# Patient Record
Sex: FEMALE | Race: WHITE | NOT HISPANIC OR LATINO | Employment: UNEMPLOYED | URBAN - METROPOLITAN AREA
[De-identification: names, ages, dates, MRNs, and addresses within clinical notes are randomized per-mention and may not be internally consistent; named-entity substitution may affect disease eponyms.]

---

## 2017-02-15 ENCOUNTER — GENERIC CONVERSION - ENCOUNTER (OUTPATIENT)
Dept: OTHER | Facility: OTHER | Age: 82
End: 2017-02-15

## 2017-02-16 ENCOUNTER — ALLSCRIPTS OFFICE VISIT (OUTPATIENT)
Dept: OTHER | Facility: OTHER | Age: 82
End: 2017-02-16

## 2017-02-27 ENCOUNTER — APPOINTMENT (INPATIENT)
Dept: RADIOLOGY | Facility: HOSPITAL | Age: 82
DRG: 580 | End: 2017-02-27
Payer: MEDICARE

## 2017-02-27 ENCOUNTER — ANESTHESIA EVENT (INPATIENT)
Dept: PERIOP | Facility: HOSPITAL | Age: 82
DRG: 580 | End: 2017-02-27
Payer: MEDICARE

## 2017-02-27 ENCOUNTER — ANESTHESIA (INPATIENT)
Dept: PERIOP | Facility: HOSPITAL | Age: 82
DRG: 580 | End: 2017-02-27
Payer: MEDICARE

## 2017-02-27 ENCOUNTER — HOSPITAL ENCOUNTER (INPATIENT)
Facility: HOSPITAL | Age: 82
LOS: 4 days | Discharge: HOME HEALTH | DRG: 580 | End: 2017-03-03
Attending: INTERNAL MEDICINE | Admitting: INTERNAL MEDICINE
Payer: MEDICARE

## 2017-02-27 DIAGNOSIS — L03.90 CELLULITIS: Primary | ICD-10-CM

## 2017-02-27 DIAGNOSIS — L02.91 ABSCESS: ICD-10-CM

## 2017-02-27 DIAGNOSIS — G35 MULTIPLE SCLEROSIS (HCC): ICD-10-CM

## 2017-02-27 PROBLEM — L03.312 CELLULITIS OF BACK: Status: ACTIVE | Noted: 2017-02-27

## 2017-02-27 PROBLEM — I10 BENIGN ESSENTIAL HYPERTENSION: Status: ACTIVE | Noted: 2017-02-27

## 2017-02-27 PROBLEM — E78.5 HYPERLIPEMIA: Status: ACTIVE | Noted: 2017-02-27

## 2017-02-27 PROBLEM — I48.91 ATRIAL FIBRILLATION (HCC): Status: ACTIVE | Noted: 2017-02-27

## 2017-02-27 PROBLEM — E13.9 DIABETES 1.5, MANAGED AS TYPE 2 (HCC): Status: ACTIVE | Noted: 2017-02-27

## 2017-02-27 PROBLEM — I16.9 HYPERTENSIVE CRISIS: Status: ACTIVE | Noted: 2017-02-27

## 2017-02-27 LAB
ALBUMIN SERPL BCP-MCNC: 3 G/DL (ref 3.5–5)
ALP SERPL-CCNC: 113 U/L (ref 46–116)
ALT SERPL W P-5'-P-CCNC: 30 U/L (ref 12–78)
ANION GAP SERPL CALCULATED.3IONS-SCNC: 7 MMOL/L (ref 4–13)
APTT PPP: 29 SECONDS (ref 24–33)
AST SERPL W P-5'-P-CCNC: 19 U/L (ref 5–45)
BASOPHILS # BLD AUTO: 0 THOUSANDS/ΜL (ref 0–0.1)
BASOPHILS NFR BLD AUTO: 0 % (ref 0–1)
BILIRUB SERPL-MCNC: 0.3 MG/DL (ref 0.2–1)
BUN SERPL-MCNC: 13 MG/DL (ref 5–25)
CALCIUM SERPL-MCNC: 9.1 MG/DL (ref 8.3–10.1)
CHLORIDE SERPL-SCNC: 102 MMOL/L (ref 100–108)
CO2 SERPL-SCNC: 31 MMOL/L (ref 21–32)
CREAT SERPL-MCNC: 0.7 MG/DL (ref 0.6–1.3)
EOSINOPHIL # BLD AUTO: 0.1 THOUSAND/ΜL (ref 0–0.61)
EOSINOPHIL NFR BLD AUTO: 1 % (ref 0–6)
ERYTHROCYTE [DISTWIDTH] IN BLOOD BY AUTOMATED COUNT: 15.3 % (ref 11.6–15.1)
GFR SERPL CREATININE-BSD FRML MDRD: >60 ML/MIN/1.73SQ M
GLUCOSE SERPL-MCNC: 142 MG/DL (ref 65–140)
HCT VFR BLD AUTO: 41.3 % (ref 37–47)
HGB BLD-MCNC: 13.6 G/DL (ref 12–16)
INR PPP: 0.99 (ref 0.86–1.16)
LACTATE SERPL-SCNC: 1.8 MMOL/L (ref 0.5–2)
LYMPHOCYTES # BLD AUTO: 1.1 THOUSANDS/ΜL (ref 0.6–4.47)
LYMPHOCYTES NFR BLD AUTO: 10 % (ref 14–44)
MCH RBC QN AUTO: 27.9 PG (ref 27–31)
MCHC RBC AUTO-ENTMCNC: 32.9 G/DL (ref 31.4–37.4)
MCV RBC AUTO: 85 FL (ref 82–98)
MONOCYTES # BLD AUTO: 0.7 THOUSAND/ΜL (ref 0.17–1.22)
MONOCYTES NFR BLD AUTO: 7 % (ref 4–12)
NEUTROPHILS # BLD AUTO: 9.5 THOUSANDS/ΜL (ref 1.85–7.62)
NEUTS SEG NFR BLD AUTO: 82 % (ref 43–75)
NRBC BLD AUTO-RTO: 0 /100 WBCS
PLATELET # BLD AUTO: 195 THOUSANDS/UL (ref 130–400)
PMV BLD AUTO: 9.9 FL (ref 8.9–12.7)
POTASSIUM SERPL-SCNC: 5.1 MMOL/L (ref 3.5–5.3)
PROT SERPL-MCNC: 7.9 G/DL (ref 6.4–8.2)
PROTHROMBIN TIME: 10.4 SECONDS (ref 9.4–11.7)
RBC # BLD AUTO: 4.87 MILLION/UL (ref 4.2–5.4)
SODIUM SERPL-SCNC: 140 MMOL/L (ref 136–145)
WBC # BLD AUTO: 11.6 THOUSAND/UL (ref 4.8–10.8)

## 2017-02-27 PROCEDURE — 87070 CULTURE OTHR SPECIMN AEROBIC: CPT | Performed by: SPECIALIST

## 2017-02-27 PROCEDURE — 85025 COMPLETE CBC W/AUTO DIFF WBC: CPT | Performed by: PHYSICIAN ASSISTANT

## 2017-02-27 PROCEDURE — 36415 COLL VENOUS BLD VENIPUNCTURE: CPT | Performed by: PHYSICIAN ASSISTANT

## 2017-02-27 PROCEDURE — 80053 COMPREHEN METABOLIC PANEL: CPT | Performed by: PHYSICIAN ASSISTANT

## 2017-02-27 PROCEDURE — 99284 EMERGENCY DEPT VISIT MOD MDM: CPT

## 2017-02-27 PROCEDURE — 96365 THER/PROPH/DIAG IV INF INIT: CPT

## 2017-02-27 PROCEDURE — 87205 SMEAR GRAM STAIN: CPT | Performed by: SPECIALIST

## 2017-02-27 PROCEDURE — 71020 HB CHEST X-RAY 2VW FRONTAL&LATL: CPT

## 2017-02-27 PROCEDURE — 87040 BLOOD CULTURE FOR BACTERIA: CPT | Performed by: PHYSICIAN ASSISTANT

## 2017-02-27 PROCEDURE — 83605 ASSAY OF LACTIC ACID: CPT | Performed by: PHYSICIAN ASSISTANT

## 2017-02-27 PROCEDURE — 87176 TISSUE HOMOGENIZATION CULTR: CPT | Performed by: SPECIALIST

## 2017-02-27 PROCEDURE — 87147 CULTURE TYPE IMMUNOLOGIC: CPT | Performed by: SPECIALIST

## 2017-02-27 PROCEDURE — 85730 THROMBOPLASTIN TIME PARTIAL: CPT | Performed by: PHYSICIAN ASSISTANT

## 2017-02-27 PROCEDURE — 0J960ZZ DRAINAGE OF CHEST SUBCUTANEOUS TISSUE AND FASCIA, OPEN APPROACH: ICD-10-PCS | Performed by: SPECIALIST

## 2017-02-27 PROCEDURE — 85610 PROTHROMBIN TIME: CPT | Performed by: PHYSICIAN ASSISTANT

## 2017-02-27 PROCEDURE — 87186 SC STD MICRODIL/AGAR DIL: CPT | Performed by: SPECIALIST

## 2017-02-27 PROCEDURE — 87075 CULTR BACTERIA EXCEPT BLOOD: CPT | Performed by: SPECIALIST

## 2017-02-27 RX ORDER — PROPOFOL 10 MG/ML
INJECTION, EMULSION INTRAVENOUS AS NEEDED
Status: DISCONTINUED | OUTPATIENT
Start: 2017-02-27 | End: 2017-02-27 | Stop reason: SURG

## 2017-02-27 RX ORDER — LABETALOL HYDROCHLORIDE 5 MG/ML
10 INJECTION, SOLUTION INTRAVENOUS EVERY 4 HOURS PRN
Status: DISCONTINUED | OUTPATIENT
Start: 2017-02-27 | End: 2017-02-27

## 2017-02-27 RX ORDER — AMLODIPINE BESYLATE 10 MG/1
10 TABLET ORAL DAILY
Status: DISCONTINUED | OUTPATIENT
Start: 2017-02-28 | End: 2017-03-03 | Stop reason: HOSPADM

## 2017-02-27 RX ORDER — LISINOPRIL 20 MG/1
20 TABLET ORAL 2 TIMES DAILY
COMMUNITY
End: 2018-04-20 | Stop reason: SDUPTHER

## 2017-02-27 RX ORDER — OXYCODONE HYDROCHLORIDE AND ACETAMINOPHEN 5; 325 MG/1; MG/1
1 TABLET ORAL EVERY 4 HOURS PRN
Status: DISCONTINUED | OUTPATIENT
Start: 2017-02-27 | End: 2017-03-02

## 2017-02-27 RX ORDER — SOTALOL HYDROCHLORIDE 80 MG/1
80 TABLET ORAL 2 TIMES DAILY
COMMUNITY
End: 2020-03-04 | Stop reason: SDUPTHER

## 2017-02-27 RX ORDER — CLINDAMYCIN PHOSPHATE 600 MG/50ML
600 INJECTION INTRAVENOUS ONCE
Status: COMPLETED | OUTPATIENT
Start: 2017-02-27 | End: 2017-02-27

## 2017-02-27 RX ORDER — LISINOPRIL 20 MG/1
20 TABLET ORAL DAILY
Status: DISCONTINUED | OUTPATIENT
Start: 2017-02-27 | End: 2017-02-27

## 2017-02-27 RX ORDER — AMLODIPINE BESYLATE 10 MG/1
10 TABLET ORAL DAILY
COMMUNITY
End: 2018-04-20 | Stop reason: SDUPTHER

## 2017-02-27 RX ORDER — SOTALOL HYDROCHLORIDE 80 MG/1
80 TABLET ORAL 2 TIMES DAILY
Status: DISCONTINUED | OUTPATIENT
Start: 2017-02-27 | End: 2017-03-03

## 2017-02-27 RX ORDER — LABETALOL HYDROCHLORIDE 5 MG/ML
10 INJECTION, SOLUTION INTRAVENOUS EVERY 4 HOURS PRN
Status: DISCONTINUED | OUTPATIENT
Start: 2017-02-27 | End: 2017-03-03 | Stop reason: HOSPADM

## 2017-02-27 RX ORDER — SODIUM CHLORIDE 9 MG/ML
50 INJECTION, SOLUTION INTRAVENOUS CONTINUOUS
Status: DISCONTINUED | OUTPATIENT
Start: 2017-02-27 | End: 2017-03-01

## 2017-02-27 RX ORDER — SIMVASTATIN 20 MG
20 TABLET ORAL
COMMUNITY
End: 2018-03-28 | Stop reason: SDUPTHER

## 2017-02-27 RX ORDER — PRAVASTATIN SODIUM 40 MG
40 TABLET ORAL
Status: DISCONTINUED | OUTPATIENT
Start: 2017-02-27 | End: 2017-03-03 | Stop reason: HOSPADM

## 2017-02-27 RX ORDER — FENTANYL CITRATE 50 UG/ML
INJECTION, SOLUTION INTRAMUSCULAR; INTRAVENOUS AS NEEDED
Status: DISCONTINUED | OUTPATIENT
Start: 2017-02-27 | End: 2017-02-27 | Stop reason: SURG

## 2017-02-27 RX ORDER — FENTANYL CITRATE/PF 50 MCG/ML
25 SYRINGE (ML) INJECTION
Status: DISCONTINUED | OUTPATIENT
Start: 2017-02-27 | End: 2017-02-27 | Stop reason: HOSPADM

## 2017-02-27 RX ORDER — SODIUM CHLORIDE 9 MG/ML
INJECTION, SOLUTION INTRAVENOUS CONTINUOUS PRN
Status: DISCONTINUED | OUTPATIENT
Start: 2017-02-27 | End: 2017-02-27 | Stop reason: SURG

## 2017-02-27 RX ORDER — AMLODIPINE BESYLATE 10 MG/1
10 TABLET ORAL DAILY
Status: DISCONTINUED | OUTPATIENT
Start: 2017-02-27 | End: 2017-02-27

## 2017-02-27 RX ORDER — ONDANSETRON 2 MG/ML
4 INJECTION INTRAMUSCULAR; INTRAVENOUS EVERY 6 HOURS PRN
Status: DISCONTINUED | OUTPATIENT
Start: 2017-02-27 | End: 2017-03-03 | Stop reason: HOSPADM

## 2017-02-27 RX ORDER — MIDAZOLAM HYDROCHLORIDE 1 MG/ML
INJECTION INTRAMUSCULAR; INTRAVENOUS AS NEEDED
Status: DISCONTINUED | OUTPATIENT
Start: 2017-02-27 | End: 2017-02-27 | Stop reason: SURG

## 2017-02-27 RX ORDER — MAGNESIUM HYDROXIDE 1200 MG/15ML
LIQUID ORAL AS NEEDED
Status: DISCONTINUED | OUTPATIENT
Start: 2017-02-27 | End: 2017-02-27 | Stop reason: HOSPADM

## 2017-02-27 RX ORDER — LISINOPRIL 20 MG/1
20 TABLET ORAL DAILY
Status: DISCONTINUED | OUTPATIENT
Start: 2017-02-28 | End: 2017-03-01

## 2017-02-27 RX ORDER — ASPIRIN 81 MG/1
162 TABLET, CHEWABLE ORAL DAILY
Status: DISCONTINUED | OUTPATIENT
Start: 2017-02-28 | End: 2017-03-03 | Stop reason: HOSPADM

## 2017-02-27 RX ADMIN — FENTANYL CITRATE 25 MCG: 50 INJECTION, SOLUTION INTRAMUSCULAR; INTRAVENOUS at 14:38

## 2017-02-27 RX ADMIN — PROPOFOL 50 MG: 10 INJECTION, EMULSION INTRAVENOUS at 14:38

## 2017-02-27 RX ADMIN — PIPERACILLIN SODIUM AND TAZOBACTAM SODIUM 3.38 G: 3; .375 INJECTION, POWDER, LYOPHILIZED, FOR SOLUTION INTRAVENOUS at 12:50

## 2017-02-27 RX ADMIN — FENTANYL CITRATE 25 MCG: 50 INJECTION, SOLUTION INTRAMUSCULAR; INTRAVENOUS at 14:45

## 2017-02-27 RX ADMIN — FENTANYL CITRATE 25 MCG: 50 INJECTION, SOLUTION INTRAMUSCULAR; INTRAVENOUS at 14:55

## 2017-02-27 RX ADMIN — PROPOFOL 30 MG: 10 INJECTION, EMULSION INTRAVENOUS at 14:45

## 2017-02-27 RX ADMIN — ENOXAPARIN SODIUM 40 MG: 40 INJECTION SUBCUTANEOUS at 18:38

## 2017-02-27 RX ADMIN — SOTALOL HYDROCHLORIDE 80 MG: 80 TABLET ORAL at 18:39

## 2017-02-27 RX ADMIN — VANCOMYCIN HYDROCHLORIDE 1 G: 1 INJECTION, POWDER, LYOPHILIZED, FOR SOLUTION INTRAVENOUS at 14:32

## 2017-02-27 RX ADMIN — LABETALOL HYDROCHLORIDE 10 MG: 5 INJECTION, SOLUTION INTRAVENOUS at 20:07

## 2017-02-27 RX ADMIN — MIDAZOLAM HYDROCHLORIDE 1 MG: 1 INJECTION, SOLUTION INTRAMUSCULAR; INTRAVENOUS at 14:38

## 2017-02-27 RX ADMIN — MIDAZOLAM HYDROCHLORIDE 1 MG: 1 INJECTION, SOLUTION INTRAMUSCULAR; INTRAVENOUS at 14:45

## 2017-02-27 RX ADMIN — FENTANYL CITRATE 25 MCG: 50 INJECTION, SOLUTION INTRAMUSCULAR; INTRAVENOUS at 14:41

## 2017-02-27 RX ADMIN — VANCOMYCIN HYDROCHLORIDE 1250 MG: 1 INJECTION, POWDER, LYOPHILIZED, FOR SOLUTION INTRAVENOUS at 13:53

## 2017-02-27 RX ADMIN — CLINDAMYCIN PHOSPHATE 600 MG: 12 INJECTION, SOLUTION INTRAMUSCULAR; INTRAVENOUS at 11:47

## 2017-02-27 RX ADMIN — SODIUM CHLORIDE: 0.9 INJECTION, SOLUTION INTRAVENOUS at 14:38

## 2017-02-27 RX ADMIN — PRAVASTATIN SODIUM 40 MG: 40 TABLET ORAL at 18:38

## 2017-02-28 LAB
ANION GAP SERPL CALCULATED.3IONS-SCNC: 11 MMOL/L (ref 4–13)
BACTERIA UR QL AUTO: ABNORMAL /HPF
BASOPHILS # BLD AUTO: 0 THOUSANDS/ΜL (ref 0–0.1)
BASOPHILS NFR BLD AUTO: 0 % (ref 0–1)
BILIRUB UR QL STRIP: NEGATIVE
BUN SERPL-MCNC: 13 MG/DL (ref 5–25)
CALCIUM SERPL-MCNC: 7.9 MG/DL (ref 8.3–10.1)
CHLORIDE SERPL-SCNC: 105 MMOL/L (ref 100–108)
CLARITY UR: CLEAR
CO2 SERPL-SCNC: 25 MMOL/L (ref 21–32)
COLOR UR: YELLOW
CREAT SERPL-MCNC: 0.71 MG/DL (ref 0.6–1.3)
EOSINOPHIL # BLD AUTO: 0.1 THOUSAND/ΜL (ref 0–0.61)
EOSINOPHIL NFR BLD AUTO: 1 % (ref 0–6)
ERYTHROCYTE [DISTWIDTH] IN BLOOD BY AUTOMATED COUNT: 15.1 % (ref 11.6–15.1)
FINE GRAN CASTS URNS QL MICRO: ABNORMAL /LPF
GFR SERPL CREATININE-BSD FRML MDRD: >60 ML/MIN/1.73SQ M
GLUCOSE SERPL-MCNC: 151 MG/DL (ref 65–140)
GLUCOSE SERPL-MCNC: 165 MG/DL (ref 65–140)
GLUCOSE SERPL-MCNC: 176 MG/DL (ref 65–140)
GLUCOSE UR STRIP-MCNC: NEGATIVE MG/DL
HCT VFR BLD AUTO: 35.6 % (ref 37–47)
HGB BLD-MCNC: 11.7 G/DL (ref 12–16)
HGB UR QL STRIP.AUTO: ABNORMAL
KETONES UR STRIP-MCNC: ABNORMAL MG/DL
LEUKOCYTE ESTERASE UR QL STRIP: NEGATIVE
LYMPHOCYTES # BLD AUTO: 1.3 THOUSANDS/ΜL (ref 0.6–4.47)
LYMPHOCYTES NFR BLD AUTO: 12 % (ref 14–44)
MCH RBC QN AUTO: 28.1 PG (ref 27–31)
MCHC RBC AUTO-ENTMCNC: 32.8 G/DL (ref 31.4–37.4)
MCV RBC AUTO: 86 FL (ref 82–98)
MONOCYTES # BLD AUTO: 1.1 THOUSAND/ΜL (ref 0.17–1.22)
MONOCYTES NFR BLD AUTO: 10 % (ref 4–12)
NEUTROPHILS # BLD AUTO: 8.6 THOUSANDS/ΜL (ref 1.85–7.62)
NEUTS SEG NFR BLD AUTO: 78 % (ref 43–75)
NITRITE UR QL STRIP: NEGATIVE
NON-SQ EPI CELLS URNS QL MICRO: ABNORMAL /HPF
NRBC BLD AUTO-RTO: 0 /100 WBCS
PH UR STRIP.AUTO: 5.5 [PH] (ref 5–9)
PLATELET # BLD AUTO: 196 THOUSANDS/UL (ref 130–400)
PMV BLD AUTO: 9.7 FL (ref 8.9–12.7)
POTASSIUM SERPL-SCNC: 4.1 MMOL/L (ref 3.5–5.3)
PROT UR STRIP-MCNC: ABNORMAL MG/DL
RBC # BLD AUTO: 4.16 MILLION/UL (ref 4.2–5.4)
RBC #/AREA URNS AUTO: ABNORMAL /HPF
SODIUM SERPL-SCNC: 141 MMOL/L (ref 136–145)
SP GR UR STRIP.AUTO: 1.02 (ref 1–1.03)
UROBILINOGEN UR QL STRIP.AUTO: 0.2 E.U./DL
WBC # BLD AUTO: 11.1 THOUSAND/UL (ref 4.8–10.8)
WBC #/AREA URNS AUTO: ABNORMAL /HPF

## 2017-02-28 PROCEDURE — 85025 COMPLETE CBC W/AUTO DIFF WBC: CPT | Performed by: SPECIALIST

## 2017-02-28 PROCEDURE — 97162 PT EVAL MOD COMPLEX 30 MIN: CPT

## 2017-02-28 PROCEDURE — 97110 THERAPEUTIC EXERCISES: CPT

## 2017-02-28 PROCEDURE — 82948 REAGENT STRIP/BLOOD GLUCOSE: CPT

## 2017-02-28 PROCEDURE — G8979 MOBILITY GOAL STATUS: HCPCS

## 2017-02-28 PROCEDURE — 97167 OT EVAL HIGH COMPLEX 60 MIN: CPT

## 2017-02-28 PROCEDURE — G8978 MOBILITY CURRENT STATUS: HCPCS

## 2017-02-28 PROCEDURE — 80048 BASIC METABOLIC PNL TOTAL CA: CPT | Performed by: SPECIALIST

## 2017-02-28 PROCEDURE — G8988 SELF CARE GOAL STATUS: HCPCS

## 2017-02-28 PROCEDURE — 87086 URINE CULTURE/COLONY COUNT: CPT | Performed by: NURSE PRACTITIONER

## 2017-02-28 PROCEDURE — 81001 URINALYSIS AUTO W/SCOPE: CPT | Performed by: NURSE PRACTITIONER

## 2017-02-28 PROCEDURE — G8987 SELF CARE CURRENT STATUS: HCPCS

## 2017-02-28 RX ADMIN — LISINOPRIL 20 MG: 20 TABLET ORAL at 10:15

## 2017-02-28 RX ADMIN — SOTALOL HYDROCHLORIDE 80 MG: 80 TABLET ORAL at 10:14

## 2017-02-28 RX ADMIN — ASPIRIN 81 MG 162 MG: 81 TABLET ORAL at 10:14

## 2017-02-28 RX ADMIN — PRAVASTATIN SODIUM 40 MG: 40 TABLET ORAL at 18:09

## 2017-02-28 RX ADMIN — SODIUM CHLORIDE 50 ML/HR: 0.9 INJECTION, SOLUTION INTRAVENOUS at 18:12

## 2017-02-28 RX ADMIN — SOTALOL HYDROCHLORIDE 80 MG: 80 TABLET ORAL at 18:09

## 2017-02-28 RX ADMIN — AMLODIPINE BESYLATE 10 MG: 10 TABLET ORAL at 10:15

## 2017-02-28 RX ADMIN — VANCOMYCIN HYDROCHLORIDE 1250 MG: 1 INJECTION, POWDER, LYOPHILIZED, FOR SOLUTION INTRAVENOUS at 14:58

## 2017-02-28 RX ADMIN — ENOXAPARIN SODIUM 40 MG: 40 INJECTION SUBCUTANEOUS at 10:14

## 2017-02-28 RX ADMIN — VANCOMYCIN HYDROCHLORIDE 1250 MG: 1 INJECTION, POWDER, LYOPHILIZED, FOR SOLUTION INTRAVENOUS at 03:00

## 2017-02-28 RX ADMIN — METFORMIN HYDROCHLORIDE 500 MG: 500 TABLET, FILM COATED ORAL at 10:14

## 2017-03-01 ENCOUNTER — APPOINTMENT (INPATIENT)
Dept: PHYSICAL THERAPY | Facility: HOSPITAL | Age: 82
DRG: 580 | End: 2017-03-01
Payer: MEDICARE

## 2017-03-01 LAB
ANION GAP SERPL CALCULATED.3IONS-SCNC: 9 MMOL/L (ref 4–13)
BACTERIA SPEC ANAEROBE CULT: NORMAL
BACTERIA UR CULT: NORMAL
BUN SERPL-MCNC: 13 MG/DL (ref 5–25)
CALCIUM SERPL-MCNC: 8 MG/DL (ref 8.3–10.1)
CHLORIDE SERPL-SCNC: 107 MMOL/L (ref 100–108)
CO2 SERPL-SCNC: 27 MMOL/L (ref 21–32)
CREAT SERPL-MCNC: 0.75 MG/DL (ref 0.6–1.3)
ERYTHROCYTE [DISTWIDTH] IN BLOOD BY AUTOMATED COUNT: 14.9 % (ref 11.6–15.1)
GFR SERPL CREATININE-BSD FRML MDRD: >60 ML/MIN/1.73SQ M
GLUCOSE SERPL-MCNC: 174 MG/DL (ref 65–140)
HCT VFR BLD AUTO: 34.8 % (ref 37–47)
HGB BLD-MCNC: 11.3 G/DL (ref 12–16)
MCH RBC QN AUTO: 27.6 PG (ref 27–31)
MCHC RBC AUTO-ENTMCNC: 32.5 G/DL (ref 31.4–37.4)
MCV RBC AUTO: 85 FL (ref 82–98)
PLATELET # BLD AUTO: 192 THOUSANDS/UL (ref 130–400)
PMV BLD AUTO: 8.7 FL (ref 8.9–12.7)
POTASSIUM SERPL-SCNC: 3.8 MMOL/L (ref 3.5–5.3)
RBC # BLD AUTO: 4.09 MILLION/UL (ref 4.2–5.4)
SODIUM SERPL-SCNC: 143 MMOL/L (ref 136–145)
VANCOMYCIN TROUGH SERPL-MCNC: 16.3 UG/ML (ref 10–20)
WBC # BLD AUTO: 8.9 THOUSAND/UL (ref 4.8–10.8)

## 2017-03-01 PROCEDURE — 80048 BASIC METABOLIC PNL TOTAL CA: CPT | Performed by: NURSE PRACTITIONER

## 2017-03-01 PROCEDURE — 85027 COMPLETE CBC AUTOMATED: CPT | Performed by: NURSE PRACTITIONER

## 2017-03-01 PROCEDURE — 80202 ASSAY OF VANCOMYCIN: CPT | Performed by: NURSE PRACTITIONER

## 2017-03-01 PROCEDURE — 82948 REAGENT STRIP/BLOOD GLUCOSE: CPT

## 2017-03-01 PROCEDURE — 97110 THERAPEUTIC EXERCISES: CPT

## 2017-03-01 PROCEDURE — 97535 SELF CARE MNGMENT TRAINING: CPT

## 2017-03-01 RX ORDER — ACETAMINOPHEN 325 MG/1
650 TABLET ORAL EVERY 6 HOURS PRN
Status: DISCONTINUED | OUTPATIENT
Start: 2017-03-01 | End: 2017-03-03 | Stop reason: HOSPADM

## 2017-03-01 RX ORDER — LISINOPRIL 10 MG/1
10 TABLET ORAL ONCE
Status: COMPLETED | OUTPATIENT
Start: 2017-03-01 | End: 2017-03-01

## 2017-03-01 RX ORDER — POLYETHYLENE GLYCOL 3350 17 G/17G
17 POWDER, FOR SOLUTION ORAL DAILY
Status: DISCONTINUED | OUTPATIENT
Start: 2017-03-01 | End: 2017-03-03 | Stop reason: HOSPADM

## 2017-03-01 RX ORDER — DOCUSATE SODIUM 100 MG/1
100 CAPSULE, LIQUID FILLED ORAL 2 TIMES DAILY
Status: DISCONTINUED | OUTPATIENT
Start: 2017-03-01 | End: 2017-03-03 | Stop reason: HOSPADM

## 2017-03-01 RX ADMIN — DOCUSATE SODIUM 100 MG: 100 CAPSULE, LIQUID FILLED ORAL at 11:29

## 2017-03-01 RX ADMIN — ENOXAPARIN SODIUM 40 MG: 40 INJECTION SUBCUTANEOUS at 08:13

## 2017-03-01 RX ADMIN — VANCOMYCIN HYDROCHLORIDE 1250 MG: 1 INJECTION, POWDER, LYOPHILIZED, FOR SOLUTION INTRAVENOUS at 02:56

## 2017-03-01 RX ADMIN — POLYETHYLENE GLYCOL 3350 17 G: 17 POWDER, FOR SOLUTION ORAL at 11:29

## 2017-03-01 RX ADMIN — ASPIRIN 81 MG 162 MG: 81 TABLET ORAL at 08:13

## 2017-03-01 RX ADMIN — METFORMIN HYDROCHLORIDE 500 MG: 500 TABLET, FILM COATED ORAL at 07:50

## 2017-03-01 RX ADMIN — AMLODIPINE BESYLATE 10 MG: 10 TABLET ORAL at 08:19

## 2017-03-01 RX ADMIN — SOTALOL HYDROCHLORIDE 80 MG: 80 TABLET ORAL at 18:41

## 2017-03-01 RX ADMIN — LISINOPRIL 10 MG: 10 TABLET ORAL at 09:25

## 2017-03-01 RX ADMIN — SOTALOL HYDROCHLORIDE 80 MG: 80 TABLET ORAL at 08:12

## 2017-03-01 RX ADMIN — LISINOPRIL 20 MG: 20 TABLET ORAL at 08:12

## 2017-03-01 RX ADMIN — PRAVASTATIN SODIUM 40 MG: 40 TABLET ORAL at 18:41

## 2017-03-01 RX ADMIN — CEFTRIAXONE SODIUM 2000 MG: 2 INJECTION, POWDER, FOR SOLUTION INTRAMUSCULAR; INTRAVENOUS at 14:34

## 2017-03-01 RX ADMIN — VANCOMYCIN HYDROCHLORIDE 1250 MG: 1 INJECTION, POWDER, LYOPHILIZED, FOR SOLUTION INTRAVENOUS at 15:58

## 2017-03-01 RX ADMIN — DOCUSATE SODIUM 100 MG: 100 CAPSULE, LIQUID FILLED ORAL at 18:41

## 2017-03-02 ENCOUNTER — APPOINTMENT (INPATIENT)
Dept: PHYSICAL THERAPY | Facility: HOSPITAL | Age: 82
DRG: 580 | End: 2017-03-02
Payer: MEDICARE

## 2017-03-02 ENCOUNTER — APPOINTMENT (INPATIENT)
Dept: OCCUPATIONAL THERAPY | Facility: HOSPITAL | Age: 82
DRG: 580 | End: 2017-03-02
Payer: MEDICARE

## 2017-03-02 LAB
ANION GAP SERPL CALCULATED.3IONS-SCNC: 9 MMOL/L (ref 4–13)
BACTERIA TISS AEROBE CULT: NORMAL
BASOPHILS # BLD AUTO: 0 THOUSANDS/ΜL (ref 0–0.1)
BASOPHILS NFR BLD AUTO: 1 % (ref 0–1)
BUN SERPL-MCNC: 16 MG/DL (ref 5–25)
CALCIUM SERPL-MCNC: 8.1 MG/DL (ref 8.3–10.1)
CHLORIDE SERPL-SCNC: 107 MMOL/L (ref 100–108)
CO2 SERPL-SCNC: 28 MMOL/L (ref 21–32)
CREAT SERPL-MCNC: 0.74 MG/DL (ref 0.6–1.3)
EOSINOPHIL # BLD AUTO: 0.2 THOUSAND/ΜL (ref 0–0.61)
EOSINOPHIL NFR BLD AUTO: 2 % (ref 0–6)
ERYTHROCYTE [DISTWIDTH] IN BLOOD BY AUTOMATED COUNT: 15.2 % (ref 11.6–15.1)
GFR SERPL CREATININE-BSD FRML MDRD: >60 ML/MIN/1.73SQ M
GLUCOSE SERPL-MCNC: 152 MG/DL (ref 65–140)
GLUCOSE SERPL-MCNC: 152 MG/DL (ref 65–140)
GLUCOSE SERPL-MCNC: 153 MG/DL (ref 65–140)
GLUCOSE SERPL-MCNC: 160 MG/DL (ref 65–140)
GLUCOSE SERPL-MCNC: 163 MG/DL (ref 65–140)
GLUCOSE SERPL-MCNC: 164 MG/DL (ref 65–140)
GLUCOSE SERPL-MCNC: 168 MG/DL (ref 65–140)
GLUCOSE SERPL-MCNC: 182 MG/DL (ref 65–140)
GLUCOSE SERPL-MCNC: 186 MG/DL (ref 65–140)
GRAM STN SPEC: NORMAL
HCT VFR BLD AUTO: 35 % (ref 37–47)
HGB BLD-MCNC: 11.5 G/DL (ref 12–16)
LYMPHOCYTES # BLD AUTO: 1.2 THOUSANDS/ΜL (ref 0.6–4.47)
LYMPHOCYTES NFR BLD AUTO: 15 % (ref 14–44)
MCH RBC QN AUTO: 28 PG (ref 27–31)
MCHC RBC AUTO-ENTMCNC: 32.9 G/DL (ref 31.4–37.4)
MCV RBC AUTO: 85 FL (ref 82–98)
MONOCYTES # BLD AUTO: 0.8 THOUSAND/ΜL (ref 0.17–1.22)
MONOCYTES NFR BLD AUTO: 10 % (ref 4–12)
NEUTROPHILS # BLD AUTO: 5.8 THOUSANDS/ΜL (ref 1.85–7.62)
NEUTS SEG NFR BLD AUTO: 72 % (ref 43–75)
NRBC BLD AUTO-RTO: 0 /100 WBCS
PLATELET # BLD AUTO: 209 THOUSANDS/UL (ref 130–400)
PMV BLD AUTO: 9.1 FL (ref 8.9–12.7)
POTASSIUM SERPL-SCNC: 3.9 MMOL/L (ref 3.5–5.3)
RBC # BLD AUTO: 4.12 MILLION/UL (ref 4.2–5.4)
SODIUM SERPL-SCNC: 144 MMOL/L (ref 136–145)
WBC # BLD AUTO: 8.1 THOUSAND/UL (ref 4.8–10.8)

## 2017-03-02 PROCEDURE — 97110 THERAPEUTIC EXERCISES: CPT

## 2017-03-02 PROCEDURE — 85025 COMPLETE CBC W/AUTO DIFF WBC: CPT | Performed by: INTERNAL MEDICINE

## 2017-03-02 PROCEDURE — 93005 ELECTROCARDIOGRAM TRACING: CPT | Performed by: INTERNAL MEDICINE

## 2017-03-02 PROCEDURE — 97535 SELF CARE MNGMENT TRAINING: CPT

## 2017-03-02 PROCEDURE — 82948 REAGENT STRIP/BLOOD GLUCOSE: CPT

## 2017-03-02 PROCEDURE — 80048 BASIC METABOLIC PNL TOTAL CA: CPT | Performed by: INTERNAL MEDICINE

## 2017-03-02 RX ADMIN — ASPIRIN 81 MG 162 MG: 81 TABLET ORAL at 10:09

## 2017-03-02 RX ADMIN — METFORMIN HYDROCHLORIDE 500 MG: 500 TABLET, FILM COATED ORAL at 07:51

## 2017-03-02 RX ADMIN — CEFTRIAXONE SODIUM 2000 MG: 2 INJECTION, POWDER, FOR SOLUTION INTRAMUSCULAR; INTRAVENOUS at 15:04

## 2017-03-02 RX ADMIN — PRAVASTATIN SODIUM 40 MG: 40 TABLET ORAL at 17:02

## 2017-03-02 RX ADMIN — ENOXAPARIN SODIUM 40 MG: 40 INJECTION SUBCUTANEOUS at 10:08

## 2017-03-02 RX ADMIN — SOTALOL HYDROCHLORIDE 80 MG: 80 TABLET ORAL at 10:08

## 2017-03-02 RX ADMIN — VANCOMYCIN HYDROCHLORIDE 1250 MG: 1 INJECTION, POWDER, LYOPHILIZED, FOR SOLUTION INTRAVENOUS at 02:57

## 2017-03-02 RX ADMIN — SOTALOL HYDROCHLORIDE 80 MG: 80 TABLET ORAL at 17:02

## 2017-03-02 RX ADMIN — POLYETHYLENE GLYCOL 3350 17 G: 17 POWDER, FOR SOLUTION ORAL at 10:08

## 2017-03-02 RX ADMIN — DOCUSATE SODIUM 100 MG: 100 CAPSULE, LIQUID FILLED ORAL at 10:09

## 2017-03-02 RX ADMIN — AMLODIPINE BESYLATE 10 MG: 10 TABLET ORAL at 10:08

## 2017-03-02 RX ADMIN — DOCUSATE SODIUM 100 MG: 100 CAPSULE, LIQUID FILLED ORAL at 17:02

## 2017-03-02 RX ADMIN — LISINOPRIL 30 MG: 10 TABLET ORAL at 10:11

## 2017-03-03 ENCOUNTER — APPOINTMENT (INPATIENT)
Dept: PHYSICAL THERAPY | Facility: HOSPITAL | Age: 82
DRG: 580 | End: 2017-03-03
Payer: MEDICARE

## 2017-03-03 ENCOUNTER — APPOINTMENT (INPATIENT)
Dept: OCCUPATIONAL THERAPY | Facility: HOSPITAL | Age: 82
DRG: 580 | End: 2017-03-03
Payer: MEDICARE

## 2017-03-03 VITALS
RESPIRATION RATE: 18 BRPM | HEART RATE: 68 BPM | HEIGHT: 66 IN | TEMPERATURE: 97.8 F | DIASTOLIC BLOOD PRESSURE: 60 MMHG | BODY MASS INDEX: 31.98 KG/M2 | WEIGHT: 199 LBS | SYSTOLIC BLOOD PRESSURE: 162 MMHG | OXYGEN SATURATION: 97 %

## 2017-03-03 LAB
ATRIAL RATE: 69 BPM
ATRIAL RATE: 76 BPM
GLUCOSE SERPL-MCNC: 142 MG/DL (ref 65–140)
GLUCOSE SERPL-MCNC: 169 MG/DL (ref 65–140)
P AXIS: -20 DEGREES
PR INTERVAL: 170 MS
PR INTERVAL: 174 MS
QRS AXIS: 8 DEGREES
QRS AXIS: 9 DEGREES
QRSD INTERVAL: 78 MS
QRSD INTERVAL: 86 MS
QT INTERVAL: 412 MS
QT INTERVAL: 470 MS
QTC INTERVAL: 441 MS
QTC INTERVAL: 528 MS
T WAVE AXIS: 14 DEGREES
T WAVE AXIS: 18 DEGREES
VENTRICULAR RATE: 69 BPM
VENTRICULAR RATE: 76 BPM

## 2017-03-03 PROCEDURE — 97110 THERAPEUTIC EXERCISES: CPT

## 2017-03-03 PROCEDURE — 93005 ELECTROCARDIOGRAM TRACING: CPT | Performed by: NURSE PRACTITIONER

## 2017-03-03 PROCEDURE — 82948 REAGENT STRIP/BLOOD GLUCOSE: CPT

## 2017-03-03 PROCEDURE — 97535 SELF CARE MNGMENT TRAINING: CPT

## 2017-03-03 RX ORDER — SOTALOL HYDROCHLORIDE 80 MG/1
80 TABLET ORAL 2 TIMES DAILY
Status: DISCONTINUED | OUTPATIENT
Start: 2017-03-03 | End: 2017-03-03

## 2017-03-03 RX ORDER — SOTALOL HYDROCHLORIDE 80 MG/1
80 TABLET ORAL 2 TIMES DAILY
Status: DISCONTINUED | OUTPATIENT
Start: 2017-03-03 | End: 2017-03-03 | Stop reason: HOSPADM

## 2017-03-03 RX ORDER — CEFADROXIL 500 MG/1
500 CAPSULE ORAL 2 TIMES DAILY
Qty: 12 CAPSULE | Refills: 0 | Status: SHIPPED | OUTPATIENT
Start: 2017-03-04 | End: 2017-03-09

## 2017-03-03 RX ADMIN — ACETAMINOPHEN 650 MG: 325 TABLET, FILM COATED ORAL at 02:55

## 2017-03-03 RX ADMIN — ASPIRIN 81 MG 162 MG: 81 TABLET ORAL at 09:19

## 2017-03-03 RX ADMIN — CEFTRIAXONE SODIUM 2000 MG: 2 INJECTION, POWDER, FOR SOLUTION INTRAMUSCULAR; INTRAVENOUS at 13:22

## 2017-03-03 RX ADMIN — METFORMIN HYDROCHLORIDE 500 MG: 500 TABLET, FILM COATED ORAL at 09:18

## 2017-03-03 RX ADMIN — SOTALOL HYDROCHLORIDE 80 MG: 80 TABLET ORAL at 13:23

## 2017-03-03 RX ADMIN — LISINOPRIL 30 MG: 10 TABLET ORAL at 09:18

## 2017-03-03 RX ADMIN — POLYETHYLENE GLYCOL 3350 17 G: 17 POWDER, FOR SOLUTION ORAL at 09:19

## 2017-03-03 RX ADMIN — ENOXAPARIN SODIUM 40 MG: 40 INJECTION SUBCUTANEOUS at 09:18

## 2017-03-03 RX ADMIN — DOCUSATE SODIUM 100 MG: 100 CAPSULE, LIQUID FILLED ORAL at 09:18

## 2017-03-03 RX ADMIN — AMLODIPINE BESYLATE 10 MG: 10 TABLET ORAL at 09:18

## 2017-03-04 LAB
BACTERIA BLD CULT: NORMAL
BACTERIA BLD CULT: NORMAL

## 2017-03-06 ENCOUNTER — ALLSCRIPTS OFFICE VISIT (OUTPATIENT)
Dept: OTHER | Facility: OTHER | Age: 82
End: 2017-03-06

## 2017-03-06 ENCOUNTER — GENERIC CONVERSION - ENCOUNTER (OUTPATIENT)
Dept: OTHER | Facility: OTHER | Age: 82
End: 2017-03-06

## 2017-03-09 ENCOUNTER — ALLSCRIPTS OFFICE VISIT (OUTPATIENT)
Dept: OTHER | Facility: OTHER | Age: 82
End: 2017-03-09

## 2017-03-10 ENCOUNTER — HOSPITAL ENCOUNTER (INPATIENT)
Facility: HOSPITAL | Age: 82
LOS: 2 days | Discharge: HOME HEALTH | DRG: 060 | End: 2017-03-13
Attending: EMERGENCY MEDICINE | Admitting: HOSPITALIST
Payer: MEDICARE

## 2017-03-10 DIAGNOSIS — I16.9 HYPERTENSIVE CRISIS: ICD-10-CM

## 2017-03-10 DIAGNOSIS — G35 MULTIPLE SCLEROSIS (HCC): ICD-10-CM

## 2017-03-10 DIAGNOSIS — R53.1 WEAKNESS: Primary | ICD-10-CM

## 2017-03-10 PROCEDURE — 93005 ELECTROCARDIOGRAM TRACING: CPT | Performed by: EMERGENCY MEDICINE

## 2017-03-10 RX ORDER — METHYLPREDNISOLONE SODIUM SUCCINATE 125 MG/2ML
125 INJECTION, POWDER, LYOPHILIZED, FOR SOLUTION INTRAMUSCULAR; INTRAVENOUS ONCE
Status: COMPLETED | OUTPATIENT
Start: 2017-03-11 | End: 2017-03-11

## 2017-03-11 ENCOUNTER — APPOINTMENT (EMERGENCY)
Dept: RADIOLOGY | Facility: HOSPITAL | Age: 82
DRG: 060 | End: 2017-03-11
Payer: MEDICARE

## 2017-03-11 ENCOUNTER — APPOINTMENT (INPATIENT)
Dept: RADIOLOGY | Facility: HOSPITAL | Age: 82
DRG: 060 | End: 2017-03-11
Payer: MEDICARE

## 2017-03-11 PROBLEM — I10 HYPERTENSION: Status: ACTIVE | Noted: 2017-03-11

## 2017-03-11 PROBLEM — R53.1 WEAKNESS: Status: ACTIVE | Noted: 2017-03-11

## 2017-03-11 PROBLEM — E11.9 DIABETES MELLITUS (HCC): Status: ACTIVE | Noted: 2017-03-11

## 2017-03-11 LAB
25(OH)D3 SERPL-MCNC: 12 NG/ML (ref 30–100)
ALBUMIN SERPL BCP-MCNC: 3 G/DL (ref 3.5–5)
ALP SERPL-CCNC: 91 U/L (ref 46–116)
ALT SERPL W P-5'-P-CCNC: 30 U/L (ref 12–78)
ANION GAP SERPL CALCULATED.3IONS-SCNC: 9 MMOL/L (ref 4–13)
AST SERPL W P-5'-P-CCNC: 26 U/L (ref 5–45)
BACTERIA UR QL AUTO: ABNORMAL /HPF
BASOPHILS # BLD AUTO: 0 THOUSANDS/ΜL (ref 0–0.1)
BASOPHILS NFR BLD AUTO: 0 % (ref 0–1)
BILIRUB SERPL-MCNC: 0.3 MG/DL (ref 0.2–1)
BILIRUB UR QL STRIP: NEGATIVE
BUN SERPL-MCNC: 12 MG/DL (ref 5–25)
CALCIUM SERPL-MCNC: 8.4 MG/DL (ref 8.3–10.1)
CHLORIDE SERPL-SCNC: 102 MMOL/L (ref 100–108)
CLARITY UR: ABNORMAL
CO2 SERPL-SCNC: 30 MMOL/L (ref 21–32)
COLOR UR: YELLOW
CREAT SERPL-MCNC: 0.78 MG/DL (ref 0.6–1.3)
EOSINOPHIL # BLD AUTO: 0.1 THOUSAND/ΜL (ref 0–0.61)
EOSINOPHIL NFR BLD AUTO: 1 % (ref 0–6)
ERYTHROCYTE [DISTWIDTH] IN BLOOD BY AUTOMATED COUNT: 15.2 % (ref 11.6–15.1)
FLUAV AG SPEC QL IA: NEGATIVE
FLUAV AG SPEC QL: DETECTED
FLUBV AG SPEC QL IA: NEGATIVE
FLUBV AG SPEC QL: ABNORMAL
GFR SERPL CREATININE-BSD FRML MDRD: >60 ML/MIN/1.73SQ M
GLUCOSE SERPL-MCNC: 155 MG/DL (ref 65–140)
GLUCOSE SERPL-MCNC: 163 MG/DL (ref 65–140)
GLUCOSE SERPL-MCNC: 216 MG/DL (ref 65–140)
GLUCOSE SERPL-MCNC: 286 MG/DL (ref 65–140)
GLUCOSE SERPL-MCNC: 348 MG/DL (ref 65–140)
GLUCOSE SERPL-MCNC: 366 MG/DL (ref 65–140)
GLUCOSE UR STRIP-MCNC: NEGATIVE MG/DL
HCT VFR BLD AUTO: 39.4 % (ref 37–47)
HGB BLD-MCNC: 12.8 G/DL (ref 12–16)
HGB UR QL STRIP.AUTO: ABNORMAL
KETONES UR STRIP-MCNC: NEGATIVE MG/DL
LEUKOCYTE ESTERASE UR QL STRIP: NEGATIVE
LG PLATELETS BLD QL SMEAR: PRESENT
LIPASE SERPL-CCNC: 165 U/L (ref 73–393)
LYMPHOCYTES # BLD AUTO: 0.7 THOUSANDS/ΜL (ref 0.6–4.47)
LYMPHOCYTES NFR BLD AUTO: 8 % (ref 14–44)
MAGNESIUM SERPL-MCNC: 1.9 MG/DL (ref 1.6–2.6)
MCH RBC QN AUTO: 27.8 PG (ref 27–31)
MCHC RBC AUTO-ENTMCNC: 32.6 G/DL (ref 31.4–37.4)
MCV RBC AUTO: 86 FL (ref 82–98)
MONOCYTES # BLD AUTO: 0.6 THOUSAND/ΜL (ref 0.17–1.22)
MONOCYTES NFR BLD AUTO: 7 % (ref 4–12)
NEUTROPHILS # BLD AUTO: 7.6 THOUSANDS/ΜL (ref 1.85–7.62)
NEUTS SEG NFR BLD AUTO: 84 % (ref 43–75)
NITRITE UR QL STRIP: NEGATIVE
NON-SQ EPI CELLS URNS QL MICRO: ABNORMAL /HPF
NRBC BLD AUTO-RTO: 0 /100 WBCS
OTHER STN SPEC: ABNORMAL
PH UR STRIP.AUTO: 6 [PH] (ref 5–9)
PLATELET # BLD AUTO: 147 THOUSANDS/UL (ref 130–400)
PLATELET # BLD AUTO: 162 THOUSANDS/UL (ref 130–400)
PLATELET BLD QL SMEAR: ADEQUATE
PMV BLD AUTO: 10.1 FL (ref 8.9–12.7)
PMV BLD AUTO: 9.7 FL (ref 8.9–12.7)
POTASSIUM SERPL-SCNC: 4.8 MMOL/L (ref 3.5–5.3)
PROT SERPL-MCNC: 7.7 G/DL (ref 6.4–8.2)
PROT UR STRIP-MCNC: ABNORMAL MG/DL
RBC # BLD AUTO: 4.61 MILLION/UL (ref 4.2–5.4)
RBC #/AREA URNS AUTO: ABNORMAL /HPF
RSV B RNA SPEC QL NAA+PROBE: ABNORMAL
S PYO AG THROAT QL: NEGATIVE
SODIUM SERPL-SCNC: 141 MMOL/L (ref 136–145)
SP GR UR STRIP.AUTO: 1.02 (ref 1–1.03)
TROPONIN I SERPL-MCNC: <0.02 NG/ML
TSH SERPL DL<=0.05 MIU/L-ACNC: 0.62 UIU/ML (ref 0.36–3.74)
UROBILINOGEN UR QL STRIP.AUTO: 0.2 E.U./DL
VIT B12 SERPL-MCNC: 582 PG/ML (ref 100–900)
WBC # BLD AUTO: 9 THOUSAND/UL (ref 4.8–10.8)
WBC #/AREA URNS AUTO: ABNORMAL /HPF
WBC TOXIC VACUOLES BLD QL SMEAR: PRESENT

## 2017-03-11 PROCEDURE — 82948 REAGENT STRIP/BLOOD GLUCOSE: CPT

## 2017-03-11 PROCEDURE — 71020 HB CHEST X-RAY 2VW FRONTAL&LATL: CPT

## 2017-03-11 PROCEDURE — 80053 COMPREHEN METABOLIC PANEL: CPT | Performed by: EMERGENCY MEDICINE

## 2017-03-11 PROCEDURE — 85025 COMPLETE CBC W/AUTO DIFF WBC: CPT | Performed by: EMERGENCY MEDICINE

## 2017-03-11 PROCEDURE — 87081 CULTURE SCREEN ONLY: CPT | Performed by: EMERGENCY MEDICINE

## 2017-03-11 PROCEDURE — 87077 CULTURE AEROBIC IDENTIFY: CPT | Performed by: EMERGENCY MEDICINE

## 2017-03-11 PROCEDURE — 87400 INFLUENZA A/B EACH AG IA: CPT | Performed by: EMERGENCY MEDICINE

## 2017-03-11 PROCEDURE — 87186 SC STD MICRODIL/AGAR DIL: CPT | Performed by: EMERGENCY MEDICINE

## 2017-03-11 PROCEDURE — 81001 URINALYSIS AUTO W/SCOPE: CPT | Performed by: EMERGENCY MEDICINE

## 2017-03-11 PROCEDURE — 96361 HYDRATE IV INFUSION ADD-ON: CPT

## 2017-03-11 PROCEDURE — 83735 ASSAY OF MAGNESIUM: CPT | Performed by: EMERGENCY MEDICINE

## 2017-03-11 PROCEDURE — 96374 THER/PROPH/DIAG INJ IV PUSH: CPT

## 2017-03-11 PROCEDURE — 87086 URINE CULTURE/COLONY COUNT: CPT | Performed by: EMERGENCY MEDICINE

## 2017-03-11 PROCEDURE — 83690 ASSAY OF LIPASE: CPT | Performed by: EMERGENCY MEDICINE

## 2017-03-11 PROCEDURE — G8978 MOBILITY CURRENT STATUS: HCPCS

## 2017-03-11 PROCEDURE — G8979 MOBILITY GOAL STATUS: HCPCS

## 2017-03-11 PROCEDURE — 82607 VITAMIN B-12: CPT | Performed by: PSYCHIATRY & NEUROLOGY

## 2017-03-11 PROCEDURE — 87070 CULTURE OTHR SPECIMN AEROBIC: CPT | Performed by: EMERGENCY MEDICINE

## 2017-03-11 PROCEDURE — 82306 VITAMIN D 25 HYDROXY: CPT | Performed by: PSYCHIATRY & NEUROLOGY

## 2017-03-11 PROCEDURE — 73620 X-RAY EXAM OF FOOT: CPT

## 2017-03-11 PROCEDURE — 36415 COLL VENOUS BLD VENIPUNCTURE: CPT | Performed by: EMERGENCY MEDICINE

## 2017-03-11 PROCEDURE — 84443 ASSAY THYROID STIM HORMONE: CPT | Performed by: EMERGENCY MEDICINE

## 2017-03-11 PROCEDURE — 97163 PT EVAL HIGH COMPLEX 45 MIN: CPT

## 2017-03-11 PROCEDURE — 87798 DETECT AGENT NOS DNA AMP: CPT | Performed by: EMERGENCY MEDICINE

## 2017-03-11 PROCEDURE — 84484 ASSAY OF TROPONIN QUANT: CPT | Performed by: EMERGENCY MEDICINE

## 2017-03-11 PROCEDURE — 87430 STREP A AG IA: CPT | Performed by: EMERGENCY MEDICINE

## 2017-03-11 PROCEDURE — 99285 EMERGENCY DEPT VISIT HI MDM: CPT

## 2017-03-11 PROCEDURE — 85049 AUTOMATED PLATELET COUNT: CPT | Performed by: HOSPITALIST

## 2017-03-11 RX ORDER — OSELTAMIVIR PHOSPHATE 30 MG/1
30 CAPSULE ORAL EVERY 12 HOURS SCHEDULED
Status: DISCONTINUED | OUTPATIENT
Start: 2017-03-11 | End: 2017-03-13 | Stop reason: HOSPADM

## 2017-03-11 RX ORDER — LISINOPRIL 20 MG/1
20 TABLET ORAL 2 TIMES DAILY
Status: DISCONTINUED | OUTPATIENT
Start: 2017-03-11 | End: 2017-03-13 | Stop reason: HOSPADM

## 2017-03-11 RX ORDER — PRAVASTATIN SODIUM 40 MG
40 TABLET ORAL
Status: DISCONTINUED | OUTPATIENT
Start: 2017-03-11 | End: 2017-03-13 | Stop reason: HOSPADM

## 2017-03-11 RX ORDER — SOTALOL HYDROCHLORIDE 80 MG/1
80 TABLET ORAL 2 TIMES DAILY
Status: DISCONTINUED | OUTPATIENT
Start: 2017-03-11 | End: 2017-03-13 | Stop reason: HOSPADM

## 2017-03-11 RX ORDER — ASPIRIN 81 MG/1
162 TABLET, CHEWABLE ORAL DAILY
Status: DISCONTINUED | OUTPATIENT
Start: 2017-03-11 | End: 2017-03-13 | Stop reason: HOSPADM

## 2017-03-11 RX ORDER — NYSTATIN 100000 [USP'U]/G
1 POWDER TOPICAL 2 TIMES DAILY
Status: DISCONTINUED | OUTPATIENT
Start: 2017-03-12 | End: 2017-03-13 | Stop reason: HOSPADM

## 2017-03-11 RX ORDER — AMLODIPINE BESYLATE 10 MG/1
10 TABLET ORAL DAILY
Status: DISCONTINUED | OUTPATIENT
Start: 2017-03-11 | End: 2017-03-13 | Stop reason: HOSPADM

## 2017-03-11 RX ADMIN — SODIUM CHLORIDE 1000 MG: 0.9 INJECTION, SOLUTION INTRAVENOUS at 09:24

## 2017-03-11 RX ADMIN — INSULIN LISPRO 4 UNITS: 100 INJECTION, SOLUTION INTRAVENOUS; SUBCUTANEOUS at 17:50

## 2017-03-11 RX ADMIN — AMLODIPINE BESYLATE 10 MG: 10 TABLET ORAL at 09:11

## 2017-03-11 RX ADMIN — LISINOPRIL 20 MG: 20 TABLET ORAL at 09:10

## 2017-03-11 RX ADMIN — INSULIN LISPRO 2 UNITS: 100 INJECTION, SOLUTION INTRAVENOUS; SUBCUTANEOUS at 09:11

## 2017-03-11 RX ADMIN — METHYLPREDNISOLONE SODIUM SUCCINATE 125 MG: 125 INJECTION, POWDER, FOR SOLUTION INTRAMUSCULAR; INTRAVENOUS at 00:56

## 2017-03-11 RX ADMIN — SODIUM CHLORIDE 1000 ML: 0.9 INJECTION, SOLUTION INTRAVENOUS at 00:55

## 2017-03-11 RX ADMIN — INSULIN LISPRO 3 UNITS: 100 INJECTION, SOLUTION INTRAVENOUS; SUBCUTANEOUS at 12:32

## 2017-03-11 RX ADMIN — PRAVASTATIN SODIUM 40 MG: 40 TABLET ORAL at 17:50

## 2017-03-11 RX ADMIN — ENOXAPARIN SODIUM 40 MG: 40 INJECTION SUBCUTANEOUS at 09:11

## 2017-03-11 RX ADMIN — ASPIRIN 81 MG CHEWABLE TABLET 162 MG: 81 TABLET CHEWABLE at 09:11

## 2017-03-11 RX ADMIN — OSELTAMIVIR PHOSPHATE 30 MG: 30 CAPSULE ORAL at 21:42

## 2017-03-11 RX ADMIN — INSULIN LISPRO 8 UNITS: 100 INJECTION, SOLUTION INTRAVENOUS; SUBCUTANEOUS at 21:42

## 2017-03-11 RX ADMIN — SOTALOL HYDROCHLORIDE 80 MG: 80 TABLET ORAL at 09:11

## 2017-03-11 RX ADMIN — LISINOPRIL 20 MG: 20 TABLET ORAL at 17:51

## 2017-03-11 RX ADMIN — SOTALOL HYDROCHLORIDE 80 MG: 80 TABLET ORAL at 17:50

## 2017-03-12 LAB
GLUCOSE SERPL-MCNC: 218 MG/DL (ref 65–140)
GLUCOSE SERPL-MCNC: 243 MG/DL (ref 65–140)
GLUCOSE SERPL-MCNC: 277 MG/DL (ref 65–140)
GLUCOSE SERPL-MCNC: 288 MG/DL (ref 65–140)
MRSA NOSE QL CULT: NORMAL

## 2017-03-12 PROCEDURE — 94664 DEMO&/EVAL PT USE INHALER: CPT

## 2017-03-12 PROCEDURE — 94760 N-INVAS EAR/PLS OXIMETRY 1: CPT

## 2017-03-12 PROCEDURE — 82948 REAGENT STRIP/BLOOD GLUCOSE: CPT

## 2017-03-12 RX ORDER — BISACODYL 10 MG
10 SUPPOSITORY, RECTAL RECTAL DAILY PRN
Status: DISCONTINUED | OUTPATIENT
Start: 2017-03-12 | End: 2017-03-13 | Stop reason: HOSPADM

## 2017-03-12 RX ORDER — IPRATROPIUM BROMIDE AND ALBUTEROL SULFATE 2.5; .5 MG/3ML; MG/3ML
3 SOLUTION RESPIRATORY (INHALATION) EVERY 4 HOURS PRN
Status: DISCONTINUED | OUTPATIENT
Start: 2017-03-12 | End: 2017-03-13 | Stop reason: HOSPADM

## 2017-03-12 RX ORDER — CHLORTHALIDONE 25 MG/1
25 TABLET ORAL DAILY
Status: DISCONTINUED | OUTPATIENT
Start: 2017-03-12 | End: 2017-03-13 | Stop reason: HOSPADM

## 2017-03-12 RX ORDER — IPRATROPIUM BROMIDE AND ALBUTEROL SULFATE 2.5; .5 MG/3ML; MG/3ML
3 SOLUTION RESPIRATORY (INHALATION) ONCE
Status: COMPLETED | OUTPATIENT
Start: 2017-03-12 | End: 2017-03-12

## 2017-03-12 RX ORDER — ACETAMINOPHEN 325 MG/1
650 TABLET ORAL EVERY 6 HOURS PRN
Status: COMPLETED | OUTPATIENT
Start: 2017-03-12 | End: 2017-03-12

## 2017-03-12 RX ORDER — MELATONIN
1000 DAILY
Status: DISCONTINUED | OUTPATIENT
Start: 2017-03-13 | End: 2017-03-13 | Stop reason: HOSPADM

## 2017-03-12 RX ADMIN — OSELTAMIVIR PHOSPHATE 30 MG: 30 CAPSULE ORAL at 08:37

## 2017-03-12 RX ADMIN — NYSTATIN 1 APPLICATION: 100000 POWDER TOPICAL at 08:38

## 2017-03-12 RX ADMIN — ACETAMINOPHEN 650 MG: 325 TABLET, FILM COATED ORAL at 21:56

## 2017-03-12 RX ADMIN — SOTALOL HYDROCHLORIDE 80 MG: 80 TABLET ORAL at 08:36

## 2017-03-12 RX ADMIN — PRAVASTATIN SODIUM 40 MG: 40 TABLET ORAL at 17:10

## 2017-03-12 RX ADMIN — OSELTAMIVIR PHOSPHATE 30 MG: 30 CAPSULE ORAL at 21:16

## 2017-03-12 RX ADMIN — INSULIN LISPRO 2 UNITS: 100 INJECTION, SOLUTION INTRAVENOUS; SUBCUTANEOUS at 08:38

## 2017-03-12 RX ADMIN — ASPIRIN 81 MG CHEWABLE TABLET 162 MG: 81 TABLET CHEWABLE at 08:36

## 2017-03-12 RX ADMIN — IPRATROPIUM BROMIDE AND ALBUTEROL SULFATE 3 ML: .5; 3 SOLUTION RESPIRATORY (INHALATION) at 06:43

## 2017-03-12 RX ADMIN — LISINOPRIL 20 MG: 20 TABLET ORAL at 17:10

## 2017-03-12 RX ADMIN — SOTALOL HYDROCHLORIDE 80 MG: 80 TABLET ORAL at 17:10

## 2017-03-12 RX ADMIN — INSULIN LISPRO 1 UNITS: 100 INJECTION, SOLUTION INTRAVENOUS; SUBCUTANEOUS at 21:58

## 2017-03-12 RX ADMIN — INSULIN LISPRO 3 UNITS: 100 INJECTION, SOLUTION INTRAVENOUS; SUBCUTANEOUS at 17:09

## 2017-03-12 RX ADMIN — ENOXAPARIN SODIUM 40 MG: 40 INJECTION SUBCUTANEOUS at 08:37

## 2017-03-12 RX ADMIN — CHLORTHALIDONE 25 MG: 25 TABLET ORAL at 18:41

## 2017-03-12 RX ADMIN — NYSTATIN 1 APPLICATION: 100000 POWDER TOPICAL at 17:15

## 2017-03-12 RX ADMIN — BISACODYL 10 MG: 10 SUPPOSITORY RECTAL at 22:41

## 2017-03-12 RX ADMIN — INSULIN LISPRO 3 UNITS: 100 INJECTION, SOLUTION INTRAVENOUS; SUBCUTANEOUS at 12:36

## 2017-03-12 RX ADMIN — LISINOPRIL 20 MG: 20 TABLET ORAL at 08:36

## 2017-03-12 RX ADMIN — AMLODIPINE BESYLATE 10 MG: 10 TABLET ORAL at 08:36

## 2017-03-13 ENCOUNTER — APPOINTMENT (INPATIENT)
Dept: PHYSICAL THERAPY | Facility: HOSPITAL | Age: 82
DRG: 060 | End: 2017-03-13
Payer: MEDICARE

## 2017-03-13 VITALS
WEIGHT: 208.56 LBS | BODY MASS INDEX: 33.52 KG/M2 | RESPIRATION RATE: 18 BRPM | SYSTOLIC BLOOD PRESSURE: 148 MMHG | TEMPERATURE: 97 F | HEIGHT: 66 IN | HEART RATE: 67 BPM | DIASTOLIC BLOOD PRESSURE: 70 MMHG | OXYGEN SATURATION: 94 %

## 2017-03-13 PROBLEM — I16.0 HYPERTENSIVE URGENCY: Status: ACTIVE | Noted: 2017-02-27

## 2017-03-13 LAB
ATRIAL RATE: 77 BPM
BACTERIA THROAT CULT: NORMAL
BACTERIA UR CULT: NORMAL
GLUCOSE SERPL-MCNC: 154 MG/DL (ref 65–140)
GLUCOSE SERPL-MCNC: 160 MG/DL (ref 65–140)
GLUCOSE SERPL-MCNC: 180 MG/DL (ref 65–140)
P AXIS: 47 DEGREES
PR INTERVAL: 174 MS
QRS AXIS: 21 DEGREES
QRSD INTERVAL: 68 MS
QT INTERVAL: 398 MS
QTC INTERVAL: 450 MS
T WAVE AXIS: 50 DEGREES
VENTRICULAR RATE: 77 BPM

## 2017-03-13 PROCEDURE — 94760 N-INVAS EAR/PLS OXIMETRY 1: CPT

## 2017-03-13 PROCEDURE — G8987 SELF CARE CURRENT STATUS: HCPCS

## 2017-03-13 PROCEDURE — G8988 SELF CARE GOAL STATUS: HCPCS

## 2017-03-13 PROCEDURE — 82948 REAGENT STRIP/BLOOD GLUCOSE: CPT

## 2017-03-13 PROCEDURE — 97166 OT EVAL MOD COMPLEX 45 MIN: CPT

## 2017-03-13 PROCEDURE — 97110 THERAPEUTIC EXERCISES: CPT

## 2017-03-13 RX ORDER — CHLORTHALIDONE 25 MG/1
25 TABLET ORAL EVERY OTHER DAY
Qty: 15 TABLET | Refills: 0 | Status: SHIPPED | OUTPATIENT
Start: 2017-03-13 | End: 2018-02-19

## 2017-03-13 RX ORDER — IPRATROPIUM BROMIDE AND ALBUTEROL SULFATE 2.5; .5 MG/3ML; MG/3ML
3 SOLUTION RESPIRATORY (INHALATION)
Qty: 360 ML | Refills: 0 | Status: SHIPPED | OUTPATIENT
Start: 2017-03-13 | End: 2017-04-12

## 2017-03-13 RX ORDER — OSELTAMIVIR PHOSPHATE 30 MG/1
30 CAPSULE ORAL EVERY 12 HOURS SCHEDULED
Qty: 6 CAPSULE | Refills: 0 | Status: SHIPPED | OUTPATIENT
Start: 2017-03-13 | End: 2017-03-16

## 2017-03-13 RX ADMIN — INSULIN LISPRO 1 UNITS: 100 INJECTION, SOLUTION INTRAVENOUS; SUBCUTANEOUS at 17:11

## 2017-03-13 RX ADMIN — INSULIN LISPRO 1 UNITS: 100 INJECTION, SOLUTION INTRAVENOUS; SUBCUTANEOUS at 13:51

## 2017-03-13 RX ADMIN — SOTALOL HYDROCHLORIDE 80 MG: 80 TABLET ORAL at 08:35

## 2017-03-13 RX ADMIN — ASPIRIN 81 MG CHEWABLE TABLET 162 MG: 81 TABLET CHEWABLE at 08:36

## 2017-03-13 RX ADMIN — OSELTAMIVIR PHOSPHATE 30 MG: 30 CAPSULE ORAL at 17:12

## 2017-03-13 RX ADMIN — PRAVASTATIN SODIUM 40 MG: 40 TABLET ORAL at 16:40

## 2017-03-13 RX ADMIN — CHOLECALCIFEROL TAB 25 MCG (1000 UNIT) 1000 UNITS: 25 TAB at 09:32

## 2017-03-13 RX ADMIN — AMLODIPINE BESYLATE 10 MG: 10 TABLET ORAL at 08:36

## 2017-03-13 RX ADMIN — LISINOPRIL 20 MG: 20 TABLET ORAL at 08:34

## 2017-03-13 RX ADMIN — INSULIN LISPRO 1 UNITS: 100 INJECTION, SOLUTION INTRAVENOUS; SUBCUTANEOUS at 08:37

## 2017-03-13 RX ADMIN — ENOXAPARIN SODIUM 40 MG: 40 INJECTION SUBCUTANEOUS at 08:37

## 2017-03-13 RX ADMIN — CHLORTHALIDONE 25 MG: 25 TABLET ORAL at 08:36

## 2017-03-20 ENCOUNTER — ALLSCRIPTS OFFICE VISIT (OUTPATIENT)
Dept: OTHER | Facility: OTHER | Age: 82
End: 2017-03-20

## 2017-04-24 ENCOUNTER — ALLSCRIPTS OFFICE VISIT (OUTPATIENT)
Dept: OTHER | Facility: OTHER | Age: 82
End: 2017-04-24

## 2017-04-27 ENCOUNTER — ALLSCRIPTS OFFICE VISIT (OUTPATIENT)
Dept: OTHER | Facility: OTHER | Age: 82
End: 2017-04-27

## 2017-05-04 ENCOUNTER — ALLSCRIPTS OFFICE VISIT (OUTPATIENT)
Dept: OTHER | Facility: OTHER | Age: 82
End: 2017-05-04

## 2017-05-11 ENCOUNTER — GENERIC CONVERSION - ENCOUNTER (OUTPATIENT)
Dept: OTHER | Facility: OTHER | Age: 82
End: 2017-05-11

## 2017-05-11 LAB
A/G RATIO (HISTORICAL): 1.3 (ref 1.2–2.2)
ALBUMIN SERPL BCP-MCNC: 4 G/DL (ref 3.5–4.8)
ALP SERPL-CCNC: 94 IU/L (ref 39–117)
ALT SERPL W P-5'-P-CCNC: 14 IU/L (ref 0–32)
AST SERPL W P-5'-P-CCNC: 12 IU/L (ref 0–40)
BILIRUB SERPL-MCNC: 0.2 MG/DL (ref 0–1.2)
BUN SERPL-MCNC: 16 MG/DL (ref 8–27)
BUN/CREA RATIO (HISTORICAL): 24 (ref 12–28)
CALCIUM SERPL-MCNC: 9.3 MG/DL (ref 8.7–10.3)
CHLORIDE SERPL-SCNC: 98 MMOL/L (ref 96–106)
CHOLEST SERPL-MCNC: 168 MG/DL (ref 100–199)
CO2 SERPL-SCNC: 25 MMOL/L (ref 18–29)
CREAT SERPL-MCNC: 0.66 MG/DL (ref 0.57–1)
EGFR AFRICAN AMERICAN (HISTORICAL): 97 ML/MIN/1.73
EGFR-AMERICAN CALC (HISTORICAL): 84 ML/MIN/1.73
GLUCOSE SERPL-MCNC: 174 MG/DL (ref 65–99)
HDLC SERPL-MCNC: 44 MG/DL
LDLC SERPL CALC-MCNC: 65 MG/DL (ref 0–99)
POTASSIUM SERPL-SCNC: 4.6 MMOL/L (ref 3.5–5.2)
SODIUM SERPL-SCNC: 142 MMOL/L (ref 134–144)
TOT. GLOBULIN, SERUM (HISTORICAL): 3 G/DL (ref 1.5–4.5)
TOTAL PROTEIN (HISTORICAL): 7 G/DL (ref 6–8.5)
TRIGL SERPL-MCNC: 297 MG/DL (ref 0–149)

## 2017-05-12 LAB
CREATININE, URINE (HISTORICAL): 59.3 MG/DL
HBA1C MFR BLD HPLC: 7.6 % (ref 4.8–5.6)
INTERPRETATION (HISTORICAL): NORMAL
MICROALBUM.,U,RANDOM (HISTORICAL): 355.4 UG/ML
MICROALBUMIN/CREATININE RATIO (HISTORICAL): 599.3 MG/G CREAT (ref 0–30)

## 2017-05-14 ENCOUNTER — GENERIC CONVERSION - ENCOUNTER (OUTPATIENT)
Dept: OTHER | Facility: OTHER | Age: 82
End: 2017-05-14

## 2017-05-16 ENCOUNTER — ALLSCRIPTS OFFICE VISIT (OUTPATIENT)
Dept: OTHER | Facility: OTHER | Age: 82
End: 2017-05-16

## 2017-06-15 ENCOUNTER — GENERIC CONVERSION - ENCOUNTER (OUTPATIENT)
Dept: OTHER | Facility: OTHER | Age: 82
End: 2017-06-15

## 2017-06-15 LAB — AMBIG ABBREV CMP14 DEFAULT (HISTORICAL): NORMAL

## 2017-06-28 ENCOUNTER — GENERIC CONVERSION - ENCOUNTER (OUTPATIENT)
Dept: OTHER | Facility: OTHER | Age: 82
End: 2017-06-28

## 2017-07-07 ENCOUNTER — HOSPITAL ENCOUNTER (OUTPATIENT)
Dept: RADIOLOGY | Facility: HOSPITAL | Age: 82
Discharge: HOME | End: 2017-07-07
Payer: MEDICARE

## 2017-07-07 DIAGNOSIS — C50.911 MALIGNANT NEOPLASM OF RIGHT FEMALE BREAST (HCC): ICD-10-CM

## 2017-07-07 DIAGNOSIS — Z12.39 ENCOUNTER FOR OTHER SCREENING FOR MALIGNANT NEOPLASM OF BREAST: ICD-10-CM

## 2017-07-07 PROCEDURE — G0206 DX MAMMO INCL CAD UNI: HCPCS

## 2017-08-16 LAB
A/G RATIO (HISTORICAL): 1.4 (ref 1.2–2.2)
ALBUMIN SERPL BCP-MCNC: 4.1 G/DL (ref 3.5–4.8)
ALP SERPL-CCNC: 93 IU/L (ref 39–117)
ALT SERPL W P-5'-P-CCNC: 21 IU/L (ref 0–32)
AST SERPL W P-5'-P-CCNC: 18 IU/L (ref 0–40)
BILIRUB SERPL-MCNC: 0.3 MG/DL (ref 0–1.2)
BUN SERPL-MCNC: 14 MG/DL (ref 8–27)
BUN/CREA RATIO (HISTORICAL): 20 (ref 12–28)
CALCIUM SERPL-MCNC: 9.1 MG/DL (ref 8.7–10.3)
CHLORIDE SERPL-SCNC: 99 MMOL/L (ref 96–106)
CO2 SERPL-SCNC: 26 MMOL/L (ref 18–29)
CREAT SERPL-MCNC: 0.7 MG/DL (ref 0.57–1)
EGFR AFRICAN AMERICAN (HISTORICAL): 95 ML/MIN/1.73
EGFR-AMERICAN CALC (HISTORICAL): 83 ML/MIN/1.73
GLUCOSE SERPL-MCNC: 151 MG/DL (ref 65–99)
HBA1C MFR BLD HPLC: 7.1 % (ref 4.8–5.6)
POTASSIUM SERPL-SCNC: 4.6 MMOL/L (ref 3.5–5.2)
SODIUM SERPL-SCNC: 140 MMOL/L (ref 134–144)
TOT. GLOBULIN, SERUM (HISTORICAL): 3 G/DL (ref 1.5–4.5)
TOTAL PROTEIN (HISTORICAL): 7.1 G/DL (ref 6–8.5)

## 2017-08-17 ENCOUNTER — GENERIC CONVERSION - ENCOUNTER (OUTPATIENT)
Dept: OTHER | Facility: OTHER | Age: 82
End: 2017-08-17

## 2017-08-22 ENCOUNTER — ALLSCRIPTS OFFICE VISIT (OUTPATIENT)
Dept: OTHER | Facility: OTHER | Age: 82
End: 2017-08-22

## 2017-11-14 ENCOUNTER — GENERIC CONVERSION - ENCOUNTER (OUTPATIENT)
Dept: OTHER | Facility: OTHER | Age: 82
End: 2017-11-14

## 2017-11-14 LAB
A/G RATIO (HISTORICAL): 1.4 (ref 1.2–2.2)
ALBUMIN SERPL BCP-MCNC: 4.4 G/DL (ref 3.5–4.7)
ALP SERPL-CCNC: 100 IU/L (ref 39–117)
ALT SERPL W P-5'-P-CCNC: 25 IU/L (ref 0–32)
AST SERPL W P-5'-P-CCNC: 24 IU/L (ref 0–40)
BILIRUB SERPL-MCNC: 0.2 MG/DL (ref 0–1.2)
BUN SERPL-MCNC: 15 MG/DL (ref 8–27)
BUN/CREA RATIO (HISTORICAL): 21 (ref 12–28)
CALCIUM SERPL-MCNC: 9.8 MG/DL (ref 8.7–10.3)
CHLORIDE SERPL-SCNC: 101 MMOL/L (ref 96–106)
CHOLEST SERPL-MCNC: 176 MG/DL (ref 100–199)
CO2 SERPL-SCNC: 26 MMOL/L (ref 18–29)
CREAT SERPL-MCNC: 0.71 MG/DL (ref 0.57–1)
EGFR AFRICAN AMERICAN (HISTORICAL): 93 ML/MIN/1.73
EGFR-AMERICAN CALC (HISTORICAL): 81 ML/MIN/1.73
GLUCOSE SERPL-MCNC: 149 MG/DL (ref 65–99)
HBA1C MFR BLD HPLC: 6.9 % (ref 4.8–5.6)
HDLC SERPL-MCNC: 50 MG/DL
INTERPRETATION (HISTORICAL): NORMAL
LDLC SERPL CALC-MCNC: 70 MG/DL (ref 0–99)
POTASSIUM SERPL-SCNC: 4.9 MMOL/L (ref 3.5–5.2)
SODIUM SERPL-SCNC: 144 MMOL/L (ref 134–144)
TOT. GLOBULIN, SERUM (HISTORICAL): 3.2 G/DL (ref 1.5–4.5)
TOTAL PROTEIN (HISTORICAL): 7.6 G/DL (ref 6–8.5)
TRIGL SERPL-MCNC: 278 MG/DL (ref 0–149)

## 2017-11-15 ENCOUNTER — GENERIC CONVERSION - ENCOUNTER (OUTPATIENT)
Dept: OTHER | Facility: OTHER | Age: 82
End: 2017-11-15

## 2017-11-17 ENCOUNTER — ALLSCRIPTS OFFICE VISIT (OUTPATIENT)
Dept: OTHER | Facility: OTHER | Age: 82
End: 2017-11-17

## 2017-12-18 DIAGNOSIS — C50.911 MALIGNANT NEOPLASM OF RIGHT FEMALE BREAST (HCC): ICD-10-CM

## 2017-12-21 ENCOUNTER — GENERIC CONVERSION - ENCOUNTER (OUTPATIENT)
Dept: OTHER | Facility: OTHER | Age: 82
End: 2017-12-21

## 2017-12-21 LAB
A/G RATIO (HISTORICAL): 1.3 (ref 1.2–2.2)
ALBUMIN SERPL BCP-MCNC: 4.2 G/DL (ref 3.5–4.7)
ALP SERPL-CCNC: 100 IU/L (ref 39–117)
ALT SERPL W P-5'-P-CCNC: 25 IU/L (ref 0–32)
AST SERPL W P-5'-P-CCNC: 23 IU/L (ref 0–40)
BASOPHILS # BLD AUTO: 0.1 X10E3/UL (ref 0–0.2)
BASOPHILS # BLD AUTO: 1 %
BILIRUB SERPL-MCNC: 0.3 MG/DL (ref 0–1.2)
BUN SERPL-MCNC: 14 MG/DL (ref 8–27)
BUN/CREA RATIO (HISTORICAL): 18 (ref 12–28)
CALCIUM SERPL-MCNC: 9.5 MG/DL (ref 8.7–10.3)
CHLORIDE SERPL-SCNC: 98 MMOL/L (ref 96–106)
CO2 SERPL-SCNC: 25 MMOL/L (ref 18–29)
CREAT SERPL-MCNC: 0.76 MG/DL (ref 0.57–1)
DEPRECATED RDW RBC AUTO: 14.6 % (ref 12.3–15.4)
EGFR AFRICAN AMERICAN (HISTORICAL): 86 ML/MIN/1.73
EGFR-AMERICAN CALC (HISTORICAL): 74 ML/MIN/1.73
EOSINOPHIL # BLD AUTO: 0.2 X10E3/UL (ref 0–0.4)
EOSINOPHIL # BLD AUTO: 2 %
GLUCOSE SERPL-MCNC: 204 MG/DL (ref 65–99)
HCT VFR BLD AUTO: 42.2 % (ref 34–46.6)
HGB BLD-MCNC: 13.8 G/DL (ref 11.1–15.9)
IMM.GRANULOCYTES (CD4/8) (HISTORICAL): 0 %
IMM.GRANULOCYTES (CD4/8) (HISTORICAL): 0 X10E3/UL (ref 0–0.1)
LYMPHOCYTES # BLD AUTO: 1.9 X10E3/UL (ref 0.7–3.1)
LYMPHOCYTES # BLD AUTO: 23 %
MCH RBC QN AUTO: 28.4 PG (ref 26.6–33)
MCHC RBC AUTO-ENTMCNC: 32.7 G/DL (ref 31.5–35.7)
MCV RBC AUTO: 87 FL (ref 79–97)
MONOCYTES # BLD AUTO: 0.7 X10E3/UL (ref 0.1–0.9)
MONOCYTES (HISTORICAL): 8 %
NEUTROPHILS # BLD AUTO: 5.3 X10E3/UL (ref 1.4–7)
NEUTROPHILS # BLD AUTO: 66 %
PLATELET # BLD AUTO: 217 X10E3/UL (ref 150–379)
POTASSIUM SERPL-SCNC: 4.3 MMOL/L (ref 3.5–5.2)
RBC (HISTORICAL): 4.86 X10E6/UL (ref 3.77–5.28)
SODIUM SERPL-SCNC: 142 MMOL/L (ref 134–144)
TOT. GLOBULIN, SERUM (HISTORICAL): 3.2 G/DL (ref 1.5–4.5)
TOTAL PROTEIN (HISTORICAL): 7.4 G/DL (ref 6–8.5)
WBC # BLD AUTO: 8 X10E3/UL (ref 3.4–10.8)

## 2017-12-22 LAB — CA 27.29 (HISTORICAL): 38.4 U/ML (ref 0–38.6)

## 2018-01-03 ENCOUNTER — GENERIC CONVERSION - ENCOUNTER (OUTPATIENT)
Dept: OTHER | Facility: OTHER | Age: 83
End: 2018-01-03

## 2018-01-03 ENCOUNTER — ALLSCRIPTS OFFICE VISIT (OUTPATIENT)
Dept: OTHER | Facility: OTHER | Age: 83
End: 2018-01-03
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
## 2018-01-10 NOTE — RESULT NOTES
Verified Results  (1) HEMOGLOBIN A1C [id number] 07:43AM [facility]     Test Name Result Flag Reference   Hemoglobin A1c 7 1 % H 4 8-5 6   Pre-diabetes: 5 7 - 6 4           Diabetes: >6 4           Glycemic control for adults with diabetes: <7 0     (1) COMPREHENSIVE METABOLIC PANEL [id number] 19:41MX [facility]     Test Name Result Flag Reference   Glucose, Serum 151 mg/dL H 65-99   BUN 14 mg/dL  8-27   Creatinine, Serum 0 70 mg/dL  0 57-1 00   BUN/Creatinine Ratio 20  12-28   Sodium, Serum 140 mmol/L  134-144   Potassium, Serum 4 6 mmol/L  3 5-5 2   Chloride, Serum 99 mmol/L     Carbon Dioxide, Total 26 mmol/L  18-29   Calcium, Serum 9 1 mg/dL  8 7-10 3   Protein, Total, Serum 7 1 g/dL  6 0-8 5   Albumin, Serum 4 1 g/dL  3 5-4 8   Globulin, Total 3 0 g/dL  1 5-4 5   A/G Ratio 1 4  1 2-2 2   Bilirubin, Total 0 3 mg/dL  0 0-1 2   Alkaline Phosphatase, S 93 IU/L     AST (SGOT) 18 IU/L  0-40   ALT (SGPT) 21 IU/L  0-32   eGFR If NonAfricn Am 83 mL/min/1 73  >59   eGFR If Africn Am 95 mL/min/1 73  >59   Glucose, Serum 151 mg/dL H 65-99   BUN 14 mg/dL  8-27   Creatinine, Serum 0 70 mg/dL  0 57-1 00   BUN/Creatinine Ratio 20  12-28   Sodium, Serum 140 mmol/L  134-144   Potassium, Serum 4 6 mmol/L  3 5-5 2   Chloride, Serum 99 mmol/L     Carbon Dioxide, Total 26 mmol/L  18-29   Calcium, Serum 9 1 mg/dL  8 7-10 3   Protein, Total, Serum 7 1 g/dL  6 0-8 5   Albumin, Serum 4 1 g/dL  3 5-4 8   Globulin, Total 3 0 g/dL  1 5-4 5   A/G Ratio 1 4  1 2-2 2   Bilirubin, Total 0 3 mg/dL  0 0-1 2   Alkaline Phosphatase, S 93 IU/L     AST (SGOT) 18 IU/L  0-40   ALT (SGPT) 21 IU/L  0-32   eGFR If NonAfricn Am 83 mL/min/1 73  >59   eGFR If Africn Am 95 mL/min/1 73  >59

## 2018-01-10 NOTE — PROGRESS NOTES
Assessment    1  Change or removal of wound packing (V58 30) (Z48 00)   2  Abscess of back (682 2) (L02 212)   3  Encounter for recheck of abscess following incision and drainage (V67 09) (Z09)    Plan  Abscess of back, Change or removal of wound packing    · Recheck if symptoms do not improve in 4-7 days Evaluation and Treatment  Follow-up   Status: Complete  Done: 73UPZ1247 03:09PM   Ordered; For: Abscess of back, Change or removal of wound packing; Ordered By: Medardo Crabtree Performed:  Due: 45QYE3378    Discussion/Summary  Discussion Summary:   Stacy Flores is doing well  Both abscesses were repacked today after the packing was removed  There granulating well and getting smaller  I will check her in the 4 days to change the packing once again  This will be changed tomorrow by the visiting nurse  Chief Complaint  Chief Complaint Free Text Note Form: Patient presents S/P I and D back- packing change  LB,CMA      History of Present Illness  HPI: Stacy Flores comes today for packing change  She is doing well  She had 2 abscesses drained in the hospital  After the last packing change he noted some bleeding  She also has a visiting nurse which is changing the packing  She is having no fevers  She is taking her last antibiotic tonight  Active Problems    1  Abnormal gait (781 2) (R26 9)   2  Abnormal mammogram (793 80) (R92 8)   3  Abscess of back (682 2) (L02 212)   4  Angioedema (995 1)   5  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)   6  Atrial fibrillation (427 31) (I48 91)   7  Benign essential hypertension (401 1) (I10)   8  BMI 33 0-33 9,adult (V85 33) (Z68 33)   9  Breast cancer (174 9) (C50 919)   10  Change or removal of wound packing (V58 30) (Z48 00)   11  Edema (782 3) (R60 9)   12  Encounter for recheck of abscess following incision and drainage (V67 09) (Z09)   13  Encounter for screening mammogram for high-risk patient (V76 11) (Z12 31)   14   Encounter for screening mammogram for malignant neoplasm of breast (V76 12)    (Z12 31)   15  Endometrial thickening on ultra sound (793 5) (R93 8)   16  Former smoker (T30 64) (T58 513)   17  Hirsutism (704 1) (L68 0)   18  History of mastectomy, right (V45 71) (Z90 11)   19  Hyperlipidemia (272 4) (E78 5)   20  Influenza vaccine needed (V04 81) (Z23)   21  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)   22  Localized, primary osteoarthritis of shoulder region, 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)    (M19 019)   23  Multiple sclerosis (340) (G35)   24  Neoplasm of uncertain behavior of skin (238 2) (D48 5)   25  Obesity (278 00) (E66 9)   26  Peptic ulcer (533 90) (K27 9)   27  Post-menopausal bleeding (627 1) (N95 0)   28  Screening for malignant neoplasm of breast (V76 10) (Z12 39)   29  Screening for osteoporosis (V82 81) (Z13 820)   30  Skin ulcer (707 9) (L98 499)   31  Type 2 diabetes mellitus (250 00) (E11 9)   32  Urinary incontinence (788 30) (R32)   33  Wears glasses (V49 89) (Z97 3)    Past Medical History    1  Acute upper respiratory infection (465 9) (J06 9)   2  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, 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)   3  History of Cellulitis (682 9) (L03 90)   4  History of Cellulitis of chest wall (682 2) (L03 313)   5  History of Dysuria (788 1) (R30 0)   6  History of Flushing (782 62) (R23 2)   7  History of abdominal pain (V13 89) (Z87 898)   8  History of acute conjunctivitis (V12 49) (Z86 69)   9  History of chronic endometritis (V13 29) (Z87 42)   10  History of ingrown nail (V13 3) (Z87 2)   11  History of malignant neoplasm of breast (V10 3) (Z85 3)   12  History of viral gastroenteritis (V12 09) (Z86 19)   13  Influenza vaccine needed (V04 81) (Z23)   14  History of Initial Medicare annual wellness visit (V70 0) (Z00 00)   15  Medicare annual wellness visit, subsequent (V70 0) (Z00 00)   16  Need for pneumococcal vaccination (V03 82) (Z23)   17  History of Nonvenomous Insect Bite (E906 4)   18   Otitis media, left (382 9) (H66 92)   19  History of Palpitations (785 1) (R00 2)   20  History of Pelvic cramping (625 9) (R10 2)   21  History of Pressure Ulcer Of The Buttock (707 05)   22  History of Proctitis (569 49) (K62 89)   23  Urinary tract infection (599 0) (N39 0)   24  History of Vaginal polyp (623 7) (N84 2)   25  History of Visit for pre-operative examination (V72 84) (Y43 224)    Surgical History    1  History of Biopsy Breast Open   2  History of Breast Surgery Mastectomy   3  History of Cervical Loop Electrosurgical Excision (LEEP)   4  History of Dilation And Curettage    Family History  Mother    1  Family history of lung cancer (V16 1) (Z80 1)  Father    2  Family history of    3  Family history of cardiac disorder (V17 49) (Z82 49)  Child    4  Family history of malignant neoplasm of urinary bladder (V16 52) (Z80 52)  Sister    5  Family history of Diabetes  Cousin    6  Family history of cardiac disorder (V17 49) (Z82 49)   7  Family history of lung cancer (V16 1) (Z80 1)   8  Family history of multiple sclerosis (V17 2) (Z82 0)    Social History    · Denied: History of Drug use   · Former smoker (V15 82) (Z53 938)   ·    · No alcohol use   · Three children    Current Meds   1  AmLODIPine Besylate 10 MG Oral Tablet; 1 every day; Therapy: 76PVT8319 to [phone])  Requested for: 91AQG0513; Last   Rx:2016 Ordered   2  Aspir-81 81 MG Oral Tablet Delayed Release; 1 Two Times A Day; Therapy: 23YCD7596 to  Requested for: 29ALX2343 Recorded   3  Bridget Contour Next Test In [employer]; USE TO TEST GLUCOSE DAILY (dx: 250 00); Therapy: 69JXN9201 to (Last Rx:54Jkm1405)  Requested for: 60PRN3043 Ordered   4  [employer]S Vitamin C 500 MG Oral Tablet; TAKE 1 TABLET DAILY; Therapy: (Recorded:2015) to Recorded   5  Lancets Miscellaneous; test glucose daily  (dx: 250 00) with Bridget Contour glucometer; Therapy: 17NQD3094 to (Last Rx:42Xuu4365)  Requested for: 86SDG1180 Ordered   6   Latanoprost 0 005 % Ophthalmic Solution; INSTILL 1 DROP IN BOTH EYES AT   BEDTIME; Therapy: (Recorded:08Jun2015) to Recorded   7  Lisinopril 20 MG Oral Tablet; TAKE 2 TABLETS BY MOUTH EVERY DAY; Therapy: 52NER6705 to (Evaluate:95Sgp8260)  Requested for: 07NMD9525; Last   Rx:61Gyg1989 Ordered   8  MetFORMIN HCl - 500 MG Oral Tablet; Take 1 tablet daily; Therapy: 61HJQ4795 to (Evaluate:15Nov2016)  Requested for: 70SHH2739; Last   Rx:48Amv2944 Ordered   9  Multi-Vitamin Oral Tablet; 1 Every Day; Therapy: 39Fpn8570 to  Requested for: 30QFB9466 Recorded   10  Omega-3 Fish Oil 1000 MG Oral Capsule; TAKE 1 CAPSULE DAILY; Therapy: (Recorded:08Jun2015) to Recorded   11  Simvastatin 20 MG Oral Tablet; 1 every day; Therapy: 21PIZ3451 to (Evaluate:06Jan2017)  Requested for: 51Jgk2492; Last    Rx:12Jan2016 Ordered   12  Sotalol HCl (AF) 80 MG Oral Tablet; TAKE 1 TABLET TWICE DAILY; Therapy: (Recorded:08Jun2015) to Recorded    Allergies    1  Clindamycin HCl CAPS   2  Coumadin TABS   3  Latex Exam Gloves MISC   4  Pradaxa CAPS    Vitals  Vital Signs    Recorded: 30EFT9747 02:36PM   Temperature 97 7 F   Heart Rate 84   Systolic 051   Diastolic 82   Height 5 ft 5 in   Weight 199 lb    BMI Calculated 33 12   BSA Calculated 1 98   O2 Saturation 99   Pain Scale 0     Physical Exam    Skin   Skin and subcutaneous tissue: Abnormal   Wounds clean  Granulating well  Repacked spoke with 1/4 inch iodoform packing  Future Appointments    Date/Time Provider Specialty Site   05/16/2017 01:00 PM Contreras Valdez[address]   06/28/2017 01:30 PM Pascual Garcia MD Hematology Oncology [facility]     Signatures   Electronically signed by :  Verona Martinez MD; Mar  9 2017  3:09PM EST                       (Author)

## 2018-01-11 NOTE — RESULT NOTES
Discussion/Summary   Appointment 11/17/17   Please print and put in my folder  Dr Rose Ramirez      Verified Results  (1) COMPREHENSIVE METABOLIC PANEL [id number] 67:82FI [facility]     Test Name Result Flag Reference   Glucose, Serum 149 mg/dL H 65-99   BUN 15 mg/dL  8-27   Creatinine, Serum 0 71 mg/dL  0 57-1 00   BUN/Creatinine Ratio 21  12-28   Sodium, Serum 144 mmol/L  134-144   Potassium, Serum 4 9 mmol/L  3 5-5 2   Chloride, Serum 101 mmol/L     Carbon Dioxide, Total 26 mmol/L  18-29   Calcium, Serum 9 8 mg/dL  8 7-10 3   Protein, Total, Serum 7 6 g/dL  6 0-8 5   Albumin, Serum 4 4 g/dL  3 5-4 7   Globulin, Total 3 2 g/dL  1 5-4 5   A/G Ratio 1 4  1 2-2 2   Bilirubin, Total 0 2 mg/dL  0 0-1 2   Alkaline Phosphatase, S 100 IU/L     AST (SGOT) 24 IU/L  0-40   ALT (SGPT) 25 IU/L  0-32   eGFR If NonAfricn Am 81 mL/min/1 73  >59   eGFR If Africn Am 93 mL/min/1 73  >59     (1) LIPID PANEL FASTING W DIRECT LDL REFLEX [id number] 07:47AM [facility]     Test Name Result Flag Reference   Cholesterol, Total 176 mg/dL  100-199   Triglycerides 278 mg/dL H 0-149   HDL Cholesterol 50 mg/dL  >39   LDL Cholesterol Calc 70 mg/dL  0-99     (1) HEMOGLOBIN A1C [id number] 07:47AM [facility]     Test Name Result Flag Reference   Hemoglobin A1c 6 9 % H 4 8-5 6   Pre-diabetes: 5 7 - 6 4           Diabetes: >6 4           Glycemic control for adults with diabetes: <7 0     [facility]) Cardiovascular Risk Assessment [id number] 07:47AM [facility]     Test Name Result Flag Reference   Interpretation Note     Supplemental report is available  PDF Image

## 2018-01-12 VITALS
TEMPERATURE: 97.7 F | SYSTOLIC BLOOD PRESSURE: 130 MMHG | HEIGHT: 65 IN | BODY MASS INDEX: 33.15 KG/M2 | WEIGHT: 199 LBS | HEART RATE: 84 BPM | DIASTOLIC BLOOD PRESSURE: 82 MMHG | OXYGEN SATURATION: 99 %

## 2018-01-12 VITALS
DIASTOLIC BLOOD PRESSURE: 74 MMHG | SYSTOLIC BLOOD PRESSURE: 142 MMHG | HEART RATE: 80 BPM | TEMPERATURE: 97.5 F | RESPIRATION RATE: 16 BRPM

## 2018-01-12 NOTE — RESULT NOTES
Message   Appointment 4/21/16   Please print and put in my folder   Dr Hua Sanchez     Verified Results  (1) COMPREHENSIVE METABOLIC PANEL [id number] 70:81LR [facility]     Test Name Result Flag Reference   Glucose, Serum 134 mg/dL H 65-99   BUN 14 mg/dL  8-27   Creatinine, Serum 0 57 mg/dL  0 57-1 00   eGFR If NonAfricn Am 89 mL/min/1 73  >59   eGFR If Africn Am 103 mL/min/1 73  >59   BUN/Creatinine Ratio 25  11-26   Sodium, Serum 143 mmol/L  134-144   Potassium, Serum 4 7 mmol/L  3 5-5 2   Chloride, Serum 101 mmol/L     Carbon Dioxide, Total 25 mmol/L  18-29   Calcium, Serum 9 4 mg/dL  8 7-10 3   Protein, Total, Serum 6 9 g/dL  6 0-8 5   Albumin, Serum 4 0 g/dL  3 5-4 8   Globulin, Total 2 9 g/dL  1 5-4 5   A/G Ratio 1 4  1 1-2 5   Bilirubin, Total 0 2 mg/dL  0 0-1 2   Alkaline Phosphatase, S 99 IU/L     AST (SGOT) 12 IU/L  0-40   ALT (SGPT) 16 IU/L  0-32     (LC) Lipid Panel 26Hcn2694 07:29AM [facility]     Test Name Result Flag Reference   Cholesterol, Total 174 mg/dL  100-199   Triglycerides 369 mg/dL H 0-149   HDL Cholesterol 40 mg/dL  >39   According to ATP-III Guidelines, HDL-C >59 mg/dL is considered a  negative risk factor for CHD     VLDL Cholesterol Garrick 74 mg/dL H 5-40   LDL Cholesterol Calc 60 mg/dL  0-99

## 2018-01-13 VITALS
DIASTOLIC BLOOD PRESSURE: 78 MMHG | TEMPERATURE: 97.6 F | OXYGEN SATURATION: 97 % | SYSTOLIC BLOOD PRESSURE: 130 MMHG | HEART RATE: 81 BPM | HEIGHT: 65 IN | WEIGHT: 199 LBS | BODY MASS INDEX: 33.15 KG/M2

## 2018-01-13 VITALS
TEMPERATURE: 98.2 F | HEIGHT: 65 IN | DIASTOLIC BLOOD PRESSURE: 78 MMHG | BODY MASS INDEX: 33.15 KG/M2 | RESPIRATION RATE: 18 BRPM | SYSTOLIC BLOOD PRESSURE: 130 MMHG | WEIGHT: 199 LBS | HEART RATE: 78 BPM

## 2018-01-13 VITALS
HEIGHT: 65 IN | TEMPERATURE: 98.1 F | BODY MASS INDEX: 33.32 KG/M2 | SYSTOLIC BLOOD PRESSURE: 130 MMHG | DIASTOLIC BLOOD PRESSURE: 82 MMHG | OXYGEN SATURATION: 97 % | WEIGHT: 200 LBS | HEART RATE: 86 BPM

## 2018-01-13 NOTE — RESULT NOTES
Message   Appointment 11/17/16   Please print and put in my folder  Dr Roni Christy      Verified Results  (1) HEMOGLOBIN A1C [id number] 07:27AM [facility]     Test Name Result Flag Reference   Hemoglobin A1c 5 9 % H 4 8-5 6   Pre-diabetes: 5 7 - 6 4           Diabetes: >6 4           Glycemic control for adults with diabetes: <7 0     (1) COMPREHENSIVE METABOLIC PANEL [id number] 57:88YO [facility]     Test Name Result Flag Reference   Glucose, Serum 140 mg/dL H 65-99   BUN 14 mg/dL  8-27   Creatinine, Serum 0 59 mg/dL  0 57-1 00   eGFR If NonAfricn Am 87 mL/min/1 73  >59   eGFR If Africn Am 101 mL/min/1 73  >59   BUN/Creatinine Ratio 24  11-26   Sodium, Serum 142 mmol/L  136-144   Potassium, Serum 4 5 mmol/L  3 5-5 2   Chloride, Serum 99 mmol/L     Carbon Dioxide, Total 26 mmol/L  18-29   Calcium, Serum 8 8 mg/dL  8 7-10 3   Protein, Total, Serum 6 5 g/dL  6 0-8 5   Albumin, Serum 3 8 g/dL  3 5-4 8   Globulin, Total 2 7 g/dL  1 5-4 5   A/G Ratio 1 4  1 1-2 5   Bilirubin, Total 0 2 mg/dL  0 0-1 2   Alkaline Phosphatase, S 88 IU/L     AST (SGOT) 14 IU/L  0-40   ALT (SGPT) 16 IU/L  0-32     (1) LIPID PANEL FASTING W DIRECT LDL REFLEX [id number] 07:27AM [facility]     Test Name Result Flag Reference   Cholesterol, Total 163 mg/dL  100-199   Triglycerides 283 mg/dL H 0-149   HDL Cholesterol 42 mg/dL  >39   According to ATP-III Guidelines, HDL-C >59 mg/dL is considered a  negative risk factor for CHD  LDL Cholesterol Calc 64 mg/dL  0-99     (1) URIC ACID [id number] 07:27AM [facility]     Test Name Result Flag Reference   Uric Acid, Serum 5 4 mg/dL  2 5-7 1   Therapeutic target for gout patients: <6 0     [facility]) Cardiovascular Risk Assessment [id number] 07:27AM [facility]     Test Name Result Flag Reference   Interpretation Note     Supplement report is available  PDF Image

## 2018-01-14 VITALS
OXYGEN SATURATION: 98 % | DIASTOLIC BLOOD PRESSURE: 78 MMHG | WEIGHT: 200 LBS | SYSTOLIC BLOOD PRESSURE: 132 MMHG | BODY MASS INDEX: 33.32 KG/M2 | HEIGHT: 65 IN | HEART RATE: 82 BPM | TEMPERATURE: 98 F

## 2018-01-14 VITALS
SYSTOLIC BLOOD PRESSURE: 142 MMHG | HEART RATE: 72 BPM | DIASTOLIC BLOOD PRESSURE: 76 MMHG | TEMPERATURE: 96.8 F | RESPIRATION RATE: 16 BRPM

## 2018-01-14 VITALS
RESPIRATION RATE: 16 BRPM | HEART RATE: 84 BPM | DIASTOLIC BLOOD PRESSURE: 72 MMHG | TEMPERATURE: 98 F | SYSTOLIC BLOOD PRESSURE: 138 MMHG

## 2018-01-14 VITALS
SYSTOLIC BLOOD PRESSURE: 118 MMHG | WEIGHT: 200 LBS | DIASTOLIC BLOOD PRESSURE: 72 MMHG | BODY MASS INDEX: 33.32 KG/M2 | TEMPERATURE: 98.2 F | HEART RATE: 82 BPM | HEIGHT: 65 IN | OXYGEN SATURATION: 99 %

## 2018-01-15 VITALS
BODY MASS INDEX: 33.32 KG/M2 | TEMPERATURE: 97.9 F | WEIGHT: 200 LBS | HEART RATE: 76 BPM | DIASTOLIC BLOOD PRESSURE: 82 MMHG | SYSTOLIC BLOOD PRESSURE: 130 MMHG | HEIGHT: 65 IN | OXYGEN SATURATION: 99 %

## 2018-01-15 NOTE — RESULT NOTES
Discussion/Summary   Appointment 5/16/17   Please print and put in my folder  Dr Darryl Jara      Verified Results  (1) COMPREHENSIVE METABOLIC PANEL [id number] 36:34FT [facility]     Test Name Result Flag Reference   Glucose, Serum 174 mg/dL H 65-99   BUN 16 mg/dL  8-27   Creatinine, Serum 0 66 mg/dL  0 57-1 00   BUN/Creatinine Ratio 24  12-28   Sodium, Serum 142 mmol/L  134-144   Potassium, Serum 4 6 mmol/L  3 5-5 2   Chloride, Serum 98 mmol/L     Carbon Dioxide, Total 25 mmol/L  18-29   Calcium, Serum 9 3 mg/dL  8 7-10 3   Protein, Total, Serum 7 0 g/dL  6 0-8 5   Albumin, Serum 4 0 g/dL  3 5-4 8   Globulin, Total 3 0 g/dL  1 5-4 5   A/G Ratio 1 3  1 2-2 2   Bilirubin, Total 0 2 mg/dL  0 0-1 2   Alkaline Phosphatase, S 94 IU/L     AST (SGOT) 12 IU/L  0-40   ALT (SGPT) 14 IU/L  0-32   eGFR If NonAfricn Am 84 mL/min/1 73  >59   eGFR If Africn Am 97 mL/min/1 73  >59     (1) LIPID PANEL FASTING W DIRECT LDL REFLEX [id number] 07:34AM [facility]     Test Name Result Flag Reference   Cholesterol, Total 168 mg/dL  100-199   Triglycerides 297 mg/dL H 0-149   HDL Cholesterol 44 mg/dL  >39   LDL Cholesterol Calc 65 mg/dL  0-99     (1) HEMOGLOBIN A1C [id number] 07:34AM [facility]     Test Name Result Flag Reference   Hemoglobin A1c 7 6 % H 4 8-5 6   Pre-diabetes: 5 7 - 6 4           Diabetes: >6 4           Glycemic control for adults with diabetes: <7 0     (1) MICROALBUMIN CREATININE RATIO, RANDOM URINE [id number] 07:34AM [facility]     Test Name Result Flag Reference   Creatinine, Urine 59 3 mg/dL  Not Estab  Microalbumin, Urine 355 4 ug/mL  Not Estab  Microalb/Creat Ratio 599 3 mg/g creat H 0 0-30 0     [facility]) Cardiovascular Risk Assessment [id number] 07:34AM [facility]     Test Name Result Flag Reference   Interpretation Note     Supplement report is available  PDF Image

## 2018-01-15 NOTE — MISCELLANEOUS
History of Present Illness  TCM Communication [facility]: The patient is being contacted for follow-up after hospitalization and [facility]  She was hospitalized at [facility]  The dates of hospitalization: 02/27/2017, date of admission: 02/27/2017, date of discharge: 03/03/2017  She was discharged to home, pt is home with at home VNA  She scheduled a follow up appointment  Follow-up appointments with other specialists: pt is also following with Dr Stoner  The patient is currently asymptomatic  Counseling was provided to the patient  and   Communication performed and completed by andrea hackett   HPI: I spoke with patient who was discharged on 3/3/2017 to home following a brief hospital stay for cellulitus of the back and a left lateral chest abcess  pt reports she feels well today however is very concerned about high bp readings when the visiting nurse comes out typically around the (190's)  pt reported that she feels well, she has no headaches or chest pain, no SOB or fevers  pt does not want to return to the ER so is going to come right over to our office right now to have her blood pressure checked  hg      Active Problems     1  Abnormal gait (781 2) (R26 9)   2  Abnormal mammogram (793 80) (R92 8)   3  Angioedema (995 1)   4  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)   5  Breast cancer (174 9) (C50 919)   6  Edema (782 3) (R60 9)   7  Encounter for screening mammogram for high-risk patient (V76 11) (Z12 31)   8  Encounter for screening mammogram for malignant neoplasm of breast (V76 12)   (Z12 31)   9  Endometrial thickening on ultra sound (793 5) (R93 8)   10  Former smoker (V15 82) (K50 886)   11  Hirsutism (704 1) (L68 0)   12  History of mastectomy, right (V45 71) (Z90 11)   13  Hyperlipidemia (272 4) (E78 5)   14  Influenza vaccine needed (V04 81) (Z23)   15  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)   16   Localized, primary osteoarthritis of shoulder region, 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)    (M19 019)   17  Neoplasm of uncertain behavior of skin (238 2) (D48 5)   18  Obesity (278 00) (E66 9)   19  Peptic ulcer (533 90) (K27 9)   20  Post-menopausal bleeding (627 1) (N95 0)   21  Screening for malignant neoplasm of breast (V76 10) (Z12 39)   22  Screening for osteoporosis (V82 81) (Z13 820)   23  Skin ulcer (707 9) (L98 499)   24  Urinary incontinence (788 30) (R32)   25  Wears glasses (V49 89) (Z97 3)    Atrial fibrillation (427 31) (I48 91)       Benign essential hypertension (401 1) (I10)       Type 2 diabetes mellitus (250 00) (E11 9)       Multiple sclerosis (340) (G35)       BMI 33 0-33 9,adult (V85 33) (Z68 33)       Change or removal of wound packing (V58 30) (Z48 00)       Encounter for recheck of abscess following incision and drainage (V67 09) (Z09)       Abscess of back (682 2) (M34 616)          Past Medical History    1  Acute upper respiratory infection (465 9) (J06 9)   2  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, 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)   3  History of Cellulitis (682 9) (L03 90)   4  History of Cellulitis of chest wall (682 2) (L03 313)   5  History of Dysuria (788 1) (R30 0)   6  History of Flushing (782 62) (R23 2)   7  History of abdominal pain (V13 89) (Z87 898)   8  History of acute conjunctivitis (V12 49) (Z86 69)   9  History of chronic endometritis (V13 29) (Z87 42)   10  History of ingrown nail (V13 3) (Z87 2)   11  History of malignant neoplasm of breast (V10 3) (Z85 3)   12  History of viral gastroenteritis (V12 09) (Z86 19)   13  Influenza vaccine needed (V04 81) (Z23)   14  History of Initial Medicare annual wellness visit (V70 0) (Z00 00)   15  Medicare annual wellness visit, subsequent (V70 0) (Z00 00)   16  Need for pneumococcal vaccination (V03 82) (Z23)   17  History of Nonvenomous Insect Bite (E906 4)   18  Otitis media, left (382 9) (H66 92)   19  History of Palpitations (785 1) (R00 2)   20   History of Pelvic cramping (625  9) (R10 2)   21  History of Pressure Ulcer Of The Buttock (707 05)   22  History of Proctitis (569 49) (K62 89)   23  Urinary tract infection (599 0) (N39 0)   24  History of Vaginal polyp (623 7) (N84 2)   25  History of Visit for pre-operative examination (V72 84) (G36 140)    Surgical History    1  History of Biopsy Breast Open   2  History of Breast Surgery Mastectomy   3  History of Cervical Loop Electrosurgical Excision (LEEP)   4  History of Dilation And Curettage    Family History  Mother    1  Family history of lung cancer (V16 1) (Z80 1)  Father    2  Family history of    3  Family history of cardiac disorder (V17 49) (Z82 49)  Child    4  Family history of malignant neoplasm of urinary bladder (V16 52) (Z80 52)  Sister    5  Family history of Diabetes  Cousin    6  Family history of cardiac disorder (V17 49) (Z82 49)   7  Family history of lung cancer (V16 1) (Z80 1)   8  Family history of multiple sclerosis (V17 2) (Z82 0)    Social History    · Denied: History of Drug use   · Former smoker (V15 82) (E01 438)   ·    · No alcohol use   · Three children    Current Meds   1  AmLODIPine Besylate 10 MG Oral Tablet; 1 every day; Therapy: 26UFS3632 to [phone])  Requested for: 83CLQ6473; Last   Rx:2016 Ordered   2  Aspir-81 81 MG Oral Tablet Delayed Release; 1 Two Times A Day; Therapy: 99MUZ9727 to  Requested for: 73EWC3051 Recorded   3  Bridget Contour Next Test In [employer]; USE TO TEST GLUCOSE DAILY (dx: 250 00); Therapy: 63BUW7038 to (Last Rx:48Qln6660)  Requested for: 25UMS6828 Ordered   4  [employer] Vitamin C 500 MG Oral Tablet; TAKE 1 TABLET DAILY; Therapy: (Recorded:2015) to Recorded   5  Lancets Miscellaneous; test glucose daily  (dx: 250 00) with Bridget Contour glucometer; Therapy: 92UNJ0337 to (Last Rx:06Zlj6476)  Requested for: 76FSI8550 Ordered   6  Latanoprost 0 005 % Ophthalmic Solution; INSTILL 1 DROP IN BOTH EYES AT   BEDTIME;    Therapy: (Recorded:08Jun2015) to Recorded   7  Lisinopril 20 MG Oral Tablet; TAKE 2 TABLETS BY MOUTH EVERY DAY; Therapy: 78PHQ5812 to (Evaluate:59Sml7583)  Requested for: 17KFG0622; Last   Rx:75Neq1389 Ordered   8  MetFORMIN HCl - 500 MG Oral Tablet; Take 1 tablet daily; Therapy: 35MRM2266 to (Evaluate:95Ogi8236)  Requested for: 26ECP1640; Last   Rx:48Pic9274 Ordered   9  Multi-Vitamin Oral Tablet; 1 Every Day; Therapy: 12Mmp6485 to  Requested for: 00RJI1446 Recorded   10  Omega-3 Fish Oil 1000 MG Oral Capsule; TAKE 1 CAPSULE DAILY; Therapy: (Recorded:08Jun2015) to Recorded   11  Simvastatin 20 MG Oral Tablet; 1 every day; Therapy: 39UCI1342 to (Evaluate:06Jan2017)  Requested for: 59Tux0531; Last    Rx:12Jan2016 Ordered   12  Sotalol HCl (AF) 80 MG Oral Tablet; TAKE 1 TABLET TWICE DAILY; Therapy: (Recorded:08Jun2015) to Recorded    Allergies    1  Clindamycin HCl CAPS   2  Coumadin TABS   3  Latex Exam Gloves MISC   4  Pradaxa CAPS    Future Appointments    Date/Time Provider Specialty Site   04/20/2017 02:15 PM Jo Ann Woodward MD General Surgery [address]   05/16/2017 01:00 PM Niki Jnuior[address]   06/28/2017 01:30 PM Matthew Awan MD Hematology Oncology [facility]     Signatures   Electronically signed by : Sherry Stanton DO;  Apr  3 2017  1:02PM EST                       (Author)

## 2018-01-16 NOTE — MISCELLANEOUS
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)   5  Atrial fibrillation (427 31) (I48 91)   6  Benign essential hypertension (401 1) (I10)   7  Edema (782 3) (R60 9)   8  Encounter for screening mammogram for malignant neoplasm of breast (V76 12)   (Z12 31)   9  Endometrial thickening on ultra sound (793 5) (R93 8)   10  Hirsutism (704 1) (L68 0)   11  History of mastectomy, right (V45 71) (Z90 11)   12  Hyperlipidemia (272 4) (E78 5)   13  Initial Medicare annual wellness visit (V70 0) (Z00 00)   14  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)   15  Localized, primary osteoarthritis of shoulder region, 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)    (M19 019)   16  Multiple sclerosis (340) (G35)   17  Neoplasm of uncertain behavior of skin (238 2) (D48 5)   18  Obesity (278 00) (E66 9)   19  Peptic ulcer (533 90) (K27 9)   20  Post-menopausal bleeding (627 1) (N95 0)   21  Screening for malignant neoplasm of breast (V76 10) (Z12 39)   22  Screening for osteoporosis (V82 81) (Z13 820)   23  Type 2 diabetes mellitus (250 00) (E11 9)   24  Urinary incontinence (788 30) (R32)   25  Visit for pre-operative examination (V72 84) (Z01 818)   26  Wears glasses (V49 89) (Z97 3)    Current Meds   1  AmLODIPine Besylate 10 MG Oral Tablet; 1 every day; Therapy: 08GCH7321 to (Last Rx:30Vip3906)  Requested for: 88SQG8470 Ordered   2  Aspir-81 81 MG Oral Tablet Delayed Release; 1 Two Times A Day; Therapy: 69DOX3035 to  Requested for: 74OZD2659 Recorded   3  Bridget Contour Next Test In [employer]; USE TO TEST GLUCOSE DAILY (dx: 250 00); Therapy: 05DCE6165 to (Last Rx:94Gtm8893)  Requested for: 27Kco9487 Ordered   4   [facility] Vitamin C 500 MG Oral Tablet; TAKE 1 TABLET DAILY; Therapy: (Recorded:08Jun2015) to Recorded   5  Lancets Miscellaneous; test glucose daily  (dx: 250 00) with Bridget Contour glucometer; Therapy: 95KCC2915 to (Last Rx:03Sep2015)  Requested for: 03Sep2015 Ordered   6  Latanoprost 0 005 % Ophthalmic Solution; INSTILL 1 DROP IN BOTH EYES AT   BEDTIME; Therapy: (Recorded:08Jun2015) to Recorded   7  Lisinopril 20 MG Oral Tablet; 2 Every Day; Therapy: 48GSZ9376 to (Evaluate:06Jan2017)  Requested for: 12Jan2016; Last   Rx:12Jan2016 Ordered   8  MetFORMIN HCl - 1000 MG Oral Tablet (Glucophage); metformin   1/2  bid; Therapy: 05JMT6297 to (Evaluate:06Jan2017)  Requested for: 12Jan2016; Last   Rx:12Jan2016 Ordered   9  Multi-Vitamin Oral Tablet; 1 Every Day; Therapy: 92Vbk1058 to  Requested for: 47QES2077 Recorded   10  Omega-3 Fish Oil 1000 MG Oral Capsule; TAKE 1 CAPSULE DAILY; Therapy: (Recorded:08Jun2015) to Recorded   11  Simvastatin 20 MG Oral Tablet; 1 every day; Therapy: 72NGS9651 to (Evaluate:06Jan2017)  Requested for: 12Jan2016; Last    Rx:12Jan2016 Ordered   12  Sotalol HCl (AF) 80 MG Oral Tablet; TAKE 1 TABLET TWICE DAILY; Therapy: (Recorded:08Jun2015) to Recorded    Allergies    1  Clindamycin HCl CAPS   2  Coumadin TABS   3  Latex Exam Gloves MISC   4  Pradaxa CAPS    Signatures   Electronically signed by :  Cristhian Puente MD; May 16 2016  5:31PM EST

## 2018-01-16 NOTE — RESULT NOTES
Message   Appointment 2/16/17   Please print and put in my folder  Dr Shelley       Verified Results  (1) COMPREHENSIVE METABOLIC PANEL 35GMX0819 48:76VL Gloria Dill     Test Name Result Flag Reference   Glucose, Serum 143 mg/dL H 65-99   BUN 13 mg/dL  8-27   Creatinine, Serum 0 63 mg/dL  0 57-1 00   eGFR If NonAfricn Am 86 mL/min/1 73  >59   eGFR If Africn Am 99 mL/min/1 73  >59   BUN/Creatinine Ratio 21  11-26   Sodium, Serum 145 mmol/L H 134-144   Potassium, Serum 5 0 mmol/L  3 5-5 2   Chloride, Serum 103 mmol/L     Carbon Dioxide, Total 25 mmol/L  18-29   Calcium, Serum 9 2 mg/dL  8 7-10 3   Protein, Total, Serum 7 0 g/dL  6 0-8 5   Albumin, Serum 4 0 g/dL  3 5-4 8   Globulin, Total 3 0 g/dL  1 5-4 5   A/G Ratio 1 3  1 1-2 5   **Effective March 13, 2017 the reference interval**                   for A/G Ratio will be changing to:                               Age                Male          Female                           0 -  7 days       1 1 - 2 3       1 1 - 2 3                           8 - 30 days       1 2 - 2 8       1 2 - 2 8                           1 -  6 months     1 3 - 3 6       1 3 - 3 6                    7 months -  5 years      1 5 - 2 6       1 5 - 2 6                              > 5 years      1 2 - 2 2       1 2 - 2 2   Bilirubin, Total <0 2 mg/dL  0 0-1 2   Alkaline Phosphatase, S 94 IU/L     AST (SGOT) 15 IU/L  0-40   ALT (SGPT) 19 IU/L  0-32     (1) HEMOGLOBIN A1C 55FDN3016 07:38AM Gloria Dill     Test Name Result Flag Reference   Hemoglobin A1c 6 7 % H 4 8-5 6   Pre-diabetes: 5 7 - 6 4           Diabetes: >6 4           Glycemic control for adults with diabetes: <7 0

## 2018-01-16 NOTE — PROGRESS NOTES
Assessment    1  Otitis media, left (382 9) (H66 92)    Plan  Otitis media, left    · Amoxicillin 875 MG Oral Tablet; Take 1 tablet twice daily   · Call [phone] if: The pain is not better in 2 days ; Status:Complete;   Done:  68XOQ1958    Discussion/Summary  Possible side effects of new medications were reviewed with the patient/guardian today  Chief Complaint  head congestion sinus drainage feeling sick for the past week rmkpn      History of Present Illness  HPI: She started getting sick on 1/29/16  She has a sore throat, head congestion and runny nose  Her ears hurt as well  She has never had such bad ear pain  Review of Systems    Constitutional: no fever  Active Problems    1  Abnormal gait (781 2) (R26 9)   2  Abnormal mammogram (793 80) (R92 8)   3  Angioedema (995 1)   4  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)   5  Atrial fibrillation (427 31) (I48 91)   6  Benign essential hypertension (401 1) (I10)   7  Breast cancer (174 9) (C50 919)   8  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, 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)   9  Edema (782 3) (R60 9)   10  Encounter for screening mammogram for malignant neoplasm of breast (V76 12)    (Z12 31)   11  Endometrial thickening on ultra sound (793 5) (R93 8)   12  Hirsutism (704 1) (L68 0)   13  Hyperlipidemia (272 4) (E78 5)   14  Initial Medicare annual wellness visit (V70 0) (Z00 00)   15  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)   16  Localized, primary osteoarthritis of shoulder region, 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)    (M19 019)   17  Multiple sclerosis (340) (G35)   18  Neoplasm of uncertain behavior of skin (238 2) (D48 5)   19  Obesity (278 00) (E66 9)   20  Peptic ulcer (533 90) (K27 9)   21  Post-menopausal bleeding (627 1) (N95 0)   22  Screening for malignant neoplasm of breast (V76 10) (Z12 39)   23  Screening for osteoporosis (V82 81) (Z13 820)   24  Type 2 diabetes mellitus (250 00) (E11 9)   25   Urinary incontinence (788 30) (R32)   26  Visit for pre-operative examination (V72 84) (Z01 818)   27  Wears glasses (V49 89) (Z97 3)    Past Medical History    1  Acute upper respiratory infection (465 9) (J06 9)   2  History of Cellulitis (682 9) (L03 90)   3  History of Cellulitis of chest wall (682 2) (L03 313)   4  History of Dysuria (788 1) (R30 0)   5  History of Flushing (782 62) (R23 2)   6  History of abdominal pain (V13 89) (Z87 898)   7  History of chronic endometritis (V13 29) (Z87 42)   8  History of ingrown nail (V13 3) (Z87 2)   9  Influenza vaccine needed (V04 81) (Z23)   10  Influenza vaccine needed (V04 81) (Z23)   11  Need for pneumococcal vaccination (V03 82) (Z23)   12  History of Nonvenomous Insect Bite (E906 4)   13  History of Palpitations (785 1) (R00 2)   14  History of Pelvic cramping (625 9) (N94 9)   15  History of Pressure Ulcer Of The Buttock (707 05)   16  History of Proctitis (569 49) (K62 89)   17  Urinary tract infection (599 0) (N39 0)   18  History of Vaginal polyp (623 7) (N84 2)    Family History    1  Family history of lung cancer (V16 1) (Z80 1)    2  Family history of    3  Family history of cardiac disorder (V17 49) (Z82 49)    4  Family history of malignant neoplasm of urinary bladder (V16 52) (Z80 52)    5  Family history of Diabetes    6  Family history of cardiac disorder (V17 49) (Z82 49)   7  Family history of lung cancer (V16 1) (Z80 1)   8  Family history of multiple sclerosis (V17 2) (Z82 0)    Social History    · Denied: History of Drug use   · Former smoker (V15 82) (P90 290)   ·    · No alcohol use   · Three children    Surgical History    1  History of Biopsy Breast Open   2  History of Breast Surgery Mastectomy   3  History of Cervical Loop Electrosurgical Excision (LEEP)   4  History of Dilation And Curettage    Current Meds   1  AmLODIPine Besylate 10 MG Oral Tablet; 1 every day;    Therapy: 14EWL1578 to (Last Rx:94Fih1558)  Requested for: 31LTS4504 Ordered   2  Aspir-81 81 MG Oral Tablet Delayed Release; 1 Two Times A Day; Therapy: 50QCE9758 to  Requested for: 49ZNO9430 Recorded   3  Bridget Contour Next Test In [employer]; USE TO TEST GLUCOSE DAILY (dx: 250 00); Therapy: 44RNA2333 to (Last Rx:03Sep2015)  Requested for: 03Sep2015 Ordered   4  [employer] Vitamin C 500 MG Oral Tablet; TAKE 1 TABLET DAILY; Therapy: (Recorded:08Jun2015) to Recorded   5  Lancets Miscellaneous; test glucose daily  (dx: 250 00) with Bridget Contour glucometer; Therapy: 49MXU4414 to (Last Rx:03Sep2015)  Requested for: 03Sep2015 Ordered   6  Latanoprost 0 005 % Ophthalmic Solution; INSTILL 1 DROP IN BOTH EYES AT   BEDTIME; Therapy: (Recorded:08Jun2015) to Recorded   7  Lisinopril 20 MG Oral Tablet; 2 Every Day; Therapy: 29GAU1061 to (Evaluate:06Jan2017)  Requested for: 12Jan2016; Last   Rx:12Jan2016 Ordered   8  MetFORMIN HCl - 1000 MG Oral Tablet; metformin   1/2  bid; Therapy: 14AEX3320 to (Evaluate:06Jan2017)  Requested for: 12Jan2016; Last   Rx:12Jan2016 Ordered   9  Multi-Vitamin Oral Tablet; 1 Every Day; Therapy: 77Mvm5830 to  Requested for: 77IZC8033 Recorded   10  Omega-3 Fish Oil 1000 MG Oral Capsule; TAKE 1 CAPSULE DAILY; Therapy: (Recorded:08Jun2015) to Recorded   11  Simvastatin 20 MG Oral Tablet; 1 every day; Therapy: 87FYY1763 to (Evaluate:06Jan2017)  Requested for: 12Jan2016; Last    Rx:12Jan2016 Ordered   12  Sotalol HCl (AF) 80 MG Oral Tablet; TAKE 1 TABLET TWICE DAILY; Therapy: (Recorded:08Jun2015) to Recorded    Allergies    1  Clindamycin HCl CAPS   2  Coumadin TABS   3  Latex Exam Gloves MISC   4  Pradaxa CAPS    Vitals   Recorded: 03Feb2016 09:41AM   Temperature 97 6 F   Heart Rate 66   Respiration 18   Systolic 294   Diastolic 80   Height 5 ft 5 in   Weight 196 lb    BMI Calculated 32 62   BSA Calculated 1 96     Physical Exam    Constitutional   General appearance: No acute distress, well appearing and well nourished      Ears, Nose, Mouth, and Throat   External inspection of ears and nose: Normal     Otoscopic examination: Abnormal   The right tympanic membrane was bulging, but was not red  The left tympanic membrane was red and was bulging  Exam of the right middle ear showed a middle ear effusion  Exam of the left middle ear showed a middle ear effusion  Oropharynx: Normal with no erythema, edema, exudate or lesions  Pulmonary   Auscultation of lungs: Clear to auscultation  Cardiovascular   Auscultation of heart: Normal rate and rhythm, normal S1 and S2, without murmurs         Future Appointments    Date/Time Provider Specialty Site   04/04/2016 01:00 PM Marko Pierre MD General Surgery [address]   04/14/2016 01:30 PM [address]   05/05/2016 01:30 PM Dena Ahumada, MD Hematology Oncology [facility]     Signatures   Electronically signed by : Maribel Muir DO; Feb  3 2016 10:13AM EST                       (Author)

## 2018-01-17 NOTE — RESULT NOTES
Message   Appointment 4/21/16   Please print and put in my folder   Dr Alex David     Verified Results  General acute hospital) Cardiovascular Risk Assessment 08Apr2016 07:29AM [facility]     Test Name Result Flag Reference   Interpretation Note     Supplement report is available  PDF Image   (1) HEMOGLOBIN A1C 04Gtu1102 07:29AM [facility]     Test Name Result Flag Reference   Hemoglobin A1c 6 2 % H 4 8-5 6   Pre-diabetes: 5 7 - 6 4           Diabetes: >6 4           Glycemic control for adults with diabetes: <7 0     General acute hospital) [facility] CMP14 Default [id number] 07:29AM [facility]     Test Name Result Flag Reference   [facility] CMP14 Default Comment     A hand-written panel/profile was received from your office  In  accordance with the [email or web address] Ambiguous Test Code Policy dated July 0995, we have completed your order by using the closest currently  or formerly recognized AMA panel  We have assigned Comprehensive  Metabolic Panel (14), Test Code #923833 to this request   If this  is not the testing you wished to receive on this specimen, please  contact the [address] [place] [address] Client Inquiry/Technical Services Department  to clarify the test order  We appreciate your business  General acute hospital) Cherry Berryn LP Default [id number] 07:29AM [facility]     Test Name Result Flag Reference   Ambig Abbrev LP Default Comment     A hand-written panel/profile was received from your office  In  accordance with the [employer] Ambiguous Test Code Policy dated July 5444, we have completed your order by using the closest currently  or formerly recognized AMA panel  We have assigned Lipid Panel,  Test Code #835593 to this request  If this is not the testing you  wished to receive on this specimen, please contact the [address] [place] [address]  [facility] Inquiry/Technical Services Department to clarify the test  order  We appreciate your business       (1) MICROALBUMIN CREATININE RATIO, RANDOM URINE 08Apr2016 07:29AM [facility]     Test Name Result Flag Reference   Creatinine, Urine 109 2 mg/dL  15 0-278 0   Microalbumin, Urine 254 5 ug/mL H 0 0-17 0   Microalb/Creat Ratio 233 1 mg/g creat H 0 0-30 0

## 2018-01-18 NOTE — RESULT NOTES
Verified Results  (1) COMPREHENSIVE METABOLIC PANEL [id number] 86:83TP Skyler Ny     Test Name Result Flag Reference   Glucose, Serum 133 mg/dL H 65-99   BUN 18 mg/dL  8-27   Creatinine, Serum 0 66 mg/dL  0 57-1 00   eGFR If NonAfricn Am 85 mL/min/1 73  >59   eGFR If Africn Am 98 mL/min/1 73  >59   BUN/Creatinine Ratio 27 H 11-26   Sodium, Serum 144 mmol/L  134-144   Potassium, Serum 4 7 mmol/L  3 5-5 2   Chloride, Serum 102 mmol/L     Carbon Dioxide, Total 24 mmol/L  18-29   Calcium, Serum 8 8 mg/dL  8 7-10 3   Protein, Total, Serum 6 8 g/dL  6 0-8 5   Albumin, Serum 4 0 g/dL  3 5-4 8   Globulin, Total 2 8 g/dL  1 5-4 5   A/G Ratio 1 4  1 1-2 5   Bilirubin, Total <0 2 mg/dL  0 0-1 2   Alkaline Phosphatase, S 92 IU/L     AST (SGOT) 15 IU/L  0-40   ALT (SGPT) 17 IU/L  0-32   Glucose, Serum 133 mg/dL H 65-99   BUN 18 mg/dL  8-27   Creatinine, Serum 0 66 mg/dL  0 57-1 00   eGFR If NonAfricn Am 85 mL/min/1 73  >59   eGFR If Africn Am 98 mL/min/1 73  >59   BUN/Creatinine Ratio 27 H 11-26   Sodium, Serum 144 mmol/L  134-144   Potassium, Serum 4 7 mmol/L  3 5-5 2   Chloride, Serum 102 mmol/L     Carbon Dioxide, Total 24 mmol/L  18-29   Calcium, Serum 8 8 mg/dL  8 7-10 3   Protein, Total, Serum 6 8 g/dL  6 0-8 5   Albumin, Serum 4 0 g/dL  3 5-4 8   Globulin, Total 2 8 g/dL  1 5-4 5   A/G Ratio 1 4  1 1-2 5   Bilirubin, Total <0 2 mg/dL  0 0-1 2   Alkaline Phosphatase, S 92 IU/L     AST (SGOT) 15 IU/L  0-40   ALT (SGPT) 17 IU/L  0-32           (1) HEMOGLOBIN A1C 11Aug2016 07:28AM [facility] Ny     Test Name Result Flag Reference   Hemoglobin A1c 6 5 % H 4 8-5 6   Pre-diabetes: 5 7 - 6 4           Diabetes: >6 4           Glycemic control for adults with diabetes: <7 0

## 2018-01-18 NOTE — MISCELLANEOUS
Assessment    1  Benign essential hypertension (401 1) (I10)   2  Multiple sclerosis (340) (G35)   3  Type 2 diabetes mellitus (250 00) (E11 9)   4  Abscess of back (682 2) (L02 212)   5  Atrial fibrillation (427 31) (I48 91)   6  BMI 32 0-32 9,adult (V85 32) (Z68 32)    Plan  Benign essential hypertension    · AmLODIPine Besylate 10 MG Oral Tablet; 1 every day   Rx By: Christian Bolden; Dispense: 90 Days ; #:90 TAB; Refill: 3; For: Benign essential hypertension; TRAE = N; Verified Transmission to [place] [address]; Last Updated By: Matilda Santiago; 3/20/2017 1:12:58 PM   · Chlorthalidone 25 MG Oral Tablet; 1every other day   Dispense: 0 Days ; #: Sufficient Tablet; Refill: 0; For: Benign essential hypertension; TRAE = N; Record; Last Updated By: Matilda Santiago; 3/20/2017 1:12:58 PM   · Lisinopril 20 MG Oral Tablet; TAKE 2 TABLETS BY MOUTH EVERY DAY   Rx By: Christian Bolden; Dispense: 90 Days ; #:180 TAB; Refill: 1; For: Benign essential hypertension; TRAE = N; Verified Transmission to [place] [address]; Last Updated By: Matilda Santiago; 3/20/2017 1:12:58 PM  Type 2 diabetes mellitus    · MetFORMIN HCl - 500 MG Oral Tablet; Take 1 tablet daily   Rx By: Christian Bolden; Dispense: 90 Days ; #:90 Tablet; Refill: 1; For: Type 2 diabetes mellitus; TRAE = N; Verified Transmission to [place] [address]; Msg to Pharmacy: please put on file; Last Updated By: Matilda Santiago; 3/20/2017 1:18:55 PM    Discussion/Summary  Discussion Summary:   HTN stable  Continue current medications  DM- sugars are stable  Will continue with Metformin 500mg  Advised her to call with blood sugar log if they are staying elevated  MS- she does not wish to see neurology at this time  Medication SE Review and Pt Understands Tx: Possible side effects of new medications were reviewed with the patient/guardian today  The treatment plan was reviewed with the patient/guardian   The patient/guardian understands and agrees with the treatment plan      Chief Complaint  I spoke with both Vinicio Bella and her  on 3/15/17 after her hospital discharge to home on 3/13/17  She states " I feel pretty good today" She still feels a slight bit weak but "better today" She denies fever  She has a home nebulizer with Duo Neb for home use  I reviewed her medications with her  and updated her chart  The visiting nurse came to assess her this am and she states that her BP was normal  Her blood sugar this afternoon was 180  She will keep a blood sugar diary and bring that with her to her appt next week  They are aware to call the office at any time with any questions or concerns and they area aware that if she has any dyspnea, weakness, dizziness, palpitations, chest pains, etc she needs to go to the ER [facility] LPN   Chief Complaint Free Text Note Form: Hospital follow up appt  She is still coughing but not as bad  Still feels a little weak  Tracey Causeykendall      History of Present Illness  TCM Communication [facility]: The patient is being contacted for follow-up after hospitalization  Hospital records were reviewed  She was hospitalized at [facility]  The date of admission: 3/10/17, date of discharge: 3/13/17  Diagnosis: Influenza A  She was discharged to home  Medications reviewed and updated today  She scheduled a follow up appointment  Counseling was provided to the patient and patient's family  Topics counseled included instructions for management, risk factor reductions, patient and family education, home health agency benefits, activities of daily living and importance of compliance with treatment  Communication performed and completed by [facility] LPN 0/88/52   HPI: Here for TCM  She has been using her nebulizer which helps with her breathing  Denies SOB or wheezing  She has a lingering cough and has generalized weakness, but is improving  Denies fevers  Was thought to have exacerbation of her MS, was seen by Dr Joan Coombs   She hasn't seen a neurologist in years  She does not want any treatment for her MS and does not plan to follow up with neurology at this time  She has been checking her sugars  They range from 120s-150s fasting  She is taking Metformin 500mg daily  She has an appt this afternoon with Dr Olman Rouse for skin abscess check  Review of Systems  Complete-Female:   Constitutional: feeling tired, but no fever and no chills  Cardiovascular: no chest pain, no palpitations and no lower extremity edema  Respiratory: cough, but no shortness of breath and no wheezing  Gastrointestinal: no abdominal pain, no nausea, no vomiting and no diarrhea  Musculoskeletal: no arthralgias and no myalgias  Neurological: no headache and no dizziness  Active Problems     1  Abnormal gait (781 2) (R26 9)   2  Abnormal mammogram (793 80) (R92 8)   3  Angioedema (995 1)   4  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)   5  Atrial fibrillation (427 31) (I48 91)   6  Benign essential hypertension (401 1) (I10)   7  Breast cancer (174 9) (C50 919)   8  Edema (782 3) (R60 9)   9  Encounter for screening mammogram for high-risk patient (V76 11) (Z12 31)   10  Encounter for screening mammogram for malignant neoplasm of breast (V76 12)    (Z12 31)   11  Endometrial thickening on ultra sound (793 5) (R93 8)   12  Former smoker (N74 69) (Y49 709)   13  Hirsutism (704 1) (L68 0)   14  History of mastectomy, right (V45 71) (Z90 11)   15  Hyperlipidemia (272 4) (E78 5)   16  Influenza vaccine needed (V04 81) (Z23)   17  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)   18  Localized, primary osteoarthritis of shoulder region, 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)    (M19 019)   19  Multiple sclerosis (340) (G35)   20  Neoplasm of uncertain behavior of skin (238 2) (D48 5)   21  Obesity (278 00) (E66 9)   22  Peptic ulcer (533 90) (K27 9)   23  Post-menopausal bleeding (627 1) (N95 0)   24  Screening for malignant neoplasm of breast (V76 10) (Z12 39)   25   Screening for osteoporosis (V82 81) (Z13 820)   26  Skin ulcer (707 9) (L98 499)   27  Type 2 diabetes mellitus (250 00) (E11 9)   28  Urinary incontinence (788 30) (R32)   29  Wears glasses (V49 89) (Z97 3)    Change or removal of wound packing (V58 30) (Z48 00)       Encounter for recheck of abscess following incision and drainage (V67 09) (Z09)       Abscess of back (682 2) (D31 852)          Past Medical History    1  Acute upper respiratory infection (465 9) (J06 9)   2  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, 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)   3  History of Cellulitis (682 9) (L03 90)   4  History of Cellulitis of chest wall (682 2) (L03 313)   5  History of Dysuria (788 1) (R30 0)   6  History of Flushing (782 62) (R23 2)   7  History of abdominal pain (V13 89) (Z87 898)   8  History of acute conjunctivitis (V12 49) (Z86 69)   9  History of chronic endometritis (V13 29) (Z87 42)   10  History of ingrown nail (V13 3) (Z87 2)   11  History of malignant neoplasm of breast (V10 3) (Z85 3)   12  History of viral gastroenteritis (V12 09) (Z86 19)   13  Influenza vaccine needed (V04 81) (Z23)   14  History of Initial Medicare annual wellness visit (V70 0) (Z00 00)   15  Medicare annual wellness visit, subsequent (V70 0) (Z00 00)   16  Need for pneumococcal vaccination (V03 82) (Z23)   17  History of Nonvenomous Insect Bite (E906 4)   18  Otitis media, left (382 9) (H66 92)   19  History of Palpitations (785 1) (R00 2)   20  History of Pelvic cramping (625 9) (R10 2)   21  History of Pressure Ulcer Of The Buttock (707 05)   22  History of Proctitis (569 49) (K62 89)   23  Urinary tract infection (599 0) (N39 0)   24  History of Vaginal polyp (623 7) (N84 2)   25  History of Visit for pre-operative examination (V72 84) (X45 341)    Surgical History    1  History of Biopsy Breast Open   2  History of Breast Surgery Mastectomy   3  History of Cervical Loop Electrosurgical Excision (LEEP)   4   History of Dilation And Curettage  Surgical History Reviewed: The surgical history was reviewed and updated today  Family History  Mother    1  Family history of lung cancer (V16 1) (Z80 1)  Father    2  Family history of    3  Family history of cardiac disorder (V17 49) (Z82 49)  Child    4  Family history of malignant neoplasm of urinary bladder (V16 52) (Z80 52)  Sister    5  Family history of Diabetes  Cousin    6  Family history of cardiac disorder (V17 49) (Z82 49)   7  Family history of lung cancer (V16 1) (Z80 1)   8  Family history of multiple sclerosis (V17 2) (Z82 0)    Social History    · Denied: History of Drug use   · Former smoker (V15 82) (B70 978)   ·    · No alcohol use   · Three children  Social History Reviewed: The social history was reviewed and is unchanged  Current Meds   1  AmLODIPine Besylate 10 MG Oral Tablet; 1 every day; Therapy: 16CNB5860 to (21 )  Requested for: 31QXN6306; Last   Rx:2016 Ordered   2  Aspir-81 81 MG Oral Tablet Delayed Release; 1 Two Times A Day; Therapy: 37LUD0306 to  Requested for: 89DIG1107 Recorded   3  Bridget Contour Next Test In [employer]; USE TO TEST GLUCOSE DAILY (dx: 250 00); Therapy: 93DQJ1840 to (Last Rx:65Nkn0023)  Requested for: 77KPQ8120 Ordered   4  Chlorthalidone 25 MG Oral Tablet; 1every other day; Therapy: (Recorded:2017) to Recorded   5  [employer] Vitamin C 500 MG Oral Tablet; TAKE 1 TABLET DAILY; Therapy: (Recorded:2015) to Recorded   6  DuoNeb 0 5-2 5 (3) MG/3ML Inhalation Solution; use as dir; Therapy: (Recorded:2017) to Recorded   7  Lancets Miscellaneous; test glucose daily  (dx: 250 00) with Bridget Contour glucometer; Therapy: 62BJK4212 to (Last Rx:21Vxq4731)  Requested for: 53TCP4589 Ordered   8  Latanoprost 0 005 % Ophthalmic Solution; INSTILL 1 DROP IN BOTH EYES AT   BEDTIME; Therapy: (Recorded:2015) to Recorded   9   Lisinopril 20 MG Oral Tablet; TAKE 2 TABLETS BY MOUTH EVERY DAY; Therapy: 24HML8416 to (Evaluate:52Xsa3117)  Requested for: 72GAK0071; Last   Rx:21Sja6244 Ordered   10  MetFORMIN HCl - 500 MG Oral Tablet; Take 1 tablet daily; Therapy: 49DZH2974 to (Evaluate:80Tts8885)  Requested for: 14AFU1955; Last    Rx:53Rbb2080 Ordered   11  Multi-Vitamin Oral Tablet; 1 Every Day; Therapy: 30Zix9234 to  Requested for: 36EGL9960 Recorded   12  Omega-3 Fish Oil 1000 MG Oral Capsule; TAKE 1 CAPSULE DAILY; Therapy: (Recorded:08Jun2015) to Recorded   13  Simvastatin 20 MG Oral Tablet; 1 every day; Therapy: 73DHU8843 to (Evaluate:06Jan2017)  Requested for: 50Ezt7504; Last    Rx:12Jan2016 Ordered   14  Sotalol HCl (AF) 80 MG Oral Tablet; TAKE 1 TABLET TWICE DAILY; Therapy: (Recorded:08Jun2015) to Recorded  Medication List Reviewed: The medication list was reviewed and updated today  Allergies    1  Clindamycin HCl CAPS   2  Coumadin TABS   3  Latex Exam Gloves MISC   4  Pradaxa CAPS    Vitals  Signs   Recorded: 20Mar2017 12:52PM   Temperature: 97 1 F  Heart Rate: 84  Respiration: 20  Systolic: 141  Diastolic: 74  Weight: 733 lb   BMI Calculated: 32 62  BSA Calculated: 1 96  Patient Refused Height: Yes  Patient Refused Height: Yes    Physical Exam    Constitutional   General appearance: No acute distress, well appearing and well nourished  Eyes   Conjunctiva and lids: No swelling, erythema or discharge  Ears, Nose, Mouth, and Throat   Otoscopic examination: Tympanic membranes translucent with normal light reflex  Canals patent without erythema  Nasal mucosa, septum, and turbinates: Normal without edema or erythema  Oropharynx: Normal with no erythema, edema, exudate or lesions  Pulmonary   Respiratory effort: No increased work of breathing or signs of respiratory distress  Auscultation of lungs: Clear to auscultation  Cardiovascular   Auscultation of heart: Normal rate and rhythm, normal S1 and S2, without murmurs      Examination of extremities for edema and/or varicosities: Normal     Abdomen   Abdomen: Non-tender, no masses  Lymphatic   Palpation of lymph nodes in neck: No lymphadenopathy  Musculoskeletal in a wheelchair today  Skin   Skin and subcutaneous tissue: Normal without rashes or lesions  Psychiatric   Mood and affect: Normal          Message   Recorded as Task   Date: 03/13/2017 06:31 PM, Created By: System   Task Name: [facility]   Assigned To:  Ken Xiao   Regarding Patient: David Castro, Status: Active   Comment:    System - 13 Mar 2017 6:31 PM     Patient discharged from hospital   Patient Name: Karina Moon  Patient YOB: 1935  Discharge Date: 3/13/2017  Facility[facility] 13 Mar 2017 6:47 PM     TASK REASSIGNED: Previously Assigned To Arcenio Primus     Future Appointments    Date/Time Provider Specialty Site   04/20/2017 02:15 PM Vero Toscano MD General Surgery [address]   05/16/2017 01:00 PM Arcenio Prim, [address] [place] [address]   06/28/2017 01:30 PM Med Hinojosa MD Hematology Oncology [facility]     Signatures   Electronically signed by : Eli Rocha, ; Mar 15 2017  2:52PM EST                       (Co-author)    Electronically signed by : Tabitha Herring; Mar 20 2017  1:29PM EST                       (Author)    Electronically signed by : Bailey Chance DO; Mar 20 2017  2:59PM EST                       (Review)

## 2018-01-22 VITALS
BODY MASS INDEX: 31.63 KG/M2 | SYSTOLIC BLOOD PRESSURE: 144 MMHG | TEMPERATURE: 97.1 F | HEART RATE: 84 BPM | RESPIRATION RATE: 20 BRPM | WEIGHT: 196 LBS | DIASTOLIC BLOOD PRESSURE: 74 MMHG

## 2018-01-22 VITALS
WEIGHT: 201 LBS | SYSTOLIC BLOOD PRESSURE: 120 MMHG | RESPIRATION RATE: 16 BRPM | BODY MASS INDEX: 33.49 KG/M2 | OXYGEN SATURATION: 97 % | TEMPERATURE: 98.3 F | HEIGHT: 65 IN | HEART RATE: 76 BPM | DIASTOLIC BLOOD PRESSURE: 70 MMHG

## 2018-01-23 NOTE — CONSULTS
I had the pleasure of evaluating your patient, Dominguez Ron  My full evaluation follows:      Chief Complaint  Chief Complaint:   The patient presents to the office today with Breast cancer, followup  History of Present Illness  24-year-old female previously diagnosed with breast cancer here for oncology followup  A routine mammogram demonstrated a right-sided abnormality  Mrs Hurley did not feel any masses or lumps  Patient was seen by Dr Jarret Rodriguez and underwent stereotactic needle biopsy  Results demonstrated invasive carcinoma  Patient has also been seen by Dr Ab Parker; status post mastectomy with sentinel lymph node sampling  Previously we discussed postsurgical treatment options including chemotherapy (not indicated) and hormonal manipulation (patient initially agreed but then decided against)  Mrs Hurley returns for followup  Presently Mrs Hurley states feeling okay, baseline  No surgical issues or pain control issues  MS issues are about the same as before  No other active medical problems  Activities are very limited but no different than before  No fevers or signs of infection  No headaches or dizziness  No significant body aches  Fatigue is the same as before - some days good, some days bad  Decreased muscle strength is the same as before and extremities  Right-sided mastectomy with sentinel lobe node biopsy (2015)  Patient refused hormonal manipulation  Observation/surveillance      Review of Systems    Constitutional: feeling tired, but as noted in HPI  Eyes: No complaints of eye pain, no red eyes, no eyesight problems, no discharge, no dry eyes, no itching of eyes  ENT: no complaints of earache, no loss of hearing, no nose bleeds, no nasal discharge, no sore throat, no hoarseness  Cardiovascular: No complaints of slow heart rate, no fast heart rate, no chest pain, no palpitations, no leg claudication, no lower extremity edema     Respiratory: No complaints of shortness of breath, no wheezing, no cough, no SOB on exertion, no orthopnea, no PND  Gastrointestinal: No complaints of abdominal pain, no constipation, no nausea or vomiting, no diarrhea, no bloody stools  Genitourinary: No complaints of dysuria, no incontinence, no pelvic pain, no dysmenorrhea, no vaginal discharge or bleeding  Musculoskeletal: arthralgias and joint stiffness  Integumentary: No complaints of skin rash or lesions, no itching, no skin wounds, no breast pain or lump  Neurological: limb weakness and difficulty walking, but as noted in HPI  Psychiatric: Not suicidal, no sleep disturbance, no anxiety or depression, no change in personality, no emotional problems  Endocrine: No complaints of proptosis, no hot flashes, no muscle weakness, no deepening of the voice, no feelings of weakness  Hematologic/Lymphatic: No complaints of swollen glands, no swollen glands in the neck, does not bleed easily, does not bruise easily  ROS reviewed  Active Problems    1  Abnormal gait (781 2) (R26 9)   2  Abnormal mammogram (793 80) (R92 8)   3  Angioedema (995 1)   4  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)   5  Atrial fibrillation (427 31) (I48 91)   6  Benign essential hypertension (401 1) (I10)   7  BMI 32 0-32 9,adult (V85 32) (Z68 32)   8  Breast cancer (174 9) (C50 919)   9  Cancer of right breast, stage 1 (174 9) (C50 911)   10  Edema (782 3) (R60 9)   11  Encounter for recheck of abscess following incision and drainage (V67 09) (Z09)   12  Encounter for screening mammogram for high-risk patient (V76 11) (Z12 31)   13  Encounter for screening mammogram for malignant neoplasm of breast (V76 12)    (Z12 31)   14  Endometrial thickening on ultra sound (793 5) (R93 8)   15  Former smoker (V15 82) (Z03 469)   16  Hirsutism (704 1) (L68 0)   17  History of mastectomy, right (V45 71) (Z98 890,Z90 11)   18  Hyperlipidemia (272 4) (E78 5)   19   Influenza vaccine needed (V04 81) (Z23)   20  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)   21  Localized, primary osteoarthritis of shoulder region, 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)    (M19 019)   22  Multiple sclerosis (340) (G35)   23  Neoplasm of uncertain behavior of skin (238 2) (D48 5)   24  Obesity (278 00) (E66 9)   25  Peptic ulcer (533 90) (K27 9)   26  Post-menopausal bleeding (627 1) (N95 0)   27  Screening for malignant neoplasm of breast (V76 10) (Z12 31)   28  Screening for osteoporosis (V82 81) (Z13 820)   29  Skin ulcer (707 9) (L98 499)   30  Type 2 diabetes mellitus (250 00) (E11 9)   31  Urinary incontinence (788 30) (R32)   32   Wears glasses (V49 89) (Z97 3)    Past Medical History    · History of Abscess of back (682 2) (L02 212)   · History of Abscess of right buttock (682 5) (L02 31)   · Acute upper respiratory infection (465 9) (J06 9)   · 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, 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)   · History of Cellulitis (682 9) (L03 90)   · History of Cellulitis of chest wall (682 2) (L03 313)   · History of Dysuria (788 1) (R30 0)   · History of Flushing (782 62) (R23 2)   · History of abdominal pain (V13 89) (A65 060)   · History of acute conjunctivitis (V12 49) (Z86 69)   · History of chronic endometritis (V13 29) (Z87 42)   · History of ingrown nail (V13 3) (Z87 2)   · History of malignant neoplasm of breast (V10 3) (Z85 3)   · History of viral gastroenteritis (V12 09) (Z86 19)   · Influenza vaccine needed (V04 81) (Z23)   · History of Initial Medicare annual wellness visit (V70 0) (Z00 00)   · Medicare annual wellness visit, subsequent (V70 0) (Z00 00)   · Need for pneumococcal vaccination (V03 82) (Z23)   · History of Nonvenomous Insect Bite (E906 4)   · Otitis media, left (382 9) (H66 92)   · History of Palpitations (785 1) (R00 2)   · History of Pelvic cramping (625 9) (R10 2)   · History of Pressure Ulcer Of The Buttock (707 05)   · History of Proctitis (569 49) (K08 89)   · Urinary tract infection (599 0) (N39 0)   · History of Vaginal polyp (623 7) (N84 2)   · History of Visit for pre-operative examination (V72 84) (A98 111)    Surgical History    · History of Biopsy Breast Open   · History of Breast Surgery Mastectomy   · History of Cervical Loop Electrosurgical Excision (LEEP)   · History of Dilation And Curettage    The surgical history was reviewed and updated today  Family History    · Family history of lung cancer (V16 1) (Z80 1)    · Family history of    · Family history of cardiac disorder (V17 49) (Z82 49)    · Family history of malignant neoplasm of urinary bladder (V16 52) (Z80 52)    · Family history of Diabetes    · Family history of cardiac disorder (V17 49) (Z82 49)   · Family history of lung cancer (V16 1) (Z80 1)   · Family history of multiple sclerosis (V17 2) (Z82 0)    The family history was reviewed and updated today  Social History    · Denied: History of Drug use   · Former smoker (V15 82) (Z96 895)   ·    · No alcohol use   · Three children  The social history was reviewed and updated today  Current Meds   1  AmLODIPine Besylate 10 MG Oral Tablet; 1 every day; Therapy: 67LMM7229 to (Evaluate:56Nfz3121)  Requested for: 51NRN3611; Last   Rx:2017 Ordered   2  Aspir-81 81 MG Oral Tablet Delayed Release; 1 Two Times A Day; Therapy: 25UYP0862 to  Requested for: 85LPS2622 Recorded   3  Bridget Contour Next Test In [employer]; USE TO TEST GLUCOSE DAILY (dx E11 9); Therapy: 74MKV6594 to (Last Rx:2017)  Requested for: 2017 Ordered   4  Bridget Microlet Lancets Miscellaneous; TEST GLUCOSE DAILY (DX: 250 00) WITH   BRIDGET CONTOUR GLUCOMETER; Therapy: 42RZN8910 to [phone])  Requested for: 2017; Last   ZN:82SKU0959 Ordered   5  [employer]S Vitamin C 500 MG Oral Tablet; TAKE 1 TABLET DAILY; Therapy: (Recorded:2015) to Recorded   6  DuoNeb 0 5-2 5 (3) MG/3ML Inhalation Solution; use as dir;    Therapy: (Recorded:15Mar2017) to Recorded   7  Lancets Miscellaneous; test glucose daily  (dx: 250 00) with Bridget Contour glucometer; Therapy: 51LIF9308 to (Last Rx:15Rqb4693)  Requested for: 16BYV7239 Ordered   8  Latanoprost 0 005 % Ophthalmic Solution; INSTILL 1 DROP IN BOTH EYES AT   BEDTIME; Therapy: (Recorded:08Jun2015) to Recorded   9  Lisinopril 20 MG Oral Tablet; TAKE 2 TABLETS BY MOUTH EVERY DAY; Therapy: 88HBH4343 to (MVYPNEXS:54MFE3152)  Requested for: 32VGP7733; Last   Rx:25Oct2017 Ordered   10  MetFORMIN HCl - 1000 MG Oral Tablet; TAKE 1/2 TABLET  BY MOUTH TWICE A DAY; Therapy: 83TOA0010 to (Evaluate:23Apr2018)  Requested for: 93PVG1034; Last    Rx:25Oct2017 Ordered   11  Multi-Vitamin Oral Tablet; 1 Every Day; Therapy: 24Flp9813 to  Requested for: 97MQQ1591 Recorded   12  Omega-3 Fish Oil 1000 MG Oral Capsule; TAKE 1 CAPSULE DAILY; Therapy: (Recorded:08Jun2015) to Recorded   13  Simvastatin 20 MG Oral Tablet; TAKE 1 TABLET  by mouth every day; Therapy: 19ELP2632 to ([phone])  Requested for: 48SOI7230; Last    Rx:83Xrj8419 Ordered   14  Sotalol HCl (AF) 80 MG Oral Tablet; TAKE 1 TABLET TWICE DAILY; Therapy: (Recorded:38Ljl4027) to Recorded    The medication list was reviewed and updated today  Allergies    1  Clindamycin HCl CAPS   2  Coumadin TABS   3  Latex Exam Gloves MISC   4  Pradaxa CAPS    Vitals   Recorded: 87FTF7776 01:04PM   Temperature 97 1 F   Heart Rate 84   Respiration 16   Systolic 182   Diastolic 76   Height Unobtainable Yes   Weight Unobtainable Yes   O2 Saturation 98   Pain Scale 0     Physical Exam    Constitutional   General appearance: No acute distress, well appearing and well nourished  No apparent distress, obese female, wheelchair bound  Eyes   Conjunctiva and lids: No swelling, erythema or discharge  Pupils and irises: Equal, round and reactive to light      Ears, Nose, Mouth, and Throat   External inspection of ears and nose: Normal  Otoscopic examination: Tympanic membranes translucent with normal light reflex  Canals patent without erythema  Nasal mucosa, septum, and turbinates: Normal without edema or erythema  Oropharynx: Normal with no erythema, edema, exudate or lesions  Pulmonary Scattered bilateral rhonchi  Cardiovascular   Palpation of heart: Normal PMI, no thrills  Auscultation of heart: Normal rate and rhythm, normal S1 and S2, without murmurs  Examination of extremities for edema and/or varicosities: Abnormal   0-1+ bilateral lower extremity edema plus obesity, no cords, pulses are decreased  Carotid pulses: Normal     Abdomen Obese cannot palpate liver or spleen, + bowel sounds, no rigidity or rebound  Lymphatic   Palpation of lymph nodes in neck: No lymphadenopathy  No palpable adenopathy in the neck, supraclavicular region, axilla and groin bilaterally  Musculoskeletal   Gait and station: Normal     Digits and nails: Normal without clubbing or cyanosis  Inspection/palpation of joints, bones, and muscles: Normal     Skin   Skin and subcutaneous tissue: Normal without rashes or lesions  moist, no ecchymoses or petechiae, patient has numerous brown moles throughout her skin - same as before  Neurologic   Cranial nerves: Cranial nerves 2-12 intact  Reflexes: 2+ and symmetric  Sensation: No sensory loss  Psychiatric   Orientation to person, place, and time: Normal     Mood and affect: Normal     Additional Exam:  Breasts: (Female present) right anterior chest wall with healed suture line, no axillary adenopathy bilaterally, left breast without masses, retraction or dimpling, no redness or inflammation  ECOG 0       Results/Data    Results   Pathology Oncotype recurrence score = 8 ER and OK RT-PCR were positive; HER-2/marlys was negative    Right breast mastectomy was performed on Hyacinth 3, 2015   Results demonstrated invasive breast carcinoma, tumor site = upper outer quadrant, invasive breast carcinoma no special type with neuroendocrine differentiation and mucinous features, tumor size = 15 mm, tumor grade = G1-2, focality = unifocal, invasive did not invade the dermis and epidermis or nipple, there was no skeletal muscle present, lymphovascular invasion was not identified  Dermal lymphovascular invasion was not identified  DCIS was present non-extensive component, size = 0 2 cm in greatest measurable diameter  Lobular carcinoma was not identified  Margins were negative  Right sentinel lymph node was negative for malignancy (0/1)  AJCC = pT1c pN0 G1-2 cM0 ER/NM positive HER-2/marlys = 1+ = negative R0 = stage IA    Right breast core biopsies from May 8, 2015 demonstrated invasive mammary carcinoma, no special type, present in all cores  Largest single focus was 1 cm, histologic grade = I-II, estrogen receptor = 100% progesterone receptor = 100% HER-2/marlys by IHC = 1+ = negative  Radiology 07/07/2017 diagnostic left digital mammogram was category 2, benign with no evidence of malignancy  Diagnostic left mammogram with CAD from May 11, 2016 was category 2, benign  Followup right digital mammogram from May 6, 2015 stated status post ultrasound-guided core biopsy for lobular mass in the retroareolar right breast, no hematoma at the biopsy site  4/27/15 digital mammogram was category zero, need additional imaging, right 10:00 breast mass    Lab Results 12/21/2017 CA 27, 29 = 38 4 BUN = 14 creatinine = 0 76 calcium = 9 5 LFTs WNL WBC = 8 0 hemoglobin = 13 8 hematocrit = 42 2 platelet = 586 neutrophil = 66%   6/15/17 WBC = 6 6, hemoglobin = 12 7, hematocrit = 39 3, platelet count = 464 BUN = 14, creatinine = 0 63, LFTs = WNL, albumin 3 9, calcium = 9 2, alkaline phosphatase = 90, CA 27-29 = 33 8  11/22/16 BUN = 14 creatinine = 0 58 calcium = 9 2 LFTs WNL CA 27, 29 = 36 6 WBC = 7 9 hemoglobin = 13 6 hematocrit = 43 platelet = 492  7/28/62 BUN = 16 creatinine = 0 68 calcium = 9 1 LFTs WNL  Assessment    1  Breast cancer (174 9) (C50 919)    Plan  Breast cancer    · (1) CA 27 29; Status:Active; Requested for:86Qir7220; Perform:[facility]; DWS:75ZQG5916;JOCOBDA; For:Breast cancer; Ordered By:Richard PRESCOTT;   · (1) CBC/PLT/DIFF; Status:Active; Requested for:18Uyn2515; Perform:[facility]; SWT:83LUV9457;BLKUNOE; For:Breast cancer; Ordered By:Richard PRESCOTT;   · (1) COMPREHENSIVE METABOLIC PANEL; Status:Active; Requested for:54Ook6564; Perform:[facility]; QFZ:22LMN1884;ISNGCOY; For:Breast cancer; Ordered By:Richard PRESCOTT;   · Follow Up After Tests Complete Evaluation and Treatment  Follow-up  Status: Complete   Done: 32KKE5556 01:20PM   Ordered; For: Breast cancer; Ordered By: Dena Ahumada Performed:  Due: 61GBF2773; Last Updated By: Carmela Mcarthur; 1/3/2018 1:43:01 PM  Encounter for screening mammogram for high-risk patient    · MAMMO SCREENING LEFT W CAD; Status:Hold For - Scheduling; Requested  for:86Jqz7473; Perform:[facility]; IVM:44EGS5635;GAMALIELEPWAT;  Santos Rodriguez for screening mammogram for high-risk patient; Ordered By:Richard Barth;    Discussion/Summary    24-year-old female with a history of stage IA right breast cancer status post mastectomy in June 2015  Issues:    1  Stage IA breast cancer (ER/MS positive, HER-2/marlys negative ) Clinically there are no troubling breast cancer related signs  Recent laboratory test results were good/acceptable  The mammogram in July 2017 did not demonstrate any abnormalities  Mrs Hurley is to return in 6 months with blood work and a repeat mammogram before  Patient knows to call the office if she has any oncology questions or concerns  Patient will also continue with monthly self exams  As discussed previously, patient has hormone receptor positive breast cancer but deferred hormonal manipulation  Mrs Hurley understand that this increases her risk for recurrence   Although usually not standard of care, patient will continue with blood work including a tumor marker every 6 months  Using [employer]juvant [employer], patients such as Ms Hurley (using G2 histology with average for age medical problems), would have a 19 4% chance for relapse with surgery alone  Hormone manipulation would decrease this number by 5 3%  Chemotherapy alone would decrease the relapse rate by 1 5%  Using G1 histology and major problems (patient has multiple sclerosis), the hormonal benefit is 1 6% and the chemotherapy benefit is 0 4% for relapse  The mortality benefit statistics are obviously less impressive  The decision to hold off on hormonal manipulation was a well thought out decision by the patient - Mrs Hurley understands that she is still at risk for recurrent breast cancer  2 Routine health maintenance and medical care is up-to-date  Please carefully review your medication list and verify that the list is accurate and up-to-date  Please call the oncology office if there are medications missing from the list, medications on the list that you are not currently taking or if there is a dosage or instruction that is different from how you're taking a medication  The patient, patient's family was counseled regarding  total time of encounter was 25 minutes  Thank you very much for allowing me to participate in the care of this patient  If you have any questions, please do not hesitate to contact me        Signatures   Electronically signed by : Lali John MD; Marbin  3 2018  1:46PM EST                       (Author)

## 2018-01-24 VITALS
RESPIRATION RATE: 16 BRPM | HEART RATE: 84 BPM | TEMPERATURE: 97.1 F | OXYGEN SATURATION: 98 % | DIASTOLIC BLOOD PRESSURE: 76 MMHG | SYSTOLIC BLOOD PRESSURE: 136 MMHG

## 2018-02-04 PROBLEM — C50.911: Status: ACTIVE | Noted: 2017-06-28

## 2018-02-04 PROBLEM — L03.312 CELLULITIS OF BACK: Status: RESOLVED | Noted: 2017-02-27 | Resolved: 2018-02-04

## 2018-02-04 PROBLEM — L02.91 ABSCESS: Status: RESOLVED | Noted: 2017-02-27 | Resolved: 2018-02-04

## 2018-02-04 PROBLEM — E13.9 DIABETES 1.5, MANAGED AS TYPE 2 (HCC): Status: RESOLVED | Noted: 2017-02-27 | Resolved: 2018-02-04

## 2018-02-04 PROBLEM — I16.0 HYPERTENSIVE URGENCY: Status: RESOLVED | Noted: 2017-02-27 | Resolved: 2018-02-04

## 2018-02-04 RX ORDER — LATANOPROST 50 UG/ML
1 SOLUTION/ DROPS OPHTHALMIC
COMMUNITY
End: 2020-10-08 | Stop reason: SDUPTHER

## 2018-02-04 RX ORDER — LANCETS 30 GAUGE
EACH MISCELLANEOUS
COMMUNITY
Start: 2013-04-23 | End: 2018-11-23

## 2018-02-04 RX ORDER — IPRATROPIUM BROMIDE AND ALBUTEROL SULFATE 2.5; .5 MG/3ML; MG/3ML
SOLUTION RESPIRATORY (INHALATION)
COMMUNITY
End: 2018-08-21 | Stop reason: ALTCHOICE

## 2018-02-04 RX ORDER — CHLORAL HYDRATE 500 MG
1 CAPSULE ORAL DAILY
COMMUNITY

## 2018-02-04 RX ORDER — ASCORBIC ACID 500 MG
1 TABLET ORAL DAILY
COMMUNITY
End: 2021-12-18

## 2018-02-14 LAB
ALBUMIN SERPL-MCNC: 4 G/DL (ref 3.5–4.7)
ALBUMIN/GLOB SERPL: 1.3 {RATIO} (ref 1.2–2.2)
ALP SERPL-CCNC: 92 IU/L (ref 39–117)
ALT SERPL-CCNC: 23 IU/L (ref 0–32)
AST SERPL-CCNC: 21 IU/L (ref 0–40)
BILIRUB SERPL-MCNC: 0.3 MG/DL (ref 0–1.2)
BUN SERPL-MCNC: 16 MG/DL (ref 8–27)
BUN/CREAT SERPL: 23 (ref 12–28)
CALCIUM SERPL-MCNC: 9.1 MG/DL (ref 8.7–10.3)
CHLORIDE SERPL-SCNC: 100 MMOL/L (ref 96–106)
CO2 SERPL-SCNC: 25 MMOL/L (ref 18–29)
CREAT SERPL-MCNC: 0.7 MG/DL (ref 0.57–1)
GLOBULIN SER-MCNC: 3.2 G/DL (ref 1.5–4.5)
GLUCOSE SERPL-MCNC: 150 MG/DL (ref 65–99)
HBA1C MFR BLD: 6.7 % (ref 4.8–5.6)
POTASSIUM SERPL-SCNC: 4.2 MMOL/L (ref 3.5–5.2)
PROT SERPL-MCNC: 7.2 G/DL (ref 6–8.5)
SL AMB EGFR AFRICAN AMERICAN: 95 ML/MIN/1.73
SL AMB EGFR NON AFRICAN AMERICAN: 82 ML/MIN/1.73
SODIUM SERPL-SCNC: 140 MMOL/L (ref 134–144)

## 2018-02-15 ENCOUNTER — OFFICE VISIT (OUTPATIENT)
Dept: FAMILY MEDICINE CLINIC | Facility: CLINIC | Age: 83
End: 2018-02-15
Payer: MEDICARE

## 2018-02-15 VITALS
HEART RATE: 80 BPM | DIASTOLIC BLOOD PRESSURE: 80 MMHG | RESPIRATION RATE: 16 BRPM | SYSTOLIC BLOOD PRESSURE: 140 MMHG | TEMPERATURE: 99.1 F

## 2018-02-15 DIAGNOSIS — G35 MULTIPLE SCLEROSIS (HCC): ICD-10-CM

## 2018-02-15 DIAGNOSIS — L92.9 GRANULOMA OF GREAT TOE: ICD-10-CM

## 2018-02-15 DIAGNOSIS — L03.031 PARONYCHIA OF GREAT TOE, RIGHT: Primary | ICD-10-CM

## 2018-02-15 PROCEDURE — 99213 OFFICE O/P EST LOW 20 MIN: CPT | Performed by: FAMILY MEDICINE

## 2018-02-15 PROCEDURE — 11765 WEDGE EXCISION SKN NAIL FOLD: CPT | Performed by: FAMILY MEDICINE

## 2018-02-15 RX ORDER — CEPHALEXIN 500 MG/1
500 CAPSULE ORAL EVERY 12 HOURS SCHEDULED
Qty: 14 CAPSULE | Refills: 0 | Status: SHIPPED | OUTPATIENT
Start: 2018-02-15 | End: 2018-02-19 | Stop reason: SDUPTHER

## 2018-02-15 NOTE — PROGRESS NOTES
Subjective:           Shea Maldonado was seen today for toe injury  Diagnoses and all orders for this visit:    Paronychia of great toe, right  -     cephalexin (KEFLEX) 500 mg capsule; Take 1 capsule (500 mg total) by mouth every 12 (twelve) hours for 7 days    Granuloma of great toe    Multiple sclerosis (HCC)      The right great toe was cleansed with Betadine alcohol  Injected medial nail plate under granuloma with 2 percent lidocaine without epinephrine  The underlying medial nail was trimmed to the base  The granulomatous inflamed tissue was excised with a curved scissor  Hemostasis was achieved with silver nitrate cautery to the bases granulomatous area  Was observed for 10 minutes no active bleeding  A dressing was placed with bacitracin, gauze wrap  Patient tolerated procedure well with no active bleeding on discharge  No orders of the defined types were placed in this encounter  Patient Instructions    Cleanse daily topical bacitracin daily  Elevate  Tylenol as needed Call if pain/ bleeding  Recheck 48-72 hours  complete antibiotic  Continue medications  Maintain Glucose   Ximena Hurley      HPI painful R great toe after nail trim from Podiatry  Now painful granuloma over nail archer with paronychia  Toe red swollen tender no fever  Angry granuloma tissue over nail  Vitals:    02/15/18 1411   BP: 140/80   Pulse: 80   Resp: 16   Temp: 99 1 °F (37 3 °C)       Allergies   Allergen Reactions    Clindamycin      Annotation - [id number]: bad taste in mouth    Coumadin [Warfarin]      Reaction Date: 22May2012;  Annotation - [id number]: lip swelling    Latex      Annotation - 99LUV9337: RASH    Pradaxa [Dabigatran Etexilate Mesylate]        Current Outpatient Prescriptions on File Prior to Visit   Medication Sig Dispense Refill    amLODIPine (NORVASC) 10 mg tablet Take 10 mg by mouth daily      ascorbic acid ([employer]S VITAMIN C) 500 MG tablet Take 1 tablet by mouth daily      aspirin 81 MG tablet Take 162 mg by mouth daily      FRANKI MICROLET LANCETS lancets by Does not apply route      glucose blood (FRANKI CONTOUR NEXT TEST) test strip by In Vitro route      ipratropium-albuterol (DUONEB) 0 5-2 5 mg/3 mL Inhale      Lancets MISC by Does not apply route      latanoprost (XALATAN) 0 005 % ophthalmic solution Apply 1 drop to eye      lisinopril (ZESTRIL) 20 mg tablet Take 20 mg by mouth 2 (two) times a day      metFORMIN (GLUCOPHAGE) 1000 MG tablet Take by mouth      Multiple Vitamin (MULTI-VITAMIN DAILY PO) Take by mouth daily      Omega-3 Fatty Acids (OMEGA-3 FISH OIL) 1000 MG CAPS Take 1 capsule by mouth daily      simvastatin (ZOCOR) 20 mg tablet Take 20 mg by mouth daily at bedtime      sotalol (BETAPACE) 80 mg tablet Take 80 mg by mouth 2 (two) times a day      chlorthalidone 25 mg tablet Take 1 tablet by mouth every other day for 30 days 15 tablet 0    cholecalciferol 1000 units tablet Take 1 tablet by mouth daily for 30 days 30 tablet 0     No current facility-administered medications on file prior to visit  Past Medical History:   Diagnosis Date    [facility] [address] [facility]     [facility]     Hyperlipidemia     Hypertension     MS (multiple sclerosis) ([facility] [facility]        Past Surgical History:   Procedure Laterality Date    BREAST SURGERY      INCISION AND DRAINAGE OF WOUND Left 2/27/2017    Procedure: INCISION AND DRAINAGE (I&D)   Chest wall x 2;  Surgeon: Helder Galan MD;  Location: [facility];  Service:    Ashford Neighbor            reports that she has quit smoking  She has never used smokeless tobacco  She reports that she does not drink alcohol or use drugs  reports that she has quit smoking   She has never used smokeless tobacco     The following portions of the patient's history were reviewed and updated as appropriate: allergies, current medications, past family history, past medical history, past social history, past surgical history and problem list     Review of Systems   Constitutional: Negative for activity change, appetite change and fever  HENT: Negative for congestion, hearing loss, mouth sores, sinus pressure and tinnitus  Eyes: Positive for visual disturbance  Negative for discharge and redness  Respiratory: Negative for apnea and chest tightness  Cardiovascular: Negative for chest pain  Gastrointestinal: Negative for abdominal distention  Musculoskeletal: Positive for arthralgias, gait problem and joint swelling  Skin: Positive for wound  Granuloma with Paronychia  R great toe   Neurological: Negative for dizziness and seizures  Hematological: Negative for adenopathy  Psychiatric/Behavioral: Negative for agitation and behavioral problems  Physical Exam   Constitutional: She is oriented to person, place, and time  HENT:   Head: Normocephalic and atraumatic  Eyes: Pupils are equal, round, and reactive to light  No scleral icterus  glasses   Neck: No thyromegaly present  Cardiovascular: Normal rate  I/VI JAIRO   Pulmonary/Chest: No respiratory distress  She has no wheezes  Abdominal: She exhibits no distension  Neurological: She is alert and oriented to person, place, and time  No cranial nerve deficit  Some uncontrolled twitching   Skin: Skin is warm  Capillary refill takes 2 to 3 seconds  There is erythema  R great toe medial distal with granuloma overgrowth paronychia    Psychiatric: She has a normal mood and affect

## 2018-02-15 NOTE — PATIENT INSTRUCTIONS
Cleanse daily topical bacitracin daily  Elevate  Tylenol as needed Call if pain/ bleeding  Recheck 48-72 hours  complete antibiotic  Continue medications  Maintain Glucose

## 2018-02-18 PROBLEM — E78.2 MIXED HYPERLIPIDEMIA: Status: ACTIVE | Noted: 2017-02-27

## 2018-02-19 ENCOUNTER — OFFICE VISIT (OUTPATIENT)
Dept: FAMILY MEDICINE CLINIC | Facility: CLINIC | Age: 83
End: 2018-02-19
Payer: MEDICARE

## 2018-02-19 VITALS
TEMPERATURE: 97.2 F | BODY MASS INDEX: 32.62 KG/M2 | HEART RATE: 78 BPM | HEIGHT: 66 IN | DIASTOLIC BLOOD PRESSURE: 80 MMHG | SYSTOLIC BLOOD PRESSURE: 136 MMHG | WEIGHT: 203 LBS | RESPIRATION RATE: 22 BRPM

## 2018-02-19 DIAGNOSIS — I10 ESSENTIAL HYPERTENSION: ICD-10-CM

## 2018-02-19 DIAGNOSIS — L03.031 PARONYCHIA OF GREAT TOE, RIGHT: ICD-10-CM

## 2018-02-19 DIAGNOSIS — E11.9 TYPE 2 DIABETES MELLITUS WITHOUT COMPLICATION, WITHOUT LONG-TERM CURRENT USE OF INSULIN (HCC): Primary | ICD-10-CM

## 2018-02-19 DIAGNOSIS — E78.2 MIXED HYPERLIPIDEMIA: ICD-10-CM

## 2018-02-19 PROCEDURE — 99214 OFFICE O/P EST MOD 30 MIN: CPT | Performed by: FAMILY MEDICINE

## 2018-02-19 RX ORDER — CEPHALEXIN 500 MG/1
500 CAPSULE ORAL EVERY 12 HOURS SCHEDULED
Qty: 14 CAPSULE | Refills: 0 | Status: SHIPPED | OUTPATIENT
Start: 2018-02-19 | End: 2018-02-26

## 2018-02-19 NOTE — PROGRESS NOTES
Assessment/Plan:    Type 2 diabetes mellitus without complication, without long-term current use of insulin ([facility])  Improved  A1c is down to 6 7 from 6 9      Mixed hyperlipidemia  Well controlled with simvastatin, next labs due in 3 months  Essential hypertension  Well controlled       Diagnoses and all orders for this visit:    Type 2 diabetes mellitus without complication, without long-term current use of insulin ([facility] )  -     Hemoglobin A1c; Future    Essential hypertension  -     Comprehensive metabolic panel; Future    Mixed hyperlipidemia  -     Lipid Panel with Direct LDL reflex; Future    Paronychia of great toe, right  Comments:  improved, but not resolved, will continue antibiotic  Orders:  -     cephalexin (KEFLEX) 500 mg capsule;  Take 1 capsule (500 mg total) by mouth every 12 (twelve) hours for 7 days          Patient Instructions     Recent Results (from the past 336 hour(s))   Comprehensive metabolic panel    Collection Time: 02/14/18  7:47 AM   Result Value Ref Range    SL AMB GLUCOSE 150 (H) 65 - 99 mg/dL    BUN 16 8 - 27 mg/dL    Creatinine, Serum 0 70 0 57 - 1 00 mg/dL    eGFR Non  82 >59 mL/min/1 73    SL AMB EGFR AFRICAN AMERICAN 95 >59 mL/min/1 73    SL AMB BUN/CREATININE RATIO 23 12 - 28    SL AMB SODIUM 140 134 - 144 mmol/L    SL AMB POTASSIUM 4 2 3 5 - 5 2 mmol/L    SL AMB CHLORIDE 100 96 - 106 mmol/L    SL AMB CARBON DIOXIDE 25 18 - 29 mmol/L    CALCIUM 9 1 8 7 - 10 3 mg/dL    SL AMB PROTEIN, TOTAL 7 2 6 0 - 8 5 g/dL    Serum Albumin 4 0 3 5 - 4 7 g/dL    Globulin, Total 3 2 1 5 - 4 5 g/dL    SL AMB ALBUMIN/GLOBULIN RATIO 1 3 1 2 - 2 2    SL AMB BILIRUBIN, TOTAL 0 3 0 0 - 1 2 mg/dL    Alk Phos Isoenzymes 92 39 - 117 IU/L    SL AMB AST 21 0 - 40 IU/L    SL AMB ALT 23 0 - 32 IU/L   Hemoglobin A1c    Collection Time: 02/14/18  7:47 AM   Result Value Ref Range    Hemoglobin A1C 6 7 (H) 4 8 - 5 6 %       Return in about 6 months (around 8/19/2018) for Next scheduled follow up - sometime in August     Subjective:      Patient ID: Bard Sharp is a [de-identified] y o  female  Chief Complaint   Patient presents with    Follow-up     lab work results, medication review and R foot Big toe  [id number]       She was seen on the 15th after injuring her right big toe  She has been taking antibiotics  She was able to wear a shoe today  The pain is getting less  She is trying to eat less sugar  She has not had any low sugars  Hypertension   This is a chronic problem  The current episode started more than 1 year ago  The problem is controlled  Pertinent negatives include no anxiety, chest pain or shortness of breath  The current treatment provides moderate improvement  There are no compliance problems  The following portions of the patient's history were reviewed and updated as appropriate: allergies, current medications, past family history, past medical history, past social history, past surgical history and problem list     Review of Systems   Constitutional: Negative for fever  Respiratory: Negative for shortness of breath  Cardiovascular: Negative for chest pain           Current Outpatient Prescriptions   Medication Sig Dispense Refill    amLODIPine (NORVASC) 10 mg tablet Take 10 mg by mouth daily      ascorbic acid ([employer] VITAMIN C) 500 MG tablet Take 1 tablet by mouth daily      aspirin 81 MG tablet Take 162 mg by mouth daily      FRANKI MICROLET LANCETS lancets by Does not apply route      cephalexin (KEFLEX) 500 mg capsule Take 1 capsule (500 mg total) by mouth every 12 (twelve) hours for 7 days 14 capsule 0    glucose blood (FRANKI CONTOUR NEXT TEST) test strip by In Vitro route      ipratropium-albuterol (DUONEB) 0 5-2 5 mg/3 mL Inhale      Lancets MISC by Does not apply route      latanoprost (XALATAN) 0 005 % ophthalmic solution Apply 1 drop to eye      lisinopril (ZESTRIL) 20 mg tablet Take 20 mg by mouth 2 (two) times a day      metFORMIN (GLUCOPHAGE) 1000 MG tablet Take by mouth      Multiple Vitamin (MULTI-VITAMIN DAILY PO) Take by mouth daily      Omega-3 Fatty Acids (OMEGA-3 FISH OIL) 1000 MG CAPS Take 1 capsule by mouth daily      simvastatin (ZOCOR) 20 mg tablet Take 20 mg by mouth daily at bedtime      sotalol (BETAPACE) 80 mg tablet Take 80 mg by mouth 2 (two) times a day       No current facility-administered medications for this visit  Objective:    /80   Pulse 78   Temp (!) 97 2 °F (36 2 °C)   Resp 22   Ht 5' 6" (1 676 m)   Wt 92 1 kg (203 lb)   BMI 32 77 kg/m²        Physical Exam   Constitutional: She appears well-developed and well-nourished  HENT:   Head: Normocephalic and atraumatic  Right Ear: External ear normal    Left Ear: External ear normal    Mouth/Throat: Oropharynx is clear and moist    Cardiovascular: Normal rate, regular rhythm and normal heart sounds  Exam reveals no friction rub  No murmur heard  Pulmonary/Chest: Effort normal and breath sounds normal  No respiratory distress  She has no wheezes  She has no rales  Musculoskeletal: She exhibits no edema  Using wheelchair, very slow gait   Skin:   Right first toe distal erythema, slightly warm   Nursing note and vitals reviewed               Maximo DO Patrick

## 2018-02-19 NOTE — PATIENT INSTRUCTIONS
Recent Results (from the past 336 hour(s))   Comprehensive metabolic panel    Collection Time: 02/14/18  7:47 AM   Result Value Ref Range    SL AMB GLUCOSE 150 (H) 65 - 99 mg/dL    BUN 16 8 - 27 mg/dL    Creatinine, Serum 0 70 0 57 - 1 00 mg/dL    eGFR Non  82 >59 mL/min/1 73    SL AMB EGFR AFRICAN AMERICAN 95 >59 mL/min/1 73    SL AMB BUN/CREATININE RATIO 23 12 - 28    SL AMB SODIUM 140 134 - 144 mmol/L    SL AMB POTASSIUM 4 2 3 5 - 5 2 mmol/L    SL AMB CHLORIDE 100 96 - 106 mmol/L    SL AMB CARBON DIOXIDE 25 18 - 29 mmol/L    CALCIUM 9 1 8 7 - 10 3 mg/dL    SL AMB PROTEIN, TOTAL 7 2 6 0 - 8 5 g/dL    Serum Albumin 4 0 3 5 - 4 7 g/dL    Globulin, Total 3 2 1 5 - 4 5 g/dL    SL AMB ALBUMIN/GLOBULIN RATIO 1 3 1 2 - 2 2    SL AMB BILIRUBIN, TOTAL 0 3 0 0 - 1 2 mg/dL    Alk Phos Isoenzymes 92 39 - 117 IU/L    SL AMB AST 21 0 - 40 IU/L    SL AMB ALT 23 0 - 32 IU/L   Hemoglobin A1c    Collection Time: 02/14/18  7:47 AM   Result Value Ref Range    Hemoglobin A1C 6 7 (H) 4 8 - 5 6 %

## 2018-02-27 ENCOUNTER — OFFICE VISIT (OUTPATIENT)
Dept: PODIATRY | Facility: CLINIC | Age: 83
End: 2018-02-27
Payer: MEDICARE

## 2018-02-27 VITALS — BODY MASS INDEX: 32.62 KG/M2 | HEIGHT: 66 IN | WEIGHT: 203 LBS

## 2018-02-27 DIAGNOSIS — I87.2 PERIPHERAL VENOUS INSUFFICIENCY: ICD-10-CM

## 2018-02-27 DIAGNOSIS — E11.42 DIABETIC POLYNEUROPATHY ASSOCIATED WITH TYPE 2 DIABETES MELLITUS (HCC): ICD-10-CM

## 2018-02-27 DIAGNOSIS — L03.031 PARONYCHIA OF GREAT TOE, RIGHT: ICD-10-CM

## 2018-02-27 DIAGNOSIS — L60.0 INGROWING NAIL: Primary | ICD-10-CM

## 2018-02-27 PROCEDURE — 99203 OFFICE O/P NEW LOW 30 MIN: CPT | Performed by: PODIATRIST

## 2018-02-27 NOTE — PROGRESS NOTES
Assessment/Plan:  Patient will finish course of antibiotics  Patient will continue with Neosporin dressing daily  Patient call if any drainage or signs of infection discussed proper diabetic foot care  Discussed that subungual hematoma will be there to the nail grows out  Discussed importance of elevation discussed pressure stockings discussed referral to vascular doctor for chronic swelling  Aseptic debridement and planning of nails x10 and manually and mechanically  Patient call if any signs of infection follow-up 2 weeks     Diagnoses and all orders for this visit:    Ingrowing nail    Diabetic polyneuropathy associated with type 2 diabetes mellitus [facility]    Peripheral venous insufficiency    Paronychia of great toe, right          Subjective:      Patient ID: Jo Check is a [de-identified] y o  female  Patient has had ingrown nail right hallux tibial border for the past few weeks  Patient had partially cut out by her medical doctor and has been on Keflex  The redness and pain has significantly improved rates pain 2/10 pain scale  Does have dried blood beneath the nail but there is no drainage and redness has significantly improved  Patient has a 30 year history of MS and a 20 year history of diabetes  Does have tingling and burning in feet  Patient is mostly wheelchair bound and has significant swelling in the legs  Patient has developed some discoloration of the left dorsal midfoot and lower leg since she was in nursing home  The following portions of the patient's history were reviewed and updated as appropriate: allergies, current medications, past family history, past medical history, past social history, past surgical history and problem list     Review of Systems   Constitutional: Negative  HENT: Negative  Eyes: Negative  Respiratory: Negative  Cardiovascular: Negative  Gastrointestinal: Negative  Endocrine: Negative  Genitourinary: Negative      Musculoskeletal: Negative  Skin: Negative  Allergic/Immunologic: Negative  Neurological: Negative  Hematological: Negative  Psychiatric/Behavioral: Negative  Objective:      Ht 5' 6" (1 676 m)   Wt 92 1 kg (203 lb)   BMI 32 77 kg/m²          Physical Exam    DP pulses 1/4 bilateral, PT pulses 0/4 bilateral, capillary fill time 6 seconds times 10 temperature gradient increased bilateral  Plus two pitting edema bilateral feet and ankles moderate venous stasis mild hyperpigmentation of left dorsal midfoot and anterior ankle left worse than right no open wound no signs of infection  Right hallux tibial border there is a subungual hematoma with dried blood there is decreasing redness there is minimal pain no exudate no abscess no open wound  Bilateral hallux nails are thickened and dystrophic other nails are non dystrophic  Significant edema  There is weakness of dorsiflexion and plantar flexion of bilateral foot and ankle  Negative calf tenderness negative Homans sign

## 2018-03-13 ENCOUNTER — OFFICE VISIT (OUTPATIENT)
Dept: PODIATRY | Facility: CLINIC | Age: 83
End: 2018-03-13
Payer: MEDICARE

## 2018-03-13 VITALS — BODY MASS INDEX: 32.62 KG/M2 | WEIGHT: 203 LBS | HEIGHT: 66 IN

## 2018-03-13 DIAGNOSIS — I70.203 ATHEROSCLEROSIS OF NATIVE ARTERY OF BOTH LOWER EXTREMITIES, WITH UNSPECIFIED PRESENCE OF CLINICAL MANIFESTATION (HCC): ICD-10-CM

## 2018-03-13 DIAGNOSIS — L92.9 GRANULOMA OF GREAT TOE: Primary | ICD-10-CM

## 2018-03-13 DIAGNOSIS — L60.0 INGROWING NAIL: ICD-10-CM

## 2018-03-13 DIAGNOSIS — E11.42 DIABETIC POLYNEUROPATHY ASSOCIATED WITH TYPE 2 DIABETES MELLITUS (HCC): ICD-10-CM

## 2018-03-13 DIAGNOSIS — B35.1 ONYCHOMYCOSIS: ICD-10-CM

## 2018-03-13 PROCEDURE — 99212 OFFICE O/P EST SF 10 MIN: CPT | Performed by: PODIATRIST

## 2018-03-13 NOTE — PROGRESS NOTES
Assessment/Plan:  Neosporin dressing daily  Patient call if any redness or signs of infection  Follow-up 2 months     Diagnoses and all orders for this visit:    Granuloma of great toe    Ingrowing nail    Onychomycosis    Diabetic polyneuropathy associated with type 2 diabetes mellitus ([facility] )    Atherosclerosis of native artery of both lower extremities, with unspecified presence of clinical manifestation (HCC)          Subjective:      Patient ID: Marvin Greenwood is a [de-identified] y o  female  Patient is follow-up for right hallux ingrown infection  Patient finish course of antibiotics  Patient has noticed any redness or drainage  Patient is partially in wheelchair  Patient has not had any fever chills  Patient does have numbness and burning in feet  The following portions of the patient's history were reviewed and updated as appropriate: allergies, current medications, past family history, past medical history, past social history, past surgical history and problem list     Review of Systems   Constitutional: Negative  HENT: Negative  Eyes: Negative  Respiratory: Negative  Cardiovascular: Negative  Gastrointestinal: Negative  Endocrine: Negative  Genitourinary: Negative  Musculoskeletal: Negative  Skin: Negative  Allergic/Immunologic: Negative  Neurological: Negative  Hematological: Negative  Psychiatric/Behavioral: Negative  Objective:      Ht 5' 6" (1 676 m)   Wt 92 1 kg (203 lb)   BMI 32 77 kg/m²          Physical Exam    DP pulses 1/4 bilateral, PT pulses 0/4 bilateral, capillary fill time 6 seconds times 10 temperature gradient increased bilateral  Plus two pitting edema bilateral feet and ankles moderate venous stasis mild hyperpigmentation of left dorsal midfoot and anterior ankle left worse than right no open wound no signs of infection  Bilateral hallux nails are thickened and dystrophic other nails are non dystrophic  Significant edema    There is weakness of dorsiflexion and plantar flexion of bilateral foot and ankle  Negative calf tenderness negative Homans sign  Right hallux tibial border resolving infection no exudate no purulence no abscess    Decreased vibratory and monofilament sensation bilateral

## 2018-03-28 DIAGNOSIS — E78.2 MIXED HYPERLIPIDEMIA: Primary | ICD-10-CM

## 2018-03-28 RX ORDER — SIMVASTATIN 20 MG
TABLET ORAL
Qty: 90 TABLET | Refills: 1 | Status: SHIPPED | OUTPATIENT
Start: 2018-03-28 | End: 2018-04-12 | Stop reason: SDUPTHER

## 2018-04-12 DIAGNOSIS — E78.2 MIXED HYPERLIPIDEMIA: ICD-10-CM

## 2018-04-12 RX ORDER — SIMVASTATIN 20 MG
20 TABLET ORAL DAILY
Qty: 90 TABLET | Refills: 0 | Status: SHIPPED | OUTPATIENT
Start: 2018-04-12 | End: 2018-08-21 | Stop reason: SDUPTHER

## 2018-04-20 ENCOUNTER — TELEPHONE (OUTPATIENT)
Dept: FAMILY MEDICINE CLINIC | Facility: CLINIC | Age: 83
End: 2018-04-20

## 2018-04-20 DIAGNOSIS — E11.9 TYPE 2 DIABETES MELLITUS WITHOUT COMPLICATION, WITHOUT LONG-TERM CURRENT USE OF INSULIN (HCC): Primary | ICD-10-CM

## 2018-04-20 DIAGNOSIS — I10 ESSENTIAL HYPERTENSION: ICD-10-CM

## 2018-04-20 RX ORDER — LISINOPRIL 20 MG/1
20 TABLET ORAL 2 TIMES DAILY
Qty: 180 TABLET | Refills: 1 | Status: SHIPPED | OUTPATIENT
Start: 2018-04-20 | End: 2018-08-21 | Stop reason: SDUPTHER

## 2018-04-20 RX ORDER — AMLODIPINE BESYLATE 10 MG/1
10 TABLET ORAL DAILY
Qty: 90 TABLET | Refills: 1 | Status: SHIPPED | OUTPATIENT
Start: 2018-04-20 | End: 2018-10-29 | Stop reason: SDUPTHER

## 2018-05-15 ENCOUNTER — OFFICE VISIT (OUTPATIENT)
Dept: PODIATRY | Facility: CLINIC | Age: 83
End: 2018-05-15
Payer: MEDICARE

## 2018-05-15 VITALS — WEIGHT: 203 LBS | HEIGHT: 66 IN | BODY MASS INDEX: 32.62 KG/M2

## 2018-05-15 DIAGNOSIS — B35.1 ONYCHOMYCOSIS: Primary | ICD-10-CM

## 2018-05-15 DIAGNOSIS — I70.203 ATHEROSCLEROSIS OF NATIVE ARTERY OF BOTH LOWER EXTREMITIES, WITH UNSPECIFIED PRESENCE OF CLINICAL MANIFESTATION (HCC): ICD-10-CM

## 2018-05-15 DIAGNOSIS — M79.671 PAIN IN BOTH FEET: ICD-10-CM

## 2018-05-15 DIAGNOSIS — I89.0 LYMPHEDEMA OF BOTH LOWER EXTREMITIES: ICD-10-CM

## 2018-05-15 DIAGNOSIS — M79.672 PAIN IN BOTH FEET: ICD-10-CM

## 2018-05-15 DIAGNOSIS — E11.42 DIABETIC POLYNEUROPATHY ASSOCIATED WITH TYPE 2 DIABETES MELLITUS (HCC): ICD-10-CM

## 2018-05-15 DIAGNOSIS — M79.89 LEG SWELLING: ICD-10-CM

## 2018-05-15 PROCEDURE — 99212 OFFICE O/P EST SF 10 MIN: CPT | Performed by: PODIATRIST

## 2018-05-15 PROCEDURE — 11721 DEBRIDE NAIL 6 OR MORE: CPT | Performed by: PODIATRIST

## 2018-05-15 NOTE — PROGRESS NOTES
Assessment/Plan:    Aseptic debridement and planning of nails x10 and manually and mechanically  Discussed importance of elevation, discussed importance cutting back on salt movement to improve circulation  Discussed pressure stockings  Discussed referral to vascular doctor for chronic swelling of legs  Discussed risks and signs of DVT  Follow-up 1 month  Diagnoses and all orders for this visit:    Onychomycosis    Diabetic polyneuropathy associated with type 2 diabetes mellitus ([facility] )    Atherosclerosis of native artery of both lower extremities, with unspecified presence of clinical manifestation (HCC)    Pain in both feet    Leg swelling    Lymphedema of both lower extremities          Subjective:      Patient ID: Luther Alejandro is a [de-identified] y o  female  Patient has thick painful nails gets recurrent ingrown nails  Patient is mostly wheelchair bound has history of MS  Patient has significant swelling in legs  Has only been elevating legs part of time  Does often eats foods that are high in salt  Past Medical History:   Diagnosis Date    Cancer ([place] [address] )     Diabetes mellitus ([place] [address] )     Hyperlipidemia     Hypertension     MS (multiple sclerosis) ([place] [address] )        Past Surgical History:   Procedure Laterality Date    BREAST SURGERY      INCISION AND DRAINAGE OF WOUND Left 2/27/2017    Procedure: INCISION AND DRAINAGE (I&D)   Chest wall x 2;  Surgeon: Jen Mcmullen MD;  Location: [facility];  Service:     MASTECTOMY         Allergies   Allergen Reactions    Clindamycin      Annotation - [id number]: bad taste in mouth    Coumadin [Warfarin]      Reaction Date: [id number];  Annotation - [id number]: lip swelling    Latex      Annotation - [id number]: RASH    Pradaxa [Dabigatran Etexilate Mesylate]          Current Outpatient Prescriptions:     amLODIPine (NORVASC) 10 mg tablet, Take 1 tablet (10 mg total) by mouth daily, Disp: 90 tablet, Rfl: 1    ascorbic acid (CVS VITAMIN C) 500 MG tablet, Take 1 tablet by mouth daily, Disp: , Rfl:     aspirin 81 MG tablet, Take 162 mg by mouth daily, Disp: , Rfl:     FRANKI MICROLET LANCETS lancets, by Does not apply route, Disp: , Rfl:     glucose blood (FRANKI CONTOUR NEXT TEST) test strip, by In Vitro route, Disp: , Rfl:     ipratropium-albuterol (DUONEB) 0 5-2 5 mg/3 mL, Inhale, Disp: , Rfl:     Lancets MISC, by Does not apply route, Disp: , Rfl:     latanoprost (XALATAN) 0 005 % ophthalmic solution, Apply 1 drop to eye, Disp: , Rfl:     lisinopril (ZESTRIL) 20 mg tablet, Take 1 tablet (20 mg total) by mouth 2 (two) times a day, Disp: 180 tablet, Rfl: 1    metFORMIN (GLUCOPHAGE) 1000 MG tablet, Take 0 5 tablets (500 mg total) by mouth 2 (two) times a day with meals, Disp: 90 tablet, Rfl: 1    Multiple Vitamin (MULTI-VITAMIN DAILY PO), Take by mouth daily, Disp: , Rfl:     Omega-3 Fatty Acids (OMEGA-3 FISH OIL) 1000 MG CAPS, Take 1 capsule by mouth daily, Disp: , Rfl:     simvastatin (ZOCOR) 20 mg tablet, Take 1 tablet (20 mg total) by mouth daily, Disp: 90 tablet, Rfl: 0    sotalol (BETAPACE) 80 mg tablet, Take 80 mg by mouth 2 (two) times a day, Disp: , Rfl:     Patient Active Problem List   Diagnosis    Mixed hyperlipidemia    Atrial fibrillation (HCC)    Multiple sclerosis (HCC)    Weakness    Essential hypertension    Type 2 diabetes mellitus without complication, without long-term current use of insulin (HCC)    Urinary incontinence    Cancer of right breast, stage 1 (HCC)    Arthropathy of multiple sites    Abnormal gait    Granuloma of great toe       Review of Systems   Constitutional: Negative  HENT: Negative  Eyes: Negative  Respiratory: Negative  Cardiovascular: Negative  Gastrointestinal: Negative  Endocrine: Negative  Genitourinary: Negative  Musculoskeletal: Negative  Skin: Negative  Allergic/Immunologic: Negative  Neurological: Negative  Hematological: Negative  Psychiatric/Behavioral: Negative  Objective:      Ht 5' 6" (1 676 m)   Wt 92 1 kg (203 lb)   BMI 32 77 kg/m²          Physical Exam   Cardiovascular: Pulses are weak pulses  Pulses:       Dorsalis pedis pulses are 1+ on the right side, and 1+ on the left side  Posterior tibial pulses are 0 on the right side, and 0 on the left side  Feet:   Right Foot:   Skin Integrity: Positive for callus  Left Foot:   Skin Integrity: Positive for callus  Patient's shoes and socks removed  Right Foot/Ankle   Right Foot Inspection  Skin Exam: callus and callus                          Toe Exam: swelling  Sensory   Vibration: absent  Proprioception: diminished   Monofilament testing: diminished  Vascular  Capillary refills: elevated  The right DP pulse is 1+  The right PT pulse is 0  Left Foot/Ankle  Left Foot Inspection  Skin Exam: callus                         Toe Exam: swelling                   Sensory   Vibration: absent  Proprioception: diminished  Monofilament: diminished  Vascular  Capillary refills: elevated  The left DP pulse is 1+  The left PT pulse is 0  Assign Risk Category:  Deformity present; Loss of protective sensation; Weak pulses       Risk: 2     DP pulses 1/4 bilateral, PT pulses 0/4 bilateral, capillary fill time 6 seconds times 10 temperature gradient increased bilateral  Plus two pitting edema bilateral feet and ankles moderate venous stasis mild hyperpigmentation of left dorsal midfoot and anterior ankle left worse than right no open wound no signs of infection  Right hallux tibial border there is a subungual hematoma with dried blood there is decreasing redness there is minimal pain no exudate no abscess no open wound  Bilateral hallux nails are thickened and dystrophic other nails are non dystrophic  Significant edema  There is weakness of dorsiflexion and plantar flexion of bilateral foot and ankle  Negative calf tenderness negative Homans sign

## 2018-06-22 ENCOUNTER — TRANSCRIBE ORDERS (OUTPATIENT)
Dept: ADMINISTRATIVE | Facility: HOSPITAL | Age: 83
End: 2018-06-22

## 2018-06-22 DIAGNOSIS — Z85.3 HX: BREAST CANCER: Primary | ICD-10-CM

## 2018-06-30 LAB
ALBUMIN SERPL-MCNC: 4.1 G/DL (ref 3.5–4.7)
ALBUMIN SERPL-MCNC: 4.1 G/DL (ref 3.5–4.7)
ALBUMIN/GLOB SERPL: 1.3 {RATIO} (ref 1.2–2.2)
ALBUMIN/GLOB SERPL: 1.4 {RATIO} (ref 1.2–2.2)
ALP SERPL-CCNC: 101 IU/L (ref 39–117)
ALP SERPL-CCNC: 99 IU/L (ref 39–117)
ALT SERPL-CCNC: 21 IU/L (ref 0–32)
ALT SERPL-CCNC: 22 IU/L (ref 0–32)
AMBIG ABBREV DEFAULT: NORMAL
AMBIG ABBREV DEFAULT: NORMAL
AST SERPL-CCNC: 17 IU/L (ref 0–40)
AST SERPL-CCNC: 21 IU/L (ref 0–40)
BASOPHILS # BLD AUTO: 0 X10E3/UL (ref 0–0.2)
BASOPHILS NFR BLD AUTO: 0 %
BILIRUB SERPL-MCNC: 0.2 MG/DL (ref 0–1.2)
BILIRUB SERPL-MCNC: 0.2 MG/DL (ref 0–1.2)
BUN SERPL-MCNC: 16 MG/DL (ref 8–27)
BUN SERPL-MCNC: 16 MG/DL (ref 8–27)
BUN/CREAT SERPL: 21 (ref 12–28)
BUN/CREAT SERPL: 22 (ref 12–28)
CALCIUM SERPL-MCNC: 9.4 MG/DL (ref 8.7–10.3)
CALCIUM SERPL-MCNC: 9.5 MG/DL (ref 8.7–10.3)
CANCER AG27-29 SERPL-ACNC: 38.8 U/ML (ref 0–38.6)
CHLORIDE SERPL-SCNC: 100 MMOL/L (ref 96–106)
CHLORIDE SERPL-SCNC: 99 MMOL/L (ref 96–106)
CHOLEST SERPL-MCNC: 160 MG/DL (ref 100–199)
CO2 SERPL-SCNC: 25 MMOL/L (ref 20–29)
CO2 SERPL-SCNC: 26 MMOL/L (ref 20–29)
CREAT SERPL-MCNC: 0.72 MG/DL (ref 0.57–1)
CREAT SERPL-MCNC: 0.75 MG/DL (ref 0.57–1)
EOSINOPHIL # BLD AUTO: 0.1 X10E3/UL (ref 0–0.4)
EOSINOPHIL NFR BLD AUTO: 2 %
ERYTHROCYTE [DISTWIDTH] IN BLOOD BY AUTOMATED COUNT: 14.9 % (ref 12.3–15.4)
GLOBULIN SER-MCNC: 3 G/DL (ref 1.5–4.5)
GLOBULIN SER-MCNC: 3.1 G/DL (ref 1.5–4.5)
GLUCOSE SERPL-MCNC: 159 MG/DL (ref 65–99)
GLUCOSE SERPL-MCNC: 160 MG/DL (ref 65–99)
HBA1C MFR BLD: 6.9 % (ref 4.8–5.6)
HCT VFR BLD AUTO: 41.3 % (ref 34–46.6)
HDLC SERPL-MCNC: 47 MG/DL
HGB BLD-MCNC: 13.5 G/DL (ref 11.1–15.9)
IMM GRANULOCYTES # BLD: 0 X10E3/UL (ref 0–0.1)
IMM GRANULOCYTES NFR BLD: 0 %
LDLC SERPL CALC-MCNC: 63 MG/DL (ref 0–99)
LYMPHOCYTES # BLD AUTO: 1.5 X10E3/UL (ref 0.7–3.1)
LYMPHOCYTES NFR BLD AUTO: 21 %
MCH RBC QN AUTO: 28.5 PG (ref 26.6–33)
MCHC RBC AUTO-ENTMCNC: 32.7 G/DL (ref 31.5–35.7)
MCV RBC AUTO: 87 FL (ref 79–97)
MICRODELETION SYND BLD/T FISH: NORMAL
MONOCYTES # BLD AUTO: 0.7 X10E3/UL (ref 0.1–0.9)
MONOCYTES NFR BLD AUTO: 10 %
NEUTROPHILS # BLD AUTO: 5 X10E3/UL (ref 1.4–7)
NEUTROPHILS NFR BLD AUTO: 67 %
PLATELET # BLD AUTO: 199 X10E3/UL (ref 150–379)
POTASSIUM SERPL-SCNC: 4.8 MMOL/L (ref 3.5–5.2)
POTASSIUM SERPL-SCNC: 4.9 MMOL/L (ref 3.5–5.2)
PROT SERPL-MCNC: 7.1 G/DL (ref 6–8.5)
PROT SERPL-MCNC: 7.2 G/DL (ref 6–8.5)
RBC # BLD AUTO: 4.73 X10E6/UL (ref 3.77–5.28)
SL AMB EGFR AFRICAN AMERICAN: 87 ML/MIN/1.73
SL AMB EGFR AFRICAN AMERICAN: 91 ML/MIN/1.73
SL AMB EGFR NON AFRICAN AMERICAN: 76 ML/MIN/1.73
SL AMB EGFR NON AFRICAN AMERICAN: 79 ML/MIN/1.73
SODIUM SERPL-SCNC: 144 MMOL/L (ref 134–144)
SODIUM SERPL-SCNC: 145 MMOL/L (ref 134–144)
TRIGL SERPL-MCNC: 252 MG/DL (ref 0–149)
WBC # BLD AUTO: 7.3 X10E3/UL (ref 3.4–10.8)

## 2018-07-03 DIAGNOSIS — Z12.31 ENCOUNTER FOR SCREENING MAMMOGRAM FOR MALIGNANT NEOPLASM OF BREAST: ICD-10-CM

## 2018-07-03 DIAGNOSIS — C50.919 MALIGNANT NEOPLASM OF FEMALE BREAST (HCC): ICD-10-CM

## 2018-07-09 ENCOUNTER — TRANSCRIBE ORDERS (OUTPATIENT)
Dept: HEMATOLOGY ONCOLOGY | Facility: MEDICAL CENTER | Age: 83
End: 2018-07-09

## 2018-07-09 ENCOUNTER — HOSPITAL ENCOUNTER (OUTPATIENT)
Dept: RADIOLOGY | Facility: HOSPITAL | Age: 83
Discharge: HOME | End: 2018-07-09
Attending: INTERNAL MEDICINE
Payer: MEDICARE

## 2018-07-09 DIAGNOSIS — C50.919 MALIGNANT NEOPLASM OF FEMALE BREAST, UNSPECIFIED ESTROGEN RECEPTOR STATUS, UNSPECIFIED LATERALITY, UNSPECIFIED SITE OF BREAST (HCC): Primary | ICD-10-CM

## 2018-07-09 DIAGNOSIS — Z85.3 HX: BREAST CANCER: ICD-10-CM

## 2018-07-09 PROCEDURE — 77065 DX MAMMO INCL CAD UNI: CPT

## 2018-07-11 ENCOUNTER — OFFICE VISIT (OUTPATIENT)
Dept: HEMATOLOGY ONCOLOGY | Facility: MEDICAL CENTER | Age: 83
End: 2018-07-11
Payer: MEDICARE

## 2018-07-11 VITALS
HEART RATE: 85 BPM | RESPIRATION RATE: 16 BRPM | DIASTOLIC BLOOD PRESSURE: 90 MMHG | SYSTOLIC BLOOD PRESSURE: 150 MMHG | OXYGEN SATURATION: 97 % | TEMPERATURE: 98.4 F

## 2018-07-11 DIAGNOSIS — Z17.0 MALIGNANT NEOPLASM OF RIGHT BREAST, STAGE 1, ESTROGEN RECEPTOR POSITIVE (HCC): Primary | ICD-10-CM

## 2018-07-11 DIAGNOSIS — C50.911 MALIGNANT NEOPLASM OF RIGHT BREAST, STAGE 1, ESTROGEN RECEPTOR POSITIVE (HCC): Primary | ICD-10-CM

## 2018-07-11 PROCEDURE — 99213 OFFICE O/P EST LOW 20 MIN: CPT | Performed by: INTERNAL MEDICINE

## 2018-07-11 NOTE — PROGRESS NOTES
[place]  1935  [address] [facility] [place] [facility] [employer] [id number]    DISCUSSION  SUMMARY:    61-year-old female with a history of stage IA right breast cancer status post mastectomy in June 2015  Issues:      1  Stage IA breast cancer (ER/MA positive, HER-2/marlys negative ) Clinically there are no troubling breast cancer related signs  Recent laboratory test results were good/acceptable  The mammogram in July 2018 did not demonstrate any abnormalities  Mrs Hurley is to return in 12 months with blood work and a repeat mammogram before  Patient will also continue with monthly self exams  As discussed previously, patient has hormone receptor positive breast cancer but deferred hormonal manipulation  Mrs Hurley understand that this increases her risk for recurrence  Although usually not standard of care, patient will continue with blood work  Mrs Hurley has requested a 12 month follow-up  Afterwards, if no cancer issues, patient can be followed by her PCP for surveillance (patient's request)  [employer], patients such as Ms Hurley (using G2 histology with average for age medical problems), would have a 19 4% chance for relapse with surgery alone  Hormone manipulation would decrease this number by 5 3%  Chemotherapy alone would decrease the relapse rate by 1 5%  Using G1 histology and major problems (patient has multiple sclerosis), the hormonal benefit is 1 6% and the chemotherapy benefit is 0 4% for relapse  The mortality benefit statistics are obviously less impressive  The decision to hold off on hormonal manipulation was a well thought out decision by the patient - Mrs Hurley understands that she is still at risk for recurrent breast cancer       Routine health maintenance and medical care is up-to-date     Patient knows to call the hematology/oncology office if there are any other questions or concerns  Carefully review your medication list and verify that the list is accurate and up-to-date  Please call the hematology/oncology office if there are medications missing from the list, medications on the list that you are not currently taking or if there is a dosage or instruction that is different from how you're taking that medication  Patient goals and areas of care:  Breast cancer surveillance  Patient is not able to self-care   _____________________________________________________________________________________    Chief Complaint   Patient presents with    Follow-up     Stage IA right breast cancer on surveillance     History of Present Illness:    82-year-old female previously diagnosed with breast cancer here for oncology followup  A routine mammogram demonstrated a right-sided abnormality  Mrs Hurley did not feel any masses or lumps  Patient was seen by Dr Monica Ricketts and underwent stereotactic needle biopsy  Results demonstrated invasive carcinoma  Patient has also been seen by Dr Dave Navarro; status post mastectomy with sentinel lymph node sampling  Previously we discussed postsurgical treatment options including chemotherapy (not indicated) and hormonal manipulation (patient initially agreed but then decided against)  Mrs Hurley returns for followup  Mrs Hurley states feeling okay, baseline  No surgical issues or pain control issues  MS issues are about the same as before  No other active medical problems  Activities are very limited but no different than before  No fevers or signs of infection  No headaches or dizziness  No significant body aches  Fatigue is the same as before - some days good, some days bad  Decreased muscle strength is the same as before and extremities  Review of Systems   Constitutional: Positive for fatigue  HENT: Negative  Eyes: Negative  Respiratory: Negative  Cardiovascular: Negative  Gastrointestinal: Negative  Endocrine: Negative  Genitourinary: Negative  Musculoskeletal: Negative  Skin: Negative  Allergic/Immunologic: Negative  Neurological: Positive for weakness  Hematological: Negative  Psychiatric/Behavioral: Negative  All other systems reviewed and are negative         Patient Active Problem List   Diagnosis    Mixed hyperlipidemia    Atrial fibrillation (HCC)    [facility]    Weakness    Essential hypertension    Type 2 diabetes mellitus without complication, without long-term current use of insulin ([facility] )    Urinary incontinence    Cancer of right breast, stage 1 (HCC)    Arthropathy of multiple sites    Abnormal gait    Granuloma of great toe     Past Medical History:   Diagnosis Date    Abscess of buttock, right     last assessed-5/4/2017    [facility])     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-last assessed-10/8/2015    Cellulitis of chest wall     last assessed-7/27/2015    Chronic endometritis 10/12/2006    Diabetes mellitus ([facility] )     Dysuria 11/02/2007    Flushing     resolved-6/30/2015    Hyperlipidemia     Hypertension     Ingrown nail 01/05/2012    Malignant neoplasm of breast ([facility] )     last assessed-11/5/2015    MS (multiple sclerosis) ([place] [address] )     Nonvenomous insect bite     last assessed-12/12/2013    Palpitations 02/09/2011    Pressure ulcer of buttock 10/13/2006    Proctitis 05/12/2006    Vaginal polyp 03/14/2006    Viral gastroenteritis     last assessed-9/8/2016     Past Surgical History:   Procedure Laterality Date    BREAST BIOPSY      open    BREAST SURGERY      CERVICAL BIOPSY  W/ LOOP ELECTRODE EXCISION      DILATION AND CURETTAGE OF UTERUS      INCISION AND DRAINAGE OF WOUND Left 2/27/2017    Procedure: INCISION AND DRAINAGE (I&D)   Chest wall x 2;  Surgeon: Riddhi Lopez MD;  Location: [facility];  Service:     MASTECTOMY      last assessed-6/30/2015     Family History   Problem Relation Age of Onset    Heart disease Father cardiac disorder    COPD Sister     Diabetes Sister     Lung cancer Mother     Lung cancer Cousin     Heart disease Cousin         cardiac disorder    Multiple sclerosis Cousin     Cancer Cousin     Cancer Child         urinary bladder     Social History     Social History    Marital status: /Civil Union     Spouse name: N/A    Number of children: 3    Years of education: N/A     Occupational History    Not on file       Social History Main Topics    Smoking status: Former Smoker    Smokeless tobacco: Never Used    Alcohol use No    Drug use: No    Sexual activity: No     Other Topics Concern    Not on file     Social History Narrative    No narrative on file       Current Outpatient Prescriptions:     amLODIPine (NORVASC) 10 mg tablet, Take 1 tablet (10 mg total) by mouth daily, Disp: 90 tablet, Rfl: 1    ascorbic acid (CVS VITAMIN C) 500 MG tablet, Take 1 tablet by mouth daily, Disp: , Rfl:     aspirin 81 MG tablet, Take 162 mg by mouth daily, Disp: , Rfl:     FRANKI MICROLET LANCETS lancets, by Does not apply route, Disp: , Rfl:     glucose blood (FRANKI CONTOUR NEXT TEST) test strip, by In Vitro route, Disp: , Rfl:     Lancets MISC, by Does not apply route, Disp: , Rfl:     latanoprost (XALATAN) 0 005 % ophthalmic solution, Apply 1 drop to eye, Disp: , Rfl:     lisinopril (ZESTRIL) 20 mg tablet, Take 1 tablet (20 mg total) by mouth 2 (two) times a day, Disp: 180 tablet, Rfl: 1    metFORMIN (GLUCOPHAGE) 1000 MG tablet, Take 0 5 tablets (500 mg total) by mouth 2 (two) times a day with meals, Disp: 90 tablet, Rfl: 1    Multiple Vitamin (MULTI-VITAMIN DAILY PO), Take by mouth daily, Disp: , Rfl:     Omega-3 Fatty Acids (OMEGA-3 FISH OIL) 1000 MG CAPS, Take 1 capsule by mouth daily, Disp: , Rfl:     simvastatin (ZOCOR) 20 mg tablet, Take 1 tablet (20 mg total) by mouth daily, Disp: 90 tablet, Rfl: 0    sotalol (BETAPACE) 80 mg tablet, Take 80 mg by mouth 2 (two) times a day, Disp: , Rfl:     ipratropium-albuterol (DUONEB) 0 5-2 5 mg/3 mL, Inhale, Disp: , Rfl:     Allergies   Allergen Reactions    Clindamycin      Annotation - [id number]: bad taste in mouth    Coumadin [Warfarin]      Reaction Date: 22May2012; Annotation - [id number]: lip swelling    Latex      Annotation - [id number]: RASH    Pradaxa [Dabigatran Etexilate Mesylate]        Vitals:    07/11/18 1309   BP: 150/90   Pulse: 85   Resp: 16   Temp: 98 4 °F (36 9 °C)   SpO2: 97%     Physical Exam   Constitutional: She is oriented to person, place, and time  She appears well-developed and well-nourished  Elderly female, wheelchair bound, obese, no respiratory distress   HENT:   Head: Normocephalic and atraumatic  Right Ear: External ear normal    Left Ear: External ear normal    Mouth/Throat: Oropharynx is clear and moist    Eyes: Conjunctivae and EOM are normal  Pupils are equal, round, and reactive to light  Neck: Normal range of motion  Neck supple  Cardiovascular: Normal rate, regular rhythm, normal heart sounds and intact distal pulses  Pulmonary/Chest:   Fair air entry bilaterally   Abdominal: Soft  Bowel sounds are normal    Obese cannot palpate liver or spleen, +bowel sounds, nontender   Musculoskeletal: Normal range of motion  Neurological: She is alert and oriented to person, place, and time  Decreased motor in lower and upper extremities, neck is chronically tilted downward, speech is clear, response time is slightly delayed, patient is appropriate and responds intelligently   Skin: Skin is warm  Relatively good color, warm, moist, no petechiae or ecchymoses   Psychiatric: She has a normal mood and affect   Her behavior is normal  Judgment and thought content normal    Breasts ( present) right anterior chest wall with well-healed suture line, no axillary adenopathy bilaterally  Extremities: 1-2+ bilateral lower extremity edema, no cords, pulses are 1+  Lymphatics:  No adenopathy in the neck, supraclavicular region, axilla and groin bilaterally    Performance Status: 0 - Asymptomatic (from a breast cancer standpoint)    Labs:    06/29/2018 BUN = 16 creatinine = 0 72 calcium = 9 5 LFTs WNL CA 27, 29 = 38 8 WBC = 7 3 hemoglobin = 13 5 hematocrit = 41 3 platelet = 057 neutrophil = 67%    12/21/2017 CA 27, 29 = 38 4 BUN = 14 creatinine = 0 76 calcium = 9 5 LFTs WNL WBC = 8 0 hemoglobin = 13 8 hematocrit = 42 2 platelet = 398 neutrophil = 66%      Imaging    07/09/2018 diagnostic left mammogram with CAD was category 2, benign with yearly follow-up  07/07/2017 diagnostic left digital mammogram was category 2, benign with no evidence of malignancy  Pathology    Oncotype recurrence score = 8 ER and ID RT-PCR were positive; HER-2/marlys was negative     Right breast mastectomy was performed on Hyacinth 3, 2015  Results demonstrated invasive breast carcinoma, tumor site = upper outer quadrant, invasive breast carcinoma no special type with neuroendocrine differentiation and mucinous features, tumor size = 15 mm, tumor grade = G1-2, focality = unifocal, invasive did not invade the dermis and epidermis or nipple, there was no skeletal muscle present, lymphovascular invasion was not identified  Dermal lymphovascular invasion was not identified  DCIS was present non-extensive component, size = 0 2 cm in greatest measurable diameter  Lobular carcinoma was not identified  Margins were negative  Right sentinel lymph node was negative for malignancy (0/1)  AJCC = pT1c pN0 G1-2 cM0 ER/ID positive HER-2/marlys = 1+ = negative R0 = stage IA     Right breast core biopsies from May 8, 2015 demonstrated invasive mammary carcinoma, no special type, present in all cores  Largest single focus was 1 cm, histologic grade = I-II, estrogen receptor = 100% progesterone receptor = 100% HER-2/marlys by IHC = 1+ = negative

## 2018-07-24 ENCOUNTER — OFFICE VISIT (OUTPATIENT)
Dept: PODIATRY | Facility: CLINIC | Age: 83
End: 2018-07-24
Payer: MEDICARE

## 2018-07-24 VITALS — BODY MASS INDEX: 32.62 KG/M2 | WEIGHT: 203 LBS | HEIGHT: 66 IN | RESPIRATION RATE: 17 BRPM | HEART RATE: 85 BPM

## 2018-07-24 DIAGNOSIS — G57.62 NEUROMA OF SECOND INTERSPACE OF LEFT FOOT: Primary | ICD-10-CM

## 2018-07-24 DIAGNOSIS — I70.203 ATHEROSCLEROSIS OF NATIVE ARTERY OF BOTH LOWER EXTREMITIES, WITH UNSPECIFIED PRESENCE OF CLINICAL MANIFESTATION (HCC): ICD-10-CM

## 2018-07-24 DIAGNOSIS — R20.2 PARESTHESIA: ICD-10-CM

## 2018-07-24 DIAGNOSIS — E11.42 DIABETIC POLYNEUROPATHY ASSOCIATED WITH TYPE 2 DIABETES MELLITUS (HCC): ICD-10-CM

## 2018-07-24 DIAGNOSIS — I89.0 LYMPHEDEMA OF BOTH LOWER EXTREMITIES: ICD-10-CM

## 2018-07-24 PROCEDURE — 99213 OFFICE O/P EST LOW 20 MIN: CPT | Performed by: PODIATRIST

## 2018-07-24 NOTE — PROGRESS NOTES
Assessment/Plan:    Aseptic debridement and planning of nails x10 and manually and mechanically      Discussed steroid or alcohol injections if not resolving  Discussed orthotics  Discussed NSAIDs risks and benefits  Stretching icing  Follow-up 1 month  Diagnoses and all orders for this visit:    Neuroma of second interspace of left foot    Paresthesia    Lymphedema of both lower extremities    Atherosclerosis of native artery of both lower extremities, with unspecified presence of clinical manifestation (HCC)    Diabetic polyneuropathy associated with type 2 diabetes mellitus (HCC)          Subjective:      Patient ID: Luis Manuel Diaz is a [de-identified] y o  female  Patient has been having pain and burning in left 2nd and 3rd interspace worse in the 2nd interspace  Has been having this on off the past several months  This has worsened tighter shoes and when walking  Patient rates pain 8/10 pain scale 7 pain last anywhere from a few minutes to a few hours  Says it happens a few times a week  No history of injury  Patient also has thick painful nails that hurt walking wearing shoes            Past Medical History:   Diagnosis Date    Abscess of buttock, right     last assessed-5/4/2017    Cancer [facility])     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-last assessed-10/8/2015    Cellulitis of chest wall     last assessed-7/27/2015    Chronic endometritis 10/12/2006    [facility]     Dysuria 11/02/2007    Flushing     resolved-6/30/2015    Hyperlipidemia     Hypertension     Ingrown nail 01/05/2012    Malignant neoplasm of breast ([facility] )     last assessed-11/5/2015    MS (multiple sclerosis) ([facility] )     Nonvenomous insect bite     last assessed-12/12/2013    Palpitations 02/09/2011    Pressure ulcer of buttock 10/13/2006    Proctitis 05/12/2006    Vaginal polyp 03/14/2006    Viral gastroenteritis     last assessed-9/8/2016       Past Surgical History:   Procedure Laterality Date    BREAST BIOPSY      open    BREAST SURGERY      CERVICAL BIOPSY  W/ LOOP ELECTRODE EXCISION      DILATION AND CURETTAGE OF UTERUS      INCISION AND DRAINAGE OF WOUND Left 2/27/2017    Procedure: INCISION AND DRAINAGE (I&D)   Chest wall x 2;  Surgeon: Gay Chery MD;  Location: [facility];  Service:     MASTECTOMY      last assessed-6/30/2015       Allergies   Allergen Reactions    Clindamycin      Annotation - [id number]: bad taste in mouth    Coumadin [Warfarin]      Reaction Date: [id number];  Annotation - [id number]: lip swelling    Latex      Annotation - [id number]: RASH    Pradaxa [Dabigatran Etexilate Mesylate]          Current Outpatient Prescriptions:     amLODIPine (NORVASC) 10 mg tablet, Take 1 tablet (10 mg total) by mouth daily, Disp: 90 tablet, Rfl: 1    ascorbic acid (CVS VITAMIN C) 500 MG tablet, Take 1 tablet by mouth daily, Disp: , Rfl:     aspirin 81 MG tablet, Take 162 mg by mouth daily, Disp: , Rfl:     FRANKI MICROLET LANCETS lancets, by Does not apply route, Disp: , Rfl:     glucose blood (FRANKI CONTOUR NEXT TEST) test strip, by In Vitro route, Disp: , Rfl:     ipratropium-albuterol (DUONEB) 0 5-2 5 mg/3 mL, Inhale, Disp: , Rfl:     Lancets MISC, by Does not apply route, Disp: , Rfl:     latanoprost (XALATAN) 0 005 % ophthalmic solution, Apply 1 drop to eye, Disp: , Rfl:     lisinopril (ZESTRIL) 20 mg tablet, Take 1 tablet (20 mg total) by mouth 2 (two) times a day, Disp: 180 tablet, Rfl: 1    metFORMIN (GLUCOPHAGE) 1000 MG tablet, Take 0 5 tablets (500 mg total) by mouth 2 (two) times a day with meals, Disp: 90 tablet, Rfl: 1    Multiple Vitamin (MULTI-VITAMIN DAILY PO), Take by mouth daily, Disp: , Rfl:     Omega-3 Fatty Acids (OMEGA-3 FISH OIL) 1000 MG CAPS, Take 1 capsule by mouth daily, Disp: , Rfl:     simvastatin (ZOCOR) 20 mg tablet, Take 1 tablet (20 mg total) by mouth daily, Disp: 90 tablet, Rfl: 0    sotalol (BETAPACE) 80 mg tablet, Take 80 mg by mouth 2 (two) times a day, Disp: , Rfl:     Patient Active Problem List   Diagnosis    Mixed hyperlipidemia    [facility] )    [facility]    Weakness    Essential hypertension    Type 2 diabetes mellitus without complication, without long-term current use of insulin ([facility] )    Urinary incontinence    Cancer of right breast, stage 1 (HCC)    Arthropathy of multiple sites    Abnormal gait    Granuloma of great toe       Review of Systems   Constitutional: Negative  HENT: Negative  Eyes: Negative  Respiratory: Negative  Cardiovascular: Negative  Gastrointestinal: Negative  Endocrine: Negative  Genitourinary: Negative  Musculoskeletal: Negative  Skin: Negative  Allergic/Immunologic: Negative  Neurological: Negative  Hematological: Negative  Psychiatric/Behavioral: Negative  Objective:      Pulse 85   Resp 17   Ht 5' 6" (1 676 m)   Wt 92 1 kg (203 lb)   BMI 32 77 kg/m²          Physical Exam   Cardiovascular: Pulses are weak pulses  Pulses:       Dorsalis pedis pulses are 1+ on the right side, and 1+ on the left side  Posterior tibial pulses are 0 on the right side, and 0 on the left side  Feet:   Right Foot:   Skin Integrity: Positive for callus  Left Foot:   Skin Integrity: Positive for callus  Patient's shoes and socks removed  Right Foot/Ankle   Right Foot Inspection  Skin Exam: callus and callus                          Toe Exam: swelling  Sensory   Vibration: absent  Proprioception: diminished   Monofilament testing: diminished  Vascular  Capillary refills: elevated  The right DP pulse is 1+  The right PT pulse is 0  Left Foot/Ankle  Left Foot Inspection  Skin Exam: callus                         Toe Exam: swelling                   Sensory   Vibration: absent  Proprioception: diminished  Monofilament: diminished  Vascular  Capillary refills: elevated  The left DP pulse is 1+  The left PT pulse is 0     Assign Risk Category:  Deformity present; Loss of protective sensation; Weak pulses       Risk: 2     DP pulses 1/4 bilateral, PT pulses 0/4 bilateral, capillary fill time 6 seconds times 10 temperature gradient increased bilateral  Plus two pitting edema bilateral feet and ankles moderate venous stasis mild hyperpigmentation of left dorsal midfoot and anterior ankle left worse than right no open wound no signs of infection     Nails are thickened discolored dystrophic  Skin is hairless and atrophic vascular changes noted bilateral  Positive Coty sign right 2nd interspace,  Mild hammertoes bilateral  Negative erythema no open wound no signs of infection

## 2018-08-21 ENCOUNTER — OFFICE VISIT (OUTPATIENT)
Dept: FAMILY MEDICINE CLINIC | Facility: CLINIC | Age: 83
End: 2018-08-21
Payer: MEDICARE

## 2018-08-21 VITALS
BODY MASS INDEX: 32.28 KG/M2 | DIASTOLIC BLOOD PRESSURE: 68 MMHG | TEMPERATURE: 97 F | SYSTOLIC BLOOD PRESSURE: 116 MMHG | RESPIRATION RATE: 16 BRPM | HEART RATE: 84 BPM | WEIGHT: 200 LBS

## 2018-08-21 DIAGNOSIS — E11.9 TYPE 2 DIABETES MELLITUS WITHOUT COMPLICATION, WITHOUT LONG-TERM CURRENT USE OF INSULIN (HCC): Primary | ICD-10-CM

## 2018-08-21 DIAGNOSIS — E78.2 MIXED HYPERLIPIDEMIA: ICD-10-CM

## 2018-08-21 DIAGNOSIS — I10 ESSENTIAL HYPERTENSION: ICD-10-CM

## 2018-08-21 PROCEDURE — 99214 OFFICE O/P EST MOD 30 MIN: CPT | Performed by: FAMILY MEDICINE

## 2018-08-21 RX ORDER — LISINOPRIL 20 MG/1
20 TABLET ORAL 2 TIMES DAILY
Qty: 180 TABLET | Refills: 3 | Status: SHIPPED | OUTPATIENT
Start: 2018-08-21 | End: 2019-08-22 | Stop reason: SDUPTHER

## 2018-08-21 RX ORDER — SIMVASTATIN 20 MG
20 TABLET ORAL DAILY
Qty: 90 TABLET | Refills: 3 | Status: SHIPPED | OUTPATIENT
Start: 2018-08-21 | End: 2019-10-09 | Stop reason: SDUPTHER

## 2018-08-21 NOTE — ASSESSMENT & PLAN NOTE
Pressure is running low on new  type of amlodipine  She is going to try taking 5 mg a day instead of 10 and see how she does

## 2018-08-21 NOTE — ASSESSMENT & PLAN NOTE
Lab Results   Component Value Date    HGBA1C 6 9 (H) 06/29/2018       No results for input(s): POCGLU in the last 72 hours      Blood Sugar Average: Last 72 hrs:    Very well controlled  Reminded to get eye exam done - form given

## 2018-08-21 NOTE — PROGRESS NOTES
Assessment/Plan:    Problem List Items Addressed This Visit     Mixed hyperlipidemia     Well controlled         Relevant Medications    simvastatin (ZOCOR) 20 mg tablet    Other Relevant Orders    Lipid Panel with Direct LDL reflex    Essential hypertension     Pressure is running low on new  type of amlodipine  She is going to try taking 5 mg a day instead of 10 and see how she does  Relevant Medications    lisinopril (ZESTRIL) 20 mg tablet    Type 2 diabetes mellitus without complication, without long-term current use of insulin (HCC) - Primary     Lab Results   Component Value Date    HGBA1C 6 9 (H) 06/29/2018       No results for input(s): POCGLU in the last 72 hours  Blood Sugar Average: Last 72 hrs:    Very well controlled  Reminded to get eye exam done - form given           Relevant Medications    metFORMIN (GLUCOPHAGE) 1000 MG tablet    Other Relevant Orders    Comprehensive metabolic panel    Hemoglobin A1C          There are no Patient Instructions on file for this visit  Return in about 6 months (around 2/21/2019) for Annual Wellness Visit  Subjective:      Patient ID: Luis Manuel Diaz is a [de-identified] y o  female  Chief Complaint   Patient presents with    Follow-up     medication use--lj       She doesn't feel good on the new type of amlodipine  It is a new   She is feeling off from the medication  Diabetes   She presents for her follow-up diabetic visit  She has type 2 diabetes mellitus  Associated symptoms include fatigue  There are no hypoglycemic complications  Symptoms are improving  Current diabetic treatment includes oral agent (monotherapy)  She is compliant with treatment all of the time  Hyperlipidemia   This is a chronic problem  The current episode started more than 1 year ago  The problem is controlled  Current antihyperlipidemic treatment includes statins  The current treatment provides moderate improvement of lipids   There are no compliance problems  The following portions of the patient's history were reviewed and updated as appropriate:  past social history    Review of Systems   Constitutional: Positive for fatigue  Respiratory: Negative  Cardiovascular: Negative  Current Outpatient Prescriptions   Medication Sig Dispense Refill    amLODIPine (NORVASC) 10 mg tablet Take 1 tablet (10 mg total) by mouth daily 90 tablet 1    ascorbic acid (CVS VITAMIN C) 500 MG tablet Take 1 tablet by mouth daily      aspirin 81 MG tablet Take 162 mg by mouth daily      FRANKI MICROLET LANCETS lancets by Does not apply route      glucose blood (FRANKI CONTOUR NEXT TEST) test strip by In Vitro route      Lancets MISC by Does not apply route      latanoprost (XALATAN) 0 005 % ophthalmic solution Apply 1 drop to eye      lisinopril (ZESTRIL) 20 mg tablet Take 1 tablet (20 mg total) by mouth 2 (two) times a day 180 tablet 3    metFORMIN (GLUCOPHAGE) 1000 MG tablet Take 0 5 tablets (500 mg total) by mouth 2 (two) times a day with meals 90 tablet 3    Multiple Vitamin (MULTI-VITAMIN DAILY PO) Take by mouth daily      Omega-3 Fatty Acids (OMEGA-3 FISH OIL) 1000 MG CAPS Take 1 capsule by mouth daily      simvastatin (ZOCOR) 20 mg tablet Take 1 tablet (20 mg total) by mouth daily 90 tablet 3    sotalol (BETAPACE) 80 mg tablet Take 80 mg by mouth 2 (two) times a day       No current facility-administered medications for this visit  Objective:    /68   Pulse 84   Temp (!) 97 °F (36 1 °C)   Resp 16   Wt 90 7 kg (200 lb)   BMI 32 28 kg/m²        Physical Exam   Constitutional: She appears well-developed and well-nourished  HENT:   Head: Normocephalic and atraumatic  Right Ear: External ear normal    Left Ear: External ear normal    Mouth/Throat: Oropharynx is clear and moist    Cardiovascular: Normal rate, regular rhythm and normal heart sounds  Exam reveals no friction rub  No murmur heard    Pulmonary/Chest: Effort normal and breath sounds normal  No respiratory distress  She has no wheezes  She has no rales  Musculoskeletal: She exhibits edema (trace left leg)  She exhibits no deformity  Poor gait, using wheelchair   Nursing note and vitals reviewed               Javon Jimenez DO

## 2018-10-02 ENCOUNTER — OFFICE VISIT (OUTPATIENT)
Dept: PODIATRY | Facility: CLINIC | Age: 83
End: 2018-10-02
Payer: MEDICARE

## 2018-10-02 VITALS — WEIGHT: 200 LBS | BODY MASS INDEX: 32.14 KG/M2 | HEIGHT: 66 IN

## 2018-10-02 DIAGNOSIS — M79.672 PAIN IN BOTH FEET: ICD-10-CM

## 2018-10-02 DIAGNOSIS — E11.42 DIABETIC POLYNEUROPATHY ASSOCIATED WITH TYPE 2 DIABETES MELLITUS (HCC): ICD-10-CM

## 2018-10-02 DIAGNOSIS — I70.203 ATHEROSCLEROSIS OF NATIVE ARTERY OF BOTH LOWER EXTREMITIES, WITH UNSPECIFIED PRESENCE OF CLINICAL MANIFESTATION (HCC): Primary | ICD-10-CM

## 2018-10-02 DIAGNOSIS — M79.671 PAIN IN BOTH FEET: ICD-10-CM

## 2018-10-02 DIAGNOSIS — G57.62 NEUROMA OF SECOND INTERSPACE OF LEFT FOOT: ICD-10-CM

## 2018-10-02 DIAGNOSIS — R20.2 PARESTHESIA: ICD-10-CM

## 2018-10-02 PROCEDURE — 99213 OFFICE O/P EST LOW 20 MIN: CPT | Performed by: PODIATRIST

## 2018-10-02 NOTE — PROGRESS NOTES
Assessment/Plan:    Aseptic debridement and planning of nails x10 and manually and mechanically    Discussed wider shoes discussed orthotics  Discussed oral and topical treatments for neuropathy  Discussed B vitamins and alpha lipoic acid  Follow up 1 month     Diagnoses and all orders for this visit:    Atherosclerosis of native artery of both lower extremities, with unspecified presence of clinical manifestation ([facility] )    Diabetic polyneuropathy associated with type 2 diabetes mellitus ([facility] )    Pain in both feet    Paresthesia    Neuroma of second interspace of left foot          Subjective:      Patient ID: Mari Burk is a 82 y o  female  Patient   is follow-up for pain and burning in feet  Isolated pain in interspaces has improved  Is still having generalized pain and burning in feet ice laid pain in interspaces from neuroma significantly improved pain is on off   is having generalized pain and burning in feet  Worse at night    Patient is having thick painful nails that hurt when walking wearing shoes        Past Medical History:   Diagnosis Date    Abscess of buttock, right     last assessed-5/4/2017    Cancer [facility])     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-last assessed-10/8/2015    Cellulitis of chest wall     last assessed-7/27/2015    Chronic endometritis 10/12/2006    Diabetes mellitus ([facility] )     Dysuria 11/02/2007    Flushing     resolved-6/30/2015    Hyperlipidemia     Hypertension     Ingrown nail 01/05/2012    Malignant ne[facility] of breast ([facility] )     last assessed-11/5/2015    MS (multiple sclerosis) ([facility] )     Nonvenomous insect bite     last assessed-12/12/2013    Palpitations 02/09/2011    Pressure ulcer of buttock 10/13/2006    Proctitis 05/12/2006    Vaginal polyp 03/14/2006    Viral gastroenteritis     last assessed-9/8/2016       Past Surgical History:   Procedure Laterality Date    BREAST BIOPSY      open    BREAST SURGERY      CERVICAL BIOPSY  W/ LOOP ELECTRODE EXCISION      DILATION AND CURETTAGE OF UTERUS      INCISION AND DRAINAGE OF WOUND Left 2/27/2017    Procedure: INCISION AND DRAINAGE (I&D)   Chest wall x 2;  Surgeon: Ashely Tapia MD;  Location: [facility];  Service:     MASTECTOMY      last assessed-6/30/2015       Allergies   Allergen Reactions    Clindamycin      Annotation - [id number]: bad taste in mouth    Coumadin [Warfarin]      Reaction Date: [id number];  Annotation - [id number]: lip swelling    Latex      Annotation - [id number]: RASH    Pradaxa [Dabigatran Etexilate Mesylate]          Current Outpatient Prescriptions:     amLODIPine (NORVASC) 10 mg tablet, Take 1 tablet (10 mg total) by mouth daily, Disp: 90 tablet, Rfl: 1    ascorbic acid (CVS VITAMIN C) 500 MG tablet, Take 1 tablet by mouth daily, Disp: , Rfl:     aspirin 81 MG tablet, Take 162 mg by mouth daily, Disp: , Rfl:     FRANKI MICROLET LANCETS lancets, by Does not apply route, Disp: , Rfl:     glucose blood (FRANKI CONTOUR NEXT TEST) test strip, by In Vitro route, Disp: , Rfl:     Lancets MISC, by Does not apply route, Disp: , Rfl:     latanoprost (XALATAN) 0 005 % ophthalmic solution, Apply 1 drop to eye, Disp: , Rfl:     lisinopril (ZESTRIL) 20 mg tablet, Take 1 tablet (20 mg total) by mouth 2 (two) times a day, Disp: 180 tablet, Rfl: 3    metFORMIN (GLUCOPHAGE) 1000 MG tablet, Take 0 5 tablets (500 mg total) by mouth 2 (two) times a day with meals, Disp: 90 tablet, Rfl: 3    Multiple Vitamin (MULTI-VITAMIN DAILY PO), Take by mouth daily, Disp: , Rfl:     Omega-3 Fatty Acids (OMEGA-3 FISH OIL) 1000 MG CAPS, Take 1 capsule by mouth daily, Disp: , Rfl:     simvastatin (ZOCOR) 20 mg tablet, Take 1 tablet (20 mg total) by mouth daily, Disp: 90 tablet, Rfl: 3    sotalol (BETAPACE) 80 mg tablet, Take 80 mg by mouth 2 (two) times a day, Disp: , Rfl:     Patient Active Problem List   Diagnosis    Mixed hyperlipidemia    Atrial fibrillation (HCC)    Multiple sclerosis ([address] )    Weakness    Essential hypertension    Type 2 diabetes mellitus without complication, without long-term current use of insulin (HCC)    Urinary incontinence    Cancer of right breast, stage 1 (HCC)    Arthropathy of multiple sites    Abnormal gait    Granuloma of great toe       Review of Systems   Constitutional: Negative  HENT: Negative  Eyes: Negative  Respiratory: Negative  Cardiovascular: Negative  Gastrointestinal: Negative  Endocrine: Negative  Genitourinary: Negative  Musculoskeletal: Negative  Skin: Negative  Allergic/Immunologic: Negative  Neurological: Negative  Hematological: Negative  Psychiatric/Behavioral: Negative  Objective:      Ht 5' 6" (1 676 m)   Wt 90 7 kg (200 lb)   BMI 32 28 kg/m²          Physical Exam   Cardiovascular: Pulses are weak pulses  Pulses:       Dorsalis pedis pulses are 1+ on the right side, and 1+ on the left side  Posterior tibial pulses are 0 on the right side, and 0 on the left side  Feet:   Right Foot:   Skin Integrity: Positive for callus  Left Foot:   Skin Integrity: Positive for callus  Patient's shoes and socks removed  Right Foot/Ankle   Right Foot Inspection  Skin Exam: callus and callus                          Toe Exam: swelling  Sensory   Vibration: absent  Proprioception: diminished   Monofilament testing: diminished  Vascular  Capillary refills: elevated  The right DP pulse is 1+  The right PT pulse is 0  Left Foot/Ankle  Left Foot Inspection  Skin Exam: callus                         Toe Exam: swelling                   Sensory   Vibration: absent  Proprioception: diminished  Monofilament: diminished  Vascular  Capillary refills: elevated  The left DP pulse is 1+  The left PT pulse is 0  Assign Risk Category:  Deformity present;  Loss of protective sensation; Weak pulses       Risk: 2     DP pulses 1/4 bilateral, PT pulses 0/4 bilateral, capillary fill time 6 seconds times 10 temperature gradient increased bilateral  Plus two pitting edema bilateral feet and ankles moderate venous stasis mild hyperpigmentation of left dorsal midfoot and anterior ankle  Nails are thickened discolored dystrophic  Skin is hairless and atrophic vascular changes noted bilateral  Positive Coty sign right 2nd interspace,  Mild pain and swelling in area 2nd interspace bilateral right worse than left  Negative Tinel sign tarsal tunnel bilateral  No open wound no signs of infection  Nails thick discolored dystrophic, mild ingrown bilateral hallux no sign of infection no exudate

## 2018-10-29 DIAGNOSIS — I10 ESSENTIAL HYPERTENSION: ICD-10-CM

## 2018-10-29 RX ORDER — AMLODIPINE BESYLATE 10 MG/1
10 TABLET ORAL DAILY
Qty: 90 TABLET | Refills: 1 | Status: SHIPPED | OUTPATIENT
Start: 2018-10-29 | End: 2019-04-25 | Stop reason: SDUPTHER

## 2018-11-13 ENCOUNTER — IMMUNIZATION (OUTPATIENT)
Dept: FAMILY MEDICINE CLINIC | Facility: CLINIC | Age: 83
End: 2018-11-13
Payer: MEDICARE

## 2018-11-13 DIAGNOSIS — Z23 ENCOUNTER FOR IMMUNIZATION: ICD-10-CM

## 2018-11-13 PROCEDURE — G0008 ADMIN INFLUENZA VIRUS VAC: HCPCS

## 2018-11-13 PROCEDURE — 90662 IIV NO PRSV INCREASED AG IM: CPT

## 2018-11-23 DIAGNOSIS — E11.9 TYPE 2 DIABETES MELLITUS WITHOUT COMPLICATION, WITHOUT LONG-TERM CURRENT USE OF INSULIN (HCC): Primary | ICD-10-CM

## 2018-11-23 DIAGNOSIS — E11.9 TYPE 2 DIABETES MELLITUS WITHOUT COMPLICATION, WITHOUT LONG-TERM CURRENT USE OF INSULIN (HCC): ICD-10-CM

## 2018-12-11 ENCOUNTER — OFFICE VISIT (OUTPATIENT)
Dept: PODIATRY | Facility: CLINIC | Age: 83
End: 2018-12-11
Payer: MEDICARE

## 2018-12-11 VITALS — WEIGHT: 200 LBS | HEIGHT: 66 IN | RESPIRATION RATE: 17 BRPM | BODY MASS INDEX: 32.14 KG/M2

## 2018-12-11 DIAGNOSIS — E11.42 DIABETIC POLYNEUROPATHY ASSOCIATED WITH TYPE 2 DIABETES MELLITUS (HCC): ICD-10-CM

## 2018-12-11 DIAGNOSIS — M79.672 PAIN IN BOTH FEET: ICD-10-CM

## 2018-12-11 DIAGNOSIS — I70.203 ATHEROSCLEROSIS OF NATIVE ARTERY OF BOTH LOWER EXTREMITIES, WITH UNSPECIFIED PRESENCE OF CLINICAL MANIFESTATION (HCC): Primary | ICD-10-CM

## 2018-12-11 DIAGNOSIS — L85.3 XEROSIS CUTIS: ICD-10-CM

## 2018-12-11 DIAGNOSIS — B35.1 ONYCHOMYCOSIS: ICD-10-CM

## 2018-12-11 DIAGNOSIS — R23.4 FISSURE IN SKIN OF FOOT: ICD-10-CM

## 2018-12-11 DIAGNOSIS — M79.671 PAIN IN BOTH FEET: ICD-10-CM

## 2018-12-11 PROCEDURE — 11721 DEBRIDE NAIL 6 OR MORE: CPT | Performed by: PODIATRIST

## 2018-12-11 PROCEDURE — 99212 OFFICE O/P EST SF 10 MIN: CPT | Performed by: PODIATRIST

## 2018-12-11 NOTE — PROGRESS NOTES
Assessment/Plan:    Aseptic debridement and planning of nails x10 and manually and mechanically    Patient apply urea cream b i d  Patient will call if any redness or signs of infection  Patient apply Lysol spray in shoes at night  Patient apply antifungal powder between toes daily  Follow-up 2 months     Diagnoses and all orders for this visit:    Atherosclerosis of native artery of both lower extremities, with unspecified presence of clinical manifestation ([facility] )    Diabetic polyneuropathy associated with type 2 diabetes mellitus (HCC)    Pain in both feet    Onychomycosis    Xerosis cutis    Fissure in skin of foot          Subjective:      Patient ID: Odilia Landa is a 82 y o  female  Patient  has significant dryness and cracking of skin plantar feet bilateral   Small superficial fissures but no open wound or signs of infection  Patient says blood sugar has been well controlled  Patient is mostly wheelchair bound  Denies any calf tenderness or shortness of breath  Has thick painful nails that hurt when wearing shoes          Past Medical History:   Diagnosis Date    Abscess of buttock, right     last assessed-5/4/2017    Cancer [facility])     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-last assessed-10/8/2015    Cellulitis of chest wall     last assessed-7/27/2015    Chronic endometritis 10/12/2006    Diabetes mellitus [facility] )     Dysuria 11/02/2007    Flushing     resolved-6/30/2015    Hyperlipidemia     Hypertension     Ingrown nail 01/05/2012    Malignant neoplasm of breast ([facility] )     last assessed-11/5/2015    [facility]     Nonvenomous insect bite     last assessed-12/12/2013    Palpitations 02/09/2011    Pressure ulcer of buttock 10/13/2006    Proctitis 05/12/2006    Vaginal polyp 03/14/2006    Viral gastroenteritis     last assessed-9/8/2016       Past Surgical History:   Procedure Laterality Date    BREAST BIOPSY      open    BREAST SURGERY      CERVICAL BIOPSY  W/ LOOP ELECTRODE EXCISION      DILATION AND CURETTAGE OF UTERUS      INCISION AND DRAINAGE OF WOUND Left 2/27/2017    Procedure: INCISION AND DRAINAGE (I&D)   Chest wall x 2;  Surgeon: Joe Trevino MD;  Location: [place] [facility];  Service:     MASTECTOMY      last assessed-6/30/2015       Allergies   Allergen Reactions    Clindamycin      Annotation - [id number]: bad taste in mouth    Coumadin [Warfarin]      Reaction Date: [id number];  Annotation - [id number]: lip swelling    Latex      Annotation - [id number]: RASH    Pradaxa [Dabigatran Etexilate Mesylate]          Current Outpatient Prescriptions:     amLODIPine (NORVASC) 10 mg tablet, TAKE 1 TABLET (10 MG TOTAL) BY MOUTH DAILY, Disp: 90 tablet, Rfl: 1    ascorbic acid (CVS VITAMIN C) 500 MG tablet, Take 1 tablet by mouth daily, Disp: , Rfl:     aspirin 81 MG tablet, Take 162 mg by mouth daily, Disp: , Rfl:     FRANKI MICROLET LANCETS lancets, by Other route 2 (two) times a day E11 9 Test blood sugar twice daily, Disp: 100 each, Rfl: 5    glucose blood (FRANKI CONTOUR NEXT TEST) test strip, 1 each by Other route 2 (two) times a day E11 9 test blood sugar twice daily, Disp: 100 each, Rfl: 5    latanoprost (XALATAN) 0 005 % ophthalmic solution, Apply 1 drop to eye, Disp: , Rfl:     lisinopril (ZESTRIL) 20 mg tablet, Take 1 tablet (20 mg total) by mouth 2 (two) times a day, Disp: 180 tablet, Rfl: 3    metFORMIN (GLUCOPHAGE) 1000 MG tablet, Take 0 5 tablets (500 mg total) by mouth 2 (two) times a day with meals, Disp: 90 tablet, Rfl: 3    Multiple Vitamin (MULTI-VITAMIN DAILY PO), Take by mouth daily, Disp: , Rfl:     Omega-3 Fatty Acids (OMEGA-3 FISH OIL) 1000 MG CAPS, Take 1 capsule by mouth daily, Disp: , Rfl:     simvastatin (ZOCOR) 20 mg tablet, Take 1 tablet (20 mg total) by mouth daily, Disp: 90 tablet, Rfl: 3    sotalol (BETAPACE) 80 mg tablet, Take 80 mg by mouth 2 (two) times a day, Disp: , Rfl:     Patient Active Problem List   Diagnosis  Mixed hyperlipidemia    Atrial fibrillation (HCC)    Multiple sclerosis (HCC)    Weakness    Essential hypertension    Type 2 diabetes mellitus without complication, without long-term current use of insulin (HCC)    Urinary incontinence    Cancer of right breast, stage 1 (HCC)    Arthropathy of multiple sites    Abnormal gait    Granuloma of great toe       Review of Systems   Constitutional: Negative  HENT: Negative  Eyes: Negative  Respiratory: Negative  Cardiovascular: Negative  Gastrointestinal: Negative  Endocrine: Negative  Genitourinary: Negative  Musculoskeletal: Negative  Skin: Negative  Allergic/Immunologic: Negative  Neurological: Negative  Hematological: Negative  Psychiatric/Behavioral: Negative  Objective:      Resp 17   Ht 5' 6" (1 676 m)   Wt 90 7 kg (200 lb)   BMI 32 28 kg/m²          Physical Exam   Cardiovascular: Pulses are weak pulses  Pulses:       Dorsalis pedis pulses are 1+ on the right side, and 1+ on the left side  Posterior tibial pulses are 0 on the right side, and 0 on the left side  Feet:   Right Foot:   Skin Integrity: Positive for callus  Left Foot:   Skin Integrity: Positive for callus  Patient's shoes and socks removed  Right Foot/Ankle   Right Foot Inspection  Skin Exam: callus and callus                          Toe Exam: swelling  Sensory   Vibration: absent  Proprioception: diminished   Monofilament testing: diminished  Vascular  Capillary refills: elevated  The right DP pulse is 1+  The right PT pulse is 0  Left Foot/Ankle  Left Foot Inspection  Skin Exam: callus                         Toe Exam: swelling                   Sensory   Vibration: absent  Proprioception: diminished  Monofilament: diminished  Vascular  Capillary refills: elevated  The left DP pulse is 1+  The left PT pulse is 0  Assign Risk Category:  Deformity present;  Loss of protective sensation; Weak pulses       Risk: 2     DP pulses 1/4 bilateral, PT pulses 0/4 bilateral, capillary fill time 6 seconds times 10 temperature gradient increased bilateral  Plus two pitting edema bilateral feet and ankles moderate venous stasis mild hyperpigmentation of left dorsal midfoot and anterior ankle  Nails are thickened discolored dystrophic  Skin is hairless and atrophic vascular changes noted bilateral  Positive Coty sign right 2nd interspace,    Significant xerosis plantar feet bilateral  Superficial fissures posterior heel bilateral right worse than left  No open wound or signs of infection  Significant edema bilateral feet and ankles  Negative calf tenderness

## 2019-02-15 LAB
ALBUMIN SERPL-MCNC: 4.1 G/DL (ref 3.5–4.7)
ALBUMIN/GLOB SERPL: 1.4 {RATIO} (ref 1.2–2.2)
ALP SERPL-CCNC: 96 IU/L (ref 39–117)
ALT SERPL-CCNC: 15 IU/L (ref 0–32)
AST SERPL-CCNC: 14 IU/L (ref 0–40)
BILIRUB SERPL-MCNC: <0.2 MG/DL (ref 0–1.2)
BUN SERPL-MCNC: 15 MG/DL (ref 8–27)
BUN/CREAT SERPL: 23 (ref 12–28)
CALCIUM SERPL-MCNC: 9.4 MG/DL (ref 8.7–10.3)
CHLORIDE SERPL-SCNC: 102 MMOL/L (ref 96–106)
CHOLEST SERPL-MCNC: 165 MG/DL (ref 100–199)
CO2 SERPL-SCNC: 27 MMOL/L (ref 20–29)
CREAT SERPL-MCNC: 0.64 MG/DL (ref 0.57–1)
GLOBULIN SER-MCNC: 3 G/DL (ref 1.5–4.5)
GLUCOSE SERPL-MCNC: 150 MG/DL (ref 65–99)
HBA1C MFR BLD: 6.6 % (ref 4.8–5.6)
HDLC SERPL-MCNC: 43 MG/DL
LABCORP COMMENT: NORMAL
LDLC SERPL CALC-MCNC: 68 MG/DL (ref 0–99)
MICRODELETION SYND BLD/T FISH: NORMAL
POTASSIUM SERPL-SCNC: 4.4 MMOL/L (ref 3.5–5.2)
PROT SERPL-MCNC: 7.1 G/DL (ref 6–8.5)
SL AMB EGFR AFRICAN AMERICAN: 97 ML/MIN/1.73
SL AMB EGFR NON AFRICAN AMERICAN: 84 ML/MIN/1.73
SODIUM SERPL-SCNC: 144 MMOL/L (ref 134–144)
TRIGL SERPL-MCNC: 269 MG/DL (ref 0–149)

## 2019-02-19 ENCOUNTER — OFFICE VISIT (OUTPATIENT)
Dept: PODIATRY | Facility: CLINIC | Age: 84
End: 2019-02-19
Payer: MEDICARE

## 2019-02-19 VITALS — WEIGHT: 200 LBS | HEIGHT: 66 IN | BODY MASS INDEX: 32.14 KG/M2 | RESPIRATION RATE: 17 BRPM

## 2019-02-19 DIAGNOSIS — M77.8 CAPSULITIS OF LEFT FOOT: ICD-10-CM

## 2019-02-19 DIAGNOSIS — M1A.0720 CHRONIC GOUT OF LEFT FOOT, UNSPECIFIED CAUSE: Primary | ICD-10-CM

## 2019-02-19 PROCEDURE — 99213 OFFICE O/P EST LOW 20 MIN: CPT | Performed by: PODIATRIST

## 2019-02-19 NOTE — PROGRESS NOTES
Assessment/Plan:    Chronic swelling left 2nd digit MP joint  Possible gout, possible fracture  Suggested blood work patient says she just had blood work and did not want to do more will go for x-ray and then will sent for new blood work  A1c 6 6  Creatinine and liver function within normal limits  Aseptic debridement and planning of nails x10 and manually and mechanically  Patient will call if any redness swelling or increasing pain  Discussed proper diabetic foot care  Follow-up 2 weeks     Diagnoses and all orders for this visit:    Chronic gout of left foot, unspecified cause  -     X-ray foot left 3+ views; Future    Capsulitis of left foot  -     X-ray foot left 3+ views; Future          Subjective:      Patient ID: Kelsy Desir is a 82 y o  female  Patient has chief complaint of pain and swelling in left 2nd digit for the past few weeks  Says it was very painful but is minimally painful nail  There is swelling but no redness or warmth  Patient has not had any fever chills  No known history of trauma  No known history of gout  Says when was painful it was also red and swollen for a few days was very painful  Patient is mostly in wheelchair patient does have history of neuropathy        Past Medical History:   Diagnosis Date    Abscess of buttock, right     last assessed-5/4/2017    Cancer [facility])     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-last assessed-10/8/2015    Cellulitis of chest wall     last assessed-7/27/2015    Chronic endometritis 10/12/2006    Diabetes mellitus [facility]     Dysuria 11/02/2007    Flushing     resolved-6/30/2015    Hyperlipidemia     Hypertension     Ingrown nail 01/05/2012    Malignant neoplasm of breast ([facility] )     last assessed-11/5/2015    MS (multiple sclerosis) ([facility] )     Nonvenomous insect bite     last assessed-12/12/2013    Palpitations 02/09/2011    Pressure ulcer of buttock 10/13/2006    Proctitis 05/12/2006    Vaginal polyp 03/14/2006    Viral gastroenteritis     last assessed-9/8/2016       Past Surgical History:   Procedure Laterality Date    BREAST BIOPSY      open    BREAST SURGERY      CERVICAL BIOPSY  W/ LOOP ELECTRODE EXCISION      DILATION AND CURETTAGE OF UTERUS      INCISION AND DRAINAGE OF WOUND Left 2/27/2017    Procedure: INCISION AND DRAINAGE (I&D)   Chest wall x 2;  Surgeon: Oriana Rouse MD;  Location[facility] MAIN OR;  Service:     MASTECTOMY      last assessed-6/30/2015       Allergies   Allergen Reactions    Clindamycin      Annotation - [id number]: bad taste in mouth    Coumadin [Warfarin]      Reaction Date: [id number];  Annotation - [id number]: lip swelling    Latex      Annotation - [id number]: RASH    Pradaxa [Dabigatran Etexilate Mesylate]          Current Outpatient Medications:     amLODIPine (NORVASC) 10 mg tablet, TAKE 1 TABLET (10 MG TOTAL) BY MOUTH DAILY, Disp: 90 tablet, Rfl: 1    ascorbic acid (CVS VITAMIN C) 500 MG tablet, Take 1 tablet by mouth daily, Disp: , Rfl:     aspirin 81 MG tablet, Take 162 mg by mouth daily, Disp: , Rfl:     FRANKI MICROLET LANCETS lancets, by Other route 2 (two) times a day E11 9 Test blood sugar twice daily, Disp: 100 each, Rfl: 5    glucose blood (FRANKI CONTOUR NEXT TEST) test strip, 1 each by Other route 2 (two) times a day E11 9 test blood sugar twice daily, Disp: 100 each, Rfl: 5    latanoprost (XALATAN) 0 005 % ophthalmic solution, Apply 1 drop to eye, Disp: , Rfl:     lisinopril (ZESTRIL) 20 mg tablet, Take 1 tablet (20 mg total) by mouth 2 (two) times a day, Disp: 180 tablet, Rfl: 3    metFORMIN (GLUCOPHAGE) 1000 MG tablet, Take 0 5 tablets (500 mg total) by mouth 2 (two) times a day with meals, Disp: 90 tablet, Rfl: 3    Multiple Vitamin (MULTI-VITAMIN DAILY PO), Take by mouth daily, Disp: , Rfl:     Omega-3 Fatty Acids (OMEGA-3 FISH OIL) 1000 MG CAPS, Take 1 capsule by mouth daily, Disp: , Rfl:     simvastatin (ZOCOR) 20 mg tablet, Take 1 tablet (20 mg total) by mouth daily, Disp: 90 tablet, Rfl: 3    sotalol (BETAPACE) 80 mg tablet, Take 80 mg by mouth 2 (two) times a day, Disp: , Rfl:     Patient Active Problem List   Diagnosis    Mixed hyperlipidemia    Atrial fibrillation (HCC)    [facility]    Weakness    Essential hypertension    Type 2 diabetes mellitus without complication, without long-term current use of insulin (HCC)    Urinary incontinence    Cancer of right breast, stage 1 (HCC)    Arthropathy of multiple sites    Abnormal gait    Granuloma of great toe       Review of Systems   Constitutional: Negative  HENT: Negative  Eyes: Negative  Respiratory: Negative  Cardiovascular: Negative  Gastrointestinal: Negative  Endocrine: Negative  Genitourinary: Negative  Musculoskeletal: Positive for gait problem and joint swelling  Skin: Negative  Allergic/Immunologic: Negative  Hematological: Negative  Psychiatric/Behavioral: Negative  Objective:  Patient's shoes and socks were removed, feet examined  Resp 17   Ht 5' 6" (1 676 m)   Wt 90 7 kg (200 lb)   BMI 32 28 kg/m²          Physical Exam   Cardiovascular: Pulses are weak pulses  Pulses:       Dorsalis pedis pulses are 1+ on the right side, and 1+ on the left side  Posterior tibial pulses are Absent on the right side, and Absent on the left side  Feet:   Right Foot:   Skin Integrity: Positive for callus and dry skin  Left Foot:   Skin Integrity: Positive for callus and dry skin  Nursing note and vitals reviewed  Foot Exam    General  General Appearance: appears stated age and healthy   Orientation: alert and oriented to person, place, and time   Affect: appropriate   Assistance: wheelchair use       Right Foot/Ankle     Inspection and Palpation  Arch: pes planus  Hammertoes: second toe, fifth toe, fourth toe and third toe  Hallux limitus: yes  Skin Exam: callus, dry skin and skin changes;      Neurovascular  Dorsalis pedis: 1+  Posterior tibial: absent      Left Foot/Ankle      Inspection and Palpation  Arch: pes planus  Hammertoes: second toe, fifth toe, fourth toe and third toe  Hallux limitus: yes  Skin Exam: callus, dry skin and skin changes; Neurovascular  Dorsalis pedis: 1+  Posterior tibial: absent            Patient's shoes and socks removed  Right Foot/Ankle   Right Foot Inspection  Skin Exam: dry skin, callus and callus                            Sensory   Vibration: absent    Monofilament testing: diminished  Vascular  Capillary refills: elevated  The right DP pulse is 1+  The right PT pulse is absent  Right Toe  - Comprehensive Exam  Arch: pes planus  Hammertoes: second toe, fifth toe, fourth toe and third toe  Hallux limitus: yes    Left Foot/Ankle  Left Foot Inspection  Skin Exam: dry skin and callus                                         Sensory   Vibration: absent  Proprioception: diminished  Monofilament: diminished  Vascular  Capillary refills: elevated  The left DP pulse is 1+  The left PT pulse is absent  Left Toe  - Comprehensive Exam  Arch: pes planus  Hammertoes: second toe, fifth toe, fourth toe and third toe  Hallux limitus: yes  Assign Risk Category:  Deformity present; Loss of protective sensation; Weak pulses       Risk: 2        DP pulses 1/4 bilateral, PT pulses 0/4 bilateral, capillary fill time 6 seconds times 10 temperature gradient increased bilateral  Plus two pitting edema bilateral feet and ankles moderate venous stasis mild hyperpigmentation of left dorsal midfoot and anterior ankle  Nails are thickened discolored dystrophic  Skin is hairless and atrophic vascular changes noted bilateral  Positive Coty sign right 2nd interspace,      There is swelling at the base of the left 2nd digit  There is no fluctuance, no pain on palpation, no redness no warmth, no abscess

## 2019-03-15 ENCOUNTER — OFFICE VISIT (OUTPATIENT)
Dept: FAMILY MEDICINE CLINIC | Facility: CLINIC | Age: 84
End: 2019-03-15
Payer: MEDICARE

## 2019-03-15 VITALS
RESPIRATION RATE: 18 BRPM | DIASTOLIC BLOOD PRESSURE: 72 MMHG | HEART RATE: 86 BPM | SYSTOLIC BLOOD PRESSURE: 138 MMHG | TEMPERATURE: 96.6 F

## 2019-03-15 DIAGNOSIS — E11.9 TYPE 2 DIABETES MELLITUS WITHOUT COMPLICATION, WITHOUT LONG-TERM CURRENT USE OF INSULIN (HCC): ICD-10-CM

## 2019-03-15 DIAGNOSIS — Z00.00 MEDICARE ANNUAL WELLNESS VISIT, SUBSEQUENT: Primary | ICD-10-CM

## 2019-03-15 DIAGNOSIS — I70.203 ATHEROSCLEROSIS OF NATIVE ARTERY OF BOTH LOWER EXTREMITIES, WITH UNSPECIFIED PRESENCE OF CLINICAL MANIFESTATION (HCC): ICD-10-CM

## 2019-03-15 DIAGNOSIS — E11.42 DIABETIC POLYNEUROPATHY ASSOCIATED WITH TYPE 2 DIABETES MELLITUS (HCC): ICD-10-CM

## 2019-03-15 DIAGNOSIS — G35 MULTIPLE SCLEROSIS (HCC): ICD-10-CM

## 2019-03-15 DIAGNOSIS — I10 ESSENTIAL HYPERTENSION: ICD-10-CM

## 2019-03-15 DIAGNOSIS — C50.911 MALIGNANT NEOPLASM OF RIGHT BREAST, STAGE 1, ESTROGEN RECEPTOR POSITIVE (HCC): ICD-10-CM

## 2019-03-15 DIAGNOSIS — Z17.0 MALIGNANT NEOPLASM OF RIGHT BREAST, STAGE 1, ESTROGEN RECEPTOR POSITIVE (HCC): ICD-10-CM

## 2019-03-15 DIAGNOSIS — I48.91 ATRIAL FIBRILLATION, UNSPECIFIED TYPE (HCC): ICD-10-CM

## 2019-03-15 PROCEDURE — G0439 PPPS, SUBSEQ VISIT: HCPCS | Performed by: FAMILY MEDICINE

## 2019-03-15 NOTE — PROGRESS NOTES
Assessment and Plan:    Problem List Items Addressed This Visit     Atherosclerosis of native artery of both lower extremities ([facility] )     Stable          Atrial fibrillation ([facility] )     On sotalol         Cancer of right breast, stage 1 ([facility] )     Following with Dr Viviana Conroy         Diabetic polyneuropathy associated with type 2 diabetes mellitus ([facility] )    Relevant Orders    Hemoglobin A1C    Comprehensive metabolic panel    Lipid Panel with Direct LDL reflex    Essential hypertension     Well controlled  Continue amlodipine 10 mg  and lisinopril 20          Multiple sclerosis (HCC)     Not following with specialist  Happy with how she feels now  I offered her a referral to Dr Darlene Wall, but she declined         Type 2 diabetes mellitus without complication, without long-term current use of insulin ([facility] )     Lab Results   Component Value Date    HGBA1C 6 6 (H) 02/14/2019       No results for input(s): POCGLU in the last 72 hours  Blood Sugar Average: Last 72 hrs: Well controlled  Encouraged to get her eye exam             Other Visit Diagnoses     Medicare annual wellness visit, subsequent    -  Primary        Health Maintenance Due   Topic Date Due    Medicare Annual Wellness Visit (AWV)  1935    BMI: Followup Plan  10/01/1955    HEPATITIS B VACCINES (1 of 3 - Risk 3-dose series) 10/01/1956    DTaP,Tdap,and Td Vaccines (1 - Tdap) 10/01/1958    DM Eye Exam  06/07/2017    URINE MICROALBUMIN  05/11/2018    Depression Screening PHQ  02/19/2019         HPI:  Meet Hanna is a 82 y o  female here for her Subsequent Wellness Visit  She has been taking 10 mg of amlodipine for her pressure       Patient Active Problem List   Diagnosis    Mixed hyperlipidemia    Atrial fibrillation (HCC)    Multiple sclerosis ([facility] )    Weakness    Essential hypertension    Type 2 diabetes mellitus without complication, without long-term current use of insulin (HCC)    Urinary incontinence    Cancer of right breast, stage 1 (HCC)    Arthropathy of multiple sites    Abnormal gait    Granuloma of great toe    Diabetic polyneuropathy associated with type 2 diabetes mellitus ([facility] )    Atherosclerosis of native artery of both lower extremities ([facility] )     Past Medical History:   Diagnosis Date    Abscess of buttock, right     last assessed-5/4/2017    Cancer [facility])     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-last assessed-10/8/2015    Cellulitis of chest wall     last assessed-7/27/2015    Chronic endometritis 10/12/2006    Diabetes mellitus ([facility] )     Dysuria 11/02/2007    Flushing     resolved-6/30/2015    Hyperlipidemia     Hypertension     Ingrown nail 01/05/2012    Malignant neoplasm of breast ([facility] )     last assessed-11/5/2015    MS (multiple sclerosis) ([facility] )     Nonvenomous insect bite     last assessed-12/12/2013    Palpitations 02/09/2011    Pressure ulcer of buttock 10/13/2006    Proctitis 05/12/2006    Vaginal polyp 03/14/2006    Viral gastroenteritis     last assessed-9/8/2016     Past Surgical History:   Procedure Laterality Date    BREAST BIOPSY      open    BREAST SURGERY      CERVICAL BIOPSY  W/ LOOP ELECTRODE EXCISION      DILATION AND CURETTAGE OF UTERUS      INCISION AND DRAINAGE OF WOUND Left 2/27/2017    Procedure: INCISION AND DRAINAGE (I&D)   Chest wall x 2;  Surgeon: Jess Belcher MD;  Location: [facility];  Service:     MASTECTOMY      last assessed-6/30/2015     Family History   Problem Relation Age of Onset    Heart disease Father         cardiac disorder    COPD Sister     Diabetes Sister     Lung cancer Mother     Lung cancer Cousin     Heart disease Cousin         cardiac disorder    Multiple sclerosis Cousin     Cancer Cousin     Cancer Child         urinary bladder     Social History     Tobacco Use   Smoking Status Former Smoker   Smokeless Tobacco Never Used     Social History     Substance and Sexual Activity   Alcohol Use No      Social History     Substance and Sexual Activity   Drug Use No       Current Outpatient Medications   Medication Sig Dispense Refill    amLODIPine (NORVASC) 10 mg tablet TAKE 1 TABLET (10 MG TOTAL) BY MOUTH DAILY 90 tablet 1    ascorbic acid ([employer] VITAMIN C) 500 MG tablet Take 1 tablet by mouth daily      aspirin 81 MG tablet Take 162 mg by mouth daily      FRANKI MICROLET LANCETS lancets by Other route 2 (two) times a day E11 9 Test blood sugar twice daily 100 each 5    glucose blood (FRANKI CONTOUR NEXT TEST) test strip 1 each by Other route 2 (two) times a day E11 9 test blood sugar twice daily 100 each 5    latanoprost (XALATAN) 0 005 % ophthalmic solution Apply 1 drop to eye      lisinopril (ZESTRIL) 20 mg tablet Take 1 tablet (20 mg total) by mouth 2 (two) times a day 180 tablet 3    metFORMIN (GLUCOPHAGE) 1000 MG tablet Take 0 5 tablets (500 mg total) by mouth 2 (two) times a day with meals 90 tablet 3    Multiple Vitamin (MULTI-VITAMIN DAILY PO) Take by mouth daily      Omega-3 Fatty Acids (OMEGA-3 FISH OIL) 1000 MG CAPS Take 1 capsule by mouth daily      simvastatin (ZOCOR) 20 mg tablet Take 1 tablet (20 mg total) by mouth daily 90 tablet 3    sotalol (BETAPACE) 80 mg tablet Take 80 mg by mouth 2 (two) times a day       No current facility-administered medications for this visit  Allergies   Allergen Reactions    Clindamycin      Annotation - [id number]: bad taste in mouth    Coumadin [Warfarin]      Reaction Date: 22May2012;  Annotation - [id number]: lip swelling    Latex      Annotation - [id number]: RASH    Pradaxa [Dabigatran Etexilate Mesylate]      Immunization History   Administered Date(s) Administered    Influenza Split High Dose Preservative Free IM 10/11/2012, 11/01/2013, 10/16/2014, 10/08/2015, 11/17/2016, 10/26/2017    Influenza, high dose seasonal 0 5 mL 11/13/2018    Pneumococcal Conjugate 13-Valent 01/12/2016    Pneumococcal Polysaccharide PPV23 01/01/2008       Patient Care Team:  Kendra Pandey DO as PCP - General (Family Medicine)  MD Hiral Hyatt MD Blaise Hock, MD Reagan Share, [address] Carey Moulins Mahlon Lennox, MD    Medicare Screening Tests and Risk Assessments:  Lyric Rowell is here for her Subsequent Wellness visit  Health Risk Assessment:  Patient rates overall health as good  Patient feels that their physical health rating is Same  Eyesight was rated as Same  Hearing was rated as Same  Patient feels that their emotional and mental health rating is Same  Pain experienced by patient in the last 7 days has been Some  Patient's pain rating has been 10/10  Patient states that she has experienced no weight loss or gain in last 6 months  Emotional/Mental Health:  Patient has been feeling nervous/anxious  PHQ-9 Depression Screening:    Frequency of the following problems over the past two weeks:      1  Little interest or pleasure in doing things: 0 - not at all      2  Feeling down, depressed, or hopeless: 0 - not at all  PHQ-2 Score: 0          Broken Bones/Falls: Fall Risk Assessment:    In the past year, patient has experienced: No history of falling in past year          Bladder/Bowel:  Patient has not leaked urine accidently in the last six months  Patient reports no loss of bowel control  Immunizations:  Patient has had a flu vaccination within the last year  Patient has received a pneumonia shot  Patient has not received a shingles shot  Patient has received tetanus/diphtheria shot  Home Safety:  Patient has trouble with stairs inside or outside of their home  Patient currently reports that there are no safety hazards present in home, working smoke alarms, working carbon monoxide detectors        Preventative Screenings:   Breast cancer screening performed, no colon cancer screen completed, cholesterol screen completed, glaucoma eye exam completed,     Nutrition:  Current diet: Frequent junk food and Regular with servings of the following:    Medications:  Patient is currently taking over-the-counter supplements  Patient is not able to manage medications  Lifestyle Choices:  Patient reports no tobacco use  Patient has smoked or used tobacco in the past   Patient has not stopped tobacco use  Patient reports no alcohol use  Patient does not drive a vehicle  Patient wears seat belt  Activities of Daily Living:  Unable to get out of bed by his or her self, unable to dress self, unable to make own meals, unable to do own shopping, unable to bathe self, unable to do laundry/housekeeping, unable to manage own money and other related tasks    Previous Hospitalizations:  Hospitalization or ED visit in past 12 months        Advanced Directives:  Patient has not decided on power of   Patient has not completed advanced directive  Preventative Screening/Counseling:      Cardiovascular:      General: Screening Current      Counseling: Healthy Weight          Diabetes:      General: Screening Current          Colorectal Cancer:      General: Screening Not Indicated          Breast Cancer:      General: Screening Not Indicated          Cervical Cancer:      General: Screening Not Indicated          Osteoporosis:      General: Patient Declines          AAA:      General: Screening Not Indicated          Glaucoma:      General: Risks and Benefits Discussed      Referrals: Ophthalmology          HIV:      General: Screening Not Indicated          Hepatitis C:      General: Screening Not Indicated        Advanced Directives:   Patient has no living will for healthcare, Information on ACP and/or AD provided  5 wishes given  End of life assessment reviewed with patient  Provider agrees with end of life decisions   Additional Comments: She can't make up her mind yet  Immunizations:      Influenza: Influenza UTD This Year      Pneumococcal: Lifetime Vaccine Completed        Physical Exam   Constitutional: She is oriented to person, place, and time   She appears well-developed and well-nourished  HENT:   Head: Normocephalic and atraumatic  Right Ear: External ear normal    Left Ear: External ear normal    Nose: Nose normal    Mouth/Throat: Oropharynx is clear and moist    Cardiovascular: Normal rate, regular rhythm and normal heart sounds  Exam reveals no friction rub  No murmur heard  Pulmonary/Chest: Breath sounds normal  No respiratory distress  She has no wheezes  She has no rales  Musculoskeletal:   Using wheelchair   Neurological: She is oriented to person, place, and time  No cranial nerve deficit  Nursing note and vitals reviewed

## 2019-03-15 NOTE — ASSESSMENT & PLAN NOTE
Lab Results   Component Value Date    HGBA1C 6 6 (H) 02/14/2019       No results for input(s): POCGLU in the last 72 hours  Blood Sugar Average: Last 72 hrs:     Well controlled  Encouraged to get her eye exam

## 2019-03-15 NOTE — PATIENT INSTRUCTIONS
Recent Results (from the past 1008 hour(s))   Comprehensive metabolic panel    Collection Time: 02/14/19  7:36 AM   Result Value Ref Range    Glucose, Random 150 (H) 65 - 99 mg/dL    BUN 15 8 - 27 mg/dL    Creatinine 0 64 0 57 - 1 00 mg/dL    eGFR Non  84 >59 mL/min/1 73    eGFR  97 >59 mL/min/1 73    SL AMB BUN/CREATININE RATIO 23 12 - 28    Sodium 144 134 - 144 mmol/L    Potassium 4 4 3 5 - 5 2 mmol/L    Chloride 102 96 - 106 mmol/L    CO2 27 20 - 29 mmol/L    CALCIUM 9 4 8 7 - 10 3 mg/dL    Protein, Total 7 1 6 0 - 8 5 g/dL    Albumin 4 1 3 5 - 4 7 g/dL    Globulin, Total 3 0 1 5 - 4 5 g/dL    Albumin/Globulin Ratio 1 4 1 2 - 2 2    TOTAL BILIRUBIN <0 2 0 0 - 1 2 mg/dL    Alk Phos Isoenzymes 96 39 - 117 IU/L    AST 14 0 - 40 IU/L    ALT 15 0 - 32 IU/L   Lipid panel    Collection Time: 02/14/19  7:36 AM   Result Value Ref Range    Cholesterol, Total 165 100 - 199 mg/dL    Triglycerides 269 (H) 0 - 149 mg/dL    HDL 43 >39 mg/dL    LDL Direct 68 0 - 99 mg/dL   Hemoglobin A1c (w/out EAG)    Collection Time: 02/14/19  7:36 AM   Result Value Ref Range    Hemoglobin A1C 6 6 (H) 4 8 - 5 6 %   Cardiovascular Report    Collection Time: 02/14/19  7:36 AM   Result Value Ref Range    Interpretation Note    Specimen Status Report    Collection Time: 02/14/19  7:36 AM   Result Value Ref Range    Comment Comment

## 2019-03-15 NOTE — ASSESSMENT & PLAN NOTE
Not following with specialist  Happy with how she feels now  I offered her a referral to Dr Matt Esteves, but she declined

## 2019-04-25 DIAGNOSIS — I10 ESSENTIAL HYPERTENSION: ICD-10-CM

## 2019-04-25 RX ORDER — AMLODIPINE BESYLATE 10 MG/1
10 TABLET ORAL DAILY
Qty: 90 TABLET | Refills: 1 | Status: SHIPPED | OUTPATIENT
Start: 2019-04-25 | End: 2020-01-22 | Stop reason: SDUPTHER

## 2019-06-04 ENCOUNTER — OFFICE VISIT (OUTPATIENT)
Dept: PODIATRY | Facility: CLINIC | Age: 84
End: 2019-06-04
Payer: MEDICARE

## 2019-06-04 VITALS
RESPIRATION RATE: 17 BRPM | HEART RATE: 86 BPM | HEIGHT: 66 IN | SYSTOLIC BLOOD PRESSURE: 138 MMHG | BODY MASS INDEX: 32.14 KG/M2 | DIASTOLIC BLOOD PRESSURE: 72 MMHG | WEIGHT: 200 LBS

## 2019-06-04 DIAGNOSIS — E11.42 DIABETIC POLYNEUROPATHY ASSOCIATED WITH TYPE 2 DIABETES MELLITUS (HCC): Primary | ICD-10-CM

## 2019-06-04 DIAGNOSIS — I70.203 ATHEROSCLEROSIS OF NATIVE ARTERY OF BOTH LOWER EXTREMITIES, WITH UNSPECIFIED PRESENCE OF CLINICAL MANIFESTATION (HCC): ICD-10-CM

## 2019-06-04 DIAGNOSIS — B35.1 ONYCHOMYCOSIS: ICD-10-CM

## 2019-06-04 DIAGNOSIS — M79.671 PAIN IN BOTH FEET: ICD-10-CM

## 2019-06-04 DIAGNOSIS — M79.672 PAIN IN BOTH FEET: ICD-10-CM

## 2019-06-04 PROCEDURE — 11721 DEBRIDE NAIL 6 OR MORE: CPT | Performed by: PODIATRIST

## 2019-07-11 LAB
ALBUMIN SERPL-MCNC: 4 G/DL (ref 3.5–4.7)
ALBUMIN SERPL-MCNC: 4.1 G/DL (ref 3.5–4.7)
ALBUMIN/GLOB SERPL: 1.3 {RATIO} (ref 1.2–2.2)
ALBUMIN/GLOB SERPL: 1.3 {RATIO} (ref 1.2–2.2)
ALP SERPL-CCNC: 93 IU/L (ref 39–117)
ALP SERPL-CCNC: 97 IU/L (ref 39–117)
ALT SERPL-CCNC: 14 IU/L (ref 0–32)
ALT SERPL-CCNC: 15 IU/L (ref 0–32)
AST SERPL-CCNC: 11 IU/L (ref 0–40)
AST SERPL-CCNC: 16 IU/L (ref 0–40)
BASOPHILS # BLD AUTO: 0.1 X10E3/UL (ref 0–0.2)
BASOPHILS NFR BLD AUTO: 1 %
BILIRUB SERPL-MCNC: 0.2 MG/DL (ref 0–1.2)
BILIRUB SERPL-MCNC: 0.2 MG/DL (ref 0–1.2)
BUN SERPL-MCNC: 13 MG/DL (ref 8–27)
BUN SERPL-MCNC: 14 MG/DL (ref 8–27)
BUN/CREAT SERPL: 21 (ref 12–28)
BUN/CREAT SERPL: 21 (ref 12–28)
CALCIUM SERPL-MCNC: 9.4 MG/DL (ref 8.7–10.3)
CALCIUM SERPL-MCNC: 9.7 MG/DL (ref 8.7–10.3)
CANCER AG27-29 SERPL-ACNC: 38.3 U/ML (ref 0–38.6)
CHLORIDE SERPL-SCNC: 103 MMOL/L (ref 96–106)
CHLORIDE SERPL-SCNC: 103 MMOL/L (ref 96–106)
CHOLEST SERPL-MCNC: 161 MG/DL (ref 100–199)
CO2 SERPL-SCNC: 24 MMOL/L (ref 20–29)
CO2 SERPL-SCNC: 24 MMOL/L (ref 20–29)
CREAT SERPL-MCNC: 0.61 MG/DL (ref 0.57–1)
CREAT SERPL-MCNC: 0.66 MG/DL (ref 0.57–1)
EOSINOPHIL # BLD AUTO: 0.1 X10E3/UL (ref 0–0.4)
EOSINOPHIL NFR BLD AUTO: 2 %
ERYTHROCYTE [DISTWIDTH] IN BLOOD BY AUTOMATED COUNT: 14.9 % (ref 12.3–15.4)
GLOBULIN SER-MCNC: 3.1 G/DL (ref 1.5–4.5)
GLOBULIN SER-MCNC: 3.1 G/DL (ref 1.5–4.5)
GLUCOSE SERPL-MCNC: 131 MG/DL (ref 65–99)
GLUCOSE SERPL-MCNC: 131 MG/DL (ref 65–99)
HBA1C MFR BLD: 6.6 % (ref 4.8–5.6)
HCT VFR BLD AUTO: 42 % (ref 34–46.6)
HDLC SERPL-MCNC: 41 MG/DL
HGB BLD-MCNC: 13.2 G/DL (ref 11.1–15.9)
IMM GRANULOCYTES # BLD: 0 X10E3/UL (ref 0–0.1)
IMM GRANULOCYTES NFR BLD: 0 %
LDLC SERPL CALC-MCNC: 68 MG/DL (ref 0–99)
LYMPHOCYTES # BLD AUTO: 1.8 X10E3/UL (ref 0.7–3.1)
LYMPHOCYTES NFR BLD AUTO: 25 %
MCH RBC QN AUTO: 28 PG (ref 26.6–33)
MCHC RBC AUTO-ENTMCNC: 31.4 G/DL (ref 31.5–35.7)
MCV RBC AUTO: 89 FL (ref 79–97)
MICRODELETION SYND BLD/T FISH: NORMAL
MONOCYTES # BLD AUTO: 0.5 X10E3/UL (ref 0.1–0.9)
MONOCYTES NFR BLD AUTO: 7 %
NEUTROPHILS # BLD AUTO: 4.5 X10E3/UL (ref 1.4–7)
NEUTROPHILS NFR BLD AUTO: 65 %
PLATELET # BLD AUTO: 196 X10E3/UL (ref 150–450)
POTASSIUM SERPL-SCNC: 4.6 MMOL/L (ref 3.5–5.2)
POTASSIUM SERPL-SCNC: 5 MMOL/L (ref 3.5–5.2)
PROT SERPL-MCNC: 7.1 G/DL (ref 6–8.5)
PROT SERPL-MCNC: 7.2 G/DL (ref 6–8.5)
RBC # BLD AUTO: 4.71 X10E6/UL (ref 3.77–5.28)
SL AMB EGFR AFRICAN AMERICAN: 96 ML/MIN/1.73
SL AMB EGFR AFRICAN AMERICAN: 98 ML/MIN/1.73
SL AMB EGFR NON AFRICAN AMERICAN: 83 ML/MIN/1.73
SL AMB EGFR NON AFRICAN AMERICAN: 85 ML/MIN/1.73
SODIUM SERPL-SCNC: 142 MMOL/L (ref 134–144)
SODIUM SERPL-SCNC: 143 MMOL/L (ref 134–144)
TRIGL SERPL-MCNC: 260 MG/DL (ref 0–149)
WBC # BLD AUTO: 7 X10E3/UL (ref 3.4–10.8)

## 2019-07-14 ENCOUNTER — HOSPITAL ENCOUNTER (OUTPATIENT)
Facility: HOSPITAL | Age: 84
Setting detail: OBSERVATION
Discharge: HOME | End: 2019-07-15
Attending: EMERGENCY MEDICINE | Admitting: STUDENT IN AN ORGANIZED HEALTH CARE EDUCATION/TRAINING PROGRAM
Payer: MEDICARE

## 2019-07-14 ENCOUNTER — APPOINTMENT (EMERGENCY)
Dept: RADIOLOGY | Facility: HOSPITAL | Age: 84
End: 2019-07-14
Payer: MEDICARE

## 2019-07-14 DIAGNOSIS — R07.9 CHEST PAIN, UNSPECIFIED TYPE: Primary | ICD-10-CM

## 2019-07-14 DIAGNOSIS — I10 ESSENTIAL HYPERTENSION: ICD-10-CM

## 2019-07-14 PROBLEM — R53.1 WEAKNESS: Status: RESOLVED | Noted: 2017-03-11 | Resolved: 2019-07-14

## 2019-07-14 PROBLEM — E04.1 THYROID NODULE: Status: ACTIVE | Noted: 2019-07-14

## 2019-07-14 PROBLEM — D72.829 LEUKOCYTOSIS: Status: ACTIVE | Noted: 2019-07-14

## 2019-07-14 LAB
ALBUMIN SERPL BCP-MCNC: 3.3 G/DL (ref 3.5–5)
ALP SERPL-CCNC: 100 U/L (ref 46–116)
ALT SERPL W P-5'-P-CCNC: 21 U/L (ref 12–78)
ANION GAP SERPL CALCULATED.3IONS-SCNC: 7 MMOL/L (ref 4–13)
AST SERPL W P-5'-P-CCNC: 17 U/L (ref 5–45)
BASOPHILS # BLD AUTO: 0.06 THOUSANDS/ΜL (ref 0–0.1)
BASOPHILS NFR BLD AUTO: 1 % (ref 0–1)
BILIRUB DIRECT SERPL-MCNC: 0.1 MG/DL (ref 0–0.2)
BILIRUB SERPL-MCNC: 0.3 MG/DL (ref 0.2–1)
BUN SERPL-MCNC: 14 MG/DL (ref 5–25)
CALCIUM SERPL-MCNC: 9 MG/DL (ref 8.3–10.1)
CHLORIDE SERPL-SCNC: 104 MMOL/L (ref 100–108)
CO2 SERPL-SCNC: 31 MMOL/L (ref 21–32)
CREAT SERPL-MCNC: 0.69 MG/DL (ref 0.6–1.3)
DEPRECATED D DIMER PPP: 440 NG/ML (FEU) (ref 190–520)
EOSINOPHIL # BLD AUTO: 0.13 THOUSAND/ΜL (ref 0–0.61)
EOSINOPHIL NFR BLD AUTO: 1 % (ref 0–6)
ERYTHROCYTE [DISTWIDTH] IN BLOOD BY AUTOMATED COUNT: 14.5 % (ref 11.6–15.1)
GFR SERPL CREATININE-BSD FRML MDRD: 82 ML/MIN/1.73SQ M
GLUCOSE SERPL-MCNC: 138 MG/DL (ref 65–140)
GLUCOSE SERPL-MCNC: 161 MG/DL (ref 65–140)
GLUCOSE SERPL-MCNC: 231 MG/DL (ref 65–140)
HCT VFR BLD AUTO: 44.9 % (ref 34.8–46.1)
HGB BLD-MCNC: 14.3 G/DL (ref 11.5–15.4)
IMM GRANULOCYTES # BLD AUTO: 0.05 THOUSAND/UL (ref 0–0.2)
IMM GRANULOCYTES NFR BLD AUTO: 0 % (ref 0–2)
LYMPHOCYTES # BLD AUTO: 1.82 THOUSANDS/ΜL (ref 0.6–4.47)
LYMPHOCYTES NFR BLD AUTO: 15 % (ref 14–44)
MCH RBC QN AUTO: 28.8 PG (ref 26.8–34.3)
MCHC RBC AUTO-ENTMCNC: 31.8 G/DL (ref 31.4–37.4)
MCV RBC AUTO: 91 FL (ref 82–98)
MONOCYTES # BLD AUTO: 0.86 THOUSAND/ΜL (ref 0.17–1.22)
MONOCYTES NFR BLD AUTO: 7 % (ref 4–12)
NEUTROPHILS # BLD AUTO: 8.86 THOUSANDS/ΜL (ref 1.85–7.62)
NEUTS SEG NFR BLD AUTO: 76 % (ref 43–75)
NRBC BLD AUTO-RTO: 0 /100 WBCS
NT-PROBNP SERPL-MCNC: 116 PG/ML
PLATELET # BLD AUTO: 203 THOUSANDS/UL (ref 149–390)
PMV BLD AUTO: 12.2 FL (ref 8.9–12.7)
POTASSIUM SERPL-SCNC: 4.6 MMOL/L (ref 3.5–5.3)
PROT SERPL-MCNC: 7.7 G/DL (ref 6.4–8.2)
RBC # BLD AUTO: 4.96 MILLION/UL (ref 3.81–5.12)
SODIUM SERPL-SCNC: 142 MMOL/L (ref 136–145)
TROPONIN I SERPL-MCNC: <0.02 NG/ML
TSH SERPL DL<=0.05 MIU/L-ACNC: 1.02 UIU/ML (ref 0.36–3.74)
WBC # BLD AUTO: 11.78 THOUSAND/UL (ref 4.31–10.16)

## 2019-07-14 PROCEDURE — 80048 BASIC METABOLIC PNL TOTAL CA: CPT | Performed by: EMERGENCY MEDICINE

## 2019-07-14 PROCEDURE — 99220 PR INITIAL OBSERVATION CARE/DAY 70 MINUTES: CPT | Performed by: STUDENT IN AN ORGANIZED HEALTH CARE EDUCATION/TRAINING PROGRAM

## 2019-07-14 PROCEDURE — 85379 FIBRIN DEGRADATION QUANT: CPT | Performed by: EMERGENCY MEDICINE

## 2019-07-14 PROCEDURE — 71275 CT ANGIOGRAPHY CHEST: CPT

## 2019-07-14 PROCEDURE — 36415 COLL VENOUS BLD VENIPUNCTURE: CPT | Performed by: EMERGENCY MEDICINE

## 2019-07-14 PROCEDURE — 93005 ELECTROCARDIOGRAM TRACING: CPT

## 2019-07-14 PROCEDURE — 84443 ASSAY THYROID STIM HORMONE: CPT | Performed by: NURSE PRACTITIONER

## 2019-07-14 PROCEDURE — 82948 REAGENT STRIP/BLOOD GLUCOSE: CPT

## 2019-07-14 PROCEDURE — 87081 CULTURE SCREEN ONLY: CPT | Performed by: STUDENT IN AN ORGANIZED HEALTH CARE EDUCATION/TRAINING PROGRAM

## 2019-07-14 PROCEDURE — 80076 HEPATIC FUNCTION PANEL: CPT | Performed by: NURSE PRACTITIONER

## 2019-07-14 PROCEDURE — 99285 EMERGENCY DEPT VISIT HI MDM: CPT

## 2019-07-14 PROCEDURE — 85025 COMPLETE CBC W/AUTO DIFF WBC: CPT | Performed by: EMERGENCY MEDICINE

## 2019-07-14 PROCEDURE — 74175 CTA ABDOMEN W/CONTRAST: CPT

## 2019-07-14 PROCEDURE — 71045 X-RAY EXAM CHEST 1 VIEW: CPT

## 2019-07-14 PROCEDURE — 83880 ASSAY OF NATRIURETIC PEPTIDE: CPT | Performed by: EMERGENCY MEDICINE

## 2019-07-14 PROCEDURE — 84484 ASSAY OF TROPONIN QUANT: CPT | Performed by: EMERGENCY MEDICINE

## 2019-07-14 PROCEDURE — 84484 ASSAY OF TROPONIN QUANT: CPT | Performed by: NURSE PRACTITIONER

## 2019-07-14 PROCEDURE — 96374 THER/PROPH/DIAG INJ IV PUSH: CPT

## 2019-07-14 PROCEDURE — 1124F ACP DISCUSS-NO DSCNMKR DOCD: CPT | Performed by: INTERNAL MEDICINE

## 2019-07-14 RX ORDER — SOTALOL HYDROCHLORIDE 80 MG/1
80 TABLET ORAL 2 TIMES DAILY
Status: DISCONTINUED | OUTPATIENT
Start: 2019-07-14 | End: 2019-07-15 | Stop reason: HOSPADM

## 2019-07-14 RX ORDER — NITROGLYCERIN 0.4 MG/1
0.4 TABLET SUBLINGUAL ONCE
Status: COMPLETED | OUTPATIENT
Start: 2019-07-14 | End: 2019-07-14

## 2019-07-14 RX ORDER — AMLODIPINE BESYLATE 10 MG/1
10 TABLET ORAL DAILY
Status: DISCONTINUED | OUTPATIENT
Start: 2019-07-15 | End: 2019-07-15 | Stop reason: HOSPADM

## 2019-07-14 RX ORDER — LISINOPRIL 20 MG/1
20 TABLET ORAL 2 TIMES DAILY
Status: DISCONTINUED | OUTPATIENT
Start: 2019-07-14 | End: 2019-07-15 | Stop reason: HOSPADM

## 2019-07-14 RX ORDER — ONDANSETRON 2 MG/ML
4 INJECTION INTRAMUSCULAR; INTRAVENOUS EVERY 6 HOURS PRN
Status: DISCONTINUED | OUTPATIENT
Start: 2019-07-14 | End: 2019-07-15 | Stop reason: HOSPADM

## 2019-07-14 RX ORDER — FAMOTIDINE 20 MG/1
20 TABLET, FILM COATED ORAL 2 TIMES DAILY
Status: DISCONTINUED | OUTPATIENT
Start: 2019-07-14 | End: 2019-07-15 | Stop reason: HOSPADM

## 2019-07-14 RX ORDER — MORPHINE SULFATE 4 MG/ML
4 INJECTION, SOLUTION INTRAMUSCULAR; INTRAVENOUS ONCE
Status: COMPLETED | OUTPATIENT
Start: 2019-07-14 | End: 2019-07-14

## 2019-07-14 RX ORDER — ASPIRIN 81 MG/1
162 TABLET, CHEWABLE ORAL DAILY
Status: DISCONTINUED | OUTPATIENT
Start: 2019-07-15 | End: 2019-07-15 | Stop reason: HOSPADM

## 2019-07-14 RX ORDER — LATANOPROST 50 UG/ML
1 SOLUTION/ DROPS OPHTHALMIC
Status: DISCONTINUED | OUTPATIENT
Start: 2019-07-14 | End: 2019-07-15 | Stop reason: HOSPADM

## 2019-07-14 RX ORDER — PRAVASTATIN SODIUM 40 MG
40 TABLET ORAL
Status: DISCONTINUED | OUTPATIENT
Start: 2019-07-15 | End: 2019-07-15 | Stop reason: HOSPADM

## 2019-07-14 RX ORDER — ASCORBIC ACID 500 MG
500 TABLET ORAL DAILY
Status: DISCONTINUED | OUTPATIENT
Start: 2019-07-15 | End: 2019-07-15 | Stop reason: HOSPADM

## 2019-07-14 RX ORDER — CHLORAL HYDRATE 500 MG
1000 CAPSULE ORAL DAILY
Status: DISCONTINUED | OUTPATIENT
Start: 2019-07-15 | End: 2019-07-15 | Stop reason: HOSPADM

## 2019-07-14 RX ADMIN — MORPHINE SULFATE 4 MG: 4 INJECTION INTRAVENOUS at 14:14

## 2019-07-14 RX ADMIN — NITROGLYCERIN 0.4 MG: 0.4 TABLET SUBLINGUAL at 13:41

## 2019-07-14 RX ADMIN — LATANOPROST 1 DROP: 50 SOLUTION OPHTHALMIC at 21:28

## 2019-07-14 RX ADMIN — INSULIN LISPRO 1 UNITS: 100 INJECTION, SOLUTION INTRAVENOUS; SUBCUTANEOUS at 17:12

## 2019-07-14 RX ADMIN — FAMOTIDINE 20 MG: 20 TABLET ORAL at 17:32

## 2019-07-14 RX ADMIN — IOHEXOL 100 ML: 350 INJECTION, SOLUTION INTRAVENOUS at 15:04

## 2019-07-14 RX ADMIN — LISINOPRIL 20 MG: 20 TABLET ORAL at 17:32

## 2019-07-14 RX ADMIN — NITROGLYCERIN 0.4 MG: 0.4 TABLET SUBLINGUAL at 13:36

## 2019-07-14 RX ADMIN — INSULIN LISPRO 3 UNITS: 100 INJECTION, SOLUTION INTRAVENOUS; SUBCUTANEOUS at 21:31

## 2019-07-14 RX ADMIN — MORPHINE SULFATE 4 MG: 4 INJECTION INTRAVENOUS at 15:30

## 2019-07-14 RX ADMIN — NITROGLYCERIN 0.4 MG: 0.4 TABLET SUBLINGUAL at 12:56

## 2019-07-14 RX ADMIN — SOTALOL HYDROCHLORIDE 80 MG: 80 TABLET ORAL at 17:32

## 2019-07-14 NOTE — ED PROVIDER NOTES
History  Chief Complaint   Patient presents with    Chest Pain     pain in chest with some radiation to back since this am     HPI    This is a 82 year female that presents today with chest pain  Patient states she has history diabetes, history of breast cancer with breast 4 years ago  Patient states since this morning she has been having chest pain it feels a pressure in her chest and also back pain  Denies any nausea no radiation of the pain to jaw  States she has never had MI or  She does have a cardiologist but has not had an echo anything in the past few years  Denies any shortness of breath  82 year female that presents with chest pain  Will do a cardiac workup  EKG was negative for MI    Prior to Admission Medications   Prescriptions Last Dose Informant Patient Reported? Taking?    FRANKI MICROLET LANCETS lancets Unknown at Unknown time  No No   Sig: by Other route 2 (two) times a day E11 9 Test blood sugar twice daily   Multiple Vitamin (MULTI-VITAMIN DAILY PO) 2019 at Unknown time Self Yes Yes   Sig: Take by mouth daily   Omega-3 Fatty Acids (OMEGA-3 FISH OIL) 1000 MG CAPS  Self Yes No   Sig: Take 1 capsule by mouth daily   amLODIPine (NORVASC) 10 mg tablet 2019 at Unknown time  No Yes   Sig: TAKE 1 TABLET (10 MG TOTAL) BY MOUTH DAILY   ascorbic acid ([employer]S VITAMIN C) 500 MG tablet 2019 at Unknown time Self Yes Yes   Sig: Take 1 tablet by mouth daily   aspirin 81 MG tablet 2019 at Unknown time Self Yes Yes   Sig: Take 162 mg by mouth daily   glucose blood (FRANKI CONTOUR NEXT TEST) test strip Unknown at Unknown time  No No   Si each by Other route 2 (two) times a day E11 9 test blood sugar twice daily   latanoprost (XALATAN) 0 005 % ophthalmic solution 2019 at Unknown time Self Yes Yes   Sig: Administer 1 drop to both eyes daily at bedtime    lisinopril (ZESTRIL) 20 mg tablet 2019 at Unknown time  No Yes   Sig: Take 1 tablet (20 mg total) by mouth 2 (two) times a day metFORMIN (GLUCOPHAGE) 1000 MG tablet 7/14/2019 at Unknown time  No Yes   Sig: Take 0 5 tablets (500 mg total) by mouth 2 (two) times a day with meals   simvastatin (ZOCOR) 20 mg tablet 7/14/2019 at Unknown time  No Yes   Sig: Take 1 tablet (20 mg total) by mouth daily   sotalol (BETAPACE) 80 mg tablet 7/14/2019 at Unknown time Self Yes Yes   Sig: Take 80 mg by mouth 2 (two) times a day      Facility-Administered Medications: None       Past Medical History:   Diagnosis Date    Abscess of buttock, right     last assessed-5/4/2017    Cancer [facility])     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-last assessed-10/8/2015    Cellulitis of chest wall     last assessed-7/27/2015    Chronic endometritis 10/12/2006    Diabetes mellitus ([facility] [address] )     Dysuria 11/02/2007    Flushing     resolved-6/30/2015    Glaucoma     Hyperlipidemia     Hypertension     Ingrown nail 01/05/2012    Malignant neoplasm of breast ([facility] )     last assessed-11/5/2015    MS (multiple sclerosis) ([facility] )     Nonvenomous insect bite     last assessed-12/12/2013    Palpitations 02/09/2011    Pressure ulcer of buttock 10/13/2006    Proctitis 05/12/2006    Vaginal polyp 03/14/2006    Viral gastroenteritis     last assessed-9/8/2016       Past Surgical History:   Procedure Laterality Date    BREAST BIOPSY      open    BREAST SURGERY      CERVICAL BIOPSY  W/ LOOP ELECTRODE EXCISION      DILATION AND CURETTAGE OF UTERUS      INCISION AND DRAINAGE OF WOUND Left 2/27/2017    Procedure: INCISION AND DRAINAGE (I&D)   Chest wall x 2;  Surgeon: Luiz Wise MD;  [facility]: [facility];  Service:     MASTECTOMY      last assessed-6/30/2015       Family History   Problem Relation Age of Onset    Heart disease Father         cardiac disorder    COPD Sister     Diabetes Sister     Lung cancer Mother     Lung cancer Cousin     Heart disease Cousin         cardiac disorder    Multiple sclerosis Cousin     Cancer Cousin     Cancer Child urinary bladder     I have reviewed and agree with the history as documented  Social History     Tobacco Use    Smoking status: Never Smoker    Smokeless tobacco: Never Used   Substance Use Topics    Alcohol use: Not Currently    Drug use: Not Currently        Review of Systems   Constitutional: Negative  Negative for diaphoresis and fever  HENT: Negative  Respiratory: Negative  Negative for cough, shortness of breath and wheezing  Cardiovascular: Positive for chest pain  Negative for palpitations and leg swelling  Gastrointestinal: Negative for abdominal distention, abdominal pain, nausea and vomiting  Genitourinary: Negative  Musculoskeletal: Negative  Skin: Negative  Neurological: Negative  Psychiatric/Behavioral: Negative  All other systems reviewed and are negative  Physical Exam  Physical Exam   Constitutional: She is oriented to person, place, and time  She appears well-developed and well-nourished  Obese   HENT:   Head: Normocephalic and atraumatic  Eyes: Pupils are equal, round, and reactive to light  EOM are normal    Neck: Normal range of motion  Neck supple  Cardiovascular: Normal rate, regular rhythm and normal heart sounds  No murmur heard  Pulmonary/Chest: Effort normal and breath sounds normal  No stridor  No respiratory distress  Abdominal: Soft  Bowel sounds are normal  She exhibits no distension  There is no tenderness  There is no rebound and no guarding  Musculoskeletal: Normal range of motion  She exhibits edema  Chronic lower extremity edema   Neurological: She is alert and oriented to person, place, and time  Skin: Skin is warm and dry  Capillary refill takes less than 2 seconds  Psychiatric: She has a normal mood and affect  Vitals reviewed        Vital Signs  ED Triage Vitals   Temperature Pulse Respirations Blood Pressure SpO2   07/14/19 1227 07/14/19 1227 07/14/19 1227 07/14/19 1241 07/14/19 1227   98 4 °F (36 9 °C) 90 (!) 24 (!) 182/70 97 %      Temp Source Heart Rate Source Patient Position - Orthostatic VS BP Location FiO2 (%)   07/14/19 1227 07/14/19 1905 07/14/19 1615 07/14/19 1241 --   Tympanic Monitor Lying Left arm       Pain Score       07/14/19 1227       6           Vitals:    07/15/19 0416 07/15/19 0544 07/15/19 0700 07/15/19 1604   BP: (!) 177/77 167/93 (!) 187/79 142/67   Pulse: 65 62 65 67   Patient Position - Orthostatic VS: Lying Lying Lying Lying         Visual Acuity  Visual Acuity      Most Recent Value   L Pupil Size (mm)  4   R Pupil Size (mm)  4   L Pupil Shape  Round   R Pupil Shape  Round          ED Medications  Medications   nitroglycerin (NITROSTAT) SL tablet 0 4 mg (0 4 mg Sublingual Given 7/14/19 1256)   nitroglycerin (NITROSTAT) SL tablet 0 4 mg (0 4 mg Sublingual Given 7/14/19 1336)   nitroglycerin (NITROSTAT) SL tablet 0 4 mg (0 4 mg Sublingual Given 7/14/19 1341)   morphine (PF) 4 mg/mL injection 4 mg (4 mg Intravenous Given 7/14/19 1414)   iohexol (OMNIPAQUE) 350 MG/ML injection (MULTI-DOSE) 100 mL (100 mL Intravenous Given 7/14/19 1504)   morphine (PF) 4 mg/mL injection 4 mg (4 mg Intravenous Given 7/14/19 1530)   acetaminophen (TYLENOL) tablet 650 mg (650 mg Oral Given 7/15/19 0428)   regadenoson (LEXISCAN) injection 0 4 mg (0 4 mg Intravenous Given 7/15/19 1132)       Diagnostic Studies  Results Reviewed     Procedure Component Value Units Date/Time    Fingerstick Glucose (POCT) [[id number]]  (Abnormal) Collected:  07/15/19 1642    Lab Status:  Final result Updated:  07/15/19 1649     POC Glucose 241 mg/dl     Hemoglobin A1c w/EAG Estimation (Orders if not completed within the last 90 days) [[id number]]  (Abnormal) Collected:  07/15/19 0550    Lab Status:  Final result Specimen:  Blood from Arm, Right Updated:  07/15/19 0943     Hemoglobin A1C 6 5 %       mg/dl     Fingerstick Glucose (POCT) [[id number]]  (Normal) Collected:  07/15/19 0750    Lab Status:  Final result Updated:  07/15/19 1271 POC Glucose 134 mg/dl     Basic metabolic panel [[id number]]  (Abnormal) Collected:  07/15/19 0550    Lab Status:  Final result Specimen:  Blood from Arm, Right Updated:  07/15/19 5237     Sodium 140 mmol/L      Potassium 4 4 mmol/L      Chloride 103 mmol/L      CO2 30 mmol/L      ANION GAP 7 mmol/L      BUN 17 mg/dL      Creatinine 0 86 mg/dL      Glucose 122 mg/dL      Glucose, Fasting 122 mg/dL      Calcium 8 4 mg/dL      eGFR 64 ml/min/1 73sq m     Narrative:       National [employer] guidelines for Chronic Kidney Disease (CKD):     Stage 1 with normal or high GFR (GFR > 90 mL/min/1 73 square meters)    Stage 2 Mild CKD (GFR = 60-89 mL/min/1 73 square meters)    Stage 3A Moderate CKD (GFR = 45-59 mL/min/1 73 square meters)    Stage 3B Moderate CKD (GFR = 30-44 mL/min/1 73 square meters)    Stage 4 Severe CKD (GFR = 15-29 mL/min/1 73 square meters)    Stage 5 End Stage CKD (GFR <15 mL/min/1 73 square meters)  Note: GFR calculation is accurate only with a steady state creatinine    Lipid panel [[id number]]  (Abnormal) Collected:  07/15/19 0550    Lab Status:  Final result Specimen:  Blood from Arm, Right Updated:  07/15/19 0634     Cholesterol 140 mg/dL      Triglycerides 153 mg/dL      HDL, Direct 42 mg/dL      LDL Calculated 67 mg/dL      Non-HDL-Chol (CHOL-HDL) 98 mg/dl     CBC and differential [[id number]]  (Abnormal) Collected:  07/15/19 0550    Lab Status:  Final result Specimen:  Blood from Arm, Right Updated:  07/15/19 0601     WBC 8 31 Thousand/uL      RBC 4 16 Million/uL      Hemoglobin 11 9 g/dL      Hematocrit 37 4 %      MCV 90 fL      MCH 28 6 pg      MCHC 31 8 g/dL      RDW 14 3 %      MPV 12 0 fL      Platelets 067 Thousands/uL      nRBC 0 /100 WBCs      Neutrophils Relative 65 %      Immat GRANS % 0 %      Lymphocytes Relative 21 %      Monocytes Relative 13 %      Eosinophils Relative 1 %      Basophils Relative 0 %      Neutrophils Absolute 5 44 Thousands/µL      Immature Grans Absolute 0 02 Thousand/uL      Lymphocytes Absolute 1 72 Thousands/µL      Monocytes Absolute 1 06 Thousand/µL      Eosinophils Absolute 0 04 Thousand/µL      Basophils Absolute 0 03 Thousands/µL     Troponin I [[id number]]  (Normal) Collected:  07/14/19 2047    Lab Status:  Final result Specimen:  Blood from Arm, Left Updated:  07/14/19 2126     Troponin I <0 02 ng/mL     Fingerstick Glucose (POCT) [[id number]]  (Abnormal) Collected:  07/14/19 2102    Lab Status:  Final result Updated:  07/14/19 2110     POC Glucose 231 mg/dl     MRSA culture [[id number]] Collected:  07/14/19 1734    Lab Status: In process Specimen:  Nares from Nose Updated:  07/14/19 1737    Hepatic function panel [[id number]]  (Abnormal) Collected:  07/14/19 1333    Lab Status:  Final result Specimen:  Blood from Arm, Left Updated:  07/14/19 1703     Total Bilirubin 0 30 mg/dL      Bilirubin, Direct 0 10 mg/dL      Alkaline Phosphatase 100 U/L      AST 17 U/L      ALT 21 U/L      Total Protein 7 7 g/dL      Albumin 3 3 g/dL     TSH, 3rd generation [[id number]]  (Normal) Collected:  07/14/19 1333    Lab Status:  Final result Specimen:  Blood from Arm, Left Updated:  07/14/19 1703     TSH 3RD GENERATON 1 018 uIU/mL     Narrative:       Patients undergoing fluorescein dye angiography may retain small amounts of fluorescein in the body for 48-72 hours post procedure  Samples containing fluorescein can produce falsely depressed TSH values  If the patient had this procedure,a specimen should be resubmitted post fluorescein clearance        Fingerstick Glucose (POCT) [[id number]]  (Abnormal) Collected:  07/14/19 1645    Lab Status:  Final result Updated:  07/14/19 1647     POC Glucose 161 mg/dl     Troponin I [[id number]]  (Normal) Collected:  07/14/19 1553    Lab Status:  Final result Specimen:  Blood from Arm, Left Updated:  07/14/19 1616     Troponin I <0 02 ng/mL     Basic metabolic panel [[id number]] Collected:  07/14/19 1333    Lab Status:  Final result Specimen:  Blood from Arm, Left Updated:  07/14/19 1402     Sodium 142 mmol/L      Potassium 4 6 mmol/L      Chloride 104 mmol/L      CO2 31 mmol/L      ANION GAP 7 mmol/L      BUN 14 mg/dL      Creatinine 0 69 mg/dL      Glucose 138 mg/dL      Calcium 9 0 mg/dL      eGFR 82 ml/min/1 73sq m     Narrative:       [place] guidelines for Chronic Kidney Disease (CKD):     Stage 1 with normal or high GFR (GFR > 90 mL/min/1 73 square meters)    Stage 2 Mild CKD (GFR = 60-89 mL/min/1 73 square meters)    Stage 3A Moderate CKD (GFR = 45-59 mL/min/1 73 square meters)    Stage 3B Moderate CKD (GFR = 30-44 mL/min/1 73 square meters)    Stage 4 Severe CKD (GFR = 15-29 mL/min/1 73 square meters)    Stage 5 End Stage CKD (GFR <15 mL/min/1 73 square meters)  Note: GFR calculation is accurate only with a steady state creatinine    B-type natriuretic peptide [[id number]]  (Normal) Collected:  07/14/19 1333    Lab Status:  Final result Specimen:  Blood from Arm, Left Updated:  07/14/19 1402     NT-proBNP 116 pg/mL     D-Dimer [[id number]]  (Normal) Collected:  07/14/19 1253    Lab Status:  Final result Specimen:  Blood from Arm, Left Updated:  07/14/19 1319     D-Dimer, Quant 440 ng/ml (FEU)     Troponin I [[id number]]  (Normal) Collected:  07/14/19 1242    Lab Status:  Final result Specimen:  Blood from Arm, Left Updated:  07/14/19 1310     Troponin I <0 02 ng/mL     CBC and differential [[id number]]  (Abnormal) Collected:  07/14/19 1242    Lab Status:  Final result Specimen:  Blood from Arm, Left Updated:  07/14/19 1259     WBC 11 78 Thousand/uL      RBC 4 96 Million/uL      Hemoglobin 14 3 g/dL      Hematocrit 44 9 %      MCV 91 fL      MCH 28 8 pg      MCHC 31 8 g/dL      RDW 14 5 %      MPV 12 2 fL      Platelets 252 Thousands/uL      nRBC 0 /100 WBCs      Neutrophils Relative 76 %      Immat GRANS % 0 %      Lymphocytes Relative 15 %      Monocytes Relative 7 %      Eosinophils Relative 1 % Basophils Relative 1 %      Neutrophils Absolute 8 86 Thousands/µL      Immature Grans Absolute 0 05 Thousand/uL      Lymphocytes Absolute 1 82 Thousands/µL      Monocytes Absolute 0 86 Thousand/µL      Eosinophils Absolute 0 13 Thousand/µL      Basophils Absolute 0 06 Thousands/µL                  CTA dissection protocol chest and abdomen   Final Result by Fatou Sabillon MD (07/14 1513)      No acute dissection and/or aneurysmal dilatation of the thoracoabdominal aorta  Incidental thyroid nodule(s) for which nonemergent thyroid ultrasound is recommended if this is deemed appropriate for this patient  Enlarged, fatty liver  Workstation performed: [id number]         XR chest 1 view portable   Final Result by Jeniffer Booth MD (07/15 0732)      No acute cardiopulmonary disease              Workstation performed: [id number]                    Procedures  Procedures       ED Course  ED Course as of Jul 15 2109   Allie Schmitz Jul 14, 2019   1246 Procedure Note: EKG  Date/Time: 07/14/19 12:47 PM   Performed by: Paul Records by: Asya Forbes   Indications / Diagnosis: CP  ECG reviewed by me, the ED Provider: yes   The EKG demonstrates:  Rhythm: normal sinus  Intervals: normal intervals  Axis: normal axis  QRS/Blocks: normal QRS  ST Changes: No acute ST Changes, no STD/LEYLA         1327 Low risk for dissection or PE   D-DIMER QUANTITATIVE: 440   1526 Patient still having active chest pain will admit patient            HEART Risk Score      Most Recent Value   History  1 Filed at: 07/14/2019 1410   ECG  1 Filed at: 07/14/2019 1410   Age  2 Filed at: 07/14/2019 1410   Risk Factors  2 Filed at: 07/14/2019 1410   Troponin  0 Filed at: 07/14/2019 1410   Heart Score Risk Calculator   History  1 Filed at: 07/14/2019 1410   ECG  1 Filed at: 07/14/2019 1410   Age  2 Filed at: 07/14/2019 1410   Risk Factors  2 Filed at: 07/14/2019 1410   Troponin  0 Filed at: 07/14/2019 1410   HEART Score  6 Filed at: 07/14/2019 1410   HEART Score  6 Filed at: 07/14/2019 1410                            MDM    Disposition  Final diagnoses:   Chest pain, unspecified type   Essential hypertension     Time reflects when diagnosis was documented in both MDM as applicable and the Disposition within this note     Time User Action Codes Description Comment    7/14/2019  3:55 PM Bhaskar Caldera [R07 9] Chest pain, unspecified type     7/14/2019  3:55 PM Kedar Parker [R07 9] Chest pain, unspecified type     7/15/2019  5:10 PM Jet Rivera Add [I10] Essential hypertension       ED Disposition     None      Follow-up Information     Follow up With Specialties Details Why Contact Info    Leo Regalado DO Family Medicine Schedule an appointment as soon as possible for a visit in 1 week(s) thyroid   [address]      Roshni Monge MD Cardiology Schedule an appointment as soon as possible for a visit in 1 month(s) BP check  [address]  [phone]            Discharge Medication List as of 7/15/2019  6:22 PM      START taking these medications    Details   hydrochlorothiazide (HYDRODIURIL) 12 5 mg tablet Take 1 tablet (12 5 mg total) by mouth daily, Starting Tue 7/16/2019, Normal      spironolactone (ALDACTONE) 25 mg tablet Take 0 5 tablets (12 5 mg total) by mouth daily, Starting Tue 7/16/2019, Normal         CONTINUE these medications which have NOT CHANGED    Details   amLODIPine (NORVASC) 10 mg tablet TAKE 1 TABLET (10 MG TOTAL) BY MOUTH DAILY, Starting Thu 4/25/2019, Normal      ascorbic acid ([employer] VITAMIN C) 500 MG tablet Take 1 tablet by mouth daily, Historical Med      aspirin 81 MG tablet Take 162 mg by mouth daily, Historical Med      FRANKI MICROLET LANCETS lancets by Other route 2 (two) times a day E11 9 Test blood sugar twice daily, Starting Fri 11/23/2018, Normal      glucose blood (FRANKI CONTOUR NEXT TEST) test strip 1 each by Other route 2 (two) times a day E11 9 test blood sugar twice daily, Starting Fri 11/23/2018, Normal      latanoprost (XALATAN) 0 005 % ophthalmic solution Administer 1 drop to both eyes daily at bedtime , Historical Med      lisinopril (ZESTRIL) 20 mg tablet Take 1 tablet (20 mg total) by mouth 2 (two) times a day, Starting Tue 8/21/2018, Normal      metFORMIN (GLUCOPHAGE) 1000 MG tablet Take 0 5 tablets (500 mg total) by mouth 2 (two) times a day with meals, Starting Tue 8/21/2018, Normal      Multiple Vitamin (MULTI-VITAMIN DAILY PO) Take by mouth daily, Starting Wed 8/12/2009, Historical Med      Omega-3 Fatty Acids (OMEGA-3 FISH OIL) 1000 MG CAPS Take 1 capsule by mouth daily, Historical Med      simvastatin (ZOCOR) 20 mg tablet Take 1 tablet (20 mg total) by mouth daily, Starting Tue 8/21/2018, Normal      sotalol (BETAPACE) 80 mg tablet Take 80 mg by mouth 2 (two) times a day, Historical Med           Outpatient Discharge Orders   Discharge Diet     Activity as tolerated     Call provider for:  difficulty breathing, headache or visual disturbances     Call provider for:  severe uncontrolled pain       ED Provider  Electronically Signed by           Blanka Chase MD  07/15/19 8347

## 2019-07-14 NOTE — ASSESSMENT & PLAN NOTE
Hypertensive in the ER, continue home meds - norvasc,lisinopril, sotalol   May need additional agent - monitor for now

## 2019-07-14 NOTE — ASSESSMENT & PLAN NOTE
Patient presented to the ED with complaints of acute onset chest pain  Patient was in her usual state of health until this morning after eating breakfast   She developed midsternal chest discomfort which radiated into her back  Described as a pressure  There is no associated shortness of breath, headache, dizziness  Nothing makes it better or worse  She received nitro and morphine in the ER with no improvement  Pain is 4/10  Troponin unremarkable  EKG with no acute ischemic changes  CTA was done which ruled out dissection  Gallbladder unremarkable      · Admit to Medicine  · Serial EKGs and troponin  · Aspirin, statin  · Cardiology consultation  · Lipid panel and hemoglobin A1c in the a m   · Nuclear stress in AM  · Add on LFTs

## 2019-07-14 NOTE — ASSESSMENT & PLAN NOTE
Patient reports no longer being [place] [facility] stopped by her primary cardiologist  Continue sotalol and aspirin

## 2019-07-14 NOTE — ED NOTES
Lenny applied to pt for frequent urination per her request   No distress noted       Alisha Carrera, RN  07/14/19 6507

## 2019-07-15 ENCOUNTER — APPOINTMENT (OUTPATIENT)
Dept: RADIOLOGY | Facility: HOSPITAL | Age: 84
End: 2019-07-15
Payer: MEDICARE

## 2019-07-15 ENCOUNTER — HOSPITAL ENCOUNTER (EMERGENCY)
Facility: HOSPITAL | Age: 84
Discharge: HOME | End: 2019-07-15
Attending: EMERGENCY MEDICINE | Admitting: EMERGENCY MEDICINE
Payer: MEDICARE

## 2019-07-15 ENCOUNTER — HOSPITAL ENCOUNTER (OUTPATIENT)
Dept: RADIOLOGY | Facility: HOSPITAL | Age: 84
Discharge: HOME | End: 2019-07-15
Attending: INTERNAL MEDICINE

## 2019-07-15 ENCOUNTER — APPOINTMENT (OUTPATIENT)
Dept: NON INVASIVE DIAGNOSTICS | Facility: HOSPITAL | Age: 84
End: 2019-07-15
Payer: MEDICARE

## 2019-07-15 VITALS
TEMPERATURE: 97.7 F | DIASTOLIC BLOOD PRESSURE: 77 MMHG | RESPIRATION RATE: 18 BRPM | HEART RATE: 62 BPM | SYSTOLIC BLOOD PRESSURE: 180 MMHG | OXYGEN SATURATION: 94 %

## 2019-07-15 VITALS
RESPIRATION RATE: 18 BRPM | HEART RATE: 67 BPM | OXYGEN SATURATION: 95 % | HEIGHT: 66 IN | SYSTOLIC BLOOD PRESSURE: 142 MMHG | TEMPERATURE: 98.1 F | DIASTOLIC BLOOD PRESSURE: 67 MMHG | WEIGHT: 207.89 LBS | BODY MASS INDEX: 33.41 KG/M2

## 2019-07-15 DIAGNOSIS — R53.1 WEAKNESS: Primary | ICD-10-CM

## 2019-07-15 PROBLEM — D72.829 LEUKOCYTOSIS: Status: RESOLVED | Noted: 2019-07-14 | Resolved: 2019-07-15

## 2019-07-15 LAB
ALBUMIN SERPL BCP-MCNC: 2.9 G/DL (ref 3.5–5)
ALP SERPL-CCNC: 91 U/L (ref 46–116)
ALT SERPL W P-5'-P-CCNC: 23 U/L (ref 12–78)
ANION GAP SERPL CALCULATED.3IONS-SCNC: 10 MMOL/L (ref 4–13)
ANION GAP SERPL CALCULATED.3IONS-SCNC: 7 MMOL/L (ref 4–13)
AST SERPL W P-5'-P-CCNC: 11 U/L (ref 5–45)
ATRIAL RATE: 61 BPM
ATRIAL RATE: 69 BPM
ATRIAL RATE: 72 BPM
ATRIAL RATE: 75 BPM
ATRIAL RATE: 82 BPM
BASOPHILS # BLD AUTO: 0.03 THOUSANDS/ΜL (ref 0–0.1)
BASOPHILS # BLD AUTO: 0.06 THOUSANDS/ΜL (ref 0–0.1)
BASOPHILS NFR BLD AUTO: 0 % (ref 0–1)
BASOPHILS NFR BLD AUTO: 1 % (ref 0–1)
BILIRUB SERPL-MCNC: 0.2 MG/DL (ref 0.2–1)
BUN SERPL-MCNC: 17 MG/DL (ref 5–25)
BUN SERPL-MCNC: 26 MG/DL (ref 5–25)
CALCIUM SERPL-MCNC: 8.4 MG/DL (ref 8.3–10.1)
CALCIUM SERPL-MCNC: 8.6 MG/DL (ref 8.3–10.1)
CHLORIDE SERPL-SCNC: 103 MMOL/L (ref 100–108)
CHLORIDE SERPL-SCNC: 104 MMOL/L (ref 100–108)
CHOLEST SERPL-MCNC: 140 MG/DL (ref 50–200)
CO2 SERPL-SCNC: 29 MMOL/L (ref 21–32)
CO2 SERPL-SCNC: 30 MMOL/L (ref 21–32)
CREAT SERPL-MCNC: 0.86 MG/DL (ref 0.6–1.3)
CREAT SERPL-MCNC: 1.08 MG/DL (ref 0.6–1.3)
EOSINOPHIL # BLD AUTO: 0.04 THOUSAND/ΜL (ref 0–0.61)
EOSINOPHIL # BLD AUTO: 0.09 THOUSAND/ΜL (ref 0–0.61)
EOSINOPHIL NFR BLD AUTO: 1 % (ref 0–6)
EOSINOPHIL NFR BLD AUTO: 1 % (ref 0–6)
ERYTHROCYTE [DISTWIDTH] IN BLOOD BY AUTOMATED COUNT: 14.2 % (ref 11.6–15.1)
ERYTHROCYTE [DISTWIDTH] IN BLOOD BY AUTOMATED COUNT: 14.3 % (ref 11.6–15.1)
EST. AVERAGE GLUCOSE BLD GHB EST-MCNC: 140 MG/DL
GFR SERPL CREATININE-BSD FRML MDRD: 48 ML/MIN/1.73SQ M
GFR SERPL CREATININE-BSD FRML MDRD: 64 ML/MIN/1.73SQ M
GLUCOSE P FAST SERPL-MCNC: 122 MG/DL (ref 65–99)
GLUCOSE SERPL-MCNC: 122 MG/DL (ref 65–140)
GLUCOSE SERPL-MCNC: 134 MG/DL (ref 65–140)
GLUCOSE SERPL-MCNC: 193 MG/DL (ref 65–140)
GLUCOSE SERPL-MCNC: 241 MG/DL (ref 65–140)
HBA1C MFR BLD: 6.5 % (ref 4.2–6.3)
HCT VFR BLD AUTO: 37.4 % (ref 34.8–46.1)
HCT VFR BLD AUTO: 43.5 % (ref 34.8–46.1)
HDLC SERPL-MCNC: 42 MG/DL (ref 40–60)
HGB BLD-MCNC: 11.9 G/DL (ref 11.5–15.4)
HGB BLD-MCNC: 13.6 G/DL (ref 11.5–15.4)
IMM GRANULOCYTES # BLD AUTO: 0.02 THOUSAND/UL (ref 0–0.2)
IMM GRANULOCYTES # BLD AUTO: 0.03 THOUSAND/UL (ref 0–0.2)
IMM GRANULOCYTES NFR BLD AUTO: 0 % (ref 0–2)
IMM GRANULOCYTES NFR BLD AUTO: 0 % (ref 0–2)
LDLC SERPL CALC-MCNC: 67 MG/DL (ref 0–100)
LYMPHOCYTES # BLD AUTO: 1.21 THOUSANDS/ΜL (ref 0.6–4.47)
LYMPHOCYTES # BLD AUTO: 1.72 THOUSANDS/ΜL (ref 0.6–4.47)
LYMPHOCYTES NFR BLD AUTO: 13 % (ref 14–44)
LYMPHOCYTES NFR BLD AUTO: 21 % (ref 14–44)
MAGNESIUM SERPL-MCNC: 2 MG/DL (ref 1.6–2.6)
MCH RBC QN AUTO: 28.2 PG (ref 26.8–34.3)
MCH RBC QN AUTO: 28.6 PG (ref 26.8–34.3)
MCHC RBC AUTO-ENTMCNC: 31.3 G/DL (ref 31.4–37.4)
MCHC RBC AUTO-ENTMCNC: 31.8 G/DL (ref 31.4–37.4)
MCV RBC AUTO: 90 FL (ref 82–98)
MCV RBC AUTO: 90 FL (ref 82–98)
MONOCYTES # BLD AUTO: 0.88 THOUSAND/ΜL (ref 0.17–1.22)
MONOCYTES # BLD AUTO: 1.06 THOUSAND/ΜL (ref 0.17–1.22)
MONOCYTES NFR BLD AUTO: 13 % (ref 4–12)
MONOCYTES NFR BLD AUTO: 9 % (ref 4–12)
NEUTROPHILS # BLD AUTO: 5.44 THOUSANDS/ΜL (ref 1.85–7.62)
NEUTROPHILS # BLD AUTO: 7.38 THOUSANDS/ΜL (ref 1.85–7.62)
NEUTS SEG NFR BLD AUTO: 65 % (ref 43–75)
NEUTS SEG NFR BLD AUTO: 76 % (ref 43–75)
NONHDLC SERPL-MCNC: 98 MG/DL
NRBC BLD AUTO-RTO: 0 /100 WBCS
NRBC BLD AUTO-RTO: 0 /100 WBCS
P AXIS: 102 DEGREES
P AXIS: 65 DEGREES
P AXIS: 87 DEGREES
P AXIS: 87 DEGREES
P AXIS: 91 DEGREES
PLATELET # BLD AUTO: 191 THOUSANDS/UL (ref 149–390)
PLATELET # BLD AUTO: 226 THOUSANDS/UL (ref 149–390)
PMV BLD AUTO: 12 FL (ref 8.9–12.7)
PMV BLD AUTO: 12.1 FL (ref 8.9–12.7)
POTASSIUM SERPL-SCNC: 4.2 MMOL/L (ref 3.5–5.3)
POTASSIUM SERPL-SCNC: 4.4 MMOL/L (ref 3.5–5.3)
PR INTERVAL: 190 MS
PR INTERVAL: 192 MS
PR INTERVAL: 196 MS
PR INTERVAL: 200 MS
PR INTERVAL: 212 MS
PROT SERPL-MCNC: 7.3 G/DL (ref 6.4–8.2)
QRS AXIS: 0 DEGREES
QRS AXIS: 11 DEGREES
QRS AXIS: 13 DEGREES
QRS AXIS: 14 DEGREES
QRS AXIS: 5 DEGREES
QRSD INTERVAL: 72 MS
QRSD INTERVAL: 74 MS
QRSD INTERVAL: 74 MS
QRSD INTERVAL: 76 MS
QRSD INTERVAL: 84 MS
QT INTERVAL: 384 MS
QT INTERVAL: 412 MS
QT INTERVAL: 414 MS
QT INTERVAL: 430 MS
QT INTERVAL: 464 MS
QTC INTERVAL: 448 MS
QTC INTERVAL: 453 MS
QTC INTERVAL: 460 MS
QTC INTERVAL: 460 MS
QTC INTERVAL: 467 MS
RBC # BLD AUTO: 4.16 MILLION/UL (ref 3.81–5.12)
RBC # BLD AUTO: 4.83 MILLION/UL (ref 3.81–5.12)
SODIUM SERPL-SCNC: 140 MMOL/L (ref 136–145)
SODIUM SERPL-SCNC: 143 MMOL/L (ref 136–145)
T WAVE AXIS: 58 DEGREES
T WAVE AXIS: 62 DEGREES
T WAVE AXIS: 65 DEGREES
T WAVE AXIS: 70 DEGREES
T WAVE AXIS: 71 DEGREES
TRIGL SERPL-MCNC: 153 MG/DL
VENTRICULAR RATE: 61 BPM
VENTRICULAR RATE: 69 BPM
VENTRICULAR RATE: 72 BPM
VENTRICULAR RATE: 75 BPM
VENTRICULAR RATE: 82 BPM
WBC # BLD AUTO: 8.31 THOUSAND/UL (ref 4.31–10.16)
WBC # BLD AUTO: 9.65 THOUSAND/UL (ref 4.31–10.16)

## 2019-07-15 PROCEDURE — 97163 PT EVAL HIGH COMPLEX 45 MIN: CPT

## 2019-07-15 PROCEDURE — 93306 TTE W/DOPPLER COMPLETE: CPT

## 2019-07-15 PROCEDURE — 80053 COMPREHEN METABOLIC PANEL: CPT | Performed by: EMERGENCY MEDICINE

## 2019-07-15 PROCEDURE — 93010 ELECTROCARDIOGRAM REPORT: CPT | Performed by: INTERNAL MEDICINE

## 2019-07-15 PROCEDURE — 36415 COLL VENOUS BLD VENIPUNCTURE: CPT | Performed by: EMERGENCY MEDICINE

## 2019-07-15 PROCEDURE — 83735 ASSAY OF MAGNESIUM: CPT | Performed by: EMERGENCY MEDICINE

## 2019-07-15 PROCEDURE — 83036 HEMOGLOBIN GLYCOSYLATED A1C: CPT | Performed by: NURSE PRACTITIONER

## 2019-07-15 PROCEDURE — G8978 MOBILITY CURRENT STATUS: HCPCS

## 2019-07-15 PROCEDURE — G8988 SELF CARE GOAL STATUS: HCPCS

## 2019-07-15 PROCEDURE — 99284 EMERGENCY DEPT VISIT MOD MDM: CPT

## 2019-07-15 PROCEDURE — 93005 ELECTROCARDIOGRAM TRACING: CPT

## 2019-07-15 PROCEDURE — 78452 HT MUSCLE IMAGE SPECT MULT: CPT

## 2019-07-15 PROCEDURE — 93306 TTE W/DOPPLER COMPLETE: CPT | Performed by: INTERNAL MEDICINE

## 2019-07-15 PROCEDURE — G8979 MOBILITY GOAL STATUS: HCPCS

## 2019-07-15 PROCEDURE — 82948 REAGENT STRIP/BLOOD GLUCOSE: CPT

## 2019-07-15 PROCEDURE — 99217 PR OBSERVATION CARE DISCHARGE MANAGEMENT: CPT | Performed by: NURSE PRACTITIONER

## 2019-07-15 PROCEDURE — 93017 CV STRESS TEST TRACING ONLY: CPT

## 2019-07-15 PROCEDURE — 85025 COMPLETE CBC W/AUTO DIFF WBC: CPT | Performed by: EMERGENCY MEDICINE

## 2019-07-15 PROCEDURE — 97530 THERAPEUTIC ACTIVITIES: CPT

## 2019-07-15 PROCEDURE — 97167 OT EVAL HIGH COMPLEX 60 MIN: CPT

## 2019-07-15 PROCEDURE — 80048 BASIC METABOLIC PNL TOTAL CA: CPT | Performed by: NURSE PRACTITIONER

## 2019-07-15 PROCEDURE — A9502 TC99M TETROFOSMIN: HCPCS

## 2019-07-15 PROCEDURE — 80061 LIPID PANEL: CPT | Performed by: NURSE PRACTITIONER

## 2019-07-15 PROCEDURE — 85025 COMPLETE CBC W/AUTO DIFF WBC: CPT | Performed by: NURSE PRACTITIONER

## 2019-07-15 PROCEDURE — 99204 OFFICE O/P NEW MOD 45 MIN: CPT | Performed by: INTERNAL MEDICINE

## 2019-07-15 PROCEDURE — G8987 SELF CARE CURRENT STATUS: HCPCS

## 2019-07-15 RX ORDER — LISINOPRIL 20 MG/1
20 TABLET ORAL ONCE
Status: COMPLETED | OUTPATIENT
Start: 2019-07-15 | End: 2019-07-15

## 2019-07-15 RX ORDER — ACETAMINOPHEN 325 MG/1
650 TABLET ORAL ONCE AS NEEDED
Status: COMPLETED | OUTPATIENT
Start: 2019-07-15 | End: 2019-07-15

## 2019-07-15 RX ORDER — SPIRONOLACTONE AND HYDROCHLOROTHIAZIDE 25; 25 MG/1; MG/1
1 TABLET ORAL DAILY
Status: DISCONTINUED | OUTPATIENT
Start: 2019-07-15 | End: 2019-07-15 | Stop reason: CLARIF

## 2019-07-15 RX ORDER — SPIRONOLACTONE 25 MG/1
12.5 TABLET ORAL DAILY
Status: DISCONTINUED | OUTPATIENT
Start: 2019-07-16 | End: 2019-07-15 | Stop reason: HOSPADM

## 2019-07-15 RX ORDER — SPIRONOLACTONE 25 MG/1
12.5 TABLET ORAL DAILY
Qty: 30 TABLET | Refills: 0 | Status: SHIPPED | OUTPATIENT
Start: 2019-07-16 | End: 2019-12-31 | Stop reason: ALTCHOICE

## 2019-07-15 RX ORDER — SPIRONOLACTONE 25 MG/1
25 TABLET ORAL DAILY
Status: DISCONTINUED | OUTPATIENT
Start: 2019-07-15 | End: 2019-07-15

## 2019-07-15 RX ORDER — HYDROCHLOROTHIAZIDE 25 MG/1
25 TABLET ORAL DAILY
Status: DISCONTINUED | OUTPATIENT
Start: 2019-07-15 | End: 2019-07-15

## 2019-07-15 RX ORDER — HYDROCHLOROTHIAZIDE 12.5 MG/1
12.5 TABLET ORAL DAILY
Status: DISCONTINUED | OUTPATIENT
Start: 2019-07-16 | End: 2019-07-15 | Stop reason: HOSPADM

## 2019-07-15 RX ORDER — HYDROCHLOROTHIAZIDE 12.5 MG/1
12.5 TABLET ORAL DAILY
Qty: 30 TABLET | Refills: 0 | Status: SHIPPED | OUTPATIENT
Start: 2019-07-16 | End: 2019-12-31 | Stop reason: ALTCHOICE

## 2019-07-15 RX ADMIN — AMLODIPINE BESYLATE 10 MG: 10 TABLET ORAL at 09:33

## 2019-07-15 RX ADMIN — OXYCODONE HYDROCHLORIDE AND ACETAMINOPHEN 500 MG: 500 TABLET ORAL at 09:34

## 2019-07-15 RX ADMIN — LISINOPRIL 20 MG: 20 TABLET ORAL at 18:43

## 2019-07-15 RX ADMIN — LISINOPRIL 20 MG: 20 TABLET ORAL at 21:58

## 2019-07-15 RX ADMIN — LISINOPRIL 20 MG: 20 TABLET ORAL at 09:33

## 2019-07-15 RX ADMIN — ENOXAPARIN SODIUM 40 MG: 40 INJECTION SUBCUTANEOUS at 09:33

## 2019-07-15 RX ADMIN — HYDROCHLOROTHIAZIDE 25 MG: 25 TABLET ORAL at 13:06

## 2019-07-15 RX ADMIN — REGADENOSON 0.4 MG: 0.08 INJECTION, SOLUTION INTRAVENOUS at 11:32

## 2019-07-15 RX ADMIN — FAMOTIDINE 20 MG: 20 TABLET ORAL at 18:43

## 2019-07-15 RX ADMIN — INSULIN LISPRO 3 UNITS: 100 INJECTION, SOLUTION INTRAVENOUS; SUBCUTANEOUS at 18:43

## 2019-07-15 RX ADMIN — SOTALOL HYDROCHLORIDE 80 MG: 80 TABLET ORAL at 18:43

## 2019-07-15 RX ADMIN — SOTALOL HYDROCHLORIDE 80 MG: 80 TABLET ORAL at 09:33

## 2019-07-15 RX ADMIN — Medication 1000 MG: at 09:34

## 2019-07-15 RX ADMIN — ACETAMINOPHEN 650 MG: 325 TABLET, FILM COATED ORAL at 04:28

## 2019-07-15 RX ADMIN — PRAVASTATIN SODIUM 40 MG: 40 TABLET ORAL at 18:43

## 2019-07-15 RX ADMIN — ASPIRIN 81 MG 162 MG: 81 TABLET ORAL at 09:34

## 2019-07-15 RX ADMIN — SPIRONOLACTONE 25 MG: 25 TABLET ORAL at 13:06

## 2019-07-15 RX ADMIN — FAMOTIDINE 20 MG: 20 TABLET ORAL at 09:34

## 2019-07-15 NOTE — INCIDENTAL FINDINGS
The following findings require follow up:  Radiographic finding   Finding: thyroid nodule   Follow up required: thyroid US   Follow up should be done within 1 month(s)    Please notify the following clinician to assist with the follow up:   Dr Tashia Archuleta

## 2019-07-15 NOTE — ASSESSMENT & PLAN NOTE
(P) [phone]   HGB A1c 6 5  Held Metformin while hospitalized   Accucheck AC/HS with insulin sliding scale

## 2019-07-15 NOTE — ASSESSMENT & PLAN NOTE
Patient presented to the ED with complaints of acute onset chest pain  Patient was in her usual state of health until this morning after eating breakfast   She developed midsternal chest discomfort which radiated into her back  Described as a pressure  There is no associated shortness of breath, headache, dizziness  Nothing makes it better or worse  She received nitro and morphine in the ER with no improvement  Pain is 4/10  Troponin unremarkable  EKG with no acute ischemic changes  CTA was done which ruled out dissection  Gallbladder unremarkable  LFTs unremarkable     · Cardiology consulted due to multiple risk factors   · Serial EKGs and troponins, negative   · Lipid panel and hemoglobin A1c reviewed   · Nuclear stress revealed no signs of ischemia   · ECHO revealed LVEF 65% and G1DD  · Continued ASA and statin   · Per cards, recommend better blood pressure control - started Aldactone/ HCTZ

## 2019-07-15 NOTE — PLAN OF CARE
Problem: Potential for Falls  Goal: Patient will remain free of falls  Description  INTERVENTIONS:  - Assess patient frequently for physical needs  -  Identify cognitive and physical deficits and behaviors that affect risk of falls  -  [place] fall precautions as indicated by assessment   - Educate patient/family on patient safety including physical limitations  - Instruct patient to call for assistance with activity based on assessment  - Modify environment to reduce risk of injury  - Consider OT/PT consult to assist with strengthening/mobility  Outcome: Progressing     Problem: Prexisting or High Potential for Compromised Skin Integrity  Goal: Skin integrity is maintained or improved  Description  INTERVENTIONS:  - Identify patients at risk for skin breakdown  - Assess and monitor skin integrity  - Assess and monitor nutrition and hydration status  - Monitor labs (i e  albumin)  - Assess for incontinence   - Turn and reposition patient  - Avoid friction and shearing  - Provide appropriate hygiene as needed including keeping skin clean and dry  - Evaluate need for skin moisturizer/barrier cream  - Patient/family teaching   Outcome: Progressing     Problem: Activity Intolerance/Impaired Mobility  Goal: Mobility/activity is maintained at optimum level for patient  Description  Interventions:  - Assess and monitor patient  barriers to mobility and need for assistive/adaptive devices  - Assess patient's emotional response to limitations  - Collaborate with interdisciplinary team and initiate plans and interventions as ordered  - Encourage independent activity per ability   - Maintain proper body alignment  - Perform active/passive rom as tolerated/ordered    - Plan activities to conserve energy   - Turn patient  Outcome: Progressing     Problem: DISCHARGE PLANNING  Goal: Discharge to home or other facility with appropriate resources  Description  INTERVENTIONS:  - Identify barriers to discharge w/patient and caregiver  - Arrange for needed discharge resources and transportation as appropriate         Outcome: Progressing     Problem: Knowledge Deficit  Goal: Patient/family/caregiver demonstrates understanding of disease process, treatment plan, medications, and discharge instructions  Description  Complete learning assessment and assess knowledge base  Interventions:  - Provide teaching at level of understanding  - Provide teaching via preferred learning methods  Outcome: Progressing     Problem: CARDIOVASCULAR - ADULT  Goal: Maintains optimal cardiac output and hemodynamic stability  Description  INTERVENTIONS:  - Monitor I/O, vital signs and rhythm  - Monitor for S/S and trends of decreased cardiac output i e  bleeding, hypotension  - Assess quality of pulses, skin color and temperature  - Assess for signs of decreased coronary artery perfusion - ex   Angina  - Instruct patient to report change in severity of symptoms   Outcome: Progressing     Problem: PAIN - ADULT  Goal: Verbalizes/displays adequate comfort level or baseline comfort level  Description  Interventions:  - Encourage patient to monitor pain and request assistance  - Assess pain using appropriate pain scale  - Administer analgesics based on type and severity of pain and evaluate response  - Implement non-pharmacological measures as appropriate and evaluate response  - Consider cultural and social influences on pain and pain management  - Notify physician/advanced practitioner if interventions unsuccessful or patient reports new pain  Outcome: Progressing     Problem: INFECTION - ADULT  Goal: Absence or prevention of progression during hospitalization  Description  INTERVENTIONS:  - Assess and monitor for signs and symptoms of infection  - Monitor lab/diagnostic results  - Monitor all insertion sites, i e  indwelling lines, tubes, and drains  - [place] appropriate cooling/warming therapies per order  - Administer medications as ordered  - Instruct and encourage patient and family to use good hand hygiene technique  - Identify and instruct in appropriate isolation precautions for identified infection/condition   Outcome: Progressing     Problem: SAFETY ADULT  Goal: Maintain or return to baseline ADL function  Description  INTERVENTIONS:  -  Assess patient's ability to carry out ADLs; assess patient's baseline for ADL function and identify physical deficits which impact ability to perform ADLs (bathing, care of mouth/teeth, toileting, grooming, dressing, etc )  - Assess/evaluate cause of self-care deficits   - Assess range of motion  - Assess patient's mobility; develop plan if impaired  - Assess patient's need for assistive devices and provide as appropriate  - Encourage maximum independence but intervene and supervise when necessary  ¯ Involve family in performance of ADLs  ¯ Assess for home care needs following discharge   ¯ Request OT consult to assist with ADL evaluation and planning for discharge  ¯ Provide patient education as appropriate  Outcome: Progressing  Goal: Maintain or return mobility status to optimal level  Description  INTERVENTIONS:  - Assess patient's baseline mobility status (ambulation, transfers, stairs, etc )    - Identify cognitive and physical deficits and behaviors that affect mobility  - Identify mobility aids required to assist with transfers and/or ambulation (gait belt, sit-to-stand, lift, walker, cane, etc )  - [place] fall precautions as indicated by assessment  - Record patient progress and toleration of activity level on Mobility SBAR; progress patient to next Phase/Stage  - Instruct patient to call for assistance with activity based on assessment  - Request Rehabilitation consult to assist with strengthening/weightbearing, etc   Outcome: Progressing

## 2019-07-15 NOTE — PHYSICAL THERAPY NOTE
PT EVALUATION     07/15/19 1420   Pain Assessment   Pain Assessment No/denies pain   Home Living   Type of Home House   Home Layout Two level;Ramped entrance   [address] [place] chair;Commode   Home Equipment Walker;Cane;Wheelchair-manual  (lift chair)   Additional Comments patient with varying levels of function prior to admission, some days walking short distances other days, WC bound   Prior Function   Level of [place] Needs assistance with ADLs and functional mobility   Lives With Spouse   Receives Help From Family   ADL Assistance Needs assistance   IADLs Needs assistance   Comments patient reports that her  has been caring for you for 30 years( when diagnosed with MS)  Patient without extra help in home  Restrictions/Precautions   Other Precautions Chair Alarm; Bed Alarm; Fall Risk   General   Additional Pertinent History chart reviewed, patient admitted with chest pain  Patient with history of MS and requiring assist prior to admission  Patient resistant to extra help in home and has MOW 3xweek   Family/Caregiver Present Yes   Cognition   Overall Cognitive Status WFL   Arousal/Participation Cooperative   Attention Within functional limits   Orientation Level Oriented X4   Following Commands Follows all commands and directions without difficulty   RLE Assessment   RLE Assessment   (ROM WFL, strength 3+/5)   LLE Assessment   LLE Assessment   (ROM WFL, strength 2+/3-)   Coordination   Movements are Fluid and Coordinated 0   Bed Mobility   Supine to Sit Unable to assess  (patient fatigued with testing and activity today)   Activity Tolerance   Activity Tolerance Patient limited by fatigue   Nurse Made Aware yes   Assessment   Prognosis Good   Problem List Decreased strength;Decreased range of motion;Decreased endurance; Impaired balance;Decreased mobility; Decreased coordination   Assessment Patient seen for Physical Therapy evaluation  Patient admitted with Chest pain    Comorbidities affecting patient's physical performance include:multiple sclerosis, HTN, HLD, Afib not on AC, T2DM, Breast ca presenting   Personal factors affecting patient at time of initial evaluation include: lives in two story house, ambulating with assistive device, inability to ambulate household distances, inability to navigate community distances, inability to navigate level surfaces without external assistance, inability to perform dynamic tasks in community, inability to perform physical activity, inability to perform ADLS and inability to perform IADLS   Prior to admission, patient was requiring assist for functional mobility with walker at times, requiring assist for ADLS, requiring assist for IADLS, living in a multi-level home, ambulating household distance and home with family assist   Please find objective findings from Physical Therapy assessment regarding body systems outlined above with impairments and limitations including weakness, decreased ROM, impaired balance, decreased endurance, impaired coordination, gait deviations, pain, decreased activity tolerance and fall risk  The Barthel Index was used as a functional outcome tool presenting with a score of 40 today indicating marked limitations of functional mobility and ADLS  Patient's clinical presentation is currently unstable/unpredictable as seen in patient's presentation of vital sign response, changing level of pain, increased fall risk, new onset of impairment of functional mobility, decreased endurance and new onset of weakness  Pt would benefit from continued Physical Therapy treatment to address deficits as defined above and maximize level of functional mobility  As demonstrated by objective findings, the assigned level of complexity for this evaluation is high     Goals   Patient Goals to go home   STG Expiration Date 07/22/19   Short Term Goal #1 transfers and gait with roller walker with mod assist of 1   Short Term Goal #2 gait endurance to 30 feet, strength BLEs 3+/5 all to meet patient goal of returning home   LTG Expiration Date 07/29/19   Long Term Goal #1 transfers and gait with roller walker with min assist of 1   Plan   Treatment/Interventions ADL retraining;Functional transfer training;LE strengthening/ROM; Therapeutic exercise; Endurance training;Patient/family training;Equipment eval/education; Bed mobility;Gait training; Compensatory technique education   PT Frequency 5x/wk   Recommendation   Recommendation Home with family support   Additional Comments patient stating, no home therapy needs   Barthel Index   Feeding 10   Bathing 0   Grooming Score 0   Dressing Score 5   Bladder Score 5   Bowels Score 10   Toilet Use Score 5   Transfers (Bed/Chair) Score 5   Mobility (Level Surface) Score 0   Stairs Score 0   Barthel Index Score 36   Licensure   [place] License Number  [employer] [id number]

## 2019-07-15 NOTE — SOCIAL WORK
Pt is current LOS 0, observation  Met with pt and  Per Gonzales  [phone]  Home is one floor with ramp to enter  Has cane, w/c, RW, and commode at home  Uses a lounge type chair to sleep in, is assistive to elevate pt out of chair  Does not drive,  does this  Was open to [facility] in the past   Explained role of cm, denies any d/c needs  Pt uses [facility]  CM reviewed d/c planning process including the following: identifying help at home, patient preference for d/c planning needs, and availability of the treatment team to discuss questions or concerns patient and/or family may have regarding understanding of medications and recognizing signs and symptoms once discharged  CM also encouraged patient to follow up with all recommended appointments after discharge  Patient advised of importance for patient and family to participate in managing patient's medical well being

## 2019-07-15 NOTE — DISCHARGE SUMMARY
Discharge- Prosper Neil 1935, 82 y o  female MRN: [id number]    Unit/Bed#: [address] [address] Encounter: [id number]    Primary Care Provider: John Lacy DO   Date and time admitted to hospital: 7/14/2019 12:21 PM        * Chest pain  Assessment & Plan  Patient presented to the ED with complaints of acute onset chest pain  Patient was in her usual state of health until this morning after eating breakfast   She developed midsternal chest discomfort which radiated into her back  Described as a pressure  There is no associated shortness of breath, headache, dizziness  Nothing makes it better or worse  She received nitro and morphine in the ER with no improvement  Pain is 4/10  Troponin unremarkable  EKG with no acute ischemic changes  CTA was done which ruled out dissection  Gallbladder unremarkable  LFTs unremarkable     · Cardiology consulted due to multiple risk factors   · Serial EKGs and troponins, negative   · Lipid panel and hemoglobin A1c reviewed   · Nuclear stress revealed no signs of ischemia   · ECHO revealed LVEF 65% and G1DD  · Continued ASA and statin   · Per cards, recommend better blood pressure control - started Aldactone/ HCTZ    Essential hypertension  Assessment & Plan  BP elevated and poorly controlled   Continue  Norvasc, lisinopril, sotalol   Per cards - start Aldactone 12 5 mg and HCTZ 12 5 mg daily     Atrial fibrillation ([address] )  Assessment & Plan  Patient reports no longer being on oAC, stopped by her primary cardiologist  Continue sotalol and aspirin     Thyroid nodule  Assessment & Plan  Incidental findings on CT - outpatient follow up  TSH 1 018    Type 2 diabetes mellitus without complication, without long-term current use of insulin (HCC)  Assessment & Plan  (P) 193 [phone]   HGB A1c 6 5  Held Metformin while hospitalized   Accucheck AC/HS with insulin sliding scale    Multiple sclerosis (HCC)  Assessment & Plan  Generalized weakness at baseline secondary to MS  No acute change  Outpatient follow up    Cancer of right breast, stage 1 (HCC)  Assessment & Plan  Post mastectomy    Mixed hyperlipidemia  Assessment & Plan  Lipid panel, triglycerides 153, LDL 67, HDL 42  Continue statin    Leukocytosis-resolved as of 7/15/2019  Assessment & Plan  Mildly, likely reactive  WBC 11 78 -> 8 31  No signs of infection      Discharging Physician / Practitioner: JAMISON Busch  PCP: Dilcia Conde DO  Admission Date:   Admission Orders (From admission, onward)    Ordered        07/14/19 1529  Place in Observation (expected length of stay for this patient is less than two midnights)  Once             Discharge Date: 07/15/19    Resolved Problems  Date Reviewed: 7/15/2019          Resolved    Leukocytosis 7/15/2019     Resolved by  Sekou Gipson, [facility] Stay:  · Cardiology     Procedures Performed:   · Stress test:   · CXR: no acute cardiopulmonary disease  · CTA chest/ abdomen: No acute dissection and/or aneurysmal dilatation of the thoracoabdominal aorta  Incidental thyroid nodule(s) for which nonemergent thyroid ultrasound is recommended if this is deemed appropriate for this patient  Enlarged, fatty liver  · ECHO: LVEF 65%, no regional wall motion abnormalities, wall thickness mildly increased, G1DD  · Nuclear Stress Test: Normal per cardiology     Significant Findings / Test Results:   · None     Incidental Findings:   · Thyroid nodule      Test Results Pending at Discharge (will require follow up): · None     Outpatient Tests Requested:  · None    Complications:  None    Reason for Admission: Chest pain    Hospital Course:     Daya Miller is a 82 y o  female patient with a PMH including MS, HTN, HLD, AFib no on oAC, T2DM, and breast cancer who originally presented to the hospital on 7/14/2019 due to chest pain  Chest pain was worse with inspiration and radiated to her back   Chest pain was not relieved with nitroglycerin or morphine  CTA ruled out dissection  EKG showed NSR with nonspecific changes  Troponin levels negative x4  A cardiology consult was obtained  Patient underwent a stress test which was normal  Echocardiogram revealed an LVEF of 65% and G1DD  At time of discharge patient is completely chest pain free  Cardiology recommends better blood control with addition of Aldactone 12 5 mg daily and HCTZ 12 5 mg daily  Patient to follow-up with her PCP and cardiologist      Please see above list of diagnoses and related plan for additional information  Condition at Discharge: stable     Discharge Day Visit / Exam:     Subjective:  No further chest pain, pain with inspiration, or radiating back pain  Anxious for discharge home  Vitals: Blood Pressure: 142/67 (07/15/19 1604)  Pulse: 67 (07/15/19 1604)  Temperature: 98 1 °F (36 7 °C) (07/15/19 1604)  Temp Source: Oral (07/15/19 1604)  Respirations: 18 (07/15/19 1604)  Height: 5' 6" (167 6 cm) (07/14/19 1615)  Weight - Scale: 94 3 kg (207 lb 14 3 oz) (07/14/19 1615)  SpO2: 95 % (07/15/19 1604)  Exam:   Physical Exam   Constitutional: She is oriented to person, place, and time  She appears well-developed  No distress  HENT:   Head: Normocephalic  Neck: Normal range of motion  Cardiovascular: Normal rate and regular rhythm  Pulmonary/Chest: Effort normal and breath sounds normal  No respiratory distress  She has no wheezes  She has no rhonchi  She has no rales  Abdominal: Soft  Bowel sounds are normal  She exhibits no distension  There is no tenderness  Obese    Musculoskeletal: Normal range of motion  She exhibits no edema or tenderness  Chronic LLE weakness    Neurological: She is alert and oriented to person, place, and time  Skin: Skin is warm and dry  She is not diaphoretic  Psychiatric: She has a normal mood and affect  Judgment normal    Nursing note and vitals reviewed        Discussion with Family:     Discharge instructions/Information to patient and family:   See after visit summary for information provided to patient and family  Provisions for Follow-Up Care:  See after visit summary for information related to follow-up care and any pertinent home health orders  Disposition:     Home    For Discharges to [facility] SNF:   · Not Applicable to this Patient - Not Applicable to this Patient    Planned Readmission: None     Discharge Statement:  I spent > 30 minutes discharging the patient  This time was spent on the day of discharge  I had direct contact with the patient on the day of discharge  Greater than 50% of the total time was spent examining patient, answering all patient questions, arranging and discussing plan of care with patient as well as directly providing post-discharge instructions  Additional time then spent on discharge activities  Discharge Medications:  See after visit summary for reconciled discharge medications provided to patient and family        ** Please Note: This note has been constructed using a voice recognition system **

## 2019-07-15 NOTE — PHYSICIAN ADVISOR
The patient may be discharged home this evening depending on results of the nuclear stress test   The test was performed and results pending  Continue observation class currently  I will reassess the case later  If the patient has a significant abnormality requiring ongoing hospital care, then she may be appropriate for change to inpatient class  Addendum 7:45 pm:  Stress test normal   Being discharged

## 2019-07-15 NOTE — ASSESSMENT & PLAN NOTE
Patient reports no longer being on [place], stopped by her primary cardiologist  Continue sotalol and aspirin

## 2019-07-15 NOTE — DISCHARGE INSTRUCTIONS
Uncontrolled Blood Pressure:  Lower your dietary sodium intake  Start Spironolactone 12 5 mg and Hydrochlorothiazide 12 5 mg daily  Continue all other home medications

## 2019-07-15 NOTE — ASSESSMENT & PLAN NOTE
BP elevated and poorly controlled   Continue  Norvasc, lisinopril, sotalol   Per cards - start Aldactone 12 5 mg and HCTZ 12 5 mg daily

## 2019-07-15 NOTE — UTILIZATION REVIEW
Initial Clinical Review    Admission: Date/Time/Statement:   Admission Orders (From admission, onward)    Ordered        07/14/19 1529  Place in Observation (expected length of stay for this patient is less than two midnights)  Once             Orders Placed This Encounter   Procedures    Place in Observation (expected length of stay for this patient is less than two midnights)     Standing Status:   Standing     Number of Occurrences:   1     Order Specific Question:   Admitting Physician     Answer:   Eloina Humphrey, [facility]     Order Specific Question:   Level of Care     Answer:   Med Surg [16]     ED Arrival Information     Expected Arrival Acuity Means of Arrival Escorted By Service Admission Type    - 7/14/2019 12:18 Emergent Wheelchair Family Member Hospitalist Emergency    Arrival Complaint    -        Chief Complaint   Patient presents with    Chest Pain     pain in chest with some radiation to back since this am     Assessment/Plan:   82 year female that presents today with chest pain  Patient states she has history diabetes, history of breast cancer with breast 4 years ago  Patient states since this morning she has been having chest pain it feels a pressure in her chest and also back pain  Denies any nausea no radiation of the pain to jaw  States she has never had MI or  She does have a cardiologist but has not had an echo anything in the past few years  Denies any shortness of breath  · Atypical chest pain, r/o ACS  EKG with no ischemic changes, first troponin neg  Admit to obs, trend serial troponins, tele monitor, AM nuclear stress test, check lipid panel and A1c  · HTN- BP elevated in ED, continue home meds and trend, may require medication titration  · T2DM- hold metformin, place on ISS, check A1c  · Multiple sclerosis- no acute flare up  Chronic generalized weakness  Fall precautions  Monitor  · Incidental Thyroid nodule- noted on CT  Check TSH  Will need outpt dedicated [address]      ED Triage Vitals   Temperature Pulse Respirations Blood Pressure SpO2   07/14/19 1227 07/14/19 1227 07/14/19 1227 07/14/19 1241 07/14/19 1227   98 4 °F (36 9 °C) 90 (!) 24 (!) 182/70 97 %      Temp Source Heart Rate Source Patient Position - Orthostatic VS BP Location FiO2 (%)   07/14/19 1227 07/14/19 1905 07/14/19 1615 07/14/19 1241 --   Tympanic Monitor Lying Left arm       Pain Score       07/14/19 1227       6        Wt Readings from Last 1 Encounters:   07/14/19 94 3 kg (207 lb 14 3 oz)     Additional Vital Signs:   07/14/19 1337  --  74  31Abnormal   198/79Abnormal   95 %  --  --   07/14/19 1330  --  76  25Abnormal   --  96 %  --  --   07/14/19 1315  --  72  26Abnormal   173/74Abnormal   93 %  --  --   07/14/19 1305  --  78  24Abnormal   173/74Abnormal   98 %  None (Room air)  --   07/14/19 1245  --  78  --  --  96 %  --  --   07/14/19 1241  --  --  --  182/70Abnormal   --  --  --   07/14/19 1227  98 4 °F (36 9 °C)  90  24Abnormal   --  97 %  None (Room air)  --   Pertinent Labs/Diagnostic Test Results:   Results from last 7 days   Lab Units 07/15/19  0550 07/14/19  1242 07/11/19  0743   WBC Thousand/uL 8 31 11 78*  --    WHITE BLOOD CELL COUNT  x10E3/uL  --   --  7 0   HEMOGLOBIN g/dL 11 9 14 3  --    HEMOGLOBIN  g/dL  --   --  13 2   HEMATOCRIT % 37 4 44 9  --    HEMATOCRIT  %  --   --  42 0   PLATELETS Thousands/uL 191 203  --    PLATELETS  I26Z4/YL  --   --  196   NEUTROS ABS Thousands/µL 5 44 8 86*  --    NEUTROS ABS   x10E3/uL  --   --  4 5     Results from last 7 days   Lab Units 07/15/19  0550 07/14/19  1333 07/11/19  0743   SODIUM mmol/L 140 142 143  142   POTASSIUM mmol/L 4 4 4 6 5 0  4 6   CHLORIDE mmol/L 103 104 103  103   CO2 mmol/L 30 31 24  24   ANION GAP mmol/L 7 7  --    BUN mg/dL 17 14 14  13   CREATININE mg/dL 0 86 0 69 0 66  0 61   EGFR ml/min/1 73sq m 64 82  --    CALCIUM mg/dL 8 4 9 0  --      Results from last 7 days   Lab Units 07/14/19  1333 07/11/19  0743   AST U/L 17 16  11   ALT U/L 21 15  14   ALK PHOS U/L 100  --    TOTAL PROTEIN g/dL 7 7 7 1  7 2   ALBUMIN g/dL 3 3* 4 0  4 1   TOTAL BILIRUBIN mg/dL 0 30 0 2  0 2   BILIRUBIN DIRECT mg/dL 0 10  --      Results from last 7 days   Lab Units 07/14/19  2102 07/14/19  1645   POC GLUCOSE mg/dl 231* 161*     Results from last 7 days   Lab Units 07/15/19  0550 07/14/19  1333 07/11/19  0743   GLUCOSE RANDOM mg/dL 122 138 131*  131*         Results from last 7 days   Lab Units 07/11/19  0743   HEMOGLOBIN A1C % 6 6*     Results from last 7 days   Lab Units 07/14/19  2047 07/14/19  1553 07/14/19  1242   TROPONIN I ng/mL <0 02 <0 02 <0 02     Results from last 7 days   Lab Units 07/14/19  1253   D DIMER QUANT ng/ml (FEU) 440     Results from last 7 days   Lab Units 07/14/19  1333   NT-PRO BNP pg/mL 116   CTA DISSECTION PROTOCOL=No acute dissection and/or aneurysmal dilatation of the thoracoabdominal aorta  Incidental thyroid nodule(s) for which nonemergent thyroid ultrasound is recommended if this is deemed appropriate for this patient   Enlarged, fatty liver  EKG=Rhythm: normal sinus  Intervals: normal intervals  Axis: normal axis  QRS/Blocks: normal QRS  ST Changes: No acute ST Changes, no STD/LEYLA    ED Treatment:   Medication Administration from 07/14/2019 1218 to 07/14/2019 1615       Date/Time Order Dose Route Action Action by Comments     07/14/2019 1256 nitroglycerin (NITROSTAT) SL tablet 0 4 mg 0 4 mg Sublingual Given Emily Joregnsen RN      07/14/2019 1336 nitroglycerin (NITROSTAT) SL tablet 0 4 mg 0 4 mg Sublingual Given Emily Jorgensen RN      07/14/2019 1341 nitroglycerin (NITROSTAT) SL tablet 0 4 mg 0 4 mg Sublingual Given Emily Jorgensen RN      07/14/2019 1414 morphine (PF) 4 mg/mL injection 4 mg 4 mg Intravenous Given Emily Jorgensen RN      07/14/2019 1504 iohexol (OMNIPAQUE) 350 MG/ML injection (MULTI-DOSE) 100 mL 100 mL Intravenous Given Reece Azevedo      07/14/2019 1530 morphine (PF) 4 mg/mL injection 4 mg 4 mg Intravenous Given Saqib Bell RN         Past Medical History:   Diagnosis Date    Abscess of buttock, right     last assessed-5/4/2017    Cancer [facility])     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-last assessed-10/8/2015    Cellulitis of chest wall     last assessed-7/27/2015    Chronic endometritis 10/12/2006    [facility] mellitus [facility] )     Dysuria 11/02/2007    Flushing     resolved-6/30/2015    Glaucoma     Hyperlipidemia     Hypertension     Ingrown nail 01/05/2012    Malignant neoplasm of breast ([facility] )     last assessed-11/5/2015    MS (multiple sclerosis) ([facility] )     Nonvenomous insect bite     last assessed-12/12/2013    Palpitations 02/09/2011    Pressure ulcer of buttock 10/13/2006    Proctitis 05/12/2006    Vaginal polyp 03/14/2006    Viral gastroenteritis     last assessed-9/8/2016     Present on Admission:   Essential hypertension   Mixed hyperlipidemia   Atrial fibrillation (HCC)   Multiple sclerosis ([facility] )   Type 2 diabetes mellitus without complication, without long-term current use of insulin (HCC)   Cancer of right breast, stage 1 (HCC)  Admitting Diagnosis: Chest pain [R07 9]  Chest pain, unspecified type [R07 9]  Age/Sex: 82 y o  female  Admission Orders:  TELEMETRY  PT/OT EVAL & TX  CONSULT CARDIO  VENODYNES  ACCUCHECKS WITH COVERAGE SCALE    Current Facility-Administered Medications:  amLODIPine 10 mg Oral Daily   ascorbic acid 500 mg Oral Daily   aspirin 162 mg Oral Daily   enoxaparin 40 mg Subcutaneous Daily   famotidine 20 mg Oral BID   fish oil 1,000 mg Oral Daily   insulin lispro 1-6 Units Subcutaneous TID AC   insulin lispro 1-6 Units Subcutaneous HS   latanoprost 1 drop Both Eyes HS   lisinopril 20 mg Oral BID   ondansetron 4 mg Intravenous Q6H PRN   pravastatin 40 mg Oral Daily With Dinner   sotalol 80 mg Oral BID     Network Utilization Review Department  Phone: [phone]; Fax [phone]  [email or web address]  ATTENTION: Please call with any questions or concerns to [phone]  and carefully listen to the prompts so that you are directed to the right person  Send all requests for admission clinical reviews, approved or denied determinations and any other requests to fax [phone]   All voicemails are confidential

## 2019-07-15 NOTE — CONSULTS
Consultation - Cardiology       Fito Blas 82 y o  female MRN: [id number]  : 1935  Unit/Bed#: [address] [place] [address] Encounter: [id number]      Assessment & Plan     1  Atypical Chest pain r/o ACS: Trop negative x3, nuclear stress test ordered, to be done today  This morning EKG: sinus rhythm w/ 1st degree AV block  Possible inferior infarct vs anterior fascicular block  Continue telemetry  2  DM2: HbA1C: 6 6%, continue to hold home Metformin  ISS algorithm # 3 ordered per medical team  3  Afib: Not on anticoagulant due to fall risk  Home Sotalol mg BID and Asprin 162 mg continued  Echo ordered  4  Htn: Uncontrolled, home Norvasc 10 mg qd & Lisinopril 20 mg BID  Aldactazide 25-25mg qd ordered  Monitor BP and potassium closely  5  Thyroid Nodule: Incidental finding on CT One or more nodule measuring more than 1 5 cm and without suspicious features  Thyroid ultrasound recommended per radiology  TSH: normal  Management per primary team   6  Hyperlipidemia: Home Pravastatin 40 mg qd continued  7  Multiple Sclerosis: PT/OT ordered, patient takes VitD at home  Fall precaution ordered  Patient uses a walker for short distance, otherwise she uses a wheelchair  Summary of Recommendations:        · Atypical chest pain r/o ACS, nuclear stress test to be done today  · Cardiac echo ordered to assess function  · Aldactazide 25-25mg qd added for better BP control  Thank you for your consultation  Physician Requesting Consult: Delmis Lawler MD    Reason for Consult / Principal Problem: Chest pain r/o ACS          Inpatient consult to Cardiology     Performed by  Adeline Payan DO     Authorized by JAMISON Leiva              HPI: Fito Blas is a 82y o  year old female w/ PMH of Afib, MS, DM2, htn, hld, right breast CA s/p mastectomy who presents with mid-sternal chest pain   Pt reports that pain started 1d prior to presentation while seating to eat breakfast  The pain was mid-sternal, exacerbated by head movement, dull in nature, rated 6/10, non-radiating  She denies stress, she lives at home with her   This morning pt reports that the pain is resolved after receiving Morphine and Nitroglycerin on the day of admission  Review of Systems   Constitutional: Negative for appetite change  Respiratory: Negative for shortness of breath  Cardiovascular: Positive for chest pain (On presention, improved w/ pain meds)  Negative for palpitations  Gastrointestinal: Negative  Genitourinary: Negative  Musculoskeletal:        Left leg swelling   Neurological: Positive for numbness (chronic attributed to MS, left sided)         Historical Information   Past Medical History:   Diagnosis Date    Abscess of buttock, right     last assessed-5/4/2017    Cancer [facility])     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-last assessed-10/8/2015    Cellulitis of chest wall     last assessed-7/27/2015    Chronic endometritis 10/12/2006    [facility] )     Dysuria 11/02/2007    Flushing     resolved-6/30/2015    Glaucoma     Hyperlipidemia     Hypertension     Ingrown nail 01/05/2012    Malignant neoplasm of breast [facility] )     last assessed-11/5/2015    MS (multiple sclerosis) [facility] )     Nonvenomous insect bite     last assessed-12/12/2013    Palpitations 02/09/2011    Pressure ulcer of buttock 10/13/2006    Proctitis 05/12/2006    Vaginal polyp 03/14/2006    Viral gastroenteritis     last assessed-9/8/2016     Past Surgical History:   Procedure Laterality Date    BREAST BIOPSY      open    BREAST SURGERY      CERVICAL BIOPSY  W/ LOOP ELECTRODE EXCISION      DILATION AND CURETTAGE OF UTERUS      INCISION AND DRAINAGE OF WOUND Left 2/27/2017    Procedure: INCISION AND DRAINAGE (I&D)   Chest wall x 2;  Surgeon: Katheryn Sales MD;  Location: [facility];  Service:     MASTECTOMY      last assessed-6/30/2015     Social History     Substance and Sexual Activity   Alcohol Use Not Currently     Social History     Substance and Sexual Activity   Drug Use Not Currently     Social History     Tobacco Use   Smoking Status Never Smoker   Smokeless Tobacco Never Used     Family History:   Family History   Problem Relation Age of Onset    Heart disease Father         cardiac disorder    COPD Sister     Diabetes Sister     Lung cancer Mother     Lung cancer Cousin     Heart disease Cousin         cardiac disorder    Multiple sclerosis Cousin     Cancer Cousin     Cancer Child         urinary bladder       Meds/Allergies    PTA meds:    Medications Prior to Admission   Medication    amLODIPine (NORVASC) 10 mg tablet    ascorbic acid ([employer] VITAMIN C) 500 MG tablet    aspirin 81 MG tablet    latanoprost (XALATAN) 0 005 % ophthalmic solution    lisinopril (ZESTRIL) 20 mg tablet    metFORMIN (GLUCOPHAGE) 1000 MG tablet    Multiple Vitamin (MULTI-VITAMIN DAILY PO)    simvastatin (ZOCOR) 20 mg tablet    sotalol (BETAPACE) 80 mg tablet    FRANKI MICROLET LANCETS lancets    glucose blood (FRANKI CONTOUR NEXT TEST) test strip    Omega-3 Fatty Acids (OMEGA-3 FISH OIL) 1000 MG CAPS      Allergies   Allergen Reactions    Clindamycin      Annotation - [id number]: bad taste in mouth    Coumadin [Warfarin]      Reaction Date: [id number];  Annotation - [id number]: lip swelling    Latex      Annotation - [id number]: RASH    Pradaxa [Dabigatran Etexilate Mesylate]        Current Facility-Administered Medications:     amLODIPine (NORVASC) tablet 10 mg, 10 mg, Oral, Daily, JAMISON Villanueva    ascorbic acid (VITAMIN C) tablet 500 mg, 500 mg, Oral, Daily, JAMISON Villanueva    aspirin chewable tablet 162 mg, 162 mg, Oral, Daily, JAMISON Villanueva    enoxaparin (LOVENOX) subcutaneous injection 40 mg, 40 mg, Subcutaneous, Daily, JAMISON Villanueva    famotidine (PEPCID) tablet 20 mg, 20 mg, Oral, BID, JAMISON Villanueva, 20 mg at 07/14/19 8302    fish oil capsule 1,000 mg, 1,000 mg, Oral, Daily, JAMISON Avina    insulin lispro (HumaLOG) 100 units/mL subcutaneous injection 1-6 Units, 1-6 Units, Subcutaneous, TID AC, 1 Units at 07/14/19 1712 **AND** Fingerstick Glucose (POCT), , , TID AC, JAMISON Avina    insulin lispro (HumaLOG) 100 units/mL subcutaneous injection 1-6 Units, 1-6 Units, Subcutaneous, HS, JAMISON Avina, 3 Units at 07/14/19 2131    latanoprost (XALATAN) 0 005 % ophthalmic solution 1 drop, 1 drop, Both Eyes, HS, Sheridan Mayer, JAMISON, 1 drop at 07/14/19 2128    lisinopril (ZESTRIL) tablet 20 mg, 20 mg, Oral, BID, JAMISON Avina, 20 mg at 07/14/19 1732    ondansetron (ZOFRAN) injection 4 mg, 4 mg, Intravenous, Q6H PRN, JAMISON Avina    pravastatin (PRAVACHOL) tablet 40 mg, 40 mg, Oral, Daily With Dinner, JAMISON Avina    sotalol (BETAPACE) tablet 80 mg, 80 mg, Oral, BID, JAMISON Avina, 80 mg at 07/14/19 1732    VTE Pharmacologic Prophylaxis:   Enoxaparin (Lovenox)    Objective:   Vitals: Blood pressure (!) 187/79, pulse 65, temperature 98 °F (36 7 °C), temperature source Oral, resp  rate 18, height 5' 6" (1 676 m), weight 94 3 kg (207 lb 14 3 oz), SpO2 94 %  Body mass index is 33 55 kg/m²    BP Readings from Last 3 Encounters:   07/15/19 (!) 187/79   06/04/19 138/72   03/15/19 138/72     Orthostatic Blood Pressures      Most Recent Value   Blood Pressure  (!) 187/79 filed at 07/15/2019 0700   Patient Position - Orthostatic VS  Lying filed at 07/15/2019 0700          Intake/Output Summary (Last 24 hours) at 7/15/2019 9399  Last data filed at 7/15/2019 0600  Gross per 24 hour   Intake 410 ml   Output 500 ml   Net -90 ml       Invasive Devices     Peripheral Intravenous Line            Peripheral IV 07/14/19 Left Forearm 1 day    Peripheral IV 07/14/19 Left Hand 1 day              CTA dissection protocol chest and abdomen   Final Result      No acute dissection and/or aneurysmal dilatation of the thoracoabdominal aorta  Incidental thyroid nodule(s) for which nonemergent thyroid ultrasound is recommended if this is deemed appropriate for this patient  Enlarged, fatty liver  Workstation performed: [id number]         XR chest 1 view portable   Final Result      No acute cardiopulmonary disease  Workstation performed: [id number]               Physical Exam:   Physical Exam   Constitutional: She is oriented to person, place, and time  She appears well-nourished  HENT:   Head: Normocephalic and atraumatic  Cardiovascular: Regular rhythm  Pulmonary/Chest: Effort normal  No tachypnea  No respiratory distress  Abdominal: Soft  Bowel sounds are normal  She exhibits no distension  There is no tenderness  There is no guarding  Musculoskeletal:        Right lower leg: Normal         Left lower leg: She exhibits edema  Neurological: She is alert and oriented to person, place, and time  Skin: Skin is warm and dry  Psychiatric: Her mood appears not anxious  Labs:   Troponins:   Results from last 7 days   Lab Units 07/14/19  2047 07/14/19  1553 07/14/19  1242   TROPONIN I ng/mL <0 02 <0 02 <0 02       CBC with diff:   Results from last 7 days   Lab Units 07/15/19  0550 07/14/19  1242 07/11/19  0743   WBC Thousand/uL 8 31 11 78*  --    WHITE BLOOD CELL COUNT  x10E3/uL  --   --  7 0   HEMOGLOBIN g/dL 11 9 14 3  --    HEMOGLOBIN  g/dL  --   --  13 2   HEMATOCRIT % 37 4 44 9  --    HEMATOCRIT  %  --   --  42 0   MCV fL 90 91  --    MCV   fL  --   --  89   PLATELETS Thousands/uL 191 203  --    PLATELETS  R42C1/XB  --   --  196   MCH pg 28 6 28 8  --    MCH  pg  --   --  28 0   MCHC g/dL 31 8 31 8  --    MCHC  g/dL  --   --  31 4*   RDW % 14 3 14 5  --    RDW  %  --   --  14 9   MPV fL 12 0 12 2  --    NRBC AUTO /100 WBCs 0 0  --        CMP:   Results from last 7 days   Lab Units 07/15/19  0550 07/14/19  1333 07/11/19  0743   SODIUM mmol/L 140 142 143  142   POTASSIUM mmol/L 4 4 4 6 5 0  4 6   CHLORIDE mmol/L 103 104 103  103   CO2 mmol/L 30 31 24  24   ANION GAP mmol/L 7 7  --    BUN mg/dL 17 14 14  13   CREATININE mg/dL 0 86 0 69 0 66  0 61   GLUCOSE FASTING mg/dL 122*  --   --    CALCIUM mg/dL 8 4 9 0  --    AST U/L  --  17 16  11   ALT U/L  --  21 15  14   ALK PHOS U/L  --  100  --    TOTAL PROTEIN g/dL  --  7 7 7 1  7 2   ALBUMIN g/dL  --  3 3* 4 0  4 1   TOTAL BILIRUBIN mg/dL  --  0 30 0 2  0 2   EGFR ml/min/1 73sq m 64 82  --    GLUCOSE RANDOM mg/dL 122 138 131*  131*       Magnesium:     Coags:     TSH:    Results from last 7 days   Lab Units 07/14/19  1333   TSH 3RD GENERATON uIU/mL 1 018     Lipid Profile:   Results from last 7 days   Lab Units 07/15/19  0550 07/11/19  0743   CHOLESTEROL mg/dL 140 161   TRIGLYCERIDES mg/dL 153* 260*   HDL mg/dL 42 41   LDL CALC mg/dL 67  --      Lipid Profile:   Lab Results   Component Value Date    CHOLESTEROL 140 07/15/2019    HDL 42 07/15/2019    LDLCALC 67 07/15/2019    TRIG 153 (H) 07/15/2019     Hgb A1c:   Results from last 7 days   Lab Units 07/11/19  0743   HEMOGLOBIN A1C % 6 6*     NT-proBNP:   Recent Labs     07/14/19  1333   NTBNP 116        Imaging & Testing   Cardiac testing:   Pending    Imaging: I have personally reviewed pertinent reports  Result Date: 7/15/2019  Narrative: CHEST INDICATION:   chest pain  COMPARISON:  03/11/2017 EXAM PERFORMED/VIEWS:  XR CHEST PORTABLE 1 image FINDINGS: Cardiomediastinal silhouette appears enlarged  The pulmonary vessels are normal  The lungs are clear  No pneumothorax or pleural effusion  Osseous structures appear within normal limits for patient age  Arthritic changes in both shoulders  Impression: No acute cardiopulmonary disease  Workstation performed: [id number]     Cta Dissection Protocol Chest And Abdomen    Result Date: 7/14/2019  Narrative: CTA - CHEST AND ABDOMEN  - WITHOUT AND WITH IV CONTRAST INDICATION:   chest pain radiating to back   COMPARISON: Chest series 7/14/2019 TECHNIQUE: CT examination of the chest and abdomen was performed both prior to and after the administration of intravenous contrast   Thin section angiographic arterial phase post contrast technique was used in order to evaluate for aortic dissection  3D reformatted images and volume rendering were performed on an independent workstation  Additionally, axial, sagittal, and coronal 2D reformatted images were created from the source data and submitted for interpretation  Radiation dose length product (DLP) for this visit:  1576 98 mGy-cm   This examination, like all CT scans performed in the [facility], was performed utilizing techniques to minimize radiation dose exposure, including the use of iterative reconstruction and automated exposure control  IV Contrast:  100 mL of iohexol (OMNIPAQUE) Enteric Contrast: Enteric contrast was not administered  FINDINGS: AORTA: There is no aortic dissection or intramural hematoma  There is no aortic aneurysm  The branch vessels of the thoracoabdominal aorta are normal as is the iliac bifurcation  CHEST LUNGS:  Minimal scattered lingula and left basilar atelectasis  There is no tracheal or endobronchial lesion  PLEURA:  No pleural effusions or pneumothorax  HEART/GREAT VESSELS:  The heart is prominent in size but there is no pericardial effusion  MEDIASTINUM AND LETICIA:  Subcentimeter middle mediastinal and right hilar lymph nodes noted with no pathologic intrathoracic lymphadenopathy  CHEST WALL AND LOWER NECK: Peripherally calcified 2 2 x 1 5 cm left lower pole nodule    Incidental discovery of one or more thyroid nodule(s) measuring more than 1 5 cm and without suspicious features is noted in this patient who is above 28years old; according to guidelines published in the February 2015 white paper on incidental thyroid nodules in the Journal of the [employer] of Radiology Brianna Ashley), further characterization with thyroid ultrasound is recommended  Prevertebral carotid arteries are noted incidentally  ABDOMEN LIVER/BILIARY TREE:  Liver is diffusely decreased in density consistent with fatty change  No CT evidence of suspicious hepatic mass  Moderate hepatic enlargement measuring 20 cm in midclavicular line  No biliary dilatation  GALLBLADDER:  No calcified gallstones  No pericholecystic inflammatory change  SPLEEN:  Unremarkable  PANCREAS:  Unremarkable  ADRENAL GLANDS:  Unremarkable  KIDNEYS/URETERS:  One or more sharply circumscribed subcentimeter renal hypodensities are noted  These lesions are too small to accurately characterize, but are statistically most likely to represent benign cortical renal cyst(s)  According to the guidelines published in the [facility] Paper of the ACR Incidental Findings Committee (Radiology 2010), no further workup of these lesions is recommended  No hydronephrosis or perinephric collections  VISUALIZED STOMACH, BOWEL AND APPENDIX:  Unremarkable  VISUALIZED ABDOMINOPELVIC CAVITY:  No ascites or free intraperitoneal air  No lymphadenopathy  ABDOMINAL WALL:  Unremarkable  OSSEOUS STRUCTURES:  No acute fracture or destructive osseous lesion  Impression: No acute dissection and/or aneurysmal dilatation of the thoracoabdominal aorta  Incidental thyroid nodule(s) for which nonemergent thyroid ultrasound is recommended if this is deemed appropriate for this patient  Enlarged, fatty liver  Workstation performed: [id number]     EKG/ Monitor: Personally reviewed  Code Status: Level 3 - DNAR and DNI    Jake Oppenheim, DO        "This note has been constructed using a voice recognition system  Therefore there may be syntax, spelling, and/or grammatical errors   Please call if you have any questions  "

## 2019-07-15 NOTE — OCCUPATIONAL THERAPY NOTE
OT EVALUATION/TREATMENT       07/15/19 1005   Note Type   Note type Eval/Treat   Restrictions/Precautions   Other Precautions Chair Alarm; Bed Alarm; Fall Risk   Pain Assessment   Pain Assessment No/denies pain   Pain Score No Pain   Home Living   Type of [address] [place] [address] Two level;Ramped entrance; Able to live on main level with bedroom/bathroom   Bathroom Shower/Tub Walk-in shower   Bathroom Toilet Raised   Bathroom Equipment Shower chair;Grab bars around toilet;Commode  (Pt not using commode prior to admission )   Home Equipment Walker;Cane;Wheelchair-manual  (RW, lift chair)   Additional Comments Pt reports using cane, RW, and manual wheelchair on various days depending on function of L extremities  Pt transfers to wheel chair using lift chair and assistance from   Prior Function   Level of [place] Needs assistance with ADLs and functional mobility; Needs assistance with IADLs   Lives With Spouse   Receives Help From Family   ADL Assistance Needs assistance   IADLs Needs assistance   Comments Pt reports that she requires assistance with ADLs and IADLs at home   is able to assist her as neccessary  Subjective   Subjective "I am able to function at home "   ADL   Eating Assistance 7  Independent   Grooming Assistance 5  Supervision/Setup  (Seated EOB)   UB Bathing Assistance 4  Minimal Assistance  (Seated on shower chair)   LB Bathing Assistance 2  Maximal Assistance  (Seated on shower chair)   [address] 2  Maximal [address]  3  Moderate Assistance  (Bedside commode)   Bed Mobility   Supine to Sit 4  Minimal assistance   Additional items Assist x 1;Verbal cues;LE management; Increased time required   Sit to Supine 3  Moderate assistance   Additional items Assist x 1;Verbal cues;LE management; Increased time required   Transfers   Sit to Stand 2  Maximal assistance   Additional items Assist x 2;Verbal cues;Increased time required  (Hand hold assistance)   Stand to Sit 2  Maximal assistance   Additional items Assist x 2;Verbal cues; Increased time required  (Hand hold assistance)   Balance   Static Sitting Fair   Dynamic Sitting Fair -   Static Standing Poor +   Dynamic Standing Poor   Activity Tolerance   Activity Tolerance Patient limited by fatigue   Nurse Made Aware Yes, CNA aware and present during session  RUE Assessment   RUE Assessment WFL   LUE Assessment   LUE Assessment   (Pt reports L sided weakness at baseline )   LUE Overall AROM   L Mass Grasp Full flexion all fingers, 4/5  LUE Strength   L Shoulder Flexion 2+/5   L Shoulder Extension 2+/5   L Elbow Flexion 3+/5   L Elbow Extension 3+/5   Hand Function   Gross Motor Coordination Functional   Fine Motor Coordination Functional   Cognition   Overall Cognitive Status WFL   Arousal/Participation Alert; Responsive; Cooperative   Attention Within functional limits   Orientation Level Oriented X4   Memory Within functional limits   Following Commands Follows all commands and directions without difficulty   Assessment   Limitation Decreased ADL status; Decreased UE ROM; Decreased UE strength;Decreased Safe judgement during ADL;Decreased endurance;Decreased self-care trans;Decreased high-level ADLs   Prognosis Good   Assessment Patient evaluated by Occupational Therapy  Patient admitted with Chest pain  The patients occupational profile, medical and therapy history includes a extensive additional review of physical, cognitive, or psychosocial history related to current functional performance  Comorbidities affecting functional mobility and ADLS include: atrial fibrillation, history of multiple sclerosis, history of cancer of breast- stage 1, and history of diabetic neuropathy  Prior to admission, patient was requiring assist for ADLS and requiring assist for IADLS    The evaluation identifies the following performance deficits: weakness, impaired balance, decreased endurance, increased fall risk, new onset of impairment of functional mobility, decreased ADLS, decreased IADLS and decreased activity tolerance, that result in activity limitations and/or participation restrictions  This evaluation requires clinical decision making of high complexity, because the patient presents with comorbidites that affect occupational performance and required significant modification of tasks or assistance with consideration of multiple treatment options  The Barthel Index was used as a functional outcome tool presenting with a score of 35, indicating marked limitations of functional mobility and ADLS  Patient will benefit from skilled Occupational Therapy services to address above deficits and facilitate a safe return to prior level of function     Goals   Patient Goals To go home   STG Time Frame   (1-7 days)   Short Term Goal  Goals established to promote patient goal of going home:  Patient will increase standing tolerance to 5 minutes during ADL task to decrease assistance level and decrease fall risk; Patient will increase functional mobility to and from bathroom with rolling walker with mod assist to increase performance with ADLS and to use a toilet; Patient will tolerate 7 minutes of UE ROM/strengthening to increase general activity tolerance and performance in ADLS/IADLS; Patient will improve functional activity tolerance to 10 minutes of sustained functional tasks to increase participation in basic self-care and decrease assistance level;  Patient will be able to to verbalize understanding and perform energy conservation during ADLS and functional mobility at least 90% of the time with minimal cueing to decrease signs of fatigue and increase stamina to return to prior level of function; Patient will increase static sitting balance to fair+ and dynamic sitting balance to fair to improve the ability to sit at edge of bed or on a chair for ADLS;  Patient will increase static standing balance to fair- and dynamic standing balance to poor+ to improve postural stability and decrease fall risk during standing ADLS and transfers  LTG Time Frame   (8-14 days)   Long Term Goal Goals established to promote patient goal of going home:  Patient will increase standing tolerance to 7 minutes during ADL task to decrease assistance level and decrease fall risk; Patient will increase functional mobility to and from bathroom with rolling walker with min assist to increase performance with ADLS and to use a toilet; Patient will tolerate 10 minutes of UE ROM/strengthening to increase general activity tolerance and performance in ADLS/IADLS; Patient will improve functional activity tolerance to 15 minutes of sustained functional tasks to increase participation in basic self-care and decrease assistance level;  Patient will be able to to verbalize understanding and perform energy conservation during ADLS and functional mobility at least 90% of the time with no cueing to decrease signs of fatigue and increase stamina to return to prior level of function; Patient will increase static sitting balance to good and dynamic sitting balance to fair+ to improve the ability to sit at edge of bed or on a chair for ADLS;  Patient will increase static standing balance to fair and dynamic standing balance to fair- to improve postural stability and decrease fall risk during standing ADLS and transfers     Functional Transfer Goals   Pt Will Perform All Functional Transfers   (STG mod assist, LTG min assist)   ADL Goals   Pt Will Perform Grooming   (STG independent seated EOB)   Pt Will Perform Bathing   (STG mod assist, LTG min assist seated on shower chair)   Pt Will Perform UE Dressing   (STG supervision, LTG independent)   Pt Will Perform LE Dressing   (STG mod assist, LTG min assist)   Pt Will Perform Toileting   (STG min assist, LTG supervision)   Plan   Treatment Interventions ADL retraining;Functional transfer training;UE strengthening/ROM; Endurance training;Patient/family training;Equipment evaluation/education; Compensatory technique education; Energy conservation; Activityengagement   Goal Expiration Date 07/29/19   OT Frequency 3-5x/wk   Additional Treatment Session   Start Time 1882   End Time 1005   Treatment Assessment Pt benefited from encouragement to participate in therapy and OOB activity  Pt performed supine>sit with max a x1 for LE management and required verbal cues and increased time  Pt seated EOB for approximately 2 minutes with vcs for upright posture  Pt required max a x2 for stand pivot transfer to w/c with bed elevated  Max a x1 to take few steps for bed to chair with verbal cues for safe transfers technique  Pt seated in wheelchair with CNA present at end of session  Pt will continue to benefit from skilled occupational therapy services to increase independence and safety with ADLs and functional mobility     Recommendation   OT Discharge Recommendation Home OT  (Pt stating she does not want services, would benefit)   Barthel Index   Feeding 10   Bathing 0   Grooming Score 0   Dressing Score 5   Bladder Score 0   Bowels Score 10   Toilet Use Score 5   Transfers (Bed/Chair) Score 5   Mobility (Level Surface) Score 0   Stairs Score 0   Barthel Index Score [address] [employer] [facility]   Licensure   [place] License Number  Joseph Fernandez Fausto [address], OTR/L [id number]

## 2019-07-16 ENCOUNTER — PATIENT OUTREACH (OUTPATIENT)
Dept: FAMILY MEDICINE CLINIC | Facility: CLINIC | Age: 84
End: 2019-07-16

## 2019-07-16 DIAGNOSIS — Z71.89 COMPLEX CARE COORDINATION: Primary | ICD-10-CM

## 2019-07-16 LAB
CHEST PAIN STATEMENT: NORMAL
MAX DIASTOLIC BP: 74 MMHG
MAX HEART RATE: 86 BPM
MAX PREDICTED HEART RATE: 139 BPM
MAX. SYSTOLIC BP: 172 MMHG
MRSA NOSE QL CULT: NORMAL
PROTOCOL NAME: NORMAL
REASON FOR TERMINATION: NORMAL
TARGET HR FORMULA: NORMAL
TEST INDICATION: NORMAL
TIME IN EXERCISE PHASE: NORMAL

## 2019-07-16 PROCEDURE — 93018 CV STRESS TEST I&R ONLY: CPT | Performed by: INTERNAL MEDICINE

## 2019-07-16 PROCEDURE — 93016 CV STRESS TEST SUPVJ ONLY: CPT | Performed by: INTERNAL MEDICINE

## 2019-07-16 PROCEDURE — 78452 HT MUSCLE IMAGE SPECT MULT: CPT | Performed by: INTERNAL MEDICINE

## 2019-07-16 NOTE — ED PROVIDER NOTES
History  Chief Complaint   Patient presents with    Weakness - Generalized     Patient states she was discharged from the hospital this morning and felt fine  When she got home she was walking and her legs buckled and she fell  Patient already partially paralyzed on left side due to MS       81 y/o female presents after she fell today, she buckled with the knees, fell over while transferring from the chair to the commode  Denies hitting her head, no LOC, no other symptoms  discharged from the hospital this morning after evaluation for chest pain  History of multiple sclerosis and left side has paralysis, mostly wheel chair bound  Uses walker to transfer from bed to recliner  History provided by:  Patient   used: No        Prior to Admission Medications   Prescriptions Last Dose Informant Patient Reported? Taking?    FRANKI MICROLET LANCETS lancets   No No   Sig: by Other route 2 (two) times a day E11 9 Test blood sugar twice daily   Multiple Vitamin (MULTI-VITAMIN DAILY PO)  Self Yes No   Sig: Take by mouth daily   Omega-3 Fatty Acids (OMEGA-3 FISH OIL) 1000 MG CAPS  Self Yes No   Sig: Take 1 capsule by mouth daily   amLODIPine (NORVASC) 10 mg tablet   No No   Sig: TAKE 1 TABLET (10 MG TOTAL) BY MOUTH DAILY   ascorbic acid (CVS VITAMIN C) 500 MG tablet  Self Yes No   Sig: Take 1 tablet by mouth daily   aspirin 81 MG tablet  Self Yes No   Sig: Take 162 mg by mouth daily   glucose blood (FRANKI CONTOUR NEXT TEST) test strip   No No   Si each by Other route 2 (two) times a day E11 9 test blood sugar twice daily   hydrochlorothiazide (HYDRODIURIL) 12 5 mg tablet   No No   Sig: Take 1 tablet (12 5 mg total) by mouth daily   latanoprost (XALATAN) 0 005 % ophthalmic solution  Self Yes No   Sig: Administer 1 drop to both eyes daily at bedtime    lisinopril (ZESTRIL) 20 mg tablet   No No   Sig: Take 1 tablet (20 mg total) by mouth 2 (two) times a day   metFORMIN (GLUCOPHAGE) 1000 MG tablet   No No Sig: Take 0 5 tablets (500 mg total) by mouth 2 (two) times a day with meals   simvastatin (ZOCOR) 20 mg tablet   No No   Sig: Take 1 tablet (20 mg total) by mouth daily   sotalol (BETAPACE) 80 mg tablet  Self Yes No   Sig: Take 80 mg by mouth 2 (two) times a day   spironolactone (ALDACTONE) 25 mg tablet   No No   Sig: Take 0 5 tablets (12 5 mg total) by mouth daily      Facility-Administered Medications: None       Past Medical History:   Diagnosis Date    Abscess of buttock, right     last assessed-5/4/2017    Cancer [facility])     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-last assessed-10/8/2015    Cellulitis of chest wall     last assessed-7/27/2015    Chronic endometritis 10/12/2006    Diabetes mellitus ([facility] )     Dysuria 11/02/2007    Flushing     resolved-6/30/2015    Glaucoma     Hyperlipidemia     Hypertension     Ingrown nail 01/05/2012    Malignant neoplasm of breast ([facility] )     last assessed-11/5/2015    MS (multiple sclerosis) ([facility] )     Nonvenomous insect bite     last assessed-12/12/2013    Palpitations 02/09/2011    Pressure ulcer of buttock 10/13/2006    Proctitis 05/12/2006    Vaginal polyp 03/14/2006    Viral gastroenteritis     last assessed-9/8/2016       Past Surgical History:   Procedure Laterality Date    BREAST BIOPSY      open    BREAST SURGERY      CERVICAL BIOPSY  W/ LOOP ELECTRODE EXCISION      DILATION AND CURETTAGE OF UTERUS      INCISION AND DRAINAGE OF WOUND Left 2/27/2017    Procedure: INCISION AND DRAINAGE (I&D)   Chest wall x 2;  Surgeon: Fabi Restrepo MD;  Location: [facility];  Service:     MASTECTOMY      last assessed-6/30/2015       Family History   Problem Relation Age of Onset    Heart disease Father         cardiac disorder    COPD Sister     Diabetes Sister     Lung cancer Mother     Lung cancer Cousin     Heart disease Cousin         cardiac disorder    Multiple sclerosis Cousin     Cancer Cousin     Cancer Child         urinary bladder I have reviewed and agree with the history as documented  Social History     Tobacco Use    Smoking status: Never Smoker    Smokeless tobacco: Never Used   Substance Use Topics    Alcohol use: Not Currently    Drug use: Not Currently        Review of Systems   All other systems reviewed and are negative  Physical Exam  Physical Exam   Constitutional: She is oriented to person, place, and time  She appears well-developed and well-nourished  HENT:   Head: Normocephalic and atraumatic  Eyes: Pupils are equal, round, and reactive to light  EOM are normal    Neck: Normal range of motion  Neck supple  Cardiovascular: Normal rate and regular rhythm  Pulmonary/Chest: Effort normal and breath sounds normal    Abdominal: Soft  Bowel sounds are normal    Musculoskeletal: Normal range of motion  Neurological: She is alert and oriented to person, place, and time  She displays normal reflexes  No cranial nerve deficit or sensory deficit  She exhibits normal muscle tone  Coordination normal    Left lower extremity motor strength at baseline, right lower extremity motor strength is 4/5    Skin: Skin is warm and dry  Psychiatric: She has a normal mood and affect  Nursing note and vitals reviewed        Vital Signs  ED Triage Vitals   Temperature Pulse Respirations Blood Pressure SpO2   07/15/19 2052 07/15/19 2052 07/15/19 2052 07/15/19 2053 07/15/19 2052   97 7 °F (36 5 °C) 78 18 (!) 191/81 97 %      Temp Source Heart Rate Source Patient Position - Orthostatic VS BP Location FiO2 (%)   07/15/19 2052 07/15/19 2052 -- -- --   Oral Monitor         Pain Score       --                  Vitals:    07/15/19 2053 07/15/19 2145 07/15/19 2158 07/15/19 2200   BP: (!) 191/81  (!) 180/77 (!) 180/77   Pulse:  62  62         Visual Acuity      ED Medications  Medications   lisinopril (ZESTRIL) tablet 20 mg (20 mg Oral Given 7/15/19 2158)       Diagnostic Studies  Results Reviewed     Procedure Component Value Units Date/Time    Comprehensive metabolic panel [[id number]]  (Abnormal) Collected:  07/15/19 2127    Lab Status:  Final result Specimen:  Blood from Arm, Left Updated:  07/15/19 2149     Sodium 143 mmol/L      Potassium 4 2 mmol/L      Chloride 104 mmol/L      CO2 29 mmol/L      ANION GAP 10 mmol/L      BUN 26 mg/dL      Creatinine 1 08 mg/dL      Glucose 193 mg/dL      Calcium 8 6 mg/dL      AST 11 U/L      ALT 23 U/L      Alkaline Phosphatase 91 U/L      Total Protein 7 3 g/dL      Albumin 2 9 g/dL      Total Bilirubin 0 20 mg/dL      eGFR 48 ml/min/1 73sq m     Narrative:       [place] guidelines for Chronic Kidney Disease (CKD):     Stage 1 with normal or high GFR (GFR > 90 mL/min/1 73 square meters)    Stage 2 Mild CKD (GFR = 60-89 mL/min/1 73 square meters)    Stage 3A Moderate CKD (GFR = 45-59 mL/min/1 73 square meters)    Stage 3B Moderate CKD (GFR = 30-44 mL/min/1 73 square meters)    Stage 4 Severe CKD (GFR = 15-29 mL/min/1 73 square meters)    Stage 5 End Stage CKD (GFR <15 mL/min/1 73 square meters)  Note: GFR calculation is accurate only with a steady state creatinine    Magnesium [[id number]]  (Normal) Collected:  07/15/19 2127    Lab Status:  Final result Specimen:  Blood from Arm, Left Updated:  07/15/19 2149     Magnesium 2 0 mg/dL     CBC and differential [[id number]]  (Abnormal) Collected:  07/15/19 2127    Lab Status:  Final result Specimen:  Blood from Arm, Left Updated:  07/15/19 2133     WBC 9 65 Thousand/uL      RBC 4 83 Million/uL      Hemoglobin 13 6 g/dL      Hematocrit 43 5 %      MCV 90 fL      MCH 28 2 pg      MCHC 31 3 g/dL      RDW 14 2 %      MPV 12 1 fL      Platelets 209 Thousands/uL      nRBC 0 /100 WBCs      Neutrophils Relative 76 %      Immat GRANS % 0 %      Lymphocytes Relative 13 %      Monocytes Relative 9 %      Eosinophils Relative 1 %      Basophils Relative 1 %      Neutrophils Absolute 7 38 Thousands/µL      Immature Grans Absolute 0 03 Thousand/uL      Lymphocytes Absolute 1 21 Thousands/µL      Monocytes Absolute 0 88 Thousand/µL      Eosinophils Absolute 0 09 Thousand/µL      Basophils Absolute 0 06 Thousands/µL                  No orders to display              Procedures  Procedures       ED Course                               MDM  Number of Diagnoses or Management Options  Weakness:   Diagnosis management comments: Patient evaluated with labs  Gave her a prescription for outpatient physical therapy  I reviewed the results and discussed them with the patient  Patient discharged with appropriate instructions, and follow-up  Patient verbalized understanding had no further questions at the time of discharge  Patient had stable vital signs and well-appearing at the time of discharge  Amount and/or Complexity of Data Reviewed  Clinical lab tests: ordered and reviewed  Tests in the medicine section of CPT®: ordered and reviewed    Patient Progress  Patient progress: stable      Disposition  Final diagnoses:   Weakness     Time reflects when diagnosis was documented in both MDM as applicable and the Disposition within this note     Time User Action Codes Description Comment    7/15/2019 10:19 PM Anepu, Marylen Haws Add [R53 1] Weakness       ED Disposition     ED Disposition Condition Date/Time Comment    Discharge Stable Mon Jul 15, 2019 10:19 PM Kaylin Hernandez [facility] discharge to home/self care              Follow-up Information     Follow up With Specialties Details Why Contact Info Additional Information    Charles Eagle DO Family Medicine Schedule an appointment as soon as possible for a visit   [address] [place] [address] 2020 [address] [place] [address] E       [address] [facility] Emergency Department Emergency Medicine  If symptoms worsen [address] [place] [address]  [phone] [facility] ED, Lizet Horta, [address] [facility], [address]          Discharge Medication List as of 7/15/2019 10:20 PM      CONTINUE these medications which have NOT CHANGED    Details   amLODIPine (NORVASC) 10 mg tablet TAKE 1 TABLET (10 MG TOTAL) BY MOUTH DAILY, Starting Thu 4/25/2019, Normal      ascorbic acid (CVS VITAMIN C) 500 MG tablet Take 1 tablet by mouth daily, Historical Med      aspirin 81 MG tablet Take 162 mg by mouth daily, Historical Med      FRANKI MICROLET LANCETS lancets by Other route 2 (two) times a day E11 9 Test blood sugar twice daily, Starting Fri 11/23/2018, Normal      glucose blood (FRANKI CONTOUR NEXT TEST) test strip 1 each by Other route 2 (two) times a day E11 9 test blood sugar twice daily, Starting Fri 11/23/2018, Normal      hydrochlorothiazide (HYDRODIURIL) 12 5 mg tablet Take 1 tablet (12 5 mg total) by mouth daily, Starting Tue 7/16/2019, Normal      latanoprost (XALATAN) 0 005 % ophthalmic solution Administer 1 drop to both eyes daily at bedtime , Historical Med      lisinopril (ZESTRIL) 20 mg tablet Take 1 tablet (20 mg total) by mouth 2 (two) times a day, Starting Tue 8/21/2018, Normal      metFORMIN (GLUCOPHAGE) 1000 MG tablet Take 0 5 tablets (500 mg total) by mouth 2 (two) times a day with meals, Starting Tue 8/21/2018, Normal      Multiple Vitamin (MULTI-VITAMIN DAILY PO) Take by mouth daily, Starting Wed 8/12/2009, Historical Med      Omega-3 Fatty Acids (OMEGA-3 FISH OIL) 1000 MG CAPS Take 1 capsule by mouth daily, Historical Med      simvastatin (ZOCOR) 20 mg tablet Take 1 tablet (20 mg total) by mouth daily, Starting Tue 8/21/2018, Normal      sotalol (BETAPACE) 80 mg tablet Take 80 mg by mouth 2 (two) times a day, Historical Med      spironolactone (ALDACTONE) 25 mg tablet Take 0 5 tablets (12 5 mg total) by mouth daily, Starting Tue 7/16/2019, Normal               ED Provider  Electronically Signed by           Umer Tolliver DO  07/15/19 1798

## 2019-07-17 ENCOUNTER — PATIENT OUTREACH (OUTPATIENT)
Dept: FAMILY MEDICINE CLINIC | Facility: CLINIC | Age: 84
End: 2019-07-17

## 2019-07-17 ENCOUNTER — TELEPHONE (OUTPATIENT)
Dept: CASE MANAGEMENT | Facility: OTHER | Age: 84
End: 2019-07-17

## 2019-07-17 NOTE — PROGRESS NOTES
Referral from analysts  81 yo female with MS, DM, Afib, HTN  Does not see specialists  Wishes to switch PCP from Dr Eb Godwin to Dr Dany Farmer as office is close  Mostly wheelchair bound  Elderly  cares for her  MOW delivered 3x weekly  Eating out for other meals  Alma Henning has medication, food, housing  She will call Dr Dany Farmer for TRISTEN  She will permit another call in two weeks  Recent admission for chest pain  On day of dc, she returned due to leg weakness and fall  Question if fall is from MS progression or deconditioning

## 2019-07-17 NOTE — TELEPHONE ENCOUNTER
Dr Kaley Yanes   This patient is somewhat known to you  Recent ER visit for leg weakness the day she is dc from hospital admission for chest pain  She needs a TRISTEN  Mostly wheelchair bound, 81 yo female with MS, DM II, A-fib, HTN,  etc  Question if recent leg weakness is from deconditioning or MS progression  Had been seeing Dr Maynor Zee but wishes to change care to you as she is 2 miles from your office  Elderly  transports her and I believe he is now your patient as well  I have encouraged her to call office  If you do not hear from her, could you have Ruthie call her?     Thank you,  Mynor Sharp

## 2019-07-18 ENCOUNTER — OFFICE VISIT (OUTPATIENT)
Dept: FAMILY MEDICINE CLINIC | Facility: CLINIC | Age: 84
End: 2019-07-18
Payer: MEDICARE

## 2019-07-18 VITALS
DIASTOLIC BLOOD PRESSURE: 68 MMHG | HEART RATE: 97 BPM | RESPIRATION RATE: 16 BRPM | BODY MASS INDEX: 33.55 KG/M2 | SYSTOLIC BLOOD PRESSURE: 142 MMHG | OXYGEN SATURATION: 96 % | HEIGHT: 66 IN | TEMPERATURE: 98.2 F

## 2019-07-18 DIAGNOSIS — I70.203 ATHEROSCLEROSIS OF NATIVE ARTERY OF BOTH LOWER EXTREMITIES, WITH UNSPECIFIED PRESENCE OF CLINICAL MANIFESTATION (HCC): ICD-10-CM

## 2019-07-18 DIAGNOSIS — Z17.0 MALIGNANT NEOPLASM OF RIGHT BREAST, STAGE 1, ESTROGEN RECEPTOR POSITIVE (HCC): ICD-10-CM

## 2019-07-18 DIAGNOSIS — R26.9 ABNORMAL GAIT: Primary | ICD-10-CM

## 2019-07-18 DIAGNOSIS — E11.42 DIABETIC POLYNEUROPATHY ASSOCIATED WITH TYPE 2 DIABETES MELLITUS (HCC): ICD-10-CM

## 2019-07-18 DIAGNOSIS — G35 MULTIPLE SCLEROSIS (HCC): ICD-10-CM

## 2019-07-18 DIAGNOSIS — C50.911 MALIGNANT NEOPLASM OF RIGHT BREAST, STAGE 1, ESTROGEN RECEPTOR POSITIVE (HCC): ICD-10-CM

## 2019-07-18 PROCEDURE — 99214 OFFICE O/P EST MOD 30 MIN: CPT | Performed by: FAMILY MEDICINE

## 2019-07-18 NOTE — PATIENT INSTRUCTIONS
Continue medications  Neurology follow up   Annual eye exam  Cardiology follow up  Skin care  Off loading to prevent pressure sores  Hematology oncology surveillance   Foot care,   Stay current with vaccinations

## 2019-08-01 ENCOUNTER — OFFICE VISIT (OUTPATIENT)
Dept: FAMILY MEDICINE CLINIC | Facility: CLINIC | Age: 84
End: 2019-08-01
Payer: MEDICARE

## 2019-08-01 ENCOUNTER — PATIENT OUTREACH (OUTPATIENT)
Dept: FAMILY MEDICINE CLINIC | Facility: CLINIC | Age: 84
End: 2019-08-01

## 2019-08-01 VITALS
BODY MASS INDEX: 33.55 KG/M2 | SYSTOLIC BLOOD PRESSURE: 140 MMHG | HEART RATE: 76 BPM | HEIGHT: 66 IN | TEMPERATURE: 98.5 F | RESPIRATION RATE: 16 BRPM | DIASTOLIC BLOOD PRESSURE: 80 MMHG

## 2019-08-01 DIAGNOSIS — G35 MULTIPLE SCLEROSIS (HCC): Primary | ICD-10-CM

## 2019-08-01 DIAGNOSIS — R29.898 RIGHT LEG WEAKNESS: ICD-10-CM

## 2019-08-01 PROCEDURE — 99213 OFFICE O/P EST LOW 20 MIN: CPT | Performed by: NURSE PRACTITIONER

## 2019-08-01 PROCEDURE — 1124F ACP DISCUSS-NO DSCNMKR DOCD: CPT | Performed by: NURSE PRACTITIONER

## 2019-08-01 NOTE — PROGRESS NOTES
Assessment/Plan:    She would be interested in in-home PT  Referral provided  She will f/u with Dr Lola Morales      Problem List Items Addressed This Visit        Nervous and Auditory    Multiple sclerosis [facility]) - Primary    Relevant Orders    Ambulatory Referral to [address] [place] [address] Edvin Aguiar      Other Visit Diagnoses     Right leg weakness        Relevant Orders    Ambulatory Referral to [address] [place] [address] Edvin Aguiar        There are no Patient Instructions on file for this visit  Return if symptoms worsen or fail to improve  Subjective:      Patient ID: Luis Manuel Diaz is a 82 y o  female  Chief Complaint   Patient presents with    Leg Pain     right leg, since in the hospital ac/cma     Insect Bite     left arm, red and warm to the touch       She was in the hospital last week after she went to the ER with chest pain  She was discharged, and when she got home, she got up to use the bathroom, and she couldn't bear weight on her right leg  Left leg has severe weakness secondary to MS so she relies on her right side  She went back to the hospital and she was sent back home  Was recommended to start PT  This has been ongoing for the past 2 weeks  No worsening swelling   No numbness, warmth, or redness of extremity  She has pain lateral aspect of right leg distal to the knee  When her foot is flat on the floor it feels better  It feels like it is going to give out on her  She is concerned this is her MS  She has not seen a neurologist since Dr Jammie Nicole retired      The following portions of the patient's history were reviewed and updated as appropriate: allergies, current medications, past family history, past medical history, past social history, past surgical history and problem list     Review of Systems   Constitutional: Negative  Musculoskeletal:        See HPI   Skin: Negative for wound  Neurological: Positive for weakness  Negative for numbness           Current Outpatient Medications   Medication Sig Dispense Refill    amLODIPine (NORVASC) 10 mg tablet TAKE 1 TABLET (10 MG TOTAL) BY MOUTH DAILY 90 tablet 1    ascorbic acid (CVS VITAMIN C) 500 MG tablet Take 1 tablet by mouth daily      aspirin 81 MG tablet Take 162 mg by mouth daily      FRANKI MICROLET LANCETS lancets by Other route 2 (two) times a day E11 9 Test blood sugar twice daily 100 each 5    glucose blood (FRANKI CONTOUR NEXT TEST) test strip 1 each by Other route 2 (two) times a day E11 9 test blood sugar twice daily 100 each 5    latanoprost (XALATAN) 0 005 % ophthalmic solution Administer 1 drop to both eyes daily at bedtime       lisinopril (ZESTRIL) 20 mg tablet Take 1 tablet (20 mg total) by mouth 2 (two) times a day 180 tablet 3    metFORMIN (GLUCOPHAGE) 1000 MG tablet Take 0 5 tablets (500 mg total) by mouth 2 (two) times a day with meals 90 tablet 3    Multiple Vitamin (MULTI-VITAMIN DAILY PO) Take by mouth daily      Omega-3 Fatty Acids (OMEGA-3 FISH OIL) 1000 MG CAPS Take 1 capsule by mouth daily      simvastatin (ZOCOR) 20 mg tablet Take 1 tablet (20 mg total) by mouth daily 90 tablet 3    sotalol (BETAPACE) 80 mg tablet Take 80 mg by mouth 2 (two) times a day      hydrochlorothiazide (HYDRODIURIL) 12 5 mg tablet Take 1 tablet (12 5 mg total) by mouth daily (Patient not taking: Reported on 8/1/2019) 30 tablet 0    spironolactone (ALDACTONE) 25 mg tablet Take 0 5 tablets (12 5 mg total) by mouth daily (Patient not taking: Reported on 8/1/2019) 30 tablet 0     No current facility-administered medications for this visit  Objective:    /80   Pulse 76   Temp 98 5 °F (36 9 °C)   Resp 16   Ht 5' 6" (1 676 m)   BMI 33 55 kg/m²        Physical Exam   Constitutional: She appears well-developed and well-nourished  Cardiovascular: Normal rate, regular rhythm and normal heart sounds  No murmur heard    Pulmonary/Chest: Effort normal and breath sounds normal    Musculoskeletal:   Right leg normal in appearance  Mild discomfort on palpation over distal tibia as well as lateral aspect of knee   Neurological: She is alert  Skin: Skin is warm and dry  Psychiatric: She has a normal mood and affect  Nursing note and vitals reviewed               Ricco Jones

## 2019-08-02 ENCOUNTER — TELEPHONE (OUTPATIENT)
Dept: CASE MANAGEMENT | Facility: OTHER | Age: 84
End: 2019-08-02

## 2019-08-02 NOTE — TELEPHONE ENCOUNTER
Alma Murillo is unable to put weight on right leg  Left leg is affected by MS  Was seen by MERARI Warner 8/1/19  This has been occurring since latest ER visit  Almaariella Murillo has not seen neurology  She has an order for ambulatory PT  She desires an order for home PT as she is essentially home bound  Thank you

## 2019-08-05 ENCOUNTER — PATIENT OUTREACH (OUTPATIENT)
Dept: FAMILY MEDICINE CLINIC | Facility: CLINIC | Age: 84
End: 2019-08-05

## 2019-08-05 NOTE — TELEPHONE ENCOUNTER
[facility]  Have to Write the Home pt order   Stephanie Anderson will take care of the rest    Thanks

## 2019-08-05 NOTE — PROGRESS NOTES
NNANNJ information sent to PCP for home PT referral  Address updated in demographics to show a street address  Artem Santoyo is hopeful to have them contact her this week

## 2019-08-05 NOTE — TELEPHONE ENCOUNTER
Dr Bety Patel can send:  1  An order for home PT  2  Face sheet and insurance information  3  Copy of your last visit with John Frazier    To [facility] [employer] [phone]

## 2019-08-16 ENCOUNTER — PATIENT OUTREACH (OUTPATIENT)
Dept: FAMILY MEDICINE CLINIC | Facility: CLINIC | Age: 84
End: 2019-08-16

## 2019-08-16 ENCOUNTER — TELEPHONE (OUTPATIENT)
Dept: CASE MANAGEMENT | Facility: OTHER | Age: 84
End: 2019-08-16

## 2019-08-16 NOTE — TELEPHONE ENCOUNTER
Hi Dr Anirudh Guevara, [facility] did not receive a referral for at home PT for [facility]  Did you send it somewhere else? No one has contact [facility] regarding this and she very much desires this service  Call me if you need assistance [phone]  Thank you  Notation from 8/2/19:  Dr Ciara Casillas can send:  1  An order for home PT  2  Face sheet and insurance information  3  Copy of your last visit with [facility]    To [facility] [employer] [phone]

## 2019-08-16 NOTE — PROGRESS NOTES
Elana Teague is unable to get into/ou of car due to leg weakness  She has canceled or has not scheduled specialty appointments  She is unable to get a shower as she is afraid of falling

## 2019-08-16 NOTE — TELEPHONE ENCOUNTER
Dr Aubrey Contreras, An order for home PT was not received by [facility]  The order has to be printed and faxed to them  Can you please add referral for shower assistance as well  These services are covered for a short while by medicare  Below is the contact information  Dr Dolly Rick can send:  1  An order for home PT and shower assistance  2  Face sheet and insurance information  3  Copy of your last visit with Nickolas Marshall  To [facility]   Fax [phone]    Thank you  Nickolas Marshall is unalble to attend specialty appointments as she can't get out of car with new leg weakness

## 2019-08-16 NOTE — PROGRESS NOTES
Clarisse Us is "still struggling to walk " She has heard nothing from [facility] regarding home PT orders sent by PCP

## 2019-08-19 ENCOUNTER — PATIENT OUTREACH (OUTPATIENT)
Dept: FAMILY MEDICINE CLINIC | Facility: CLINIC | Age: 84
End: 2019-08-19

## 2019-08-19 NOTE — TELEPHONE ENCOUNTER
Spoke with Vijaya Hahn this morning  Key Vizcaino put the order in on 8/1/2019 when she saw her while you were away  Faxed everything over this morning  No further action needed    Juan Walker MA

## 2019-08-22 ENCOUNTER — PATIENT OUTREACH (OUTPATIENT)
Dept: FAMILY MEDICINE CLINIC | Facility: CLINIC | Age: 84
End: 2019-08-22

## 2019-08-22 DIAGNOSIS — I10 ESSENTIAL HYPERTENSION: ICD-10-CM

## 2019-08-22 RX ORDER — LISINOPRIL 20 MG/1
20 TABLET ORAL 2 TIMES DAILY
Qty: 180 TABLET | Refills: 0 | Status: SHIPPED | OUTPATIENT
Start: 2019-08-22 | End: 2019-11-18 | Stop reason: SDUPTHER

## 2019-08-22 NOTE — PROGRESS NOTES
Jewelreilly Emperatriz is receiving home PT services  She will also be evaluated for [facility] services for shower assistance  She is "sore" from PT initiation  I have encouraged her not to give up and to use ice packs to sore areas after PT episode   We hang up to allow her to get ready for PT arrival

## 2019-09-05 ENCOUNTER — TELEPHONE (OUTPATIENT)
Dept: FAMILY MEDICINE CLINIC | Facility: CLINIC | Age: 84
End: 2019-09-05

## 2019-09-05 ENCOUNTER — PATIENT OUTREACH (OUTPATIENT)
Dept: FAMILY MEDICINE CLINIC | Facility: CLINIC | Age: 84
End: 2019-09-05

## 2019-09-05 NOTE — TELEPHONE ENCOUNTER
LMOM letting patient know that I am sending her POLST and 5 wishes forms for her to review and fill out and bring back with her to her appointment with Dr Janee Martinez in November  Forms were requested to be sent by Care Coordinator [facility] who has been speaking with patient  No further action needed at this time    Laly Talavera MA

## 2019-09-05 NOTE — PROGRESS NOTES
Ace Everett left great toe while getting in to shower, black and blue, no open areas  Encouraged PCP communication as she has DM  We review signs of infection  Gerri Villa checks FBS only  We review signs of hypoglycemia, does have episodes of night sweats described as seldom  We review hyperglycemia, she denies these  We identify carbohydrates  This will need to be reinforced  Gerri Villa receives Meals on Wheels 3x weekly  She is a breast cancer survivor, 4 years post mastectomy, needs mammogram  Cardiology is Dr Shirley Overton, may be due for appointment  Have stated we have these providers in [place]  Does not have end of life paperwork in place  Will speak to PCP  Will do med reconciliation with future call with  Rafal Holt  Edvin administers all meds

## 2019-09-24 ENCOUNTER — OFFICE VISIT (OUTPATIENT)
Dept: PODIATRY | Facility: CLINIC | Age: 84
End: 2019-09-24
Payer: MEDICARE

## 2019-09-24 VITALS — BODY MASS INDEX: 33.55 KG/M2 | HEIGHT: 66 IN | RESPIRATION RATE: 17 BRPM

## 2019-09-24 DIAGNOSIS — I70.203 ATHEROSCLEROSIS OF NATIVE ARTERY OF BOTH LOWER EXTREMITIES, WITH UNSPECIFIED PRESENCE OF CLINICAL MANIFESTATION (HCC): ICD-10-CM

## 2019-09-24 DIAGNOSIS — M79.671 PAIN IN BOTH FEET: ICD-10-CM

## 2019-09-24 DIAGNOSIS — B35.3 TINEA PEDIS OF BOTH FEET: ICD-10-CM

## 2019-09-24 DIAGNOSIS — B35.1 ONYCHOMYCOSIS: ICD-10-CM

## 2019-09-24 DIAGNOSIS — E11.42 DIABETIC POLYNEUROPATHY ASSOCIATED WITH TYPE 2 DIABETES MELLITUS (HCC): Primary | ICD-10-CM

## 2019-09-24 DIAGNOSIS — M79.672 PAIN IN BOTH FEET: ICD-10-CM

## 2019-09-24 PROCEDURE — 99211 OFF/OP EST MAY X REQ PHY/QHP: CPT | Performed by: PODIATRIST

## 2019-09-24 PROCEDURE — 11721 DEBRIDE NAIL 6 OR MORE: CPT | Performed by: PODIATRIST

## 2019-09-24 RX ORDER — KETOCONAZOLE 20 MG/G
CREAM TOPICAL DAILY
Qty: 60 G | Refills: 1 | Status: SHIPPED | OUTPATIENT
Start: 2019-09-24 | End: 2019-12-31 | Stop reason: ALTCHOICE

## 2019-09-26 ENCOUNTER — TELEPHONE (OUTPATIENT)
Dept: CASE MANAGEMENT | Facility: OTHER | Age: 84
End: 2019-09-26

## 2019-09-26 ENCOUNTER — PATIENT OUTREACH (OUTPATIENT)
Dept: FAMILY MEDICINE CLINIC | Facility: CLINIC | Age: 84
End: 2019-09-26

## 2019-09-26 NOTE — TELEPHONE ENCOUNTER
Mobility has improved with home PT, now discontinued  Azeem Boyd is walking more, with walker  Able to get in/out of shower/car  Doing PT exercises daily  Future need may be for more PT, if she realizes she is deconditioned again, through [facility] as they bill Medicare Part B  -130  Bed time snack ideas provided  Still undecided regarding cardiology provider, Dr Gui Valadez was last provider  Denies chest pain, dizziness, edema  Azeem Nix has my contact information and offer of future assistance  Reminded to discuss end of life documents with next PCP appointment

## 2019-10-09 ENCOUNTER — TELEPHONE (OUTPATIENT)
Dept: FAMILY MEDICINE CLINIC | Facility: CLINIC | Age: 84
End: 2019-10-09

## 2019-10-09 DIAGNOSIS — E11.9 TYPE 2 DIABETES MELLITUS WITHOUT COMPLICATION, WITHOUT LONG-TERM CURRENT USE OF INSULIN (HCC): ICD-10-CM

## 2019-10-09 DIAGNOSIS — E78.2 MIXED HYPERLIPIDEMIA: ICD-10-CM

## 2019-10-09 RX ORDER — SIMVASTATIN 20 MG
20 TABLET ORAL DAILY
Qty: 90 TABLET | Refills: 3 | Status: SHIPPED | OUTPATIENT
Start: 2019-10-09 | End: 2020-09-30 | Stop reason: SDUPTHER

## 2019-10-09 NOTE — TELEPHONE ENCOUNTER
Pt needs a refill of her Simvastatin 20mg and her Metformin  Please send it to [facility] in [place]

## 2019-10-29 LAB
ALBUMIN SERPL-MCNC: 4 G/DL (ref 3.5–4.7)
ALBUMIN/GLOB SERPL: 1.4 {RATIO} (ref 1.2–2.2)
ALP SERPL-CCNC: 96 IU/L (ref 39–117)
ALT SERPL-CCNC: 13 IU/L (ref 0–32)
AST SERPL-CCNC: 13 IU/L (ref 0–40)
BILIRUB SERPL-MCNC: 0.2 MG/DL (ref 0–1.2)
BUN SERPL-MCNC: 17 MG/DL (ref 8–27)
BUN/CREAT SERPL: 26 (ref 12–28)
CALCIUM SERPL-MCNC: 9.7 MG/DL (ref 8.7–10.3)
CHLORIDE SERPL-SCNC: 102 MMOL/L (ref 96–106)
CO2 SERPL-SCNC: 25 MMOL/L (ref 20–29)
CREAT SERPL-MCNC: 0.65 MG/DL (ref 0.57–1)
GLOBULIN SER-MCNC: 2.8 G/DL (ref 1.5–4.5)
GLUCOSE SERPL-MCNC: 137 MG/DL (ref 65–99)
HBA1C MFR BLD: 6.5 % (ref 4.8–5.6)
POTASSIUM SERPL-SCNC: 4.9 MMOL/L (ref 3.5–5.2)
PROT SERPL-MCNC: 6.8 G/DL (ref 6–8.5)
SL AMB EGFR AFRICAN AMERICAN: 96 ML/MIN/1.73
SL AMB EGFR NON AFRICAN AMERICAN: 83 ML/MIN/1.73
SODIUM SERPL-SCNC: 144 MMOL/L (ref 134–144)

## 2019-11-04 ENCOUNTER — OFFICE VISIT (OUTPATIENT)
Dept: FAMILY MEDICINE CLINIC | Facility: CLINIC | Age: 84
End: 2019-11-04
Payer: MEDICARE

## 2019-11-04 VITALS
BODY MASS INDEX: 33.55 KG/M2 | OXYGEN SATURATION: 98 % | HEIGHT: 66 IN | DIASTOLIC BLOOD PRESSURE: 70 MMHG | TEMPERATURE: 97.1 F | HEART RATE: 79 BPM | SYSTOLIC BLOOD PRESSURE: 124 MMHG | RESPIRATION RATE: 16 BRPM

## 2019-11-04 DIAGNOSIS — I10 ESSENTIAL HYPERTENSION: ICD-10-CM

## 2019-11-04 DIAGNOSIS — E11.9 TYPE 2 DIABETES MELLITUS WITHOUT COMPLICATION, WITHOUT LONG-TERM CURRENT USE OF INSULIN (HCC): ICD-10-CM

## 2019-11-04 DIAGNOSIS — I48.91 ATRIAL FIBRILLATION, UNSPECIFIED TYPE (HCC): ICD-10-CM

## 2019-11-04 DIAGNOSIS — G35 MULTIPLE SCLEROSIS (HCC): Primary | ICD-10-CM

## 2019-11-04 DIAGNOSIS — Z23 NEED FOR INFLUENZA VACCINATION: ICD-10-CM

## 2019-11-04 PROCEDURE — G0008 ADMIN INFLUENZA VIRUS VAC: HCPCS | Performed by: FAMILY MEDICINE

## 2019-11-04 PROCEDURE — 99214 OFFICE O/P EST MOD 30 MIN: CPT | Performed by: FAMILY MEDICINE

## 2019-11-04 PROCEDURE — 90662 IIV NO PRSV INCREASED AG IM: CPT | Performed by: FAMILY MEDICINE

## 2019-11-04 NOTE — PROGRESS NOTES
Subjective:           Problem List Items Addressed This Visit        Endocrine    Type 2 diabetes mellitus without complication, without long-term current use of insulin (HCC) stable a1c 6 5       Cardiovascular and Mediastinum    Atrial fibrillation (HCC) nsrs    Essential hypertension stable cont meds low  Salt high protein       Nervous and Auditory    Multiple sclerosis ([facility] [address] ) - Primary stable unchanged      Other Visit Diagnoses     Need for influenza vaccination        Relevant Orders    influenza vaccine, [id number], high-dose, PF 0 5 mL (FLUZONE HIGH-DOSE)              Orders Placed This Encounter   Procedures    influenza vaccine, [id number], high-dose, PF 0 5 mL (FLUZONE HIGH-DOSE)    Comprehensive metabolic panel     This is a patient instruction: Patient fasting for 8 hours or longer recommended  Standing Status:   Future     Standing Expiration Date:   11/4/2020    Hemoglobin A1C     Standing Status:   Future     Standing Expiration Date:   11/4/2020    Lipid panel     This is a patient instruction: This test requires patient fasting for 10-12 hours or longer  Drinking of black coffee or black tea is acceptable  Standing Status:   Future     Standing Expiration Date:   11/4/2020       Patient Instructions     Stay current with vaccinations  Cardiology follow up, annual eye exam, foot care  Fall precautions  Range of motion L shoulder with wall climbing exercise     PT/ x-ray if persists or worsens      Recent Results (from the past 1344 hour(s))   Comprehensive metabolic panel    Collection Time: 10/29/19  8:02 AM   Result Value Ref Range    Glucose, Random 137 (H) 65 - 99 mg/dL    BUN 17 8 - 27 mg/dL    Creatinine 0 65 0 57 - 1 00 mg/dL    eGFR Non  83 >59 mL/min/1 73    eGFR  96 >59 mL/min/1 73    SL AMB BUN/CREATININE RATIO 26 12 - 28    Sodium 144 134 - 144 mmol/L    Potassium 4 9 3 5 - 5 2 mmol/L    Chloride 102 96 - 106 mmol/L    CO2 25 20 - 29 mmol/L CALCIUM 9 7 8 7 - 10 3 mg/dL    Protein, Total 6 8 6 0 - 8 5 g/dL    Albumin 4 0 3 5 - 4 7 g/dL    Globulin, Total 2 8 1 5 - 4 5 g/dL    Albumin/Globulin Ratio 1 4 1 2 - 2 2    TOTAL BILIRUBIN 0 2 0 0 - 1 2 mg/dL    Alk Phos Isoenzymes 96 39 - 117 IU/L    AST 13 0 - 40 IU/L    ALT 13 0 - 32 IU/L   Hemoglobin A1c (w/out EAG)    Collection Time: 10/29/19  8:02 AM   Result Value Ref Range    Hemoglobin A1C 6 5 (H) 4 8 - 5 6 %         BMI Counseling: Body mass index is 33 55 kg/m²  Discussed the patient's BMI with her  The BMI is above normal  Nutrition recommendations include increasing intake of lean protein  Sandro Goodman    Chief Complaint   Patient presents with    Follow-up     4mo f/u   Doc LOWE Nehal Dillon is in for evaluation follow-up history of diabetes hypertension breast cancer, MS, atrial fibrillation    /70   Pulse 79   Temp (!) 97 1 °F (36 2 °C)   Resp 16   Ht 5' 6" (1 676 m)   SpO2 98%   BMI 33 55 kg/m²       Allergies   Allergen Reactions    Clindamycin      Annotation - [id number]: bad taste in mouth    Coumadin [Warfarin]      Reaction Date: 22May2012;  Annotation - [id number]: lip swelling    Dabigatran     Latex      Annotation - [id number]: RASH    Pradaxa [Dabigatran Etexilate Mesylate]        Current Outpatient Medications on File Prior to Visit   Medication Sig Dispense Refill    amLODIPine (NORVASC) 10 mg tablet TAKE 1 TABLET (10 MG TOTAL) BY MOUTH DAILY 90 tablet 1    ascorbic acid (CVS VITAMIN C) 500 MG tablet Take 1 tablet by mouth daily      aspirin 81 MG tablet Take 162 mg by mouth daily      FRANKI MICROLET LANCETS lancets by Other route 2 (two) times a day E11 9 Test blood sugar twice daily 100 each 5    glucose blood (FRANKI CONTOUR NEXT TEST) test strip 1 each by Other route 2 (two) times a day E11 9 test blood sugar twice daily 100 each 5    latanoprost (XALATAN) 0 005 % ophthalmic solution Administer 1 drop to both eyes daily at bedtime       lisinopril (ZESTRIL) 20 mg tablet TAKE 1 TABLET (20 MG TOTAL) BY MOUTH 2 (TWO) TIMES A  tablet 0    metFORMIN (GLUCOPHAGE) 1000 MG tablet Take 0 5 tablets (500 mg total) by mouth 2 (two) times a day with meals 90 tablet 3    Multiple Vitamin (MULTI-VITAMIN DAILY PO) Take by mouth daily      Omega-3 Fatty Acids (OMEGA-3 FISH OIL) 1000 MG CAPS Take 1 capsule by mouth daily      simvastatin (ZOCOR) 20 mg tablet Take 1 tablet (20 mg total) by mouth daily 90 tablet 3    sotalol (BETAPACE) 80 mg tablet Take 80 mg by mouth 2 (two) times a day      hydrochlorothiazide (HYDRODIURIL) 12 5 mg tablet Take 1 tablet (12 5 mg total) by mouth daily (Patient not taking: Reported on 8/1/2019) 30 tablet 0    ketoconazole (NIZORAL) 2 % cream Apply topically daily (Patient not taking: Reported on 11/4/2019) 60 g 1    spironolactone (ALDACTONE) 25 mg tablet Take 0 5 tablets (12 5 mg total) by mouth daily (Patient not taking: Reported on 8/1/2019) 30 tablet 0     No current facility-administered medications on file prior to visit          Past Medical History:   Diagnosis Date    Abscess of buttock, right     last assessed-5/4/2017    Cancer [facility])     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-last assessed-10/8/2015    Cellulitis of chest wall     last assessed-7/27/2015    Chronic endometritis 10/12/2006    Diabetes mellitus ([facility] [address] )     Dysuria 11/02/2007    Flushing     resolved-6/30/2015    Glaucoma     Hyperlipidemia     Hypertension     Ingrown nail 01/05/2012    Malignant neoplasm of breast ([facility] [address] )     last assessed-11/5/2015    MS (multiple sclerosis) ([facility] [address] )     Nonvenomous insect bite     last assessed-12/12/2013    Palpitations 02/09/2011    Pressure ulcer of buttock 10/13/2006    Proctitis 05/12/2006    Vaginal polyp 03/14/2006    Viral gastroenteritis     last assessed-9/8/2016       Past Surgical History:   Procedure Laterality Date    BREAST BIOPSY      open    BREAST SURGERY      CERVICAL BIOPSY  W/ LOOP ELECTRODE EXCISION      DILATION AND CURETTAGE OF UTERUS      INCISION AND DRAINAGE OF WOUND Left 2/27/2017    Procedure: INCISION AND DRAINAGE (I&D)   Chest wall x 2;  Surgeon: Tristin Frausto MD;  Location: [facility];  Service:     MASTECTOMY      last assessed-6/30/2015          reports that she has quit smoking  She has never used smokeless tobacco  She reports that she drank alcohol  She reports that she has current or past drug history  reports that she has quit smoking  She has never used smokeless tobacco         Review of Systems   Constitutional: Positive for fatigue  Negative for activity change and fever  HENT: Negative for congestion and nosebleeds  Respiratory: Negative for chest tightness and shortness of breath  Cardiovascular: Negative for chest pain and palpitations  Gastrointestinal: Negative for abdominal distention and blood in stool  Genitourinary: Negative for difficulty urinating, dysuria, flank pain, hematuria, pelvic pain and vaginal discharge  Musculoskeletal: Positive for arthralgias  L shoulder restriction  abduction   Neurological: Positive for weakness  Negative for dizziness, tremors, seizures and speech difficulty  MS LE weakness Wheelchair and transfer issues,'   Hematological: Negative for adenopathy  Psychiatric/Behavioral: Negative for agitation, confusion and hallucinations  Physical Exam   Constitutional: She is oriented to person, place, and time  She appears well-developed  HENT:   Head: Normocephalic and atraumatic  Mouth/Throat: Oropharynx is clear and moist    Eyes: Pupils are equal, round, and reactive to light  Conjunctivae and EOM are normal  No scleral icterus  Neck: Normal range of motion  No JVD present  No thyromegaly present  Cardiovascular: Normal rate and regular rhythm  No murmur heard  Pulmonary/Chest: Effort normal and breath sounds normal  No stridor  Abdominal: Soft  She exhibits no distension  There is no guarding  Musculoskeletal: She exhibits edema  She exhibits no deformity  robert LE trace ankles  L > R     restriction L shoulder > 90     in WC   Lymphadenopathy:     She has no cervical adenopathy  Neurological: She is alert and oriented to person, place, and time  She displays abnormal reflex  No cranial nerve deficit  She exhibits abnormal muscle tone  LE L>R weakness   Skin: Skin is warm  Capillary refill takes less than 2 seconds  No erythema  AK seb k chest breast line  Psychiatric: She has a normal mood and affect   Her behavior is normal  Judgment and thought content normal

## 2019-11-04 NOTE — PATIENT INSTRUCTIONS
Stay current with vaccinations  Cardiology follow up, annual eye exam, foot care  Fall precautions  Range of motion L shoulder with wall climbing exercise     PT/ x-ray if persists or worsens      Recent Results (from the past 1344 hour(s))   Comprehensive metabolic panel    Collection Time: 10/29/19  8:02 AM   Result Value Ref Range    Glucose, Random 137 (H) 65 - 99 mg/dL    BUN 17 8 - 27 mg/dL    Creatinine 0 65 0 57 - 1 00 mg/dL    eGFR Non  83 >59 mL/min/1 73    eGFR  96 >59 mL/min/1 73    SL AMB BUN/CREATININE RATIO 26 12 - 28    Sodium 144 134 - 144 mmol/L    Potassium 4 9 3 5 - 5 2 mmol/L    Chloride 102 96 - 106 mmol/L    CO2 25 20 - 29 mmol/L    CALCIUM 9 7 8 7 - 10 3 mg/dL    Protein, Total 6 8 6 0 - 8 5 g/dL    Albumin 4 0 3 5 - 4 7 g/dL    Globulin, Total 2 8 1 5 - 4 5 g/dL    Albumin/Globulin Ratio 1 4 1 2 - 2 2    TOTAL BILIRUBIN 0 2 0 0 - 1 2 mg/dL    Alk Phos Isoenzymes 96 39 - 117 IU/L    AST 13 0 - 40 IU/L    ALT 13 0 - 32 IU/L   Hemoglobin A1c (w/out EAG)    Collection Time: 10/29/19  8:02 AM   Result Value Ref Range    Hemoglobin A1C 6 5 (H) 4 8 - 5 6 %

## 2019-11-18 DIAGNOSIS — I10 ESSENTIAL HYPERTENSION: ICD-10-CM

## 2019-11-18 RX ORDER — LISINOPRIL 20 MG/1
20 TABLET ORAL 2 TIMES DAILY
Qty: 180 TABLET | Refills: 3 | Status: SHIPPED | OUTPATIENT
Start: 2019-11-18 | End: 2020-11-19 | Stop reason: SDUPTHER

## 2019-12-06 ENCOUNTER — HOSPITAL ENCOUNTER (OUTPATIENT)
Dept: RADIOLOGY | Facility: HOSPITAL | Age: 84
Discharge: HOME | End: 2019-12-06
Attending: INTERNAL MEDICINE
Payer: MEDICARE

## 2019-12-06 ENCOUNTER — HOSPITAL ENCOUNTER (OUTPATIENT)
Dept: RADIOLOGY | Facility: HOSPITAL | Age: 84
Discharge: HOME | End: 2019-12-06
Payer: MEDICARE

## 2019-12-06 VITALS — WEIGHT: 194 LBS | BODY MASS INDEX: 31.18 KG/M2 | HEIGHT: 66 IN

## 2019-12-06 DIAGNOSIS — C50.911 MALIGNANT NEOPLASM OF RIGHT BREAST, STAGE 1, ESTROGEN RECEPTOR POSITIVE (HCC): ICD-10-CM

## 2019-12-06 DIAGNOSIS — Z17.0 MALIGNANT NEOPLASM OF RIGHT BREAST, STAGE 1, ESTROGEN RECEPTOR POSITIVE (HCC): ICD-10-CM

## 2019-12-06 PROCEDURE — 76642 ULTRASOUND BREAST LIMITED: CPT

## 2019-12-06 PROCEDURE — 77065 DX MAMMO INCL CAD UNI: CPT

## 2019-12-13 ENCOUNTER — OFFICE VISIT (OUTPATIENT)
Dept: HEMATOLOGY ONCOLOGY | Facility: MEDICAL CENTER | Age: 84
End: 2019-12-13
Payer: MEDICARE

## 2019-12-13 VITALS
DIASTOLIC BLOOD PRESSURE: 62 MMHG | OXYGEN SATURATION: 98 % | TEMPERATURE: 96.3 F | HEART RATE: 83 BPM | RESPIRATION RATE: 16 BRPM | SYSTOLIC BLOOD PRESSURE: 130 MMHG

## 2019-12-13 DIAGNOSIS — C50.911 MALIGNANT NEOPLASM OF RIGHT BREAST, STAGE 1, ESTROGEN RECEPTOR POSITIVE (HCC): Primary | ICD-10-CM

## 2019-12-13 DIAGNOSIS — Z17.0 MALIGNANT NEOPLASM OF RIGHT BREAST, STAGE 1, ESTROGEN RECEPTOR POSITIVE (HCC): Primary | ICD-10-CM

## 2019-12-13 PROCEDURE — 99213 OFFICE O/P EST LOW 20 MIN: CPT | Performed by: INTERNAL MEDICINE

## 2019-12-13 NOTE — PROGRESS NOTES
Sandy Mcginnis  1935  [facility] [place] [id number]    DISCUSSION  SUMMARY:    31-year-old female with a history of stage IA right breast cancer status post mastectomy in June 2015  Issues:      Stage IA breast cancer (ER/[place] positive, HER-2/marlys negative ) Clinically there are no troubling breast cancer related signs  The most recent left mammogram and ultrasound demonstrated 2 questionable new lesions  A 6 month ultrasound follow-up was requested - this has been ordered  At this time, Mrs Abrahan Evans is to return in 12 months  This will change if the ultrasound in 6 months demonstrates evidence for progression  As discussed previously, patient has hormone receptor positive breast cancer but deferred hormonal manipulation  Mrs Hurley understands that deferring the hormonal manipulation increases her risk for recurrence  Additionally, as before, patient has not been very keen on coming back to the oncology office  Mrs Hurley agrees to return in 12 months  Afterwards, if no issues, she would rather be followed by her PCP only  [facility], patients such as Ms Hurley (using G2 histology with average for age medical problems), would have a 19 4% chance for relapse with surgery alone  Hormone manipulation would decrease this number by 5 3%  Chemotherapy alone would decrease the relapse rate by 1 5%  Using G1 histology and major problems (patient has multiple sclerosis), the hormonal benefit is 1 6% and the chemotherapy benefit is 0 4% for relapse  The mortality benefit statistics are obviously less impressive  The decision to hold off on hormonal manipulation was a well thought out decision by the patient - Mrs Hurley understands that she is still at risk for recurrent breast cancer  MS  Clinically patient seems stable  Mrs Hurley has not seen her neurologist in a number of years - patient's choice      Routine health maintenance and medical care  Up-to-date as per patient  Patient knows to call the hematology/oncology office if there are any other questions or concerns  Carefully review your medication list and verify that the list is accurate and up-to-date  Please call the hematology/oncology office if there are medications missing from the list, medications on the list that you are not currently taking or if there is a dosage or instruction that is different from how you're taking that medication  Patient goals and areas of care:  Breast cancer surveillance, six-month ultrasound  Barriers to care:  Comorbidities  Patient is not able to self-care   _____________________________________________________________________________________    Chief Complaint   Patient presents with    Follow-up     History of breast cancer, on surveillance     History of Present Illness:    44-year-old female previously diagnosed with breast cancer here for oncology followup  A routine mammogram demonstrated a right-sided abnormality  Mrs Hurley did not feel any masses or lumps  Patient was seen by Dr Ingrid Love and underwent stereotactic needle biopsy  Results demonstrated invasive carcinoma  Patient has also been seen by Dr Sharmaine Davila; status post mastectomy with sentinel lymph node sampling  Previously we discussed postsurgical treatment options including chemotherapy (not indicated) and hormonal manipulation (patient initially agreed but then decided against)  Mrs Hurley returns for followup  Mrs Hurley states feeling okay, about the same as before  Patient has MS, activities are extremely limited but no different than before  No pain control issues  No specific breast issues  Appetite is okay, no GI,  or gyn issues  Fatigue is the same as before  Review of Systems   Constitutional: Positive for fatigue  HENT: Negative  Eyes: Negative  Respiratory: Negative  Cardiovascular: Negative  Gastrointestinal: Negative  Endocrine: Negative  Genitourinary: Negative  Musculoskeletal: Negative  Skin: Negative  Allergic/Immunologic: Negative  Neurological: Positive for weakness  Hematological: Negative  Psychiatric/Behavioral: Negative  All other systems reviewed and are negative       Patient Active Problem List   Diagnosis    Mixed hyperlipidemia    Atrial fibrillation ([facility] )    Multiple sclerosis ([facility] )    Essential hypertension    Type 2 diabetes mellitus without complication, without long-term current use of insulin ([facility] )    Urinary incontinence    Cancer of right breast, stage 1 (HCC)    Arthropathy of multiple sites    Abnormal gait    Granuloma of great toe    Diabetic polyneuropathy associated with type 2 diabetes mellitus ([facility] )    Atherosclerosis of native artery of both lower extremities (HCC)    Chest pain    Thyroid nodule    Onychomycosis    Tinea pedis of both feet    Pain in both feet     Past Medical History:   Diagnosis Date    Abscess of buttock, right     last assessed-5/4/2017    Cancer [facility])     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-last assessed-10/8/2015    Cellulitis of chest wall     last assessed-7/27/2015    Chronic endometritis 10/12/2006    Diabetes mellitus ([facility] )     Dysuria 11/02/2007    Flushing     resolved-6/30/2015    Glaucoma     Hyperlipidemia     Hypertension     Ingrown nail 01/05/2012    Malignant ne[place] of breast ([place] [address] )     last assessed-11/5/2015    MS (multiple sclerosis) ([place] [address] )     Nonvenomous insect bite     last assessed-12/12/2013    Palpitations 02/09/2011    Pressure ulcer of buttock 10/13/2006    Proctitis 05/12/2006    Vaginal polyp 03/14/2006    Viral gastroenteritis     last assessed-9/8/2016     Past Surgical History:   Procedure Laterality Date    BREAST BIOPSY      open    BREAST SURGERY      CERVICAL BIOPSY  W/ LOOP ELECTRODE EXCISION      DILATION AND CURETTAGE OF UTERUS      INCISION AND DRAINAGE OF WOUND Left 2/27/2017    Procedure: INCISION AND DRAINAGE (I&D)   Chest wall x 2;  Surgeon: Ramon Stokes MD;  Location: [facility];  Service:     MASTECTOMY      last assessed-6/30/2015     Family History   Problem Relation Age of Onset    Heart disease Father         cardiac disorder    COPD Sister     Diabetes Sister     Lung cancer Mother     Lung cancer Cousin     Heart disease Cousin         cardiac disorder    Multiple sclerosis Cousin     Cancer Cousin     Cancer Daughter         bladder    Breast cancer Maternal Aunt      Social History     Socioeconomic History    Marital status: /Civil Union     Spouse name: Not on file    Number of children: 3    Years of education: Not on file    Highest education level: Not on file   Occupational History    Not on file   Social Needs    Financial resource strain: Not on file    Food insecurity:     Worry: Not on file     Inability: Not on file    Transportation needs:     Medical: Not on file     Non-medical: Not on file   Tobacco Use    Smoking status: Former Smoker    Smokeless tobacco: Never Used   Substance and Sexual Activity    Alcohol use: Not Currently    Drug use: Not Currently    Sexual activity: Not Currently   Lifestyle    Physical activity:     Days per week: Not on file     Minutes per session: Not on file    Stress: Not on file   Relationships    Social connections:     Talks on phone: Not on file     Gets together: Not on file     Attends Baptism service: Not on file     Active member of club or organization: Not on file     Attends meetings of clubs or organizations: Not on file     Relationship status: Not on file    Intimate partner violence:     Fear of current or ex partner: Not on file     Emotionally abused: Not on file     Physically abused: Not on file     Forced sexual activity: Not on file   Other Topics Concern    Not on file   Social History Narrative    Not on file       Current Outpatient Medications:     amLODIPine (NORVASC) 10 mg tablet, TAKE 1 TABLET (10 MG TOTAL) BY MOUTH DAILY, Disp: 90 tablet, Rfl: 1    ascorbic acid ([employer]S VITAMIN C) 500 MG tablet, Take 1 tablet by mouth daily, Disp: , Rfl:     aspirin 81 MG tablet, Take 162 mg by mouth daily, Disp: , Rfl:     FRANKI MICROLET LANCETS lancets, by Other route 2 (two) times a day E11 9 Test blood sugar twice daily, Disp: 100 each, Rfl: 5    glucose blood (FRANKI CONTOUR NEXT TEST) test strip, 1 each by Other route 2 (two) times a day E11 9 test blood sugar twice daily, Disp: 100 each, Rfl: 5    latanoprost (XALATAN) 0 005 % ophthalmic solution, Administer 1 drop to both eyes daily at bedtime , Disp: , Rfl:     lisinopril (ZESTRIL) 20 mg tablet, Take 1 tablet (20 mg total) by mouth 2 (two) times a day, Disp: 180 tablet, Rfl: 3    metFORMIN (GLUCOPHAGE) 1000 MG tablet, Take 0 5 tablets (500 mg total) by mouth 2 (two) times a day with meals, Disp: 90 tablet, Rfl: 3    Multiple Vitamin (MULTI-VITAMIN DAILY PO), Take by mouth daily, Disp: , Rfl:     Omega-3 Fatty Acids (OMEGA-3 FISH OIL) 1000 MG CAPS, Take 1 capsule by mouth daily, Disp: , Rfl:     simvastatin (ZOCOR) 20 mg tablet, Take 1 tablet (20 mg total) by mouth daily, Disp: 90 tablet, Rfl: 3    sotalol (BETAPACE) 80 mg tablet, Take 80 mg by mouth 2 (two) times a day, Disp: , Rfl:     hydrochlorothiazide (HYDRODIURIL) 12 5 mg tablet, Take 1 tablet (12 5 mg total) by mouth daily (Patient not taking: Reported on 8/1/2019), Disp: 30 tablet, Rfl: 0    ketoconazole (NIZORAL) 2 % cream, Apply topically daily (Patient not taking: Reported on 11/4/2019), Disp: 60 g, Rfl: 1    spironolactone (ALDACTONE) 25 mg tablet, Take 0 5 tablets (12 5 mg total) by mouth daily (Patient not taking: Reported on 8/1/2019), Disp: 30 tablet, Rfl: 0    Allergies   Allergen Reactions    Clindamycin      Annotation - [id number]: bad taste in mouth    Coumadin [Warfarin]      Reaction Date: 22May2012; Annotation - [id number]: lip swelling    Dabigatran     Latex      Annotation - [id number]: RASH    Pradaxa [Dabigatran Etexilate Mesylate]        Vitals:    12/13/19 1141   BP: 130/62   Pulse: 83   Resp: 16   Temp: (!) 96 3 °F (35 7 °C)   SpO2: 98%     Physical Exam   Constitutional: She is oriented to person, place, and time  She appears well-developed and well-nourished  Elderly female, wheelchair bound, obese, no respiratory distress   HENT:   Head: Normocephalic and atraumatic  Right Ear: External ear normal    Left Ear: External ear normal    Mouth/Throat: Oropharynx is clear and moist    Eyes: Pupils are equal, round, and reactive to light  Conjunctivae and EOM are normal    Neck: Normal range of motion  Neck supple  Cardiovascular: Normal rate, regular rhythm, normal heart sounds and intact distal pulses  Pulmonary/Chest:   Fair air entry bilaterally   Abdominal: Soft  Bowel sounds are normal    Obese cannot palpate liver or spleen, +bowel sounds, nontender   Musculoskeletal: Normal range of motion  Neurological: She is alert and oriented to person, place, and time  Decreased motor in lower and upper extremities, neck is chronically tilted downward, speech is clear, response time is slightly delayed, patient is appropriate and responds intelligently   Skin: Skin is warm  Relatively good color, warm, moist, no petechiae or ecchymoses   Psychiatric: She has a normal mood and affect   Her behavior is normal  Judgment and thought content normal    Breasts ( present) right anterior chest wall with well-healed suture line, no axillary adenopathy bilaterally  Extremities: 2+ bilateral lower extremity edema plus obesity, no cords, pulses are 1+  Lymphatics:  No adenopathy in the neck, supraclavicular region, axilla and groin bilaterally    Performance Status: 0 - Asymptomatic (from a breast cancer standpoint)    Labs    10/29/2019 BUN = 17 creatinine = 0  65 LFTs WNL calcium = 9 7  07/11/2019 CA 27, 29 = 30 3  06/29/2018 BUN = 16 creatinine = 0 72 calcium = 9 5 LFTs WNL CA 27, 29 = 38 8 WBC = 7 3 hemoglobin = 13 5 hematocrit = 41 3 platelet = 923 neutrophil = 67%  12/21/2017 CA 27, 29 = 38 4 BUN = 14 creatinine = 0 76 calcium = 9 5 LFTs WNL WBC = 8 0 hemoglobin = 13 8 hematocrit = 42 2 platelet = 550 neutrophil = 66%      Imaging    12/06/2019 diagnostic left mammogram with CAD and left breast ultrasound  Impression stated 2 new lesions, 1 of which is a simple cyst and the other is probably a complicated cyst or benign type solid nodule  Six-month follow-up ultrasound of the lesion at 12:00 pm location 2 cm from the nipple is recommended  Category 3, probably benign  07/09/2018 diagnostic left mammogram with CAD was category 2, benign with yearly follow-up  07/07/2017 diagnostic left digital mammogram was category 2, benign with no evidence of malignancy  Pathology    Oncotype recurrence score = 8 ER and MT RT-PCR were positive; HER-2/marlys was negative     Right breast mastectomy was performed on Hyacinth 3, 2015  Results demonstrated invasive breast carcinoma, tumor site = upper outer quadrant, invasive breast carcinoma no special type with neuroendocrine differentiation and mucinous features, tumor size = 15 mm, tumor grade = G1-2, focality = unifocal, invasive did not invade the dermis and epidermis or nipple, there was no skeletal muscle present, lymphovascular invasion was not identified  Dermal lymphovascular invasion was not identified  DCIS was present non-extensive component, size = 0 2 cm in greatest measurable diameter  Lobular carcinoma was not identified  Margins were negative  Right sentinel lymph node was negative for malignancy (0/1)  AJCC = pT1c pN0 G1-2 cM0 ER/MT positive HER-2/marlys = 1+ = negative R0 = stage IA     Right breast core biopsies from May 8, 2015 demonstrated invasive mammary carcinoma, no special type, present in all cores  Largest single focus was 1 cm, histologic grade = I-II, estrogen receptor = 100% progesterone receptor = 100% HER-2/marlys by IHC = 1+ = negative

## 2019-12-17 ENCOUNTER — OFFICE VISIT (OUTPATIENT)
Dept: PODIATRY | Facility: CLINIC | Age: 84
End: 2019-12-17
Payer: MEDICARE

## 2019-12-17 VITALS — RESPIRATION RATE: 17 BRPM | WEIGHT: 194 LBS | BODY MASS INDEX: 31.18 KG/M2 | HEIGHT: 66 IN

## 2019-12-17 DIAGNOSIS — M79.671 PAIN IN BOTH FEET: ICD-10-CM

## 2019-12-17 DIAGNOSIS — I70.203 ATHEROSCLEROSIS OF NATIVE ARTERY OF BOTH LOWER EXTREMITIES, WITH UNSPECIFIED PRESENCE OF CLINICAL MANIFESTATION (HCC): ICD-10-CM

## 2019-12-17 DIAGNOSIS — E11.42 DIABETIC POLYNEUROPATHY ASSOCIATED WITH TYPE 2 DIABETES MELLITUS (HCC): Primary | ICD-10-CM

## 2019-12-17 DIAGNOSIS — B35.1 ONYCHOMYCOSIS: ICD-10-CM

## 2019-12-17 DIAGNOSIS — M79.672 PAIN IN BOTH FEET: ICD-10-CM

## 2019-12-17 PROCEDURE — 11721 DEBRIDE NAIL 6 OR MORE: CPT | Performed by: PODIATRIST

## 2019-12-17 NOTE — PROGRESS NOTES
Plan      Aseptic debridement and planning of nails x10 and manually and mechanically           Diagnoses and all orders for this visit:   Assessment  Diabetic polyneuropathy associated with type 2 diabetes mellitus ([facility] )     Atherosclerosis of native artery of both lower extremities, with unspecified presence of clinical manifestation ([facility] )     Pain in both feet     Onychomycosis             Subjective:  patient is diabetic  She has pain in her feet with ambulation  No history of trauma  She has pain with shoe wear       Patient ID: Ernestina Hurley is a 82 y  o  female      Patient has chief complaint of thick painful nails that hurt when walking wearing shoes   Patient is mostly in wheelchair    Patient is follow-up for swelling left 2nd digit   Patient never went for x-ray   Patient has no pain no fever   Patient still has swelling but no redness         Medical History           Past Medical History:   Diagnosis Date    Abscess of buttock, right       last assessed-5/4/2017    Cancer [facility])       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-last assessed-10/8/2015    Cellulitis of chest wall       last assessed-7/27/2015    Chronic endometritis 10/12/2006    Diabetes mellitus ([facility] )      Dysuria 11/02/2007    Flushing       resolved-6/30/2015    Hyperlipidemia      Hypertension      Ingrown nail 01/05/2012    Malignant neoplasm of breast (HCC)       last assessed-11/5/2015    MS (multiple sclerosis) (HCC)      Nonvenomous insect bite       last assessed-12/12/2013    Palpitations 02/09/2011    Pressure ulcer of buttock 10/13/2006    Proctitis 05/12/2006    Vaginal polyp 03/14/2006    Viral gastroenteritis       last assessed-9/8/2016            Surgical History             Past Surgical History:   Procedure Laterality Date    BREAST BIOPSY         open    BREAST SURGERY        CERVICAL BIOPSY  W/ LOOP ELECTRODE EXCISION        DILATION AND CURETTAGE OF UTERUS        INCISION AND DRAINAGE OF WOUND Left 2/27/2017     Procedure: INCISION AND DRAINAGE (I&D)   Chest wall x 2;  Surgeon: Danni Vegas MD;  Location: [facility];  Service:     MASTECTOMY         last assessed-6/30/2015                      Allergies   Allergen Reactions    Clindamycin         Annotation - [id number]: bad taste in mouth    Coumadin [Warfarin]         Reaction Date: [id number];  Annotation - [id number]: lip swelling    Latex         Annotation - [id number]: RASH    Pradaxa [Dabigatran Etexilate Mesylate]              Current Outpatient Medications:     amLODIPine (NORVASC) 10 mg tablet, TAKE 1 TABLET (10 MG TOTAL) BY MOUTH DAILY, Disp: 90 tablet, Rfl: 1    ascorbic acid (CVS VITAMIN C) 500 MG tablet, Take 1 tablet by mouth daily, Disp: , Rfl:     aspirin 81 MG tablet, Take 162 mg by mouth daily, Disp: , Rfl:     FRANKI MICROLET LANCETS lancets, by Other route 2 (two) times a day E11 9 Test blood sugar twice daily, Disp: 100 each, Rfl: 5    glucose blood (FRANKI CONTOUR NEXT TEST) test strip, 1 each by Other route 2 (two) times a day E11 9 test blood sugar twice daily, Disp: 100 each, Rfl: 5    latanoprost (XALATAN) 0 005 % ophthalmic solution, Apply 1 drop to eye, Disp: , Rfl:     lisinopril (ZESTRIL) 20 mg tablet, Take 1 tablet (20 mg total) by mouth 2 (two) times a day, Disp: 180 tablet, Rfl: 3    metFORMIN (GLUCOPHAGE) 1000 MG tablet, Take 0 5 tablets (500 mg total) by mouth 2 (two) times a day with meals, Disp: 90 tablet, Rfl: 3    Multiple Vitamin (MULTI-VITAMIN DAILY PO), Take by mouth daily, Disp: , Rfl:     Omega-3 Fatty Acids (OMEGA-3 FISH OIL) 1000 MG CAPS, Take 1 capsule by mouth daily, Disp: , Rfl:     simvastatin (ZOCOR) 20 mg tablet, Take 1 tablet (20 mg total) by mouth daily, Disp: 90 tablet, Rfl: 3    sotalol (BETAPACE) 80 mg tablet, Take 80 mg by mouth 2 (two) times a day, Disp: , Rfl:            Patient Active Problem List   Diagnosis    Mixed hyperlipidemia    Atrial fibrillation (HCC)  Multiple sclerosis (HCC)    Weakness    Essential hypertension    Type 2 diabetes mellitus without complication, without long-term current use of insulin (HCC)    Urinary incontinence    Cancer of right breast, stage 1 (HCC)    Arthropathy of multiple sites    Abnormal gait    Granuloma of great toe    Diabetic polyneuropathy associated with type 2 diabetes mellitus ([facility] )    Atherosclerosis of native artery of both lower extremities (HCC)         Review of Systems   Constitutional: Negative     HENT: Negative     Eyes: Negative     Respiratory: Negative     Cardiovascular: Negative     Gastrointestinal: Negative     Endocrine: Negative     Genitourinary: Negative     Musculoskeletal: Positive for gait problem and joint swelling  Skin: Negative     Allergic/Immunologic: Negative     Hematological: Negative     Psychiatric/Behavioral: Negative           Objective:  Patient's shoes and socks were removed, feet examined          /72   Pulse 86   Resp 17   Ht 5' 6" (1 676 m)   Wt 90 7 kg (200 lb)   BMI 32 28 kg/m²             Physical Exam   Cardiovascular: Pulses are weak pulses  Pulses:       Dorsalis pedis pulses are 1+ on the right side, and 1+ on the left side         Posterior tibial pulses are Absent on the right side, and Absent on the left side  Feet:   Right Foot:   Skin Integrity: Positive for callus and dry skin     Left Foot:   Skin Integrity: Positive for callus and dry skin    Nursing note and vitals reviewed      Foot Exam     General  General Appearance: appears stated age and healthy   Orientation: alert and oriented to person, place, and time   Affect: appropriate   Assistance: wheelchair use      Verbena tinea pedis noted bilateral        Right Foot/Ankle      Inspection and Palpation  Arch: pes planus  Hammertoes: second toe, fifth toe, fourth toe and third toe  Hallux limitus: yes  Skin Exam: callus, dry skin and skin changes;      Neurovascular  Dorsalis pedis: 1+  Posterior tibial: absent        Left Foot/Ankle       Inspection and Palpation  Arch: pes planus  Hammertoes: second toe, fifth toe, fourth toe and third toe  Hallux limitus: yes  Skin Exam: callus, dry skin and skin changes;      Neurovascular  Dorsalis pedis: 1+  Posterior tibial: absent                 Patient's shoes and socks removed  Right Foot/Ankle   Right Foot Inspection  Skin Exam: dry skin, callus and callus                  Sensory   Vibration: absent     Monofilament testing: diminished  Vascular  Capillary refills: elevated  The right DP pulse is 1+  The right PT pulse is absent  Right Toe  - Comprehensive Exam  Arch: pes planus  Hammertoes: second toe, fifth toe, fourth toe and third toe  Hallux limitus: yes     Left Foot/Ankle  Left Foot Inspection  Skin Exam: dry skin and callus                 Jackalyn Mom  Proprioception: diminished  Monofilament: diminished  Vascular  Capillary refills: elevated  The left DP pulse is 1+  The left PT pulse is absent     Left Toe  - Comprehensive Exam  Arch: pes planus  Hammertoes: second toe, fifth toe, fourth toe and third toe  Hallux limitus: yes  Assign Risk Category:  Deformity present; Loss of protective sensation; Weak pulses       Risk: 2

## 2019-12-31 ENCOUNTER — OFFICE VISIT (OUTPATIENT)
Dept: FAMILY MEDICINE CLINIC | Facility: CLINIC | Age: 84
End: 2019-12-31
Payer: MEDICARE

## 2019-12-31 VITALS
TEMPERATURE: 98.2 F | OXYGEN SATURATION: 96 % | DIASTOLIC BLOOD PRESSURE: 77 MMHG | HEART RATE: 88 BPM | RESPIRATION RATE: 16 BRPM | SYSTOLIC BLOOD PRESSURE: 142 MMHG

## 2019-12-31 DIAGNOSIS — E11.42 DIABETIC POLYNEUROPATHY ASSOCIATED WITH TYPE 2 DIABETES MELLITUS (HCC): ICD-10-CM

## 2019-12-31 DIAGNOSIS — I48.91 ATRIAL FIBRILLATION, UNSPECIFIED TYPE (HCC): ICD-10-CM

## 2019-12-31 DIAGNOSIS — I70.203 ATHEROSCLEROSIS OF NATIVE ARTERY OF BOTH LOWER EXTREMITIES, WITH UNSPECIFIED PRESENCE OF CLINICAL MANIFESTATION (HCC): ICD-10-CM

## 2019-12-31 DIAGNOSIS — L30.9 DERMATITIS: ICD-10-CM

## 2019-12-31 DIAGNOSIS — L72.3 SEBACEOUS CYST: Primary | ICD-10-CM

## 2019-12-31 DIAGNOSIS — G35 MULTIPLE SCLEROSIS (HCC): ICD-10-CM

## 2019-12-31 PROCEDURE — 99213 OFFICE O/P EST LOW 20 MIN: CPT | Performed by: FAMILY MEDICINE

## 2019-12-31 RX ORDER — CLOTRIMAZOLE AND BETAMETHASONE DIPROPIONATE 10; .5 MG/ML; MG/ML
LOTION TOPICAL 2 TIMES DAILY
Qty: 30 ML | Refills: 0 | Status: SHIPPED | OUTPATIENT
Start: 2019-12-31 | End: 2020-05-07 | Stop reason: ALTCHOICE

## 2019-12-31 RX ORDER — CEPHALEXIN 500 MG/1
500 CAPSULE ORAL EVERY 12 HOURS SCHEDULED
Qty: 10 CAPSULE | Refills: 0 | Status: SHIPPED | OUTPATIENT
Start: 2019-12-31 | End: 2020-01-05

## 2019-12-31 NOTE — PROGRESS NOTES
Subjective:           Problem List Items Addressed This Visit        Endocrine    Diabetic polyneuropathy associated with type 2 diabetes mellitus ([facility] ) stable cont medication diet       Cardiovascular and Mediastinum    Atrial fibrillation (HCC) stable cont medication    Atherosclerosis of native artery of both lower extremities ([facility] )       Nervous and Auditory    Multiple sclerosis ([facility] )      Other Visit Diagnoses     Sebaceous cyst    -  Primary topical bacitracin F/U if worsens or persists ? ? ECM no tick noted    Relevant Medications    cephalexin (KEFLEX) 500 mg capsule    clotrimazole-betamethasone (LOTRISONE) 1-0 05 % lotion    Dermatitis        Relevant Medications    clotrimazole-betamethasone (LOTRISONE) 1-0 05 % lotion              No orders of the defined types were placed in this encounter  Patient Instructions   Irdry  follow up if worsens or persists 1 week    BMI Counseling: There is no height or weight on file to calculate BMI  Discussed the patient's BMI with her  The BMI is above normal  Nutrition recommendations include moderation in carbohydrate intake and reducing intake of saturated fat and trans fat  [facility]    Chief Complaint   Patient presents with    cyst     under left arm  jmcma     HPI Bridget Shelling is in for cyst under arm draining  She has diabetes has multiple sclerosis she denies fever chills she has multiple medication allergies  Her last A1c is 6 5 history of AFib CAD stable       Was spraying with Bactine and applying various topicals ? ? Ring rash    /77   Pulse 88   Temp 98 2 °F (36 8 °C)   Resp 16   SpO2 96%       Allergies   Allergen Reactions    Clindamycin      Annotation - [id number]: bad taste in mouth    Coumadin [Warfarin]      Reaction Date: 22May2012;  Annotation - [id number]: lip swelling    Dabigatran     Latex      Annotation - [id number]: RASH    Pradaxa [Dabigatran Etexilate Mesylate]        Current Outpatient Medications on File Prior to Visit   Medication Sig Dispense Refill    amLODIPine (NORVASC) 10 mg tablet TAKE 1 TABLET (10 MG TOTAL) BY MOUTH DAILY 90 tablet 1    ascorbic acid (CVS VITAMIN C) 500 MG tablet Take 1 tablet by mouth daily      aspirin 81 MG tablet Take 162 mg by mouth daily      FRANKI MICROLET LANCETS lancets by Other route 2 (two) times a day E11 9 Test blood sugar twice daily 100 each 5    glucose blood (FRANKI CONTOUR NEXT TEST) test strip 1 each by Other route 2 (two) times a day E11 9 test blood sugar twice daily 100 each 5    latanoprost (XALATAN) 0 005 % ophthalmic solution Administer 1 drop to both eyes daily at bedtime       lisinopril (ZESTRIL) 20 mg tablet Take 1 tablet (20 mg total) by mouth 2 (two) times a day 180 tablet 3    metFORMIN (GLUCOPHAGE) 1000 MG tablet Take 0 5 tablets (500 mg total) by mouth 2 (two) times a day with meals 90 tablet 3    Multiple Vitamin (MULTI-VITAMIN DAILY PO) Take by mouth daily      Omega-3 Fatty Acids (OMEGA-3 FISH OIL) 1000 MG CAPS Take 1 capsule by mouth daily      simvastatin (ZOCOR) 20 mg tablet Take 1 tablet (20 mg total) by mouth daily 90 tablet 3    sotalol (BETAPACE) 80 mg tablet Take 80 mg by mouth 2 (two) times a day      [DISCONTINUED] hydrochlorothiazide (HYDRODIURIL) 12 5 mg tablet Take 1 tablet (12 5 mg total) by mouth daily (Patient not taking: Reported on 8/1/2019) 30 tablet 0    [DISCONTINUED] ketoconazole (NIZORAL) 2 % cream Apply topically daily (Patient not taking: Reported on 11/4/2019) 60 g 1    [DISCONTINUED] spironolactone (ALDACTONE) 25 mg tablet Take 0 5 tablets (12 5 mg total) by mouth daily (Patient not taking: Reported on 8/1/2019) 30 tablet 0     No current facility-administered medications on file prior to visit          Past Medical History:   Diagnosis Date    Abscess of buttock, right     last assessed-5/4/2017    Cancer [facility])     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-last assessed-10/8/2015    Cellulitis of chest wall last assessed-7/27/2015    Chronic endometritis 10/12/2006    Diabetes mellitus ([facility] )     Dysuria 11/02/2007    Flushing     resolved-6/30/2015    Glaucoma     Hyperlipidemia     Hypertension     Ingrown nail 01/05/2012    Malignant neoplasm of breast ([facility] )     last assessed-11/5/2015    MS (multiple sclerosis) ([facility] )     Nonvenomous insect bite     last assessed-12/12/2013    Palpitations 02/09/2011    Pressure ulcer of buttock 10/13/2006    Proctitis 05/12/2006    Vaginal polyp 03/14/2006    Viral gastroenteritis     last assessed-9/8/2016       Past Surgical History:   Procedure Laterality Date    BREAST BIOPSY      open    BREAST SURGERY      CERVICAL BIOPSY  W/ LOOP ELECTRODE EXCISION      DILATION AND CURETTAGE OF UTERUS      INCISION AND DRAINAGE OF WOUND Left 2/27/2017    Procedure: INCISION AND DRAINAGE (I&D)   Chest wall x 2;  Surgeon: Ramon Stokes MD;  Location: [address] [place] [address];  Service:     MASTECTOMY      last assessed-6/30/2015          reports that she has quit smoking  She has never used smokeless tobacco  She reports that she drank alcohol  She reports that she has current or past drug history  reports that she has quit smoking  She has never used smokeless tobacco         Review of Systems   Constitutional: Positive for fatigue  Negative for activity change and fever  HENT: Negative for congestion and nosebleeds  Respiratory: Negative for chest tightness and shortness of breath  Cardiovascular: Negative for chest pain and palpitations  Gastrointestinal: Negative for abdominal distention and blood in stool  Genitourinary: Negative for difficulty urinating, dysuria, flank pain, hematuria, pelvic pain and vaginal discharge  Musculoskeletal: Positive for arthralgias  L shoulder restriction  abduction   Skin: Positive for rash and wound  Cyst axilla  L open draining   Neurological: Positive for weakness   Negative for dizziness, tremors, seizures, facial asymmetry, speech difficulty and light-headedness  MS LE weakness Wheelchair and transfer issues,'   Hematological: Negative for adenopathy  Psychiatric/Behavioral: Negative for agitation, confusion and hallucinations  Physical Exam   Constitutional: She is oriented to person, place, and time  She appears well-developed  HENT:   Head: Normocephalic and atraumatic  Mouth/Throat: Oropharynx is clear and moist    Eyes: Pupils are equal, round, and reactive to light  Conjunctivae and EOM are normal  No scleral icterus  Neck: Normal range of motion  No JVD present  No thyromegaly present  Cardiovascular: Normal rate and regular rhythm  No murmur heard  Pulmonary/Chest: Effort normal and breath sounds normal  No stridor  Abdominal: Soft  She exhibits no distension  There is no guarding  Musculoskeletal: She exhibits edema  She exhibits no deformity  robert LE trace ankles  L > R     restriction L shoulder > 90     in WC   Lymphadenopathy:     She has no cervical adenopathy  Neurological: She is alert and oriented to person, place, and time  She displays abnormal reflex  No cranial nerve deficit  She exhibits abnormal muscle tone  LE L>R weakness   Skin: Skin is warm  Capillary refill takes less than 2 seconds  No erythema  AK seb k chest breast line  Cyst axilla infected draining 2cm L with surrounding erythema tineal   Psychiatric: She has a normal mood and affect   Her behavior is normal  Judgment and thought content normal

## 2020-01-09 DIAGNOSIS — E11.9 TYPE 2 DIABETES MELLITUS WITHOUT COMPLICATION, WITHOUT LONG-TERM CURRENT USE OF INSULIN (HCC): Primary | ICD-10-CM

## 2020-01-09 RX ORDER — LANCETS 28 GAUGE
EACH MISCELLANEOUS DAILY
Qty: 100 EACH | Refills: 3 | Status: SHIPPED | OUTPATIENT
Start: 2020-01-09 | End: 2020-12-26

## 2020-01-09 NOTE — TELEPHONE ENCOUNTER
Refill Easy touch lancets    [facility]      stated he called these in yesterday      Please let patient know

## 2020-01-22 DIAGNOSIS — I10 ESSENTIAL HYPERTENSION: ICD-10-CM

## 2020-01-22 RX ORDER — AMLODIPINE BESYLATE 10 MG/1
10 TABLET ORAL DAILY
Qty: 90 TABLET | Refills: 3 | Status: SHIPPED | OUTPATIENT
Start: 2020-01-22 | End: 2021-01-20

## 2020-02-05 DIAGNOSIS — E11.9 TYPE 2 DIABETES MELLITUS WITHOUT COMPLICATION, WITHOUT LONG-TERM CURRENT USE OF INSULIN (HCC): ICD-10-CM

## 2020-02-25 ENCOUNTER — OFFICE VISIT (OUTPATIENT)
Dept: PODIATRY | Facility: CLINIC | Age: 85
End: 2020-02-25
Payer: MEDICARE

## 2020-02-25 VITALS
BODY MASS INDEX: 31.18 KG/M2 | WEIGHT: 194 LBS | SYSTOLIC BLOOD PRESSURE: 144 MMHG | HEIGHT: 66 IN | DIASTOLIC BLOOD PRESSURE: 78 MMHG

## 2020-02-25 DIAGNOSIS — I70.203 ATHEROSCLEROSIS OF NATIVE ARTERY OF BOTH LOWER EXTREMITIES, WITH UNSPECIFIED PRESENCE OF CLINICAL MANIFESTATION (HCC): ICD-10-CM

## 2020-02-25 DIAGNOSIS — B35.1 ONYCHOMYCOSIS: ICD-10-CM

## 2020-02-25 DIAGNOSIS — E11.42 DIABETIC POLYNEUROPATHY ASSOCIATED WITH TYPE 2 DIABETES MELLITUS (HCC): Primary | ICD-10-CM

## 2020-02-25 DIAGNOSIS — M79.672 PAIN IN BOTH FEET: ICD-10-CM

## 2020-02-25 DIAGNOSIS — M79.671 PAIN IN BOTH FEET: ICD-10-CM

## 2020-02-25 PROCEDURE — 11721 DEBRIDE NAIL 6 OR MORE: CPT | Performed by: PODIATRIST

## 2020-02-25 NOTE — PROGRESS NOTES
Plan      Aseptic debridement and planning of nails x10 and manually and mechanically        Assessment      Diabetic polyneuropathy associated with type 2 diabetes mellitus (HCC)     Atherosclerosis of native artery of both lower extremities, with unspecified presence of clinical manifestation (HCC)     Pain in both feet     Onychomycosis              Subjective:  patient is diabetic   She has pain in her feet with ambulation   No history of trauma   She has pain with shoe wear       Patient ID: Ernestina Hurley is a 82 y  o  female      Patient has chief complaint of thick painful nails that hurt when walking wearing shoes   Patient is mostly in wheelchair    Patient is follow-up for swelling left 2nd digit   Patient never went for x-ray   Patient has no pain no fever   Patient still has swelling but no redness         Medical History           Past Medical History:   Diagnosis Date    Abscess of buttock, right       last assessed-5/4/2017    Cancer [facility])       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-last assessed-10/8/2015    Cellulitis of chest wall       last assessed-7/27/2015    Chronic endometritis 10/12/2006    Diabetes mellitus ([facility] )      Dysuria 11/02/2007    Flushing       resolved-6/30/2015    Hyperlipidemia      Hypertension      Ingrown nail 01/05/2012    Malignant neoplasm of breast (HCC)       last assessed-11/5/2015    [facility]      Nonvenomous insect bite       last assessed-12/12/2013    Palpitations 02/09/2011    Pressure ulcer of buttock 10/13/2006    Proctitis 05/12/2006    Vaginal polyp 03/14/2006    Viral gastroenteritis       last assessed-9/8/2016            Surgical History             Past Surgical History:   Procedure Laterality Date    BREAST BIOPSY         open    BREAST SURGERY        CERVICAL BIOPSY  W/ LOOP ELECTRODE EXCISION        DILATION AND CURETTAGE OF UTERUS        INCISION AND DRAINAGE OF WOUND Left 2/27/2017     Procedure: INCISION AND DRAINAGE (I&D)   Chest wall x 2;  Surgeon: Paula Mensah MD;  Location: [facility];  Service:     MASTECTOMY         last assessed-6/30/2015                      Allergies   Allergen Reactions    Clindamycin         Annotation - [id number]: bad taste in mouth    Coumadin [Warfarin]         Reaction Date: [id number];  Annotation - [id number]: lip swelling    Latex         Annotation - [id number]: RASH    Pradaxa [Dabigatran Etexilate Mesylate]              Current Outpatient Medications:     amLODIPine (NORVASC) 10 mg tablet, TAKE 1 TABLET (10 MG TOTAL) BY MOUTH DAILY, Disp: 90 tablet, Rfl: 1    ascorbic acid (CVS VITAMIN C) 500 MG tablet, Take 1 tablet by mouth daily, Disp: , Rfl:     aspirin 81 MG tablet, Take 162 mg by mouth daily, Disp: , Rfl:     FRANKI MICROLET LANCETS lancets, by Other route 2 (two) times a day E11 9 Test blood sugar twice daily, Disp: 100 each, Rfl: 5    glucose blood (FRANKI CONTOUR NEXT TEST) test strip, 1 each by Other route 2 (two) times a day E11 9 test blood sugar twice daily, Disp: 100 each, Rfl: 5    latanoprost (XALATAN) 0 005 % ophthalmic solution, Apply 1 drop to eye, Disp: , Rfl:     lisinopril (ZESTRIL) 20 mg tablet, Take 1 tablet (20 mg total) by mouth 2 (two) times a day, Disp: 180 tablet, Rfl: 3    metFORMIN (GLUCOPHAGE) 1000 MG tablet, Take 0 5 tablets (500 mg total) by mouth 2 (two) times a day with meals, Disp: 90 tablet, Rfl: 3    Multiple Vitamin (MULTI-VITAMIN DAILY PO), Take by mouth daily, Disp: , Rfl:     Omega-3 Fatty Acids (OMEGA-3 FISH OIL) 1000 MG CAPS, Take 1 capsule by mouth daily, Disp: , Rfl:     simvastatin (ZOCOR) 20 mg tablet, Take 1 tablet (20 mg total) by mouth daily, Disp: 90 tablet, Rfl: 3    sotalol (BETAPACE) 80 mg tablet, Take 80 mg by mouth 2 (two) times a day, Disp: , Rfl:            Patient Active Problem List   Diagnosis    Mixed hyperlipidemia    Atrial fibrillation (HCC)    Multiple sclerosis (HCC)    Weakness    Essential hypertension    Type 2 diabetes mellitus without complication, without long-term current use of insulin (HCC)    Urinary incontinence    [facility] of right [facility] ([facility])    Arthropathy of multiple sites    Abnormal gait    Granuloma of great toe    Diabetic polyneuropathy associated with type 2 diabetes mellitus ([facility] )    Atherosclerosis of native artery of both lower extremities ([facility])         Review of Systems   Constitutional: Negative     HENT: Negative     Eyes: Negative     Respiratory: Negative     Cardiovascular: Negative     Gastrointestinal: Negative     Endocrine: Negative     Genitourinary: Negative     Musculoskeletal: Positive for gait problem and joint swelling  Skin: Negative     Allergic/Immunologic: Negative     Hematological: Negative     Psychiatric/Behavioral: Negative           Objective:  Patient's shoes and socks were removed, feet examined          /72   Pulse 86   Resp 17   Ht 5' 6" (1 676 m)   Wt 90 7 kg (200 lb)   BMI 32 28 kg/m²             Physical Exam   Cardiovascular: Pulses are weak pulses  Pulses:       Dorsalis pedis pulses are 1+ on the right side, and 1+ on the left side         Posterior tibial pulses are Absent on the right side, and Absent on the left side  Feet:   Right Foot:   Skin Integrity: Positive for callus and dry skin     Left Foot:   Skin Integrity: Positive for callus and dry skin    Nursing note and vitals reviewed      Foot Exam     General  General Appearance: appears stated age and healthy   Orientation: alert and oriented to person, place, and time   Affect: appropriate   Assistance: wheelchair use      [place] tinea pedis noted bilateral        Right Foot/Ankle      Inspection and Palpation  Arch: pes planus  Hammertoes: second toe, fifth toe, fourth toe and third toe  Hallux limitus: yes  Skin Exam: callus, dry skin and skin changes;      Neurovascular  Dorsalis pedis: 1+  Posterior tibial: absent        Left Foot/Ankle       Inspection and Palpation  Arch: pes planus  Hammertoes: second toe, fifth toe, fourth toe and third toe  Hallux limitus: yes  Skin Exam: callus, dry skin and skin changes;      Neurovascular  Dorsalis pedis: 1+  Posterior tibial: absent                 Patient's shoes and socks removed  Right Foot/Ankle   Right Foot Inspection  Skin Exam: dry skin, callus and callus                  Sensory   Vibration: absent     Monofilament testing: diminished  Vascular  Capillary refills: elevated  The right DP pulse is 1+  The right PT pulse is absent  Right Toe  - Comprehensive Exam  Arch: pes planus  Hammertoes: second toe, fifth toe, fourth toe and third toe  Hallux limitus: yes     Left Foot/Ankle  Left Foot Inspection  Skin Exam: dry skin and callus                 Alicja Adkins  Proprioception: diminished  Monofilament: diminished  Vascular  Capillary refills: elevated  The left DP pulse is 1+  The left PT pulse is absent  Left Toe  - Comprehensive Exam  Arch: pes planus  Hammertoes: second toe, fifth toe, fourth toe and third toe  Hallux limitus: yes    Patient's shoes and socks removed  Assign Risk Category:  Deformity present;  Loss of protective sensation; Weak pulses       Risk: 2

## 2020-03-04 DIAGNOSIS — I48.91 ATRIAL FIBRILLATION, UNSPECIFIED TYPE (HCC): Primary | ICD-10-CM

## 2020-03-04 RX ORDER — SOTALOL HYDROCHLORIDE 80 MG/1
80 TABLET ORAL 2 TIMES DAILY
Qty: 180 TABLET | Refills: 3 | Status: SHIPPED | OUTPATIENT
Start: 2020-03-04 | End: 2021-02-15

## 2020-05-07 ENCOUNTER — TELEMEDICINE (OUTPATIENT)
Dept: FAMILY MEDICINE CLINIC | Facility: CLINIC | Age: 85
End: 2020-05-07
Payer: MEDICARE

## 2020-05-07 VITALS — HEIGHT: 66 IN | BODY MASS INDEX: 31.34 KG/M2 | WEIGHT: 195 LBS

## 2020-05-07 DIAGNOSIS — E11.42 DIABETIC POLYNEUROPATHY ASSOCIATED WITH TYPE 2 DIABETES MELLITUS (HCC): Primary | ICD-10-CM

## 2020-05-07 DIAGNOSIS — I10 ESSENTIAL HYPERTENSION: ICD-10-CM

## 2020-05-07 DIAGNOSIS — E78.2 MIXED HYPERLIPIDEMIA: ICD-10-CM

## 2020-05-07 DIAGNOSIS — G35 MULTIPLE SCLEROSIS (HCC): ICD-10-CM

## 2020-05-07 PROCEDURE — 99442 PR PHYS/QHP TELEPHONE EVALUATION 11-20 MIN: CPT | Performed by: FAMILY MEDICINE

## 2020-05-19 ENCOUNTER — OFFICE VISIT (OUTPATIENT)
Dept: PODIATRY | Facility: CLINIC | Age: 85
End: 2020-05-19
Payer: MEDICARE

## 2020-05-19 VITALS — RESPIRATION RATE: 17 BRPM | WEIGHT: 195 LBS | BODY MASS INDEX: 31.34 KG/M2 | HEIGHT: 66 IN

## 2020-05-19 DIAGNOSIS — M79.671 PAIN IN BOTH FEET: ICD-10-CM

## 2020-05-19 DIAGNOSIS — I70.203 ATHEROSCLEROSIS OF NATIVE ARTERY OF BOTH LOWER EXTREMITIES, WITH UNSPECIFIED PRESENCE OF CLINICAL MANIFESTATION (HCC): ICD-10-CM

## 2020-05-19 DIAGNOSIS — M79.672 PAIN IN BOTH FEET: ICD-10-CM

## 2020-05-19 DIAGNOSIS — B35.1 ONYCHOMYCOSIS: ICD-10-CM

## 2020-05-19 DIAGNOSIS — E11.42 DIABETIC POLYNEUROPATHY ASSOCIATED WITH TYPE 2 DIABETES MELLITUS (HCC): Primary | ICD-10-CM

## 2020-05-19 PROCEDURE — 11721 DEBRIDE NAIL 6 OR MORE: CPT | Performed by: PODIATRIST

## 2020-06-10 ENCOUNTER — HOSPITAL ENCOUNTER (OUTPATIENT)
Dept: RADIOLOGY | Facility: HOSPITAL | Age: 85
Discharge: HOME | End: 2020-06-10
Attending: INTERNAL MEDICINE
Payer: MEDICARE

## 2020-06-10 DIAGNOSIS — Z17.0 MALIGNANT NEOPLASM OF RIGHT BREAST, STAGE 1, ESTROGEN RECEPTOR POSITIVE (HCC): ICD-10-CM

## 2020-06-10 DIAGNOSIS — C50.911 MALIGNANT NEOPLASM OF RIGHT BREAST, STAGE 1, ESTROGEN RECEPTOR POSITIVE (HCC): ICD-10-CM

## 2020-06-10 PROCEDURE — 76642 ULTRASOUND BREAST LIMITED: CPT

## 2020-07-11 ENCOUNTER — TELEMEDICINE (OUTPATIENT)
Dept: FAMILY MEDICINE CLINIC | Facility: CLINIC | Age: 85
End: 2020-07-11
Payer: MEDICARE

## 2020-07-11 DIAGNOSIS — R35.0 URINARY FREQUENCY: Primary | ICD-10-CM

## 2020-07-11 PROCEDURE — 99442 PR PHYS/QHP TELEPHONE EVALUATION 11-20 MIN: CPT | Performed by: FAMILY MEDICINE

## 2020-07-11 RX ORDER — SULFAMETHOXAZOLE AND TRIMETHOPRIM 800; 160 MG/1; MG/1
1 TABLET ORAL 2 TIMES DAILY
Qty: 6 TABLET | Refills: 0 | Status: SHIPPED | OUTPATIENT
Start: 2020-07-11 | End: 2020-07-14

## 2020-07-11 NOTE — PROGRESS NOTES
Virtual Brief Visit    Assessment/Plan:    Problem List Items Addressed This Visit     None      Visit Diagnoses     Urinary frequency    -  Primary    Relevant Medications    sulfamethoxazole-trimethoprim (BACTRIM DS) 800-160 mg per tablet      Pt with urinary frequency, urgency, hesitancy- likely a UTI  Will treat with Bactrim  She is aware that should symptoms persist or worsen, she will have to come into the office to give a urine sample for further evaluation  Pt expresses understanding  Reason for visit is urine problem     Encounter provider Martha Reich MD    Provider located at [place] [id number]  [address] [place] [id number]    Recent Visits  No visits were found meeting these conditions  Showing recent visits within past 7 days and meeting all other requirements     Future Appointments  No visits were found meeting these conditions  Showing future appointments within next 150 days and meeting all other requirements        After connecting through telephone, the patient was identified by name and date of birth  Kelsy Desir was informed that this is a telemedicine visit and that the visit is being conducted through telephone  My office door was closed  No one else was in the room  She acknowledged consent and understanding of privacy and security of the platform  The patient has agreed to participate and understands she can discontinue the visit at any time  Patient is aware this is a billable service  Subjective    Kelsy Desir is a 82 y o  female   Urinary Tract Infection    This is a new problem  Episode onset: 2 days  The problem occurs every urination  The problem has been gradually worsening  The patient is experiencing no pain  There has been no fever  Associated symptoms include frequency, hesitancy and urgency   Pertinent negatives include no chills, discharge, flank pain, hematuria, nausea, possible pregnancy, sweats or vomiting  She has tried nothing for the symptoms          Past Medical History:   Diagnosis Date    Abscess of buttock, right     last assessed-5/4/2017    Cancer [facility])     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-last assessed-10/8/2015    Cellulitis of chest wall     last assessed-7/27/2015    Chronic endometritis 10/12/2006    Diabetes mellitus ([facility] )     Dysuria 11/02/2007    Flushing     resolved-6/30/2015    Glaucoma     Hyperlipidemia     Hypertension     Ingrown nail 01/05/2012    Malignant neoplasm of breast ([facility] )     last assessed-11/5/2015    MS (multiple sclerosis) ([facility] )     Nonvenomous insect bite     last assessed-12/12/2013    Palpitations 02/09/2011    Pressure ulcer of buttock 10/13/2006    Proctitis 05/12/2006    Vaginal polyp 03/14/2006    Viral gastroenteritis     last assessed-9/8/2016       Past Surgical History:   Procedure Laterality Date    BREAST BIOPSY      open    BREAST SURGERY      CERVICAL BIOPSY  W/ LOOP ELECTRODE EXCISION      DILATION AND CURETTAGE OF UTERUS      INCISION AND DRAINAGE OF WOUND Left 2/27/2017    Procedure: INCISION AND DRAINAGE (I&D)   Chest wall x 2;  Surgeon: Olivia Odell MD;  Location: [address] [place] [address];  Service:     MASTECTOMY      last assessed-6/30/2015       Current Outpatient Medications   Medication Sig Dispense Refill    amLODIPine (NORVASC) 10 mg tablet Take 1 tablet (10 mg total) by mouth daily 90 tablet 3    ascorbic acid (CVS VITAMIN C) 500 MG tablet Take 1 tablet by mouth daily      aspirin 81 MG tablet Take 162 mg by mouth daily      FRANKI MICROLET LANCETS lancets by Other route 2 (two) times a day E11 9 Test blood sugar twice daily 100 each 5    EASY TOUCH SAFETY LANCETS 28G MISC by Does not apply route daily 100 each 3    glucose blood (FRANKI CONTOUR NEXT TEST) test strip 1 each by Other route 2 (two) times a day E11 9 test blood sugar twice daily 100 each 5    latanoprost (XALATAN) 0 005 % ophthalmic solution Administer 1 drop to both eyes daily at bedtime       lisinopril (ZESTRIL) 20 mg tablet Take 1 tablet (20 mg total) by mouth 2 (two) times a day 180 tablet 3    metFORMIN (GLUCOPHAGE) 1000 MG tablet Take 0 5 tablets (500 mg total) by mouth 2 (two) times a day with meals 90 tablet 3    Multiple Vitamin (MULTI-VITAMIN DAILY PO) Take by mouth daily      Omega-3 Fatty Acids (OMEGA-3 FISH OIL) 1000 MG CAPS Take 1 capsule by mouth daily      simvastatin (ZOCOR) 20 mg tablet Take 1 tablet (20 mg total) by mouth daily 90 tablet 3    sotalol (BETAPACE) 80 mg tablet Take 1 tablet (80 mg total) by mouth 2 (two) times a day 180 tablet 3    sulfamethoxazole-trimethoprim (BACTRIM DS) 800-160 mg per tablet Take 1 tablet by mouth 2 (two) times a day for 3 days 6 tablet 0     No current facility-administered medications for this visit  Allergies   Allergen Reactions    Clindamycin      Annotation - [id number]: bad taste in mouth    Coumadin [Warfarin]      Reaction Date: 22May2012; Annotation - [id number]: lip swelling    Dabigatran     Latex      Annotation - [id number]: RASH    Pradaxa [Dabigatran Etexilate Mesylate]        Review of Systems   Constitutional: Negative for activity change, appetite change, chills, diaphoresis, fatigue and fever  HENT: Negative  Respiratory: Negative for cough, choking, chest tightness, shortness of breath and wheezing  Cardiovascular: Negative for chest pain, palpitations and leg swelling  Gastrointestinal: Negative for abdominal distention, abdominal pain, constipation, diarrhea, nausea and vomiting  Genitourinary: Positive for difficulty urinating, frequency, hesitancy and urgency  Negative for dyspareunia, dysuria, enuresis, flank pain, hematuria, menstrual problem and pelvic pain  Musculoskeletal: Negative for arthralgias, back pain, gait problem, joint swelling, myalgias, neck pain and neck stiffness  Skin: Negative      Neurological: Negative for dizziness, tremors, syncope, facial asymmetry, weakness, light-headedness, numbness and headaches  Psychiatric/Behavioral: Negative  There were no vitals filed for this visit  I spent 15 minutes directly with the patient during this visit    VIRTUAL VISIT DISCLAIMER    Swapna Haque acknowledges that she has consented to an online visit or consultation  She understands that the online visit is based solely on information provided by her, and that, in the absence of a face-to-face physical evaluation by the physician, the diagnosis she receives is both limited and provisional in terms of accuracy and completeness  This is not intended to replace a full medical face-to-face evaluation by the physician  Swapna Haque understands and accepts these terms

## 2020-08-06 ENCOUNTER — APPOINTMENT (OUTPATIENT)
Dept: LAB | Facility: CLINIC | Age: 85
End: 2020-08-06
Payer: MEDICARE

## 2020-08-06 DIAGNOSIS — E11.42 DIABETIC POLYNEUROPATHY ASSOCIATED WITH TYPE 2 DIABETES MELLITUS (HCC): ICD-10-CM

## 2020-08-06 DIAGNOSIS — I10 ESSENTIAL HYPERTENSION: ICD-10-CM

## 2020-08-06 DIAGNOSIS — E78.2 MIXED HYPERLIPIDEMIA: ICD-10-CM

## 2020-08-06 LAB
ALBUMIN SERPL BCP-MCNC: 3.4 G/DL (ref 3.5–5)
ALP SERPL-CCNC: 102 U/L (ref 46–116)
ALT SERPL W P-5'-P-CCNC: 19 U/L (ref 12–78)
ANION GAP SERPL CALCULATED.3IONS-SCNC: 5 MMOL/L (ref 4–13)
AST SERPL W P-5'-P-CCNC: 8 U/L (ref 5–45)
BILIRUB SERPL-MCNC: 0.33 MG/DL (ref 0.2–1)
BUN SERPL-MCNC: 13 MG/DL (ref 5–25)
CALCIUM SERPL-MCNC: 9.5 MG/DL (ref 8.3–10.1)
CHLORIDE SERPL-SCNC: 106 MMOL/L (ref 100–108)
CHOLEST SERPL-MCNC: 158 MG/DL (ref 50–200)
CO2 SERPL-SCNC: 29 MMOL/L (ref 21–32)
CREAT SERPL-MCNC: 0.76 MG/DL (ref 0.6–1.3)
ERYTHROCYTE [DISTWIDTH] IN BLOOD BY AUTOMATED COUNT: 14.6 % (ref 11.6–15.1)
EST. AVERAGE GLUCOSE BLD GHB EST-MCNC: 143 MG/DL
GFR SERPL CREATININE-BSD FRML MDRD: 73 ML/MIN/1.73SQ M
GLUCOSE P FAST SERPL-MCNC: 130 MG/DL (ref 65–99)
HBA1C MFR BLD: 6.6 %
HCT VFR BLD AUTO: 44.3 % (ref 34.8–46.1)
HDLC SERPL-MCNC: 42 MG/DL
HGB BLD-MCNC: 13.8 G/DL (ref 11.5–15.4)
LDLC SERPL CALC-MCNC: 53 MG/DL (ref 0–100)
MCH RBC QN AUTO: 28.6 PG (ref 26.8–34.3)
MCHC RBC AUTO-ENTMCNC: 31.2 G/DL (ref 31.4–37.4)
MCV RBC AUTO: 92 FL (ref 82–98)
PLATELET # BLD AUTO: 179 THOUSANDS/UL (ref 149–390)
PMV BLD AUTO: 12.4 FL (ref 8.9–12.7)
POTASSIUM SERPL-SCNC: 4.1 MMOL/L (ref 3.5–5.3)
PROT SERPL-MCNC: 7.6 G/DL (ref 6.4–8.2)
RBC # BLD AUTO: 4.83 MILLION/UL (ref 3.81–5.12)
SODIUM SERPL-SCNC: 140 MMOL/L (ref 136–145)
TRIGL SERPL-MCNC: 313 MG/DL
TSH SERPL DL<=0.05 MIU/L-ACNC: 1.53 UIU/ML (ref 0.36–3.74)
WBC # BLD AUTO: 6.47 THOUSAND/UL (ref 4.31–10.16)

## 2020-08-06 PROCEDURE — 80061 LIPID PANEL: CPT

## 2020-08-06 PROCEDURE — 80053 COMPREHEN METABOLIC PANEL: CPT

## 2020-08-06 PROCEDURE — 84443 ASSAY THYROID STIM HORMONE: CPT

## 2020-08-06 PROCEDURE — 83036 HEMOGLOBIN GLYCOSYLATED A1C: CPT

## 2020-08-06 PROCEDURE — 36415 COLL VENOUS BLD VENIPUNCTURE: CPT

## 2020-08-06 PROCEDURE — 85027 COMPLETE CBC AUTOMATED: CPT

## 2020-08-11 ENCOUNTER — OFFICE VISIT (OUTPATIENT)
Dept: FAMILY MEDICINE CLINIC | Facility: CLINIC | Age: 85
End: 2020-08-11
Payer: MEDICARE

## 2020-08-11 VITALS
TEMPERATURE: 97.5 F | RESPIRATION RATE: 20 BRPM | HEART RATE: 76 BPM | SYSTOLIC BLOOD PRESSURE: 134 MMHG | DIASTOLIC BLOOD PRESSURE: 70 MMHG

## 2020-08-11 DIAGNOSIS — E11.9 TYPE 2 DIABETES MELLITUS WITHOUT COMPLICATION, WITHOUT LONG-TERM CURRENT USE OF INSULIN (HCC): ICD-10-CM

## 2020-08-11 DIAGNOSIS — Z00.00 MEDICARE ANNUAL WELLNESS VISIT, SUBSEQUENT: Primary | ICD-10-CM

## 2020-08-11 DIAGNOSIS — G35 MULTIPLE SCLEROSIS (HCC): ICD-10-CM

## 2020-08-11 DIAGNOSIS — I48.91 ATRIAL FIBRILLATION, UNSPECIFIED TYPE (HCC): ICD-10-CM

## 2020-08-11 DIAGNOSIS — F32.1 CURRENT MODERATE EPISODE OF MAJOR DEPRESSIVE DISORDER WITHOUT PRIOR EPISODE (HCC): ICD-10-CM

## 2020-08-11 DIAGNOSIS — M79.671 RIGHT FOOT PAIN: ICD-10-CM

## 2020-08-11 PROCEDURE — 3044F HG A1C LEVEL LT 7.0%: CPT | Performed by: FAMILY MEDICINE

## 2020-08-11 PROCEDURE — 4040F PNEUMOC VAC/ADMIN/RCVD: CPT | Performed by: FAMILY MEDICINE

## 2020-08-11 PROCEDURE — 1170F FXNL STATUS ASSESSED: CPT | Performed by: FAMILY MEDICINE

## 2020-08-11 PROCEDURE — 1123F ACP DISCUSS/DSCN MKR DOCD: CPT | Performed by: FAMILY MEDICINE

## 2020-08-11 PROCEDURE — 3075F SYST BP GE 130 - 139MM HG: CPT | Performed by: FAMILY MEDICINE

## 2020-08-11 PROCEDURE — 1160F RVW MEDS BY RX/DR IN RCRD: CPT | Performed by: FAMILY MEDICINE

## 2020-08-11 PROCEDURE — 3078F DIAST BP <80 MM HG: CPT | Performed by: FAMILY MEDICINE

## 2020-08-11 PROCEDURE — 1036F TOBACCO NON-USER: CPT | Performed by: FAMILY MEDICINE

## 2020-08-11 PROCEDURE — G0439 PPPS, SUBSEQ VISIT: HCPCS | Performed by: FAMILY MEDICINE

## 2020-08-11 PROCEDURE — 1125F AMNT PAIN NOTED PAIN PRSNT: CPT | Performed by: FAMILY MEDICINE

## 2020-08-11 NOTE — ASSESSMENT & PLAN NOTE
New  Discussed diagnosis with patient and her  at length  She declined referral for therapy and medication at this time  She thinks it is from the current situation of COVID  I asked her to think about talking to a therapist and she will let me know if she decides she would like to do it

## 2020-08-11 NOTE — PATIENT INSTRUCTIONS
Medicare Preventive Visit Patient Instructions  Thank you for completing your Welcome to Medicare Visit or Medicare Annual Wellness Visit today  Your next wellness visit will be due in one year (8/11/2021)  The screening/preventive services that you may require over the next 5-10 years are detailed below  Some tests may not apply to you based off risk factors and/or age  Screening tests ordered at today's visit but not completed yet may show as past due  Also, please note that scanned in results may not display below  Preventive Screenings:  Service Recommendations Previous Testing/Comments   Colorectal Cancer Screening  * Colonoscopy    * Fecal Occult Blood Test (FOBT)/Fecal Immunochemical Test (FIT)  * Fecal DNA/Cologuard Test  * Flexible Sigmoidoscopy Age: 54-65 years old   Colonoscopy: every 10 years (may be performed more frequently if at higher risk)  OR  FOBT/FIT: every 1 year  OR  Cologuard: every 3 years  OR  Sigmoidoscopy: every 5 years  Screening may be recommended earlier than age 48 if at higher risk for colorectal cancer  Also, an individualized decision between you and your healthcare provider will decide whether screening between the ages of 74-80 would be appropriate  Colonoscopy: Not on file  FOBT/FIT: Not on file  Cologuard: Not on file  Sigmoidoscopy: Not on file         Breast Cancer Screening Age: 36 years old  Frequency: every 1-2 years  Not required if history of left and right mastectomy Mammogram: 12/06/2019    History Breast Cancer   Cervical Cancer Screening Between the ages of 21-29, pap smear recommended once every 3 years  Between the ages of 33-67, can perform pap smear with HPV co-testing every 5 years     Recommendations may differ for women with a history of total hysterectomy, cervical cancer, or abnormal pap smears in past  Pap Smear: Not on file    Screening Not Indicated   Hepatitis C Screening Once for adults born between 1945 and 1965  More frequently in patients at high risk for Hepatitis C Hep C Antibody: Not on file       Diabetes Screening 1-2 times per year if you're at risk for diabetes or have pre-diabetes Fasting glucose: 130 mg/dL   A1C: 6 6 %    Screening Not Indicated  History Diabetes   Cholesterol Screening Once every 5 years if you don't have a lipid disorder  May order more often based on risk factors  Lipid panel: 08/06/2020    Screening Not Indicated  History Lipid Disorder     Other Preventive Screenings Covered by Medicare:  1  Abdominal Aortic Aneurysm (AAA) Screening: covered once if your at risk  You're considered to be at risk if you have a family history of AAA  2  Lung Cancer Screening: covers low dose CT scan once per year if you meet all of the following conditions: (1) Age 50-69; (2) No signs or symptoms of lung cancer; (3) Current smoker or have quit smoking within the last 15 years; (4) You have a tobacco smoking history of at least 30 pack years (packs per day multiplied by number of years you smoked); (5) You get a written order from a healthcare provider  3  Glaucoma Screening: covered annually if you're considered high risk: (1) You have diabetes OR (2) Family history of glaucoma OR (3)  aged 48 and older OR (3)  American aged 72 and older  3  Osteoporosis Screening: covered every 2 years if you meet one of the following conditions: (1) You're estrogen deficient and at risk for osteoporosis based off medical history and other findings; (2) Have a vertebral abnormality; (3) On glucocorticoid therapy for more than 3 months; (4) Have primary hyperparathyroidism; (5) On osteoporosis medications and need to assess response to drug therapy  · Last bone density test (DXA Scan): Not on file  5  HIV Screening: covered annually if you're between the age of 12-76  Also covered annually if you are younger than 13 and older than 72 with risk factors for HIV infection   For pregnant patients, it is covered up to 3 times per pregnancy  Immunizations:  Immunization Recommendations   Influenza Vaccine Annual influenza vaccination during flu season is recommended for all persons aged >= 6 months who do not have contraindications   Pneumococcal Vaccine (Prevnar and Pneumovax)  * Prevnar = PCV13  * Pneumovax = PPSV23   Adults 25-60 years old: 1-3 doses may be recommended based on certain risk factors  Adults 72 years old: Prevnar (PCV13) vaccine recommended followed by Pneumovax (PPSV23) vaccine  If already received PPSV23 since turning 65, then PCV13 recommended at least one year after PPSV23 dose  Hepatitis B Vaccine 3 dose series if at intermediate or high risk (ex: diabetes, end stage renal disease, liver disease)   Tetanus (Td) Vaccine - COST NOT COVERED BY MEDICARE PART B Following completion of primary series, a booster dose should be given every 10 years to maintain immunity against tetanus  Td may also be given as tetanus wound prophylaxis  Tdap Vaccine - COST NOT COVERED BY MEDICARE PART B Recommended at least once for all adults  For pregnant patients, recommended with each pregnancy  Shingles Vaccine (Shingrix) - COST NOT COVERED BY MEDICARE PART B  2 shot series recommended in those aged 48 and above     Health Maintenance Due:  There are no preventive care reminders to display for this patient  Immunizations Due:      Topic Date Due    DTaP,Tdap,and Td Vaccines (1 - Tdap) 10/01/1956    Influenza Vaccine  07/01/2020     Advance Directives   What are advance directives? Advance directives are legal documents that state your wishes and plans for medical care  These plans are made ahead of time in case you lose your ability to make decisions for yourself  Advance directives can apply to any medical decision, such as the treatments you want, and if you want to donate organs  What are the types of advance directives? There are many types of advance directives, and each state has rules about how to use them   You may choose a combination of any of the following:  · Living will: This is a written record of the treatment you want  You can also choose which treatments you do not want, which to limit, and which to stop at a certain time  This includes surgery, medicine, IV fluid, and tube feedings  · Durable power of  for healthcare [place] [facility] [facility]): This is a written record that states who you want to make healthcare choices for you when you are unable to make them for yourself  This person, called a proxy, is usually a family member or a friend  You may choose more than 1 proxy  · Do not resuscitate (DNR) order:  A DNR order is used in case your heart stops beating or you stop breathing  It is a request not to have certain forms of treatment, such as CPR  A DNR order may be included in other types of advance directives  · Medical directive: This covers the care that you want if you are in a coma, near death, or unable to make decisions for yourself  You can list the treatments you want for each condition  Treatment may include pain medicine, surgery, blood transfusions, dialysis, IV or tube feedings, and a ventilator (breathing machine)  · Values history: This document has questions about your views, beliefs, and how you feel and think about life  This information can help others choose the care that you would choose  Why are advance directives important? An advance directive helps you control your care  Although spoken wishes may be used, it is better to have your wishes written down  Spoken wishes can be misunderstood, or not followed  Treatments may be given even if you do not want them  An advance directive may make it easier for your family to make difficult choices about your care  Depression   Depression  is a medical condition that causes feelings of sadness or hopelessness that do not go away  Depression may cause you to lose interest in things you used to enjoy   These feelings may interfere with your daily life   Call your local emergency number (911 in the [address] [place] [address] [address]) if:   · You think about harming yourself or someone else  · You have done something on purpose to hurt yourself  The following resources are available at any time to help you, if needed:   · [address]: 2-430.780.5273 (8-541-776-LEQF)   · Suicide Hotline: 6-565.370.2672 (3-567-SBTWMPQ)   · For a list of international numbers: https://save org/find-help/international-resources/  Treatment for depression may include medicine to relieve depression  Medicine is often used together with therapy  Therapy is a way for you to talk about your feelings and anything that may be causing depression  Therapy can be done alone or in a group  It may also be done with family members or a significant other  · Get regular physical activity  · Create a regular sleep schedule  · Eat a variety of healthy foods  · Do not drink alcohol or use drugs  Fall Prevention    Fall prevention  includes ways to make your home and other areas safer  It also includes ways you can move more carefully to prevent a fall  Health conditions that cause changes in your blood pressure, vision, or muscle strength and coordination may increase your risk for falls  Medicines may also increase your risk for falls if they make you dizzy, weak, or sleepy  Fall prevention tips:   · Stand or sit up slowly  · Use assistive devices as directed  · Wear shoes that fit well and have soles that   · Wear a personal alarm  · Stay active  · Manage your medical conditions  Home Safety Tips:  · Add items to prevent falls in the bathroom  · Keep paths clear  · Install bright lights in your home  · Keep items you use often on shelves within reach  · Paint or place reflective tape on the edges of your stairs  Urinary Incontinence   Urinary incontinence (UI)  is when you lose control of your bladder   UI develops because your bladder cannot store or empty urine properly  The 3 most common types of UI are stress incontinence, urge incontinence, or both  Medicines:   · May be given to help strengthen your bladder control  Report any side effects of medication to your healthcare provider  Do pelvic muscle exercises often:  Your pelvic muscles help you stop urinating  Squeeze these muscles tight for 5 seconds, then relax for 5 seconds  Gradually work up to squeezing for 10 seconds  Do 3 sets of 15 repetitions a day, or as directed  This will help strengthen your pelvic muscles and improve bladder control  Train your bladder:  Go to the bathroom at set times, such as every 2 hours, even if you do not feel the urge to go  You can also try to hold your urine when you feel the urge to go  For example, hold your urine for 5 minutes when you feel the urge to go  As that becomes easier, hold your urine for 10 minutes  Self-care:   · Keep a UI record  Write down how often you leak urine and how much you leak  Make a note of what you were doing when you leaked urine  · Drink liquids as directed  You may need to limit the amount of liquid you drink to help control your urine leakage  Do not drink any liquid right before you go to bed  Limit or do not have drinks that contain caffeine or alcohol  · Prevent constipation  Eat a variety of high-fiber foods  Good examples are high-fiber cereals, beans, vegetables, and whole-grain breads  Walking is the best way to trigger your intestines to have a bowel movement  · Exercise regularly and maintain a healthy weight  Weight loss and exercise will decrease pressure on your bladder and help you control your leakage  · Use a catheter as directed  to help empty your bladder  A catheter is a tiny, plastic tube that is put into your bladder to drain your urine  · Go to behavior therapy as directed  Behavior therapy may be used to help you learn to control your urge to urinate      Weight Management   Why it is important to manage your weight:  Being overweight increases your risk of health conditions such as heart disease, high blood pressure, type 2 diabetes, and certain types of cancer  It can also increase your risk for osteoarthritis, sleep apnea, and other respiratory problems  Aim for a slow, steady weight loss  Even a small amount of weight loss can lower your risk of health problems  How to lose weight safely:  A safe and healthy way to lose weight is to eat fewer calories and get regular exercise  You can lose up about 1 pound a week by decreasing the number of calories you eat by 500 calories each day  Healthy meal plan for weight management:  A healthy meal plan includes a variety of foods, contains fewer calories, and helps you stay healthy  A healthy meal plan includes the following:  · Eat whole-grain foods more often  A healthy meal plan should contain fiber  Fiber is the part of grains, fruits, and vegetables that is not broken down by your body  Whole-grain foods are healthy and provide extra fiber in your diet  Some examples of whole-grain foods are whole-wheat breads and pastas, oatmeal, brown rice, and bulgur  · Eat a variety of vegetables every day  Include dark, leafy greens such as spinach, kale, kaylie greens, and mustard greens  Eat yellow and orange vegetables such as carrots, sweet potatoes, and winter squash  · Eat a variety of fruits every day  Choose fresh or canned fruit (canned in its own juice or light syrup) instead of juice  Fruit juice has very little or no fiber  · Eat low-fat dairy foods  Drink fat-free (skim) milk or 1% milk  Eat fat-free yogurt and low-fat cottage cheese  Try low-fat cheeses such as mozzarella and other reduced-fat cheeses  · Choose meat and other protein foods that are low in fat  Choose beans or other legumes such as split peas or lentils  Choose fish, skinless poultry (chicken or turkey), or lean cuts of red meat (beef or pork)   Before you cook meat or poultry, cut off any visible fat  · Use less fat and oil  Try baking foods instead of frying them  Add less fat, such as margarine, sour cream, regular salad dressing and mayonnaise to foods  Eat fewer high-fat foods  Some examples of high-fat foods include french fries, doughnuts, ice cream, and cakes  · Eat fewer sweets  Limit foods and drinks that are high in sugar  This includes candy, cookies, regular soda, and sweetened drinks  Exercise:  Exercise at least 30 minutes per day on most days of the week  Some examples of exercise include walking, biking, dancing, and swimming  You can also fit in more physical activity by taking the stairs instead of the elevator or parking farther away from stores  Ask your healthcare provider about the best exercise plan for you  © Copyright [employer] 2018 Information is for End User's use only and may not be sold, redistributed or otherwise used for commercial purposes   All illustrations and images included in CareNotes® are the copyrighted property of [employer]  or [address] [place] [address] Results (from the past 672 hour(s))   CBC    Collection Time: 08/06/20  7:31 AM   Result Value Ref Range    WBC 6 47 4 31 - 10 16 Thousand/uL    RBC 4 83 3 81 - 5 12 Million/uL    Hemoglobin 13 8 11 5 - 15 4 g/dL    Hematocrit 44 3 34 8 - 46 1 %    MCV 92 82 - 98 fL    MCH 28 6 26 8 - 34 3 pg    MCHC 31 2 (L) 31 4 - 37 4 g/dL    RDW 14 6 11 6 - 15 1 %    Platelets 458 161 - 184 Thousands/uL    MPV 12 4 8 9 - 12 7 fL   Comprehensive metabolic panel    Collection Time: 08/06/20  7:31 AM   Result Value Ref Range    Sodium 140 136 - 145 mmol/L    Potassium 4 1 3 5 - 5 3 mmol/L    Chloride 106 100 - 108 mmol/L    CO2 29 21 - 32 mmol/L    ANION GAP 5 4 - 13 mmol/L    BUN 13 5 - 25 mg/dL    Creatinine 0 76 0 60 - 1 30 mg/dL    Glucose, Fasting 130 (H) 65 - 99 mg/dL    Calcium 9 5 8 3 - 10 1 mg/dL    AST 8 5 - 45 U/L    ALT 19 12 - 78 U/L    Alkaline Phosphatase 102 46 - 116 U/L    Total Protein 7 6 6 4 - 8 2 g/dL    Albumin 3 4 (L) 3 5 - 5 0 g/dL    Total Bilirubin 0 33 0 20 - 1 00 mg/dL    eGFR 73 ml/min/1 73sq m   Hemoglobin A1C    Collection Time: 08/06/20  7:31 AM   Result Value Ref Range    Hemoglobin A1C 6 6 (H) Normal 3 8-5 6%; PreDiabetic 5 7-6 4%;  Diabetic >=6 5%; Glycemic control for adults with diabetes <7 0% %     mg/dl   Lipid Panel with Direct LDL reflex    Collection Time: 08/06/20  7:31 AM   Result Value Ref Range    Cholesterol 158 50 - 200 mg/dL    Triglycerides 313 (H) <=150 mg/dL    HDL, Direct 42 >=40 mg/dL    LDL Calculated 53 0 - 100 mg/dL   TSH, 3rd generation    Collection Time: 08/06/20  7:31 AM   Result Value Ref Range    TSH 3RD GENERATON 1 530 0 358 - 3 740 uIU/mL

## 2020-08-11 NOTE — PROGRESS NOTES
Assessment and Plan:     Problem List Items Addressed This Visit     Atrial fibrillation ([facility] [facility] )     Controlled with sotalol         Current moderate episode of major depressive disorder without prior episode [facility])     New  Discussed diagnosis with patient and her  at length  She declined referral for therapy and medication at this time  She thinks it is from the current situation of COVID  I asked her to think about talking to a therapist and she will let me know if she decides she would like to do it  Multiple sclerosis ([facility] )     Stable  Follows with neurologist         Type 2 diabetes mellitus without complication, without long-term current use of insulin ([facility] )       Lab Results   Component Value Date    HGBA1C 6 6 (H) 08/06/2020       Well controlled           Other Visit Diagnoses     Medicare annual wellness visit, subsequent    -  Primary    Right foot pain        pain post fall 1 month ago, will see Dr Gabriella Nassar for follow up    Relevant Orders    XR foot 3+ vw right          Depression Screening and Follow-up Plan: Patient's depression screening was positive with a PHQ-2 score of 4  Their PHQ-9 score was 19  Patient assessed for underlying major depression  Brief counseling provided and recommend additional follow-up/re-evaluation next office visit  Falls Plan of Care: balance, strength, and gait training instructions were provided  Preventive health issues were discussed with patient, and age appropriate screening tests were ordered as noted in patient's After Visit Summary  Personalized health advice and appropriate referrals for health education or preventive services given if needed, as noted in patient's After Visit Summary       History of Present Illness:     Patient presents for Medicare Annual Wellness visit    Patient Care Team:  Pako Ariza DO as PCP - General (Family Medicine)  MD Brooke Newton MD Weldon Sol, MD Jabier Lawrence, DO Arno Beavers Justino Renee MD     Problem List:     Patient Active Problem List   Diagnosis    Mixed hyperlipidemia    Atrial fibrillation ([facility] )    Multiple sclerosis ([facility] )    Essential hypertension    Type 2 diabetes mellitus without complication, without long-term current use of insulin (HCC)    Urinary incontinence    Cancer of right breast, stage 1 ([facility] )    Arthropathy of multiple sites    Abnormal gait    Granuloma of great toe    Diabetic polyneuropathy associated with type 2 diabetes mellitus ([facility] )    Atherosclerosis of native artery of both lower extremities (HCC)    Chest pain    Thyroid nodule    Onychomycosis    Tinea pedis of both feet    Pain in both feet    Current moderate episode of major depressive disorder without prior episode [facility])      Past Medical and Surgical History:     Past Medical History:   Diagnosis Date    Abscess of buttock, right     last assessed-5/4/2017    Cancer [facility])     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-last assessed-10/8/2015    Cellulitis of chest wall     last assessed-7/27/2015    Chronic endometritis 10/12/2006    Diabetes mellitus ([facility] )     Dysuria 11/02/2007    Flushing     resolved-6/30/2015    Glaucoma     Hyperlipidemia     Hypertension     Ingrown nail 01/05/2012    Malignant neoplasm of breast ([facility] )     last assessed-11/5/2015    MS (multiple sclerosis) ([place] [address] )     Nonvenomous insect bite     last assessed-12/12/2013    Palpitations 02/09/2011    Pressure ulcer of buttock 10/13/2006    Proctitis 05/12/2006    Vaginal polyp 03/14/2006    Viral gastroenteritis     last assessed-9/8/2016     Past Surgical History:   Procedure Laterality Date    BREAST BIOPSY      open    BREAST SURGERY      CERVICAL BIOPSY  W/ LOOP ELECTRODE EXCISION      DILATION AND CURETTAGE OF UTERUS      INCISION AND DRAINAGE OF WOUND Left 2/27/2017    Procedure: INCISION AND DRAINAGE (I&D)   Chest wall x 2;  Surgeon: Kari Leyden, MD;  Location: [address] [place] [address]; Service:     MASTECTOMY      last assessed-6/30/2015      Family History:     Family History   Problem Relation Age of Onset    Heart disease Father         cardiac disorder    COPD Sister     Diabetes Sister     Lung cancer Mother     Lung cancer Cousin     Heart disease Cousin         cardiac disorder    Multiple sclerosis Cousin     Cancer Cousin     Cancer Daughter         bladder    Breast cancer Maternal Aunt       Social History:        Social History     Socioeconomic History    Marital status: /Civil Union     Spouse name: None    Number of children: 3    Years of education: None    Highest education level: None   Occupational History    None   Social Needs    Financial resource strain: None    Food insecurity     Worry: None     Inability: None    Transportation needs     Medical: None     Non-medical: None   Tobacco Use    Smoking status: Former Smoker    Smokeless tobacco: Never Used   Substance and Sexual Activity    Alcohol use: Not Currently    Drug use: Not Currently    Sexual activity: Not Currently   Lifestyle    Physical activity     Days per week: None     Minutes per session: None    Stress: None   Relationships    Social connections     Talks on phone: None     Gets together: None     Attends Sabianism service: None     Active member of club or organization: None     Attends meetings of clubs or organizations: None     Relationship status: None    Intimate partner violence     Fear of current or ex partner: None     Emotionally abused: None     Physically abused: None     Forced sexual activity: None   Other Topics Concern    None   Social History Narrative    None      Medications and Allergies:     Current Outpatient Medications   Medication Sig Dispense Refill    amLODIPine (NORVASC) 10 mg tablet Take 1 tablet (10 mg total) by mouth daily 90 tablet 3    ascorbic acid ([employer] VITAMIN C) 500 MG tablet Take 1 tablet by mouth daily      aspirin 81 MG tablet Take 162 mg by mouth daily      glucose blood (FRANKI CONTOUR NEXT TEST) test strip 1 each by Other route 2 (two) times a day E11 9 test blood sugar twice daily 100 each 5    latanoprost (XALATAN) 0 005 % ophthalmic solution Administer 1 drop to both eyes daily at bedtime       lisinopril (ZESTRIL) 20 mg tablet Take 1 tablet (20 mg total) by mouth 2 (two) times a day 180 tablet 3    metFORMIN (GLUCOPHAGE) 1000 MG tablet Take 0 5 tablets (500 mg total) by mouth 2 (two) times a day with meals 90 tablet 3    Multiple Vitamin (MULTI-VITAMIN DAILY PO) Take by mouth daily      Omega-3 Fatty Acids (OMEGA-3 FISH OIL) 1000 MG CAPS Take 1 capsule by mouth daily      simvastatin (ZOCOR) 20 mg tablet Take 1 tablet (20 mg total) by mouth daily 90 tablet 3    sotalol (BETAPACE) 80 mg tablet Take 1 tablet (80 mg total) by mouth 2 (two) times a day 180 tablet 3    FRANKI MICROLET LANCETS lancets by Other route 2 (two) times a day E11 9 Test blood sugar twice daily (Patient not taking: Reported on 8/11/2020) 100 each 5    EASY TOUCH SAFETY LANCETS 28G MISC by Does not apply route daily (Patient not taking: Reported on 8/11/2020) 100 each 3     No current facility-administered medications for this visit  Allergies   Allergen Reactions    Bactrim [Sulfamethoxazole-Trimethoprim] Other (See Comments)     General weakness    Clindamycin      Annotation - [id number]: bad taste in mouth    Coumadin [Warfarin]      Reaction Date: 22May2012;  Annotation - [id number]: lip swelling    Dabigatran     Latex      Annotation - [id number]: RASH    Pradaxa [Dabigatran Etexilate Mesylate]       Immunizations:     Immunization History   Administered Date(s) Administered    Influenza Split High Dose Preservative Free IM 10/11/2012, 11/01/2013, 10/16/2014, 10/08/2015, 11/17/2016, 10/26/2017    Influenza, high dose seasonal 0 5 mL 11/13/2018, 11/04/2019    Pneumococcal Conjugate 13-Valent 01/12/2016    Pneumococcal Polysaccharide PPV23 01/01/2008      Health Maintenance: There are no preventive care reminders to display for this patient  Topic Date Due    DTaP,Tdap,and Td Vaccines (1 - Tdap) 10/01/1956    Influenza Vaccine  07/01/2020      Medicare Health Risk Assessment:     /70   Pulse 76   Temp 97 5 °F (36 4 °C)   Resp 20      Eric Rudd is here for her Subsequent Wellness visit  Health Risk Assessment:   Patient rates overall health as poor  Patient feels that their physical health rating is slightly worse  Eyesight was rated as slightly worse  Hearing was rated as slightly worse  Patient feels that their emotional and mental health rating is slightly worse  Pain experienced in the last 7 days has been some  Patient's pain rating has been 6/10  Patient states that she has experienced no weight loss or gain in last 6 months  Depression Screening:   PHQ-2 Score: 4  PHQ-9 Score: 19      Fall Risk Screening: In the past year, patient has experienced: history of falling in past year    Number of falls: 1  Injured during fall?: Yes      Urinary Incontinence Screening:   Patient has not leaked urine accidently in the last six months  Home Safety:  Patient has trouble with stairs inside or outside of their home  Patient has working smoke alarms and has no working carbon monoxide detector  Home safety hazards include: none  Nutrition:   Current diet is Regular  Meals on wheels 3 days a week  Pt states she does eat a lot of salt  Medications:   Patient is currently taking over-the-counter supplements  OTC medications include: see medication list  Patient is not able to manage medications  pts  helps her with her medication  Pt can tell what she needs to take by looking at the pills  Activities of Daily Living (ADLs)/Instrumental Activities of Daily Living (IADLs):   Walk and transfer into and out of bed and chair?: No  Dress and groom yourself?: No    Bathe or shower yourself?: No    Feed yourself?  Yes  Do your laundry/housekeeping?: No  Manage your money, pay your bills and track your expenses?: No  Make your own meals?: No    Do your own shopping?: No    Previous Hospitalizations:   Any hospitalizations or ED visits within the last 12 months?: No      Advance Care Planning:   Living will: No    Durable POA for healthcare: No    Advanced directive counseling given: Yes    Five wishes given: Yes    End of Life Decisions reviewed with patient: Yes    Provider agrees with end of life decisions: Yes      Cognitive Screening:   Provider or family/friend/caregiver concerned regarding cognition?: No    PREVENTIVE SCREENINGS      Cardiovascular Screening:    General: Screening Not Indicated and History Lipid Disorder      Diabetes Screening:     General: Screening Not Indicated and History Diabetes      Colorectal Cancer Screening:     General: Screening Not Indicated      Breast Cancer Screening:     General: History Breast Cancer      Cervical Cancer Screening:    General: Screening Not Indicated      Osteoporosis Screening:    General: Risks and Benefits Discussed and Patient Declines      Abdominal Aortic Aneurysm (AAA) Screening:        General: Screening Not Indicated      Lung Cancer Screening:     General: Screening Not Indicated      Hepatitis C Screening:    General: Screening Not Indicated    Physical Exam  Vitals signs and nursing note reviewed  Constitutional:       Appearance: She is well-developed  HENT:      Head: Normocephalic and atraumatic  Right Ear: External ear normal       Left Ear: External ear normal       Nose: Nose normal    Cardiovascular:      Rate and Rhythm: Normal rate and regular rhythm  Heart sounds: Normal heart sounds  No murmur  No friction rub  Pulmonary:      Effort: No respiratory distress  Breath sounds: Normal breath sounds  No wheezing or rales  Musculoskeletal:      Right lower leg: Edema present  Left lower leg: Edema present        Comments: Using wheelchair, right foot tenderness   Neurological:      Mental Status: She is oriented to person, place, and time  Cranial Nerves: No cranial nerve deficit             Bernardino Thompson, DO

## 2020-08-20 ENCOUNTER — OFFICE VISIT (OUTPATIENT)
Dept: PODIATRY | Facility: CLINIC | Age: 85
End: 2020-08-20
Payer: MEDICARE

## 2020-08-20 VITALS
HEIGHT: 66 IN | DIASTOLIC BLOOD PRESSURE: 70 MMHG | HEART RATE: 76 BPM | RESPIRATION RATE: 17 BRPM | WEIGHT: 195 LBS | BODY MASS INDEX: 31.34 KG/M2 | SYSTOLIC BLOOD PRESSURE: 134 MMHG

## 2020-08-20 DIAGNOSIS — S93.601A FOOT SPRAIN, RIGHT, INITIAL ENCOUNTER: ICD-10-CM

## 2020-08-20 DIAGNOSIS — E11.42 DIABETIC POLYNEUROPATHY ASSOCIATED WITH TYPE 2 DIABETES MELLITUS (HCC): Primary | ICD-10-CM

## 2020-08-20 DIAGNOSIS — I70.203 ATHEROSCLEROSIS OF NATIVE ARTERY OF BOTH LOWER EXTREMITIES, WITH UNSPECIFIED PRESENCE OF CLINICAL MANIFESTATION (HCC): ICD-10-CM

## 2020-08-20 DIAGNOSIS — M79.671 PAIN IN BOTH FEET: ICD-10-CM

## 2020-08-20 DIAGNOSIS — B35.1 ONYCHOMYCOSIS: ICD-10-CM

## 2020-08-20 DIAGNOSIS — M79.672 PAIN IN BOTH FEET: ICD-10-CM

## 2020-08-20 PROCEDURE — 29540 STRAPPING ANKLE &/FOOT: CPT | Performed by: PODIATRIST

## 2020-08-20 PROCEDURE — 73620 X-RAY EXAM OF FOOT: CPT | Performed by: PODIATRIST

## 2020-08-20 PROCEDURE — 11721 DEBRIDE NAIL 6 OR MORE: CPT | Performed by: PODIATRIST

## 2020-08-20 NOTE — PROGRESS NOTES
Plan      Aseptic debridement and planning of nails x10 and manually and mechanically         Assessment      Diabetic polyneuropathy associated with type 2 diabetes mellitus (HCC)     Atherosclerosis of native artery of both lower extremities, with unspecified presence of clinical manifestation (HCC)     Pain in both feet     Onychomycosis              Subjective:  patient is diabetic   She has pain in her feet with ambulation   No history of trauma   She has pain with shoe wear       Patient ID: Ernestina Hurley is a 84 y  o  female      Patient has chief complaint of thick painful nails that hurt when walking wearing shoes   Patient is mostly in wheelchair    Patient is follow-up for swelling left 2nd digit   Patient never went for x-ray   Patient has no pain no fever   Patient still has swelling but no redness         Medical History           Past Medical History:   Diagnosis Date    Abscess of buttock, right       last assessed-5/4/2017    Cancer [facility])       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-last assessed-10/8/2015    Cellulitis of chest wall       last assessed-7/27/2015    Chronic endometritis 10/12/2006    Diabetes mellitus ([facility] )      Dysuria 11/02/2007    Flushing       resolved-6/30/2015    Hyperlipidemia      Hypertension      Ingrown nail 01/05/2012    Malignant neoplasm of breast (HCC)       last assessed-11/5/2015    MS (multiple sclerosis) ([facility]      Nonvenomous insect bite       last assessed-12/12/2013    Palpitations 02/09/2011    Pressure ulcer of buttock 10/13/2006    Proctitis 05/12/2006    Vaginal polyp 03/14/2006    Viral gastroenteritis       last assessed-9/8/2016            Surgical History             Past Surgical History:   Procedure Laterality Date    BREAST BIOPSY         open    BREAST SURGERY        CERVICAL BIOPSY  W/ LOOP ELECTRODE EXCISION        DILATION AND CURETTAGE OF UTERUS        INCISION AND DRAINAGE OF WOUND Left 2/27/2017     Procedure: INCISION AND DRAINAGE (I&D)   Chest wall x 2;  Surgeon: Danielle Wise MD;  Location: [facility];  Service:     MASTECTOMY         last assessed-6/30/2015                      Allergies   Allergen Reactions    Clindamycin         Annotation - [id number]: bad taste in mouth    Coumadin [Warfarin]         Reaction Date: [id number];  Annotation - [id number]: lip swelling    Latex         Annotation - [id number]: RASH    Pradaxa [Dabigatran Etexilate Mesylate]              Current Outpatient Medications:     amLODIPine (NORVASC) 10 mg tablet, TAKE 1 TABLET (10 MG TOTAL) BY MOUTH DAILY, Disp: 90 tablet, Rfl: 1    ascorbic acid (CVS VITAMIN C) 500 MG tablet, Take 1 tablet by mouth daily, Disp: , Rfl:     aspirin 81 MG tablet, Take 162 mg by mouth daily, Disp: , Rfl:     FRANKI MICROLET LANCETS lancets, by Other route 2 (two) times a day E11 9 Test blood sugar twice daily, Disp: 100 each, Rfl: 5    glucose blood (FRANKI CONTOUR NEXT TEST) test strip, 1 each by Other route 2 (two) times a day E11 9 test blood sugar twice daily, Disp: 100 each, Rfl: 5    latanoprost (XALATAN) 0 005 % ophthalmic solution, Apply 1 drop to eye, Disp: , Rfl:     lisinopril (ZESTRIL) 20 mg tablet, Take 1 tablet (20 mg total) by mouth 2 (two) times a day, Disp: 180 tablet, Rfl: 3    metFORMIN (GLUCOPHAGE) 1000 MG tablet, Take 0 5 tablets (500 mg total) by mouth 2 (two) times a day with meals, Disp: 90 tablet, Rfl: 3    Multiple Vitamin (MULTI-VITAMIN DAILY PO), Take by mouth daily, Disp: , Rfl:     Omega-3 Fatty Acids (OMEGA-3 FISH OIL) 1000 MG CAPS, Take 1 capsule by mouth daily, Disp: , Rfl:     simvastatin (ZOCOR) 20 mg tablet, Take 1 tablet (20 mg total) by mouth daily, Disp: 90 tablet, Rfl: 3    sotalol (BETAPACE) 80 mg tablet, Take 80 mg by mouth 2 (two) times a day, Disp: , Rfl:            Patient Active Problem List   Diagnosis    Mixed hyperlipidemia    Atrial fibrillation (HCC)    Multiple sclerosis (HCC)    Weakness    Essential hypertension    Type 2 diabetes mellitus without complication, without long-term current use of insulin (HCC)    Urinary incontinence    [facility] right [facility], [facility] ([facility]    Arthropathy of multiple sites    Abnormal gait    Granuloma of great toe    Diabetic polyneuropathy associated with type 2 diabetes mellitus ([facility] )    Atherosclerosis of native artery of both lower extremities ([facility])         Review of Systems   Constitutional: Negative     HENT: Negative     Eyes: Negative     Respiratory: Negative     Cardiovascular: Negative     Gastrointestinal: Negative     Endocrine: Negative     Genitourinary: Negative     Musculoskeletal: Positive for gait problem and joint swelling  Skin: Negative     Allergic/Immunologic: Negative     Hematological: Negative     Psychiatric/Behavioral: Negative           Objective:  Patient's shoes and socks were removed, feet examined          /72   Pulse 86   Resp 17   Ht 5' 6" (1 676 m)   Wt 90 7 kg (200 lb)   BMI 32 28 kg/m²             Physical Exam   Cardiovascular: Pulses are weak pulses  Pulses:       Dorsalis pedis pulses are 1+ on the right side, and 1+ on the left side         Posterior tibial pulses are Absent on the right side, and Absent on the left side  Feet:   Right Foot:   Skin Integrity: Positive for callus and dry skin     Left Foot:   Skin Integrity: Positive for callus and dry skin    Nursing note and vitals reviewed      Foot Exam     General  General Appearance: appears stated age and healthy   Orientation: alert and oriented to person, place, and time   Affect: appropriate   Assistance: wheelchair use      [place] tinea pedis noted bilateral        Right Foot/Ankle      Inspection and Palpation  Arch: pes planus  Hammertoes: second toe, fifth toe, fourth toe and third toe  Hallux limitus: yes  Skin Exam: callus, dry skin and skin changes;      Neurovascular  Dorsalis pedis: 1+  Posterior tibial: absent        Left Foot/Ankle       Inspection and Palpation  Arch: pes planus  Hammertoes: second toe, fifth toe, fourth toe and third toe  Hallux limitus: yes  Skin Exam: callus, dry skin and skin changes;      Neurovascular  Dorsalis pedis: 1+  Posterior tibial: absent                 Patient's shoes and socks removed  Right Foot/Ankle   Right Foot Inspection  Skin Exam: dry skin, callus and callus                  Sensory   Vibration: absent     Monofilament testing: diminished  Vascular  Capillary refills: elevated  The right DP pulse is 1+  The right PT pulse is absent  Right Toe  - Comprehensive Exam  Arch: pes planus  Hammertoes: second toe, fifth toe, fourth toe and third toe  Hallux limitus: yes     Left Foot/Ankle  Left Foot Inspection  Skin Exam: dry skin and callus                 Tiffanie Cullen  Proprioception: diminished  Monofilament: diminished  Vascular  Capillary refills: elevated  The left DP pulse is 1+  The left PT pulse is absent  Left Toe  - Comprehensive Exam  Arch: pes planus  Hammertoes: second toe, fifth toe, fourth toe and third toe  Hallux limitus: yes     Patient's shoes and socks removed  Assign Risk Category:  Deformity present; Loss of protective sensation; Weak pulses       Risk: 2  Addendum  Patient has history of injury to right foot  She fell over a month ago  She fell at home and injured her right foot  She was given orders for x-rays by primary care however she was unable to get x-rays done  Patient walks with a walker on occasion  She has occasional pain  Right foot demonstrates edema and erythema overlying the forefoot  This is on the dorsum  No pain with tuning fork test   X-ray demonstrates no evidence of occult fracture  We will treat this is foot sprain    X-rays were taken today and patient's foot bound a low dye arch strapping fashion

## 2020-08-31 RX ORDER — LATANOPROST 50 UG/ML
SOLUTION/ DROPS OPHTHALMIC
Qty: 2.5 ML | Refills: 6 | OUTPATIENT
Start: 2020-08-31

## 2020-09-30 DIAGNOSIS — E78.2 MIXED HYPERLIPIDEMIA: ICD-10-CM

## 2020-09-30 RX ORDER — SIMVASTATIN 20 MG
20 TABLET ORAL DAILY
Qty: 90 TABLET | Refills: 3 | Status: SHIPPED | OUTPATIENT
Start: 2020-09-30 | End: 2021-10-02 | Stop reason: SDUPTHER

## 2020-10-08 ENCOUNTER — TELEPHONE (OUTPATIENT)
Dept: FAMILY MEDICINE CLINIC | Facility: CLINIC | Age: 85
End: 2020-10-08

## 2020-10-08 DIAGNOSIS — H40.9 GLAUCOMA, UNSPECIFIED GLAUCOMA TYPE, UNSPECIFIED LATERALITY: Primary | ICD-10-CM

## 2020-10-08 RX ORDER — LATANOPROST 50 UG/ML
1 SOLUTION/ DROPS OPHTHALMIC
Qty: 2.5 ML | Refills: 0 | Status: SHIPPED | OUTPATIENT
Start: 2020-10-08

## 2020-10-15 DIAGNOSIS — E11.9 TYPE 2 DIABETES MELLITUS WITHOUT COMPLICATION, WITHOUT LONG-TERM CURRENT USE OF INSULIN (HCC): ICD-10-CM

## 2020-10-23 ENCOUNTER — OFFICE VISIT (OUTPATIENT)
Dept: PODIATRY | Facility: CLINIC | Age: 85
End: 2020-10-23
Payer: MEDICARE

## 2020-10-23 VITALS
SYSTOLIC BLOOD PRESSURE: 134 MMHG | RESPIRATION RATE: 17 BRPM | WEIGHT: 195 LBS | HEIGHT: 66 IN | BODY MASS INDEX: 31.34 KG/M2 | DIASTOLIC BLOOD PRESSURE: 70 MMHG | HEART RATE: 76 BPM

## 2020-10-23 DIAGNOSIS — I70.203 ATHEROSCLEROSIS OF NATIVE ARTERY OF BOTH LOWER EXTREMITIES, WITH UNSPECIFIED PRESENCE OF CLINICAL MANIFESTATION (HCC): ICD-10-CM

## 2020-10-23 DIAGNOSIS — B35.1 ONYCHOMYCOSIS: ICD-10-CM

## 2020-10-23 DIAGNOSIS — M79.672 PAIN IN BOTH FEET: ICD-10-CM

## 2020-10-23 DIAGNOSIS — M79.671 PAIN IN BOTH FEET: ICD-10-CM

## 2020-10-23 DIAGNOSIS — E11.42 DIABETIC POLYNEUROPATHY ASSOCIATED WITH TYPE 2 DIABETES MELLITUS (HCC): Primary | ICD-10-CM

## 2020-10-23 PROCEDURE — 11721 DEBRIDE NAIL 6 OR MORE: CPT | Performed by: PODIATRIST

## 2020-11-17 ENCOUNTER — OFFICE VISIT (OUTPATIENT)
Dept: FAMILY MEDICINE CLINIC | Facility: CLINIC | Age: 85
End: 2020-11-17
Payer: MEDICARE

## 2020-11-17 VITALS
SYSTOLIC BLOOD PRESSURE: 124 MMHG | DIASTOLIC BLOOD PRESSURE: 76 MMHG | RESPIRATION RATE: 16 BRPM | TEMPERATURE: 96.4 F | HEART RATE: 78 BPM

## 2020-11-17 DIAGNOSIS — E11.9 TYPE 2 DIABETES MELLITUS WITHOUT COMPLICATION, WITHOUT LONG-TERM CURRENT USE OF INSULIN (HCC): Primary | ICD-10-CM

## 2020-11-17 DIAGNOSIS — L89.152 PRESSURE INJURY OF SACRAL REGION, STAGE 2 (HCC): ICD-10-CM

## 2020-11-17 DIAGNOSIS — Z23 NEED FOR VACCINATION: ICD-10-CM

## 2020-11-17 DIAGNOSIS — I10 ESSENTIAL HYPERTENSION: ICD-10-CM

## 2020-11-17 DIAGNOSIS — E78.2 MIXED HYPERLIPIDEMIA: ICD-10-CM

## 2020-11-17 LAB — SL AMB POCT HEMOGLOBIN AIC: 6.1 (ref ?–6.5)

## 2020-11-17 PROCEDURE — 99214 OFFICE O/P EST MOD 30 MIN: CPT | Performed by: FAMILY MEDICINE

## 2020-11-17 PROCEDURE — 83036 HEMOGLOBIN GLYCOSYLATED A1C: CPT | Performed by: FAMILY MEDICINE

## 2020-11-17 PROCEDURE — G0008 ADMIN INFLUENZA VIRUS VAC: HCPCS | Performed by: FAMILY MEDICINE

## 2020-11-17 PROCEDURE — 90662 IIV NO PRSV INCREASED AG IM: CPT | Performed by: FAMILY MEDICINE

## 2020-11-18 ENCOUNTER — TELEPHONE (OUTPATIENT)
Dept: FAMILY MEDICINE CLINIC | Facility: CLINIC | Age: 85
End: 2020-11-18

## 2020-11-19 DIAGNOSIS — I10 ESSENTIAL HYPERTENSION: ICD-10-CM

## 2020-11-19 RX ORDER — LISINOPRIL 20 MG/1
20 TABLET ORAL 2 TIMES DAILY
Qty: 180 TABLET | Refills: 3 | Status: SHIPPED | OUTPATIENT
Start: 2020-11-19 | End: 2021-11-20 | Stop reason: SDUPTHER

## 2020-12-07 ENCOUNTER — TELEPHONE (OUTPATIENT)
Dept: HEMATOLOGY ONCOLOGY | Facility: CLINIC | Age: 85
End: 2020-12-07

## 2020-12-26 DIAGNOSIS — E11.9 TYPE 2 DIABETES MELLITUS WITHOUT COMPLICATION, WITHOUT LONG-TERM CURRENT USE OF INSULIN (HCC): ICD-10-CM

## 2020-12-26 RX ORDER — BLOOD-GLUCOSE METER
EACH MISCELLANEOUS
Qty: 100 EACH | Refills: 3 | Status: SHIPPED | OUTPATIENT
Start: 2020-12-26

## 2021-01-20 DIAGNOSIS — I10 ESSENTIAL HYPERTENSION: ICD-10-CM

## 2021-01-20 RX ORDER — AMLODIPINE BESYLATE 10 MG/1
10 TABLET ORAL DAILY
Qty: 90 TABLET | Refills: 3 | Status: SHIPPED | OUTPATIENT
Start: 2021-01-20 | End: 2021-12-18

## 2021-02-15 DIAGNOSIS — I48.91 ATRIAL FIBRILLATION, UNSPECIFIED TYPE (HCC): ICD-10-CM

## 2021-02-15 RX ORDER — SOTALOL HYDROCHLORIDE 80 MG/1
80 TABLET ORAL 2 TIMES DAILY
Qty: 180 TABLET | Refills: 3 | Status: SHIPPED | OUTPATIENT
Start: 2021-02-15 | End: 2021-02-15 | Stop reason: SDUPTHER

## 2021-02-15 RX ORDER — SOTALOL HYDROCHLORIDE 80 MG/1
80 TABLET ORAL 2 TIMES DAILY
Qty: 180 TABLET | Refills: 3 | Status: SHIPPED | OUTPATIENT
Start: 2021-02-15

## 2021-02-16 PROBLEM — Z85.3 HISTORY OF BREAST CANCER: Status: ACTIVE | Noted: 2017-06-28

## 2021-03-02 DIAGNOSIS — E11.9 TYPE 2 DIABETES MELLITUS WITHOUT COMPLICATION, WITHOUT LONG-TERM CURRENT USE OF INSULIN (HCC): ICD-10-CM

## 2021-10-02 DIAGNOSIS — E78.2 MIXED HYPERLIPIDEMIA: ICD-10-CM

## 2021-10-02 RX ORDER — SIMVASTATIN 20 MG
20 TABLET ORAL DAILY
Qty: 90 TABLET | Refills: 0 | Status: SHIPPED | OUTPATIENT
Start: 2021-10-02

## 2021-10-12 DIAGNOSIS — E11.9 TYPE 2 DIABETES MELLITUS WITHOUT COMPLICATION, WITHOUT LONG-TERM CURRENT USE OF INSULIN (HCC): ICD-10-CM

## 2021-10-13 DIAGNOSIS — E11.9 TYPE 2 DIABETES MELLITUS WITHOUT COMPLICATION, WITHOUT LONG-TERM CURRENT USE OF INSULIN (HCC): ICD-10-CM

## 2021-10-14 ENCOUNTER — TELEPHONE (OUTPATIENT)
Dept: FAMILY MEDICINE CLINIC | Facility: CLINIC | Age: 86
End: 2021-10-14

## 2021-11-10 ENCOUNTER — TELEPHONE (OUTPATIENT)
Dept: FAMILY MEDICINE CLINIC | Facility: CLINIC | Age: 86
End: 2021-11-10

## 2021-11-10 ENCOUNTER — OFFICE VISIT (OUTPATIENT)
Dept: FAMILY MEDICINE CLINIC | Facility: CLINIC | Age: 86
End: 2021-11-10
Payer: MEDICARE

## 2021-11-10 VITALS
TEMPERATURE: 97.5 F | HEART RATE: 69 BPM | OXYGEN SATURATION: 98 % | DIASTOLIC BLOOD PRESSURE: 80 MMHG | SYSTOLIC BLOOD PRESSURE: 158 MMHG | RESPIRATION RATE: 16 BRPM

## 2021-11-10 DIAGNOSIS — Z23 NEED FOR VACCINATION: ICD-10-CM

## 2021-11-10 DIAGNOSIS — I48.91 ATRIAL FIBRILLATION, UNSPECIFIED TYPE (HCC): ICD-10-CM

## 2021-11-10 DIAGNOSIS — K59.00 CONSTIPATION, UNSPECIFIED CONSTIPATION TYPE: ICD-10-CM

## 2021-11-10 DIAGNOSIS — Z00.00 MEDICARE ANNUAL WELLNESS VISIT, SUBSEQUENT: Primary | ICD-10-CM

## 2021-11-10 DIAGNOSIS — L89.152 PRESSURE INJURY OF SACRAL REGION, STAGE 2 (HCC): ICD-10-CM

## 2021-11-10 DIAGNOSIS — F32.1 CURRENT MODERATE EPISODE OF MAJOR DEPRESSIVE DISORDER WITHOUT PRIOR EPISODE (HCC): ICD-10-CM

## 2021-11-10 DIAGNOSIS — E11.42 DIABETIC POLYNEUROPATHY ASSOCIATED WITH TYPE 2 DIABETES MELLITUS (HCC): ICD-10-CM

## 2021-11-10 DIAGNOSIS — I10 ESSENTIAL HYPERTENSION: ICD-10-CM

## 2021-11-10 DIAGNOSIS — I70.203 ATHEROSCLEROSIS OF NATIVE ARTERY OF BOTH LOWER EXTREMITIES, WITH UNSPECIFIED PRESENCE OF CLINICAL MANIFESTATION (HCC): ICD-10-CM

## 2021-11-10 DIAGNOSIS — G35 MULTIPLE SCLEROSIS (HCC): ICD-10-CM

## 2021-11-10 PROCEDURE — 90662 IIV NO PRSV INCREASED AG IM: CPT

## 2021-11-10 PROCEDURE — 1123F ACP DISCUSS/DSCN MKR DOCD: CPT

## 2021-11-10 PROCEDURE — G0439 PPPS, SUBSEQ VISIT: HCPCS | Performed by: FAMILY MEDICINE

## 2021-11-10 PROCEDURE — G0008 ADMIN INFLUENZA VIRUS VAC: HCPCS

## 2021-11-10 RX ORDER — NETARSUDIL 0.2 MG/ML
SOLUTION/ DROPS OPHTHALMIC; TOPICAL
COMMUNITY
Start: 2021-10-20 | End: 2022-01-09 | Stop reason: HOSPADM

## 2021-11-10 RX ORDER — ESCITALOPRAM OXALATE 10 MG/1
10 TABLET ORAL DAILY
Qty: 90 TABLET | Refills: 1 | Status: SHIPPED | OUTPATIENT
Start: 2021-11-10

## 2021-11-20 ENCOUNTER — TELEPHONE (OUTPATIENT)
Dept: FAMILY MEDICINE CLINIC | Facility: CLINIC | Age: 86
End: 2021-11-20

## 2021-11-20 DIAGNOSIS — I10 ESSENTIAL HYPERTENSION: ICD-10-CM

## 2021-11-20 RX ORDER — LISINOPRIL 20 MG/1
20 TABLET ORAL 2 TIMES DAILY
Qty: 180 TABLET | Refills: 3 | Status: SHIPPED | OUTPATIENT
Start: 2021-11-20

## 2021-11-23 ENCOUNTER — APPOINTMENT (EMERGENCY)
Dept: RADIOLOGY | Facility: HOSPITAL | Age: 86
DRG: 552 | End: 2021-11-23
Payer: MEDICARE

## 2021-11-23 ENCOUNTER — HOSPITAL ENCOUNTER (INPATIENT)
Facility: HOSPITAL | Age: 86
LOS: 3 days | Discharge: NURSING, REHAB OR LONG-TERM CARE | DRG: 552 | End: 2021-11-26
Attending: EMERGENCY MEDICINE | Admitting: STUDENT IN AN ORGANIZED HEALTH CARE EDUCATION/TRAINING PROGRAM
Payer: MEDICARE

## 2021-11-23 DIAGNOSIS — G35 MULTIPLE SCLEROSIS (HCC): ICD-10-CM

## 2021-11-23 DIAGNOSIS — R53.1 WEAKNESS: Primary | ICD-10-CM

## 2021-11-23 DIAGNOSIS — R29.898 LOWER EXTREMITY WEAKNESS: ICD-10-CM

## 2021-11-23 LAB
ALBUMIN SERPL BCP-MCNC: 3.1 G/DL (ref 3.5–5)
ALP SERPL-CCNC: 91 U/L (ref 46–116)
ALT SERPL W P-5'-P-CCNC: 16 U/L (ref 12–78)
ANION GAP SERPL CALCULATED.3IONS-SCNC: 8 MMOL/L (ref 4–13)
APTT PPP: 31 SECONDS (ref 23–37)
AST SERPL W P-5'-P-CCNC: 15 U/L (ref 5–45)
ATRIAL RATE: 60 BPM
BACTERIA UR QL AUTO: ABNORMAL /HPF
BASOPHILS # BLD AUTO: 0.07 THOUSANDS/ΜL (ref 0–0.1)
BASOPHILS NFR BLD AUTO: 1 % (ref 0–1)
BILIRUB SERPL-MCNC: 0.34 MG/DL (ref 0.2–1)
BILIRUB UR QL STRIP: NEGATIVE
BUN SERPL-MCNC: 14 MG/DL (ref 5–25)
CALCIUM ALBUM COR SERPL-MCNC: 9.6 MG/DL (ref 8.3–10.1)
CALCIUM SERPL-MCNC: 8.9 MG/DL (ref 8.3–10.1)
CHLORIDE SERPL-SCNC: 107 MMOL/L (ref 100–108)
CLARITY UR: CLEAR
CO2 SERPL-SCNC: 31 MMOL/L (ref 21–32)
COLOR UR: YELLOW
CREAT SERPL-MCNC: 0.72 MG/DL (ref 0.6–1.3)
EOSINOPHIL # BLD AUTO: 0.12 THOUSAND/ΜL (ref 0–0.61)
EOSINOPHIL NFR BLD AUTO: 1 % (ref 0–6)
ERYTHROCYTE [DISTWIDTH] IN BLOOD BY AUTOMATED COUNT: 14.7 % (ref 11.6–15.1)
FLUAV RNA RESP QL NAA+PROBE: NEGATIVE
FLUBV RNA RESP QL NAA+PROBE: NEGATIVE
GFR SERPL CREATININE-BSD FRML MDRD: 77 ML/MIN/1.73SQ M
GLUCOSE SERPL-MCNC: 131 MG/DL (ref 65–140)
GLUCOSE UR STRIP-MCNC: NEGATIVE MG/DL
HCT VFR BLD AUTO: 43.8 % (ref 34.8–46.1)
HGB BLD-MCNC: 13.7 G/DL (ref 11.5–15.4)
HGB UR QL STRIP.AUTO: NEGATIVE
IMM GRANULOCYTES # BLD AUTO: 0.02 THOUSAND/UL (ref 0–0.2)
IMM GRANULOCYTES NFR BLD AUTO: 0 % (ref 0–2)
INR PPP: 1 (ref 0.84–1.19)
KETONES UR STRIP-MCNC: NEGATIVE MG/DL
LACTATE SERPL-SCNC: 1 MMOL/L (ref 0.5–2)
LEUKOCYTE ESTERASE UR QL STRIP: NEGATIVE
LIPASE SERPL-CCNC: 167 U/L (ref 73–393)
LYMPHOCYTES # BLD AUTO: 1.69 THOUSANDS/ΜL (ref 0.6–4.47)
LYMPHOCYTES NFR BLD AUTO: 20 % (ref 14–44)
MCH RBC QN AUTO: 28.2 PG (ref 26.8–34.3)
MCHC RBC AUTO-ENTMCNC: 31.3 G/DL (ref 31.4–37.4)
MCV RBC AUTO: 90 FL (ref 82–98)
MONOCYTES # BLD AUTO: 0.82 THOUSAND/ΜL (ref 0.17–1.22)
MONOCYTES NFR BLD AUTO: 10 % (ref 4–12)
NEUTROPHILS # BLD AUTO: 5.57 THOUSANDS/ΜL (ref 1.85–7.62)
NEUTS SEG NFR BLD AUTO: 68 % (ref 43–75)
NITRITE UR QL STRIP: NEGATIVE
NON-SQ EPI CELLS URNS QL MICRO: ABNORMAL /HPF
NRBC BLD AUTO-RTO: 0 /100 WBCS
P AXIS: 94 DEGREES
PH UR STRIP.AUTO: 7 [PH]
PLATELET # BLD AUTO: 196 THOUSANDS/UL (ref 149–390)
PMV BLD AUTO: 12.8 FL (ref 8.9–12.7)
POTASSIUM SERPL-SCNC: 4.5 MMOL/L (ref 3.5–5.3)
PR INTERVAL: 190 MS
PROT SERPL-MCNC: 7.2 G/DL (ref 6.4–8.2)
PROT UR STRIP-MCNC: ABNORMAL MG/DL
PROTHROMBIN TIME: 13 SECONDS (ref 11.6–14.5)
QRS AXIS: -1 DEGREES
QRSD INTERVAL: 80 MS
QT INTERVAL: 456 MS
QTC INTERVAL: 456 MS
RBC # BLD AUTO: 4.86 MILLION/UL (ref 3.81–5.12)
RBC #/AREA URNS AUTO: ABNORMAL /HPF
RSV RNA RESP QL NAA+PROBE: NEGATIVE
SARS-COV-2 RNA RESP QL NAA+PROBE: NEGATIVE
SODIUM SERPL-SCNC: 146 MMOL/L (ref 136–145)
SP GR UR STRIP.AUTO: 1.01 (ref 1–1.03)
T WAVE AXIS: 51 DEGREES
TSH SERPL DL<=0.05 MIU/L-ACNC: 1.39 UIU/ML (ref 0.36–3.74)
UROBILINOGEN UR QL STRIP.AUTO: 0.2 E.U./DL
VENTRICULAR RATE: 60 BPM
WBC # BLD AUTO: 8.29 THOUSAND/UL (ref 4.31–10.16)
WBC #/AREA URNS AUTO: ABNORMAL /HPF

## 2021-11-23 PROCEDURE — 87040 BLOOD CULTURE FOR BACTERIA: CPT | Performed by: EMERGENCY MEDICINE

## 2021-11-23 PROCEDURE — 85025 COMPLETE CBC W/AUTO DIFF WBC: CPT | Performed by: EMERGENCY MEDICINE

## 2021-11-23 PROCEDURE — 93005 ELECTROCARDIOGRAM TRACING: CPT

## 2021-11-23 PROCEDURE — 85610 PROTHROMBIN TIME: CPT | Performed by: EMERGENCY MEDICINE

## 2021-11-23 PROCEDURE — 83605 ASSAY OF LACTIC ACID: CPT | Performed by: EMERGENCY MEDICINE

## 2021-11-23 PROCEDURE — 71045 X-RAY EXAM CHEST 1 VIEW: CPT

## 2021-11-23 PROCEDURE — 99223 1ST HOSP IP/OBS HIGH 75: CPT | Performed by: STUDENT IN AN ORGANIZED HEALTH CARE EDUCATION/TRAINING PROGRAM

## 2021-11-23 PROCEDURE — 80053 COMPREHEN METABOLIC PANEL: CPT | Performed by: EMERGENCY MEDICINE

## 2021-11-23 PROCEDURE — 0241U HB NFCT DS VIR RESP RNA 4 TRGT: CPT | Performed by: EMERGENCY MEDICINE

## 2021-11-23 PROCEDURE — 81001 URINALYSIS AUTO W/SCOPE: CPT | Performed by: EMERGENCY MEDICINE

## 2021-11-23 PROCEDURE — 87086 URINE CULTURE/COLONY COUNT: CPT | Performed by: EMERGENCY MEDICINE

## 2021-11-23 PROCEDURE — 96360 HYDRATION IV INFUSION INIT: CPT

## 2021-11-23 PROCEDURE — 96361 HYDRATE IV INFUSION ADD-ON: CPT

## 2021-11-23 PROCEDURE — 99284 EMERGENCY DEPT VISIT MOD MDM: CPT | Performed by: EMERGENCY MEDICINE

## 2021-11-23 PROCEDURE — 84443 ASSAY THYROID STIM HORMONE: CPT | Performed by: EMERGENCY MEDICINE

## 2021-11-23 PROCEDURE — 99285 EMERGENCY DEPT VISIT HI MDM: CPT

## 2021-11-23 PROCEDURE — 83690 ASSAY OF LIPASE: CPT | Performed by: EMERGENCY MEDICINE

## 2021-11-23 PROCEDURE — 93010 ELECTROCARDIOGRAM REPORT: CPT | Performed by: INTERNAL MEDICINE

## 2021-11-23 PROCEDURE — 85730 THROMBOPLASTIN TIME PARTIAL: CPT | Performed by: EMERGENCY MEDICINE

## 2021-11-23 PROCEDURE — 70450 CT HEAD/BRAIN W/O DYE: CPT

## 2021-11-23 PROCEDURE — 36415 COLL VENOUS BLD VENIPUNCTURE: CPT | Performed by: EMERGENCY MEDICINE

## 2021-11-23 RX ORDER — SOTALOL HYDROCHLORIDE 80 MG/1
80 TABLET ORAL 2 TIMES DAILY
Status: DISCONTINUED | OUTPATIENT
Start: 2021-11-23 | End: 2021-11-26 | Stop reason: HOSPADM

## 2021-11-23 RX ORDER — POLYETHYLENE GLYCOL 3350 17 G/17G
17 POWDER, FOR SOLUTION ORAL DAILY
Status: DISCONTINUED | OUTPATIENT
Start: 2021-11-23 | End: 2021-11-26 | Stop reason: HOSPADM

## 2021-11-23 RX ORDER — SODIUM PHOSPHATE, DIBASIC AND SODIUM PHOSPHATE, MONOBASIC 7; 19 G/133ML; G/133ML
1 ENEMA RECTAL ONCE
Status: DISCONTINUED | OUTPATIENT
Start: 2021-11-23 | End: 2021-11-23

## 2021-11-23 RX ORDER — ACETAMINOPHEN 325 MG/1
650 TABLET ORAL EVERY 6 HOURS PRN
Status: DISCONTINUED | OUTPATIENT
Start: 2021-11-23 | End: 2021-11-26 | Stop reason: HOSPADM

## 2021-11-23 RX ORDER — ATORVASTATIN CALCIUM 40 MG/1
40 TABLET, FILM COATED ORAL
Status: DISCONTINUED | OUTPATIENT
Start: 2021-11-23 | End: 2021-11-26 | Stop reason: HOSPADM

## 2021-11-23 RX ORDER — LATANOPROST 50 UG/ML
1 SOLUTION/ DROPS OPHTHALMIC
Status: DISCONTINUED | OUTPATIENT
Start: 2021-11-23 | End: 2021-11-24

## 2021-11-23 RX ORDER — ESCITALOPRAM OXALATE 10 MG/1
10 TABLET ORAL DAILY
Status: DISCONTINUED | OUTPATIENT
Start: 2021-11-23 | End: 2021-11-26 | Stop reason: HOSPADM

## 2021-11-23 RX ORDER — ASPIRIN 81 MG/1
162 TABLET ORAL DAILY
Status: DISCONTINUED | OUTPATIENT
Start: 2021-11-23 | End: 2021-11-26 | Stop reason: HOSPADM

## 2021-11-23 RX ORDER — AMLODIPINE BESYLATE 10 MG/1
10 TABLET ORAL DAILY
Status: DISCONTINUED | OUTPATIENT
Start: 2021-11-23 | End: 2021-11-23

## 2021-11-23 RX ORDER — LISINOPRIL 20 MG/1
20 TABLET ORAL 2 TIMES DAILY
Status: DISCONTINUED | OUTPATIENT
Start: 2021-11-23 | End: 2021-11-26 | Stop reason: HOSPADM

## 2021-11-23 RX ADMIN — SOTALOL HYDROCHLORIDE 80 MG: 80 TABLET ORAL at 12:54

## 2021-11-23 RX ADMIN — SODIUM CHLORIDE 1000 MG: 0.9 INJECTION, SOLUTION INTRAVENOUS at 13:56

## 2021-11-23 RX ADMIN — LISINOPRIL 20 MG: 20 TABLET ORAL at 12:55

## 2021-11-23 RX ADMIN — ASPIRIN 162 MG: 81 TABLET, COATED ORAL at 12:53

## 2021-11-23 RX ADMIN — SODIUM CHLORIDE 1000 ML: 0.9 INJECTION, SOLUTION INTRAVENOUS at 08:12

## 2021-11-23 RX ADMIN — SOTALOL HYDROCHLORIDE 80 MG: 80 TABLET ORAL at 17:28

## 2021-11-23 RX ADMIN — ATORVASTATIN CALCIUM 40 MG: 40 TABLET, FILM COATED ORAL at 17:28

## 2021-11-23 RX ADMIN — ENOXAPARIN SODIUM 40 MG: 40 INJECTION SUBCUTANEOUS at 12:54

## 2021-11-23 RX ADMIN — ESCITALOPRAM OXALATE 10 MG: 10 TABLET ORAL at 12:54

## 2021-11-23 RX ADMIN — AMLODIPINE BESYLATE 10 MG: 10 TABLET ORAL at 12:54

## 2021-11-23 RX ADMIN — LATANOPROST 1 DROP: 50 SOLUTION OPHTHALMIC at 21:03

## 2021-11-23 RX ADMIN — POLYETHYLENE GLYCOL 3350 17 G: 17 POWDER, FOR SOLUTION ORAL at 12:54

## 2021-11-23 RX ADMIN — LISINOPRIL 20 MG: 20 TABLET ORAL at 17:28

## 2021-11-24 ENCOUNTER — APPOINTMENT (INPATIENT)
Dept: RADIOLOGY | Facility: HOSPITAL | Age: 86
DRG: 552 | End: 2021-11-24
Payer: MEDICARE

## 2021-11-24 LAB
ANION GAP SERPL CALCULATED.3IONS-SCNC: 11 MMOL/L (ref 4–13)
BACTERIA UR CULT: NORMAL
BASOPHILS # BLD MANUAL: 0 THOUSAND/UL (ref 0–0.1)
BASOPHILS NFR MAR MANUAL: 0 % (ref 0–1)
BUN SERPL-MCNC: 31 MG/DL (ref 5–25)
CALCIUM SERPL-MCNC: 8.6 MG/DL (ref 8.3–10.1)
CHLORIDE SERPL-SCNC: 107 MMOL/L (ref 100–108)
CHOLEST SERPL-MCNC: 146 MG/DL
CO2 SERPL-SCNC: 26 MMOL/L (ref 21–32)
CREAT SERPL-MCNC: 0.89 MG/DL (ref 0.6–1.3)
EOSINOPHIL # BLD MANUAL: 0 THOUSAND/UL (ref 0–0.4)
EOSINOPHIL NFR BLD MANUAL: 0 % (ref 0–6)
ERYTHROCYTE [DISTWIDTH] IN BLOOD BY AUTOMATED COUNT: 14.6 % (ref 11.6–15.1)
EST. AVERAGE GLUCOSE BLD GHB EST-MCNC: 126 MG/DL
GFR SERPL CREATININE-BSD FRML MDRD: 60 ML/MIN/1.73SQ M
GLUCOSE SERPL-MCNC: 176 MG/DL (ref 65–140)
HBA1C MFR BLD: 6 %
HCT VFR BLD AUTO: 39.7 % (ref 34.8–46.1)
HDLC SERPL-MCNC: 52 MG/DL
HGB BLD-MCNC: 12.7 G/DL (ref 11.5–15.4)
LDLC SERPL CALC-MCNC: 67 MG/DL (ref 0–100)
LG PLATELETS BLD QL SMEAR: PRESENT
LYMPHOCYTES # BLD AUTO: 0.9 THOUSAND/UL (ref 0.6–4.47)
LYMPHOCYTES # BLD AUTO: 16 % (ref 14–44)
MCH RBC QN AUTO: 28.3 PG (ref 26.8–34.3)
MCHC RBC AUTO-ENTMCNC: 32 G/DL (ref 31.4–37.4)
MCV RBC AUTO: 89 FL (ref 82–98)
MONOCYTES # BLD AUTO: 0 THOUSAND/UL (ref 0–1.22)
MONOCYTES NFR BLD: 0 % (ref 4–12)
NEUTROPHILS # BLD MANUAL: 4.7 THOUSAND/UL (ref 1.85–7.62)
NEUTS SEG NFR BLD AUTO: 84 % (ref 43–75)
PLATELET # BLD AUTO: 213 THOUSANDS/UL (ref 149–390)
PLATELET BLD QL SMEAR: ADEQUATE
PMV BLD AUTO: 12.5 FL (ref 8.9–12.7)
POTASSIUM SERPL-SCNC: 4.8 MMOL/L (ref 3.5–5.3)
RBC # BLD AUTO: 4.48 MILLION/UL (ref 3.81–5.12)
RBC MORPH BLD: NORMAL
SODIUM SERPL-SCNC: 144 MMOL/L (ref 136–145)
TRIGL SERPL-MCNC: 134 MG/DL
WBC # BLD AUTO: 5.6 THOUSAND/UL (ref 4.31–10.16)

## 2021-11-24 PROCEDURE — 85027 COMPLETE CBC AUTOMATED: CPT | Performed by: STUDENT IN AN ORGANIZED HEALTH CARE EDUCATION/TRAINING PROGRAM

## 2021-11-24 PROCEDURE — 85007 BL SMEAR W/DIFF WBC COUNT: CPT | Performed by: STUDENT IN AN ORGANIZED HEALTH CARE EDUCATION/TRAINING PROGRAM

## 2021-11-24 PROCEDURE — 99232 SBSQ HOSP IP/OBS MODERATE 35: CPT | Performed by: STUDENT IN AN ORGANIZED HEALTH CARE EDUCATION/TRAINING PROGRAM

## 2021-11-24 PROCEDURE — 97167 OT EVAL HIGH COMPLEX 60 MIN: CPT

## 2021-11-24 PROCEDURE — G0427 INPT/ED TELECONSULT70: HCPCS | Performed by: PSYCHIATRY & NEUROLOGY

## 2021-11-24 PROCEDURE — 97163 PT EVAL HIGH COMPLEX 45 MIN: CPT

## 2021-11-24 PROCEDURE — G1004 CDSM NDSC: HCPCS

## 2021-11-24 PROCEDURE — 97530 THERAPEUTIC ACTIVITIES: CPT

## 2021-11-24 PROCEDURE — 80048 BASIC METABOLIC PNL TOTAL CA: CPT | Performed by: STUDENT IN AN ORGANIZED HEALTH CARE EDUCATION/TRAINING PROGRAM

## 2021-11-24 PROCEDURE — 72131 CT LUMBAR SPINE W/O DYE: CPT

## 2021-11-24 PROCEDURE — 97535 SELF CARE MNGMENT TRAINING: CPT

## 2021-11-24 PROCEDURE — 83036 HEMOGLOBIN GLYCOSYLATED A1C: CPT | Performed by: STUDENT IN AN ORGANIZED HEALTH CARE EDUCATION/TRAINING PROGRAM

## 2021-11-24 PROCEDURE — 80061 LIPID PANEL: CPT | Performed by: STUDENT IN AN ORGANIZED HEALTH CARE EDUCATION/TRAINING PROGRAM

## 2021-11-24 RX ADMIN — ASPIRIN 162 MG: 81 TABLET, COATED ORAL at 09:47

## 2021-11-24 RX ADMIN — ATORVASTATIN CALCIUM 40 MG: 40 TABLET, FILM COATED ORAL at 16:08

## 2021-11-24 RX ADMIN — SOTALOL HYDROCHLORIDE 80 MG: 80 TABLET ORAL at 09:49

## 2021-11-24 RX ADMIN — SODIUM CHLORIDE 1000 MG: 0.9 INJECTION, SOLUTION INTRAVENOUS at 09:55

## 2021-11-24 RX ADMIN — LISINOPRIL 20 MG: 20 TABLET ORAL at 09:49

## 2021-11-24 RX ADMIN — LISINOPRIL 20 MG: 20 TABLET ORAL at 18:02

## 2021-11-24 RX ADMIN — ENOXAPARIN SODIUM 40 MG: 40 INJECTION SUBCUTANEOUS at 09:55

## 2021-11-24 RX ADMIN — POLYETHYLENE GLYCOL 3350 17 G: 17 POWDER, FOR SOLUTION ORAL at 09:46

## 2021-11-24 RX ADMIN — ESCITALOPRAM OXALATE 10 MG: 10 TABLET ORAL at 09:48

## 2021-11-24 RX ADMIN — SOTALOL HYDROCHLORIDE 80 MG: 80 TABLET ORAL at 18:03

## 2021-11-25 LAB
ANION GAP SERPL CALCULATED.3IONS-SCNC: 7 MMOL/L (ref 4–13)
BASOPHILS # BLD AUTO: 0 THOUSANDS/ΜL (ref 0–0.1)
BASOPHILS NFR BLD AUTO: 0 % (ref 0–1)
BUN SERPL-MCNC: 36 MG/DL (ref 5–25)
CALCIUM SERPL-MCNC: 8.4 MG/DL (ref 8.3–10.1)
CHLORIDE SERPL-SCNC: 110 MMOL/L (ref 100–108)
CO2 SERPL-SCNC: 28 MMOL/L (ref 21–32)
CREAT SERPL-MCNC: 0.75 MG/DL (ref 0.6–1.3)
EOSINOPHIL # BLD AUTO: 0 THOUSAND/ΜL (ref 0–0.61)
EOSINOPHIL NFR BLD AUTO: 0 % (ref 0–6)
ERYTHROCYTE [DISTWIDTH] IN BLOOD BY AUTOMATED COUNT: 14.6 % (ref 11.6–15.1)
GFR SERPL CREATININE-BSD FRML MDRD: 73 ML/MIN/1.73SQ M
GLUCOSE SERPL-MCNC: 199 MG/DL (ref 65–140)
HCT VFR BLD AUTO: 38.8 % (ref 34.8–46.1)
HGB BLD-MCNC: 12.4 G/DL (ref 11.5–15.4)
IMM GRANULOCYTES # BLD AUTO: 0.04 THOUSAND/UL (ref 0–0.2)
IMM GRANULOCYTES NFR BLD AUTO: 1 % (ref 0–2)
LYMPHOCYTES # BLD AUTO: 0.8 THOUSANDS/ΜL (ref 0.6–4.47)
LYMPHOCYTES NFR BLD AUTO: 10 % (ref 14–44)
MAGNESIUM SERPL-MCNC: 2.2 MG/DL (ref 1.6–2.6)
MCH RBC QN AUTO: 28.4 PG (ref 26.8–34.3)
MCHC RBC AUTO-ENTMCNC: 32 G/DL (ref 31.4–37.4)
MCV RBC AUTO: 89 FL (ref 82–98)
MONOCYTES # BLD AUTO: 0.42 THOUSAND/ΜL (ref 0.17–1.22)
MONOCYTES NFR BLD AUTO: 6 % (ref 4–12)
NEUTROPHILS # BLD AUTO: 6.42 THOUSANDS/ΜL (ref 1.85–7.62)
NEUTS SEG NFR BLD AUTO: 83 % (ref 43–75)
NRBC BLD AUTO-RTO: 0 /100 WBCS
PLATELET # BLD AUTO: 213 THOUSANDS/UL (ref 149–390)
PMV BLD AUTO: 12.2 FL (ref 8.9–12.7)
POTASSIUM SERPL-SCNC: 4.3 MMOL/L (ref 3.5–5.3)
RBC # BLD AUTO: 4.37 MILLION/UL (ref 3.81–5.12)
SODIUM SERPL-SCNC: 145 MMOL/L (ref 136–145)
WBC # BLD AUTO: 7.68 THOUSAND/UL (ref 4.31–10.16)

## 2021-11-25 PROCEDURE — 99232 SBSQ HOSP IP/OBS MODERATE 35: CPT | Performed by: STUDENT IN AN ORGANIZED HEALTH CARE EDUCATION/TRAINING PROGRAM

## 2021-11-25 PROCEDURE — 80048 BASIC METABOLIC PNL TOTAL CA: CPT | Performed by: NURSE PRACTITIONER

## 2021-11-25 PROCEDURE — 85025 COMPLETE CBC W/AUTO DIFF WBC: CPT | Performed by: NURSE PRACTITIONER

## 2021-11-25 PROCEDURE — 83735 ASSAY OF MAGNESIUM: CPT | Performed by: NURSE PRACTITIONER

## 2021-11-25 RX ORDER — MICONAZOLE NITRATE 20 MG/G
CREAM TOPICAL 2 TIMES DAILY
Status: DISCONTINUED | OUTPATIENT
Start: 2021-11-25 | End: 2021-11-26 | Stop reason: HOSPADM

## 2021-11-25 RX ADMIN — ENOXAPARIN SODIUM 40 MG: 40 INJECTION SUBCUTANEOUS at 08:05

## 2021-11-25 RX ADMIN — SOTALOL HYDROCHLORIDE 80 MG: 80 TABLET ORAL at 08:05

## 2021-11-25 RX ADMIN — POLYETHYLENE GLYCOL 3350 17 G: 17 POWDER, FOR SOLUTION ORAL at 08:04

## 2021-11-25 RX ADMIN — ATORVASTATIN CALCIUM 40 MG: 40 TABLET, FILM COATED ORAL at 17:00

## 2021-11-25 RX ADMIN — MICONAZOLE NITRATE: 20 CREAM TOPICAL at 15:15

## 2021-11-25 RX ADMIN — SOTALOL HYDROCHLORIDE 80 MG: 80 TABLET ORAL at 17:00

## 2021-11-25 RX ADMIN — ASPIRIN 162 MG: 81 TABLET, COATED ORAL at 08:05

## 2021-11-25 RX ADMIN — LISINOPRIL 20 MG: 20 TABLET ORAL at 08:05

## 2021-11-25 RX ADMIN — LISINOPRIL 20 MG: 20 TABLET ORAL at 17:00

## 2021-11-25 RX ADMIN — ESCITALOPRAM OXALATE 10 MG: 10 TABLET ORAL at 08:05

## 2021-11-26 ENCOUNTER — TRANSITIONAL CARE MANAGEMENT (OUTPATIENT)
Dept: FAMILY MEDICINE CLINIC | Facility: CLINIC | Age: 86
End: 2021-11-26

## 2021-11-26 VITALS
RESPIRATION RATE: 20 BRPM | SYSTOLIC BLOOD PRESSURE: 168 MMHG | OXYGEN SATURATION: 94 % | BODY MASS INDEX: 32.28 KG/M2 | WEIGHT: 200.84 LBS | HEART RATE: 98 BPM | HEIGHT: 66 IN | DIASTOLIC BLOOD PRESSURE: 84 MMHG | TEMPERATURE: 98.3 F

## 2021-11-26 PROCEDURE — 99239 HOSP IP/OBS DSCHRG MGMT >30: CPT | Performed by: PHYSICIAN ASSISTANT

## 2021-11-26 RX ADMIN — MICONAZOLE NITRATE 1 APPLICATION: 20 CREAM TOPICAL at 08:01

## 2021-11-26 RX ADMIN — ESCITALOPRAM OXALATE 10 MG: 10 TABLET ORAL at 08:00

## 2021-11-26 RX ADMIN — POLYETHYLENE GLYCOL 3350 17 G: 17 POWDER, FOR SOLUTION ORAL at 08:00

## 2021-11-26 RX ADMIN — LISINOPRIL 20 MG: 20 TABLET ORAL at 08:00

## 2021-11-26 RX ADMIN — ENOXAPARIN SODIUM 40 MG: 40 INJECTION SUBCUTANEOUS at 08:00

## 2021-11-26 RX ADMIN — SOTALOL HYDROCHLORIDE 80 MG: 80 TABLET ORAL at 08:00

## 2021-11-26 RX ADMIN — ASPIRIN 162 MG: 81 TABLET, COATED ORAL at 08:00

## 2021-11-28 LAB
BACTERIA BLD CULT: NORMAL
BACTERIA BLD CULT: NORMAL

## 2021-12-09 ENCOUNTER — TELEPHONE (OUTPATIENT)
Dept: NEUROLOGY | Facility: CLINIC | Age: 86
End: 2021-12-09

## 2021-12-18 ENCOUNTER — APPOINTMENT (EMERGENCY)
Dept: RADIOLOGY | Facility: HOSPITAL | Age: 86
End: 2021-12-18
Payer: MEDICARE

## 2021-12-18 ENCOUNTER — HOSPITAL ENCOUNTER (EMERGENCY)
Facility: HOSPITAL | Age: 86
Discharge: HOME | End: 2021-12-18
Attending: EMERGENCY MEDICINE | Admitting: EMERGENCY MEDICINE
Payer: MEDICARE

## 2021-12-18 VITALS
HEART RATE: 59 BPM | OXYGEN SATURATION: 96 % | DIASTOLIC BLOOD PRESSURE: 76 MMHG | TEMPERATURE: 96.3 F | SYSTOLIC BLOOD PRESSURE: 175 MMHG | RESPIRATION RATE: 16 BRPM

## 2021-12-18 DIAGNOSIS — R07.9 CHEST PAIN: Primary | ICD-10-CM

## 2021-12-18 LAB
2HR DELTA HS TROPONIN: 0 NG/L
ALBUMIN SERPL BCP-MCNC: 2.6 G/DL (ref 3.5–5)
ALP SERPL-CCNC: 117 U/L (ref 46–116)
ALT SERPL W P-5'-P-CCNC: 21 U/L (ref 12–78)
ANION GAP SERPL CALCULATED.3IONS-SCNC: 10 MMOL/L (ref 4–13)
APTT PPP: 27 SECONDS (ref 23–37)
AST SERPL W P-5'-P-CCNC: 13 U/L (ref 5–45)
BASOPHILS # BLD AUTO: 0.05 THOUSANDS/ΜL (ref 0–0.1)
BASOPHILS NFR BLD AUTO: 1 % (ref 0–1)
BILIRUB SERPL-MCNC: 0.14 MG/DL (ref 0.2–1)
BUN SERPL-MCNC: 38 MG/DL (ref 5–25)
CALCIUM ALBUM COR SERPL-MCNC: 9.7 MG/DL (ref 8.3–10.1)
CALCIUM SERPL-MCNC: 8.6 MG/DL (ref 8.3–10.1)
CARDIAC TROPONIN I PNL SERPL HS: 5 NG/L
CARDIAC TROPONIN I PNL SERPL HS: 5 NG/L
CHLORIDE SERPL-SCNC: 111 MMOL/L (ref 100–108)
CO2 SERPL-SCNC: 22 MMOL/L (ref 21–32)
CREAT SERPL-MCNC: 0.76 MG/DL (ref 0.6–1.3)
EOSINOPHIL # BLD AUTO: 0.18 THOUSAND/ΜL (ref 0–0.61)
EOSINOPHIL NFR BLD AUTO: 3 % (ref 0–6)
ERYTHROCYTE [DISTWIDTH] IN BLOOD BY AUTOMATED COUNT: 15.2 % (ref 11.6–15.1)
GFR SERPL CREATININE-BSD FRML MDRD: 72 ML/MIN/1.73SQ M
GLUCOSE SERPL-MCNC: 101 MG/DL (ref 65–140)
HCT VFR BLD AUTO: 39 % (ref 34.8–46.1)
HGB BLD-MCNC: 12.1 G/DL (ref 11.5–15.4)
IMM GRANULOCYTES # BLD AUTO: 0.05 THOUSAND/UL (ref 0–0.2)
IMM GRANULOCYTES NFR BLD AUTO: 1 % (ref 0–2)
INR PPP: 1 (ref 0.84–1.19)
LIPASE SERPL-CCNC: 200 U/L (ref 73–393)
LYMPHOCYTES # BLD AUTO: 2.17 THOUSANDS/ΜL (ref 0.6–4.47)
LYMPHOCYTES NFR BLD AUTO: 31 % (ref 14–44)
MAGNESIUM SERPL-MCNC: 2 MG/DL (ref 1.6–2.6)
MCH RBC QN AUTO: 28.2 PG (ref 26.8–34.3)
MCHC RBC AUTO-ENTMCNC: 31 G/DL (ref 31.4–37.4)
MCV RBC AUTO: 91 FL (ref 82–98)
MONOCYTES # BLD AUTO: 0.86 THOUSAND/ΜL (ref 0.17–1.22)
MONOCYTES NFR BLD AUTO: 12 % (ref 4–12)
NEUTROPHILS # BLD AUTO: 3.79 THOUSANDS/ΜL (ref 1.85–7.62)
NEUTS SEG NFR BLD AUTO: 52 % (ref 43–75)
NRBC BLD AUTO-RTO: 0 /100 WBCS
NT-PROBNP SERPL-MCNC: 834 PG/ML
PLATELET # BLD AUTO: 234 THOUSANDS/UL (ref 149–390)
PMV BLD AUTO: 11.9 FL (ref 8.9–12.7)
POTASSIUM SERPL-SCNC: 4.7 MMOL/L (ref 3.5–5.3)
PROT SERPL-MCNC: 6.2 G/DL (ref 6.4–8.2)
PROTHROMBIN TIME: 13 SECONDS (ref 11.6–14.5)
RBC # BLD AUTO: 4.29 MILLION/UL (ref 3.81–5.12)
SODIUM SERPL-SCNC: 143 MMOL/L (ref 136–145)
WBC # BLD AUTO: 7.1 THOUSAND/UL (ref 4.31–10.16)

## 2021-12-18 PROCEDURE — 93005 ELECTROCARDIOGRAM TRACING: CPT

## 2021-12-18 PROCEDURE — 80053 COMPREHEN METABOLIC PANEL: CPT | Performed by: EMERGENCY MEDICINE

## 2021-12-18 PROCEDURE — 83735 ASSAY OF MAGNESIUM: CPT | Performed by: EMERGENCY MEDICINE

## 2021-12-18 PROCEDURE — 99285 EMERGENCY DEPT VISIT HI MDM: CPT | Performed by: EMERGENCY MEDICINE

## 2021-12-18 PROCEDURE — 36415 COLL VENOUS BLD VENIPUNCTURE: CPT | Performed by: EMERGENCY MEDICINE

## 2021-12-18 PROCEDURE — 99284 EMERGENCY DEPT VISIT MOD MDM: CPT

## 2021-12-18 PROCEDURE — 71045 X-RAY EXAM CHEST 1 VIEW: CPT

## 2021-12-18 PROCEDURE — 84484 ASSAY OF TROPONIN QUANT: CPT | Performed by: EMERGENCY MEDICINE

## 2021-12-18 PROCEDURE — 85730 THROMBOPLASTIN TIME PARTIAL: CPT | Performed by: EMERGENCY MEDICINE

## 2021-12-18 PROCEDURE — 85610 PROTHROMBIN TIME: CPT | Performed by: EMERGENCY MEDICINE

## 2021-12-18 PROCEDURE — 83690 ASSAY OF LIPASE: CPT | Performed by: EMERGENCY MEDICINE

## 2021-12-18 PROCEDURE — 83880 ASSAY OF NATRIURETIC PEPTIDE: CPT | Performed by: EMERGENCY MEDICINE

## 2021-12-18 PROCEDURE — 85025 COMPLETE CBC W/AUTO DIFF WBC: CPT | Performed by: EMERGENCY MEDICINE

## 2021-12-18 RX ORDER — METHOCARBAMOL 500 MG/1
500 TABLET, FILM COATED ORAL 4 TIMES DAILY
COMMUNITY
End: 2022-01-09 | Stop reason: HOSPADM

## 2021-12-20 LAB
ATRIAL RATE: 59 BPM
ATRIAL RATE: 61 BPM
P AXIS: 91 DEGREES
P AXIS: 97 DEGREES
PR INTERVAL: 194 MS
PR INTERVAL: 198 MS
QRS AXIS: -12 DEGREES
QRS AXIS: -8 DEGREES
QRSD INTERVAL: 76 MS
QRSD INTERVAL: 80 MS
QT INTERVAL: 402 MS
QT INTERVAL: 452 MS
QTC INTERVAL: 397 MS
QTC INTERVAL: 455 MS
T WAVE AXIS: 19 DEGREES
T WAVE AXIS: 25 DEGREES
VENTRICULAR RATE: 59 BPM
VENTRICULAR RATE: 61 BPM

## 2021-12-20 PROCEDURE — 93010 ELECTROCARDIOGRAM REPORT: CPT | Performed by: INTERNAL MEDICINE

## 2021-12-28 ENCOUNTER — TELEPHONE (OUTPATIENT)
Dept: FAMILY MEDICINE CLINIC | Facility: CLINIC | Age: 86
End: 2021-12-28

## 2021-12-29 ENCOUNTER — HOSPITAL ENCOUNTER (OUTPATIENT)
Dept: INFUSION CENTER | Facility: HOSPITAL | Age: 86
Discharge: HOME | End: 2021-12-29
Payer: MEDICARE

## 2021-12-29 ENCOUNTER — TELEMEDICINE (OUTPATIENT)
Dept: FAMILY MEDICINE CLINIC | Facility: CLINIC | Age: 86
End: 2021-12-29
Payer: MEDICARE

## 2021-12-29 VITALS
HEART RATE: 48 BPM | TEMPERATURE: 99 F | RESPIRATION RATE: 18 BRPM | DIASTOLIC BLOOD PRESSURE: 60 MMHG | SYSTOLIC BLOOD PRESSURE: 133 MMHG | OXYGEN SATURATION: 94 %

## 2021-12-29 VITALS
SYSTOLIC BLOOD PRESSURE: 187 MMHG | HEART RATE: 56 BPM | TEMPERATURE: 97.6 F | DIASTOLIC BLOOD PRESSURE: 72 MMHG | RESPIRATION RATE: 18 BRPM

## 2021-12-29 DIAGNOSIS — U07.1 COVID-19: Primary | ICD-10-CM

## 2021-12-29 PROCEDURE — 99213 OFFICE O/P EST LOW 20 MIN: CPT | Performed by: FAMILY MEDICINE

## 2021-12-29 PROCEDURE — M0247 HB SOTROVIMAB INF ADMIN: HCPCS | Performed by: FAMILY MEDICINE

## 2021-12-29 PROCEDURE — 1036F TOBACCO NON-USER: CPT | Performed by: FAMILY MEDICINE

## 2021-12-29 RX ORDER — ALBUTEROL SULFATE 90 UG/1
3 AEROSOL, METERED RESPIRATORY (INHALATION) ONCE AS NEEDED
Status: CANCELLED | OUTPATIENT
Start: 2021-12-29

## 2021-12-29 RX ORDER — ONDANSETRON 2 MG/ML
4 INJECTION INTRAMUSCULAR; INTRAVENOUS ONCE AS NEEDED
Status: CANCELLED | OUTPATIENT
Start: 2021-12-29

## 2021-12-29 RX ORDER — SODIUM CHLORIDE 9 MG/ML
20 INJECTION, SOLUTION INTRAVENOUS ONCE
Status: CANCELLED | OUTPATIENT
Start: 2021-12-29

## 2021-12-29 RX ORDER — ALBUTEROL SULFATE 90 UG/1
3 AEROSOL, METERED RESPIRATORY (INHALATION) ONCE AS NEEDED
Status: DISCONTINUED | OUTPATIENT
Start: 2021-12-29 | End: 2022-01-01 | Stop reason: HOSPADM

## 2021-12-29 RX ORDER — ACETAMINOPHEN 325 MG/1
650 TABLET ORAL ONCE AS NEEDED
Status: CANCELLED | OUTPATIENT
Start: 2021-12-29

## 2021-12-29 RX ORDER — ACETAMINOPHEN 325 MG/1
650 TABLET ORAL ONCE AS NEEDED
Status: DISCONTINUED | OUTPATIENT
Start: 2021-12-29 | End: 2022-01-01 | Stop reason: HOSPADM

## 2021-12-29 RX ORDER — SODIUM CHLORIDE 9 MG/ML
20 INJECTION, SOLUTION INTRAVENOUS ONCE
Status: COMPLETED | OUTPATIENT
Start: 2021-12-29 | End: 2021-12-29

## 2021-12-29 RX ORDER — ONDANSETRON 2 MG/ML
4 INJECTION INTRAMUSCULAR; INTRAVENOUS ONCE AS NEEDED
Status: DISCONTINUED | OUTPATIENT
Start: 2021-12-29 | End: 2022-01-01 | Stop reason: HOSPADM

## 2021-12-29 RX ADMIN — SODIUM CHLORIDE 20 ML/HR: 0.9 INJECTION, SOLUTION INTRAVENOUS at 13:04

## 2021-12-29 RX ADMIN — SODIUM CHLORIDE 500 MG: 9 INJECTION, SOLUTION INTRAVENOUS at 13:03

## 2021-12-29 NOTE — PROGRESS NOTES
Infusion complete  One of hour of monitoring finished  Vitals rechecked and stable  Iv removed  Discharge instructions given  Pt brought out of unit to ride

## 2021-12-29 NOTE — PLAN OF CARE
Problem: Potential for Falls  Goal: Patient will remain free of falls  Description: INTERVENTIONS:  - Educate patient/family on patient safety including physical limitations  - Instruct patient to call for assistance with activity   - Consult OT/PT to assist with strengthening/mobility   - Keep Call bell within reach  - Keep bed low and locked with side rails adjusted as appropriate  - Keep care items and personal belongings within reach  - Initiate and maintain comfort rounds  - Make Fall Risk Sign visible to staff  - Apply yellow socks and bracelet for high fall risk patients  - Consider moving patient to room near nurses station  12/29/2021 1301 by Dev Blas RN  Outcome: Progressing  12/29/2021 1301 by Dev Blas RN  Outcome: Progressing     Problem: SAFETY ADULT  Goal: Patient will remain free of falls  Description: INTERVENTIONS:  - Educate patient/family on patient safety including physical limitations  - Instruct patient to call for assistance with activity   - Consult OT/PT to assist with strengthening/mobility   - Keep Call bell within reach  - Keep bed low and locked with side rails adjusted as appropriate  - Keep care items and personal belongings within reach  - Initiate and maintain comfort rounds  - Make Fall Risk Sign visible to staff  - Apply yellow socks and bracelet for high fall risk patients  - Consider moving patient to room near nurses station  12/29/2021 1301 by Dev Blas RN  Outcome: Progressing  12/29/2021 1301 by Dev Blas RN  Outcome: Progressing     Problem: Knowledge Deficit  Goal: Patient/family/caregiver demonstrates understanding of disease process, treatment plan, medications, and discharge instructions  Description: Complete learning assessment and assess knowledge base    Interventions:  - Provide teaching at level of understanding  - Provide teaching via preferred learning methods  Outcome: Progressing

## 2021-12-29 NOTE — PROGRESS NOTES
Patient arrived for sotovimab infusion  Patient wheelchair bound  EAU information provided and patient verbally consented  Vital signs stable   Peripheral IV inserted and call bell within reach

## 2021-12-30 ENCOUNTER — TELEMEDICINE (OUTPATIENT)
Dept: FAMILY MEDICINE CLINIC | Facility: CLINIC | Age: 86
End: 2021-12-30
Payer: MEDICARE

## 2021-12-30 DIAGNOSIS — U07.1 COVID-19: Primary | ICD-10-CM

## 2021-12-30 PROCEDURE — 99441 PR PHYS/QHP TELEPHONE EVALUATION 5-10 MIN: CPT | Performed by: FAMILY MEDICINE

## 2021-12-31 ENCOUNTER — APPOINTMENT (EMERGENCY)
Dept: RADIOLOGY | Facility: HOSPITAL | Age: 86
DRG: 177 | End: 2021-12-31
Payer: MEDICARE

## 2021-12-31 ENCOUNTER — HOSPITAL ENCOUNTER (INPATIENT)
Facility: HOSPITAL | Age: 86
LOS: 9 days | Discharge: NURSING, REHAB OR LONG-TERM CARE | DRG: 177 | End: 2022-01-09
Attending: EMERGENCY MEDICINE | Admitting: FAMILY MEDICINE
Payer: MEDICARE

## 2021-12-31 DIAGNOSIS — K59.00 CONSTIPATION: ICD-10-CM

## 2021-12-31 DIAGNOSIS — R09.02 HYPOXIA: ICD-10-CM

## 2021-12-31 DIAGNOSIS — U07.1 COVID-19: Primary | ICD-10-CM

## 2021-12-31 DIAGNOSIS — I10 ESSENTIAL HYPERTENSION: ICD-10-CM

## 2021-12-31 DIAGNOSIS — I48.91 ATRIAL FIBRILLATION, UNSPECIFIED TYPE (HCC): ICD-10-CM

## 2021-12-31 DIAGNOSIS — E11.9 TYPE 2 DIABETES MELLITUS WITHOUT COMPLICATION, WITHOUT LONG-TERM CURRENT USE OF INSULIN (HCC): ICD-10-CM

## 2021-12-31 DIAGNOSIS — U07.1 PNEUMONIA DUE TO COVID-19 VIRUS: ICD-10-CM

## 2021-12-31 DIAGNOSIS — J12.82 PNEUMONIA DUE TO COVID-19 VIRUS: ICD-10-CM

## 2021-12-31 DIAGNOSIS — K21.9 GERD (GASTROESOPHAGEAL REFLUX DISEASE): ICD-10-CM

## 2021-12-31 PROBLEM — J96.01 ACUTE RESPIRATORY FAILURE WITH HYPOXIA (HCC): Status: ACTIVE | Noted: 2021-12-31

## 2021-12-31 LAB
2HR DELTA HS TROPONIN: -1 NG/L
4HR DELTA HS TROPONIN: -6 NG/L
ALBUMIN SERPL BCP-MCNC: 2.2 G/DL (ref 3.5–5)
ALP SERPL-CCNC: 118 U/L (ref 46–116)
ALT SERPL W P-5'-P-CCNC: 27 U/L (ref 12–78)
ANION GAP SERPL CALCULATED.3IONS-SCNC: 13 MMOL/L (ref 4–13)
APTT PPP: 33 SECONDS (ref 23–37)
AST SERPL W P-5'-P-CCNC: 14 U/L (ref 5–45)
BASOPHILS # BLD AUTO: 0.01 THOUSANDS/ΜL (ref 0–0.1)
BASOPHILS NFR BLD AUTO: 0 % (ref 0–1)
BILIRUB SERPL-MCNC: 0.26 MG/DL (ref 0.2–1)
BILIRUB UR QL STRIP: NEGATIVE
BUN SERPL-MCNC: 48 MG/DL (ref 5–25)
CALCIUM ALBUM COR SERPL-MCNC: 9.8 MG/DL (ref 8.3–10.1)
CALCIUM SERPL-MCNC: 8.4 MG/DL (ref 8.3–10.1)
CARDIAC TROPONIN I PNL SERPL HS: 14 NG/L
CARDIAC TROPONIN I PNL SERPL HS: 15 NG/L
CARDIAC TROPONIN I PNL SERPL HS: 9 NG/L
CHLORIDE SERPL-SCNC: 106 MMOL/L (ref 100–108)
CLARITY UR: CLEAR
CO2 SERPL-SCNC: 21 MMOL/L (ref 21–32)
COLOR UR: YELLOW
CREAT SERPL-MCNC: 1.04 MG/DL (ref 0.6–1.3)
D DIMER PPP FEU-MCNC: 1.98 UG/ML FEU
EOSINOPHIL # BLD AUTO: 0 THOUSAND/ΜL (ref 0–0.61)
EOSINOPHIL NFR BLD AUTO: 0 % (ref 0–6)
ERYTHROCYTE [DISTWIDTH] IN BLOOD BY AUTOMATED COUNT: 15.3 % (ref 11.6–15.1)
GFR SERPL CREATININE-BSD FRML MDRD: 49 ML/MIN/1.73SQ M
GLUCOSE SERPL-MCNC: 144 MG/DL (ref 65–140)
GLUCOSE SERPL-MCNC: 259 MG/DL (ref 65–140)
GLUCOSE UR STRIP-MCNC: NEGATIVE MG/DL
HCT VFR BLD AUTO: 40 % (ref 34.8–46.1)
HGB BLD-MCNC: 12.8 G/DL (ref 11.5–15.4)
HGB UR QL STRIP.AUTO: NEGATIVE
IMM GRANULOCYTES # BLD AUTO: 0.05 THOUSAND/UL (ref 0–0.2)
IMM GRANULOCYTES NFR BLD AUTO: 0 % (ref 0–2)
INR PPP: 1.12 (ref 0.84–1.19)
KETONES UR STRIP-MCNC: NEGATIVE MG/DL
LACTATE SERPL-SCNC: 1 MMOL/L (ref 0.5–2)
LACTATE SERPL-SCNC: 2.5 MMOL/L (ref 0.5–2)
LEUKOCYTE ESTERASE UR QL STRIP: NEGATIVE
LYMPHOCYTES # BLD AUTO: 1.67 THOUSANDS/ΜL (ref 0.6–4.47)
LYMPHOCYTES NFR BLD AUTO: 13 % (ref 14–44)
MCH RBC QN AUTO: 27.9 PG (ref 26.8–34.3)
MCHC RBC AUTO-ENTMCNC: 32 G/DL (ref 31.4–37.4)
MCV RBC AUTO: 87 FL (ref 82–98)
MONOCYTES # BLD AUTO: 1.28 THOUSAND/ΜL (ref 0.17–1.22)
MONOCYTES NFR BLD AUTO: 10 % (ref 4–12)
NEUTROPHILS # BLD AUTO: 9.52 THOUSANDS/ΜL (ref 1.85–7.62)
NEUTS SEG NFR BLD AUTO: 77 % (ref 43–75)
NITRITE UR QL STRIP: NEGATIVE
NRBC BLD AUTO-RTO: 0 /100 WBCS
PH UR STRIP.AUTO: 5.5 [PH]
PLATELET # BLD AUTO: 213 THOUSANDS/UL (ref 149–390)
PMV BLD AUTO: 13.1 FL (ref 8.9–12.7)
POTASSIUM SERPL-SCNC: 4.8 MMOL/L (ref 3.5–5.3)
PROT SERPL-MCNC: 6.5 G/DL (ref 6.4–8.2)
PROT UR STRIP-MCNC: NEGATIVE MG/DL
PROTHROMBIN TIME: 14.2 SECONDS (ref 11.6–14.5)
RBC # BLD AUTO: 4.58 MILLION/UL (ref 3.81–5.12)
SODIUM SERPL-SCNC: 140 MMOL/L (ref 136–145)
SP GR UR STRIP.AUTO: 1.02 (ref 1–1.03)
UROBILINOGEN UR QL STRIP.AUTO: 0.2 E.U./DL
WBC # BLD AUTO: 12.53 THOUSAND/UL (ref 4.31–10.16)

## 2021-12-31 PROCEDURE — 83605 ASSAY OF LACTIC ACID: CPT | Performed by: EMERGENCY MEDICINE

## 2021-12-31 PROCEDURE — 84145 PROCALCITONIN (PCT): CPT | Performed by: EMERGENCY MEDICINE

## 2021-12-31 PROCEDURE — 85610 PROTHROMBIN TIME: CPT | Performed by: EMERGENCY MEDICINE

## 2021-12-31 PROCEDURE — 85730 THROMBOPLASTIN TIME PARTIAL: CPT | Performed by: EMERGENCY MEDICINE

## 2021-12-31 PROCEDURE — 87040 BLOOD CULTURE FOR BACTERIA: CPT | Performed by: EMERGENCY MEDICINE

## 2021-12-31 PROCEDURE — 87449 NOS EACH ORGANISM AG IA: CPT | Performed by: INTERNAL MEDICINE

## 2021-12-31 PROCEDURE — 80053 COMPREHEN METABOLIC PANEL: CPT | Performed by: EMERGENCY MEDICINE

## 2021-12-31 PROCEDURE — 99285 EMERGENCY DEPT VISIT HI MDM: CPT | Performed by: EMERGENCY MEDICINE

## 2021-12-31 PROCEDURE — 99223 1ST HOSP IP/OBS HIGH 75: CPT | Performed by: INTERNAL MEDICINE

## 2021-12-31 PROCEDURE — 85025 COMPLETE CBC W/AUTO DIFF WBC: CPT | Performed by: EMERGENCY MEDICINE

## 2021-12-31 PROCEDURE — XW033E5 INTRODUCTION OF REMDESIVIR ANTI-INFECTIVE INTO PERIPHERAL VEIN, PERCUTANEOUS APPROACH, NEW TECHNOLOGY GROUP 5: ICD-10-PCS | Performed by: INTERNAL MEDICINE

## 2021-12-31 PROCEDURE — 99285 EMERGENCY DEPT VISIT HI MDM: CPT

## 2021-12-31 PROCEDURE — 36415 COLL VENOUS BLD VENIPUNCTURE: CPT | Performed by: EMERGENCY MEDICINE

## 2021-12-31 PROCEDURE — 85379 FIBRIN DEGRADATION QUANT: CPT | Performed by: EMERGENCY MEDICINE

## 2021-12-31 PROCEDURE — 81003 URINALYSIS AUTO W/O SCOPE: CPT | Performed by: INTERNAL MEDICINE

## 2021-12-31 PROCEDURE — 93005 ELECTROCARDIOGRAM TRACING: CPT

## 2021-12-31 PROCEDURE — 84484 ASSAY OF TROPONIN QUANT: CPT | Performed by: INTERNAL MEDICINE

## 2021-12-31 PROCEDURE — 71045 X-RAY EXAM CHEST 1 VIEW: CPT

## 2021-12-31 PROCEDURE — 82948 REAGENT STRIP/BLOOD GLUCOSE: CPT

## 2021-12-31 RX ORDER — ESCITALOPRAM OXALATE 10 MG/1
10 TABLET ORAL DAILY
Status: DISCONTINUED | OUTPATIENT
Start: 2022-01-01 | End: 2022-01-09 | Stop reason: HOSPADM

## 2021-12-31 RX ORDER — ASPIRIN 81 MG/1
81 TABLET, CHEWABLE ORAL DAILY
Status: DISCONTINUED | OUTPATIENT
Start: 2022-01-01 | End: 2022-01-07

## 2021-12-31 RX ORDER — HEPARIN SODIUM 5000 [USP'U]/ML
5000 INJECTION, SOLUTION INTRAVENOUS; SUBCUTANEOUS EVERY 8 HOURS SCHEDULED
Status: DISCONTINUED | OUTPATIENT
Start: 2021-12-31 | End: 2022-01-02

## 2021-12-31 RX ORDER — SOTALOL HYDROCHLORIDE 80 MG/1
80 TABLET ORAL 2 TIMES DAILY
Status: DISCONTINUED | OUTPATIENT
Start: 2021-12-31 | End: 2022-01-01

## 2021-12-31 RX ORDER — PANTOPRAZOLE SODIUM 40 MG/1
40 TABLET, DELAYED RELEASE ORAL
Status: DISCONTINUED | OUTPATIENT
Start: 2022-01-01 | End: 2022-01-09 | Stop reason: HOSPADM

## 2021-12-31 RX ORDER — ATORVASTATIN CALCIUM 40 MG/1
40 TABLET, FILM COATED ORAL
Status: DISCONTINUED | OUTPATIENT
Start: 2021-12-31 | End: 2022-01-09 | Stop reason: HOSPADM

## 2021-12-31 RX ORDER — CEFEPIME HYDROCHLORIDE 2 G/50ML
2000 INJECTION, SOLUTION INTRAVENOUS EVERY 12 HOURS
Status: DISCONTINUED | OUTPATIENT
Start: 2021-12-31 | End: 2021-12-31 | Stop reason: SDUPTHER

## 2021-12-31 RX ORDER — DEXAMETHASONE SODIUM PHOSPHATE 4 MG/ML
6 INJECTION, SOLUTION INTRA-ARTICULAR; INTRALESIONAL; INTRAMUSCULAR; INTRAVENOUS; SOFT TISSUE EVERY 24 HOURS
Status: DISCONTINUED | OUTPATIENT
Start: 2021-12-31 | End: 2022-01-09 | Stop reason: HOSPADM

## 2021-12-31 RX ORDER — LISINOPRIL 20 MG/1
20 TABLET ORAL 2 TIMES DAILY
Status: DISCONTINUED | OUTPATIENT
Start: 2021-12-31 | End: 2022-01-09 | Stop reason: HOSPADM

## 2021-12-31 RX ADMIN — CEFEPIME HYDROCHLORIDE 2000 MG: 2 INJECTION, POWDER, FOR SOLUTION INTRAVENOUS at 22:12

## 2021-12-31 RX ADMIN — HEPARIN SODIUM 5000 UNITS: 5000 INJECTION INTRAVENOUS; SUBCUTANEOUS at 22:12

## 2021-12-31 RX ADMIN — ATORVASTATIN CALCIUM 40 MG: 40 TABLET, FILM COATED ORAL at 20:12

## 2021-12-31 RX ADMIN — SOTALOL HYDROCHLORIDE 80 MG: 80 TABLET ORAL at 20:44

## 2021-12-31 RX ADMIN — REMDESIVIR 200 MG: 100 INJECTION, POWDER, LYOPHILIZED, FOR SOLUTION INTRAVENOUS at 23:58

## 2021-12-31 RX ADMIN — INSULIN LISPRO 2 UNITS: 100 INJECTION, SOLUTION INTRAVENOUS; SUBCUTANEOUS at 22:13

## 2021-12-31 RX ADMIN — LISINOPRIL 20 MG: 20 TABLET ORAL at 20:12

## 2021-12-31 RX ADMIN — DEXAMETHASONE SODIUM PHOSPHATE 6 MG: 4 INJECTION INTRA-ARTICULAR; INTRALESIONAL; INTRAMUSCULAR; INTRAVENOUS; SOFT TISSUE at 17:58

## 2021-12-31 NOTE — H&P
History and Physical - [facility] Internal Medicine    Patient Information: Geraldine Bedolla 82 y o  female MRN: [id number]  Unit/Bed#: ED CT2 Encounter: [id number]  Admitting Physician: Reece Palm MD  PCP: Valentin Moore DO  Date of Admission:  12/31/21        Hospital Problem List:     Principal Problem:    Acute respiratory failure with hypoxia [facility])  Active Problems:    Pneumonia due to COVID-19 virus    Atrial fibrillation ([place] [address] )    Multiple sclerosis ([place] [address] )    Type 2 diabetes mellitus without complication, without long-term current use of insulin ([place] [address] )    Mixed hyperlipidemia    Essential hypertension    History of breast cancer      Assessment/Plan:    Pneumonia due to COVID-19 virus  Assessment & Plan  Presented with increasing shortness of breath, cough, hypoxia  Moderate disease based on oxygen  10 L requirement on presentation  CXR - evidence of left-sided infiltrate  SARS-CoV-2 PCR positive-12/28, subsequently received monoclonal sotrovimab 12/29  · Medication treatment started per COVID protocol:  · Dexamethasone 6 mg IV daily for 10 days  · Remdesivir 200 mg x 1 followed by 100 mg daily for 4 days  · Anticoagulation-heparin subQ 5000 units q 8 hours  Monitor for bleeding      · Atorvastatin  · IV cefepime pending procalcitonin  · Trend inflammatory markers, CRP  ·   · Trend D-dimer-initial 1 98  · Troponin, proBNP   · Daily CBC, CMP  · Trend procalcitonin  · Increase activity and mobilization as tolerated  · PT/OT as needed      Results from last 7 days   Lab Units 12/28/21  0000   SARS-COV-2  Positive*     Results from last 7 days   Lab Units 12/31/21  1446   D-DIMER QUANTITATIVE ug/ml FEU 1 98*                 * Acute respiratory failure with hypoxia (HCC)  Assessment & Plan  Noted to be saturating 90% on 5 L at skilled nursing facility and requiring 8 L during transportation to be ER  Currently saturating in mid 90s on 10 L of supplemental oxygen  Chest x-ray with evidence of left-sided pneumonia  · Continue supplemental oxygen  · Incentive spirometry  · Monitor respiratory status    Type 2 diabetes mellitus without complication, without long-term current use of insulin (HCC)  Assessment & Plan  Lab Results   Component Value Date    HGBA1C 6 0 (H) 11/24/2021       No results for input(s): POCGLU in the last 72 hours  Blood Sugar Average: Last 72 hrs:      Hold metformin  Monitor p o  Intake  Monitor on sliding scale      Multiple sclerosis (HCC)  Assessment & Plan  PT/OT    Atrial fibrillation [facility])  Assessment & Plan  Continue aspirin, sotalol  Previously declined anticoagulation due to allergy/intolerance to anticoagulants    Essential hypertension  Assessment & Plan  Continue lisinopril with holding parameters    Mixed hyperlipidemia  Assessment & Plan  Substitute home simvastatin to Lipitor 40 mg q h s  VTE Prophylaxis: Enoxaparin (Lovenox)  / sequential compression device   Code Status: Level 3 - DNAR and DNI    Anticipated Length of Stay:  Patient will be admitted on an Inpatient basis with an anticipated length of stay of >2 midnights  Justification for Hospital Stay:  Hypoxic respiratory failure secondary to pneumonia    Total Time for Visit, including Counseling / Coordination of Care: 45 minutes  Greater than 50% of this total time spent on direct patient counseling and coordination of care  Chief Complaint:     Breathing Difficulty (Tested covid positive 4 days ago with pneumonia  Patient O2 sat was under 90% w/ 5L NC at care center  ALS had O2 at 80 w/ 8L)    History of Present Illness:    Bonnee Scheuermann is a 82 y o  female with history of multiple sclerosis, hypertension, dyslipidemia, diabetes mellitus type 2, history of breast cancer, who presents with shortness of breath, cough and hypoxia  Patient is currently residing at skilled nursing facility after recent hospitalization for weakness associated with multiple sclerosis    Patient reported that she was diagnosed to have SARS-CoV-2 PCR positive on 12/28 when she started with symptoms of sore throat, nasal congestion cough and poor appetite  Patient was evaluated by PCP and referred for sotrovimab infusion on 12/29/2021  Over last few days, patient reported increasing cough and shortness of breath with worsening of hypoxia and she referred to ED for further evaluation  In ED, patient was noted to be afebrile with stable vital sign except hypoxia requiring of 10 L of supplemental oxygen to maintain oxygenation in mid 90s  Chest x-ray revealed findings suggestive of left-sided pneumonia  Patient also reported dark stool in ED, noted to be guaiac negative  Patient was subsequently admitted  Review of Systems:    Review of Systems   Constitutional: Positive for activity change, appetite change and fatigue  Negative for chills and fever  HENT: Positive for congestion and voice change  Negative for sore throat and trouble swallowing  Eyes: Negative for visual disturbance  Respiratory: Positive for cough and shortness of breath  Negative for chest tightness  Cardiovascular: Negative for chest pain and leg swelling  Gastrointestinal: Negative for abdominal pain, diarrhea, nausea and vomiting  Genitourinary: Negative for difficulty urinating  Musculoskeletal: Positive for gait problem  Neurological: Negative for dizziness, speech difficulty and headaches  Psychiatric/Behavioral: The patient is nervous/anxious          Past Medical and Surgical History:     Past Medical History:   Diagnosis Date    Abscess of buttock, right     last assessed-5/4/2017    Cancer [facility])     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-last assessed-10/8/2015    Cellulitis of chest wall     last assessed-7/27/2015    Chronic endometritis 10/12/2006    Diabetes mellitus [facility] [address] )     Dysuria 11/02/2007    Flushing     resolved-6/30/2015    Glaucoma     Hyperlipidemia     Hypertension     Ingrown nail 01/05/2012    Malignant neoplasm of breast ([facility] [address] )     last assessed-11/5/2015    MS (multiple sclerosis) ([facility] )     Nonvenomous insect bite     last assessed-12/12/2013    Palpitations 02/09/2011    Pressure ulcer of buttock 10/13/2006    Proctitis 05/12/2006    Vaginal polyp 03/14/2006    Viral gastroenteritis     last assessed-9/8/2016       Past Surgical History:   Procedure Laterality Date    BREAST BIOPSY      open    BREAST SURGERY      CERVICAL BIOPSY  W/ LOOP ELECTRODE EXCISION      DILATION AND CURETTAGE OF UTERUS      INCISION AND DRAINAGE OF WOUND Left 2/27/2017    Procedure: INCISION AND DRAINAGE (I&D)   Chest wall x 2;  Surgeon: Julita Keene MD;  Location: [address] [place] [address];  Service:     MASTECTOMY      last assessed-6/30/2015       Meds/Allergies:    PTA meds:   Prior to Admission Medications   Prescriptions Last Dose Informant Patient Reported? Taking? Easy Comfort Lancets MISC   No No   Sig: USE DAILY ICD 10 CODE E11 9   Multiple Vitamin (MULTI-VITAMIN DAILY PO)  Self Yes No   Sig: Take by mouth daily   Omega-3 Fatty Acids (OMEGA-3 FISH OIL) 1000 MG CAPS  Self Yes No   Sig: Take 1 capsule by mouth daily   Rhopressa 0 02 % SOLN   Yes No   aspirin 81 MG tablet  Self Yes No   Sig: Take 162 mg by mouth daily   escitalopram (Lexapro) 10 mg tablet   No No   Sig: Take 1 tablet (10 mg total) by mouth daily   glucose blood test strip   No No   Sig: Test twice daily   latanoprost (XALATAN) 0 005 % ophthalmic solution   No No   Sig: Administer 1 drop to both eyes daily at bedtime   lisinopril (ZESTRIL) 20 mg tablet   No No   Sig: Take 1 tablet (20 mg total) by mouth 2 (two) times a day   metFORMIN (GLUCOPHAGE) 1000 MG tablet   No No   Sig: TAKE 1/2 TABLET BY MOUTH TWICE A DAY WITH MEALS     methocarbamol (ROBAXIN) 500 mg tablet   Yes No   Sig: Take 500 mg by mouth 4 (four) times a day   simvastatin (ZOCOR) 20 mg tablet   No No   Sig: Take 1 tablet (20 mg total) by mouth daily   sotalol (BETAPACE) 80 mg tablet   No No   Sig: Take 1 tablet (80 mg total) by mouth 2 (two) times a day      Facility-Administered Medications: None       Allergies: Allergies   Allergen Reactions    Bactrim [Sulfamethoxazole-Trimethoprim] Other (See Comments)     General weakness    Clindamycin      Annotation - [id number]: bad taste in mouth    Coumadin [Warfarin]      Reaction Date: 22May2012; Annotation - [id number]: lip swelling    Dabigatran     Latex      Annotation - [id number]: RASH    Pradaxa [Dabigatran Etexilate Mesylate]      History:     Marital Status: /Civil Union     Substance Use History:   Social History     Substance and Sexual Activity   Alcohol Use Not Currently     Social History     Tobacco Use   Smoking Status Former Smoker   Smokeless Tobacco Never Used     Social History     Substance and Sexual Activity   Drug Use Not Currently       Family History:    Family History   Problem Relation Age of Onset    Heart disease Father         cardiac disorder    COPD Sister     Diabetes Sister     Lung cancer Mother     Lung cancer Cousin     Heart disease Cousin         cardiac disorder    Multiple sclerosis Cousin     Cancer Cousin     Cancer Daughter         bladder    Breast cancer Maternal Aunt        Physical Exam:     Vitals:   Blood Pressure: 126/60 (12/31/21 1445)  Pulse: 69 (12/31/21 1445)  Temperature: 97 9 °F (36 6 °C) (12/31/21 1426)  Temp Source: Oral (12/31/21 1426)  Respirations: 22 (12/31/21 1426)  Height: 5' 6" (167 6 cm) (12/31/21 1426)  Weight - Scale: 83 4 kg (183 lb 13 8 oz) (12/31/21 1426)  SpO2: 95 % (12/31/21 1445)    Physical Exam  Constitutional:       General: She is not in acute distress  Appearance: She is obese  She is ill-appearing  Comments: Appears elderly, deconditioned   HENT:      Head: Normocephalic and atraumatic  Cardiovascular:      Rate and Rhythm: Normal rate     Pulmonary:      Effort: Pulmonary effort is normal  No accessory muscle usage or respiratory distress  Breath sounds: Examination of the left-middle field reveals decreased breath sounds  Examination of the left-lower field reveals decreased breath sounds  Decreased breath sounds present  No wheezing or rales  Abdominal:      General: Bowel sounds are normal  There is no distension  Palpations: Abdomen is soft  Tenderness: There is no abdominal tenderness  There is no guarding or rebound  Musculoskeletal:      Right lower leg: No edema  Left lower leg: No edema  Skin:     General: Skin is warm and dry  Findings: No rash  Neurological:      Mental Status: She is alert  Mental status is at baseline  Psychiatric:         Mood and Affect: Mood is anxious  Affect is tearful  Cognition and Memory: Cognition normal                  Lab Results: I have personally reviewed pertinent reports  Results from last 7 days   Lab Units 12/31/21  1446   WBC Thousand/uL 12 53*   HEMOGLOBIN g/dL 12 8   HEMATOCRIT % 40 0   PLATELETS Thousands/uL 213   NEUTROS PCT % 77*   LYMPHS PCT % 13*   MONOS PCT % 10   EOS PCT % 0     Results from last 7 days   Lab Units 12/31/21  1446   POTASSIUM mmol/L 4 8   CHLORIDE mmol/L 106   CO2 mmol/L 21   BUN mg/dL 48*   CREATININE mg/dL 1 04   CALCIUM mg/dL 8 4   ALK PHOS U/L 118*   ALT U/L 27   AST U/L 14     Results from last 7 days   Lab Units 12/31/21  1446   INR  1 12       Imaging: I have personally reviewed pertinent reports  XR chest 1 view portable    Result Date: 12/31/2021  Narrative: CHEST INDICATION:   Shortness of breath, hypoxia    Patient has confirmed COVID-19  COMPARISON:  12/18/2021 EXAM PERFORMED/VIEWS:  XR CHEST PORTABLE FINDINGS: The cardiac silhouette is without change from the prior study  Interval development of airspace disease noted in the left lung, both the left upper lung zone and retrocardiac aspect of the left hemithorax  Right lung is unremarkable  No pneumothorax   Small left effusion not excluded given opacity at the base Osseous structures appear within normal limits for patient age  Impression: Development of left lung pneumonia  Workstation performed: [id number]     XR chest 1 view portable    Result Date: 12/18/2021  Narrative: CHEST INDICATION:   chest pain  History of right breast cancer  COMPARISON:  Chest radiograph from 11/23/2021 and chest CT from 7/14/2019  EXAM PERFORMED/VIEWS:  XR CHEST PORTABLE FINDINGS: Mild cardiomegaly  The lungs are clear  No pneumothorax or pleural effusion  Osseous structures appear within normal limits for patient age  Impression: No acute cardiopulmonary disease  Workstation performed: [id number]       XR chest 1 view portable   Final Result      Development of left lung pneumonia  Workstation performed: [id number]             EKG, Pathology, and Other Studies Reviewed on Admission:   · EKG-sinus rhythm with occasional PVCs and PACs    Allscripts/EPIC Records Reviewed: Yes     ** Please Note: "This note has been constructed using a voice recognition system  Therefore there may be syntax, spelling, and/or grammatical errors   Please call if you have any questions  "**

## 2021-12-31 NOTE — ED NOTES
No sepsis alert as per Dr Marcus Dave as patient is known COVID and being treated for COVID        Mable Willingham RN  12/31/21 8402

## 2021-12-31 NOTE — SEPSIS NOTE
Sepsis Note   Mirella Scott 82 y o  female MRN: [id number]  Unit/Bed#: ED CT2 Encounter: [id number]       qSOFA     Row Name 12/31/21 1445 12/31/21 1426             Altered mental status GCS < 15 -- --       Respiratory Rate > / =22 -- 1       Systolic BP < / =367 0 0       Q Sofa Score 1 1                  Initial Sepsis Screening     Row Name 12/31/21 1537                Is the patient's history suggestive of a new or worsening infection? --        Suspected source of infection pneumonia  -[place]        Are two or more of the following signs & symptoms of infection both present and new to the patient? Yes (Proceed)  Patient is COVID positive as source  -[place]        Indicate SIRS criteria Tachypnea > 20 resp per min;Leukocytosis (WBC > 585[address] IJL)  -[place]        If the answer is yes to both questions, suspicion of sepsis is present --        If severe sepsis is present AND tissue hypoperfusion perists in the hour after fluid resuscitation or lactate > 4, the patient meets criteria for SEPTIC SHOCK --        Are any of the following organ dysfunction criteria present within 6 hours of suspected infection and SIRS criteria that are NOT considered to be chronic conditions?  --        Organ dysfunction --        Date of presentation of severe sepsis --        Time of presentation of severe sepsis --        Tissue hypoperfusion persists in the hour after crystalloid fluid administration, evidenced, by either: --        Was hypotension present within one hour of the conclusion of crystalloid fluid administration? --        Date of presentation of septic shock --        Time of presentation of septic shock --              User Key  (r) = Recorded By, (t) = Taken By, (c) = Cosigned By    [address] Name Provider Jose Angel Lee MD Physician

## 2022-01-01 LAB
ALBUMIN SERPL BCP-MCNC: 2.2 G/DL (ref 3.5–5)
ALP SERPL-CCNC: 107 U/L (ref 46–116)
ALT SERPL W P-5'-P-CCNC: 26 U/L (ref 12–78)
ANION GAP SERPL CALCULATED.3IONS-SCNC: 11 MMOL/L (ref 4–13)
AST SERPL W P-5'-P-CCNC: 14 U/L (ref 5–45)
BASOPHILS # BLD AUTO: 0.01 THOUSANDS/ΜL (ref 0–0.1)
BASOPHILS NFR BLD AUTO: 0 % (ref 0–1)
BILIRUB SERPL-MCNC: 0.19 MG/DL (ref 0.2–1)
BUN SERPL-MCNC: 48 MG/DL (ref 5–25)
CALCIUM ALBUM COR SERPL-MCNC: 9.5 MG/DL (ref 8.3–10.1)
CALCIUM SERPL-MCNC: 8.1 MG/DL (ref 8.3–10.1)
CHLORIDE SERPL-SCNC: 108 MMOL/L (ref 100–108)
CO2 SERPL-SCNC: 21 MMOL/L (ref 21–32)
CREAT SERPL-MCNC: 0.79 MG/DL (ref 0.6–1.3)
CRP SERPL QL: 97.7 MG/L
D DIMER PPP FEU-MCNC: 1.51 UG/ML FEU
EOSINOPHIL # BLD AUTO: 0 THOUSAND/ΜL (ref 0–0.61)
EOSINOPHIL NFR BLD AUTO: 0 % (ref 0–6)
ERYTHROCYTE [DISTWIDTH] IN BLOOD BY AUTOMATED COUNT: 15 % (ref 11.6–15.1)
GFR SERPL CREATININE-BSD FRML MDRD: 68 ML/MIN/1.73SQ M
GLUCOSE SERPL-MCNC: 160 MG/DL (ref 65–140)
GLUCOSE SERPL-MCNC: 161 MG/DL (ref 65–140)
GLUCOSE SERPL-MCNC: 163 MG/DL (ref 65–140)
GLUCOSE SERPL-MCNC: 208 MG/DL (ref 65–140)
GLUCOSE SERPL-MCNC: 288 MG/DL (ref 65–140)
HCT VFR BLD AUTO: 40.5 % (ref 34.8–46.1)
HGB BLD-MCNC: 12.4 G/DL (ref 11.5–15.4)
IMM GRANULOCYTES # BLD AUTO: 0.03 THOUSAND/UL (ref 0–0.2)
IMM GRANULOCYTES NFR BLD AUTO: 0 % (ref 0–2)
L PNEUMO1 AG UR QL IA.RAPID: NEGATIVE
LYMPHOCYTES # BLD AUTO: 1.05 THOUSANDS/ΜL (ref 0.6–4.47)
LYMPHOCYTES NFR BLD AUTO: 14 % (ref 14–44)
MCH RBC QN AUTO: 27.3 PG (ref 26.8–34.3)
MCHC RBC AUTO-ENTMCNC: 30.6 G/DL (ref 31.4–37.4)
MCV RBC AUTO: 89 FL (ref 82–98)
MONOCYTES # BLD AUTO: 0.51 THOUSAND/ΜL (ref 0.17–1.22)
MONOCYTES NFR BLD AUTO: 7 % (ref 4–12)
NEUTROPHILS # BLD AUTO: 6.16 THOUSANDS/ΜL (ref 1.85–7.62)
NEUTS SEG NFR BLD AUTO: 79 % (ref 43–75)
NRBC BLD AUTO-RTO: 0 /100 WBCS
NT-PROBNP SERPL-MCNC: 1779 PG/ML
PLATELET # BLD AUTO: 204 THOUSANDS/UL (ref 149–390)
PMV BLD AUTO: 12.7 FL (ref 8.9–12.7)
POTASSIUM SERPL-SCNC: 5 MMOL/L (ref 3.5–5.3)
PROCALCITONIN SERPL-MCNC: 0.19 NG/ML
PROT SERPL-MCNC: 6.2 G/DL (ref 6.4–8.2)
RBC # BLD AUTO: 4.54 MILLION/UL (ref 3.81–5.12)
S PNEUM AG UR QL: POSITIVE
SODIUM SERPL-SCNC: 140 MMOL/L (ref 136–145)
WBC # BLD AUTO: 7.76 THOUSAND/UL (ref 4.31–10.16)

## 2022-01-01 PROCEDURE — 82948 REAGENT STRIP/BLOOD GLUCOSE: CPT

## 2022-01-01 PROCEDURE — 86140 C-REACTIVE PROTEIN: CPT | Performed by: INTERNAL MEDICINE

## 2022-01-01 PROCEDURE — 83880 ASSAY OF NATRIURETIC PEPTIDE: CPT | Performed by: INTERNAL MEDICINE

## 2022-01-01 PROCEDURE — 99232 SBSQ HOSP IP/OBS MODERATE 35: CPT | Performed by: INTERNAL MEDICINE

## 2022-01-01 PROCEDURE — 87070 CULTURE OTHR SPECIMN AEROBIC: CPT | Performed by: INTERNAL MEDICINE

## 2022-01-01 PROCEDURE — 87205 SMEAR GRAM STAIN: CPT | Performed by: INTERNAL MEDICINE

## 2022-01-01 PROCEDURE — 85025 COMPLETE CBC W/AUTO DIFF WBC: CPT | Performed by: INTERNAL MEDICINE

## 2022-01-01 PROCEDURE — 85379 FIBRIN DEGRADATION QUANT: CPT | Performed by: INTERNAL MEDICINE

## 2022-01-01 PROCEDURE — 80053 COMPREHEN METABOLIC PANEL: CPT | Performed by: INTERNAL MEDICINE

## 2022-01-01 RX ORDER — DEXAMETHASONE 6 MG/1
6 TABLET ORAL DAILY
COMMUNITY
End: 2022-01-09 | Stop reason: HOSPADM

## 2022-01-01 RX ORDER — DOCUSATE SODIUM 100 MG/1
100 CAPSULE, LIQUID FILLED ORAL 2 TIMES DAILY
COMMUNITY
End: 2022-01-09 | Stop reason: HOSPADM

## 2022-01-01 RX ORDER — AMLODIPINE BESYLATE 10 MG/1
10 TABLET ORAL DAILY
COMMUNITY
End: 2022-01-09 | Stop reason: HOSPADM

## 2022-01-01 RX ORDER — DIPHENHYDRAMINE HYDROCHLORIDE 50 MG/ML
25 INJECTION INTRAMUSCULAR; INTRAVENOUS ONCE
Status: COMPLETED | OUTPATIENT
Start: 2022-01-01 | End: 2022-01-01

## 2022-01-01 RX ORDER — SOTALOL HYDROCHLORIDE 80 MG/1
80 TABLET ORAL DAILY
Status: DISCONTINUED | OUTPATIENT
Start: 2022-01-02 | End: 2022-01-04

## 2022-01-01 RX ORDER — CEFTRIAXONE 1 G/50ML
1000 INJECTION, SOLUTION INTRAVENOUS EVERY 24 HOURS
Status: COMPLETED | OUTPATIENT
Start: 2022-01-01 | End: 2022-01-06

## 2022-01-01 RX ORDER — DIPHENHYDRAMINE HYDROCHLORIDE 50 MG/ML
INJECTION INTRAMUSCULAR; INTRAVENOUS
Status: COMPLETED
Start: 2022-01-01 | End: 2022-01-01

## 2022-01-01 RX ORDER — LIDOCAINE 50 MG/G
1 PATCH TOPICAL DAILY
COMMUNITY
End: 2022-01-09 | Stop reason: HOSPADM

## 2022-01-01 RX ORDER — ASCORBIC ACID 500 MG
500 TABLET ORAL DAILY
COMMUNITY

## 2022-01-01 RX ADMIN — CEFEPIME HYDROCHLORIDE 2000 MG: 2 INJECTION, POWDER, FOR SOLUTION INTRAVENOUS at 12:30

## 2022-01-01 RX ADMIN — DIPHENHYDRAMINE HYDROCHLORIDE 25 MG: 50 INJECTION, SOLUTION INTRAMUSCULAR; INTRAVENOUS at 00:42

## 2022-01-01 RX ADMIN — INSULIN LISPRO 2 UNITS: 100 INJECTION, SOLUTION INTRAVENOUS; SUBCUTANEOUS at 22:15

## 2022-01-01 RX ADMIN — HEPARIN SODIUM 5000 UNITS: 5000 INJECTION INTRAVENOUS; SUBCUTANEOUS at 22:16

## 2022-01-01 RX ADMIN — PANTOPRAZOLE SODIUM 40 MG: 40 TABLET, DELAYED RELEASE ORAL at 05:18

## 2022-01-01 RX ADMIN — LISINOPRIL 20 MG: 20 TABLET ORAL at 10:22

## 2022-01-01 RX ADMIN — CEFTRIAXONE 1000 MG: 1 INJECTION, SOLUTION INTRAVENOUS at 22:17

## 2022-01-01 RX ADMIN — ASPIRIN 81 MG CHEWABLE TABLET 81 MG: 81 TABLET CHEWABLE at 10:22

## 2022-01-01 RX ADMIN — DIPHENHYDRAMINE HYDROCHLORIDE 25 MG: 50 INJECTION INTRAMUSCULAR; INTRAVENOUS at 00:42

## 2022-01-01 RX ADMIN — INSULIN LISPRO 1 UNITS: 100 INJECTION, SOLUTION INTRAVENOUS; SUBCUTANEOUS at 18:40

## 2022-01-01 RX ADMIN — B-COMPLEX W/ C & FOLIC ACID TAB 1 TABLET: TAB at 10:23

## 2022-01-01 RX ADMIN — ESCITALOPRAM OXALATE 10 MG: 10 TABLET ORAL at 10:23

## 2022-01-01 RX ADMIN — DEXAMETHASONE SODIUM PHOSPHATE 6 MG: 4 INJECTION INTRA-ARTICULAR; INTRALESIONAL; INTRAMUSCULAR; INTRAVENOUS; SOFT TISSUE at 18:49

## 2022-01-01 RX ADMIN — INSULIN LISPRO 1 UNITS: 100 INJECTION, SOLUTION INTRAVENOUS; SUBCUTANEOUS at 12:30

## 2022-01-01 RX ADMIN — HEPARIN SODIUM 5000 UNITS: 5000 INJECTION INTRAVENOUS; SUBCUTANEOUS at 16:21

## 2022-01-01 RX ADMIN — HEPARIN SODIUM 5000 UNITS: 5000 INJECTION INTRAVENOUS; SUBCUTANEOUS at 05:18

## 2022-01-01 RX ADMIN — ATORVASTATIN CALCIUM 40 MG: 40 TABLET, FILM COATED ORAL at 16:21

## 2022-01-01 RX ADMIN — SOTALOL HYDROCHLORIDE 80 MG: 80 TABLET ORAL at 10:23

## 2022-01-01 NOTE — ED NOTES
Patient started with itchy localized redness around IV site  IV checked for infiltration but still WNL  Dr Yunier Marte notified of patient reaction to remdesivir  Medication stopped  Awaiting further orders       James Ceja RN  01/01/22 3801

## 2022-01-01 NOTE — ASSESSMENT & PLAN NOTE
Presented with increasing shortness of breath, cough, hypoxia  Moderate disease based on oxygen  10 L requirement on presentation  CXR - evidence of left-sided infiltrate  SARS-CoV-2 PCR positive-12/28, subsequently received monoclonal sotrovimab 12/29  Clinically improving, now saturating adequately on 1 L  · Medication treatment started per COVID protocol:  · Dexamethasone 6 mg IV daily for 10 days  · Remdesivir 200 mg x 1 followed by 100 mg daily for 4 days  · Patient had been on heparin subQ, D-dimer elevated 11 55, starting heparin drip as per COVID protocol  · Repeat CBC and monitor closely for bleeding      · Atorvastatin  · Initially started on IV cefepime,transitioned to ceftriaxone given positive pneumococcal antigen  · Trend inflammatory markers, CRP  · Troponin-negative, proBNP -elevated  · Daily CBC, CMP  · Trend procalcitonin  · Increase activity and mobilization as tolerated  · PT/OT as needed      Results from last 7 days   Lab Units 12/28/21  0000   SARS-COV-2  Positive*     Results from last 7 days   Lab Units 01/01/22  0516 12/31/21  1446   CRP mg/L 97 7*  --    D-DIMER QUANTITATIVE ug/ml FEU 1 51* 1 98*

## 2022-01-01 NOTE — ASSESSMENT & PLAN NOTE
Noted to be saturating 90% on 5 L at skilled nursing facility and requiring 8 L during transportation to be ER  On presentation, saturating in mid 90s on 10 L of supplemental oxygen  Chest x-ray with evidence of left-sided pneumonia  Oxygenation is improving, will taper as tolerated; currently at 1 L nasal cannula with O2 sats 97% at rest   Noted D-dimer to be elevated at 11 55 on 01/02, started heparin drip as per COVID protocol  Bilateral lower extremity venous duplex ordered, follow-up on results    May consider CTA of chest  · Continue supplemental oxygen  · Incentive spirometry  · Monitor respiratory status

## 2022-01-01 NOTE — PLAN OF CARE
Problem: MOBILITY - ADULT  Goal: Maintain or return to baseline ADL function  Description: INTERVENTIONS:  -  Assess patient's ability to carry out ADLs; assess patient's baseline for ADL function and identify physical deficits which impact ability to perform ADLs (bathing, care of mouth/teeth, toileting, grooming, dressing, etc )  - Assess/evaluate cause of self-care deficits   - Assess range of motion  - Assess patient's mobility; develop plan if impaired  - Assess patient's need for assistive devices and provide as appropriate  - Encourage maximum independence but intervene and supervise when necessary  - Involve family in performance of ADLs  - Assess for home care needs following discharge   - Consider OT consult to assist with ADL evaluation and planning for discharge  - Provide patient education as appropriate  Outcome: Progressing  Goal: Maintains/Returns to pre admission functional level  Description: INTERVENTIONS:  - Perform BMAT or MOVE assessment daily    - Set and communicate daily mobility goal to care team and patient/family/caregiver  - Collaborate with rehabilitation services on mobility goals if consulted  - Perform Range of Motion 2 times a day  - Reposition patient every 2 hours    - Dangle patient 2 times a day  - Stand patient 2 times a day  - Ambulate patient 2 times a day  - Out of bed to chair 2 times a day   - Out of bed for meals 2 times a day  - Out of bed for toileting  - Record patient progress and toleration of activity level   Outcome: Progressing     Problem: Prexisting or High Potential for Compromised Skin Integrity  Goal: Skin integrity is maintained or improved  Description: INTERVENTIONS:  - Identify patients at risk for skin breakdown  - Assess and monitor skin integrity  - Assess and monitor nutrition and hydration status  - Monitor labs   - Assess for incontinence   - Turn and reposition patient  - Assist with mobility/ambulation  - Relieve pressure over bony prominences  - Avoid friction and shearing  - Provide appropriate hygiene as needed including keeping skin clean and dry  - Evaluate need for skin moisturizer/barrier cream  - Collaborate with interdisciplinary team   - Patient/family teaching  - Consider wound care consult   Outcome: Progressing     Problem: Potential for Falls  Goal: Patient will remain free of falls  Description: INTERVENTIONS:  - Educate patient/family on patient safety including physical limitations  - Instruct patient to call for assistance with activity   - Consult OT/PT to assist with strengthening/mobility   - Keep Call bell within reach  - Keep bed low and locked with side rails adjusted as appropriate  - Keep care items and personal belongings within reach  - Initiate and maintain comfort rounds  - Make Fall Risk Sign visible to staff  - Offer Toileting every 4 Hours, in advance of need  - Initiate/Maintain bed alarm  - Obtain necessary fall risk management equipment:   - Apply yellow socks and bracelet for high fall risk patients  - Consider moving patient to room near nurses station  Outcome: Progressing     Problem: PAIN - ADULT  Goal: Verbalizes/displays adequate comfort level or baseline comfort level  Description: Interventions:  - Encourage patient to monitor pain and request assistance  - Assess pain using appropriate pain scale  - Administer analgesics based on type and severity of pain and evaluate response  - Implement non-pharmacological measures as appropriate and evaluate response  - Consider cultural and social influences on pain and pain management  - Notify physician/advanced practitioner if interventions unsuccessful or patient reports new pain  Outcome: Progressing     Problem: INFECTION - ADULT  Goal: Absence or prevention of progression during hospitalization  Description: INTERVENTIONS:  - Assess and monitor for signs and symptoms of infection  - Monitor lab/diagnostic results  - Monitor all insertion sites, i e  indwelling lines, tubes, and drains  - Monitor endotracheal if appropriate and nasal secretions for changes in amount and color  - Scenic appropriate cooling/warming therapies per order  - Administer medications as ordered  - Instruct and encourage patient and family to use good hand hygiene technique  - Identify and instruct in appropriate isolation precautions for identified infection/condition  Outcome: Progressing     Problem: DISCHARGE PLANNING  Goal: Discharge to home or other facility with appropriate resources  Description: INTERVENTIONS:  - Identify barriers to discharge w/patient and caregiver  - Arrange for needed discharge resources and transportation as appropriate  - Identify discharge learning needs (meds, wound care, etc )  - Arrange for interpretive services to assist at discharge as needed  - Refer to Case Management Department for coordinating discharge planning if the patient needs post-hospital services based on physician/advanced practitioner order or complex needs related to functional status, cognitive ability, or social support system  Outcome: Progressing     Problem: Knowledge Deficit  Goal: Patient/family/caregiver demonstrates understanding of disease process, treatment plan, medications, and discharge instructions  Description: Complete learning assessment and assess knowledge base    Interventions:  - Provide teaching at level of understanding  - Provide teaching via preferred learning methods  Outcome: Progressing

## 2022-01-01 NOTE — ASSESSMENT & PLAN NOTE
Continue aspirin  On sotalol, bradycardia noted at rest   Decrease sotalol to daily  EKG in a m    Previously declined anticoagulation due to allergy/intolerance to anticoagulants

## 2022-01-01 NOTE — ASSESSMENT & PLAN NOTE
Lab Results   Component Value Date    HGBA1C 6 0 (H) 11/24/2021       Recent Labs     12/31/21  2155 01/01/22  0742 01/01/22  1148 01/01/22  1636   POCGLU 259* 163* 161* 208*       Blood Sugar Average: Last 72 hrs:      Hold metformin  Hyperglycemia secondary to steroids  Monitor p o   Intake  Monitor on sliding scale

## 2022-01-01 NOTE — PROGRESS NOTES
[facility] Progress Note  Patient: Vannessa Guillen 82 y o  female   MRN: [id number]  PCP: Felicita Frey DO  Unit/Bed#: [address] [place] [address] Encounter: [id number]  Date Of Visit: 01/01/22    Problem List:    Principal Problem:    Acute respiratory failure with hypoxia ([facility] )  Active Problems:    Pneumonia due to COVID-19 virus    Atrial fibrillation ([place] [address] )    Multiple sclerosis ([place] [address] )    Type 2 diabetes mellitus without complication, without long-term current use of insulin ([place] [address] )    Mixed hyperlipidemia    Essential hypertension    History of breast cancer      Assessment & Plan:    Pneumonia due to COVID-19 virus  Assessment & Plan  Presented with increasing shortness of breath, cough, hypoxia  Moderate disease based on oxygen  10 L requirement on presentation  CXR - evidence of left-sided infiltrate  SARS-CoV-2 PCR positive-12/28, subsequently received monoclonal sotrovimab 12/29  Clinically improving, now saturating adequately on 5 L  · Medication treatment started per COVID protocol:  · Dexamethasone 6 mg IV daily for 10 days  · Remdesivir 200 mg x 1 followed by 100 mg daily for 4 days  · Anticoagulation-heparin subQ 5000 units q 8 hours  Monitor for bleeding      · Atorvastatin  · Initially started on IV cefepime will transition to ceftriaxone given positive pneumococcal antigen  · Trend inflammatory markers, CRP  ·   · Trend D-dimer-initial 1 98-downtrending  · Troponin-negative, proBNP -elevated  · Daily CBC, CMP  · Trend procalcitonin  · Increase activity and mobilization as tolerated  · PT/OT as needed      Results from last 7 days   Lab Units 12/28/21  0000   SARS-COV-2  Positive*     Results from last 7 days   Lab Units 01/01/22  0516 12/31/21  1446   CRP mg/L 97 7*  --    D-DIMER QUANTITATIVE ug/ml FEU 1 51* 1 98*                 * Acute respiratory failure with hypoxia (HCC)  Assessment & Plan  Noted to be saturating 90% on 5 L at skilled nursing facility and requiring 8 L during transportation to be ER  On presentation, saturating in mid 90s on 10 L of supplemental oxygen  Chest x-ray with evidence of left-sided pneumonia  Oxygenation is improving, will taper as tolerated  · Continue supplemental oxygen  · Incentive spirometry  · Monitor respiratory status    Type 2 diabetes mellitus without complication, without long-term current use of insulin [facility])  Assessment & Plan  Lab Results   Component Value Date    HGBA1C 6 0 (H) 11/24/2021       Recent Labs     12/31/21  2155 01/01/22  0742 01/01/22  1148 01/01/22  1636   POCGLU 259* 163* 161* 208*       Blood Sugar Average: Last 72 hrs:      Hold metformin  Hyperglycemia secondary to steroids  Monitor p o  Intake  Monitor on sliding scale      Multiple sclerosis (HCC)  Assessment & Plan  PT/OT    Atrial fibrillation [facility])  Assessment & Plan  Continue aspirin  On sotalol, bradycardia noted at rest   Decrease sotalol to daily  EKG in a m  Previously declined anticoagulation due to allergy/intolerance to anticoagulants    Essential hypertension  Assessment & Plan  Continue lisinopril with holding parameters    Mixed hyperlipidemia  Assessment & Plan  Substitute home simvastatin to Lipitor 40 mg q h s  VTE Pharmacologic Prophylaxis:   Moderate Risk (Score 3-4) - Pharmacological DVT Prophylaxis Ordered: heparin  Patient Centered Rounds: I performed bedside rounds with nursing staff today  Discussions with Specialists or Other Care Team Provider:  Yes    Education and Discussions with Family / Patient: Updated  (son) via phone  Time Spent for Care: 45 minutes  More than 50% of total time spent on counseling and coordination of care as described above  Current Length of Stay: 1 day(s)  Current Patient Status: Inpatient   Certification Statement: The patient will continue to require additional inpatient hospital stay due to Hypoxic respiratory failure  Discharge Plan: Anticipate discharge in 48-72 hrs to rehab facility      Code Status: Level 3 - DNAR and DNI    Subjective:   Appears much better, reports improvement in breathing and cough  Oxygen level is improving  Appetite fair  Reports constipation      Objective:     Vitals:   Temp (24hrs), Av 5 °F (36 4 °C), Min:96 5 °F (35 8 °C), Max:98 6 °F (37 °C)    Temp:  [96 5 °F (35 8 °C)-98 6 °F (37 °C)] 98 6 °F (37 °C)  HR:  [] 68  Resp:  [16-24] 18  BP: (113-204)/(55-96) 133/85  SpO2:  [91 %-99 %] 99 % on 5 L  Body mass index is 29 71 kg/m²  Input and Output Summary (last 24 hours): Intake/Output Summary (Last 24 hours) at 2022 1607  Last data filed at 2022 0540  Gross per 24 hour   Intake --   Output 600 ml   Net -600 ml       Physical Exam:   Physical Exam  Constitutional:       General: She is not in acute distress  HENT:      Head: Normocephalic and atraumatic  Cardiovascular:      Rate and Rhythm: Normal rate  Pulmonary:      Effort: Pulmonary effort is normal  No respiratory distress  Breath sounds: Normal breath sounds  No wheezing or rales  Comments: Diminished, left more than right  Abdominal:      General: Bowel sounds are normal  There is no distension  Palpations: Abdomen is soft  Tenderness: There is no abdominal tenderness  There is no guarding or rebound  Musculoskeletal:      Right lower leg: No edema  Left lower leg: No edema  Skin:     General: Skin is warm and dry  Findings: No rash  Neurological:      Mental Status: She is alert  Mental status is at baseline     Psychiatric:         Mood and Affect: Mood normal          Additional Data:     Labs:  Results from last 7 days   Lab Units 22  0515   WBC Thousand/uL 7 76   HEMOGLOBIN g/dL 12 4   HEMATOCRIT % 40 5   PLATELETS Thousands/uL 204   NEUTROS PCT % 79*   LYMPHS PCT % 14   MONOS PCT % 7   EOS PCT % 0     Results from last 7 days   Lab Units 22  0516   SODIUM mmol/L 140   POTASSIUM mmol/L 5 0   CHLORIDE mmol/L 108   CO2 mmol/L 21   BUN mg/dL 48* CREATININE mg/dL 0 79   ANION GAP mmol/L 11   CALCIUM mg/dL 8 1*   ALBUMIN g/dL 2 2*   TOTAL BILIRUBIN mg/dL 0 19*   ALK PHOS U/L 107   ALT U/L 26   AST U/L 14   GLUCOSE RANDOM mg/dL 160*     Results from last 7 days   Lab Units 12/31/21  1446   INR  1 12     Results from last 7 days   Lab Units 01/01/22  1148 01/01/22  0742 12/31/21  2155   POC GLUCOSE mg/dl 161* 163* 259*         Results from last 7 days   Lab Units 12/31/21  1730 12/31/21  1446   LACTIC ACID mmol/L 1 0 2 5*   PROCALCITONIN ng/ml  --  0 19       Lines/Drains:  Invasive Devices  Report    Peripheral Intravenous Line            Peripheral IV 12/18/21 Left; Lower Arm 14 days    Peripheral IV 12/31/21 Left Hand <1 day          Drain            External Urinary Catheter 37 days                      Imaging: Reviewed radiology reports from this admission including: chest xray    Recent Cultures (last 7 days):   Results from last 7 days   Lab Units 12/31/21  2057 12/31/21  1446 12/31/21  1430   BLOOD CULTURE   --  Received in Microbiology Lab  Culture in Progress  Received in Microbiology Lab  Culture in Progress     LEGIONELLA URINARY ANTIGEN  Negative  --   --        Last 24 Hours Medication List:   Current Facility-Administered Medications   Medication Dose Route Frequency Provider Last Rate    aspirin  81 mg Oral Daily Luanne Moser MD      atorvastatin  40 mg Oral Daily With Leland Coleman MD      cefepime  2 g Intravenous Q12H Luanne Moser MD Stopped (01/01/22 1250)    dexamethasone  6 mg Intravenous Q24H Luanne Moser MD      escitalopram  10 mg Oral Daily Luanne Moser MD      heparin (porcine)  5,000 Units Subcutaneous [facility] Luanne Moser MD      insulin lispro  1-5 Units Subcutaneous TID AC Luanne Moser MD      insulin lispro  1-5 Units Subcutaneous HS Luanne Moser MD      lisinopril  20 mg Oral BID Luanne Moser MD      multivitamin stress formula  1 tablet Oral Daily Luanne Moser MD     [facility] pantoprazole  40 mg Oral Early Morning Lynn Crabtree MD      remdesivir  100 mg Intravenous Q24H Lynn Crabtree MD      sotalol  80 mg Oral BID Lynn Crabtree MD          Today, Patient Was Seen By: Lynn Crabtree MD    ** Please Note: "This note has been constructed using a voice recognition system  Therefore there may be syntax, spelling, and/or grammatical errors   Please call if you have any questions  "**

## 2022-01-01 NOTE — ED PROVIDER NOTES
History  Chief Complaint   Patient presents with    Breathing Difficulty     Tested covid positive 4 days ago with pneumonia  Patient O2 sat was under 90% w/ 5L NC at care center  ALS had O2 at 80 w/ 8L     HPI  Patient is an 82-year-old female history of hypertension, hyperlipidemia, diabetes, MS presenting for evaluation of cough, shortness of breath, hypoxia  Patient tested COVID positive 4 days ago, has felt progressively worse since that time  Patient denies chest pain, states shortness of breath and chest tightness  Patient referred for monoclonal antibody infusion on 12/29  Patient hypoxic requiring between 8 and 10 L nasal cannula to maintain saturations in the emergency department  Patient stating over the same interval she has been having loose dark bowel movements  Patient guaiac negative emergency department with benign abdominal examination  Prior to Admission Medications   Prescriptions Last Dose Informant Patient Reported? Taking?    Easy Comfort Lancets MISC 12/31/2021 at Unknown time  No Yes   Sig: USE DAILY ICD 10 CODE E11 9   Multiple Vitamin (MULTI-VITAMIN DAILY PO) 12/31/2021 at Unknown time Self Yes Yes   Sig: Take by mouth daily   Omega-3 Fatty Acids (OMEGA-3 FISH OIL) 1000 MG CAPS 12/31/2021 at Unknown time Self Yes Yes   Sig: Take 1 capsule by mouth daily   Rhopressa 0 02 % SOLN 12/31/2021 at Unknown time  Yes Yes   amLODIPine (NORVASC) 10 mg tablet 12/31/2021 at Unknown time  Yes Yes   Sig: Take 10 mg by mouth daily   ascorbic acid (VITAMIN C) 500 MG tablet 12/31/2021 at Unknown time  Yes Yes   Sig: Take 500 mg by mouth daily   aspirin 81 MG tablet 12/31/2021 at Unknown time Self Yes Yes   Sig: Take 162 mg by mouth daily   dexamethasone (DECADRON) 6 mg tablet   Yes Yes   Sig: Take 6 mg by mouth in the morning   docusate sodium (COLACE) 100 mg capsule 12/31/2021 at Unknown time  Yes Yes   Sig: Take 100 mg by mouth 2 (two) times a day   enoxaparin (LOVENOX) 40 mg/0 4 mL 12/31/2021 at Unknown time  Yes Yes   Sig: Inject 40 mg under the skin   escitalopram (Lexapro) 10 mg tablet 12/31/2021 at Unknown time  No Yes   Sig: Take 1 tablet (10 mg total) by mouth daily   glucose blood test strip   No No   Sig: Test twice daily   latanoprost (XALATAN) 0 005 % ophthalmic solution 12/31/2021 at Unknown time  No Yes   Sig: Administer 1 drop to both eyes daily at bedtime   lidocaine (LIDODERM) 5 % 12/31/2021 at Unknown time  Yes Yes   Sig: Apply 1 patch topically daily Remove & Discard patch within 12 hours or as directed by MD   lisinopril (ZESTRIL) 20 mg tablet 12/31/2021 at Unknown time  No Yes   Sig: Take 1 tablet (20 mg total) by mouth 2 (two) times a day   metFORMIN (GLUCOPHAGE) 1000 MG tablet 12/31/2021 at Unknown time  No Yes   Sig: TAKE 1/2 TABLET BY MOUTH TWICE A DAY WITH MEALS     methocarbamol (ROBAXIN) 500 mg tablet Not Taking at Unknown time  Yes No   Sig: Take 500 mg by mouth 4 (four) times a day   Patient not taking: Reported on 1/1/2022    simvastatin (ZOCOR) 20 mg tablet 12/31/2021 at Unknown time  No Yes   Sig: Take 1 tablet (20 mg total) by mouth daily   sotalol (BETAPACE) 80 mg tablet 12/31/2021 at Unknown time  No Yes   Sig: Take 1 tablet (80 mg total) by mouth 2 (two) times a day      Facility-Administered Medications: None       Past Medical History:   Diagnosis Date    Abscess of buttock, right     last assessed-5/4/2017    Cancer [facility])     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-last assessed-10/8/2015    Cellulitis of chest wall     last assessed-7/27/2015    Chronic endometritis 10/12/2006    Diabetes mellitus ([facility] [place] [address] )     Dysuria 11/02/2007    Flushing     resolved-6/30/2015    Glaucoma     Hyperlipidemia     Hypertension     Ingrown nail 01/05/2012    Malignant neoplasm of breast ([facility] [place] [address] )     last assessed-11/5/2015    MS (multiple sclerosis) ([facility] [place] [address] )     Nonvenomous insect bite     last assessed-12/12/2013    Palpitations 02/09/2011    Pressure ulcer of buttock 10/13/2006    Proctitis 05/12/2006    Vaginal polyp 03/14/2006    Viral gastroenteritis     last assessed-9/8/2016       Past Surgical History:   Procedure Laterality Date    BREAST BIOPSY      open    BREAST SURGERY      CERVICAL BIOPSY  W/ LOOP ELECTRODE EXCISION      DILATION AND CURETTAGE OF UTERUS      INCISION AND DRAINAGE OF WOUND Left 2/27/2017    Procedure: INCISION AND DRAINAGE (I&D)   Chest wall x 2;  Surgeon: Mik Winslow MD;  Location: [facility];  Service:    Nitro PDF      last assessed-6/30/2015       Family History   Problem Relation Age of Onset    Heart disease Father         cardiac disorder    COPD Sister     Diabetes Sister     Lung cancer Mother     Lung cancer Cousin     Heart disease Cousin         cardiac disorder    Multiple sclerosis Cousin     Cancer Cousin     Cancer Daughter         bladder    Breast cancer Maternal Aunt      I have reviewed and agree with the history as documented  E-Cigarette/Vaping    E-Cigarette Use Never User      E-Cigarette/Vaping Substances     Social History     Tobacco Use    Smoking status: Former Smoker    Smokeless tobacco: Never Used   Vaping Use    Vaping Use: Never used   Substance Use Topics    Alcohol use: Not Currently     Alcohol/week: 0 0 standard drinks    Drug use: Not Currently       Review of Systems   Constitutional: Negative for chills, fatigue and fever  HENT: Negative for sore throat  Eyes: Negative for visual disturbance  Respiratory: Positive for cough and shortness of breath  Negative for chest tightness  Cardiovascular: Negative for chest pain  Gastrointestinal: Positive for diarrhea  Negative for abdominal distention, abdominal pain, constipation, nausea and vomiting  Endocrine: Negative for polydipsia and polyuria  Genitourinary: Negative for dysuria and hematuria  Skin: Negative for color change and rash  Neurological: Negative for dizziness and headaches     Psychiatric/Behavioral: Negative for confusion  All other systems reviewed and are negative  Physical Exam  Physical Exam  Vitals reviewed  Constitutional:       General: She is not in acute distress  Appearance: She is well-developed  She is not diaphoretic  Comments: Ill-appearing   HENT:      Head: Normocephalic and atraumatic  Comments: Dry mucous membrane     Right Ear: External ear normal       Left Ear: External ear normal       Nose: Nose normal       Mouth/Throat:      Pharynx: No oropharyngeal exudate  Eyes:      Pupils: Pupils are equal, round, and reactive to light  Cardiovascular:      Rate and Rhythm: Normal rate and regular rhythm  Heart sounds: Normal heart sounds  No murmur heard  No friction rub  No gallop  Pulmonary:      Effort: Pulmonary effort is normal  No respiratory distress  Breath sounds: Rales present  No wheezing  Comments: Persistently coughing and struggling to catch her breath, requiring a to 10 L nasal cannula to maintain saturations  Diffuse rales on auscultation  Chest:      Chest wall: No tenderness  Abdominal:      General: Bowel sounds are normal  There is no distension  Palpations: Abdomen is soft  There is no mass  Tenderness: There is no abdominal tenderness  There is no guarding  Musculoskeletal:         General: No deformity  Normal range of motion  Cervical back: Normal range of motion  Lymphadenopathy:      Cervical: No cervical adenopathy  Skin:     General: Skin is warm and dry  Capillary Refill: Capillary refill takes less than 2 seconds  Neurological:      Mental Status: She is alert and oriented to person, place, and time           Vital Signs  ED Triage Vitals [12/31/21 1426]   Temperature Pulse Respirations Blood Pressure SpO2   97 9 °F (36 6 °C) 105 22 114/57 97 %      Temp Source Heart Rate Source Patient Position - Orthostatic VS BP Location FiO2 (%)   Oral Monitor Sitting Left arm --      Pain Score       No Pain           Vitals:    01/01/22 0000 01/01/22 0015 01/01/22 0215 01/01/22 0900   BP: 130/60 132/96 140/67 133/85   Pulse: 98 96 70 68   Patient Position - Orthostatic VS:             Visual Acuity      ED Medications  Medications   dexamethasone (DECADRON) injection 6 mg (6 mg Intravenous Given 12/31/21 1758)   insulin lispro (HumaLOG) 100 units/mL subcutaneous injection 1-5 Units (1 Units Subcutaneous Given 1/1/22 1230)   insulin lispro (HumaLOG) 100 units/mL subcutaneous injection 1-5 Units (2 Units Subcutaneous Given 12/31/21 2213)   escitalopram (LEXAPRO) tablet 10 mg (10 mg Oral Given 1/1/22 1023)   lisinopril (ZESTRIL) tablet 20 mg (20 mg Oral Given 1/1/22 1022)   multivitamin stress formula tablet 1 tablet (1 tablet Oral Given 1/1/22 1023)   atorvastatin (LIPITOR) tablet 40 mg (40 mg Oral Given 12/31/21 2012)   sotalol (BETAPACE) tablet 80 mg (80 mg Oral Given 1/1/22 1023)   aspirin chewable tablet 81 mg (81 mg Oral Given 1/1/22 1022)   remdesivir (Veklury) 200 mg in sodium chloride 0 9 % 290 mL IVPB (200 mg Intravenous Not Given 1/1/22 0015)     Followed by   remdesivir Chinyere Modest) 100 mg in sodium chloride 0 9 % 270 mL IVPB (has no administration in time range)   heparin (porcine) subcutaneous injection 5,000 Units (5,000 Units Subcutaneous Given 1/1/22 0518)   pantoprazole (PROTONIX) EC tablet 40 mg (40 mg Oral Given 1/1/22 0518)   cefepime (MAXIPIME) 2 g/50 mL dextrose IVPB (0 mg Intravenous Paused 1/1/22 1250)   diphenhydrAMINE (BENADRYL) injection 25 mg (25 mg Intravenous Given 1/1/22 0042)       Diagnostic Studies  Results Reviewed     Procedure Component Value Units Date/Time    Strep Pneumoniae, Urine [[id number]]  (Abnormal) Collected: 12/31/21 2054    Lab Status: Final result Specimen: Urine, Other Updated: 01/01/22 1251     Strep pneumoniae antigen, urine Positive    Legionella antigen, urine [[id number]]  (Normal) Collected: 12/31/21 2057    Lab Status: Final result Specimen: Urine, Clean Catch Updated: 01/01/22 1251     Legionella Urinary Antigen Negative    Comprehensive metabolic panel [[id number]]  (Abnormal) Collected: 01/01/22 0516    Lab Status: Final result Specimen: Blood from Hand, Left Updated: 01/01/22 0643     Sodium 140 mmol/L      Potassium 5 0 mmol/L      Chloride 108 mmol/L      CO2 21 mmol/L      ANION GAP 11 mmol/L      BUN 48 mg/dL      Creatinine 0 79 mg/dL      Glucose 160 mg/dL      Calcium 8 1 mg/dL      Corrected Calcium 9 5 mg/dL      AST 14 U/L      ALT 26 U/L      Alkaline Phosphatase 107 U/L      Total Protein 6 2 g/dL      Albumin 2 2 g/dL      Total Bilirubin 0 19 mg/dL      eGFR 68 ml/min/1 73sq m     Narrative:      [place] guidelines for Chronic Kidney Disease (CKD):     Stage 1 with normal or high GFR (GFR > 90 mL/min/1 73 square meters)    Stage 2 Mild CKD (GFR = 60-89 mL/min/1 73 square meters)    Stage 3A Moderate CKD (GFR = 45-59 mL/min/1 73 square meters)    Stage 3B Moderate CKD (GFR = 30-44 mL/min/1 73 square meters)    Stage 4 Severe CKD (GFR = 15-29 mL/min/1 73 square meters)    Stage 5 End Stage CKD (GFR <15 mL/min/1 73 square meters)  Note: GFR calculation is accurate only with a steady state creatinine    C-reactive protein [[id number]]  (Abnormal) Collected: 01/01/22 0516    Lab Status: Final result Specimen: Blood from Hand, Left Updated: 01/01/22 0643     CRP 97 7 mg/L     NT-BNP PRO [[id number]]  (Abnormal) Collected: 01/01/22 0516    Lab Status: Final result Specimen: Blood from Hand, Left Updated: 01/01/22 0643     NT-proBNP 1,779 pg/mL     D-dimer, quantitative [[id number]]  (Abnormal) Collected: 01/01/22 0516    Lab Status: Final result Specimen: Blood from Hand, Left Updated: 01/01/22 0630     D-Dimer, Quant 1 51 ug/ml FEU     CBC and differential [[id number]]  (Abnormal) Collected: 01/01/22 0515    Lab Status: Final result Specimen: Blood from Hand, Left Updated: 01/01/22 0604     WBC 7 76 Thousand/uL      RBC 4 54 Million/uL      Hemoglobin 12 4 g/dL      Hematocrit 40 5 %      MCV 89 fL      MCH 27 3 pg      MCHC 30 6 g/dL      RDW 15 0 %      MPV 12 7 fL      Platelets 293 Thousands/uL      nRBC 0 /100 WBCs      Neutrophils Relative 79 %      Immat GRANS % 0 %      Lymphocytes Relative 14 %      Monocytes Relative 7 %      Eosinophils Relative 0 %      Basophils Relative 0 %      Neutrophils Absolute 6 16 Thousands/µL      Immature Grans Absolute 0 03 Thousand/uL      Lymphocytes Absolute 1 05 Thousands/µL      Monocytes Absolute 0 51 Thousand/µL      Eosinophils Absolute 0 00 Thousand/µL      Basophils Absolute 0 01 Thousands/µL     Procalcitonin with AM Reflex [[id number]]  (Normal) Collected: 12/31/21 1446    Lab Status: Final result Specimen: Blood from Arm, Left Updated: 01/01/22 0301     Procalcitonin 0 19 ng/ml     Blood culture #1 [[id number]] Collected: 12/31/21 1430    Lab Status: Preliminary result Specimen: Blood from Arm, Left Updated: 01/01/22 0301     Blood Culture Received in Microbiology Lab  Culture in Progress  Blood culture #2 [[id number]] Collected: 12/31/21 1446    Lab Status: Preliminary result Specimen: Blood from Arm, Left Updated: 01/01/22 0301     Blood Culture Received in Microbiology Lab  Culture in Progress  Sputum culture and Gram stain [[id number]] Collected: 01/01/22 0044    Lab Status:  In process Specimen: Expectorated Sputum Updated: 01/01/22 0052    HS Troponin I 4hr [[id number]] Collected: 12/31/21 2225    Lab Status: Final result Specimen: Blood from Arm, Left Updated: 12/31/21 2257     hs TnI 4hr 9 ng/L      Delta 4hr hsTnI -6 ng/L     Fingerstick Glucose (POCT) [[id number]]  (Abnormal) Collected: 12/31/21 2155    Lab Status: Final result Updated: 12/31/21 2156     POC Glucose 259 mg/dl     HS Troponin I 2hr [[id number]] Collected: 12/31/21 2054    Lab Status: Final result Specimen: Blood from Arm, Left Updated: 12/31/21 2135     hs TnI 2hr 14 ng/L      Delta 2hr hsTnI -1 ng/L     UA w Reflex to Microscopic w Reflex to Culture [[id number]] Collected: 12/31/21 2057    Lab Status: Final result Specimen: Urine, Clean Catch Updated: 12/31/21 2110     Color, UA Yellow     Clarity, UA Clear     Specific [place], UA 1 020     pH, UA 5 5     Leukocytes, UA Negative     Nitrite, UA Negative     Protein, UA Negative mg/dl      Glucose, UA Negative mg/dl      Ketones, UA Negative mg/dl      Urobilinogen, UA 0 2 E U /dl      Bilirubin, UA Negative     Blood, UA Negative    HS Troponin 0hr (reflex protocol) [[id number]]  (Normal) Collected: 12/31/21 1730    Lab Status: Final result Specimen: Blood from Arm, Left Updated: 12/31/21 1832     hs TnI 0hr 15 ng/L     Lactic acid 2 Hours [[id number]]  (Normal) Collected: 12/31/21 1730    Lab Status: Final result Specimen: Blood from Arm, Left Updated: 12/31/21 1827     LACTIC ACID 1 0 mmol/L     Narrative:      Result may be elevated if tourniquet was used during collection  Lactic acid [[id number]]  (Abnormal) Collected: 12/31/21 1446    Lab Status: Final result Specimen: Blood from Arm, Left Updated: 12/31/21 1530     LACTIC ACID 2 5 mmol/L     Narrative:      Result may be elevated if tourniquet was used during collection      D-dimer, quantitative [[id number]]  (Abnormal) Collected: 12/31/21 1446    Lab Status: Final result Specimen: Blood from Arm, Left Updated: 12/31/21 1516     D-Dimer, Quant 1 98 ug/ml FEU     Comprehensive metabolic panel [[id number]]  (Abnormal) Collected: 12/31/21 1446    Lab Status: Final result Specimen: Blood from Arm, Left Updated: 12/31/21 1516     Sodium 140 mmol/L      Potassium 4 8 mmol/L      Chloride 106 mmol/L      CO2 21 mmol/L      ANION GAP 13 mmol/L      BUN 48 mg/dL      Creatinine 1 04 mg/dL      Glucose 144 mg/dL      Calcium 8 4 mg/dL      Corrected Calcium 9 8 mg/dL      AST 14 U/L      ALT 27 U/L      Alkaline Phosphatase 118 U/L      Total Protein 6 5 g/dL      Albumin 2 2 g/dL      Total Bilirubin 0 26 mg/dL      eGFR 49 ml/min/1 73sq m     Narrative:      [employer] guidelines for Chronic Kidney Disease (CKD):     Stage 1 with normal or high GFR (GFR > 90 mL/min/1 73 square meters)    Stage 2 Mild CKD (GFR = 60-89 mL/min/1 73 square meters)    Stage 3A Moderate CKD (GFR = 45-59 mL/min/1 73 square meters)    Stage 3B Moderate CKD (GFR = 30-44 mL/min/1 73 square meters)    Stage 4 Severe CKD (GFR = 15-29 mL/min/1 73 square meters)    Stage 5 End Stage CKD (GFR <15 mL/min/1 73 square meters)  Note: GFR calculation is accurate only with a steady state creatinine    Protime-INR [[id number]]  (Normal) Collected: 12/31/21 1446    Lab Status: Final result Specimen: Blood from Arm, Left Updated: 12/31/21 1511     Protime 14 2 seconds      INR 1 12    APTT [[id number]]  (Normal) Collected: 12/31/21 1446    Lab Status: Final result Specimen: Blood from Arm, Left Updated: 12/31/21 1511     PTT 33 seconds     CBC and differential [[id number]]  (Abnormal) Collected: 12/31/21 1446    Lab Status: Final result Specimen: Blood from Arm, Left Updated: 12/31/21 1458     WBC 12 53 Thousand/uL      RBC 4 58 Million/uL      Hemoglobin 12 8 g/dL      Hematocrit 40 0 %      MCV 87 fL      MCH 27 9 pg      MCHC 32 0 g/dL      RDW 15 3 %      MPV 13 1 fL      Platelets 437 Thousands/uL      nRBC 0 /100 WBCs      Neutrophils Relative 77 %      Immat GRANS % 0 %      Lymphocytes Relative 13 %      Monocytes Relative 10 %      Eosinophils Relative 0 %      Basophils Relative 0 %      Neutrophils Absolute 9 52 Thousands/µL      Immature Grans Absolute 0 05 Thousand/uL      Lymphocytes Absolute 1 67 Thousands/µL      Monocytes Absolute 1 28 Thousand/µL      Eosinophils Absolute 0 00 Thousand/µL      Basophils Absolute 0 01 Thousands/µL                  XR chest 1 view portable   Final Result by Yue Guerrero MD (12/31 1551)      Development of left lung pneumonia                    Workstation performed: [id number] Procedures  Procedures         ED Course                            Initial Sepsis Screening     Row Name 12/31/21 9406                Is the patient's history suggestive of a new or worsening infection? --        Suspected source of infection pneumonia  -[place]        Are two or more of the following signs & symptoms of infection both present and new to the patient? Yes (Proceed)  Patient is COVID positive as source  -[place]        Indicate SIRS criteria Tachypnea > 20 resp per min;Leukocytosis (WBC > 98795 IJL)  -[place]        If the answer is yes to both questions, suspicion of sepsis is present --        If severe sepsis is present AND tissue hypoperfusion perists in the hour after fluid resuscitation or lactate > 4, the patient meets criteria for SEPTIC SHOCK --        Are any of the following organ dysfunction criteria present within 6 hours of suspected infection and SIRS criteria that are NOT considered to be chronic conditions? --        Organ dysfunction --        Date of presentation of severe sepsis --        Time of presentation of severe sepsis --        Tissue hypoperfusion persists in the hour after crystalloid fluid administration, evidenced, by either: --        Was hypotension present within one hour of the conclusion of crystalloid fluid administration? --        Date of presentation of septic shock --        Time of presentation of septic shock --              User Key  (r) = Recorded By, (t) = Taken By, (c) = Cosigned By    [id number] Name Provider Type    Anaya Estrada MD Physician                              MDM  Number of Diagnoses or Management Options  COVID-19  Hypoxia  Diagnosis management comments: 57-year-old female with cough, shortness of breath, known COVID, new oxygen requirement, CBC and CMP unremarkable, moderately elevated D-dimer and BNP  Patient admitted to medicine service for further management of COVID-19        Disposition  Final diagnoses:   COVID-19   Hypoxia     Time reflects when diagnosis was documented in both MDM as applicable and the Disposition within this note     Time User Action Codes Description Comment    12/31/2021  3:18 PM Curt Rapp Add [U07 1] COVID-19     12/31/2021  3:18 PM Curt Rapp Add [R09 02] Hypoxia       ED Disposition     ED Disposition Condition Date/Time Comment    Admit Stable Fri Dec 31, 2021  3:18 PM Case was discussed with JOSEF and the patient's admission status was agreed to be Admission Status: inpatient status to the service of Dr Al Casper           Follow-up Information    None         Current Discharge Medication List      CONTINUE these medications which have NOT CHANGED    Details   amLODIPine (NORVASC) 10 mg tablet Take 10 mg by mouth daily      ascorbic acid (VITAMIN C) 500 MG tablet Take 500 mg by mouth daily      aspirin 81 MG tablet Take 162 mg by mouth daily      dexamethasone (DECADRON) 6 mg tablet Take 6 mg by mouth in the morning      docusate sodium (COLACE) 100 mg capsule Take 100 mg by mouth 2 (two) times a day      Easy Comfort Lancets MISC USE DAILY ICD 10 CODE E11 9  Qty: 100 each, Refills: 3    Associated Diagnoses: Type 2 diabetes mellitus without complication, without long-term current use of insulin ([facility])      enoxaparin (LOVENOX) 40 mg/0 4 mL Inject 40 mg under the skin      escitalopram (Lexapro) 10 mg tablet Take 1 tablet (10 mg total) by mouth daily  Qty: 90 tablet, Refills: 1    Associated Diagnoses: Current moderate episode of major depressive disorder without prior episode (HCC)      latanoprost (XALATAN) 0 005 % ophthalmic solution Administer 1 drop to both eyes daily at bedtime  Qty: 2 5 mL, Refills: 0    Associated Diagnoses: Glaucoma, unspecified glaucoma type, unspecified laterality      lidocaine (LIDODERM) 5 % Apply 1 patch topically daily Remove & Discard patch within 12 hours or as directed by MD      lisinopril (ZESTRIL) 20 mg tablet Take 1 tablet (20 mg total) by mouth 2 (two) times a day  Qty: 180 tablet, Refills: 3    Associated Diagnoses: Essential hypertension      metFORMIN (GLUCOPHAGE) 1000 MG tablet TAKE 1/2 TABLET BY MOUTH TWICE A DAY WITH MEALS  Qty: 90 tablet, Refills: 3    Associated Diagnoses: Type 2 diabetes mellitus without complication, without long-term current use of insulin ([facility])      Multiple Vitamin (MULTI-VITAMIN DAILY PO) Take by mouth daily      Omega-3 Fatty Acids (OMEGA-3 FISH OIL) 1000 MG CAPS Take 1 capsule by mouth daily      Rhopressa 0 02 % SOLN       simvastatin (ZOCOR) 20 mg tablet Take 1 tablet (20 mg total) by mouth daily  Qty: 90 tablet, Refills: 0    Associated Diagnoses: Mixed hyperlipidemia      sotalol (BETAPACE) 80 mg tablet Take 1 tablet (80 mg total) by mouth 2 (two) times a day  Qty: 180 tablet, Refills: 3    Associated Diagnoses: Atrial fibrillation, unspecified type ([facility])      glucose blood test strip Test twice daily  Qty: 100 each, Refills: 10    Associated Diagnoses: Type 2 diabetes mellitus without complication, without long-term current use of insulin ([facility])      methocarbamol (ROBAXIN) 500 mg tablet Take 500 mg by mouth 4 (four) times a day             No discharge procedures on file      PDMP Review     None          ED Provider  Electronically Signed by           Enoc Guerrier MD  01/01/22 [phone]

## 2022-01-02 LAB
ALBUMIN SERPL BCP-MCNC: 1.9 G/DL (ref 3.5–5)
ALP SERPL-CCNC: 84 U/L (ref 46–116)
ALT SERPL W P-5'-P-CCNC: 19 U/L (ref 12–78)
ANION GAP SERPL CALCULATED.3IONS-SCNC: 10 MMOL/L (ref 4–13)
APTT PPP: 40 SECONDS (ref 23–37)
APTT PPP: >210 SECONDS (ref 23–37)
AST SERPL W P-5'-P-CCNC: 13 U/L (ref 5–45)
BILIRUB SERPL-MCNC: 0.11 MG/DL (ref 0.2–1)
BUN SERPL-MCNC: 50 MG/DL (ref 5–25)
CALCIUM ALBUM COR SERPL-MCNC: 9.7 MG/DL (ref 8.3–10.1)
CALCIUM SERPL-MCNC: 8 MG/DL (ref 8.3–10.1)
CHLORIDE SERPL-SCNC: 112 MMOL/L (ref 100–108)
CO2 SERPL-SCNC: 22 MMOL/L (ref 21–32)
CREAT SERPL-MCNC: 0.79 MG/DL (ref 0.6–1.3)
CRP SERPL QL: 30.3 MG/L
D DIMER PPP FEU-MCNC: 11.55 UG/ML FEU
ERYTHROCYTE [DISTWIDTH] IN BLOOD BY AUTOMATED COUNT: 14.9 % (ref 11.6–15.1)
GFR SERPL CREATININE-BSD FRML MDRD: 68 ML/MIN/1.73SQ M
GLUCOSE SERPL-MCNC: 146 MG/DL (ref 65–140)
GLUCOSE SERPL-MCNC: 165 MG/DL (ref 65–140)
GLUCOSE SERPL-MCNC: 179 MG/DL (ref 65–140)
GLUCOSE SERPL-MCNC: 184 MG/DL (ref 65–140)
GLUCOSE SERPL-MCNC: 221 MG/DL (ref 65–140)
HCT VFR BLD AUTO: 31.1 % (ref 34.8–46.1)
HGB BLD-MCNC: 9.5 G/DL (ref 11.5–15.4)
INR PPP: 1.1 (ref 0.84–1.19)
MAGNESIUM SERPL-MCNC: 1.9 MG/DL (ref 1.6–2.6)
MCH RBC QN AUTO: 27.6 PG (ref 26.8–34.3)
MCHC RBC AUTO-ENTMCNC: 30.5 G/DL (ref 31.4–37.4)
MCV RBC AUTO: 90 FL (ref 82–98)
PLATELET # BLD AUTO: 174 THOUSANDS/UL (ref 149–390)
PMV BLD AUTO: 12.9 FL (ref 8.9–12.7)
POTASSIUM SERPL-SCNC: 4.9 MMOL/L (ref 3.5–5.3)
PROCALCITONIN SERPL-MCNC: 0.07 NG/ML
PROT SERPL-MCNC: 5.4 G/DL (ref 6.4–8.2)
PROTHROMBIN TIME: 13.9 SECONDS (ref 11.6–14.5)
RBC # BLD AUTO: 3.44 MILLION/UL (ref 3.81–5.12)
SODIUM SERPL-SCNC: 144 MMOL/L (ref 136–145)
WBC # BLD AUTO: 6.46 THOUSAND/UL (ref 4.31–10.16)

## 2022-01-02 PROCEDURE — 85730 THROMBOPLASTIN TIME PARTIAL: CPT | Performed by: NURSE PRACTITIONER

## 2022-01-02 PROCEDURE — 93005 ELECTROCARDIOGRAM TRACING: CPT

## 2022-01-02 PROCEDURE — 82948 REAGENT STRIP/BLOOD GLUCOSE: CPT

## 2022-01-02 PROCEDURE — 97167 OT EVAL HIGH COMPLEX 60 MIN: CPT

## 2022-01-02 PROCEDURE — 85379 FIBRIN DEGRADATION QUANT: CPT | Performed by: NURSE PRACTITIONER

## 2022-01-02 PROCEDURE — 84145 PROCALCITONIN (PCT): CPT | Performed by: NURSE PRACTITIONER

## 2022-01-02 PROCEDURE — 85610 PROTHROMBIN TIME: CPT | Performed by: NURSE PRACTITIONER

## 2022-01-02 PROCEDURE — 83735 ASSAY OF MAGNESIUM: CPT | Performed by: NURSE PRACTITIONER

## 2022-01-02 PROCEDURE — 80053 COMPREHEN METABOLIC PANEL: CPT | Performed by: NURSE PRACTITIONER

## 2022-01-02 PROCEDURE — 99232 SBSQ HOSP IP/OBS MODERATE 35: CPT | Performed by: NURSE PRACTITIONER

## 2022-01-02 PROCEDURE — 85027 COMPLETE CBC AUTOMATED: CPT | Performed by: NURSE PRACTITIONER

## 2022-01-02 PROCEDURE — 86140 C-REACTIVE PROTEIN: CPT | Performed by: NURSE PRACTITIONER

## 2022-01-02 RX ORDER — HEPARIN SODIUM 10000 [USP'U]/100ML
3-30 INJECTION, SOLUTION INTRAVENOUS
Status: DISCONTINUED | OUTPATIENT
Start: 2022-01-02 | End: 2022-01-04

## 2022-01-02 RX ORDER — HEPARIN SODIUM 1000 [USP'U]/ML
3400 INJECTION, SOLUTION INTRAVENOUS; SUBCUTANEOUS
Status: DISCONTINUED | OUTPATIENT
Start: 2022-01-02 | End: 2022-01-04

## 2022-01-02 RX ORDER — HEPARIN SODIUM 1000 [USP'U]/ML
6800 INJECTION, SOLUTION INTRAVENOUS; SUBCUTANEOUS
Status: DISCONTINUED | OUTPATIENT
Start: 2022-01-02 | End: 2022-01-04

## 2022-01-02 RX ORDER — HEPARIN SODIUM 1000 [USP'U]/ML
6800 INJECTION, SOLUTION INTRAVENOUS; SUBCUTANEOUS ONCE
Status: COMPLETED | OUTPATIENT
Start: 2022-01-02 | End: 2022-01-02

## 2022-01-02 RX ADMIN — LISINOPRIL 20 MG: 20 TABLET ORAL at 09:49

## 2022-01-02 RX ADMIN — ESCITALOPRAM OXALATE 10 MG: 10 TABLET ORAL at 09:49

## 2022-01-02 RX ADMIN — HEPARIN SODIUM 6800 UNITS: 1000 INJECTION INTRAVENOUS; SUBCUTANEOUS at 12:47

## 2022-01-02 RX ADMIN — HEPARIN SODIUM 18 UNITS/KG/HR: 10000 INJECTION, SOLUTION INTRAVENOUS at 12:47

## 2022-01-02 RX ADMIN — ASPIRIN 81 MG CHEWABLE TABLET 81 MG: 81 TABLET CHEWABLE at 09:46

## 2022-01-02 RX ADMIN — DEXAMETHASONE SODIUM PHOSPHATE 6 MG: 4 INJECTION INTRA-ARTICULAR; INTRALESIONAL; INTRAMUSCULAR; INTRAVENOUS; SOFT TISSUE at 17:31

## 2022-01-02 RX ADMIN — LISINOPRIL 20 MG: 20 TABLET ORAL at 17:32

## 2022-01-02 RX ADMIN — REMDESIVIR 100 MG: 100 INJECTION, POWDER, LYOPHILIZED, FOR SOLUTION INTRAVENOUS at 01:35

## 2022-01-02 RX ADMIN — HEPARIN SODIUM 5000 UNITS: 5000 INJECTION INTRAVENOUS; SUBCUTANEOUS at 05:35

## 2022-01-02 RX ADMIN — REMDESIVIR 100 MG: 100 INJECTION, POWDER, LYOPHILIZED, FOR SOLUTION INTRAVENOUS at 23:44

## 2022-01-02 RX ADMIN — CEFTRIAXONE 1000 MG: 1 INJECTION, SOLUTION INTRAVENOUS at 22:42

## 2022-01-02 RX ADMIN — INSULIN LISPRO 1 UNITS: 100 INJECTION, SOLUTION INTRAVENOUS; SUBCUTANEOUS at 22:42

## 2022-01-02 RX ADMIN — B-COMPLEX W/ C & FOLIC ACID TAB 1 TABLET: TAB at 09:46

## 2022-01-02 RX ADMIN — PANTOPRAZOLE SODIUM 40 MG: 40 TABLET, DELAYED RELEASE ORAL at 05:35

## 2022-01-02 RX ADMIN — INSULIN LISPRO 1 UNITS: 100 INJECTION, SOLUTION INTRAVENOUS; SUBCUTANEOUS at 07:49

## 2022-01-02 RX ADMIN — SOTALOL HYDROCHLORIDE 80 MG: 80 TABLET ORAL at 09:46

## 2022-01-02 RX ADMIN — ATORVASTATIN CALCIUM 40 MG: 40 TABLET, FILM COATED ORAL at 16:32

## 2022-01-02 NOTE — PROGRESS NOTES
Julián 47  Progress Note - Magedgeovanny Rolando 1935, 82 y o  female MRN: [id number]  Unit/Bed#: [address]- Encounter: [id number]  Primary Care Provider: Emely Trevino DO   Date and time admitted to hospital: 12/31/2021  2:01 PM    * Acute respiratory failure with hypoxia [facility])  Assessment & Plan  Noted to be saturating 90% on 5 L at skilled nursing facility and requiring 8 L during transportation to be ER  On presentation, saturating in mid 90s on 10 L of supplemental oxygen  Chest x-ray with evidence of left-sided pneumonia  Oxygenation is improving, will taper as tolerated; currently at 1 L nasal cannula with O2 sats 97% at rest   Noted D-dimer to be elevated at 11 55 on 01/02, started heparin drip as per COVID protocol  Bilateral lower extremity venous duplex ordered, follow-up on results  May consider CTA of chest  · Continue supplemental oxygen  · Incentive spirometry  · Monitor respiratory status    Pneumonia due to COVID-19 virus  Assessment & Plan  Presented with increasing shortness of breath, cough, hypoxia  Moderate disease based on oxygen  10 L requirement on presentation  CXR - evidence of left-sided infiltrate  SARS-CoV-2 PCR positive-12/28, subsequently received monoclonal sotrovimab 12/29  Clinically improving, now saturating adequately on 1 L  · Medication treatment started per COVID protocol:  · Dexamethasone 6 mg IV daily for 10 days  · Remdesivir 200 mg x 1 followed by 100 mg daily for 4 days  · Patient had been on heparin subQ, D-dimer elevated 11 55, starting heparin drip as per COVID protocol  · Repeat CBC and monitor closely for bleeding      · Atorvastatin  · Initially started on IV cefepime,transitioned to ceftriaxone given positive pneumococcal antigen  · Trend inflammatory markers, CRP  · Troponin-negative, proBNP -elevated  · Daily CBC, CMP  · Trend procalcitonin  · Increase activity and mobilization as tolerated  · PT/OT as needed      Results from last 7 days   Lab Units 12/28/21  0000   SARS-COV-2  Positive*     Results from last 7 days   Lab Units 01/01/22  0516 12/31/21  1446   CRP mg/L 97 7*  --    D-DIMER QUANTITATIVE ug/ml FEU 1 51* 1 98*                 Atrial fibrillation (HCC)  Assessment & Plan  Continue aspirin  On sotalol, bradycardia noted at rest   Decrease sotalol to daily  EKG in a m  Previously declined anticoagulation due to allergy/intolerance to anticoagulants    Type 2 diabetes mellitus without complication, without long-term current use of insulin [facility])  Assessment & Plan  Lab Results   Component Value Date    HGBA1C 6 0 (H) 11/24/2021       Recent Labs     12/31/21  2155 01/01/22  0742 01/01/22  1148 01/01/22  1636   POCGLU 259* 163* 161* 208*       Blood Sugar Average: Last 72 hrs:      Hold metformin  Hyperglycemia secondary to steroids  Monitor p o  Intake  Monitor on sliding scale      Essential hypertension  Assessment & Plan  Continue lisinopril with holding parameters    Multiple sclerosis (HCC)  Assessment & Plan  PT/OT, follow-up on recommendations    Mixed hyperlipidemia  Assessment & Plan  Substitute home simvastatin to Lipitor 40 mg q h s  History of breast cancer  Assessment & Plan  Supportive care          VTE Pharmacologic Prophylaxis:   High Risk (Score >/= 5) - Pharmacological DVT Prophylaxis Ordered: heparin drip  Sequential Compression Devices Ordered  Patient Centered Rounds: I performed bedside rounds with nursing staff today  Discussions with Specialists or Other Care Team Provider:  Attending    Education and Discussions with Family / Patient: Updated  (daughter) via phone  Time Spent for Care: 30 minutes  More than 50% of total time spent on counseling and coordination of care as described above      Current Length of Stay: 2 day(s)  Current Patient Status: Inpatient   Certification Statement: The patient will continue to require additional inpatient hospital stay due to Elevated D-dimer, heparin drip, venous Doppler of lower extremities, repeat labs in a m  Discharge Plan: Anticipate discharge in 48-72 hrs to discharge location to be determined pending rehab evaluations  Code Status: Level 3 - DNAR and DNI    Subjective:   Patient seen sitting up in bed resting comfortably, sleeping initially, awakens when spoken to  Reports that she does feel very tired overall  States that she has been sleeping a lot  Fair appetite no nausea or vomiting    Objective:     Vitals:   Temp (24hrs), Av 8 °F (36 6 °C), Min:97 7 °F (36 5 °C), Max:98 °F (36 7 °C)    Temp:  [97 7 °F (36 5 °C)-98 °F (36 7 °C)] 97 7 °F (36 5 °C)  HR:  [29-62] 62  Resp:  [16-20] 20  BP: (118-139)/(55-89) 119/58  SpO2:  [96 %-97 %] 97 %  Body mass index is 30 91 kg/m²  Input and Output Summary (last 24 hours):   No intake or output data in the 24 hours ending 22 1136    Physical Exam:   Physical Exam  Vitals and nursing note reviewed  Constitutional:       Comments: Frail, chronically ill-appearing elderly female resting quietly in bed  HENT:      Head: Normocephalic  Nose: Nose normal       Mouth/Throat:      Mouth: Mucous membranes are moist    Eyes:      Extraocular Movements: Extraocular movements intact  Conjunctiva/sclera: Conjunctivae normal       Pupils: Pupils are equal, round, and reactive to light  Cardiovascular:      Rate and Rhythm: Rhythm irregular  Heart sounds: Murmur heard  Pulmonary:      Effort: Pulmonary effort is normal       Comments: Diminished bilaterally  Abdominal:      General: Bowel sounds are normal  There is no distension  Palpations: Abdomen is soft  Tenderness: There is no abdominal tenderness  Genitourinary:     Comments: Voiding spontaneously  Musculoskeletal:      Cervical back: Normal range of motion  Comments: Patient reports history of MS, left side weaker   Skin:     General: Skin is warm and dry        Capillary Refill: Capillary refill takes less than 2 seconds  Coloration: Skin is pale  Neurological:      General: No focal deficit present  Mental Status: She is oriented to person, place, and time  Psychiatric:         Mood and Affect: Mood normal          Behavior: Behavior normal          Thought Content: Thought content normal          Judgment: Judgment normal          Additional Data:     Labs:  Results from last 7 days   Lab Units 01/02/22  0555 01/01/22  0515 01/01/22  0515   WBC Thousand/uL 6 46   < > 7 76   HEMOGLOBIN g/dL 9 5*   < > 12 4   HEMATOCRIT % 31 1*   < > 40 5   PLATELETS Thousands/uL 174   < > 204   NEUTROS PCT %  --   --  79*   LYMPHS PCT %  --   --  14   MONOS PCT %  --   --  7   EOS PCT %  --   --  0    < > = values in this interval not displayed  Results from last 7 days   Lab Units 01/02/22  0555   SODIUM mmol/L 144   POTASSIUM mmol/L 4 9   CHLORIDE mmol/L 112*   CO2 mmol/L 22   BUN mg/dL 50*   CREATININE mg/dL 0 79   ANION GAP mmol/L 10   CALCIUM mg/dL 8 0*   ALBUMIN g/dL 1 9*   TOTAL BILIRUBIN mg/dL 0 11*   ALK PHOS U/L 84   ALT U/L 19   AST U/L 13   GLUCOSE RANDOM mg/dL 184*     Results from last 7 days   Lab Units 12/31/21  1446   INR  1 12     Results from last 7 days   Lab Units 01/02/22  0757 01/01/22  2140 01/01/22  1636 01/01/22  1148 01/01/22  0742 12/31/21  2155   POC GLUCOSE mg/dl 179* 288* 208* 161* 163* 259*         Results from last 7 days   Lab Units 12/31/21  1730 12/31/21  1446   LACTIC ACID mmol/L 1 0 2 5*   PROCALCITONIN ng/ml  --  0 19       Lines/Drains:  Invasive Devices  Report    Peripheral Intravenous Line            Long-Dwell Peripheral IV (Midline) 92/54/43 Left Cephalic <1 day          Drain            External Urinary Catheter 38 days                      Imaging: No pertinent imaging reviewed  Recent Cultures (last 7 days):   Results from last 7 days   Lab Units 01/01/22  0044 12/31/21  2057 12/31/21  1446 12/31/21  1430   BLOOD CULTURE   --   --  No Growth at 24 hrs   No Growth at 24 hrs  GRAM STAIN RESULT  No Epithelial cells seen*  No polys seen*  1+ Gram positive cocci in pairs*  Rare Gram positive cocci in clusters*  --   --   --    LEGIONELLA URINARY ANTIGEN   --  Negative  --   --        Last 24 Hours Medication List:   Current Facility-Administered Medications   Medication Dose Route Frequency Provider Last Rate    aspirin  81 mg Oral Daily Luanne Moser MD      atorvastatin  40 mg Oral Daily With Leland Coleman MD      cefTRIAXone  1,000 mg Intravenous Q24H Luanne Moser  mL/hr at 01/02/22 0030    dexamethasone  6 mg Intravenous Q24H Luanne Moser MD      escitalopram  10 mg Oral Daily Luanne Moser MD      heparin (porcine)  3-30 Units/kg/hr (Order-Specific) Intravenous Titrated JAMISON Noble      heparin (porcine)  3,400 Units Intravenous Q1H PRN JAMISON Noble      heparin (porcine)  6,800 Units Intravenous Once JAMISON Noble      heparin (porcine)  6,800 Units Intravenous Q1H PRN JAMISON Noble      insulin lispro  1-5 Units Subcutaneous TID AC Luanne Moser MD      insulin lispro  1-5 Units Subcutaneous HS Luanne Moser MD      lisinopril  20 mg Oral BID Luanne Moser MD      multivitamin stress formula  1 tablet Oral Daily Luanne Moser MD      pantoprazole  40 mg Oral Early Morning Luanne Moser MD      remdesivir  100 mg Intravenous Q24H Luanne Moser MD      sotalol  80 mg Oral Daily Luanne Moser MD          Today, Patient Was Seen By: JAMISON Noble    **Please Note: This note may have been constructed using a voice recognition system  **

## 2022-01-02 NOTE — OCCUPATIONAL THERAPY NOTE
OT EVALUATION       01/02/22 1135   Note Type   Note type Evaluation   Restrictions/Precautions   Other Precautions Chair Alarm; Bed Alarm;Contact/isolation; Airborne/isolation; Fall Risk   Pain Assessment   Pain Assessment Tool 0-10   Pain Score No Pain   Home Living   Type of Home SNF  ([facility])   Home Equipment Mechanical lift   Additional Comments per transfer form pt is a nazario lift transfer and total assist for toileting, can feed self    Prior Function   Level of [place] Needs assistance with ADLs and functional mobility; Needs assistance with IADLs  (non-ambulatory )   Lives With Facility staff   Receives Help From Personal care attendant   ADL Assistance Needs assistance   IADLs Needs assistance   ADL   Eating Assistance 4  Minimal Assistance   Eating Deficit Setup   Grooming Assistance 4  Minimal Assistance   Grooming Deficit Setup   UB Bathing Assistance 3  Moderate Assistance   LB Bathing Assistance 1  Total Assistance   UB Dressing Assistance 2  Maximal Assistance   LB Dressing Assistance 1  Total Assistance   Toileting Assistance  1  Total Assistance   Bed Mobility   Rolling R 2  Maximal assistance   Rolling L 2  Maximal assistance   Transfers   Sit to Stand   (pt uses a nazario lift for transfers at baseline )   Activity Tolerance   Activity Tolerance Patient limited by fatigue   Nurse Made Aware yes, Brian Small   RUE Assessment   RUE Assessment WFL   LUE Assessment   LUE Assessment WFL   Cognition   Overall Cognitive Status WFL   Arousal/Participation Cooperative   Attention Attends with cues to redirect   Orientation Level Oriented to person;Oriented to place   Following Commands Follows one step commands with increased time or repetition   Assessment   Limitation Decreased ADL status; Decreased UE strength;Decreased endurance   Prognosis Fair   Assessment Patient evaluated by Occupational Therapy  Patient admitted with Acute respiratory failure with hypoxia ([facility] [address] )    The patients occupational profile, medical and therapy history includes a extensive additional review of physical, cognitive, or psychosocial history related to current functional performance  Comorbidities affecting functional mobility and ADLS include: cancer, diabetes, glaucoma, hypertension and Multiple Sclerosis  Prior to admission, patient was dependent for mobility, requiring assist for ADLS, requiring assist for IADLS and a resident of long term care  The evaluation identifies the following performance deficits: weakness, decreased endurance, decreased ADLS, decreased activity tolerance, decreased safety awareness, impaired judgement and decreased strength, that result in activity limitations and/or participation restrictions  This evaluation requires clinical decision making of high complexity, because the patient presents with comorbidites that affect occupational performance and required significant modification of tasks or assistance with consideration of multiple treatment options  The Barthel Index was used as a functional outcome tool presenting with a score of 5, indicating marked limitations of functional mobility and ADLS  The patient's raw score on the -PAC Daily Activity inpatient short form is 11, standardized score is 29 04, less than 39 4  Patients at this level are likely to benefit from DC to post-acute rehabilitation services  Please refer to the recommendation of the Occupational Therapist for safe DC planning  Patient will benefit from skilled Occupational Therapy services to address above deficits and facilitate a safe return to prior level of function  PT evaluation deferred as pt is dependent for mobility and transfers at baseline  No skilled PT needs at this time  OT to follow for ADLS and bed mobility      Goals   Patient Goals to get stronger    STG Time Frame   (1-7 days)   Short Term Goal  Goals established to promote patient goal of to get stronger:   Patient will increase bed mobility to mod assist in preparation for ADLS and transfers; Patient will tolerate 10 minutes of UE ROM/strengthening to increase general activity tolerance and performance in ADLS/IADLS; Patient will improve functional activity tolerance to 10 minutes of sustained functional tasks to increase participation in basic self-care and decrease assistance level  LTG Time Frame   (8-14 days)   Long Term Goal Patient will increase bed mobility to min assist in preparation for ADLS and transfers; Patient will tolerate 20 minutes of UE ROM/strengthening to increase general activity tolerance and performance in ADLS/IADLS; Patient will improve functional activity tolerance to 20 minutes of sustained functional tasks to increase participation in basic self-care and decrease assistance level  Pt will score >/= 15/24 on AM-PAC Daily Activity Inpatient scale to promote safe independence with ADLs; Pt will score >/= 35/100 on Barthel Index in order to decrease caregiver assistance needed and increase ability to perform ADLs  ADL Goals   Pt Will Perform Eating   (STG supervision LTG Independent )   Pt Will Perform Grooming   (STG supervision LTG Independent )   Pt Will Perform Bathing   (STG min assist LTG supervision )   Plan   Treatment Interventions ADL retraining;UE strengthening/ROM; Endurance training;Patient/family training;Equipment evaluation/education; Activityengagement; Compensatory technique education   Goal Expiration Date 01/16/22   OT Frequency 2-3x/wk   Recommendation   OT Discharge Recommendation Return to facility with rehabilitation services   AM-[facility] Daily Activity Inpatient   Lower Body Dressing 1   Bathing 1   Toileting 1   Upper Body Dressing 2   Grooming 3   Eating 3   Daily Activity Raw Score 11   Daily Activity Standardized Score (Calc for Raw Score >=11) 29 04   AM-[facility] Applied Cognition Inpatient   Following a Speech/Presentation 4   Understanding Ordinary Conversation 4   Taking Medications 4   Remembering Where Things Are Placed or Put Away 4   Remembering List of 4-5 Errands 3   Taking Care of Complicated Tasks 3   Applied Cognition Raw Score 22   Applied Cognition Standardized Score 47 83   Barthel Index   Feeding 5   Bathing 0   Grooming Score 0   Dressing Score 0   Bladder Score 0   Bowels Score 0   Toilet Use Score 0   Transfers (Bed/Chair) Score 0   Mobility (Level Surface) Score 0   Stairs Score 0   Barthel Index Score 5   Licensure   [place] License Number  Claudette Ruiz Fausto 89 OTR/L [id number]

## 2022-01-02 NOTE — NURSING NOTE
Patient HR in the 30's but asymptomatic  Hospitalist notified of changes in patient status  Orders for telemetry and EKG for verification of HR rate received  Telemetry showed patient rhythm to be sinus bradycardia with bigeminy PVC's and heart rate ranging from 30's to 90  EKG showed patient to be in sinus rhythm with PVC'S and HR of 65  Hospitalist notified of results of EKG and telemetry with no further orders received

## 2022-01-02 NOTE — PHYSICAL THERAPY NOTE
01/02/22 1136   Note Type   Note type Screen   Additional Comments patient dependent for transfers with nazario lift and nonambulatory prior to admission, therefore no skilled PT needs at this time and patient to be followed by IOANA Montes License Number  Alison Madison [id number]

## 2022-01-03 ENCOUNTER — APPOINTMENT (INPATIENT)
Dept: RADIOLOGY | Facility: HOSPITAL | Age: 87
DRG: 177 | End: 2022-01-03
Payer: MEDICARE

## 2022-01-03 LAB
ALBUMIN SERPL BCP-MCNC: 2.2 G/DL (ref 3.5–5)
ALP SERPL-CCNC: 87 U/L (ref 46–116)
ALT SERPL W P-5'-P-CCNC: 26 U/L (ref 12–78)
ANION GAP SERPL CALCULATED.3IONS-SCNC: 11 MMOL/L (ref 4–13)
APTT PPP: >210 SECONDS (ref 23–37)
APTT PPP: >210 SECONDS (ref 23–37)
AST SERPL W P-5'-P-CCNC: 14 U/L (ref 5–45)
BACTERIA SPT RESP CULT: ABNORMAL
BILIRUB SERPL-MCNC: 0.12 MG/DL (ref 0.2–1)
BUN SERPL-MCNC: 38 MG/DL (ref 5–25)
CALCIUM ALBUM COR SERPL-MCNC: 9.4 MG/DL (ref 8.3–10.1)
CALCIUM SERPL-MCNC: 8 MG/DL (ref 8.3–10.1)
CHLORIDE SERPL-SCNC: 108 MMOL/L (ref 100–108)
CO2 SERPL-SCNC: 23 MMOL/L (ref 21–32)
CREAT SERPL-MCNC: 0.61 MG/DL (ref 0.6–1.3)
CRP SERPL QL: 16.4 MG/L
D DIMER PPP FEU-MCNC: 0.79 UG/ML FEU
ERYTHROCYTE [DISTWIDTH] IN BLOOD BY AUTOMATED COUNT: 15 % (ref 11.6–15.1)
GFR SERPL CREATININE-BSD FRML MDRD: 83 ML/MIN/1.73SQ M
GLUCOSE SERPL-MCNC: 116 MG/DL (ref 65–140)
GLUCOSE SERPL-MCNC: 190 MG/DL (ref 65–140)
GLUCOSE SERPL-MCNC: 197 MG/DL (ref 65–140)
GLUCOSE SERPL-MCNC: 211 MG/DL (ref 65–140)
GLUCOSE SERPL-MCNC: 221 MG/DL (ref 65–140)
GRAM STN SPEC: ABNORMAL
HCT VFR BLD AUTO: 34.3 % (ref 34.8–46.1)
HGB BLD-MCNC: 10.7 G/DL (ref 11.5–15.4)
MAGNESIUM SERPL-MCNC: 1.9 MG/DL (ref 1.6–2.6)
MCH RBC QN AUTO: 27.8 PG (ref 26.8–34.3)
MCHC RBC AUTO-ENTMCNC: 31.2 G/DL (ref 31.4–37.4)
MCV RBC AUTO: 89 FL (ref 82–98)
PLATELET # BLD AUTO: 225 THOUSANDS/UL (ref 149–390)
PMV BLD AUTO: 13.2 FL (ref 8.9–12.7)
POTASSIUM SERPL-SCNC: 4 MMOL/L (ref 3.5–5.3)
PROT SERPL-MCNC: 5.5 G/DL (ref 6.4–8.2)
RBC # BLD AUTO: 3.85 MILLION/UL (ref 3.81–5.12)
SODIUM SERPL-SCNC: 142 MMOL/L (ref 136–145)
WBC # BLD AUTO: 7.18 THOUSAND/UL (ref 4.31–10.16)

## 2022-01-03 PROCEDURE — G1004 CDSM NDSC: HCPCS

## 2022-01-03 PROCEDURE — 71275 CT ANGIOGRAPHY CHEST: CPT

## 2022-01-03 PROCEDURE — 93970 EXTREMITY STUDY: CPT

## 2022-01-03 PROCEDURE — 86140 C-REACTIVE PROTEIN: CPT | Performed by: NURSE PRACTITIONER

## 2022-01-03 PROCEDURE — 83735 ASSAY OF MAGNESIUM: CPT | Performed by: NURSE PRACTITIONER

## 2022-01-03 PROCEDURE — 85730 THROMBOPLASTIN TIME PARTIAL: CPT | Performed by: NURSE PRACTITIONER

## 2022-01-03 PROCEDURE — 82948 REAGENT STRIP/BLOOD GLUCOSE: CPT

## 2022-01-03 PROCEDURE — 99232 SBSQ HOSP IP/OBS MODERATE 35: CPT | Performed by: NURSE PRACTITIONER

## 2022-01-03 PROCEDURE — 93970 EXTREMITY STUDY: CPT | Performed by: SURGERY

## 2022-01-03 PROCEDURE — 80053 COMPREHEN METABOLIC PANEL: CPT | Performed by: NURSE PRACTITIONER

## 2022-01-03 PROCEDURE — 85379 FIBRIN DEGRADATION QUANT: CPT | Performed by: NURSE PRACTITIONER

## 2022-01-03 PROCEDURE — 85027 COMPLETE CBC AUTOMATED: CPT | Performed by: NURSE PRACTITIONER

## 2022-01-03 RX ORDER — INSULIN GLARGINE 100 [IU]/ML
6 INJECTION, SOLUTION SUBCUTANEOUS
Status: DISCONTINUED | OUTPATIENT
Start: 2022-01-03 | End: 2022-01-05

## 2022-01-03 RX ORDER — GUAIFENESIN 600 MG
600 TABLET, EXTENDED RELEASE 12 HR ORAL EVERY 12 HOURS SCHEDULED
Status: DISCONTINUED | OUTPATIENT
Start: 2022-01-03 | End: 2022-01-09 | Stop reason: HOSPADM

## 2022-01-03 RX ADMIN — REMDESIVIR 100 MG: 100 INJECTION, POWDER, LYOPHILIZED, FOR SOLUTION INTRAVENOUS at 23:25

## 2022-01-03 RX ADMIN — LISINOPRIL 20 MG: 20 TABLET ORAL at 08:47

## 2022-01-03 RX ADMIN — INSULIN LISPRO 1 UNITS: 100 INJECTION, SOLUTION INTRAVENOUS; SUBCUTANEOUS at 21:09

## 2022-01-03 RX ADMIN — INSULIN LISPRO 1 UNITS: 100 INJECTION, SOLUTION INTRAVENOUS; SUBCUTANEOUS at 12:24

## 2022-01-03 RX ADMIN — CEFTRIAXONE 1000 MG: 1 INJECTION, SOLUTION INTRAVENOUS at 21:07

## 2022-01-03 RX ADMIN — HEPARIN SODIUM 15 UNITS/KG/HR: 10000 INJECTION, SOLUTION INTRAVENOUS at 05:05

## 2022-01-03 RX ADMIN — SOTALOL HYDROCHLORIDE 80 MG: 80 TABLET ORAL at 08:47

## 2022-01-03 RX ADMIN — ESCITALOPRAM OXALATE 10 MG: 10 TABLET ORAL at 08:43

## 2022-01-03 RX ADMIN — PANTOPRAZOLE SODIUM 40 MG: 40 TABLET, DELAYED RELEASE ORAL at 05:05

## 2022-01-03 RX ADMIN — INSULIN GLARGINE 6 UNITS: 100 INJECTION, SOLUTION SUBCUTANEOUS at 21:08

## 2022-01-03 RX ADMIN — IOHEXOL 85 ML: 350 INJECTION, SOLUTION INTRAVENOUS at 16:32

## 2022-01-03 RX ADMIN — LISINOPRIL 20 MG: 20 TABLET ORAL at 17:41

## 2022-01-03 RX ADMIN — INSULIN LISPRO 1 UNITS: 100 INJECTION, SOLUTION INTRAVENOUS; SUBCUTANEOUS at 08:45

## 2022-01-03 RX ADMIN — ASPIRIN 81 MG CHEWABLE TABLET 81 MG: 81 TABLET CHEWABLE at 08:43

## 2022-01-03 RX ADMIN — ATORVASTATIN CALCIUM 40 MG: 40 TABLET, FILM COATED ORAL at 17:41

## 2022-01-03 RX ADMIN — DEXAMETHASONE SODIUM PHOSPHATE 6 MG: 4 INJECTION INTRA-ARTICULAR; INTRALESIONAL; INTRAMUSCULAR; INTRAVENOUS; SOFT TISSUE at 17:41

## 2022-01-03 RX ADMIN — GUAIFENESIN 600 MG: 600 TABLET, EXTENDED RELEASE ORAL at 21:08

## 2022-01-03 RX ADMIN — B-COMPLEX W/ C & FOLIC ACID TAB 1 TABLET: TAB at 08:43

## 2022-01-03 NOTE — ASSESSMENT & PLAN NOTE
Continue aspirin  On sotalol, bradycardia noted at rest   Decrease sotalol to daily  Check EKG in a m    Telemetry sinus rhythm with frequent PVCs, heart rate 40s to 50s when asleep at night  Previously declined anticoagulation due to allergy/intolerance to anticoagulants  Optimize electrolytes

## 2022-01-03 NOTE — ASSESSMENT & PLAN NOTE
Lab Results   Component Value Date    HGBA1C 6 0 (H) 11/24/2021       Recent Labs     01/02/22  1651 01/02/22  2241 01/03/22  0719 01/03/22  1117   POCGLU 146* 221* 190* 211*       Blood Sugar Average: Last 72 hrs:  Hold metformin  Hyperglycemia secondary to steroids  Monitor p o   Intake  Monitor on sliding scale  Start low-dose Lantus due to hyperglycemia

## 2022-01-03 NOTE — PLAN OF CARE
Problem: MOBILITY - ADULT  Goal: Maintain or return to baseline ADL function  Description: INTERVENTIONS:  -  Assess patient's ability to carry out ADLs; assess patient's baseline for ADL function and identify physical deficits which impact ability to perform ADLs (bathing, care of mouth/teeth, toileting, grooming, dressing, etc )  - Assess/evaluate cause of self-care deficits   - Assess range of motion  - Assess patient's mobility; develop plan if impaired  - Assess patient's need for assistive devices and provide as appropriate  - Encourage maximum independence but intervene and supervise when necessary  - Involve family in performance of ADLs  - Assess for home care needs following discharge   - Consider OT consult to assist with ADL evaluation and planning for discharge  - Provide patient education as appropriate  Outcome: Progressing  Goal: Maintains/Returns to pre admission functional level  Description: INTERVENTIONS:  - Perform BMAT or MOVE assessment daily    - Set and communicate daily mobility goal to care team and patient/family/caregiver  - Collaborate with rehabilitation services on mobility goals if consulted  - Perform Range of Motion 2 times a day  - Reposition patient every 2 hours    - Dangle patient 1 times a day  - Stand patient 0 times a day  - Ambulate patient 0 times a day  - Out of bed to chair 2 times a day   - Out of bed for meals 2 times a day  - Out of bed for toileting  - Record patient progress and toleration of activity level   Outcome: Progressing     Problem: Prexisting or High Potential for Compromised Skin Integrity  Goal: Skin integrity is maintained or improved  Description: INTERVENTIONS:  - Identify patients at risk for skin breakdown  - Assess and monitor skin integrity  - Assess and monitor nutrition and hydration status  - Monitor labs   - Assess for incontinence   - Turn and reposition patient  - Assist with mobility/ambulation  - Relieve pressure over bony prominences  - Avoid friction and shearing  - Provide appropriate hygiene as needed including keeping skin clean and dry  - Evaluate need for skin moisturizer/barrier cream  - Collaborate with interdisciplinary team   - Patient/family teaching  - Consider wound care consult   Outcome: Progressing     Problem: Potential for Falls  Goal: Patient will remain free of falls  Description: INTERVENTIONS:  - Educate patient/family on patient safety including physical limitations  - Instruct patient to call for assistance with activity   - Consult OT/PT to assist with strengthening/mobility   - Keep Call bell within reach  - Keep bed low and locked with side rails adjusted as appropriate  - Keep care items and personal belongings within reach  - Initiate and maintain comfort rounds  - Make Fall Risk Sign visible to staff  - Offer Toileting every 2 Hours, in advance of need  - Initiate/Maintain bed alarm  - Obtain necessary fall risk management equipment: none  - Apply yellow socks and bracelet for high fall risk patients  - Consider moving patient to room near nurses station  Outcome: Progressing

## 2022-01-03 NOTE — ASSESSMENT & PLAN NOTE
Noted to be saturating 90% on 5 L at skilled nursing facility and requiring 8 L during transportation to be in ER  On presentation, saturating in mid 90s on 10 L of supplemental oxygen  Chest x-ray with evidence of left-sided pneumonia  Oxygenation is improving, will taper as tolerated; currently at 1 L nasal cannula with O2 sats high 90s at rest   Noted D-dimer to be elevated at 11 55 on 01/02, started heparin drip as per COVID protocol  Bilateral lower extremity venous duplex ordered, follow-up on results    Will order CTA of chest to rule out PE  · Continue supplemental oxygen  · Incentive spirometry  · Monitor respiratory status  · COVID treatment as below

## 2022-01-03 NOTE — PLAN OF CARE
Problem: MOBILITY - ADULT  Goal: Maintain or return to baseline ADL function  Description: INTERVENTIONS:  -  Assess patient's ability to carry out ADLs; assess patient's baseline for ADL function and identify physical deficits which impact ability to perform ADLs (bathing, care of mouth/teeth, toileting, grooming, dressing, etc )  - Assess/evaluate cause of self-care deficits   - Assess range of motion  - Assess patient's mobility; develop plan if impaired  - Assess patient's need for assistive devices and provide as appropriate  - Encourage maximum independence but intervene and supervise when necessary  - Involve family in performance of ADLs  - Assess for home care needs following discharge   - Consider OT consult to assist with ADL evaluation and planning for discharge  - Provide patient education as appropriate  Outcome: Progressing  Goal: Maintains/Returns to pre admission functional level  Description: INTERVENTIONS:  - Perform BMAT or MOVE assessment daily    - Set and communicate daily mobility goal to care team and patient/family/caregiver  - Collaborate with rehabilitation services on mobility goals if consulted  - Perform Range of Motion 2 times a day  - Reposition patient every 2 hours    - Dangle patient 2 times a day  - Stand patient 2 times a day  - Ambulate patient 2 times a day  - Out of bed to chair 2 times a day   - Out of bed for meals 2 times a day  - Out of bed for toileting  - Record patient progress and toleration of activity level   Outcome: Progressing     Problem: Prexisting or High Potential for Compromised Skin Integrity  Goal: Skin integrity is maintained or improved  Description: INTERVENTIONS:  - Identify patients at risk for skin breakdown  - Assess and monitor skin integrity  - Assess and monitor nutrition and hydration status  - Monitor labs   - Assess for incontinence   - Turn and reposition patient  - Assist with mobility/ambulation  - Relieve pressure over bony prominences  - Avoid friction and shearing  - Provide appropriate hygiene as needed including keeping skin clean and dry  - Evaluate need for skin moisturizer/barrier cream  - Collaborate with interdisciplinary team   - Patient/family teaching  - Consider wound care consult   Outcome: Progressing     Problem: Potential for Falls  Goal: Patient will remain free of falls  Description: INTERVENTIONS:  - Educate patient/family on patient safety including physical limitations  - Instruct patient to call for assistance with activity   - Consult OT/PT to assist with strengthening/mobility   - Keep Call bell within reach  - Keep bed low and locked with side rails adjusted as appropriate  - Keep care items and personal belongings within reach  - Initiate and maintain comfort rounds  - Make Fall Risk Sign visible to staff  - Offer Toileting every 2 Hours, in advance of need  - Initiate/Maintain bed alarm  - Obtain necessary fall risk management equipment  - Apply yellow socks and bracelet for high fall risk patients  - Consider moving patient to room near nurses station  Outcome: Progressing     Problem: PAIN - ADULT  Goal: Verbalizes/displays adequate comfort level or baseline comfort level  Description: Interventions:  - Encourage patient to monitor pain and request assistance  - Assess pain using appropriate pain scale  - Administer analgesics based on type and severity of pain and evaluate response  - Implement non-pharmacological measures as appropriate and evaluate response  - Consider cultural and social influences on pain and pain management  - Notify physician/advanced practitioner if interventions unsuccessful or patient reports new pain  Outcome: Progressing     Problem: INFECTION - ADULT  Goal: Absence or prevention of progression during hospitalization  Description: INTERVENTIONS:  - Assess and monitor for signs and symptoms of infection  - Monitor lab/diagnostic results  - Monitor all insertion sites, i e  indwelling lines, tubes, and drains  - Monitor endotracheal if appropriate and nasal secretions for changes in amount and color  - Monument appropriate cooling/warming therapies per order  - Administer medications as ordered  - Instruct and encourage patient and family to use good hand hygiene technique  - Identify and instruct in appropriate isolation precautions for identified infection/condition  Outcome: Progressing     Problem: DISCHARGE PLANNING  Goal: Discharge to home or other facility with appropriate resources  Description: INTERVENTIONS:  - Identify barriers to discharge w/patient and caregiver  - Arrange for needed discharge resources and transportation as appropriate  - Identify discharge learning needs (meds, wound care, etc )  - Arrange for interpretive services to assist at discharge as needed  - Refer to Case Management Department for coordinating discharge planning if the patient needs post-hospital services based on physician/advanced practitioner order or complex needs related to functional status, cognitive ability, or social support system  Outcome: Progressing     Problem: Knowledge Deficit  Goal: Patient/family/caregiver demonstrates understanding of disease process, treatment plan, medications, and discharge instructions  Description: Complete learning assessment and assess knowledge base    Interventions:  - Provide teaching at level of understanding  - Provide teaching via preferred learning methods  Outcome: Progressing

## 2022-01-03 NOTE — PROGRESS NOTES
Pastoral Care Progress Note    1/3/2022  Patient: Julia Houston : 1935  Admission Date & Time: 2021 1401  MRN: [id number] CSN: [id number]                     Chaplaincy Interventions Utilized:   Relationship Building: Cultivated a relationship of care and support  Patient was feeling sad and dejected  She said that her MS has been gaining on her and taking away the little ability she has to walk even a little  Having COVID has made matters worse for her  She is also grieving the loss of her  of 72 years who took care of her  He  last March  She believes in and prays to God and says everything is in God's hands       Ritual: Provided prayer     22 1500   Clinical Encounter Type   Visited With Patient   Routine Visit Introduction   Referral From Nurse   Referral To [facility] [address] Needs Prayer

## 2022-01-03 NOTE — PROGRESS NOTES
Julián 47  Progress Note - Jo Check 1935, 82 y o  female MRN: [id number]  Unit/Bed#: [address] [facility]- Encounter: [id number]  Primary Care Provider: Korin Aggarwal DO   Date and time admitted to hospital: 12/31/2021  2:01 PM    * Acute respiratory failure with hypoxia [facility])  Assessment & Plan  Noted to be saturating 90% on 5 L at skilled nursing facility and requiring 8 L during transportation to be in ER  On presentation, saturating in mid 90s on 10 L of supplemental oxygen  Chest x-ray with evidence of left-sided pneumonia  Oxygenation is improving, will taper as tolerated; currently at 1 L nasal cannula with O2 sats high 90s at rest   Noted D-dimer to be elevated at 11 55 on 01/02, started heparin drip as per COVID protocol  Bilateral lower extremity venous duplex ordered, follow-up on results  Will order CTA of chest to rule out PE  · Continue supplemental oxygen  · Incentive spirometry  · Monitor respiratory status  · COVID treatment as below    Pneumonia due to COVID-19 virus  Assessment & Plan  Pneumonia due to COVID-19 with superimposed pneumococcal pneumonia  Presented with increasing shortness of breath, cough, hypoxia  Moderate disease based on oxygen  10 L requirement on presentation  CXR - evidence of left-sided infiltrate  SARS-CoV-2 PCR positive-12/28, subsequently received monoclonal sotrovimab 12/29  Clinically improving, now saturating adequately on 1 L  · Medication treatment started per COVID protocol:  · Dexamethasone 6 mg IV daily for 10 days  · Remdesivir 200 mg x 1 followed by 100 mg daily for 4 days  · Patient had been on heparin subQ, D-dimer elevated 11 55, started heparin drip as per COVID protocol  · Repeat CBC and monitor closely for bleeding      · Continue Atorvastatin  · Initially started on IV cefepime,transitioned to ceftriaxone given positive pneumococcal antigen  · Trend inflammatory markers, CRP, D-dimer trending down  · Troponin-negative, proBNP -elevated   · Trend procalcitonin, negative x2  · Sputum culture showed mixed respiratory johnny  · Increase activity and mobilization as tolerated  · Incentive spirometer  · Check CMP, CBC, Mag, CRP, D-dimer in the morning      Results from last 7 days   Lab Units 12/28/21  0000   SARS-COV-2  Positive*     Results from last 7 days   Lab Units 01/03/22  0525 01/02/22  0555 01/01/22  0516 12/31/21  1446   CRP mg/L 16 4* 30 3* 97 7*  --    D-DIMER QUANTITATIVE ug/ml FEU 0 79* 11 55* 1 51* 1 98*      Results from last 7 days   Lab Units 01/02/22  0557 12/31/21  1446   PROCALCITONIN ng/ml 0 07 0 19            Atrial fibrillation (HCC)  Assessment & Plan  Continue aspirin  On sotalol, bradycardia noted at rest   Decrease sotalol to daily  Check EKG in a m  Telemetry sinus rhythm with frequent PVCs, heart rate 40s to 50s when asleep at night  Previously declined anticoagulation due to allergy/intolerance to anticoagulants  Optimize electrolytes    History of breast cancer  Assessment & Plan  Supportive care    Type 2 diabetes mellitus without complication, without long-term current use of insulin [facility])  Assessment & Plan  Lab Results   Component Value Date    HGBA1C 6 0 (H) 11/24/2021       Recent Labs     01/02/22  1651 01/02/22  2241 01/03/22  0719 01/03/22  1117   POCGLU 146* 221* 190* 211*       Blood Sugar Average: Last 72 hrs:  Hold metformin  Hyperglycemia secondary to steroids  Monitor p o  Intake  Monitor on sliding scale  Start low-dose Lantus due to hyperglycemia    Essential hypertension  Assessment & Plan  Continue lisinopril with holding parameters  BP stable    Multiple sclerosis (HCC)  Assessment & Plan  PT/OT, follow-up on recommendations    Mixed hyperlipidemia  Assessment & Plan  Substitute home simvastatin to Lipitor 40 mg q h s          VTE Pharmacologic Prophylaxis:   Pharmacologic: Heparin Drip  Mechanical VTE Prophylaxis in Place: Yes    Patient Centered Rounds: I have performed bedside rounds with nursing staff today  Discussions with Specialists or Other Care Team Provider:  Yes    Education and Discussions with Family / Patient:  Yes    Time Spent for Care: 20 minutes  More than 50% of total time spent on counseling and coordination of care as described above  Current Length of Stay: 3 day(s)    Current Patient Status: Inpatient   Certification Statement: The patient will continue to require additional inpatient hospital stay due to COVID-19 pneumonia and pneumococcal pneumonia, acute respiratory failure    Discharge Plan:  Return to nursing home once medically cleared in about 48 hours    Code Status: Level 3 - DNAR and DNI      Subjective:   Patient reports mild SOB, cough with phlegm and sometimes blood tinged phlegm  Denies chest pain, headache, dizziness, nausea vomiting diarrhea, constipation, fever, chills  Objective:     Vitals:   Temp (24hrs), Av 8 °F (36 6 °C), Min:97 6 °F (36 4 °C), Max:98 °F (36 7 °C)    Temp:  [97 6 °F (36 4 °C)-98 °F (36 7 °C)] 97 6 °F (36 4 °C)  HR:  [] 65  Resp:  [13-20] 16  BP: (136-143)/(63-67) 142/64  SpO2:  [96 %-98 %] 98 %  Body mass index is 30 12 kg/m²  Input and Output Summary (last 24 hours): Intake/Output Summary (Last 24 hours) at 1/3/2022 1510  Last data filed at 1/3/2022 0500  Gross per 24 hour   Intake 830 ml   Output 1500 ml   Net -670 ml       Physical Exam:     Physical Exam  Vitals and nursing note reviewed  Constitutional:       Appearance: She is well-developed  She is obese  HENT:      Head: Normocephalic and atraumatic  Neck:      Thyroid: No thyromegaly  Vascular: No JVD  Trachea: No tracheal deviation  Cardiovascular:      Rate and Rhythm: Normal rate and regular rhythm  Comments: Telemetry shows sinus rhythm, with frequent PVC  Heart rate 40 to 50s overnight when asleep  Pulmonary:      Effort: Pulmonary effort is normal  No respiratory distress        Breath sounds: Normal breath sounds  No wheezing or rales  Comments: On oxygen 1 L, satting high 90s  Abdominal:      General: There is no distension  Palpations: Abdomen is soft  Tenderness: There is no abdominal tenderness  There is no guarding  Musculoskeletal:         General: Swelling present  Cervical back: Neck supple  Comments: Left lower extremity is bigger than right lower extremity, mild left pedal edema  Ecchymosis/slight swelling to left AC  Skin:     General: Skin is warm and dry  Neurological:      General: No focal deficit present  Mental Status: She is alert and oriented to person, place, and time  Psychiatric:         Mood and Affect: Mood normal          Judgment: Judgment normal          Additional Data:     Labs:    Results from last 7 days   Lab Units 01/03/22  0525 01/02/22  0555 01/01/22  0515   WBC Thousand/uL 7 18   < > 7 76   HEMOGLOBIN g/dL 10 7*   < > 12 4   HEMATOCRIT % 34 3*   < > 40 5   PLATELETS Thousands/uL 225   < > 204   NEUTROS PCT %  --   --  79*   LYMPHS PCT %  --   --  14   MONOS PCT %  --   --  7   EOS PCT %  --   --  0    < > = values in this interval not displayed  Results from last 7 days   Lab Units 01/03/22  0525   POTASSIUM mmol/L 4 0   CHLORIDE mmol/L 108   CO2 mmol/L 23   BUN mg/dL 38*   CREATININE mg/dL 0 61   CALCIUM mg/dL 8 0*   ALK PHOS U/L 87   ALT U/L 26   AST U/L 14     Results from last 7 days   Lab Units 01/02/22  1135   INR  1 10       * I Have Reviewed All Lab Data Listed Above  * Additional Pertinent Lab Tests Reviewed: Kelly 68 Admission Reviewed    Imaging:    Imaging Reports Reviewed Today Include:  Chest x-ray  Imaging Personally Reviewed by Myself Includes:  None    Recent Cultures (last 7 days):     Results from last 7 days   Lab Units 01/01/22  0044 12/31/21  2057 12/31/21  1446 12/31/21  1430   BLOOD CULTURE   --   --  No Growth at 48 hrs  No Growth at 48 hrs     SPUTUM CULTURE  2+ Growth of   --   --   --    Farrukh Rivera STAIN RESULT  No Epithelial cells seen*  No polys seen*  1+ Gram positive cocci in pairs*  Rare Gram positive cocci in clusters*  --   --   --    LEGIONELLA URINARY ANTIGEN   --  Negative  --   --        Last 24 Hours Medication List:   Current Facility-Administered Medications   Medication Dose Route Frequency Provider Last Rate    aspirin  81 mg Oral Daily Luanne Moser MD      atorvastatin  40 mg Oral Daily With Leland Coleman MD      cefTRIAXone  1,000 mg Intravenous Q24H Luanne Moser MD 1,000 mg (01/02/22 2242)    dexamethasone  6 mg Intravenous Q24H Luanne Moser MD      escitalopram  10 mg Oral Daily Luanne Moser MD      guaiFENesin  600 mg Oral Q12H [facility] JAMISON Nair      heparin (porcine)  3-30 Units/kg/hr (Order-Specific) Intravenous Titrated JAMISON Nbole 12 Units/kg/hr (01/03/22 0849)    heparin (porcine)  3,400 Units Intravenous Q1H PRN JAMISON Noble      heparin (porcine)  6,800 Units Intravenous Q1H PRN JAMISON Noble      insulin glargine  6 Units Subcutaneous HS JAMISON Nair      insulin lispro  1-5 Units Subcutaneous TID AC Luanen Moser MD      insulin lispro  1-5 Units Subcutaneous HS Luanne Moser MD      lisinopril  20 mg Oral BID Luanne Moser MD      multivitamin stress formula  1 tablet Oral Daily Luanne Moser MD      pantoprazole  40 mg Oral Early Morning Luanne Moser MD      remdesivir  100 mg Intravenous Q24H Luanne Moser MD      sotalol  80 mg Oral Daily Luanne Moser MD          Today, Patient Was Seen By: JAMISON Hemphill    ** Please Note: Dragon 360 Dictation voice to text software may have been used in the creation of this document   **

## 2022-01-04 LAB
ALBUMIN SERPL BCP-MCNC: 2.2 G/DL (ref 3.5–5)
ALP SERPL-CCNC: 81 U/L (ref 46–116)
ALT SERPL W P-5'-P-CCNC: 28 U/L (ref 12–78)
ANION GAP SERPL CALCULATED.3IONS-SCNC: 7 MMOL/L (ref 4–13)
APTT PPP: >210 SECONDS (ref 23–37)
AST SERPL W P-5'-P-CCNC: 18 U/L (ref 5–45)
ATRIAL RATE: 65 BPM
ATRIAL RATE: 68 BPM
ATRIAL RATE: 69 BPM
BILIRUB SERPL-MCNC: 0.25 MG/DL (ref 0.2–1)
BUN SERPL-MCNC: 33 MG/DL (ref 5–25)
CALCIUM ALBUM COR SERPL-MCNC: 9.2 MG/DL (ref 8.3–10.1)
CALCIUM SERPL-MCNC: 7.8 MG/DL (ref 8.3–10.1)
CHLORIDE SERPL-SCNC: 108 MMOL/L (ref 100–108)
CO2 SERPL-SCNC: 25 MMOL/L (ref 21–32)
CREAT SERPL-MCNC: 0.6 MG/DL (ref 0.6–1.3)
CRP SERPL QL: 8.3 MG/L
ERYTHROCYTE [DISTWIDTH] IN BLOOD BY AUTOMATED COUNT: 15 % (ref 11.6–15.1)
GFR SERPL CREATININE-BSD FRML MDRD: 83 ML/MIN/1.73SQ M
GLUCOSE SERPL-MCNC: 144 MG/DL (ref 65–140)
GLUCOSE SERPL-MCNC: 162 MG/DL (ref 65–140)
GLUCOSE SERPL-MCNC: 172 MG/DL (ref 65–140)
GLUCOSE SERPL-MCNC: 175 MG/DL (ref 65–140)
GLUCOSE SERPL-MCNC: 249 MG/DL (ref 65–140)
HCT VFR BLD AUTO: 34.1 % (ref 34.8–46.1)
HGB BLD-MCNC: 10.7 G/DL (ref 11.5–15.4)
MAGNESIUM SERPL-MCNC: 1.8 MG/DL (ref 1.6–2.6)
MCH RBC QN AUTO: 27.9 PG (ref 26.8–34.3)
MCHC RBC AUTO-ENTMCNC: 31.4 G/DL (ref 31.4–37.4)
MCV RBC AUTO: 89 FL (ref 82–98)
P AXIS: 5 DEGREES
P AXIS: 68 DEGREES
P AXIS: 96 DEGREES
PLATELET # BLD AUTO: 257 THOUSANDS/UL (ref 149–390)
PMV BLD AUTO: 12.1 FL (ref 8.9–12.7)
POTASSIUM SERPL-SCNC: 4.2 MMOL/L (ref 3.5–5.3)
PR INTERVAL: 168 MS
PR INTERVAL: 170 MS
PR INTERVAL: 172 MS
PROCALCITONIN SERPL-MCNC: 0.09 NG/ML
PROT SERPL-MCNC: 5.4 G/DL (ref 6.4–8.2)
QRS AXIS: 18 DEGREES
QRS AXIS: 195 DEGREES
QRS AXIS: 23 DEGREES
QRSD INTERVAL: 68 MS
QRSD INTERVAL: 72 MS
QRSD INTERVAL: 80 MS
QT INTERVAL: 430 MS
QT INTERVAL: 432 MS
QT INTERVAL: 458 MS
QTC INTERVAL: 459 MS
QTC INTERVAL: 460 MS
QTC INTERVAL: 476 MS
RBC # BLD AUTO: 3.84 MILLION/UL (ref 3.81–5.12)
SODIUM SERPL-SCNC: 140 MMOL/L (ref 136–145)
T WAVE AXIS: 152 DEGREES
T WAVE AXIS: 28 DEGREES
T WAVE AXIS: 35 DEGREES
VENTRICULAR RATE: 65 BPM
VENTRICULAR RATE: 68 BPM
VENTRICULAR RATE: 69 BPM
WBC # BLD AUTO: 9.47 THOUSAND/UL (ref 4.31–10.16)

## 2022-01-04 PROCEDURE — 80053 COMPREHEN METABOLIC PANEL: CPT | Performed by: NURSE PRACTITIONER

## 2022-01-04 PROCEDURE — 82948 REAGENT STRIP/BLOOD GLUCOSE: CPT

## 2022-01-04 PROCEDURE — 84145 PROCALCITONIN (PCT): CPT | Performed by: NURSE PRACTITIONER

## 2022-01-04 PROCEDURE — 85730 THROMBOPLASTIN TIME PARTIAL: CPT | Performed by: NURSE PRACTITIONER

## 2022-01-04 PROCEDURE — 85027 COMPLETE CBC AUTOMATED: CPT | Performed by: NURSE PRACTITIONER

## 2022-01-04 PROCEDURE — 99223 1ST HOSP IP/OBS HIGH 75: CPT | Performed by: INTERNAL MEDICINE

## 2022-01-04 PROCEDURE — 86140 C-REACTIVE PROTEIN: CPT | Performed by: NURSE PRACTITIONER

## 2022-01-04 PROCEDURE — 83735 ASSAY OF MAGNESIUM: CPT | Performed by: NURSE PRACTITIONER

## 2022-01-04 PROCEDURE — 93010 ELECTROCARDIOGRAM REPORT: CPT | Performed by: INTERNAL MEDICINE

## 2022-01-04 PROCEDURE — 99232 SBSQ HOSP IP/OBS MODERATE 35: CPT | Performed by: NURSE PRACTITIONER

## 2022-01-04 RX ORDER — BISACODYL 10 MG
10 SUPPOSITORY, RECTAL RECTAL ONCE
Status: COMPLETED | OUTPATIENT
Start: 2022-01-04 | End: 2022-01-04

## 2022-01-04 RX ORDER — DOCUSATE SODIUM 100 MG/1
100 CAPSULE, LIQUID FILLED ORAL 2 TIMES DAILY
Status: DISCONTINUED | OUTPATIENT
Start: 2022-01-04 | End: 2022-01-07

## 2022-01-04 RX ORDER — SOTALOL HYDROCHLORIDE 80 MG/1
80 TABLET ORAL EVERY 12 HOURS SCHEDULED
Status: DISCONTINUED | OUTPATIENT
Start: 2022-01-04 | End: 2022-01-09 | Stop reason: HOSPADM

## 2022-01-04 RX ORDER — AMLODIPINE BESYLATE 5 MG/1
5 TABLET ORAL DAILY
Status: DISCONTINUED | OUTPATIENT
Start: 2022-01-04 | End: 2022-01-09 | Stop reason: HOSPADM

## 2022-01-04 RX ORDER — MAGNESIUM SULFATE 1 G/100ML
1 INJECTION INTRAVENOUS ONCE
Status: COMPLETED | OUTPATIENT
Start: 2022-01-04 | End: 2022-01-04

## 2022-01-04 RX ORDER — SENNOSIDES 8.6 MG
2 TABLET ORAL
Status: DISCONTINUED | OUTPATIENT
Start: 2022-01-04 | End: 2022-01-07

## 2022-01-04 RX ORDER — POLYETHYLENE GLYCOL 3350 17 G/17G
17 POWDER, FOR SOLUTION ORAL DAILY PRN
Status: DISCONTINUED | OUTPATIENT
Start: 2022-01-04 | End: 2022-01-09 | Stop reason: HOSPADM

## 2022-01-04 RX ORDER — SACCHAROMYCES BOULARDII 250 MG
250 CAPSULE ORAL 2 TIMES DAILY
Status: DISCONTINUED | OUTPATIENT
Start: 2022-01-04 | End: 2022-01-09 | Stop reason: HOSPADM

## 2022-01-04 RX ADMIN — INSULIN GLARGINE 6 UNITS: 100 INJECTION, SOLUTION SUBCUTANEOUS at 22:40

## 2022-01-04 RX ADMIN — ESCITALOPRAM OXALATE 10 MG: 10 TABLET ORAL at 09:11

## 2022-01-04 RX ADMIN — DEXAMETHASONE SODIUM PHOSPHATE 6 MG: 4 INJECTION INTRA-ARTICULAR; INTRALESIONAL; INTRAMUSCULAR; INTRAVENOUS; SOFT TISSUE at 18:06

## 2022-01-04 RX ADMIN — SENNOSIDES 17.2 MG: 8.6 TABLET, FILM COATED ORAL at 21:03

## 2022-01-04 RX ADMIN — AMLODIPINE BESYLATE 5 MG: 5 TABLET ORAL at 18:05

## 2022-01-04 RX ADMIN — ASPIRIN 81 MG CHEWABLE TABLET 81 MG: 81 TABLET CHEWABLE at 09:11

## 2022-01-04 RX ADMIN — BISACODYL 10 MG: 10 SUPPOSITORY RECTAL at 11:48

## 2022-01-04 RX ADMIN — INSULIN LISPRO 1 UNITS: 100 INJECTION, SOLUTION INTRAVENOUS; SUBCUTANEOUS at 11:49

## 2022-01-04 RX ADMIN — PANTOPRAZOLE SODIUM 40 MG: 40 TABLET, DELAYED RELEASE ORAL at 06:23

## 2022-01-04 RX ADMIN — ENOXAPARIN SODIUM 30 MG: 30 INJECTION SUBCUTANEOUS at 20:48

## 2022-01-04 RX ADMIN — MAGNESIUM SULFATE HEPTAHYDRATE 1 G: 1 INJECTION, SOLUTION INTRAVENOUS at 15:29

## 2022-01-04 RX ADMIN — B-COMPLEX W/ C & FOLIC ACID TAB 1 TABLET: TAB at 09:11

## 2022-01-04 RX ADMIN — LISINOPRIL 20 MG: 20 TABLET ORAL at 09:11

## 2022-01-04 RX ADMIN — REMDESIVIR 100 MG: 100 INJECTION, POWDER, LYOPHILIZED, FOR SOLUTION INTRAVENOUS at 22:41

## 2022-01-04 RX ADMIN — Medication 250 MG: at 18:05

## 2022-01-04 RX ADMIN — DOCUSATE SODIUM 100 MG: 100 CAPSULE ORAL at 11:48

## 2022-01-04 RX ADMIN — SOTALOL HYDROCHLORIDE 80 MG: 80 TABLET ORAL at 20:48

## 2022-01-04 RX ADMIN — SOTALOL HYDROCHLORIDE 80 MG: 80 TABLET ORAL at 09:11

## 2022-01-04 RX ADMIN — GUAIFENESIN 600 MG: 600 TABLET, EXTENDED RELEASE ORAL at 09:11

## 2022-01-04 RX ADMIN — INSULIN LISPRO 2 UNITS: 100 INJECTION, SOLUTION INTRAVENOUS; SUBCUTANEOUS at 22:40

## 2022-01-04 RX ADMIN — ATORVASTATIN CALCIUM 40 MG: 40 TABLET, FILM COATED ORAL at 18:05

## 2022-01-04 RX ADMIN — LISINOPRIL 20 MG: 20 TABLET ORAL at 18:06

## 2022-01-04 RX ADMIN — CEFTRIAXONE 1000 MG: 1 INJECTION, SOLUTION INTRAVENOUS at 20:48

## 2022-01-04 RX ADMIN — INSULIN LISPRO 1 UNITS: 100 INJECTION, SOLUTION INTRAVENOUS; SUBCUTANEOUS at 09:11

## 2022-01-04 RX ADMIN — DOCUSATE SODIUM 100 MG: 100 CAPSULE ORAL at 18:19

## 2022-01-04 RX ADMIN — GUAIFENESIN 600 MG: 600 TABLET, EXTENDED RELEASE ORAL at 20:49

## 2022-01-04 NOTE — PLAN OF CARE
Problem: MOBILITY - ADULT  Goal: Maintain or return to baseline ADL function  Description: INTERVENTIONS:  -  Assess patient's ability to carry out ADLs; assess patient's baseline for ADL function and identify physical deficits which impact ability to perform ADLs (bathing, care of mouth/teeth, toileting, grooming, dressing, etc )  - Assess/evaluate cause of self-care deficits   - Assess range of motion  - Assess patient's mobility; develop plan if impaired  - Assess patient's need for assistive devices and provide as appropriate  - Encourage maximum independence but intervene and supervise when necessary  - Involve family in performance of ADLs  - Assess for home care needs following discharge   - Consider OT consult to assist with ADL evaluation and planning for discharge  - Provide patient education as appropriate  Outcome: Progressing  Goal: Maintains/Returns to pre admission functional level  Description: INTERVENTIONS:  - Perform BMAT or MOVE assessment daily    - Set and communicate daily mobility goal to care team and patient/family/caregiver  - Collaborate with rehabilitation services on mobility goals if consulted  - Perform Range of Motion 2 times a day  - Reposition patient every 2 hours    - Dangle patient 3 times a day  - Stand patient 3 times a day  - Ambulate patient 3 times a day  - Out of bed to chair 3 times a day   - Out of bed for meals 3 times a day  - Out of bed for toileting  - Record patient progress and toleration of activity level   Outcome: Progressing

## 2022-01-04 NOTE — PROGRESS NOTES
Julián 47  Progress Note - Neelam Quinn 1935, 82 y o  female MRN: [id number]  Unit/Bed#: [address] [facility]- Encounter: [id number]  Primary Care Provider: Nilda Clemente DO   Date and time admitted to hospital: 12/31/2021  2:01 PM    * Acute respiratory failure with hypoxia [facility])  Assessment & Plan  Noted to be saturating 90% on 5 L at skilled nursing facility and requiring 8 L during transportation to be in ER  On presentation, saturating in mid 90s on 10 L of supplemental oxygen  Weaned to RA,satting mid 90s  Chest x-ray with evidence of left-sided pneumonia  Noted D-dimer to be elevated at 11 55 on 01/02, started heparin drip as per COVID protocol  Repeat D-dimer 1/3 0 79  Query accuracy of  D dimer value on 1/2  Bilateral lower extremity venous duplex negative for DVT  CTA of chest no PE  Patient with worsening hemoptysis this morning  D-dimer pending today  Will discontinue heparin drip  Transitioning to Lovenox 30mg subQ every 12 hours per protocol  Continue Incentive spirometry  · Monitor respiratory status  · COVID treatment as below    Pneumonia due to COVID-19 virus  Assessment & Plan  Pneumonia due to COVID-19 with superimposed pneumococcal pneumonia  Presented with increasing shortness of breath, cough, hypoxia  Moderate disease based on oxygen 10 L requirement on presentation  CXR - evidence of left-sided infiltrate  SARS-CoV-2 PCR positive-12/28, subsequently received monoclonal sotrovimab 12/29  CTA chest showed - Consolidation and near complete collapse of the left lower lobe  Patchy groundglass opacities in the left upper lobe  Right lung clear  Clinically improving, now on room air  Coughing up bloody sputum/small clot this morning  Medication treatment started per COVID protocol:  · Dexamethasone 6 mg IV daily for 10 days  · Remdesivir 200 mg x 1 followed by 100 mg daily for 4 days  · Discontinue heparin drip, change to Lovenox 30 subQ b i d   As above  · Continue Atorvastatin  · Initially started on IV cefepime,transitioned to ceftriaxone given positive pneumococcal antigen  · Trend inflammatory markers, CRP, D-dimer trending down  · Troponin-negative, proBNP -elevated   · Trend procalcitonin, negative x2  Repeat pending today  · Sputum culture showed mixed respiratory johnny  · Increase activity and mobilization as tolerated  · Incentive spirometer  · Check CMP, CBC, Mag, CRP, D-dimer in the morning          Results from last 7 days   Lab Units 01/04/22  0905 01/03/22  0525 01/02/22  0555 01/01/22  0516 12/31/21  1446   CRP mg/L 8 3* 16 4* 30 3* 97 7*  --    D-DIMER QUANTITATIVE ug/ml FEU  --  0 79* 11 55* 1 51* 1 98*      Results from last 7 days   Lab Units 01/02/22  0557 12/31/21  1446   PROCALCITONIN ng/ml 0 07 0 19            Atrial fibrillation (HCC)  Assessment & Plan  Continue aspirin  On sotalol, bradycardia noted at rest   Decreased sotalol to daily  Telemetry sinus rhythm, frequent PVCs, bigeminy today  No evidence of bradycardia overnight last night  Check EKG today  Previously declined anticoagulation due to allergy/intolerance to anticoagulants  Optimize electrolytes  Potassium 4 2, Mag 1 8  Will order Mag sulfate 1 g x 1 today  Curbside discussion with Cardiology in view of bigeminy, recommend for back on sotalol 80mg p o  B i d   Will continue telemetry today  Repeat electrolytes in the morning  History of breast cancer  Assessment & Plan  Supportive care    Type 2 diabetes mellitus without complication, without long-term current use of insulin [facility])  Assessment & Plan  Lab Results   Component Value Date    HGBA1C 6 0 (H) 11/24/2021       Recent Labs     01/03/22  1706 01/03/22 2004 01/04/22  0705 01/04/22  1126   POCGLU 116 221* 175* 172*       Blood Sugar Average: Last 72 hrs:  Hold metformin  Hyperglycemia secondary to steroids  Monitor p o   Intake  Monitor on sliding scale  Started low-dose Lantus due to hyperglycemia    Essential hypertension  Assessment & Plan  Continue lisinopril with holding parameters  BP labile  Monitor    Multiple sclerosis (HCC)  Assessment & Plan  PT/OT, follow-up on recommendations  Patient is wheelchair-bound baseline  Mixed hyperlipidemia  Assessment & Plan  Substitute home simvastatin to Lipitor 40 mg q h s  VTE Pharmacologic Prophylaxis:   Pharmacologic: Enoxaparin (Lovenox)  Mechanical VTE Prophylaxis in Place: Yes    Patient Centered Rounds: I have performed bedside rounds with nursing staff today  Discussions with Specialists or Other Care Team Provider:  Pulmonary    Education and Discussions with Family / Patient:  Yes    Time Spent for Care: 20 minutes  More than 50% of total time spent on counseling and coordination of care as described above  Current Length of Stay: 4 day(s)    Current Patient Status: Inpatient   Certification Statement: The patient will continue to require additional inpatient hospital stay due to COVID-19 pneumonia and pneumococcal pneumonia, left lower lobe collapse    Discharge Plan:  Return to nursing once medically cleared in 48-72 hours    Code Status: Level 3 - DNAR and DNI      Subjective:   Patient reports coughing up blood and SOB after coughing  Denies chest pain, headache dizziness, nausea vomiting  Reports constipation  Objective:     Vitals:   Temp (24hrs), Av 9 °F (36 6 °C), Min:97 3 °F (36 3 °C), Max:99 5 °F (37 5 °C)    Temp:  [97 3 °F (36 3 °C)-99 5 °F (37 5 °C)] 97 6 °F (36 4 °C)  HR:  [] 59  Resp:  [18-19] 18  BP: (132-196)/(60-84) 196/84  SpO2:  [96 %-97 %] 96 %  Body mass index is 29 89 kg/m²  Input and Output Summary (last 24 hours):     No intake or output data in the 24 hours ending 22 1231    Physical Exam:     Physical Exam  Vitals and nursing note reviewed  Constitutional:       Appearance: She is well-developed  HENT:      Head: Normocephalic and atraumatic     Neck:      Thyroid: No thyromegaly  Vascular: No JVD  Trachea: No tracheal deviation  Cardiovascular:      Rate and Rhythm: Normal rate and regular rhythm  Heart sounds: Normal heart sounds  Comments: Telemetry sinus rhythm with frequent PVCs, bigeminy  No episodes of bradycardia overnight  Pulmonary:      Effort: No respiratory distress  Breath sounds: No wheezing or rales  Comments: On room air, satting low to mid 90s  Mild tachypneic after coughing  Bloody sputum/small clot seen on tissue during round  Abdominal:      General: There is no distension  Palpations: Abdomen is soft  Tenderness: There is no abdominal tenderness  There is no guarding  Musculoskeletal:         General: Swelling present  Cervical back: Neck supple  Comments: Mild edema left lower extremity which is chronic  Skin:     General: Skin is warm and dry  Neurological:      General: No focal deficit present  Mental Status: She is alert and oriented to person, place, and time  Comments: Follows commands  Moves all extremities but weak  Psychiatric:         Mood and Affect: Mood normal          Judgment: Judgment normal          Additional Data:     Labs:    Results from last 7 days   Lab Units 01/04/22  0905 01/02/22  0555 01/01/22  0515   WBC Thousand/uL 9 47   < > 7 76   HEMOGLOBIN g/dL 10 7*   < > 12 4   HEMATOCRIT % 34 1*   < > 40 5   PLATELETS Thousands/uL 257   < > 204   NEUTROS PCT %  --   --  79*   LYMPHS PCT %  --   --  14   MONOS PCT %  --   --  7   EOS PCT %  --   --  0    < > = values in this interval not displayed  Results from last 7 days   Lab Units 01/04/22  0905   POTASSIUM mmol/L 4 2   CHLORIDE mmol/L 108   CO2 mmol/L 25   BUN mg/dL 33*   CREATININE mg/dL 0 60   CALCIUM mg/dL 7 8*   ALK PHOS U/L 81   ALT U/L 28   AST U/L 18     Results from last 7 days   Lab Units 01/02/22  1135   INR  1 10       * I Have Reviewed All Lab Data Listed Above    * Additional Pertinent Lab Tests Reviewed: Kelly 68 Admission Reviewed    Imaging:    Imaging Reports Reviewed Today Include:  Lower extremity venous Doppler, CTA chest PE study  Imaging Personally Reviewed by Myself Includes:  None    Recent Cultures (last 7 days):     Results from last 7 days   Lab Units 01/01/22  0044 12/31/21 2057 12/31/21  1446 12/31/21  1430   BLOOD CULTURE   --   --  No Growth at 72 hrs  No Growth at 72 hrs     SPUTUM CULTURE  2+ Growth of   --   --   --    GRAM STAIN RESULT  No Epithelial cells seen*  No polys seen*  1+ Gram positive cocci in pairs*  Rare Gram positive cocci in clusters*  --   --   --    LEGIONELLA URINARY ANTIGEN   --  Negative  --   --        Last 24 Hours Medication List:   Current Facility-Administered Medications   Medication Dose Route Frequency Provider Last Rate    aspirin  81 mg Oral Daily Schuyler Browne MD      atorvastatin  40 mg Oral Daily With Parker Carroll MD      cefTRIAXone  1,000 mg Intravenous Q24H Schuyler Browne MD 1,000 mg (01/03/22 2107)    dexamethasone  6 mg Intravenous Q24H Schuyler Browne MD      docusate sodium  100 mg Oral BID JAMISON Nair      enoxaparin  30 mg Subcutaneous Q12H [address] JAMISON Nair      escitalopram  10 mg Oral Daily Schuyler Browne MD      guaiFENesin  600 mg Oral Q12H [address] JAMISON Nair      insulin glargine  6 Units Subcutaneous HS JAMISON Nair      insulin lispro  1-5 Units Subcutaneous TID AC Schuyler Browne MD      insulin lispro  1-5 Units Subcutaneous HS Schuyler Browne MD      lisinopril  20 mg Oral BID Schuyler Browne MD      magnesium sulfate  1 g Intravenous Once JAMISON aNir      multivitamin stress formula  1 tablet Oral Daily Schuyler Browne MD      pantoprazole  40 mg Oral Early Morning Schuyler Browne MD      polyethylene glycol  17 g Oral Daily PRN JAMISON Nair      remdesivir  100 mg Intravenous Q24H Schuyler Browne  mg (01/03/22 0237)   Rhonda Enciso senna  2 tablet Oral HS JAMISON Nair      sotalol  80 mg Oral Q12H Kathryn Panchal, DONNANP          Today, Patient Was Seen By: JAMISON Ortega    ** Please Note: Dragon 360 Dictation voice to text software may have been used in the creation of this document   **

## 2022-01-04 NOTE — CONSULTS
Consultation - Pulmonary Medicine  Gudelia Jenkins 82 y o  female MRN: [id number]  Unit/Bed#: [address] [place] [address] Encounter: [id number]  Code Status: Level 3 - DNAR and DNI    Assessment/Plan:    COVID-19 Pneumonia with Superimposed Bacterial Pneumonia Presumptive for Streptococcal  -Has Mild Severity Range infection /  Currently on RA L NC   Positive COVID 19 Date       Results from last 7 days   Lab Units 01/02/22  0557 12/31/21  1446   PROCALCITONIN ng/ml 0 07 0 19     Results from last 7 days   Lab Units 01/04/22  0905 01/03/22  0525 01/02/22  0555 01/01/22  0516 12/31/21  1446   D-DIMER QUANTITATIVE ug/ml FEU  --  0 79* 11 55* 1 51* 1 98*   CRP mg/L 8 3* 16 4* 30 3* 97 7*  --      -Steroid:  Day # 5 / Dexamethasone  -Remdesivir:  # 4  -Abx #: Day # 5 overall / Day # 4 of Rocephin / continue for total 7 days minimum as symptoms warrant  -VTE PPX: Lovenox 30 mg Q 12 hrs   -Baricitnib: NA  Acute Hypoxemic Respiratory Insuffficiency  -now resolved  -Currently on 0 L NC   -continue continuous oximetry monitoring via [employer] system  Abnormal Chest Imaging:  -CTA on 1/3 w/ LLL consolidation and collapse  -likely d/t MS / hypoventilation / PNA / and mucus plugging  -diffuse B/L GGO c/w clinical COVID 19  -further follow up imaging as needed for changes in oxygen / clinical requirements  -will subsequently need imaging prior to D/C for trending purposes and f/u imaging in OP setting in approx 6-8 weeks pending clinical OP course  -continue I/S and oscillator therapy  Hemoptysis  -Likely 2/2/ PNA  -heparin prior held > safe to resume at this point  Elevated DDimer  -PE effectively ruled out via CTA PE study negative on 1/3/21  -most likely erroneous ddimer outlier / lab error  Results from last 7 days   Lab Units 01/03/22  0525 01/02/22  0555 01/01/22  0516 12/31/21  1446   D-DIMER QUANTITATIVE ug/ml FEU 0 79* 11 55* 1 51* 1 98*   Multiple Sclerosis with Chronic Disability   -patient is wheelchair bound and non mobile w/ lower extremities  -higher risk for VTE   -continue VTE PPx      Thank you for this consultation; we will be happy to follow with you     ______________________________________________________________________      History of Present Illness   Physician Requesting Consult: Min Whitehead DO  Reason for Consult :  COVID 19 / Pneumonia with positive urine strep antigen / Hemoptysis    HX and PE limited by: Poor Historian    HPI:  Mirella Scott is a 82 y o  female  has a past medical history of Abscess of buttock, right, Cancer ([facility] ), Cellulitis of chest wall, Chronic endometritis (10/12/2006), Diabetes mellitus ([facility] ), Dysuria (11/02/2007), Flushing, Glaucoma, Hyperlipidemia, Hypertension, Ingrown nail (01/05/2012), Malignant neoplasm of breast ([facility] ), MS (multiple sclerosis) ([facility] ), Nonvenomous insect bite, Palpitations (02/09/2011), Pressure ulcer of buttock (10/13/2006), Proctitis (05/12/2006), Vaginal polyp (03/14/2006), and Viral gastroenteritis  Presented to the hospital on 12/31/21 with symptoms of  Cough / dyspnea and reports of hypoxemia and admitted with a diagnosis of recent diagnosis of COVID 19 approx 4 days PTA  by means of COVID swab labs and diagnostic testing  Did have LLL PNA on CXR and during transport to ED was said to have required up to 8 L NC 02   Progressive fatigue, malaise and SOB as well as measured low SPO2 propmted patient transport to ED from [facility]  Known additional past medical history of MS w/ chronic disability and wheelchair bound, HTN/HLD  Inpatient treatment: IV abx w/ rocephin therapy  Dexamethasone, remdesivir / heparin therapy  Did develop hemoptysis while in inpatient setting while under treatment therapy for presumed DVT/PE /w heparin and was subsequently stopped  CTA findings w/ L sided alveolar infiltrates c/w bacterial PNA   Pertinent hospital notes reviewed  Pertinent labs reviewed  Pertninent imaging reviewed    Collaborative discussion with Jaya Noriega regarding assessment and plan of ongoing treatment of PNA w/ abx / COVID w/ dexamethasone and remdesivir therapy  Key image thumbnails:  1/3/2022 CTA PE:            July 14 2019      Review of Systems   Constitutional: Negative for activity change, appetite change, chills, fatigue and fever  HENT: Negative for postnasal drip, rhinorrhea, sinus pressure and sinus pain  Eyes: Negative for visual disturbance  Respiratory: Positive for cough and shortness of breath  Negative for apnea, chest tightness, wheezing and stridor  Cardiovascular: Negative for chest pain and leg swelling  Gastrointestinal: Negative for diarrhea, nausea and vomiting  Endocrine: Negative for cold intolerance and heat intolerance  Musculoskeletal: Negative for arthralgias, back pain, joint swelling and myalgias  Skin: Negative for color change  Neurological: Negative for syncope and light-headedness  Hematological: Negative for adenopathy  Psychiatric/Behavioral: Negative for confusion and sleep disturbance         Past Medical/Surgical History  Past Medical History:   Diagnosis Date    Abscess of buttock, right     last assessed-5/4/2017    Cancer [facility])     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-last assessed-10/8/2015    Cellulitis of chest wall     last assessed-7/27/2015    Chronic endometritis 10/12/2006    Diabetes mellitus ([facility] )     Dysuria 11/02/2007    Flushing     resolved-6/30/2015    Glaucoma     Hyperlipidemia     Hypertension     Ingrown nail 01/05/2012    Malignant neoplasm of breast ([facility] )     last assessed-11/5/2015    MS (multiple sclerosis) ([facility] )     Nonvenomous insect bite     last assessed-12/12/2013    Palpitations 02/09/2011    Pressure ulcer of buttock 10/13/2006    Proctitis 05/12/2006    Vaginal polyp 03/14/2006    Viral gastroenteritis     last assessed-9/8/2016     Past Surgical History:   Procedure Laterality Date    BREAST BIOPSY      open    BREAST SURGERY      CERVICAL BIOPSY  W/ LOOP ELECTRODE EXCISION      DILATION AND CURETTAGE OF UTERUS      INCISION AND DRAINAGE OF WOUND Left 2/27/2017    Procedure: INCISION AND DRAINAGE (I&D)   Chest wall x 2;  Surgeon: Marvin Willams MD;  Location: [facility];  Service:     MASTECTOMY      last assessed-6/30/2015       Social History  Social History     Substance and Sexual Activity   Alcohol Use Not Currently    Alcohol/week: 0 0 standard drinks     Social History     Substance and Sexual Activity   Drug Use Not Currently     Social History     Tobacco Use   Smoking Status Former Smoker   Smokeless Tobacco Never Used       Family History  Family History   Problem Relation Age of Onset    Heart disease Father         cardiac disorder    COPD Sister     Diabetes Sister     Lung cancer Mother     Lung cancer Cousin     Heart disease Cousin         cardiac disorder    Multiple sclerosis Cousin     Cancer Cousin     Cancer Daughter         bladder    Breast cancer Maternal Aunt        Allergies  Allergies   Allergen Reactions    Bactrim [Sulfamethoxazole-Trimethoprim] Other (See Comments)     General weakness    Clindamycin      Annotation - [id number]: bad taste in mouth    Coumadin [Warfarin]      Reaction Date: 22May2012;  Annotation - [id number]: lip swelling    Dabigatran     Latex      Annotation - [id number]: RASH    Pradaxa [Dabigatran Etexilate Mesylate]        Home Meds:   Medications Prior to Admission   Medication Sig Dispense Refill Last Dose    amLODIPine (NORVASC) 10 mg tablet Take 10 mg by mouth daily   12/31/2021 at Unknown time    ascorbic acid (VITAMIN C) 500 MG tablet Take 500 mg by mouth daily   12/31/2021 at Unknown time    aspirin 81 MG tablet Take 162 mg by mouth daily   12/31/2021 at Unknown time    dexamethasone (DECADRON) 6 mg tablet Take 6 mg by mouth in the morning       docusate sodium (COLACE) 100 mg capsule Take 100 mg by mouth 2 (two) times a day   12/31/2021 at Unknown time    Easy Comfort Lancets MISC USE DAILY ICD 10 CODE E11 9 100 each 3 12/31/2021 at Unknown time    enoxaparin (LOVENOX) 40 mg/0 4 mL Inject 40 mg under the skin   12/31/2021 at Unknown time    escitalopram (Lexapro) 10 mg tablet Take 1 tablet (10 mg total) by mouth daily 90 tablet 1 12/31/2021 at Unknown time    latanoprost (XALATAN) 0 005 % ophthalmic solution Administer 1 drop to both eyes daily at bedtime 2 5 mL 0 12/31/2021 at Unknown time    lidocaine (LIDODERM) 5 % Apply 1 patch topically daily Remove & Discard patch within 12 hours or as directed by MD   12/31/2021 at Unknown time    lisinopril (ZESTRIL) 20 mg tablet Take 1 tablet (20 mg total) by mouth 2 (two) times a day 180 tablet 3 12/31/2021 at Unknown time    metFORMIN (GLUCOPHAGE) 1000 MG tablet TAKE 1/2 TABLET BY MOUTH TWICE A DAY WITH MEALS   90 tablet 3 12/31/2021 at Unknown time    Multiple Vitamin (MULTI-VITAMIN DAILY PO) Take by mouth daily   12/31/2021 at Unknown time    Omega-3 Fatty Acids (OMEGA-3 FISH OIL) 1000 MG CAPS Take 1 capsule by mouth daily   12/31/2021 at Unknown time    Rhopressa 0 02 % SOLN    12/31/2021 at Unknown time    simvastatin (ZOCOR) 20 mg tablet Take 1 tablet (20 mg total) by mouth daily 90 tablet 0 12/31/2021 at Unknown time    sotalol (BETAPACE) 80 mg tablet Take 1 tablet (80 mg total) by mouth 2 (two) times a day 180 tablet 3 12/31/2021 at Unknown time    glucose blood test strip Test twice daily 100 each 10     methocarbamol (ROBAXIN) 500 mg tablet Take 500 mg by mouth 4 (four) times a day (Patient not taking: Reported on 1/1/2022 )   Not Taking at Unknown time       Current Meds:   Scheduled Meds:  Current Facility-Administered Medications   Medication Dose Route Frequency Provider Last Rate    amLODIPine  5 mg Oral Daily JAMISON Nair      aspirin  81 mg Oral Daily Lynn Crabtree MD      atorvastatin  40 mg Oral Daily With Rosa Gayle MD      cefTRIAXone  1,000 mg Intravenous Q24H Yamila Hernandez MD 1,000 mg (22)    dexamethasone  6 mg Intravenous Q24H Yamila Hernandez MD      docusate sodium  100 mg Oral BID JAMISON Nair      enoxaparin  30 mg Subcutaneous Q12H [facility] JAMISON Nair      escitalopram  10 mg Oral Daily Yamila Hernandez MD      guaiFENesin  600 mg Oral Q12H [facility] JAMISON Nair      insulin glargine  6 Units Subcutaneous HS JAMISON Nair      insulin lispro  1-5 Units Subcutaneous TID AC Yamila Hernandez MD      insulin lispro  1-5 Units Subcutaneous HS Yamila Hernandez MD      lisinopril  20 mg Oral BID Yamila Hernandez MD      magnesium sulfate  1 g Intravenous Once JAMISON Nair      multivitamin stress formula  1 tablet Oral Daily Yamila Hernandez MD      pantoprazole  40 mg Oral Early Morning Yamila Hernandez MD      polyethylene glycol  17 g Oral Daily PRN JAMISON Nair      remdesivir  100 mg Intravenous Q24H Yamila Hernandez  mg (225)    saccharomyces boulardii  250 mg Oral BID JAMISON Nair      senna  2 tablet Oral HS JAMISON Nair      sotalol  80 mg Oral Q12H [facility] JAMISON Nair       PRN Meds:polyethylene glycol, 17 g, Daily PRN      IV Infusions:        ____________________________________________________________________    Objective   Vitals:   Vitals:    22 1930 22 2253 22 0600 22 0740   BP: (!) 174/79 132/60  (!) 196/84   BP Location:    Left arm   Pulse: 102   59   Resp: 19 19  18   Temp: (!) 97 3 °F (36 3 °C) 99 5 °F (37 5 °C)  97 6 °F (36 4 °C)   TempSrc:    Oral   SpO2: 96% 97%  96%   Weight:   84 kg (185 lb 3 oz)    Height:         Weight (last 2 days)     Date/Time Weight    22 06 84 (185 19)    22 06 84 6 (186 6)    22 06 86 9 (191 5)        Temp (24hrs), Av 9 °F (36 6 °C), Min:97 3 °F (36 3 °C), Max:99 5 °F (37 5 °C)  Current: Temperature: 97 6 °F (36 4 °C)        Invasive/non-invasive ventilation settings   Respiratory  Report   Lab Data (Last 4 hours)    None         O2/Vent Data (Last 4 hours)    None                Nutrition:        Diet Orders   (From admission, onward)             Start     Ordered    01/01/22 1841  Diet Garrick/CHO Controlled; Consistent Carbohydrate Diet Level 2 (5 carb servings/75 grams CHO/meal); Cardiac, Potassium 2 GM  Diet effective now        References:    Nutrtion Support Algorithm Enteral vs  Parenteral   Question Answer Comment   Diet Type Garrick/CHO Controlled    Garrick/CHO Controlled Consistent Carbohydrate Diet Level 2 (5 carb servings/75 grams CHO/meal)    Other Restriction(s): Cardiac    Other Restriction(s): Potassium 2 GM    RD to adjust diet per protocol? Yes        01/01/22 1844    01/01/22 1101  Room Service  Once        Question:  Type of Service  Answer:  Room Service - Appropriate with Assistance    01/01/22 1100                Ins/Outs:   I/O       01/02 0701  01/03 0700 01/03 0701  01/04 0700 01/04 0701  01/05 0700    P  O  800  120    I V  (mL/kg) 30 (0 4)      Total Intake(mL/kg) 830 (9 8)  120 (1 4)    Urine (mL/kg/hr) 1500 (0 7)  900 (1 2)    Total Output 1500  900    Net -670  -780                 Lines/Drains:  Invasive Devices  Report    Peripheral Intravenous Line            Long-Dwell Peripheral IV (Midline) 50/73/81 Left Cephalic 2 days          Drain            External Urinary Catheter 1 day              ___________________________________________________________________    Physical Exam  Constitutional:       Appearance: She is well-developed  HENT:      Head: Normocephalic and atraumatic  Eyes:      Conjunctiva/sclera: Conjunctivae normal       Pupils: Pupils are equal, round, and reactive to light  Cardiovascular:      Rate and Rhythm: Normal rate and regular rhythm  Heart sounds: Normal heart sounds  Pulmonary:      Effort: Pulmonary effort is normal  No respiratory distress        Breath sounds: Examination of the right-lower field reveals rhonchi  Examination of the left-lower field reveals rhonchi  Rhonchi present  No wheezing or rales  Comments: 94% RA   Chest:      Chest wall: No tenderness  Abdominal:      General: Bowel sounds are normal       Palpations: Abdomen is soft  Musculoskeletal:         General: Normal range of motion  Cervical back: Normal range of motion and neck supple  Skin:     General: Skin is warm and dry  Neurological:      Mental Status: She is alert and oriented to person, place, and time         ___________________________________________________________________    Laboratory and Diagnostics:  Results from last 7 days   Lab Units 01/04/22  0905 01/03/22  0525 01/02/22  0555 01/01/22  0515 12/31/21  1446   WBC Thousand/uL 9 47 7 18 6 46 7 76 12 53*   HEMOGLOBIN g/dL 10 7* 10 7* 9 5* 12 4 12 8   HEMATOCRIT % 34 1* 34 3* 31 1* 40 5 40 0   PLATELETS Thousands/uL 257 225 174 204 213   NEUTROS PCT %  --   --   --  79* 77*   MONOS PCT %  --   --   --  7 10     Results from last 7 days   Lab Units 01/04/22  0905 01/03/22  0525 01/02/22  0555 01/01/22  0516 12/31/21  1446   SODIUM mmol/L 140 142 144 140 140   POTASSIUM mmol/L 4 2 4 0 4 9 5 0 4 8   CHLORIDE mmol/L 108 108 112* 108 106   CO2 mmol/L 25 23 22 21 21   ANION GAP mmol/L 7 11 10 11 13   BUN mg/dL 33* 38* 50* 48* 48*   CREATININE mg/dL 0 60 0 61 0 79 0 79 1 04   CALCIUM mg/dL 7 8* 8 0* 8 0* 8 1* 8 4   GLUCOSE RANDOM mg/dL 162* 197* 184* 160* 144*   ALT U/L 28 26 19 26 27   AST U/L 18 14 13 14 14   ALK PHOS U/L 81 87 84 107 118*   ALBUMIN g/dL 2 2* 2 2* 1 9* 2 2* 2 2*   TOTAL BILIRUBIN mg/dL 0 25 0 12* 0 11* 0 19* 0 26     Results from last 7 days   Lab Units 01/04/22  0905 01/03/22  0525 01/02/22  0555   MAGNESIUM mg/dL 1 8 1 9 1 9      Results from last 7 days   Lab Units 01/04/22  0037 01/03/22  1541 01/03/22  0525 01/02/22 2011 01/02/22  1135 12/31/21  1446   INR   --   --   --   --  1 10 1 12   PTT seconds >210* >210* >210* >210* 40* 33          Results from last 7 days   Lab Units 12/31/21  1730 12/31/21  1446   LACTIC ACID mmol/L 1 0 2 5*     ABG:    VBG:    Results from last 7 days   Lab Units 01/02/22  0557 12/31/21  1446   PROCALCITONIN ng/ml 0 07 0 19       Micro  Results from last 7 days   Lab Units 01/01/22  0044 12/31/21  2057 12/31/21  2054 12/31/21  1446 12/31/21  1430   BLOOD CULTURE   --   --   --  No Growth at 72 hrs  No Growth at 72 hrs  SPUTUM CULTURE  2+ Growth of   --   --   --   --    GRAM STAIN RESULT  No Epithelial cells seen*  No polys seen*  1+ Gram positive cocci in pairs*  Rare Gram positive cocci in clusters*  --   --   --   --    LEGIONELLA URINARY ANTIGEN   --  Negative  --   --   --    STREP PNEUMONIAE ANTIGEN, URINE   --   --  Positive*  --   --        Imaging:   CTA chest pe study   Final Result by Gloria Guerrier MD (01/03 2107)   Addendum 1 of 1 by Gloria Guerrier MD (01/03 2107)   ADDENDUM:      ADDITIONAL FINDING:  Incidental 2 1 cm peripherally calcified nodule in    the left thyroid lobe identified on this CT  According to guidelines    published in the February 2015 white paper on incidental thyroid nodules    in the Journal of the [employer]    of Radiology Marisela Montemayor), further evaluation with ultrasound may be considered  However, as the majority of incidental thyroid nodules are benign and    because small incidental thyroid malignancies typically demonstrate    indolent behavior, consideration of the    patient's life expectancy and possible comorbidities should be taken into    account prior to a decision to pursue further imaging  Final      1  No evidence of pulmonary embolus  2   Left lower lobe consolidation and collapse  3   Scattered groundglass opacities in the left upper lobe, possibly additional areas of pneumonia  4   Small left pleural effusion                    Workstation performed: [id number]         VAS lower limb venous duplex study, complete bilateral   Final Result by Kenyatta Alejandro MD (01/03 1642)      XR chest 1 view portable   Final Result by Shannan Stone MD (12/31 1551)      Development of left lung pneumonia  Workstation performed: [id number]               Micro:   Lab Results   Component Value Date    BLOODCX No Growth at 72 hrs  12/31/2021    BLOODCX No Growth at 72 hrs  12/31/2021    BLOODCX No Growth After 5 Days  11/23/2021    BLOODCX No Growth After 5 Days   11/23/2021    URINECX 40,000-49,000 cfu/ml  11/23/2021    URINECX >100,000 cfu/ml Pseudomonas aeruginosa 03/11/2017    URINECX No Growth <1000 cfu/mL 02/28/2017    SPUTUMCULTUR 2+ Growth of  01/01/2022    MRSACULTURE  07/14/2019     No Methicillin Resistant Staphlyococcus aureus (MRSA) isolated        ____________________________________________________________________

## 2022-01-04 NOTE — ASSESSMENT & PLAN NOTE
Lab Results   Component Value Date    HGBA1C 6 0 (H) 11/24/2021       Recent Labs     01/03/22  1706 01/03/22  2004 01/04/22  0705 01/04/22  1126   POCGLU 116 221* 175* 172*       Blood Sugar Average: Last 72 hrs:  Hold metformin  Hyperglycemia secondary to steroids  Monitor p o   Intake  Monitor on sliding scale  Started low-dose Lantus due to hyperglycemia

## 2022-01-04 NOTE — PLAN OF CARE
Problem: MOBILITY - ADULT  Goal: Maintain or return to baseline ADL function  Description: INTERVENTIONS:  -  Assess patient's ability to carry out ADLs; assess patient's baseline for ADL function and identify physical deficits which impact ability to perform ADLs (bathing, care of mouth/teeth, toileting, grooming, dressing, etc )  - Assess/evaluate cause of self-care deficits   - Assess range of motion  - Assess patient's mobility; develop plan if impaired  - Assess patient's need for assistive devices and provide as appropriate  - Encourage maximum independence but intervene and supervise when necessary  - Involve family in performance of ADLs  - Assess for home care needs following discharge   - Consider OT consult to assist with ADL evaluation and planning for discharge  - Provide patient education as appropriate  Outcome: Progressing     Problem: Prexisting or High Potential for Compromised Skin Integrity  Goal: Skin integrity is maintained or improved  Description: INTERVENTIONS:  - Identify patients at risk for skin breakdown  - Assess and monitor skin integrity  - Assess and monitor nutrition and hydration status  - Monitor labs   - Assess for incontinence   - Turn and reposition patient  - Assist with mobility/ambulation  - Relieve pressure over bony prominences  - Avoid friction and shearing  - Provide appropriate hygiene as needed including keeping skin clean and dry  - Evaluate need for skin moisturizer/barrier cream  - Collaborate with interdisciplinary team   - Patient/family teaching  - Consider wound care consult   Outcome: Progressing       Problem: PAIN - ADULT  Goal: Verbalizes/displays adequate comfort level or baseline comfort level  Description: Interventions:  - Encourage patient to monitor pain and request assistance  - Assess pain using appropriate pain scale  - Administer analgesics based on type and severity of pain and evaluate response  - Implement non-pharmacological measures as appropriate and evaluate response  - Consider cultural and social influences on pain and pain management  - Notify physician/advanced practitioner if interventions unsuccessful or patient reports new pain  Outcome: Progressing

## 2022-01-04 NOTE — ASSESSMENT & PLAN NOTE
Pneumonia due to COVID-19 with superimposed pneumococcal pneumonia  Presented with increasing shortness of breath, cough, hypoxia  Moderate disease based on oxygen 10 L requirement on presentation  CXR - evidence of left-sided infiltrate  SARS-CoV-2 PCR positive-12/28, subsequently received monoclonal sotrovimab 12/29  CTA chest showed - Consolidation and near complete collapse of the left lower lobe  Patchy groundglass opacities in the left upper lobe  Right lung clear  Clinically improving, now on room air  Coughing up bloody sputum/small clot this morning  Medication treatment started per COVID protocol:  · Dexamethasone 6 mg IV daily for 10 days  · Remdesivir 200 mg x 1 followed by 100 mg daily for 4 days  · Discontinue heparin drip, change to Lovenox 30 subQ b i d  As above  · Continue Atorvastatin  · Initially started on IV cefepime,transitioned to ceftriaxone given positive pneumococcal antigen  · Trend inflammatory markers, CRP, D-dimer trending down  · Troponin-negative, proBNP -elevated   · Trend procalcitonin, negative x2  Repeat pending today    · Sputum culture showed mixed respiratory johnny  · Increase activity and mobilization as tolerated  · Incentive spirometer  · Check CMP, CBC, Mag, CRP, D-dimer in the morning          Results from last 7 days   Lab Units 01/04/22  0905 01/03/22  0525 01/02/22  0555 01/01/22  0516 12/31/21  1446   CRP mg/L 8 3* 16 4* 30 3* 97 7*  --    D-DIMER QUANTITATIVE ug/ml FEU  --  0 79* 11 55* 1 51* 1 98*      Results from last 7 days   Lab Units 01/02/22  0557 12/31/21  1446   PROCALCITONIN ng/ml 0 07 0 19

## 2022-01-04 NOTE — ASSESSMENT & PLAN NOTE
Continue aspirin  On sotalol, bradycardia noted at rest   Decreased sotalol to daily  Telemetry sinus rhythm, frequent PVCs, bigeminy today  No evidence of bradycardia overnight last night  Check EKG today  Previously declined anticoagulation due to allergy/intolerance to anticoagulants  Optimize electrolytes  Potassium 4 2, Mag 1 8  Will order Mag sulfate 1 g x 1 today  Curbside discussion with Cardiology in view of bigeminy, recommend for back on sotalol 80mg p o  B i d   Will continue telemetry today  Repeat electrolytes in the morning

## 2022-01-04 NOTE — ASSESSMENT & PLAN NOTE
Noted to be saturating 90% on 5 L at skilled nursing facility and requiring 8 L during transportation to be in ER  On presentation, saturating in mid 90s on 10 L of supplemental oxygen  Weaned to RA,satting mid 90s  Chest x-ray with evidence of left-sided pneumonia  Noted D-dimer to be elevated at 11 55 on 01/02, started heparin drip as per COVID protocol  Repeat D-dimer 1/3 0 79  Query accuracy of  D dimer value on 1/2  Bilateral lower extremity venous duplex negative for DVT  CTA of chest no PE  Patient with worsening hemoptysis this morning  D-dimer pending today  Will discontinue heparin drip  Transitioning to Lovenox 30mg subQ every 12 hours per protocol    Continue Incentive spirometry  · Monitor respiratory status  · COVID treatment as below

## 2022-01-05 LAB
ALBUMIN SERPL BCP-MCNC: 2.1 G/DL (ref 3.5–5)
ALP SERPL-CCNC: 77 U/L (ref 46–116)
ALT SERPL W P-5'-P-CCNC: 22 U/L (ref 12–78)
ANION GAP SERPL CALCULATED.3IONS-SCNC: 9 MMOL/L (ref 4–13)
AST SERPL W P-5'-P-CCNC: 10 U/L (ref 5–45)
BILIRUB SERPL-MCNC: 0.16 MG/DL (ref 0.2–1)
BUN SERPL-MCNC: 37 MG/DL (ref 5–25)
CALCIUM ALBUM COR SERPL-MCNC: 9.3 MG/DL (ref 8.3–10.1)
CALCIUM SERPL-MCNC: 7.8 MG/DL (ref 8.3–10.1)
CHLORIDE SERPL-SCNC: 107 MMOL/L (ref 100–108)
CO2 SERPL-SCNC: 22 MMOL/L (ref 21–32)
CREAT SERPL-MCNC: 0.63 MG/DL (ref 0.6–1.3)
CRP SERPL QL: 7.9 MG/L
D DIMER PPP FEU-MCNC: 0.43 UG/ML FEU
ERYTHROCYTE [DISTWIDTH] IN BLOOD BY AUTOMATED COUNT: 15.2 % (ref 11.6–15.1)
GFR SERPL CREATININE-BSD FRML MDRD: 82 ML/MIN/1.73SQ M
GLUCOSE SERPL-MCNC: 166 MG/DL (ref 65–140)
GLUCOSE SERPL-MCNC: 172 MG/DL (ref 65–140)
GLUCOSE SERPL-MCNC: 190 MG/DL (ref 65–140)
GLUCOSE SERPL-MCNC: 203 MG/DL (ref 65–140)
GLUCOSE SERPL-MCNC: 219 MG/DL (ref 65–140)
HCT VFR BLD AUTO: 30.4 % (ref 34.8–46.1)
HGB BLD-MCNC: 9.7 G/DL (ref 11.5–15.4)
MAGNESIUM SERPL-MCNC: 2.2 MG/DL (ref 1.6–2.6)
MCH RBC QN AUTO: 28.7 PG (ref 26.8–34.3)
MCHC RBC AUTO-ENTMCNC: 31.9 G/DL (ref 31.4–37.4)
MCV RBC AUTO: 90 FL (ref 82–98)
PLATELET # BLD AUTO: 210 THOUSANDS/UL (ref 149–390)
PMV BLD AUTO: 12.2 FL (ref 8.9–12.7)
POTASSIUM SERPL-SCNC: 4.4 MMOL/L (ref 3.5–5.3)
PROCALCITONIN SERPL-MCNC: 0.1 NG/ML
PROT SERPL-MCNC: 4.9 G/DL (ref 6.4–8.2)
RBC # BLD AUTO: 3.38 MILLION/UL (ref 3.81–5.12)
SODIUM SERPL-SCNC: 138 MMOL/L (ref 136–145)
WBC # BLD AUTO: 7.48 THOUSAND/UL (ref 4.31–10.16)

## 2022-01-05 PROCEDURE — 83735 ASSAY OF MAGNESIUM: CPT | Performed by: NURSE PRACTITIONER

## 2022-01-05 PROCEDURE — 86140 C-REACTIVE PROTEIN: CPT | Performed by: NURSE PRACTITIONER

## 2022-01-05 PROCEDURE — 99232 SBSQ HOSP IP/OBS MODERATE 35: CPT | Performed by: NURSE PRACTITIONER

## 2022-01-05 PROCEDURE — 94760 N-INVAS EAR/PLS OXIMETRY 1: CPT

## 2022-01-05 PROCEDURE — 99232 SBSQ HOSP IP/OBS MODERATE 35: CPT | Performed by: INTERNAL MEDICINE

## 2022-01-05 PROCEDURE — 85027 COMPLETE CBC AUTOMATED: CPT | Performed by: NURSE PRACTITIONER

## 2022-01-05 PROCEDURE — 84145 PROCALCITONIN (PCT): CPT | Performed by: NURSE PRACTITIONER

## 2022-01-05 PROCEDURE — 85379 FIBRIN DEGRADATION QUANT: CPT | Performed by: NURSE PRACTITIONER

## 2022-01-05 PROCEDURE — 94668 MNPJ CHEST WALL SBSQ: CPT

## 2022-01-05 PROCEDURE — 80053 COMPREHEN METABOLIC PANEL: CPT | Performed by: NURSE PRACTITIONER

## 2022-01-05 PROCEDURE — 93005 ELECTROCARDIOGRAM TRACING: CPT

## 2022-01-05 PROCEDURE — 82948 REAGENT STRIP/BLOOD GLUCOSE: CPT

## 2022-01-05 RX ORDER — INSULIN GLARGINE 100 [IU]/ML
8 INJECTION, SOLUTION SUBCUTANEOUS
Status: DISCONTINUED | OUTPATIENT
Start: 2022-01-05 | End: 2022-01-07

## 2022-01-05 RX ADMIN — SOTALOL HYDROCHLORIDE 80 MG: 80 TABLET ORAL at 23:53

## 2022-01-05 RX ADMIN — AMLODIPINE BESYLATE 5 MG: 5 TABLET ORAL at 08:24

## 2022-01-05 RX ADMIN — Medication 250 MG: at 18:13

## 2022-01-05 RX ADMIN — INSULIN LISPRO 1 UNITS: 100 INJECTION, SOLUTION INTRAVENOUS; SUBCUTANEOUS at 18:13

## 2022-01-05 RX ADMIN — GUAIFENESIN 600 MG: 600 TABLET, EXTENDED RELEASE ORAL at 23:53

## 2022-01-05 RX ADMIN — SENNOSIDES 17.2 MG: 8.6 TABLET, FILM COATED ORAL at 23:53

## 2022-01-05 RX ADMIN — SOTALOL HYDROCHLORIDE 80 MG: 80 TABLET ORAL at 08:23

## 2022-01-05 RX ADMIN — CEFTRIAXONE 1000 MG: 1 INJECTION, SOLUTION INTRAVENOUS at 23:52

## 2022-01-05 RX ADMIN — LISINOPRIL 20 MG: 20 TABLET ORAL at 18:13

## 2022-01-05 RX ADMIN — INSULIN LISPRO 1 UNITS: 100 INJECTION, SOLUTION INTRAVENOUS; SUBCUTANEOUS at 12:21

## 2022-01-05 RX ADMIN — DOCUSATE SODIUM 100 MG: 100 CAPSULE ORAL at 18:13

## 2022-01-05 RX ADMIN — ESCITALOPRAM OXALATE 10 MG: 10 TABLET ORAL at 08:23

## 2022-01-05 RX ADMIN — ENOXAPARIN SODIUM 30 MG: 30 INJECTION SUBCUTANEOUS at 08:24

## 2022-01-05 RX ADMIN — Medication 250 MG: at 08:23

## 2022-01-05 RX ADMIN — INSULIN LISPRO 1 UNITS: 100 INJECTION, SOLUTION INTRAVENOUS; SUBCUTANEOUS at 08:20

## 2022-01-05 RX ADMIN — LISINOPRIL 20 MG: 20 TABLET ORAL at 08:23

## 2022-01-05 RX ADMIN — ASPIRIN 81 MG CHEWABLE TABLET 81 MG: 81 TABLET CHEWABLE at 08:22

## 2022-01-05 RX ADMIN — INSULIN LISPRO 1 UNITS: 100 INJECTION, SOLUTION INTRAVENOUS; SUBCUTANEOUS at 23:54

## 2022-01-05 RX ADMIN — DEXAMETHASONE SODIUM PHOSPHATE 6 MG: 4 INJECTION INTRA-ARTICULAR; INTRALESIONAL; INTRAMUSCULAR; INTRAVENOUS; SOFT TISSUE at 18:16

## 2022-01-05 RX ADMIN — INSULIN GLARGINE 8 UNITS: 100 INJECTION, SOLUTION SUBCUTANEOUS at 23:54

## 2022-01-05 RX ADMIN — ENOXAPARIN SODIUM 30 MG: 30 INJECTION SUBCUTANEOUS at 23:53

## 2022-01-05 RX ADMIN — ATORVASTATIN CALCIUM 40 MG: 40 TABLET, FILM COATED ORAL at 18:16

## 2022-01-05 RX ADMIN — B-COMPLEX W/ C & FOLIC ACID TAB 1 TABLET: TAB at 08:23

## 2022-01-05 RX ADMIN — DOCUSATE SODIUM 100 MG: 100 CAPSULE ORAL at 08:23

## 2022-01-05 RX ADMIN — PANTOPRAZOLE SODIUM 40 MG: 40 TABLET, DELAYED RELEASE ORAL at 05:19

## 2022-01-05 RX ADMIN — GUAIFENESIN 600 MG: 600 TABLET, EXTENDED RELEASE ORAL at 08:24

## 2022-01-05 NOTE — ASSESSMENT & PLAN NOTE
Pneumonia due to COVID-19 with superimposed pneumococcal pneumonia  Presented with increasing shortness of breath, cough, hypoxia  Moderate disease based on oxygen 10 L requirement on presentation  CXR - evidence of left-sided infiltrate  SARS-CoV-2 PCR positive-12/28, subsequently received monoclonal sotrovimab 12/29  CTA chest showed - Consolidation and near complete collapse of the left lower lobe  Patchy groundglass opacities in the left upper lobe  Right lung clear  Clinically improving, now on room air  Coughing up bloody sputum/small clot yesterday morning  Medication treatment started per COVID protocol:  · Dexamethasone 6 mg IV daily for 10 days  · Completed Remdesivir   · Discontinued heparin drip, change to Lovenox 30 subQ b i d  As above  · Continue Atorvastatin  · Initially started on IV cefepime,transitioned to ceftriaxone given positive pneumococcal antigen,day # 5  · Trend inflammatory markers, CRP trending down, D-dimer normalized  · Troponin-negative, proBNP -elevated   · Trend procalcitonin, negative x 3  · Sputum culture showed mixed respiratory johnny  · Increase activity and mobilization as tolerated  · Incentive spirometer and Acapella  · Check BMP, CBC, Mag in AM  · Speech eval due to cough with meals            Results from last 7 days   Lab Units 01/05/22  0530 01/04/22  0905 01/03/22  0525 01/02/22  0555 01/01/22  0516 12/31/21  1446   CRP mg/L 7 9* 8 3* 16 4* 30 3* 97 7*  --    D-DIMER QUANTITATIVE ug/ml FEU 0 43  --  0 79* 11 55* 1 51* 1 98*      Results from last 7 days   Lab Units 01/04/22  1313 01/02/22  0557 12/31/21  1446   PROCALCITONIN ng/ml 0 09 0 07 0 19

## 2022-01-05 NOTE — CASE MANAGEMENT
Case Management Assessment & Discharge Planning Note    Patient name Geraldine Bedolla  Location [address] [facility]/[address] [place]-* MRN [id number]  : 1935 Date 2022       Current Admission Date: 2021  Current Admission Diagnosis:Acute respiratory failure with hypoxia [facility])   Patient Active Problem List    Diagnosis Date Noted    Acute respiratory failure with hypoxia ([facility] ) 2021    Pneumonia due to COVID-19 virus 2021    Lower extremity weakness 2021    Current moderate episode of major depressive disorder without prior episode ([facility] ) 2020    Onychomycosis 2019    Tinea pedis of both feet 2019    Pain in both feet 2019    Chest pain 2019    Thyroid nodule 2019    Diabetic polyneuropathy associated with type 2 diabetes mellitus ([facility] ) 03/15/2019    Atherosclerosis of native artery of both lower extremities ([facility] ) 03/15/2019    Granuloma of great toe 02/15/2018    History of breast cancer 2017    Essential hypertension 2017    Type 2 diabetes mellitus without complication, without long-term current use of insulin ([facility] ) 2017    Mixed hyperlipidemia 2017    Atrial fibrillation ([facility] ) 2017    Multiple sclerosis ([facility] ) 2017    Abnormal gait 10/08/2015    Urinary incontinence 08/15/2013    Arthropathy of multiple sites 2013      LOS (days): 5  Geometric Mean LOS (GMLOS) (days): 5 40  Days to GMLOS:0 5     OBJECTIVE:    Risk of Unplanned Readmission Score: 22      Current admission status: Inpatient     Preferred Pharmacy:   [facility] #[id number], [address] [facility] [address]  Phone: [phone] Fax: [address] [address]  Phone: [phone] Fax: [phone]    Primary Care Provider: Valentin Mooer DO    Primary Insurance: MEDICARE  Secondary Insurance: Diann CROSS    ASSESSMENT:  Active Health Care Agents    There are no active Health Care Agents on file  Readmission Root Cause  30 Day Readmission: No    Patient Information  Admitted from[de-identified] Home  Mental Status: Alert  During Assessment patient was accompanied by: Not accompanied during assessment  Assessment information provided by[de-identified] [address]: [place] of Residence: Carolin Frank  Type of Current Residence: Facility ([address] [place] [address])    Activities of Daily Living Prior to Admission  Functional Status: Assistance  Completes ADLs independently?: No  Level of ADL dependence: Assistance  Ambulates independently?: No  Level of ambulatory dependence:  Total Dependent  Does patient use assisted devices?: Yes  Assisted Devices (DME) used: Arnette Chi  Does the patient have a history of Short-Term Rehab?: Yes    DISCHARGE DETAILS:    Discharge planning discussed with[de-identified] Patient, Dtr  Freedom of Choice: Yes  Comments - Freedom of Choice: Preference is to return to [address] [place] [address] for continued STR  CM contacted family/caregiver?: Yes  Were Treatment Team discharge recommendations reviewed with patient/caregiver?: Yes  Did patient/caregiver verbalize understanding of patient care needs?: Yes  Were patient/caregiver advised of the risks associated with not following Treatment Team discharge recommendations?: Yes    Contacts  Patient Contacts: Radha Jordan (dtr)  Relationship to Patient[de-identified] Family  Contact Method: Phone  Phone Number: [phone]  Reason/Outcome: Emergency Contact,Discharge Planning,Continuity of [facility]         Is the patient interested in [address] at discharge?: No    DME Referral Provided  Referral made for DME?: No    Other Referral/Resources/Interventions Provided:  Interventions: Short Term Rehab  Referral Comments: [facility] referral sent to [facility]    Would you like to participate in our [address] service program?  : No - Declined    Treatment Team Recommendation: Short Term Rehab  Discharge Destination Plan[de-identified] Short Term Rehab  Transport at Discharge : [facility]      SW called pt's dtr Herminia to introduce role, complete assessment, and discuss discharge plan  Ariel Russell confirms that pt has been at [address] [place] [address] for STR ever since her last hospital admission  Prior to coming back to the hospital, [facility] was going to try to keep her skilled for one more week before pt would have to transition to private pay as LTC  SW advised that pt would be re-evaluated by PT/OT after returning to facility so that estimate may change  Ariel Russell confirmed their preference is for pt to [facility]  Per liaison Heidi Bloom, they may not have a bed available until next Monday  Pt is not yet medically cleared for discharge  SW will continue to follow

## 2022-01-05 NOTE — PROGRESS NOTES
[address]  Progress Note - Kelsy Desir 1935, 82 y o  female MRN: [id number]  Unit/Bed#: [address]- Encounter: [id number]  Primary Care Provider: Tg Licona DO   Date and time admitted to hospital: 12/31/2021  2:01 PM    * Acute respiratory failure with hypoxia [facility])  Assessment & Plan  Noted to be saturating 90% on 5 L at skilled nursing facility and requiring 8 L during transportation to be in ER  On presentation, saturating in mid 90s on 10 L of supplemental oxygen  Weaned to RA,satting mid 90s  Chest x-ray with evidence of left-sided pneumonia  Noted D-dimer to be elevated at 11 55 on 01/02, started heparin drip as per COVID protocol  Repeat D-dimer 1/3 0 79  Query accuracy of  D dimer value on 1/2  Bilateral lower extremity venous duplex negative for DVT  CTA of chest no PE  Patient with worsening hemoptysis yesterday morning  Discontinued heparin drip  Transitioned to Lovenox 30mg subQ every 12 hours per protocol  Continue Incentive spirometry  · Monitor respiratory status  · COVID treatment as below    Pneumonia due to COVID-19 virus  Assessment & Plan  Pneumonia due to COVID-19 with superimposed pneumococcal pneumonia  Presented with increasing shortness of breath, cough, hypoxia  Moderate disease based on oxygen 10 L requirement on presentation  CXR - evidence of left-sided infiltrate  SARS-CoV-2 PCR positive-12/28, subsequently received monoclonal sotrovimab 12/29  CTA chest showed - Consolidation and near complete collapse of the left lower lobe  Patchy groundglass opacities in the left upper lobe  Right lung clear  Clinically improving, now on room air  Coughing up bloody sputum/small clot yesterday morning  Medication treatment started per COVID protocol:  · Dexamethasone 6 mg IV daily for 10 days  · Completed Remdesivir   · Discontinued heparin drip, change to Lovenox 30 subQ b i d   As above  · Continue Atorvastatin  · Initially started on IV cefepime,transitioned to ceftriaxone given positive pneumococcal antigen,day # 5  · Trend inflammatory markers, CRP trending down, D-dimer normalized  · Troponin-negative, proBNP -elevated   · Trend procalcitonin, negative x 3  · Sputum culture showed mixed respiratory johnny  · Increase activity and mobilization as tolerated  · Incentive spirometer and Acapella  · Check BMP, CBC, Mag in AM  · Speech eval due to cough with meals  Results from last 7 days   Lab Units 01/05/22  0530 01/04/22  0905 01/03/22  0525 01/02/22  0555 01/01/22  0516 12/31/21  1446   CRP mg/L 7 9* 8 3* 16 4* 30 3* 97 7*  --    D-DIMER QUANTITATIVE ug/ml FEU 0 43  --  0 79* 11 55* 1 51* 1 98*      Results from last 7 days   Lab Units 01/04/22  1313 01/02/22  0557 12/31/21  1446   PROCALCITONIN ng/ml 0 09 0 07 0 19            Atrial fibrillation (HCC)  Assessment & Plan  Continue aspirin  On sotalol, bradycardia noted at rest   Decreased sotalol to daily  Telemetry sinus rhythm, frequent PVCs, bigeminy 1/4  No evidence of bradycardia overnight last night  Previously declined anticoagulation due to allergy/intolerance to anticoagulants  Optimize electrolytes  Curbside discussion with Cardiology in view of bigeminy 1/4, recommend put back on sotalol 80mg p o  B i d  Telemetry improving today, sinus rhythm, with trigeminy and PVCs  Discontinued telemetry today  Check EKG    History of breast cancer  Assessment & Plan  Supportive care    Type 2 diabetes mellitus without complication, without long-term current use of insulin [facility])  Assessment & Plan  Lab Results   Component Value Date    HGBA1C 6 0 (H) 11/24/2021       Recent Labs     01/04/22  1531 01/04/22  2040 01/05/22  0717 01/05/22  1148   POCGLU 144* 249* 190* 172*       Blood Sugar Average: Last 72 hrs:  Hold metformin  Hyperglycemia secondary to steroids  Monitor p o   Intake  Monitor on sliding scale  Started low-dose Lantus due to hyperglycemia    Essential hypertension  Assessment & Plan  Continue lisinopril with holding parameters  BP labile  Monitor    Multiple sclerosis (HCC)  Assessment & Plan  PT/OT, follow-up on recommendations  Patient is wheelchair-bound baseline  Mixed hyperlipidemia  Assessment & Plan  Substitute home simvastatin to Lipitor 40 mg q h s  VTE Pharmacologic Prophylaxis:   Pharmacologic: Enoxaparin (Lovenox)  Mechanical VTE Prophylaxis in Place: Yes    Patient Centered Rounds: I have performed bedside rounds with nursing staff today  Discussions with Specialists or Other Care Team Provider: yes    Education and Discussions with Family / Patient:  Yes    Time Spent for Care: 20 minutes  More than 50% of total time spent on counseling and coordination of care as described above  Current Length of Stay: 5 day(s)    Current Patient Status: Inpatient   Certification Statement: The patient will continue to require additional inpatient hospital stay due to Pneumonia due to COVID-19 and pneumococcal pneumonia    Discharge Plan:  Return to nursing home once medically cleared    Code Status: Level 3 - DNAR and DNI      Subjective:   Patient reports unable to cough up phlegm and feels SOB when that happens  Denies bloody sputum today  Denies chest pain, headache dizziness, nausea vomiting, fever, chills  Objective:     Vitals:   Temp (24hrs), Av 5 °F (36 4 °C), Min:96 9 °F (36 1 °C), Max:97 9 °F (36 6 °C)    Temp:  [96 9 °F (36 1 °C)-97 9 °F (36 6 °C)] 97 6 °F (36 4 °C)  HR:  [] 53  Resp:  [11-16] 15  BP: (115-145)/(54-77) 139/60  SpO2:  [93 %-98 %] 95 %  Body mass index is 30 28 kg/m²  Input and Output Summary (last 24 hours):     No intake or output data in the 24 hours ending 22 2515    Physical Exam:     Physical Exam  Vitals and nursing note reviewed  Constitutional:       Appearance: She is well-developed  She is obese  HENT:      Head: Normocephalic and atraumatic  Neck:      Thyroid: No thyromegaly  Vascular: No JVD  Trachea: No tracheal deviation  Cardiovascular:      Rate and Rhythm: Normal rate  Heart sounds: Normal heart sounds  Comments: Telemetry sinus rhythm, trigeminy, PVCs  Improving from yesterday  Pulmonary:      Effort: Pulmonary effort is normal  No respiratory distress  Breath sounds: No wheezing or rales  Comments: Diminished sounds left lower lobe, on room air, satting mid to high 90s  Abdominal:      General: Bowel sounds are normal  There is no distension  Palpations: Abdomen is soft  Tenderness: There is no abdominal tenderness  There is no guarding  Musculoskeletal:         General: Swelling present  Cervical back: Neck supple  Comments: Left pedal mild edema which is chronic  Skin:     General: Skin is warm and dry  Neurological:      General: No focal deficit present  Mental Status: She is alert  Comments: Patient awake alert, oriented to place and person, follows commands  Forgetful  Psychiatric:         Judgment: Judgment normal          Additional Data:     Labs:    Results from last 7 days   Lab Units 01/05/22  0529 01/02/22  0555 01/01/22  0515   WBC Thousand/uL 7 48   < > 7 76   HEMOGLOBIN g/dL 9 7*   < > 12 4   HEMATOCRIT % 30 4*   < > 40 5   PLATELETS Thousands/uL 210   < > 204   NEUTROS PCT %  --   --  79*   LYMPHS PCT %  --   --  14   MONOS PCT %  --   --  7   EOS PCT %  --   --  0    < > = values in this interval not displayed  Results from last 7 days   Lab Units 01/05/22  0530   POTASSIUM mmol/L 4 4   CHLORIDE mmol/L 107   CO2 mmol/L 22   BUN mg/dL 37*   CREATININE mg/dL 0 63   CALCIUM mg/dL 7 8*   ALK PHOS U/L 77   ALT U/L 22   AST U/L 10     Results from last 7 days   Lab Units 01/02/22  1135   INR  1 10       * I Have Reviewed All Lab Data Listed Above  * Additional Pertinent Lab Tests Reviewed:  All Labs For Current Hospital Admission Reviewed    Imaging:    Imaging Reports Reviewed Today Include: None  Imaging Personally Reviewed by Myself Includes:  None    Recent Cultures (last 7 days):     Results from last 7 days   Lab Units 01/01/22  0044 12/31/21  2057 12/31/21  1446 12/31/21  1430   BLOOD CULTURE   --   --  No Growth After 4 Days  No Growth After 4 Days     SPUTUM CULTURE  2+ Growth of   --   --   --    GRAM STAIN RESULT  No Epithelial cells seen*  No polys seen*  1+ Gram positive cocci in pairs*  Rare Gram positive cocci in clusters*  --   --   --    LEGIONELLA URINARY ANTIGEN   --  Negative  --   --        Last 24 Hours Medication List:   Current Facility-Administered Medications   Medication Dose Route Frequency Provider Last Rate    amLODIPine  5 mg Oral Daily JAMISON Nair      aspirin  81 mg Oral Daily Moni Gordillo MD      atorvastatin  40 mg Oral Daily With Senait Guerrero MD      cefTRIAXone  1,000 mg Intravenous Q24H Moni Gordillo MD 1,000 mg (01/04/22 2048)    dexamethasone  6 mg Intravenous Q24H Moni Gordillo MD      docusate sodium  100 mg Oral BID JAMISON Nair      enoxaparin  30 mg Subcutaneous Q12H [address] JAMISON Nair      escitalopram  10 mg Oral Daily Moni Gordillo MD      guaiFENesin  600 mg Oral Q12H [address] JAMISON Nair      insulin glargine  8 Units Subcutaneous HS JAMISON Nair      insulin lispro  1-5 Units Subcutaneous TID AC Moni Gordillo MD      insulin lispro  1-5 Units Subcutaneous HS Moni Gordillo MD      lisinopril  20 mg Oral BID Moni Gordillo MD      multivitamin stress formula  1 tablet Oral Daily Moni Gordillo MD      pantoprazole  40 mg Oral Early Morning Moni Gordillo MD      polyethylene glycol  17 g Oral Daily PRN JAMISON Nair      saccharomyces boulardii  250 mg Oral BID JAMISON Nair      senna  2 tablet Oral HS JAMISON Nair      sotalol  80 mg Oral Q12H Kathryn [address], CRNP          Today, Patient Was Seen By: JAMISON Hollins    ** Please Note: Dragon 360 Dictation voice to text software may have been used in the creation of this document   **

## 2022-01-05 NOTE — PLAN OF CARE
Problem: MOBILITY - ADULT  Goal: Maintain or return to baseline ADL function  Description: INTERVENTIONS:  -  Assess patient's ability to carry out ADLs; assess patient's baseline for ADL function and identify physical deficits which impact ability to perform ADLs (bathing, care of mouth/teeth, toileting, grooming, dressing, etc )  - Assess/evaluate cause of self-care deficits   - Assess range of motion  - Assess patient's mobility; develop plan if impaired  - Assess patient's need for assistive devices and provide as appropriate  - Encourage maximum independence but intervene and supervise when necessary  - Involve family in performance of ADLs  - Assess for home care needs following discharge   - Consider OT consult to assist with ADL evaluation and planning for discharge  - Provide patient education as appropriate  Outcome: Progressing  Goal: Maintains/Returns to pre admission functional level  Description: INTERVENTIONS:  - Perform BMAT or MOVE assessment daily    - Set and communicate daily mobility goal to care team and patient/family/caregiver  - Collaborate with rehabilitation services on mobility goals if consulted  - Perform Range of Motion 2 times a day  - Reposition patient every 2 hours    - Dangle patient 2 times a day  - Stand patient 3 times a day  - Ambulate patient 3 times a day  - Out of bed to chair 3 times a day   - Out of bed for meals 3 times a day  - Out of bed for toileting  - Record patient progress and toleration of activity level   Outcome: Progressing

## 2022-01-05 NOTE — ASSESSMENT & PLAN NOTE
Continue aspirin  On sotalol, bradycardia noted at rest   Decreased sotalol to daily  Telemetry sinus rhythm, frequent PVCs, bigeminy 1/4  No evidence of bradycardia overnight last night  Previously declined anticoagulation due to allergy/intolerance to anticoagulants  Optimize electrolytes  Curbside discussion with Cardiology in view of bigeminy 1/4, recommend put back on sotalol 80mg p o  B i d  Telemetry improving today, sinus rhythm, with trigeminy and PVCs  Discontinued telemetry today    Check EKG

## 2022-01-05 NOTE — ASSESSMENT & PLAN NOTE
Lab Results   Component Value Date    HGBA1C 6 0 (H) 11/24/2021       Recent Labs     01/04/22  1531 01/04/22  2040 01/05/22  0717 01/05/22  1148   POCGLU 144* 249* 190* 172*       Blood Sugar Average: Last 72 hrs:  Hold metformin  Hyperglycemia secondary to steroids  Monitor p o   Intake  Monitor on sliding scale  Started low-dose Lantus due to hyperglycemia

## 2022-01-05 NOTE — ASSESSMENT & PLAN NOTE
Noted to be saturating 90% on 5 L at skilled nursing facility and requiring 8 L during transportation to be in ER  On presentation, saturating in mid 90s on 10 L of supplemental oxygen  Weaned to RA,satting mid 90s  Chest x-ray with evidence of left-sided pneumonia  Noted D-dimer to be elevated at 11 55 on 01/02, started heparin drip as per COVID protocol  Repeat D-dimer 1/3 0 79  Query accuracy of  D dimer value on 1/2  Bilateral lower extremity venous duplex negative for DVT  CTA of chest no PE  Patient with worsening hemoptysis yesterday morning  Discontinued heparin drip  Transitioned to Lovenox 30mg subQ every 12 hours per protocol    Continue Incentive spirometry  · Monitor respiratory status  · COVID treatment as below

## 2022-01-06 PROBLEM — D64.9 ANEMIA: Status: ACTIVE | Noted: 2022-01-06

## 2022-01-06 LAB
ANION GAP SERPL CALCULATED.3IONS-SCNC: 8 MMOL/L (ref 4–13)
BACTERIA BLD CULT: NORMAL
BACTERIA BLD CULT: NORMAL
BUN SERPL-MCNC: 29 MG/DL (ref 5–25)
CALCIUM SERPL-MCNC: 7.8 MG/DL (ref 8.3–10.1)
CHLORIDE SERPL-SCNC: 108 MMOL/L (ref 100–108)
CO2 SERPL-SCNC: 23 MMOL/L (ref 21–32)
CREAT SERPL-MCNC: 0.48 MG/DL (ref 0.6–1.3)
ERYTHROCYTE [DISTWIDTH] IN BLOOD BY AUTOMATED COUNT: 15.5 % (ref 11.6–15.1)
GFR SERPL CREATININE-BSD FRML MDRD: 90 ML/MIN/1.73SQ M
GLUCOSE SERPL-MCNC: 123 MG/DL (ref 65–140)
GLUCOSE SERPL-MCNC: 171 MG/DL (ref 65–140)
GLUCOSE SERPL-MCNC: 189 MG/DL (ref 65–140)
GLUCOSE SERPL-MCNC: 204 MG/DL (ref 65–140)
GLUCOSE SERPL-MCNC: 240 MG/DL (ref 65–140)
HCT VFR BLD AUTO: 31.1 % (ref 34.8–46.1)
HGB BLD-MCNC: 9.7 G/DL (ref 11.5–15.4)
MAGNESIUM SERPL-MCNC: 2.1 MG/DL (ref 1.6–2.6)
MCH RBC QN AUTO: 28 PG (ref 26.8–34.3)
MCHC RBC AUTO-ENTMCNC: 31.2 G/DL (ref 31.4–37.4)
MCV RBC AUTO: 90 FL (ref 82–98)
PLATELET # BLD AUTO: 213 THOUSANDS/UL (ref 149–390)
PMV BLD AUTO: 12.2 FL (ref 8.9–12.7)
POTASSIUM SERPL-SCNC: 4.2 MMOL/L (ref 3.5–5.3)
RBC # BLD AUTO: 3.47 MILLION/UL (ref 3.81–5.12)
SODIUM SERPL-SCNC: 139 MMOL/L (ref 136–145)
WBC # BLD AUTO: 7.09 THOUSAND/UL (ref 4.31–10.16)

## 2022-01-06 PROCEDURE — 92610 EVALUATE SWALLOWING FUNCTION: CPT

## 2022-01-06 PROCEDURE — 94668 MNPJ CHEST WALL SBSQ: CPT

## 2022-01-06 PROCEDURE — 82948 REAGENT STRIP/BLOOD GLUCOSE: CPT

## 2022-01-06 PROCEDURE — 99232 SBSQ HOSP IP/OBS MODERATE 35: CPT | Performed by: INTERNAL MEDICINE

## 2022-01-06 PROCEDURE — 85027 COMPLETE CBC AUTOMATED: CPT | Performed by: NURSE PRACTITIONER

## 2022-01-06 PROCEDURE — 83735 ASSAY OF MAGNESIUM: CPT | Performed by: NURSE PRACTITIONER

## 2022-01-06 PROCEDURE — 80048 BASIC METABOLIC PNL TOTAL CA: CPT | Performed by: NURSE PRACTITIONER

## 2022-01-06 RX ORDER — ACETAMINOPHEN 325 MG/1
650 TABLET ORAL EVERY 6 HOURS PRN
Status: DISCONTINUED | OUTPATIENT
Start: 2022-01-06 | End: 2022-01-09 | Stop reason: HOSPADM

## 2022-01-06 RX ADMIN — DOCUSATE SODIUM 100 MG: 100 CAPSULE ORAL at 08:40

## 2022-01-06 RX ADMIN — GUAIFENESIN 600 MG: 600 TABLET, EXTENDED RELEASE ORAL at 08:41

## 2022-01-06 RX ADMIN — Medication 250 MG: at 17:46

## 2022-01-06 RX ADMIN — Medication 250 MG: at 08:40

## 2022-01-06 RX ADMIN — PANTOPRAZOLE SODIUM 40 MG: 40 TABLET, DELAYED RELEASE ORAL at 06:03

## 2022-01-06 RX ADMIN — INSULIN LISPRO 2 UNITS: 100 INJECTION, SOLUTION INTRAVENOUS; SUBCUTANEOUS at 22:59

## 2022-01-06 RX ADMIN — GUAIFENESIN 600 MG: 600 TABLET, EXTENDED RELEASE ORAL at 22:56

## 2022-01-06 RX ADMIN — INSULIN LISPRO 1 UNITS: 100 INJECTION, SOLUTION INTRAVENOUS; SUBCUTANEOUS at 08:41

## 2022-01-06 RX ADMIN — CEFTRIAXONE 1000 MG: 1 INJECTION, SOLUTION INTRAVENOUS at 22:56

## 2022-01-06 RX ADMIN — INSULIN LISPRO 1 UNITS: 100 INJECTION, SOLUTION INTRAVENOUS; SUBCUTANEOUS at 11:51

## 2022-01-06 RX ADMIN — LISINOPRIL 20 MG: 20 TABLET ORAL at 08:40

## 2022-01-06 RX ADMIN — ATORVASTATIN CALCIUM 40 MG: 40 TABLET, FILM COATED ORAL at 17:46

## 2022-01-06 RX ADMIN — INSULIN GLARGINE 8 UNITS: 100 INJECTION, SOLUTION SUBCUTANEOUS at 22:55

## 2022-01-06 RX ADMIN — AMLODIPINE BESYLATE 5 MG: 5 TABLET ORAL at 08:41

## 2022-01-06 RX ADMIN — B-COMPLEX W/ C & FOLIC ACID TAB 1 TABLET: TAB at 08:41

## 2022-01-06 RX ADMIN — ENOXAPARIN SODIUM 30 MG: 30 INJECTION SUBCUTANEOUS at 22:55

## 2022-01-06 RX ADMIN — DEXAMETHASONE SODIUM PHOSPHATE 6 MG: 4 INJECTION INTRA-ARTICULAR; INTRALESIONAL; INTRAMUSCULAR; INTRAVENOUS; SOFT TISSUE at 17:47

## 2022-01-06 RX ADMIN — ENOXAPARIN SODIUM 30 MG: 30 INJECTION SUBCUTANEOUS at 08:41

## 2022-01-06 RX ADMIN — ESCITALOPRAM OXALATE 10 MG: 10 TABLET ORAL at 08:40

## 2022-01-06 RX ADMIN — LISINOPRIL 20 MG: 20 TABLET ORAL at 17:46

## 2022-01-06 RX ADMIN — ASPIRIN 81 MG CHEWABLE TABLET 81 MG: 81 TABLET CHEWABLE at 08:40

## 2022-01-06 RX ADMIN — SOTALOL HYDROCHLORIDE 80 MG: 80 TABLET ORAL at 08:41

## 2022-01-06 RX ADMIN — SOTALOL HYDROCHLORIDE 80 MG: 80 TABLET ORAL at 22:56

## 2022-01-06 NOTE — SPEECH THERAPY NOTE
Speech-Language Pathology Bedside Swallow Evaluation      Patient Name: Daisy BENNETT Date: 1/6/2022     Problem List  Principal Problem:    Acute respiratory failure with hypoxia ([facility] )  Active Problems:    Mixed hyperlipidemia    Atrial fibrillation (HCC)    Multiple sclerosis ([facility] )    Essential hypertension    Type 2 diabetes mellitus without complication, without long-term current use of insulin ([facility] )    History of breast cancer    Pneumonia due to COVID-19 virus      Past Medical History  Past Medical History:   Diagnosis Date    Abscess of buttock, right     last assessed-5/4/2017    [facility])     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-last assessed-10/8/2015    Cellulitis of chest wall     last assessed-7/27/2015    Chronic endometritis 10/12/2006    Diabetes mellitus ([facility] )     Dysuria 11/02/2007    Flushing     resolved-6/30/2015    Glaucoma     Hyperlipidemia     Hypertension     Ingrown nail 01/05/2012    Malignant neoplasm of breast ([facility] )     last assessed-11/5/2015    MS (multiple sclerosis) ([facility] )     Nonvenomous insect bite     last assessed-12/12/2013    Palpitations 02/09/2011    Pressure ulcer of buttock 10/13/2006    Proctitis 05/12/2006    Vaginal polyp 03/14/2006    Viral gastroenteritis     last assessed-9/8/2016       Past Surgical History  Past Surgical History:   Procedure Laterality Date    BREAST BIOPSY      open    BREAST SURGERY      CERVICAL BIOPSY  W/ LOOP ELECTRODE EXCISION      DILATION AND CURETTAGE OF UTERUS      INCISION AND DRAINAGE OF WOUND Left 2/27/2017    Procedure: INCISION AND DRAINAGE (I&D)   Chest wall x 2;  Surgeon: Zarina Zuniga MD;  Location: [address] [place] [address];  Service:     MASTECTOMY      last assessed-6/30/2015       Summary   Pt presented as SOB who appeared to benefit from adherence to soft food and slow rate of intake to maximize swallowing safety   Cough x1 noted with food (1st bite when pt eating more quickly), no s/s pharyngeal dysphagia with rest of meal (oatmeal, pears) or thin liquid  Per pt, no h/o dysphagia related to MS   "My trouble now seems to be related to all the phlegm that I can not get up  And, I have learned that I must eat very slowly I get too short of breath" per pt    Recommended Diet: soft/level 3 diet and thin liquids   Recommended Form of Meds: as tolerated (took meds whole with water today with no s/s and pt denied h/o pill dysphagia)   Aspiration precautions and swallowing strategies: upright posture and slow rate of feeding  Other Recommendations: Continue oral care        Current Medical Status  Vanessa Cobb is a 83 yo F c PMH of multiple sclerosis, hypertension, dyslipidemia, diabetes mellitus type 2, history of breast cancer, who presented 12/31 with shortness of breath, cough and hypoxia  DX: Acute respiratory failure with hypoxia, Pneumonia due to COVID-19 virus, MS, afib T2DM  CTA chest showed - Consolidation and near complete collapse of the left lower lobe   Patchy groundglass opacities in the left upper lobe   Right lung clear  Clinically improving, now on room air  Coughed up bloody sputum/small clot 1/4 am   Cough reported with meals     SLP Swallowing Evaluation ordered at this time    Current Precautions:  Fall, Contact &  AIrborne    Allergies:  No known food allergies (but medication and latex allergies)    Past medical history:  Please see H&P for details    Social/Education/Vocational Hx:  [facility] in [facility]    Swallow Information   Current Symptoms/Concerns: coughing with po  Current Diet: regular diet and thin liquids   Baseline Diet: regular diet and thin liquids      Baseline Assessment   Behavior/Cognition: alert  Speech/Language Status: able to participate in conversation but benefited from slow rate (and avoidance of talking while eating)  Patient Positioning: upright in bed  Pain Status/Interventions/Response to Interventions:  No report of or nonverbal indications of pain  Swallow Mechanism Exam  Facial: symmetrical  Labial: WFL  Lingual: WFL  Velum: unable to visualize  Mandible: adequate ROM  Dentition: adequate  Vocal quality:clear/adequate   Volitional Cough: weak   Respiratory Status: on RA      Consistencies Assessed and Performance   Materials administered included a full bowl of oatmeal, 1/2 serving canned pears, coffee and water  Patient also taking pills in water  Oral Stage:   Mastication was mildly slowed (as desired) but adequate with the materials administered today  Bolus formation and transfer were functional with no significant oral residue noted  No overt s/s reduced oral control but can not r/o risk given frequency of cough and if pt SOB or talking    Pharyngeal Stage:   Swallow Mechanics:  Swallowing initiation appeared prompt  Laryngeal rise was palpated and judged to be within functional limits  Cough x1 noted with food (1st bite when pt eating more quickly), but no s/s pharyngeal dysphagia with remainder of foods or liquid  Esophageal Concerns: none reported    Summary and Recommendations (see above)    Results Reviewed with: pt, MD & RN     Treatment Recommended: f/u min of x1 to ensure diet tolerance over time, r/o increasing cough with oral intake    Dysphagia LTG  -Patient will demonstrate safe and effective oral intake (without overt s/s significant oral/pharyngeal dysphagia including s/s penetration or aspiration) for the highest appropriate diet level       Short Term Goals:    -Pt will tolerate Dysphagia 3/advanced (dental soft) diet and thin liquid with no significant s/s oral or pharyngeal dysphagia across 1-2 diagnostic session/s     -Patient will utilize trained compensatory strategies (eg   controlled bite/sip size, controlled rate, prep set", etc ) with 90% accuracy to eliminate overt s/s penetration/aspiration with the least restrictive food/liquid consistencies    Thank you for this referral   Please do not hesitate to contact me with any questions or concerns      Sujit Tolbert [facility]   Speech Pathologist  [place] License [id number]  [place] License [id number]  Available via [facility] Text

## 2022-01-06 NOTE — ASSESSMENT & PLAN NOTE
Noted to be saturating 90% on 5 L at skilled nursing facility and requiring 8 L during transportation to be in ER  On presentation, saturating in mid 90s on 10 L of supplemental oxygen  Now weaned to RA,satting mid 80s  Chest x-ray with evidence of left-sided pneumonia  Noted D-dimer to be elevated at 11 55 on 01/02, started heparin drip as per COVID protocol  Repeat D-dimer 1/3 0 79  Query accuracy of  D dimer value on 1/2 as other D-dimer levels were low  Bilateral lower extremity venous duplex negative for DVT  CTA of chest no PE  Patient had episode of hemoptysis  Now resolved  Discontinued heparin drip  Transitioned to Lovenox 30mg subQ every 12 hours per protocol    Continue Incentive spirometry  Currently saturating adequately on room air at rest  · Monitor respiratory status  · COVID treatment as below

## 2022-01-06 NOTE — ASSESSMENT & PLAN NOTE
Noted to be downgoing during hospitalization now stabilized  No further evidence of overt bleeding now  · Monitor hemoglobin  · Monitor signs and symptoms of overt bleeding

## 2022-01-06 NOTE — PROGRESS NOTES
[facility] Internal Medicine Progress Note  Patient: Swapna Rm 82 y o  female   MRN: [id number]  PCP: Arian Mccain DO  Unit/Bed#: [address] [place] [address] Encounter: [id number]  Date Of Visit: 01/06/22    Problem List:    Principal Problem:    Acute respiratory failure with hypoxia ([facility] )  Active Problems:    Pneumonia due to COVID-19 virus    Atrial fibrillation ([facility] )    Multiple sclerosis ([place] [address] )    Type 2 diabetes mellitus without complication, without long-term current use of insulin ([place] [address] )    Mixed hyperlipidemia    Essential hypertension    History of breast cancer    Anemia      Assessment & Plan:    Pneumonia due to COVID-19 virus  Assessment & Plan  Pneumonia due to COVID-19 with superimposed pneumococcal pneumonia  Presented with increasing shortness of breath, cough, hypoxia  Moderate disease based on oxygen 10 L requirement on presentation  CXR - evidence of left-sided infiltrate  SARS-CoV-2 PCR positive-12/28, subsequently received monoclonal sotrovimab 12/29  CTA chest showed - Consolidation and near complete collapse of the left lower lobe  Patchy groundglass opacities in the left upper lobe  Right lung clear  No pulmonary embolism  Clinically improving, now on room air  Denies shortness of breath, cough is improving  Coughing up bloody sputum/small clot during hospitalization-no further hemoptysis  Medication treatment started per COVID protocol:  · Dexamethasone 6 mg IV daily for 10 days  · Completed Remdesivir   · Discontinued heparin drip, change to Lovenox 30 subQ b i d  As above  · Continue Atorvastatin  · Initially started on IV cefepime,transitioned to ceftriaxone given positive pneumococcal antigen,day # 6, completing 7 day today    · Trend inflammatory markers, CRP trending down, D-dimer normalized  · Troponin-negative, proBNP -elevated   · Trended procalcitonin, negative x 3  · Sputum culture showed mixed respiratory johnny  · Increase activity and mobilization as tolerated  · Incentive spirometer and Acapella  · Check BMP, CBC in AM  · Speech eval due to cough with meals  -follow-up recommendation, downgraded to dysphagia 3          Results from last 7 days   Lab Units 01/05/22  0530 01/04/22  0905 01/03/22  0525 01/02/22  0555 01/01/22  0516 12/31/21  1446   CRP mg/L 7 9* 8 3* 16 4* 30 3* 97 7*  --    D-DIMER QUANTITATIVE ug/ml FEU 0 43  --  0 79* 11 55* 1 51* 1 98*      Results from last 7 days   Lab Units 01/05/22  0530 01/04/22  1313 01/02/22  0557 12/31/21  1446   PROCALCITONIN ng/ml 0 10 0 09 0 07 0 19            * Acute respiratory failure with hypoxia (HCC)  Assessment & Plan  Noted to be saturating 90% on 5 L at [facility] nursing facility and requiring 8 L during transportation to be in ER  On presentation, saturating in mid 90s on 10 L of supplemental oxygen  Now weaned to RA,satting mid 80s  Chest x-ray with evidence of left-sided pneumonia  Noted D-dimer to be elevated at 11 55 on 01/02, started heparin drip as per COVID protocol  Repeat D-dimer 1/3 0 79  Query accuracy of  D dimer value on 1/2 as other D-dimer levels were low  Bilateral lower extremity venous duplex negative for DVT  CTA of chest no PE  Patient with worsening hemoptysis yesterday morning  Discontinued heparin drip  Transitioned to Lovenox 30mg subQ every 12 hours per protocol  Continue Incentive spirometry  Currently saturating adequately on room air at rest  · Monitor respiratory status  · COVID treatment as below    Type 2 diabetes mellitus without complication, without long-term current use of insulin [facility])  Assessment & Plan  Lab Results   Component Value Date    HGBA1C 6 0 (H) 11/24/2021       Recent Labs     01/05/22  1148 01/05/22  1635 01/05/22 2013 01/06/22  0731   POCGLU 172* 166* 219* 189*       Blood Sugar Average: Last 72 hrs:  Hold metformin  Hyperglycemia secondary to steroids  Monitor p o   Intake  Monitor on sliding scale  Continue low-dose Lantus due to hyperglycemia    Multiple sclerosis [facility])  Assessment & Plan  PT/OT, follow-up on recommendations  Patient is wheelchair-bound baseline  , awaiting rehab    Atrial fibrillation [facility])  Assessment & Plan  Continue aspirin  On sotalol, bradycardia noted at rest   Decreased sotalol to daily  Telemetry sinus rhythm, frequent PVCs, bigeminy 1/4  No further evidence of bradycardia  QTC improved  Previously declined anticoagulation due to allergy/intolerance to anticoagulants  Optimize electrolytes  Reviewed with Cardiology in view of bigeminy on 1/4 by team, recommend put back on sotalol 80mg p o  B i d  Telemetry improving today, sinus rhythm, with trigeminy and PVCs  Subsequently discontinue  Check EKG in a m  History of breast cancer  Assessment & Plan  Supportive care    Essential hypertension  Assessment & Plan  Continue lisinopril with holding parameters  BP stable  Monitor    Mixed hyperlipidemia  Assessment & Plan  Substitute home simvastatin to Lipitor 40 mg q h s  Anemia  Assessment & Plan  Noted to be downgoing during hospitalization now stabilized  No further evidence of overt bleeding now  · Monitor hemoglobin  · Monitor signs and symptoms of overt bleeding            VTE Pharmacologic Prophylaxis:   Moderate Risk (Score 3-4) - Pharmacological DVT Prophylaxis Ordered: enoxaparin (Lovenox)  Patient Centered Rounds: I performed bedside rounds with nursing staff today  Discussions with Specialists or Other Care Team Provider:  Yes    Education and Discussions with Family / Patient: Attempted to update  (son) via phone  Unable to contact  Time Spent for Care: 45 minutes  More than 50% of total time spent on counseling and coordination of care as described above      Current Length of Stay: 6 day(s)  Current Patient Status: Inpatient   Certification Statement: The patient will continue to require additional inpatient hospital stay due to [place], pneumonia awaiting rehab availability  Discharge Plan: Anticipate discharge in 24-48 hrs to rehab facility  Code Status: Level 3 - DNAR and DNI    Subjective:   Noted to be sitting up in chair, having meal  Denies any chest pain or shortness of breath  Cough is improving  Reports generalized weakness    Objective:     Vitals:   Temp (24hrs), Av 6 °F (36 4 °C), Min:97 5 °F (36 4 °C), Max:97 9 °F (36 6 °C)    Temp:  [97 5 °F (36 4 °C)-97 9 °F (36 6 °C)] 97 9 °F (36 6 °C)  HR:  [60-95] 63  Resp:  [18-19] 18  BP: (133-138)/(61-76) 136/76  SpO2:  [93 %-98 %] 93 % on room air  Body mass index is 30 28 kg/m²  Input and Output Summary (last 24 hours): Intake/Output Summary (Last 24 hours) at 2022 1150  Last data filed at 2022 3094  Gross per 24 hour   Intake 50 ml   Output 1600 ml   Net -1550 ml       Physical Exam:   Physical Exam  Constitutional:       General: She is not in acute distress  Appearance: She is obese  HENT:      Head: Normocephalic and atraumatic  Cardiovascular:      Rate and Rhythm: Normal rate  Pulmonary:      Effort: Pulmonary effort is normal  No respiratory distress  Breath sounds: Normal breath sounds  No wheezing or rales  Comments: Diminished at bases  Abdominal:      General: Bowel sounds are normal  There is no distension  Palpations: Abdomen is soft  Tenderness: There is no abdominal tenderness  There is no guarding or rebound  Musculoskeletal:      Right lower leg: No edema  Left lower leg: No edema  Skin:     General: Skin is warm and dry  Findings: No rash  Neurological:      Mental Status: She is alert  Mental status is at baseline     Psychiatric:         Mood and Affect: Mood normal          Additional Data:     Labs:  Results from last 7 days   Lab Units 22  0621 22  0555 22  0515   WBC Thousand/uL 7 09   < > 7 76   HEMOGLOBIN g/dL 9 7*   < > 12 4   HEMATOCRIT % 31 1*   < > 40 5   PLATELETS Thousands/uL 213   < > 204   NEUTROS PCT %  --   --  79*   LYMPHS PCT %  -- --  14   MONOS PCT %  --   --  7   EOS PCT %  --   --  0    < > = values in this interval not displayed  Results from last 7 days   Lab Units 01/06/22  0621 01/05/22  0530 01/05/22  0530   SODIUM mmol/L 139   < > 138   POTASSIUM mmol/L 4 2   < > 4 4   CHLORIDE mmol/L 108   < > 107   CO2 mmol/L 23   < > 22   BUN mg/dL 29*   < > 37*   CREATININE mg/dL 0 48*   < > 0 63   ANION GAP mmol/L 8   < > 9   CALCIUM mg/dL 7 8*   < > 7 8*   ALBUMIN g/dL  --   --  2 1*   TOTAL BILIRUBIN mg/dL  --   --  0 16*   ALK PHOS U/L  --   --  77   ALT U/L  --   --  22   AST U/L  --   --  10   GLUCOSE RANDOM mg/dL 204*   < > 203*    < > = values in this interval not displayed  Results from last 7 days   Lab Units 01/02/22  1135   INR  1 10     Results from last 7 days   Lab Units 01/06/22  0731 01/05/22  2013 01/05/22  1635 01/05/22  1148 01/05/22  0717 01/04/22  2040 01/04/22  1531 01/04/22  1126 01/04/22  0705 01/03/22 2004 01/03/22  1706 01/03/22  1117   POC GLUCOSE mg/dl 189* 219* 166* 172* 190* 249* 144* 172* 175* 221* 116 211*         Results from last 7 days   Lab Units 01/05/22  0530 01/04/22  1313 01/02/22  0557 12/31/21  1730 12/31/21  1446   LACTIC ACID mmol/L  --   --   --  1 0 2 5*   PROCALCITONIN ng/ml 0 10 0 09 0 07  --  0 19       Lines/Drains:  Invasive Devices  Report    Peripheral Intravenous Line            Long-Dwell Peripheral IV (Midline) 36/97/09 Left Cephalic 4 days          Drain            External Urinary Catheter <1 day                      Imaging: Reviewed radiology reports from this admission including: chest CT scan    Recent Cultures (last 7 days):   Results from last 7 days   Lab Units 01/01/22  0044 12/31/21  2057 12/31/21  1446 12/31/21  1430   BLOOD CULTURE   --   --  No Growth After 5 Days  No Growth After 5 Days     SPUTUM CULTURE  2+ Growth of   --   --   --    GRAM STAIN RESULT  No Epithelial cells seen*  No polys seen*  1+ Gram positive cocci in pairs*  Rare Gram positive cocci in clusters*  --   --   --    LEGIONELLA URINARY ANTIGEN   --  Negative  --   --        Last 24 Hours Medication List:   Current Facility-Administered Medications   Medication Dose Route Frequency Provider Last Rate    amLODIPine  5 mg Oral Daily JAMISON Nair      aspirin  81 mg Oral Daily Gigi Aquino MD      atorvastatin  40 mg Oral Daily With Sandro Mora MD      cefTRIAXone  1,000 mg Intravenous Q24H Gigi Aquino MD Stopped (01/06/22 0022)    dexamethasone  6 mg Intravenous Q24H Gigi Aquino MD      docusate sodium  100 mg Oral BID JAMISON Nair      enoxaparin  30 mg Subcutaneous Q12H [facility] JAMISON Nair      escitalopram  10 mg Oral Daily Gigi Aquino MD      guaiFENesin  600 mg Oral Q12H [facility] JAMISON Nair      insulin glargine  8 Units Subcutaneous HS Luisito Munguia, JAMISON      insulin lispro  1-5 Units Subcutaneous TID AC Gigi Aquino MD      insulin lispro  1-5 Units Subcutaneous HS Gigi Aquino MD      lisinopril  20 mg Oral BID Gigi Aquino MD      multivitamin stress formula  1 tablet Oral Daily Gigi Aquino MD      pantoprazole  40 mg Oral Early Morning Gigi Aquino MD      polyethylene glycol  17 g Oral Daily PRN Luisito Munguia, JAMISON      saccharomyces boulardii  250 mg Oral BID Marthaiady Munguia, JAMISON      senna  2 tablet Oral HS Luisito Munguia, JAMISON      sotalol  80 mg Oral Q12H Kathryn 47, CRNP          Today, Patient Was Seen By: Gigi Aquino MD    ** Please Note: "This note has been constructed using a voice recognition system  Therefore there may be syntax, spelling, and/or grammatical errors   Please call if you have any questions  "**

## 2022-01-06 NOTE — ASSESSMENT & PLAN NOTE
Lab Results   Component Value Date    HGBA1C 6 0 (H) 11/24/2021       Recent Labs     01/05/22  1148 01/05/22  1635 01/05/22 2013 01/06/22  0731   POCGLU 172* 166* 219* 189*       Blood Sugar Average: Last 72 hrs:  Hold metformin  Hyperglycemia secondary to steroids  Monitor p o   Intake  Monitor on sliding scale  Continue low-dose Lantus due to hyperglycemia

## 2022-01-06 NOTE — ASSESSMENT & PLAN NOTE
Pneumonia due to COVID-19 with superimposed pneumococcal pneumonia  Presented with increasing shortness of breath, cough, hypoxia  Moderate disease based on oxygen 10 L requirement on presentation  CXR - evidence of left-sided infiltrate  SARS-CoV-2 PCR positive-12/28, subsequently received monoclonal sotrovimab 12/29  CTA chest showed - Consolidation and near complete collapse of the left lower lobe  Patchy groundglass opacities in the left upper lobe  Right lung clear  No pulmonary embolism  Clinically improving, now on room air  Coughing up bloody sputum/small clot during hospitalization-no further hemoptysis  Medication treatment started per COVID protocol:  · Dexamethasone 6 mg IV daily for 10 days  Consider discontinue in the morning as patient is on RA now  · Completed Remdesivir   · Discontinued heparin drip, change to Lovenox 30 subQ b i d  As above  · Continue Atorvastatin  · Initially started on IV cefepime,transitioned to ceftriaxone given positive pneumococcal antigen,day # 7, completing 7 day today  · Trend inflammatory markers, CRP trending down, D-dimer normalized  · Troponin-negative, proBNP -elevated   · Trended procalcitonin, negative x 3  · Sputum culture showed mixed respiratory johnny  · Increase activity and mobilization as tolerated  · Incentive spirometer and Acapella  · Check BMP, Mag in the morning  · Chest x-ray today  · Speech eval due to cough with meals  -follow-up recommendation, downgraded to dysphagia 3          Results from last 7 days   Lab Units 01/05/22  0530 01/04/22  0905 01/03/22  0525 01/02/22  0555 01/01/22  0516 12/31/21  1446   CRP mg/L 7 9* 8 3* 16 4* 30 3* 97 7*  --    D-DIMER QUANTITATIVE ug/ml FEU 0 43  --  0 79* 11 55* 1 51* 1 98*      Results from last 7 days   Lab Units 01/05/22  0530 01/04/22  1313 01/02/22  0557 12/31/21  1446   PROCALCITONIN ng/ml 0 10 0 09 0 07 0 19

## 2022-01-06 NOTE — PLAN OF CARE
Problem: MOBILITY - ADULT  Goal: Maintain or return to baseline ADL function  Description: INTERVENTIONS:  -  Assess patient's ability to carry out ADLs; assess patient's baseline for ADL function and identify physical deficits which impact ability to perform ADLs (bathing, care of mouth/teeth, toileting, grooming, dressing, etc )  - Assess/evaluate cause of self-care deficits   - Assess range of motion  - Assess patient's mobility; develop plan if impaired  - Assess patient's need for assistive devices and provide as appropriate  - Encourage maximum independence but intervene and supervise when necessary  - Involve family in performance of ADLs  - Assess for home care needs following discharge   - Consider OT consult to assist with ADL evaluation and planning for discharge  - Provide patient education as appropriate  Outcome: Progressing  Goal: Maintains/Returns to pre admission functional level  Description: INTERVENTIONS:  - Perform BMAT or MOVE assessment daily    - Set and communicate daily mobility goal to care team and patient/family/caregiver     - Collaborate with rehabilitation services on mobility goals if consulted  - Out of bed for toileting  - Record patient progress and toleration of activity level   Outcome: Progressing     Problem: Prexisting or High Potential for Compromised Skin Integrity  Goal: Skin integrity is maintained or improved  Description: INTERVENTIONS:  - Identify patients at risk for skin breakdown  - Assess and monitor skin integrity  - Assess and monitor nutrition and hydration status  - Monitor labs   - Assess for incontinence   - Turn and reposition patient  - Assist with mobility/ambulation  - Relieve pressure over bony prominences  - Avoid friction and shearing  - Provide appropriate hygiene as needed including keeping skin clean and dry  - Evaluate need for skin moisturizer/barrier cream  - Collaborate with interdisciplinary team   - Patient/family teaching  - Consider wound care consult   Outcome: Progressing     Problem: Potential for Falls  Goal: Patient will remain free of falls  Description: INTERVENTIONS:  - Educate patient/family on patient safety including physical limitations  - Instruct patient to call for assistance with activity   - Consult OT/PT to assist with strengthening/mobility   - Keep Call bell within reach  - Keep bed low and locked with side rails adjusted as appropriate  - Keep care items and personal belongings within reach  - Initiate and maintain comfort rounds  - Make Fall Risk Sign visible to staff  - Apply yellow socks and bracelet for high fall risk patients  - Consider moving patient to room near nurses station  Outcome: Progressing     Problem: PAIN - ADULT  Goal: Verbalizes/displays adequate comfort level or baseline comfort level  Description: Interventions:  - Encourage patient to monitor pain and request assistance  - Assess pain using appropriate pain scale  - Administer analgesics based on type and severity of pain and evaluate response  - Implement non-pharmacological measures as appropriate and evaluate response  - Consider cultural and social influences on pain and pain management  - Notify physician/advanced practitioner if interventions unsuccessful or patient reports new pain  Outcome: Progressing     Problem: INFECTION - ADULT  Goal: Absence or prevention of progression during hospitalization  Description: INTERVENTIONS:  - Assess and monitor for signs and symptoms of infection  - Monitor lab/diagnostic results  - Monitor all insertion sites, i e  indwelling lines, tubes, and drains  - Monitor endotracheal if appropriate and nasal secretions for changes in amount and color  - [place] appropriate cooling/warming therapies per order  - Administer medications as ordered  - Instruct and encourage patient and family to use good hand hygiene technique  - Identify and instruct in appropriate isolation precautions for identified infection/condition  Outcome: Progressing     Problem: DISCHARGE PLANNING  Goal: Discharge to home or other facility with appropriate resources  Description: INTERVENTIONS:  - Identify barriers to discharge w/patient and caregiver  - Arrange for needed discharge resources and transportation as appropriate  - Identify discharge learning needs (meds, wound care, etc )  - Arrange for interpretive services to assist at discharge as needed  - Refer to Case Management Department for coordinating discharge planning if the patient needs post-hospital services based on physician/advanced practitioner order or complex needs related to functional status, cognitive ability, or social support system  Outcome: Progressing     Problem: Knowledge Deficit  Goal: Patient/family/caregiver demonstrates understanding of disease process, treatment plan, medications, and discharge instructions  Description: Complete learning assessment and assess knowledge base    Interventions:  - Provide teaching at level of understanding  - Provide teaching via preferred learning methods  Outcome: Progressing     Problem: RESPIRATORY - ADULT  Goal: Achieves optimal ventilation and oxygenation  Description: INTERVENTIONS:  - Assess for changes in respiratory status  - Assess for changes in mentation and behavior  - Position to facilitate oxygenation and minimize respiratory effort  - Oxygen administered by appropriate delivery if ordered  - Initiate smoking cessation education as indicated  - Encourage broncho-pulmonary hygiene including cough, deep breathe, Incentive Spirometry  - Assess the need for suctioning and aspirate as needed  - Assess and instruct to report SOB or any respiratory difficulty  - Respiratory Therapy support as indicated  Outcome: Progressing

## 2022-01-06 NOTE — PROGRESS NOTES
Progress Note - Pulmonary   Jo Echols 82 y o  female MRN: [id number]  Unit/Bed#: [address] [place] [address] Encounter: [id number]    Assessment:  Left lower lobe pneumococcal pneumonia clinically improving  COVID-19 pneumonia affecting left upper lobe  This is clinically better  Acute hypoxemic respiratory failure resolved  Multiple sclerosis  Probable partial atelectasis left lower lobe due to pneumonia    Plan:  Day 5 of IV ceftriaxone  Continue IV antibiotic therapy for 7 days  Recheck chest x-ray on Friday 1/7  Continue dexamethasone 6 mg IV daily  Incentive spirometer    Subjective:   Still has some intermittent cough and feels sometimes she has thick mucus in her throat that is hard to expectorate  Overall does feel better    Objective:     Vitals: Blood pressure 133/62, pulse 95, temperature 97 5 °F (36 4 °C), resp  rate 18, height 5' 6" (1 676 m), weight 85 1 kg (187 lb 9 8 oz), SpO2 98 %  ,Body mass index is 30 28 kg/m²  Intake/Output Summary (Last 24 hours) at 1/5/2022 2131  Last data filed at 1/5/2022 1500  Gross per 24 hour   Intake --   Output 950 ml   Net -950 ml       Physical Exam: Physical Exam  Vitals reviewed  Constitutional:       General: She is not in acute distress  Appearance: Normal appearance  She is well-developed  Comments: Room air O2 saturation 97%   HENT:      Head: Normocephalic  Right Ear: External ear normal       Left Ear: External ear normal       Nose: Nose normal       Mouth/Throat:      Mouth: Mucous membranes are moist       Pharynx: Oropharynx is clear  No oropharyngeal exudate  Eyes:      Conjunctiva/sclera: Conjunctivae normal       Pupils: Pupils are equal, round, and reactive to light  Neck:      Vascular: No JVD  Trachea: No tracheal deviation  Cardiovascular:      Rate and Rhythm: Normal rate and regular rhythm  Heart sounds: Normal heart sounds     Pulmonary:      Effort: Pulmonary effort is normal       Comments: Lung sounds decreased at bases but clear  Abdominal:      General: There is no distension  Palpations: Abdomen is soft  Tenderness: There is no abdominal tenderness  There is no guarding  Musculoskeletal:      Cervical back: Neck supple  Comments: No edema   Lymphadenopathy:      Cervical: No cervical adenopathy  Skin:     General: Skin is warm and dry  Findings: No rash  Neurological:      Mental Status: She is alert and oriented to person, place, and time  Psychiatric:         Behavior: Behavior normal          Thought Content: Thought content normal           Labs: I have personally reviewed pertinent lab results  , ABG: No results found for: PHART, AHY3FIW, PO2ART, PDK9AVD, R8BBXDUH, BEART, SOURCE, BNP: No results found for: BNP, CBC:   Lab Results   Component Value Date    WBC 7 48 01/05/2022    HGB 9 7 (L) 01/05/2022    HCT 30 4 (L) 01/05/2022    MCV 90 01/05/2022     01/05/2022    MCH 28 7 01/05/2022    MCHC 31 9 01/05/2022    RDW 15 2 (H) 01/05/2022    MPV 12 2 01/05/2022    NRBC 0 01/01/2022   , CMP:   Lab Results   Component Value Date     12/21/2017    K 4 4 01/05/2022    K 4 9 10/29/2019     01/05/2022     10/29/2019    CO2 22 01/05/2022    CO2 25 10/29/2019    BUN 37 (H) 01/05/2022    BUN 17 10/29/2019    CREATININE 0 63 01/05/2022    CREATININE 0 76 12/21/2017    GLUCOSE 204 (H) 12/21/2017    CALCIUM 7 8 (L) 01/05/2022    CALCIUM 9 5 12/21/2017    AST 10 01/05/2022    AST 13 10/29/2019    ALT 22 01/05/2022    ALT 13 10/29/2019    ALKPHOS 77 01/05/2022    ALKPHOS 100 12/21/2017    PROT 7 4 12/21/2017    BILITOT 0 3 12/21/2017    EGFR 82 01/05/2022   , PT/INR:   Lab Results   Component Value Date    INR 1 10 01/02/2022   , Troponin: No results found for: TROPONIN    Imaging and other studies: I have personally reviewed pertinent reports     and I have personally reviewed pertinent films in PACS

## 2022-01-06 NOTE — ASSESSMENT & PLAN NOTE
Continue aspirin, increased to 162mg p o  Daily as she was taking in rehab  On sotalol, bradycardia noted at rest   Decreased sotalol to daily  Increase back to b i d due to bigeminy trigeminy on telemetry  Previously declined anticoagulation due to allergy/intolerance to anticoagulants  Optimize electrolytes     EKG today,will follow up

## 2022-01-06 NOTE — PLAN OF CARE
Problem: MOBILITY - ADULT  Goal: Maintain or return to baseline ADL function  Description: INTERVENTIONS:  -  Assess patient's ability to carry out ADLs; assess patient's baseline for ADL function and identify physical deficits which impact ability to perform ADLs (bathing, care of mouth/teeth, toileting, grooming, dressing, etc )  - Assess/evaluate cause of self-care deficits   - Assess range of motion  - Assess patient's mobility; develop plan if impaired  - Assess patient's need for assistive devices and provide as appropriate  - Encourage maximum independence but intervene and supervise when necessary  - Involve family in performance of ADLs  - Assess for home care needs following discharge   - Consider OT consult to assist with ADL evaluation and planning for discharge  - Provide patient education as appropriate  Outcome: Progressing  Goal: Maintains/Returns to pre admission functional level  Description: INTERVENTIONS:  - Perform BMAT or MOVE assessment daily    - Set and communicate daily mobility goal to care team and patient/family/caregiver  - Collaborate with rehabilitation services on mobility goals if consulted  - Perform Range of Motion 2 times a day  - Reposition patient every 2 hours    - Record patient progress and toleration of activity level   Outcome: Progressing     Problem: Prexisting or High Potential for Compromised Skin Integrity  Goal: Skin integrity is maintained or improved  Description: INTERVENTIONS:  - Identify patients at risk for skin breakdown  - Assess and monitor skin integrity  - Assess and monitor nutrition and hydration status  - Monitor labs   - Assess for incontinence   - Turn and reposition patient  - Assist with mobility/ambulation  - Relieve pressure over bony prominences  - Avoid friction and shearing  - Provide appropriate hygiene as needed including keeping skin clean and dry  - Evaluate need for skin moisturizer/barrier cream  - Collaborate with interdisciplinary team   - Patient/family teaching  - Consider wound care consult   Outcome: Progressing     Problem: Potential for Falls  Goal: Patient will remain free of falls  Description: INTERVENTIONS:  - Educate patient/family on patient safety including physical limitations  - Instruct patient to call for assistance with activity   - Consult OT/PT to assist with strengthening/mobility   - Keep Call bell within reach  - Keep bed low and locked with side rails adjusted as appropriate  - Keep care items and personal belongings within reach  - Initiate and maintain comfort rounds  - Make Fall Risk Sign visible to staff  - Offer Toileting every 2 Hours, in advance of need  - Initiate/Maintain bed alarm  - Obtain necessary fall risk management equipment:   - Apply yellow socks and bracelet for high fall risk patients  - Consider moving patient to room near nurses station  Outcome: Progressing     Problem: PAIN - ADULT  Goal: Verbalizes/displays adequate comfort level or baseline comfort level  Description: Interventions:  - Encourage patient to monitor pain and request assistance  - Assess pain using appropriate pain scale  - Administer analgesics based on type and severity of pain and evaluate response  - Implement non-pharmacological measures as appropriate and evaluate response  - Consider cultural and social influences on pain and pain management  - Notify physician/advanced practitioner if interventions unsuccessful or patient reports new pain  Outcome: Progressing     Problem: INFECTION - ADULT  Goal: Absence or prevention of progression during hospitalization  Description: INTERVENTIONS:  - Assess and monitor for signs and symptoms of infection  - Monitor lab/diagnostic results  - Monitor all insertion sites, i e  indwelling lines, tubes, and drains  - Monitor endotracheal if appropriate and nasal secretions for changes in amount and color  - [place] appropriate cooling/warming therapies per order  - Administer medications as ordered  - Instruct and encourage patient and family to use good hand hygiene technique  - Identify and instruct in appropriate isolation precautions for identified infection/condition  Outcome: Progressing     Problem: DISCHARGE PLANNING  Goal: Discharge to home or other facility with appropriate resources  Description: INTERVENTIONS:  - Identify barriers to discharge w/patient and caregiver  - Arrange for needed discharge resources and transportation as appropriate  - Identify discharge learning needs (meds, wound care, etc )  - Arrange for interpretive services to assist at discharge as needed  - Refer to Case Management Department for coordinating discharge planning if the patient needs post-hospital services based on physician/advanced practitioner order or complex needs related to functional status, cognitive ability, or social support system  Outcome: Progressing     Problem: Knowledge Deficit  Goal: Patient/family/caregiver demonstrates understanding of disease process, treatment plan, medications, and discharge instructions  Description: Complete learning assessment and assess knowledge base    Interventions:  - Provide teaching at level of understanding  - Provide teaching via preferred learning methods  Outcome: Progressing     Problem: RESPIRATORY - ADULT  Goal: Achieves optimal ventilation and oxygenation  Description: INTERVENTIONS:  - Assess for changes in respiratory status  - Assess for changes in mentation and behavior  - Position to facilitate oxygenation and minimize respiratory effort  - Oxygen administered by appropriate delivery if ordered  - Initiate smoking cessation education as indicated  - Encourage broncho-pulmonary hygiene including cough, deep breathe, Incentive Spirometry  - Assess the need for suctioning and aspirate as needed  - Assess and instruct to report SOB or any respiratory difficulty  - Respiratory Therapy support as indicated  Outcome: Progressing

## 2022-01-07 ENCOUNTER — APPOINTMENT (INPATIENT)
Dept: RADIOLOGY | Facility: HOSPITAL | Age: 87
DRG: 177 | End: 2022-01-07
Payer: MEDICARE

## 2022-01-07 LAB
ANION GAP SERPL CALCULATED.3IONS-SCNC: 7 MMOL/L (ref 4–13)
ATRIAL RATE: 57 BPM
BUN SERPL-MCNC: 27 MG/DL (ref 5–25)
CALCIUM SERPL-MCNC: 7.9 MG/DL (ref 8.3–10.1)
CHLORIDE SERPL-SCNC: 109 MMOL/L (ref 100–108)
CO2 SERPL-SCNC: 26 MMOL/L (ref 21–32)
CREAT SERPL-MCNC: 0.62 MG/DL (ref 0.6–1.3)
ERYTHROCYTE [DISTWIDTH] IN BLOOD BY AUTOMATED COUNT: 15.9 % (ref 11.6–15.1)
GFR SERPL CREATININE-BSD FRML MDRD: 83 ML/MIN/1.73SQ M
GLUCOSE SERPL-MCNC: 136 MG/DL (ref 65–140)
GLUCOSE SERPL-MCNC: 140 MG/DL (ref 65–140)
GLUCOSE SERPL-MCNC: 163 MG/DL (ref 65–140)
GLUCOSE SERPL-MCNC: 184 MG/DL (ref 65–140)
GLUCOSE SERPL-MCNC: 343 MG/DL (ref 65–140)
HCT VFR BLD AUTO: 31.6 % (ref 34.8–46.1)
HGB BLD-MCNC: 9.5 G/DL (ref 11.5–15.4)
MCH RBC QN AUTO: 27.8 PG (ref 26.8–34.3)
MCHC RBC AUTO-ENTMCNC: 30.1 G/DL (ref 31.4–37.4)
MCV RBC AUTO: 92 FL (ref 82–98)
P AXIS: 31 DEGREES
PLATELET # BLD AUTO: 206 THOUSANDS/UL (ref 149–390)
PMV BLD AUTO: 11.8 FL (ref 8.9–12.7)
POTASSIUM SERPL-SCNC: 4.5 MMOL/L (ref 3.5–5.3)
PR INTERVAL: 182 MS
QRS AXIS: -2 DEGREES
QRSD INTERVAL: 86 MS
QT INTERVAL: 464 MS
QTC INTERVAL: 451 MS
RBC # BLD AUTO: 3.42 MILLION/UL (ref 3.81–5.12)
SODIUM SERPL-SCNC: 142 MMOL/L (ref 136–145)
T WAVE AXIS: 43 DEGREES
VENTRICULAR RATE: 57 BPM
WBC # BLD AUTO: 7.6 THOUSAND/UL (ref 4.31–10.16)

## 2022-01-07 PROCEDURE — 71045 X-RAY EXAM CHEST 1 VIEW: CPT

## 2022-01-07 PROCEDURE — 94668 MNPJ CHEST WALL SBSQ: CPT

## 2022-01-07 PROCEDURE — 93010 ELECTROCARDIOGRAM REPORT: CPT | Performed by: INTERNAL MEDICINE

## 2022-01-07 PROCEDURE — 99232 SBSQ HOSP IP/OBS MODERATE 35: CPT | Performed by: INTERNAL MEDICINE

## 2022-01-07 PROCEDURE — 99232 SBSQ HOSP IP/OBS MODERATE 35: CPT | Performed by: NURSE PRACTITIONER

## 2022-01-07 PROCEDURE — 94760 N-INVAS EAR/PLS OXIMETRY 1: CPT

## 2022-01-07 PROCEDURE — 80048 BASIC METABOLIC PNL TOTAL CA: CPT | Performed by: INTERNAL MEDICINE

## 2022-01-07 PROCEDURE — 93005 ELECTROCARDIOGRAM TRACING: CPT

## 2022-01-07 PROCEDURE — 85027 COMPLETE CBC AUTOMATED: CPT | Performed by: INTERNAL MEDICINE

## 2022-01-07 PROCEDURE — 82948 REAGENT STRIP/BLOOD GLUCOSE: CPT

## 2022-01-07 RX ORDER — INSULIN GLARGINE 100 [IU]/ML
6 INJECTION, SOLUTION SUBCUTANEOUS
Status: DISCONTINUED | OUTPATIENT
Start: 2022-01-07 | End: 2022-01-09 | Stop reason: HOSPADM

## 2022-01-07 RX ORDER — CEFTRIAXONE 1 G/50ML
1000 INJECTION, SOLUTION INTRAVENOUS ONCE
Status: DISCONTINUED | OUTPATIENT
Start: 2022-01-07 | End: 2022-01-07

## 2022-01-07 RX ORDER — ASPIRIN 81 MG/1
162 TABLET, CHEWABLE ORAL DAILY
Status: DISCONTINUED | OUTPATIENT
Start: 2022-01-08 | End: 2022-01-09 | Stop reason: HOSPADM

## 2022-01-07 RX ORDER — DOCUSATE SODIUM 100 MG/1
100 CAPSULE, LIQUID FILLED ORAL 2 TIMES DAILY PRN
Status: DISCONTINUED | OUTPATIENT
Start: 2022-01-07 | End: 2022-01-09 | Stop reason: HOSPADM

## 2022-01-07 RX ADMIN — ESCITALOPRAM OXALATE 10 MG: 10 TABLET ORAL at 09:28

## 2022-01-07 RX ADMIN — DEXAMETHASONE SODIUM PHOSPHATE 6 MG: 4 INJECTION INTRA-ARTICULAR; INTRALESIONAL; INTRAMUSCULAR; INTRAVENOUS; SOFT TISSUE at 17:13

## 2022-01-07 RX ADMIN — AMLODIPINE BESYLATE 5 MG: 5 TABLET ORAL at 09:28

## 2022-01-07 RX ADMIN — ATORVASTATIN CALCIUM 40 MG: 40 TABLET, FILM COATED ORAL at 17:13

## 2022-01-07 RX ADMIN — Medication 250 MG: at 09:28

## 2022-01-07 RX ADMIN — SOTALOL HYDROCHLORIDE 80 MG: 80 TABLET ORAL at 09:28

## 2022-01-07 RX ADMIN — ASPIRIN 81 MG CHEWABLE TABLET 81 MG: 81 TABLET CHEWABLE at 09:28

## 2022-01-07 RX ADMIN — INSULIN GLARGINE 6 UNITS: 100 INJECTION, SOLUTION SUBCUTANEOUS at 23:38

## 2022-01-07 RX ADMIN — GUAIFENESIN 600 MG: 600 TABLET, EXTENDED RELEASE ORAL at 09:28

## 2022-01-07 RX ADMIN — DOCUSATE SODIUM 100 MG: 100 CAPSULE ORAL at 09:28

## 2022-01-07 RX ADMIN — SOTALOL HYDROCHLORIDE 80 MG: 80 TABLET ORAL at 23:39

## 2022-01-07 RX ADMIN — ENOXAPARIN SODIUM 30 MG: 30 INJECTION SUBCUTANEOUS at 09:28

## 2022-01-07 RX ADMIN — B-COMPLEX W/ C & FOLIC ACID TAB 1 TABLET: TAB at 09:28

## 2022-01-07 RX ADMIN — PANTOPRAZOLE SODIUM 40 MG: 40 TABLET, DELAYED RELEASE ORAL at 07:14

## 2022-01-07 RX ADMIN — GUAIFENESIN 600 MG: 600 TABLET, EXTENDED RELEASE ORAL at 23:38

## 2022-01-07 RX ADMIN — INSULIN LISPRO 1 UNITS: 100 INJECTION, SOLUTION INTRAVENOUS; SUBCUTANEOUS at 09:28

## 2022-01-07 RX ADMIN — ENOXAPARIN SODIUM 30 MG: 30 INJECTION SUBCUTANEOUS at 23:39

## 2022-01-07 RX ADMIN — Medication 250 MG: at 17:13

## 2022-01-07 RX ADMIN — LISINOPRIL 20 MG: 20 TABLET ORAL at 17:13

## 2022-01-07 RX ADMIN — LISINOPRIL 20 MG: 20 TABLET ORAL at 09:28

## 2022-01-07 RX ADMIN — INSULIN LISPRO 3 UNITS: 100 INJECTION, SOLUTION INTRAVENOUS; SUBCUTANEOUS at 23:37

## 2022-01-07 NOTE — PLAN OF CARE
Problem: MOBILITY - ADULT  Goal: Maintain or return to baseline ADL function  Description: INTERVENTIONS:  -  Assess patient's ability to carry out ADLs; assess patient's baseline for ADL function and identify physical deficits which impact ability to perform ADLs (bathing, care of mouth/teeth, toileting, grooming, dressing, etc )  - Assess/evaluate cause of self-care deficits   - Assess range of motion  - Assess patient's mobility; develop plan if impaired  - Assess patient's need for assistive devices and provide as appropriate  - Encourage maximum independence but intervene and supervise when necessary  - Involve family in performance of ADLs  - Assess for home care needs following discharge   - Consider OT consult to assist with ADL evaluation and planning for discharge  - Provide patient education as appropriate  Outcome: Progressing  Goal: Maintains/Returns to pre admission functional level  Description: INTERVENTIONS:  - Perform BMAT or MOVE assessment daily    - Set and communicate daily mobility goal to care team and patient/family/caregiver     - Collaborate with rehabilitation services on mobility goals if consulted  - Out of bed for toileting  - Record patient progress and toleration of activity level   Outcome: Progressing     Problem: Prexisting or High Potential for Compromised Skin Integrity  Goal: Skin integrity is maintained or improved  Description: INTERVENTIONS:  - Identify patients at risk for skin breakdown  - Assess and monitor skin integrity  - Assess and monitor nutrition and hydration status  - Monitor labs   - Assess for incontinence   - Turn and reposition patient  - Assist with mobility/ambulation  - Relieve pressure over bony prominences  - Avoid friction and shearing  - Provide appropriate hygiene as needed including keeping skin clean and dry  - Evaluate need for skin moisturizer/barrier cream  - Collaborate with interdisciplinary team   - Patient/family teaching  - Consider wound care consult   Outcome: Progressing     Problem: Potential for Falls  Goal: Patient will remain free of falls  Description: INTERVENTIONS:  - Educate patient/family on patient safety including physical limitations  - Instruct patient to call for assistance with activity   - Consult OT/PT to assist with strengthening/mobility   - Keep Call bell within reach  - Keep bed low and locked with side rails adjusted as appropriate  - Keep care items and personal belongings within reach  - Initiate and maintain comfort rounds  - Make Fall Risk Sign visible to staff  - Apply yellow socks and bracelet for high fall risk patients  - Consider moving patient to room near nurses station  Outcome: Progressing     Problem: PAIN - ADULT  Goal: Verbalizes/displays adequate comfort level or baseline comfort level  Description: Interventions:  - Encourage patient to monitor pain and request assistance  - Assess pain using appropriate pain scale  - Administer analgesics based on type and severity of pain and evaluate response  - Implement non-pharmacological measures as appropriate and evaluate response  - Consider cultural and social influences on pain and pain management  - Notify physician/advanced practitioner if interventions unsuccessful or patient reports new pain  Outcome: Progressing     Problem: DISCHARGE PLANNING  Goal: Discharge to home or other facility with appropriate resources  Description: INTERVENTIONS:  - Identify barriers to discharge w/patient and caregiver  - Arrange for needed discharge resources and transportation as appropriate  - Identify discharge learning needs (meds, wound care, etc )  - Arrange for interpretive services to assist at discharge as needed  - Refer to Case Management Department for coordinating discharge planning if the patient needs post-hospital services based on physician/advanced practitioner order or complex needs related to functional status, cognitive ability, or social support system  Outcome: Progressing     Problem: Knowledge Deficit  Goal: Patient/family/caregiver demonstrates understanding of disease process, treatment plan, medications, and discharge instructions  Description: Complete learning assessment and assess knowledge base    Interventions:  - Provide teaching at level of understanding  - Provide teaching via preferred learning methods  Outcome: Progressing

## 2022-01-07 NOTE — PROGRESS NOTES
[address]  Progress Note - Juanjo Captain 1935, 82 y o  female MRN: [id number]  Unit/Bed#: [address] [facility]- Encounter: [id number]  Primary Care Provider: Levy Ingram DO   Date and time admitted to hospital: 12/31/2021  2:01 PM    * Acute respiratory failure with hypoxia [facility])  Assessment & Plan  Noted to be saturating 90% on 5 L at skilled nursing facility and requiring 8 L during transportation to be in ER  On presentation, saturating in mid 90s on 10 L of supplemental oxygen  Now weaned to RA,satting mid 80s  Chest x-ray with evidence of left-sided pneumonia  Noted D-dimer to be elevated at 11 55 on 01/02, started heparin drip as per COVID protocol  Repeat D-dimer 1/3 0 79  Query accuracy of  D dimer value on 1/2 as other D-dimer levels were low  Bilateral lower extremity venous duplex negative for DVT  CTA of chest no PE  Patient had episode of hemoptysis  Now resolved  Discontinued heparin drip  Transitioned to Lovenox 30mg subQ every 12 hours per protocol  Continue Incentive spirometry  Currently saturating adequately on room air at rest  · Monitor respiratory status  · COVID treatment as below    Pneumonia due to COVID-19 virus  Assessment & Plan  Pneumonia due to COVID-19 with superimposed pneumococcal pneumonia  Presented with increasing shortness of breath, cough, hypoxia  Moderate disease based on oxygen 10 L requirement on presentation  CXR - evidence of left-sided infiltrate  SARS-CoV-2 PCR positive-12/28, subsequently received monoclonal sotrovimab 12/29  CTA chest showed - Consolidation and near complete collapse of the left lower lobe  Patchy groundglass opacities in the left upper lobe  Right lung clear  No pulmonary embolism  Clinically improving, now on room air  Coughing up bloody sputum/small clot during hospitalization-no further hemoptysis  Medication treatment started per COVID protocol:  · Dexamethasone 6 mg IV daily for 10 days    Consider discontinue in the morning as patient is on RA now  · Completed Remdesivir   · Discontinued heparin drip, change to Lovenox 30 subQ b i d  As above  · Continue Atorvastatin  · Initially started on IV cefepime,transitioned to ceftriaxone given positive pneumococcal antigen,day # 7, completing 7 day today  · Trend inflammatory markers, CRP trending down, D-dimer normalized  · Troponin-negative, proBNP -elevated   · Trended procalcitonin, negative x 3  · Sputum culture showed mixed respiratory johnny  · Increase activity and mobilization as tolerated  · Incentive spirometer and Acapella  · Check BMP, Mag in the morning  · Chest x-ray today  · Speech eval due to cough with meals  -follow-up recommendation, downgraded to dysphagia 3          Results from last 7 days   Lab Units 01/05/22  0530 01/04/22  0905 01/03/22  0525 01/02/22  0555 01/01/22  0516 12/31/21  1446   CRP mg/L 7 9* 8 3* 16 4* 30 3* 97 7*  --    D-DIMER QUANTITATIVE ug/ml FEU 0 43  --  0 79* 11 55* 1 51* 1 98*      Results from last 7 days   Lab Units 01/05/22  0530 01/04/22  1313 01/02/22  0557 12/31/21  1446   PROCALCITONIN ng/ml 0 10 0 09 0 07 0 19            Atrial fibrillation (HCC)  Assessment & Plan  Continue aspirin, increased to 162mg p o  Daily as she was taking in rehab  On sotalol, bradycardia noted at rest   Decreased sotalol to daily  Increase back to b i d due to bigeminy trigeminy on telemetry  Previously declined anticoagulation due to allergy/intolerance to anticoagulants  Optimize electrolytes     EKG today,will follow up    Anemia  Assessment & Plan  Noted to be downgoing during hospitalization now stabilized  No further evidence of overt bleeding now  · Monitor hemoglobin  · Monitor signs and symptoms of overt bleeding      History of breast cancer  Assessment & Plan  Supportive care    Type 2 diabetes mellitus without complication, without long-term current use of insulin [facility])  Assessment & Plan  Lab Results   Component Value Date HGBA1C 6 0 (H) 2021       Recent Labs     22  1148 22  1635 22  0731   POCGLU 172* 166* 219* 189*       Blood Sugar Average: Last 72 hrs:  Hold metformin  Hyperglycemia secondary to steroids  Monitor p o  Intake  Monitor on sliding scale  Continue low-dose Lantus due to hyperglycemia    Essential hypertension  Assessment & Plan  Continue lisinopril and Norvasc with holding parameters  BP stable  Monitor    Multiple sclerosis (HCC)  Assessment & Plan  PT/OT, follow-up on recommendations  Patient is wheelchair-bound baseline  , awaiting rehab    Mixed hyperlipidemia  Assessment & Plan  Substitute home simvastatin to Lipitor 40 mg q h s  VTE Pharmacologic Prophylaxis:   Pharmacologic: Enoxaparin (Lovenox)  Mechanical VTE Prophylaxis in Place: Yes    Patient Centered Rounds: I have performed bedside rounds with nursing staff today  Discussions with Specialists or Other Care Team Provider: yes    Education and Discussions with Family / Patient: yes    Time Spent for Care: 20 minutes  More than 50% of total time spent on counseling and coordination of care as described above  Current Length of Stay: 7 day(s)    Current Patient Status: Inpatient   Certification Statement: The patient will continue to require additional inpatient hospital stay due to Pneumonia    Discharge Plan:  Discharge on  once bed available in rehab    Code Status: Level 3 - DNAR and DNI      Subjective:   Patient reports feeling better  Reports coughing up whitish phlegm  Denies chest pain, headache, dizziness, nausea vomiting, diarrhea, constipation, fever, chills  Objective:     Vitals:   Temp (24hrs), Av °F (36 7 °C), Min:98 °F (36 7 °C), Max:98 °F (36 7 °C)    Temp:  [98 °F (36 7 °C)] 98 °F (36 7 °C)  HR:  [63-85] 63  Resp:  [16-18] 16  BP: (128-136)/(63-74) 136/73  SpO2:  [95 %-97 %] 95 %  Body mass index is 30 28 kg/m²       Input and Output Summary (last 24 hours): Intake/Output Summary (Last 24 hours) at 1/7/2022 1536  Last data filed at 1/7/2022 1000  Gross per 24 hour   Intake --   Output 800 ml   Net -800 ml       Physical Exam:     Physical Exam  Vitals and nursing note reviewed  Constitutional:       Appearance: She is well-developed  She is obese  HENT:      Head: Normocephalic and atraumatic  Neck:      Thyroid: No thyromegaly  Vascular: No JVD  Trachea: No tracheal deviation  Cardiovascular:      Rate and Rhythm: Normal rate and regular rhythm  Pulmonary:      Effort: Pulmonary effort is normal  No respiratory distress  Breath sounds: No wheezing or rales  Comments: On room air, satting high 90s  Abdominal:      General: Bowel sounds are normal  There is no distension  Palpations: Abdomen is soft  Tenderness: There is no abdominal tenderness  There is no guarding  Musculoskeletal:         General: Swelling present  Cervical back: Neck supple  Comments: Left pedal edema, which is chronic   Skin:     General: Skin is warm and dry  Neurological:      General: No focal deficit present  Mental Status: She is alert and oriented to person, place, and time  Comments: Follows commands  Psychiatric:         Mood and Affect: Mood normal          Judgment: Judgment normal          Additional Data:     Labs:    Results from last 7 days   Lab Units 01/07/22  0539 01/02/22  0555 01/01/22  0515   WBC Thousand/uL 7 60   < > 7 76   HEMOGLOBIN g/dL 9 5*   < > 12 4   HEMATOCRIT % 31 6*   < > 40 5   PLATELETS Thousands/uL 206   < > 204   NEUTROS PCT %  --   --  79*   LYMPHS PCT %  --   --  14   MONOS PCT %  --   --  7   EOS PCT %  --   --  0    < > = values in this interval not displayed       Results from last 7 days   Lab Units 01/07/22  0539 01/06/22  0621 01/05/22  0530   POTASSIUM mmol/L 4 5   < > 4 4   CHLORIDE mmol/L 109*   < > 107   CO2 mmol/L 26   < > 22   BUN mg/dL 27*   < > 37*   CREATININE mg/dL 0 62   < > 0  63   CALCIUM mg/dL 7 9*   < > 7 8*   ALK PHOS U/L  --   --  77   ALT U/L  --   --  22   AST U/L  --   --  10    < > = values in this interval not displayed  Results from last 7 days   Lab Units 01/02/22  1135   INR  1 10       * I Have Reviewed All Lab Data Listed Above  * Additional Pertinent Lab Tests Reviewed:  Kelly De La O Admission Reviewed    Imaging:    Imaging Reports Reviewed Today Include:  None  Imaging Personally Reviewed by Myself Includes:  None    Recent Cultures (last 7 days):     Results from last 7 days   Lab Units 01/01/22  0044 12/31/21 2057   SPUTUM CULTURE  2+ Growth of   --    GRAM STAIN RESULT  No Epithelial cells seen*  No polys seen*  1+ Gram positive cocci in pairs*  Rare Gram positive cocci in clusters*  --    LEGIONELLA URINARY ANTIGEN   --  Negative       Last 24 Hours Medication List:   Current Facility-Administered Medications   Medication Dose Route Frequency Provider Last Rate    acetaminophen  650 mg Oral Q6H PRN Vasiliy Alex MD      amLODIPine  5 mg Oral Daily JAMISON Nair      [START ON 1/8/2022] aspirin  162 mg Oral Daily JAMISON Nair      atorvastatin  40 mg Oral Daily With Carly Page MD      cefTRIAXone  1,000 mg Intravenous Once JAMISON Nair      dexamethasone  6 mg Intravenous Q24H Vasiliy Alex MD      docusate sodium  100 mg Oral BID JAMISON Nair      enoxaparin  30 mg Subcutaneous Q12H [facility] JAMISON Nair      escitalopram  10 mg Oral Daily Vasiliy Alex MD      guaiFENesin  600 mg Oral Q12H [facility] JAMISON Nair      insulin glargine  6 Units Subcutaneous HS JAMISON Nair      insulin lispro  1-5 Units Subcutaneous TID AC Vasiliy Alex MD      insulin lispro  1-5 Units Subcutaneous HS Vasiliy Alex MD      lisinopril  20 mg Oral BID Vasiliy Alex MD      multivitamin stress formula  1 tablet Oral Daily Vasiliy Alex MD      pantoprazole  40 mg Oral Early Morning Vasiliy Alex MD      polyethylene glycol  17 g Oral Daily PRN JAMISNO Nair      saccharomyces boulardii  250 mg Oral BID JAMISON Nair      senna  2 tablet Oral HS JAMISON Nair      sotalol  80 mg Oral Q12H [address], JAMISON          Today, Patient Was Seen By: JAMISON Keyes    ** Please Note: Dragon 360 Dictation voice to text software may have been used in the creation of this document   **

## 2022-01-07 NOTE — PROGRESS NOTES
Pastoral Care Progress Note    2022  Patient: Gudelia Jenkins : 1935  Admission Date & Time: 2021 1401  MRN: [id number] [facility]: [id number]                     Chaplaincy Interventions Utilized:   Relationship Building: Cultivated a relationship of care and support  Patient was distressed because said she was terribly cold  The thermostat was about as high as it goes but her room was just 65 degrees  I got additional blankets for her  I also offered a prayer for peace and healing     Ritual: Provided prayer     22 1200   Clinical Encounter Type   Visited With Patient   Routine Visit Follow-up   Referral From Nurse   Referral To    Anglican Encounters   Anglican Needs Prayer

## 2022-01-07 NOTE — PROGRESS NOTES
Progress Note - Pulmonary   Shanae Harrison 82 y o  female MRN: [id number]  Unit/Bed#: [address] [place] [address] Encounter: [id number]    Assessment:  Left lower lobe pneumococcal pneumonia has improved  Patient completed 7 days of antibiotic therapy yesterday   Acute hypoxemic respiratory failure resolved  COVID-19 pneumonia affecting left upper lobe improved  Probable partial atelectasis left lower lobe due to pneumonia  Multiple sclerosis  Plan:  Follow-up chest x-ray pending to see if it improved aeration left lower lobe  Continue IV dexamethasone 6 mg every 24 hours per  Protocol  Patient encouraged to use her incentive spirometer      Subjective:   Feels better  Cough is better    Objective:     Vitals: Blood pressure 122/59, pulse 92, temperature 97 7 °F (36 5 °C), temperature source Oral, resp  rate 17, height 5' 6" (1 676 m), weight 85 1 kg (187 lb 9 8 oz), SpO2 96 %  ,Body mass index is 30 28 kg/m²  Intake/Output Summary (Last 24 hours) at 1/7/2022 1719  Last data filed at 1/7/2022 1000  Gross per 24 hour   Intake --   Output 800 ml   Net -800 ml       Physical Exam: Physical Exam  Vitals reviewed  Constitutional:       General: She is not in acute distress  Appearance: She is well-developed  Comments: Room air O2 saturation 96%   HENT:      Head: Normocephalic  Right Ear: External ear normal       Left Ear: External ear normal       Nose: Nose normal       Mouth/Throat:      Mouth: Mucous membranes are moist       Pharynx: Oropharynx is clear  No oropharyngeal exudate  Eyes:      Conjunctiva/sclera: Conjunctivae normal       Pupils: Pupils are equal, round, and reactive to light  Neck:      Vascular: No JVD  Trachea: No tracheal deviation  Cardiovascular:      Rate and Rhythm: Normal rate and regular rhythm  Heart sounds: Normal heart sounds     Pulmonary:      Effort: Pulmonary effort is normal       Comments: Lung sounds are clear  Abdominal:      General: There is no distension  Palpations: Abdomen is soft  Tenderness: There is no abdominal tenderness  There is no guarding  Musculoskeletal:      Cervical back: Neck supple  Comments: No edema   Lymphadenopathy:      Cervical: No cervical adenopathy  Skin:     General: Skin is warm and dry  Findings: No rash  Neurological:      Mental Status: She is alert and oriented to person, place, and time  Psychiatric:         Behavior: Behavior normal          Thought Content: Thought content normal           Labs: I have personally reviewed pertinent lab results  , ABG: No results found for: PHART, HIX5DTI, PO2ART, ZBQ6SWL, J6BPWPSL, BEART, SOURCE, BNP: No results found for: BNP, CBC:   Lab Results   Component Value Date    WBC 7 60 01/07/2022    HGB 9 5 (L) 01/07/2022    HCT 31 6 (L) 01/07/2022    MCV 92 01/07/2022     01/07/2022    MCH 27 8 01/07/2022    MCHC 30 1 (L) 01/07/2022    RDW 15 9 (H) 01/07/2022    MPV 11 8 01/07/2022    NRBC 0 01/01/2022   , CMP:   Lab Results   Component Value Date     12/21/2017    K 4 5 01/07/2022    K 4 9 10/29/2019     (H) 01/07/2022     10/29/2019    CO2 26 01/07/2022    CO2 25 10/29/2019    BUN 27 (H) 01/07/2022    BUN 17 10/29/2019    CREATININE 0 62 01/07/2022    CREATININE 0 76 12/21/2017    GLUCOSE 204 (H) 12/21/2017    CALCIUM 7 9 (L) 01/07/2022    CALCIUM 9 5 12/21/2017    AST 10 01/05/2022    AST 13 10/29/2019    ALT 22 01/05/2022    ALT 13 10/29/2019    ALKPHOS 77 01/05/2022    ALKPHOS 100 12/21/2017    PROT 7 4 12/21/2017    BILITOT 0 3 12/21/2017    EGFR 83 01/07/2022   , PT/INR:   Lab Results   Component Value Date    INR 1 10 01/02/2022   , Troponin: No results found for: TROPONIN    Imaging and other studies: I have personally reviewed pertinent reports     and I have personally reviewed pertinent films in PACS

## 2022-01-08 LAB
GLUCOSE SERPL-MCNC: 128 MG/DL (ref 65–140)
GLUCOSE SERPL-MCNC: 190 MG/DL (ref 65–140)
GLUCOSE SERPL-MCNC: 215 MG/DL (ref 65–140)
GLUCOSE SERPL-MCNC: 234 MG/DL (ref 65–140)

## 2022-01-08 PROCEDURE — 99232 SBSQ HOSP IP/OBS MODERATE 35: CPT | Performed by: NURSE PRACTITIONER

## 2022-01-08 PROCEDURE — 82948 REAGENT STRIP/BLOOD GLUCOSE: CPT

## 2022-01-08 RX ADMIN — SOTALOL HYDROCHLORIDE 80 MG: 80 TABLET ORAL at 23:26

## 2022-01-08 RX ADMIN — LISINOPRIL 20 MG: 20 TABLET ORAL at 19:12

## 2022-01-08 RX ADMIN — ENOXAPARIN SODIUM 30 MG: 30 INJECTION SUBCUTANEOUS at 09:02

## 2022-01-08 RX ADMIN — ESCITALOPRAM OXALATE 10 MG: 10 TABLET ORAL at 09:03

## 2022-01-08 RX ADMIN — DEXAMETHASONE SODIUM PHOSPHATE 6 MG: 4 INJECTION INTRA-ARTICULAR; INTRALESIONAL; INTRAMUSCULAR; INTRAVENOUS; SOFT TISSUE at 19:12

## 2022-01-08 RX ADMIN — B-COMPLEX W/ C & FOLIC ACID TAB 1 TABLET: TAB at 09:04

## 2022-01-08 RX ADMIN — LISINOPRIL 20 MG: 20 TABLET ORAL at 12:41

## 2022-01-08 RX ADMIN — INSULIN LISPRO 2 UNITS: 100 INJECTION, SOLUTION INTRAVENOUS; SUBCUTANEOUS at 23:28

## 2022-01-08 RX ADMIN — Medication 250 MG: at 19:12

## 2022-01-08 RX ADMIN — ENOXAPARIN SODIUM 30 MG: 30 INJECTION SUBCUTANEOUS at 23:00

## 2022-01-08 RX ADMIN — INSULIN GLARGINE 6 UNITS: 100 INJECTION, SOLUTION SUBCUTANEOUS at 23:28

## 2022-01-08 RX ADMIN — AMLODIPINE BESYLATE 5 MG: 5 TABLET ORAL at 09:08

## 2022-01-08 RX ADMIN — GUAIFENESIN 600 MG: 600 TABLET, EXTENDED RELEASE ORAL at 23:26

## 2022-01-08 RX ADMIN — SOTALOL HYDROCHLORIDE 80 MG: 80 TABLET ORAL at 09:08

## 2022-01-08 RX ADMIN — INSULIN LISPRO 1 UNITS: 100 INJECTION, SOLUTION INTRAVENOUS; SUBCUTANEOUS at 13:07

## 2022-01-08 RX ADMIN — PANTOPRAZOLE SODIUM 40 MG: 40 TABLET, DELAYED RELEASE ORAL at 06:53

## 2022-01-08 RX ADMIN — ATORVASTATIN CALCIUM 40 MG: 40 TABLET, FILM COATED ORAL at 19:12

## 2022-01-08 RX ADMIN — GUAIFENESIN 600 MG: 600 TABLET, EXTENDED RELEASE ORAL at 09:03

## 2022-01-08 RX ADMIN — Medication 250 MG: at 09:04

## 2022-01-08 RX ADMIN — INSULIN LISPRO 1 UNITS: 100 INJECTION, SOLUTION INTRAVENOUS; SUBCUTANEOUS at 09:01

## 2022-01-08 RX ADMIN — ASPIRIN 81 MG CHEWABLE TABLET 162 MG: 81 TABLET CHEWABLE at 09:02

## 2022-01-08 NOTE — PROGRESS NOTES
Julián 47  Progress Note - Lizzie Recinos 1935, 82 y o  female MRN: [id number]  Unit/Bed#: [address]- Encounter: [id number]  Primary Care Provider: Moses Chávez DO   Date and time admitted to hospital: 12/31/2021  2:01 PM    * Acute respiratory failure with hypoxia [facility])  Assessment & Plan  Noted to be saturating 90% on 5 L at skilled nursing facility and requiring 8 L during transportation to be in ER  On presentation, saturating in mid 90s on 10 L of supplemental oxygen  Now weaned to RA,satting mid 80s  Chest x-ray with evidence of left-sided pneumonia  Noted D-dimer to be elevated at 11 55 on 01/02, started heparin drip as per COVID protocol  Repeat D-dimer 1/3 0 79  Query accuracy of  D dimer value on 1/2 as other D-dimer levels were low  Bilateral lower extremity venous duplex negative for DVT  CTA of chest no PE  Patient had episode of hemoptysis  Now resolved  Discontinued heparin drip  Transitioned to Lovenox 30mg subQ every 12 hours per protocol  Continue Incentive spirometry  Currently saturating adequately on room air at rest  · Monitor respiratory status  · COVID treatment as below    Pneumonia due to COVID-19 virus  Assessment & Plan  Pneumonia due to COVID-19 with superimposed pneumococcal pneumonia  Presented with increasing shortness of breath, cough, hypoxia  Moderate disease based on oxygen 10 L requirement on presentation  CXR - evidence of left-sided infiltrate  SARS-CoV-2 PCR positive-12/28, subsequently received monoclonal sotrovimab 12/29  CTA chest showed - Consolidation and near complete collapse of the left lower lobe  Patchy groundglass opacities in the left upper lobe  Right lung clear  No pulmonary embolism  Repeat chest x-ray yesterday shows improvement  Clinically improving, now on room air     Coughing up bloody sputum/small clot during hospitalization-no further hemoptysis  Medication treatment started per COVID protocol:  · Dexamethasone 6 mg IV daily for 10 days  Consider discontinue in the morning as patient is on RA now  · Completed Remdesivir   · Discontinued heparin drip, change to Lovenox 30 subQ b i d  As above  · Continue Atorvastatin  · Initially started on IV cefepime,transitioned to ceftriaxone given positive pneumococcal antigen,day # 7, completing 7 day today  · Trend inflammatory markers, CRP trending down, D-dimer normalized  · Troponin-negative, proBNP -elevated   · Trended procalcitonin, negative x 3  · Sputum culture showed mixed respiratory johnny  · Increase activity and mobilization as tolerated  · Incentive spirometer and Acapella  · Check BMP, Mag in the morning  · Speech eval due to cough with meals  -follow-up recommendation, downgraded to dysphagia 3          Results from last 7 days   Lab Units 01/05/22  0530 01/04/22  0905 01/03/22  0525 01/02/22  0555   CRP mg/L 7 9* 8 3* 16 4* 30 3*   D-DIMER QUANTITATIVE ug/ml FEU 0 43  --  0 79* 11 55*      Results from last 7 days   Lab Units 01/05/22  0530 01/04/22  1313 01/02/22  0557   PROCALCITONIN ng/ml 0 10 0 09 0 07            Atrial fibrillation (HCC)  Assessment & Plan  Continue aspirin, increased to 162mg p o  Daily as she was taking in rehab  On sotalol, bradycardia noted at rest   Decreased sotalol to daily  Increased back to b i d due to bigeminy trigeminy on telemetry  Previously declined anticoagulation due to allergy/intolerance to anticoagulants  Optimize electrolytes  EKG done yesterday, cannot be located  EKG on 1/5 showed sinus rhythm with frequent PVCs in a pattern of bigeminy, essentially unchanged from admitting EKG      Anemia  Assessment & Plan  Noted to be downgoing during hospitalization now stabilized  No further evidence of overt bleeding now  · Monitor hemoglobin  · Monitor signs and symptoms of overt bleeding      History of breast cancer  Assessment & Plan  Supportive care    Type 2 diabetes mellitus without complication, without long-term current use of insulin [facility])  Assessment & Plan  Lab Results   Component Value Date    HGBA1C 6 0 (H) 2021       Recent Labs     22  2106 22  0858 22  1052 22  1717   POCGLU 343* 215* 190* 128       Blood Sugar Average: Last 72 hrs:  Hold metformin  Hyperglycemia secondary to steroids  Monitor p o  Intake  Monitor on sliding scale  Continue low-dose Lantus due to hyperglycemia    Essential hypertension  Assessment & Plan  Continue lisinopril and Norvasc with holding parameters  BP stable  Monitor    Multiple sclerosis (HCC)  Assessment & Plan  PT/OT, follow-up on recommendations  Patient is wheelchair-bound baseline  , awaiting rehab    Mixed hyperlipidemia  Assessment & Plan  Substitute home simvastatin to Lipitor 40 mg q h s  VTE Pharmacologic Prophylaxis:   Pharmacologic: Enoxaparin (Lovenox)  Mechanical VTE Prophylaxis in Place:  Yes    Patient Centered Rounds: I have performed bedside rounds with nursing staff today  Discussions with Specialists or Other Care Team Provider:  Yes    Education and Discussions with Family / Patient:  Yes    Time Spent for Care: 20 minutes  More than 50% of total time spent on counseling and coordination of care as described above  Current Length of Stay: 8 day(s)    Current Patient Status: Inpatient   Certification Statement: The patient will continue to require additional inpatient hospital stay due to COVID-19 pneumonia, pneumococcal pneumonia    Discharge Plan:  DC to rehab tomorrow if remains stable    Code Status: Level 3 - DNAR and DNI      Subjective:   Patient offered no complaints today  No issues per nursing  Reports coughing up clear phlegm now      Objective:     Vitals:   Temp (24hrs), Av 3 °F (36 8 °C), Min:98 1 °F (36 7 °C), Max:98 4 °F (36 9 °C)    Temp:  [98 1 °F (36 7 °C)-98 4 °F (36 9 °C)] 98 4 °F (36 9 °C)  HR:  [] 92  Resp:  [18] 18  BP: (132)/(60-62) 132/62  SpO2:  [90 %-100 %] 100 %  Body mass index is 30 28 kg/m²  Input and Output Summary (last 24 hours): Intake/Output Summary (Last 24 hours) at 1/8/2022 1824  Last data filed at 1/8/2022 0124  Gross per 24 hour   Intake --   Output 600 ml   Net -600 ml       Physical Exam:     Physical Exam  Vitals and nursing note reviewed  Constitutional:       Appearance: She is well-developed  She is obese  HENT:      Head: Normocephalic and atraumatic  Neck:      Thyroid: No thyromegaly  Vascular: No JVD  Trachea: No tracheal deviation  Cardiovascular:      Rate and Rhythm: Normal rate and regular rhythm  Heart sounds: Normal heart sounds  Pulmonary:      Effort: Pulmonary effort is normal  No respiratory distress  Breath sounds: No wheezing or rales  Comments: Room air, satting high 90s  Abdominal:      General: Bowel sounds are normal  There is no distension  Palpations: Abdomen is soft  Tenderness: There is no abdominal tenderness  There is no guarding  Musculoskeletal:         General: Swelling present  Cervical back: Neck supple  Comments: Left foot swelling which is chronic  Skin:     General: Skin is warm and dry  Neurological:      General: No focal deficit present  Mental Status: She is alert and oriented to person, place, and time     Psychiatric:         Mood and Affect: Mood normal          Judgment: Judgment normal          Additional Data:     Labs:    Results from last 7 days   Lab Units 01/07/22  0539   WBC Thousand/uL 7 60   HEMOGLOBIN g/dL 9 5*   HEMATOCRIT % 31 6*   PLATELETS Thousands/uL 206     Results from last 7 days   Lab Units 01/07/22  0539 01/06/22  0621 01/05/22  0530   POTASSIUM mmol/L 4 5   < > 4 4   CHLORIDE mmol/L 109*   < > 107   CO2 mmol/L 26   < > 22   BUN mg/dL 27*   < > 37*   CREATININE mg/dL 0 62   < > 0 63   CALCIUM mg/dL 7 9*   < > 7 8*   ALK PHOS U/L  --   --  77   ALT U/L  --   --  22   AST U/L  --   --  10    < > = values in this interval not displayed  Results from last 7 days   Lab Units 01/02/22  1135   INR  1 10       * I Have Reviewed All Lab Data Listed Above  * Additional Pertinent Lab Tests Reviewed: [address] Admission Reviewed    Imaging:    Imaging Reports Reviewed Today Include:  Chest x-ray  Imaging Personally Reviewed by Myself Includes:  Chest X ray    Recent Cultures (last 7 days):           Last 24 Hours Medication List:   Current Facility-Administered Medications   Medication Dose Route Frequency Provider Last Rate    acetaminophen  650 mg Oral Q6H PRN Abena Ortiz MD      amLODIPine  5 mg Oral Daily Cuiyin Yurik, JAMISON      aspirin  162 mg Oral Daily Cuiyin Yurik, CRNP      atorvastatin  40 mg Oral Daily With Mitzi Benjamin MD      dexamethasone  6 mg Intravenous Q24H Abena Ortiz MD      docusate sodium  100 mg Oral BID PRN Cuiyin Yurik, JAMISON      enoxaparin  30 mg Subcutaneous Q12H [address] Cuiyin Yurik, CRNP      escitalopram  10 mg Oral Daily Abena Ortiz MD      guaiFENesin  600 mg Oral Q12H [address], CRNP      insulin glargine  6 Units Subcutaneous HS Cuiady Munguia, CRLILY      insulin lispro  1-5 Units Subcutaneous TID AC Abena Ortiz MD      insulin lispro  1-5 Units Subcutaneous HS Abena Ortiz MD      lisinopril  20 mg Oral BID Abena Ortiz MD      multivitamin stress formula  1 tablet Oral Daily Abena Ortiz MD      pantoprazole  40 mg Oral Early Morning Abena Ortiz MD      polyethylene glycol  17 g Oral Daily PRN Cuiyimeka Reyesrik, CRLILY      saccharomyces boulardii  250 mg Oral BID Cuiyimeka Yurik, CRLILY      sotalol  80 mg Oral Q12H [address], CRNP          Today, Patient Was Seen By: JAMISON Pandya    ** Please Note: Dragon 360 Dictation voice to text software may have been used in the creation of this document   **

## 2022-01-08 NOTE — ASSESSMENT & PLAN NOTE
Continue aspirin, increased to 162mg p o  Daily as she was taking in rehab  On sotalol, bradycardia noted at rest   Decreased sotalol to daily  Increased back to b i d due to bigeminy trigeminy on telemetry  Previously declined anticoagulation due to allergy/intolerance to anticoagulants  Optimize electrolytes  EKG done yesterday, cannot be located  EKG on 1/5 showed sinus rhythm with frequent PVCs in a pattern of bigeminy, essentially unchanged from admitting EKG

## 2022-01-08 NOTE — PLAN OF CARE
Problem: MOBILITY - ADULT  Goal: Maintain or return to baseline ADL function  Description: INTERVENTIONS:  -  Assess patient's ability to carry out ADLs; assess patient's baseline for ADL function and identify physical deficits which impact ability to perform ADLs (bathing, care of mouth/teeth, toileting, grooming, dressing, etc )  - Assess/evaluate cause of self-care deficits   - Assess range of motion  - Assess patient's mobility; develop plan if impaired  - Assess patient's need for assistive devices and provide as appropriate  - Encourage maximum independence but intervene and supervise when necessary  - Involve family in performance of ADLs  - Assess for home care needs following discharge   - Consider OT consult to assist with ADL evaluation and planning for discharge  - Provide patient education as appropriate  Outcome: Progressing  Goal: Maintains/Returns to pre admission functional level  Description: INTERVENTIONS:  - Perform BMAT or MOVE assessment daily    - Set and communicate daily mobility goal to care team and patient/family/caregiver  - Collaborate with rehabilitation services on mobility goals if consulted  - Perform Range of Motion 2 times a day  - Reposition patient every 2 hours    - Dangle patient 2 times a day  - Stand patient 2 times a day  - Ambulate patient 2 times a day  - Out of bed to chair 2 times a day   - Out of bed for meals 2 times a day  - Out of bed for toileting  - Record patient progress and toleration of activity level   Outcome: Progressing     Problem: Prexisting or High Potential for Compromised Skin Integrity  Goal: Skin integrity is maintained or improved  Description: INTERVENTIONS:  - Identify patients at risk for skin breakdown  - Assess and monitor skin integrity  - Assess and monitor nutrition and hydration status  - Monitor labs   - Assess for incontinence   - Turn and reposition patient  - Assist with mobility/ambulation  - Relieve pressure over bony prominences  - Avoid friction and shearing  - Provide appropriate hygiene as needed including keeping skin clean and dry  - Evaluate need for skin moisturizer/barrier cream  - Collaborate with interdisciplinary team   - Patient/family teaching  - Consider wound care consult   Outcome: Progressing     Problem: Potential for Falls  Goal: Patient will remain free of falls  Description: INTERVENTIONS:  - Educate patient/family on patient safety including physical limitations  - Instruct patient to call for assistance with activity   - Consult OT/PT to assist with strengthening/mobility   - Keep Call bell within reach  - Keep bed low and locked with side rails adjusted as appropriate  - Keep care items and personal belongings within reach  - Initiate and maintain comfort rounds  - Make Fall Risk Sign visible to staff  - Offer Toileting every 2 Hours, in advance of need  - Initiate/Maintain bed alarm  - Apply yellow socks and bracelet for high fall risk patients  - Consider moving patient to room near nurses station  Outcome: Progressing     Problem: PAIN - ADULT  Goal: Verbalizes/displays adequate comfort level or baseline comfort level  Description: Interventions:  - Encourage patient to monitor pain and request assistance  - Assess pain using appropriate pain scale  - Administer analgesics based on type and severity of pain and evaluate response  - Implement non-pharmacological measures as appropriate and evaluate response  - Consider cultural and social influences on pain and pain management  - Notify physician/advanced practitioner if interventions unsuccessful or patient reports new pain  Outcome: Progressing     Problem: INFECTION - ADULT  Goal: Absence or prevention of progression during hospitalization  Description: INTERVENTIONS:  - Assess and monitor for signs and symptoms of infection  - Monitor lab/diagnostic results  - Monitor all insertion sites, i e  indwelling lines, tubes, and drains  - Monitor endotracheal if appropriate and nasal secretions for changes in amount and color  - [place] appropriate cooling/warming therapies per order  - Administer medications as ordered  - Instruct and encourage patient and family to use good hand hygiene technique  - Identify and instruct in appropriate isolation precautions for identified infection/condition  Outcome: Progressing     Problem: DISCHARGE PLANNING  Goal: Discharge to home or other facility with appropriate resources  Description: INTERVENTIONS:  - Identify barriers to discharge w/patient and caregiver  - Arrange for needed discharge resources and transportation as appropriate  - Identify discharge learning needs (meds, wound care, etc )  - Arrange for interpretive services to assist at discharge as needed  - Refer to Case Management Department for coordinating discharge planning if the patient needs post-hospital services based on physician/advanced practitioner order or complex needs related to functional status, cognitive ability, or social support system  Outcome: Progressing     Problem: Knowledge Deficit  Goal: Patient/family/caregiver demonstrates understanding of disease process, treatment plan, medications, and discharge instructions  Description: Complete learning assessment and assess knowledge base    Interventions:  - Provide teaching at level of understanding  - Provide teaching via preferred learning methods  Outcome: Progressing     Problem: RESPIRATORY - ADULT  Goal: Achieves optimal ventilation and oxygenation  Description: INTERVENTIONS:  - Assess for changes in respiratory status  - Assess for changes in mentation and behavior  - Position to facilitate oxygenation and minimize respiratory effort  - Oxygen administered by appropriate delivery if ordered  - Initiate smoking cessation education as indicated  - Encourage broncho-pulmonary hygiene including cough, deep breathe, Incentive Spirometry  - Assess the need for suctioning and aspirate as needed  - Assess and instruct to report SOB or any respiratory difficulty  - Respiratory Therapy support as indicated  Outcome: Progressing     Problem: Nutrition/Hydration-ADULT  Goal: Nutrient/Hydration intake appropriate for improving, restoring or maintaining nutritional needs  Description: Monitor and assess patient's nutrition/hydration status for malnutrition  Collaborate with interdisciplinary team and initiate plan and interventions as ordered  Monitor patient's weight and dietary intake as ordered or per policy  Utilize nutrition screening tool and intervene as necessary  Determine patient's food preferences and provide high-protein, high-caloric foods as appropriate       INTERVENTIONS:  - Monitor oral intake, urinary output, labs, and treatment plans  - Assess nutrition and hydration status and recommend course of action  - Evaluate amount of meals eaten  - Assist patient with eating if necessary   - Allow adequate time for meals  - Recommend/ encourage appropriate diets, oral nutritional supplements, and vitamin/mineral supplements  - Order, calculate, and assess calorie counts as needed  - Recommend, monitor, and adjust tube feedings and TPN/PPN based on assessed needs  - Assess need for intravenous fluids  - Provide specific nutrition/hydration education as appropriate  - Include patient/family/caregiver in decisions related to nutrition  Outcome: Progressing

## 2022-01-08 NOTE — CASE MANAGEMENT
Case Management Discharge Planning Note    Patient name Marvin Greenwood  Location [address] [facility]/3 [address]-* MRN [id number]  : 1935 Date 2022       Current Admission Date: 2021  Current Admission Diagnosis:Acute respiratory failure with hypoxia [facility])   Patient Active Problem List    Diagnosis Date Noted    Anemia 2022    Acute respiratory failure with hypoxia ([facility] ) 2021    Pneumonia due to COVID-19 virus 2021    Lower extremity weakness 2021    Current moderate episode of major depressive disorder without prior episode ([facility] ) 2020    Onychomycosis 2019    Tinea pedis of both feet 2019    Pain in both feet 2019    Chest pain 2019    Thyroid nodule 2019    Diabetic polyneuropathy associated with type 2 diabetes mellitus [facility] ) 03/15/2019    Atherosclerosis of native artery of both lower extremities ([facility] ) 03/15/2019    Granuloma of great toe 02/15/2018    History of breast cancer 2017    Essential hypertension 2017    Type 2 diabetes mellitus without complication, without long-term current use of insulin ([facility] ) 2017    Mixed hyperlipidemia 2017    Atrial fibrillation ([facility] ) 2017    Multiple sclerosis ([facility] ) 2017    Abnormal gait 10/08/2015    Urinary incontinence 08/15/2013    Arthropathy of multiple sites 2013      LOS (days): 8  Geometric Mean LOS (GMLOS) (days): 5 40  Days to GMLOS:-2 6     OBJECTIVE:  Risk of Unplanned Readmission Score: 24      Current admission status: Inpatient   Preferred Pharmacy:   [facility] [id number], [address] [id number]  Phone: [phone] Fax: [address] [address]  Phone: [phone] Fax: [phone]    Primary Care Provider: Gautam Hernandez DO    Primary Insurance: MEDICARE  Secondary Insurance: BLUE CROSS    DISCHARGE DETAILS:     CM contacted family/caregiver?: Yes  Were Treatment Team discharge recommendations reviewed with patient/caregiver?: Yes  Did patient/caregiver verbalize understanding of patient care needs?: Yes  Were patient/caregiver advised of the risks associated with not following Treatment Team discharge recommendations?: Yes    Contacts  Patient Contacts: Atilio Bailey (dtr)  Relationship to Patient[de-identified] Family  Contact Method: Phone  Phone Number: [phone]  Reason/Outcome: Emergency Contact,Discharge Planning,Continuity of Care    Would you like to participate in our [address] service program?  : No - Declined    Treatment Team Recommendation: SNF  Discharge Destination Plan[de-identified] SNF  Transport at Discharge : [facility]ulance     Number/Name of Dispatcher: Chema Kapoor by [employer] and Unit #): [facility]  ETA of Transport (Date): 01/09/22  ETA of Transport (Time): 1130     IMM Given (Date):: 01/09/22  IMM Given to[de-identified] Family (IMM reviewed with dtr  Dtr in agreement with discharge determination )    Accepting Facility Name, [address] : [facility]   Receiving Facility/Agency Phone Number: [phone]  Facility/Agency Fax Number: [phone]    Patient is planned for discharge back to [facility] tomorrow  SW confirmed with admissions that bed is available  Transport reserved for 1130 pickup with [facility]  Medical necessity form completed and provided to nursing  Dtr, SNF, Attending, and unit nurse made aware

## 2022-01-08 NOTE — ASSESSMENT & PLAN NOTE
Lab Results   Component Value Date    HGBA1C 6 0 (H) 11/24/2021       Recent Labs     01/07/22  2106 01/08/22  0858 01/08/22  1052 01/08/22  1717   POCGLU 343* 215* 190* 128       Blood Sugar Average: Last 72 hrs:  Hold metformin  Hyperglycemia secondary to steroids  Monitor p o   Intake  Monitor on sliding scale  Continue low-dose Lantus due to hyperglycemia

## 2022-01-08 NOTE — NURSING NOTE
Attempted one phlebotomy stick without success  Patient refused anymore attempts at blood work at this time

## 2022-01-08 NOTE — ASSESSMENT & PLAN NOTE
Pneumonia due to COVID-19 with superimposed pneumococcal pneumonia  Presented with increasing shortness of breath, cough, hypoxia  Moderate disease based on oxygen 10 L requirement on presentation  CXR - evidence of left-sided infiltrate  SARS-CoV-2 PCR positive-12/28, subsequently received monoclonal sotrovimab 12/29  CTA chest showed - Consolidation and near complete collapse of the left lower lobe  Patchy groundglass opacities in the left upper lobe  Right lung clear  No pulmonary embolism  Repeat chest x-ray yesterday shows improvement  Clinically improving, now on room air  Coughing up bloody sputum/small clot during hospitalization-no further hemoptysis  Medication treatment started per COVID protocol:  · Dexamethasone 6 mg IV daily for 10 days  Consider discontinue in the morning as patient is on RA now  · Completed Remdesivir   · Discontinued heparin drip, change to Lovenox 30 subQ b i d  As above  · Continue Atorvastatin  · Initially started on IV cefepime,transitioned to ceftriaxone given positive pneumococcal antigen,day # 7, completing 7 day today  · Trend inflammatory markers, CRP trending down, D-dimer normalized  · Troponin-negative, proBNP -elevated   · Trended procalcitonin, negative x 3  · Sputum culture showed mixed respiratory johnny  · Increase activity and mobilization as tolerated  · Incentive spirometer and Acapella  · Check BMP, Mag in the morning  · Speech eval due to cough with meals  -follow-up recommendation, downgraded to dysphagia 3          Results from last 7 days   Lab Units 01/05/22  0530 01/04/22  0905 01/03/22  0525 01/02/22  0555   CRP mg/L 7 9* 8 3* 16 4* 30 3*   D-DIMER QUANTITATIVE ug/ml FEU 0 43  --  0 79* 11 55*      Results from last 7 days   Lab Units 01/05/22  0530 01/04/22  1313 01/02/22  0557   PROCALCITONIN ng/ml 0 10 0 09 0 07

## 2022-01-09 VITALS
DIASTOLIC BLOOD PRESSURE: 77 MMHG | BODY MASS INDEX: 30.15 KG/M2 | WEIGHT: 187.61 LBS | SYSTOLIC BLOOD PRESSURE: 134 MMHG | TEMPERATURE: 97.9 F | RESPIRATION RATE: 20 BRPM | HEIGHT: 66 IN | OXYGEN SATURATION: 96 % | HEART RATE: 56 BPM

## 2022-01-09 PROBLEM — J96.01 ACUTE RESPIRATORY FAILURE WITH HYPOXIA (HCC): Status: RESOLVED | Noted: 2021-12-31 | Resolved: 2022-01-09

## 2022-01-09 LAB
ANION GAP SERPL CALCULATED.3IONS-SCNC: 7 MMOL/L (ref 4–13)
BUN SERPL-MCNC: 29 MG/DL (ref 5–25)
CALCIUM SERPL-MCNC: 7.8 MG/DL (ref 8.3–10.1)
CHLORIDE SERPL-SCNC: 109 MMOL/L (ref 100–108)
CO2 SERPL-SCNC: 26 MMOL/L (ref 21–32)
CREAT SERPL-MCNC: 0.61 MG/DL (ref 0.6–1.3)
CRP SERPL QL: 2.2 MG/L
D DIMER PPP FEU-MCNC: 0.48 UG/ML FEU
ERYTHROCYTE [DISTWIDTH] IN BLOOD BY AUTOMATED COUNT: 16.4 % (ref 11.6–15.1)
GFR SERPL CREATININE-BSD FRML MDRD: 83 ML/MIN/1.73SQ M
GLUCOSE SERPL-MCNC: 164 MG/DL (ref 65–140)
GLUCOSE SERPL-MCNC: 196 MG/DL (ref 65–140)
GLUCOSE SERPL-MCNC: 213 MG/DL (ref 65–140)
HCT VFR BLD AUTO: 30.3 % (ref 34.8–46.1)
HGB BLD-MCNC: 9.3 G/DL (ref 11.5–15.4)
MAGNESIUM SERPL-MCNC: 2.1 MG/DL (ref 1.6–2.6)
MCH RBC QN AUTO: 28 PG (ref 26.8–34.3)
MCHC RBC AUTO-ENTMCNC: 30.7 G/DL (ref 31.4–37.4)
MCV RBC AUTO: 91 FL (ref 82–98)
PLATELET # BLD AUTO: 231 THOUSANDS/UL (ref 149–390)
PMV BLD AUTO: 12.2 FL (ref 8.9–12.7)
POTASSIUM SERPL-SCNC: 4.6 MMOL/L (ref 3.5–5.3)
RBC # BLD AUTO: 3.32 MILLION/UL (ref 3.81–5.12)
SODIUM SERPL-SCNC: 142 MMOL/L (ref 136–145)
WBC # BLD AUTO: 9.49 THOUSAND/UL (ref 4.31–10.16)

## 2022-01-09 PROCEDURE — 82948 REAGENT STRIP/BLOOD GLUCOSE: CPT

## 2022-01-09 PROCEDURE — 85027 COMPLETE CBC AUTOMATED: CPT | Performed by: NURSE PRACTITIONER

## 2022-01-09 PROCEDURE — 85379 FIBRIN DEGRADATION QUANT: CPT | Performed by: NURSE PRACTITIONER

## 2022-01-09 PROCEDURE — 86140 C-REACTIVE PROTEIN: CPT | Performed by: NURSE PRACTITIONER

## 2022-01-09 PROCEDURE — 99239 HOSP IP/OBS DSCHRG MGMT >30: CPT | Performed by: NURSE PRACTITIONER

## 2022-01-09 PROCEDURE — 83735 ASSAY OF MAGNESIUM: CPT | Performed by: NURSE PRACTITIONER

## 2022-01-09 PROCEDURE — 80048 BASIC METABOLIC PNL TOTAL CA: CPT | Performed by: NURSE PRACTITIONER

## 2022-01-09 RX ORDER — FAMOTIDINE 20 MG/1
20 TABLET, FILM COATED ORAL
Refills: 0
Start: 2022-01-09

## 2022-01-09 RX ORDER — AMLODIPINE BESYLATE 5 MG/1
5 TABLET ORAL DAILY
Refills: 0
Start: 2022-01-10

## 2022-01-09 RX ORDER — GUAIFENESIN 600 MG
600 TABLET, EXTENDED RELEASE 12 HR ORAL EVERY 12 HOURS SCHEDULED
Refills: 0
Start: 2022-01-09 | End: 2022-01-16

## 2022-01-09 RX ORDER — DOCUSATE SODIUM 100 MG/1
100 CAPSULE, LIQUID FILLED ORAL 2 TIMES DAILY PRN
Refills: 0
Start: 2022-01-09

## 2022-01-09 RX ORDER — SACCHAROMYCES BOULARDII 250 MG
250 CAPSULE ORAL 2 TIMES DAILY
Refills: 0
Start: 2022-01-09 | End: 2022-01-16

## 2022-01-09 RX ADMIN — AMLODIPINE BESYLATE 5 MG: 5 TABLET ORAL at 09:43

## 2022-01-09 RX ADMIN — Medication 250 MG: at 09:43

## 2022-01-09 RX ADMIN — ASPIRIN 81 MG CHEWABLE TABLET 162 MG: 81 TABLET CHEWABLE at 09:43

## 2022-01-09 RX ADMIN — ENOXAPARIN SODIUM 30 MG: 30 INJECTION SUBCUTANEOUS at 09:43

## 2022-01-09 RX ADMIN — SOTALOL HYDROCHLORIDE 80 MG: 80 TABLET ORAL at 09:43

## 2022-01-09 RX ADMIN — PANTOPRAZOLE SODIUM 40 MG: 40 TABLET, DELAYED RELEASE ORAL at 07:34

## 2022-01-09 RX ADMIN — GUAIFENESIN 600 MG: 600 TABLET, EXTENDED RELEASE ORAL at 09:43

## 2022-01-09 RX ADMIN — ESCITALOPRAM OXALATE 10 MG: 10 TABLET ORAL at 09:43

## 2022-01-09 RX ADMIN — LISINOPRIL 20 MG: 20 TABLET ORAL at 09:43

## 2022-01-09 RX ADMIN — B-COMPLEX W/ C & FOLIC ACID TAB 1 TABLET: TAB at 09:43

## 2022-01-09 RX ADMIN — INSULIN LISPRO 1 UNITS: 100 INJECTION, SOLUTION INTRAVENOUS; SUBCUTANEOUS at 07:39

## 2022-01-09 NOTE — ASSESSMENT & PLAN NOTE
Noted to be saturating 90% on 5 L at skilled nursing facility and requiring 8 L during transportation to be in ER  On presentation, saturating in mid 90s on 10 L of supplemental oxygen  Now weaned to RA,satting mid 80s  Chest x-ray with evidence of left-sided pneumonia  Noted D-dimer to be elevated at 11 55 on 01/02, started heparin drip as per COVID protocol  Repeat D-dimer 1/3 0 79  Query accuracy of  D dimer value on 1/2 as other D-dimer levels were low  Bilateral lower extremity venous duplex negative for DVT  CTA of chest no PE  Patient had episode of hemoptysis while on heparin drip  Now resolved  Discontinued heparin drip  Transitioned to Lovenox 30mg subQ every 12 hours per protocol    Continue Incentive spirometry  Currently saturating adequately on room air at rest  · Monitor respiratory status  · COVID treatment as below

## 2022-01-09 NOTE — PLAN OF CARE
Problem: MOBILITY - ADULT  Goal: Maintain or return to baseline ADL function  Description: INTERVENTIONS:  -  Assess patient's ability to carry out ADLs; assess patient's baseline for ADL function and identify physical deficits which impact ability to perform ADLs (bathing, care of mouth/teeth, toileting, grooming, dressing, etc )  - Assess/evaluate cause of self-care deficits   - Assess range of motion  - Assess patient's mobility; develop plan if impaired  - Assess patient's need for assistive devices and provide as appropriate  - Encourage maximum independence but intervene and supervise when necessary  - Involve family in performance of ADLs  - Assess for home care needs following discharge   - Consider OT consult to assist with ADL evaluation and planning for discharge  - Provide patient education as appropriate  Outcome: Progressing  Goal: Maintains/Returns to pre admission functional level  Description: INTERVENTIONS:  - Perform BMAT or MOVE assessment daily    - Set and communicate daily mobility goal to care team and patient/family/caregiver  - Collaborate with rehabilitation services on mobility goals if consulted  - Perform Range of Motion 2 times a day  - Reposition patient every 2 hours    - Dangle patient 2 times a day  - Stand patient 2 times a day  - Ambulate patient 2 times a day  - Out of bed to chair 2 times a day   - Out of bed for meals 2 times a day  - Out of bed for toileting  - Record patient progress and toleration of activity level   Outcome: Progressing     Problem: Prexisting or High Potential for Compromised Skin Integrity  Goal: Skin integrity is maintained or improved  Description: INTERVENTIONS:  - Identify patients at risk for skin breakdown  - Assess and monitor skin integrity  - Assess and monitor nutrition and hydration status  - Monitor labs   - Assess for incontinence   - Turn and reposition patient  - Assist with mobility/ambulation  - Relieve pressure over bony prominences  - Avoid friction and shearing  - Provide appropriate hygiene as needed including keeping skin clean and dry  - Evaluate need for skin moisturizer/barrier cream  - Collaborate with interdisciplinary team   - Patient/family teaching  - Consider wound care consult   Outcome: Progressing     Problem: Potential for Falls  Goal: Patient will remain free of falls  Description: INTERVENTIONS:  - Educate patient/family on patient safety including physical limitations  - Instruct patient to call for assistance with activity   - Consult OT/PT to assist with strengthening/mobility   - Keep Call bell within reach  - Keep bed low and locked with side rails adjusted as appropriate  - Keep care items and personal belongings within reach  - Initiate and maintain comfort rounds  - Make Fall Risk Sign visible to staff  - Offer Toileting every 2 Hours, in advance of need  - Initiate/Maintain bed alarm  - Apply yellow socks and bracelet for high fall risk patients  - Consider moving patient to room near nurses station  Outcome: Progressing     Problem: PAIN - ADULT  Goal: Verbalizes/displays adequate comfort level or baseline comfort level  Description: Interventions:  - Encourage patient to monitor pain and request assistance  - Assess pain using appropriate pain scale  - Administer analgesics based on type and severity of pain and evaluate response  - Implement non-pharmacological measures as appropriate and evaluate response  - Consider cultural and social influences on pain and pain management  - Notify physician/advanced practitioner if interventions unsuccessful or patient reports new pain  Outcome: Progressing     Problem: INFECTION - ADULT  Goal: Absence or prevention of progression during hospitalization  Description: INTERVENTIONS:  - Assess and monitor for signs and symptoms of infection  - Monitor lab/diagnostic results  - Monitor all insertion sites, i e  indwelling lines, tubes, and drains  - Monitor endotracheal if appropriate and nasal secretions for changes in amount and color  - [place] appropriate cooling/warming therapies per order  - Administer medications as ordered  - Instruct and encourage patient and family to use good hand hygiene technique  - Identify and instruct in appropriate isolation precautions for identified infection/condition  Outcome: Progressing     Problem: DISCHARGE PLANNING  Goal: Discharge to home or other facility with appropriate resources  Description: INTERVENTIONS:  - Identify barriers to discharge w/patient and caregiver  - Arrange for needed discharge resources and transportation as appropriate  - Identify discharge learning needs (meds, wound care, etc )  - Arrange for interpretive services to assist at discharge as needed  - Refer to Case Management Department for coordinating discharge planning if the patient needs post-hospital services based on physician/advanced practitioner order or complex needs related to functional status, cognitive ability, or social support system  Outcome: Progressing     Problem: Knowledge Deficit  Goal: Patient/family/caregiver demonstrates understanding of disease process, treatment plan, medications, and discharge instructions  Description: Complete learning assessment and assess knowledge base    Interventions:  - Provide teaching at level of understanding  - Provide teaching via preferred learning methods  Outcome: Progressing     Problem: RESPIRATORY - ADULT  Goal: Achieves optimal ventilation and oxygenation  Description: INTERVENTIONS:  - Assess for changes in respiratory status  - Assess for changes in mentation and behavior  - Position to facilitate oxygenation and minimize respiratory effort  - Oxygen administered by appropriate delivery if ordered  - Initiate smoking cessation education as indicated  - Encourage broncho-pulmonary hygiene including cough, deep breathe, Incentive Spirometry  - Assess the need for suctioning and aspirate as needed  - Assess and instruct to report SOB or any respiratory difficulty  - Respiratory Therapy support as indicated  Outcome: Progressing     Problem: Nutrition/Hydration-ADULT  Goal: Nutrient/Hydration intake appropriate for improving, restoring or maintaining nutritional needs  Description: Monitor and assess patient's nutrition/hydration status for malnutrition  Collaborate with interdisciplinary team and initiate plan and interventions as ordered  Monitor patient's weight and dietary intake as ordered or per policy  Utilize nutrition screening tool and intervene as necessary  Determine patient's food preferences and provide high-protein, high-caloric foods as appropriate       INTERVENTIONS:  - Monitor oral intake, urinary output, labs, and treatment plans  - Assess nutrition and hydration status and recommend course of action  - Evaluate amount of meals eaten  - Assist patient with eating if necessary   - Allow adequate time for meals  - Recommend/ encourage appropriate diets, oral nutritional supplements, and vitamin/mineral supplements  - Order, calculate, and assess calorie counts as needed  - Recommend, monitor, and adjust tube feedings and TPN/PPN based on assessed needs  - Assess need for intravenous fluids  - Provide specific nutrition/hydration education as appropriate  - Include patient/family/caregiver in decisions related to nutrition  Outcome: Progressing

## 2022-01-09 NOTE — PLAN OF CARE
Problem: MOBILITY - ADULT  Goal: Maintain or return to baseline ADL function  Description: INTERVENTIONS:  -  Assess patient's ability to carry out ADLs; assess patient's baseline for ADL function and identify physical deficits which impact ability to perform ADLs (bathing, care of mouth/teeth, toileting, grooming, dressing, etc )  - Assess/evaluate cause of self-care deficits   - Assess range of motion  - Assess patient's mobility; develop plan if impaired  - Assess patient's need for assistive devices and provide as appropriate  - Encourage maximum independence but intervene and supervise when necessary  - Involve family in performance of ADLs  - Assess for home care needs following discharge   - Consider OT consult to assist with ADL evaluation and planning for discharge  - Provide patient education as appropriate  Outcome: Progressing  Goal: Maintains/Returns to pre admission functional level  Description: INTERVENTIONS:  - Perform BMAT or MOVE assessment daily    - Set and communicate daily mobility goal to care team and patient/family/caregiver  - Collaborate with rehabilitation services on mobility goals if consulted  - Perform Range of Motion 2 times a day  - Reposition patient every 2 hours    - Dangle patient 2 times a day  - Stand patient 2 times a day  - Ambulate patient 2 times a day  - Out of bed to chair 3 times a day   - Out of bed for meals 3 times a day  - Out of bed for toileting  - Record patient progress and toleration of activity level   Outcome: Progressing     Problem: Prexisting or High Potential for Compromised Skin Integrity  Goal: Skin integrity is maintained or improved  Description: INTERVENTIONS:  - Identify patients at risk for skin breakdown  - Assess and monitor skin integrity  - Assess and monitor nutrition and hydration status  - Monitor labs   - Assess for incontinence   - Turn and reposition patient  - Assist with mobility/ambulation  - Relieve pressure over bony prominences  - Avoid friction and shearing  - Provide appropriate hygiene as needed including keeping skin clean and dry  - Evaluate need for skin moisturizer/barrier cream  - Collaborate with interdisciplinary team   - Patient/family teaching  - Consider wound care consult   Outcome: Progressing     Problem: Potential for Falls  Goal: Patient will remain free of falls  Description: INTERVENTIONS:  - Educate patient/family on patient safety including physical limitations  - Instruct patient to call for assistance with activity   - Consult OT/PT to assist with strengthening/mobility   - Keep Call bell within reach  - Keep bed low and locked with side rails adjusted as appropriate  - Keep care items and personal belongings within reach  - Initiate and maintain comfort rounds  - Make Fall Risk Sign visible to staff  - Offer Toileting every 2 Hours, in advance of need  - Initiate/Maintain bed alarm  - Apply yellow socks and bracelet for high fall risk patients  - Consider moving patient to room near nurses station  Outcome: Progressing     Problem: PAIN - ADULT  Goal: Verbalizes/displays adequate comfort level or baseline comfort level  Description: Interventions:  - Encourage patient to monitor pain and request assistance  - Assess pain using appropriate pain scale  - Administer analgesics based on type and severity of pain and evaluate response  - Implement non-pharmacological measures as appropriate and evaluate response  - Consider cultural and social influences on pain and pain management  - Notify physician/advanced practitioner if interventions unsuccessful or patient reports new pain  Outcome: Progressing     Problem: INFECTION - ADULT  Goal: Absence or prevention of progression during hospitalization  Description: INTERVENTIONS:  - Assess and monitor for signs and symptoms of infection  - Monitor lab/diagnostic results  - Monitor all insertion sites, i e  indwelling lines, tubes, and drains  - Monitor endotracheal if appropriate and nasal secretions for changes in amount and color  - [place] appropriate cooling/warming therapies per order  - Administer medications as ordered  - Instruct and encourage patient and family to use good hand hygiene technique  - Identify and instruct in appropriate isolation precautions for identified infection/condition  Outcome: Progressing     Problem: DISCHARGE PLANNING  Goal: Discharge to home or other facility with appropriate resources  Description: INTERVENTIONS:  - Identify barriers to discharge w/patient and caregiver  - Arrange for needed discharge resources and transportation as appropriate  - Identify discharge learning needs (meds, wound care, etc )  - Arrange for interpretive services to assist at discharge as needed  - Refer to Case Management Department for coordinating discharge planning if the patient needs post-hospital services based on physician/advanced practitioner order or complex needs related to functional status, cognitive ability, or social support system  Outcome: Progressing     Problem: Knowledge Deficit  Goal: Patient/family/caregiver demonstrates understanding of disease process, treatment plan, medications, and discharge instructions  Description: Complete learning assessment and assess knowledge base    Interventions:  - Provide teaching at level of understanding  - Provide teaching via preferred learning methods  Outcome: Progressing     Problem: RESPIRATORY - ADULT  Goal: Achieves optimal ventilation and oxygenation  Description: INTERVENTIONS:  - Assess for changes in respiratory status  - Assess for changes in mentation and behavior  - Position to facilitate oxygenation and minimize respiratory effort  - Oxygen administered by appropriate delivery if ordered  - Initiate smoking cessation education as indicated  - Encourage broncho-pulmonary hygiene including cough, deep breathe, Incentive Spirometry  - Assess the need for suctioning and aspirate as needed  - Assess and instruct to report SOB or any respiratory difficulty  - Respiratory Therapy support as indicated  Outcome: Progressing     Problem: Nutrition/Hydration-ADULT  Goal: Nutrient/Hydration intake appropriate for improving, restoring or maintaining nutritional needs  Description: Monitor and assess patient's nutrition/hydration status for malnutrition  Collaborate with interdisciplinary team and initiate plan and interventions as ordered  Monitor patient's weight and dietary intake as ordered or per policy  Utilize nutrition screening tool and intervene as necessary  Determine patient's food preferences and provide high-protein, high-caloric foods as appropriate       INTERVENTIONS:  - Monitor oral intake, urinary output, labs, and treatment plans  - Assess nutrition and hydration status and recommend course of action  - Evaluate amount of meals eaten  - Assist patient with eating if necessary   - Allow adequate time for meals  - Recommend/ encourage appropriate diets, oral nutritional supplements, and vitamin/mineral supplements  - Order, calculate, and assess calorie counts as needed  - Recommend, monitor, and adjust tube feedings and TPN/PPN based on assessed needs  - Assess need for intravenous fluids  - Provide specific nutrition/hydration education as appropriate  - Include patient/family/caregiver in decisions related to nutrition  Outcome: Progressing

## 2022-01-09 NOTE — ASSESSMENT & PLAN NOTE
Lab Results   Component Value Date    HGBA1C 6 0 (H) 11/24/2021       Recent Labs     01/08/22  1052 01/08/22  1717 01/08/22  2225 01/09/22  0738   POCGLU 190* 128 234* 196*       Blood Sugar Average: Last 72 hrs:  Metformin was held during her stay  Hyperglycemia secondary to steroids, patient was started on low-dose Lantus and sliding scale insulin  Resume metformin on discharge  Continue SSI a c  And HS for now    Diabetic diet

## 2022-01-09 NOTE — DISCHARGE SUMMARY
Julián 47  Discharge- Gudelia Jenkins 1935, 82 y o  female MRN: [id number]  Unit/Bed#: [address] [facility]- Encounter: [id number]  Primary Care Provider: Maynor Craft DO   Date and time admitted to hospital: 12/31/2021  2:01 PM    * Acute respiratory failure with hypoxia (HCC)-resolved as of 1/9/2022  Assessment & Plan  Noted to be saturating 90% on 5 L at skilled nursing facility and requiring 8 L during transportation to be in ER  On presentation, saturating in mid 90s on 10 L of supplemental oxygen  Now weaned to RA,satting mid 80s  Chest x-ray with evidence of left-sided pneumonia  Noted D-dimer to be elevated at 11 55 on 01/02, started heparin drip as per COVID protocol  Repeat D-dimer 1/3 0 79  Query accuracy of  D dimer value on 1/2 as other D-dimer levels were low  Bilateral lower extremity venous duplex negative for DVT  CTA of chest no PE  Patient had episode of hemoptysis while on heparin drip  Now resolved  Discontinued heparin drip  Transitioned to Lovenox 30mg subQ every 12 hours per protocol  Continue Incentive spirometry  Currently saturating adequately on room air at rest  · Monitor respiratory status  · COVID treatment as below    Pneumonia due to COVID-19 virus  Assessment & Plan  Pneumonia due to COVID-19 with superimposed pneumococcal pneumonia  Presented with increasing shortness of breath, cough, hypoxia  Moderate disease based on oxygen 10 L requirement on presentation, now on room air  CXR - evidence of left-sided infiltrate  SARS-CoV-2 PCR positive-12/28, subsequently received monoclonal sotrovimab 12/29  CTA chest showed - Consolidation and near complete collapse of the left lower lobe  Patchy groundglass opacities in the left upper lobe  Right lung clear  No pulmonary embolism  Repeat chest x-ray 1/7 showed improvement  Clinically improving, now on room air     Coughing up bloody sputum/small clot during hospitalization while on heparin drip-no further hemoptysis  Medication treatment started per COVID protocol:  · Received 9 days of Dexamethasone 6 mg IV daily  · Completed Remdesivir   · Discontinued heparin drip, changed to Lovenox 30 subQ b i d  As above  · Continue Atorvastatin  · Initially started on IV cefepime,transitioned to ceftriaxone given positive pneumococcal antigen, received Rocephin 7 days total   · Trend inflammatory markers, CRP and D-dimer normalized  · Troponin-negative, proBNP -elevated   · Trended procalcitonin, negative x 3  · Sputum culture showed mixed respiratory johnny  · Increase activity and mobilization as tolerated  · Continue Incentive spirometer and Acapella in rehab  · Speech eval due to cough with meals  -follow-up recommendation, downgraded to dysphagia 3  Tolerating well  · Discharge patient to rehab today  Continue Mucinex for 7 more days  Continue IS and Acapella in rehab  · Check CBC, BMP, Mag in 3 days  Results from last 7 days   Lab Units 01/09/22  0646 01/05/22  0530 01/04/22  0905 01/03/22  0525   CRP mg/L 2 2 7 9* 8 3* 16 4*   D-DIMER QUANTITATIVE ug/ml FEU 0 48 0 43  --  0 79*      Results from last 7 days   Lab Units 01/05/22  0530 01/04/22  1313   PROCALCITONIN ng/ml 0 10 0 09            Atrial fibrillation (HCC)  Assessment & Plan  Continue aspirin, increased to 162mg p o  Daily as she was taking in rehab  On sotalol, bradycardia noted at rest   Decreased sotalol to daily  Increased back to b i d due to bigeminy trigeminy on telemetry  Previously declined anticoagulation due to allergy/intolerance to anticoagulants  Optimize electrolytes  EKG on 1/5 showed sinus rhythm with frequent PVCs in a pattern of bigeminy, essentially unchanged from admitting EKG  EKG on 1/7 showed NSR  Check BMP, Mag in 3 days in rehab  Patient has not been seen by her cardiologist for a few years  Refer patient to Cardiology post discharge, to be seen in 2 weeks      Anemia  Assessment & Plan  Noted to be downgoing during hospitalization now stabilized  No further evidence of overt bleeding now  · Monitor hemoglobin  · Monitor signs and symptoms of overt bleeding  · Repeat CBC in 3 days in rehab      History of breast cancer  Assessment & Plan  Supportive care    Type 2 diabetes mellitus without complication, without long-term current use of insulin [facility])  Assessment & Plan  Lab Results   Component Value Date    HGBA1C 6 0 (H) 11/24/2021       Recent Labs     01/08/22  1052 01/08/22  1717 01/08/22  2225 01/09/22  0738   POCGLU 190* 128 234* 196*       Blood Sugar Average: Last 72 hrs:  Metformin was held during her stay  Hyperglycemia secondary to steroids, patient was started on low-dose Lantus and sliding scale insulin  Resume metformin on discharge  Continue SSI a c  And HS for now  Diabetic diet    Essential hypertension  Assessment & Plan  Continue lisinopril and Norvasc with holding parameters  BP stable  Monitor    Multiple sclerosis (HCC)  Assessment & Plan  PT/OT, follow-up on recommendations  Patient is wheelchair-bound baseline, discharge to rehab today  Mixed hyperlipidemia  Assessment & Plan  Substitute home simvastatin to Lipitor 40 mg q h s  Discharging Physician / Practitioner: JAMISON Braden  PCP: Bello Carcamo DO  Admission Date: 12/31/2021  Discharge Date: 01/09/22    Reason for Admission: Breathing Difficulty (Tested covid positive 4 days ago with pneumonia  Patient O2 sat was under 90% w/ 5L NC at care center   ALS had O2 at 96 w/ 8L)        Medical Problems             Resolved Problems  Date Reviewed: 1/9/2022          Resolved    * (Principal) Acute respiratory failure with hypoxia ([facility] [address] ) 1/9/2022     Resolved by  Patt Hoyt, [facility] Stay:  IP CONSULT TO PULMONOLOGY    Procedures Performed:     · None    Significant Findings / Test Results:     · As below  Results from last 7 days   Lab Units 01/09/22  0646 01/07/22  0539 01/06/22  0621   WBC Thousand/uL 9 49 7 60 7 09   HEMOGLOBIN g/dL 9 3* 9 5* 9 7*   PLATELETS Thousands/uL 231 206 213     Results from last 7 days   Lab Units 01/09/22  0646 01/07/22  0539 01/06/22  0621 01/05/22  0530 01/05/22  0530 01/04/22  0905 01/04/22  0905 01/03/22  0525 01/03/22  0525   SODIUM mmol/L 142 142 139   < > 138   < > 140   < > 142   POTASSIUM mmol/L 4 6 4 5 4 2   < > 4 4   < > 4 2   < > 4 0   CHLORIDE mmol/L 109* 109* 108   < > 107   < > 108   < > 108   CO2 mmol/L 26 26 23   < > 22   < > 25   < > 23   BUN mg/dL 29* 27* 29*   < > 37*   < > 33*   < > 38*   CREATININE mg/dL 0 61 0 62 0 48*   < > 0 63   < > 0 60   < > 0 61   CALCIUM mg/dL 7 8* 7 9* 7 8*   < > 7 8*   < > 7 8*   < > 8 0*   TOTAL BILIRUBIN mg/dL  --   --   --   --  0 16*  --  0 25  --  0 12*   ALK PHOS U/L  --   --   --   --  77  --  81  --  87   ALT U/L  --   --   --   --  22  --  28  --  26   AST U/L  --   --   --   --  10  --  18  --  14    < > = values in this interval not displayed  Results from last 7 days   Lab Units 01/02/22  1135   INR  1 10         Lab Results   Component Value Date/Time    HGBA1C 6 0 (H) 11/24/2021 06:19 AM    HGBA1C 6 5 (H) 10/29/2019 08:02 AM     Results from last 7 days   Lab Units 01/09/22  0738 01/08/22  2225 01/08/22  1717 01/08/22  1052 01/08/22  0858 01/07/22  2106 01/07/22  1657 01/07/22  1134 01/07/22  0722 01/06/22  2211 01/06/22  1648 01/06/22  1111   POC GLUCOSE mg/dl 196* 234* 128 190* 215* 343* 136 140 163* 240* 123 171*     Results from last 7 days   Lab Units 01/05/22  0530 01/04/22  1313   PROCALCITONIN ng/ml 0 10 0 09     Blood Culture:   Lab Results   Component Value Date    BLOODCX No Growth After 5 Days  12/31/2021    BLOODCX No Growth After 5 Days  12/31/2021    BLOODCX No Growth After 5 Days  11/23/2021    BLOODCX No Growth After 5 Days  11/23/2021    BLOODCX No Growth After 5 Days  02/27/2017    BLOODCX No Growth After 5 Days   02/27/2017     Urine Culture:   Lab Results   Component Value Date URINECX 40,000-49,000 cfu/ml  11/23/2021    URINECX >100,000 cfu/ml Pseudomonas aeruginosa 03/11/2017    URINECX No Growth <1000 cfu/mL 02/28/2017     Sputum Culture: No components found for: SPUTUMCX  Wound Culture: No results found for: WOUNDCULT     XR chest portable   Final Result by Watson Mittal MD (01/08 6073)      Left retrocardiac opacity persists though is decreased since December 31, 2021  Significant decrease in the left upper lung opacities  Workstation performed: [id number]         CTA chest pe study   Final Result by Dale Wilson MD (01/03 2107)   Addendum 1 of 1 by Dale Wilson MD (01/03 2107)   ADDENDUM:      ADDITIONAL FINDING:  Incidental 2 1 cm peripherally calcified nodule in    the left thyroid lobe identified on this CT  According to guidelines    published in the February 2015 white paper on incidental thyroid nodules    in the Journal of the [employer]    of Radiology Rogerio Cali), further evaluation with ultrasound may be considered  However, as the majority of incidental thyroid nodules are benign and    because small incidental thyroid malignancies typically demonstrate    indolent behavior, consideration of the    patient's life expectancy and possible comorbidities should be taken into    account prior to a decision to pursue further imaging  Final      1  No evidence of pulmonary embolus  2   Left lower lobe consolidation and collapse  3   Scattered groundglass opacities in the left upper lobe, possibly additional areas of pneumonia  4   Small left pleural effusion  Workstation performed: [id number]         VAS lower limb venous duplex study, complete bilateral   Final Result by Nessa Maciel MD (01/03 1642)      XR chest 1 view portable   Final Result by Shaan Astorga MD (12/31 9491)      Development of left lung pneumonia                    Workstation performed: [id number]                Incidental Findings: · None     Test Results Pending at Discharge (will require follow up): · None     Outpatient Tests Requested:  · BMP, CBC, Mag in 3 days    Complications:  None    Reason for Admission:   Chief Complaint   Patient presents with    Breathing Difficulty     Tested covid positive 4 days ago with pneumonia  Patient O2 sat was under 90% w/ 5L NC at care center  ALS had O2 at 80 w/ 8L       Hospital Course:     Per HPI: Julia Houston is a 82 y o  female patient with a PMH of a fib,HTN,HLD, breast cancer, MS, type 2 diabetes, anemia who originally presented to the hospital on 12/31/2021 due to COVID-19 pneumonia with superimposed pneumococcal pneumonia, acute respiratory failure  Patient received remdesivir, 7 days IV Rocephin, 9 days of IV dexamethasone  Patient was seen by Pulmonary in consultation  Patient improved with above treatment and supportive care with incentive spirometer/Acapella  Patient required a maximum of 10L oxygen via nasal cannula, weaned to room air  Repeat chest x-ray yesterday showed  significant improvement from admission x-ray  Patient was noted to be bradycardic in 40-50s overnight during her stay, sotalol was decreased daily then increased back to b i d  due to bigeminy and trigeminy on telemetry  EKG from 1/7 showed normal sinus rhythm, no PVCs  Referred patient to Cardiology on discharge as patient has not been seen by a  cardiologist for a few years  Discharge patient back to rehab today  Follow-up Pulmonary post discharge  Follow-up Cardiology post discharge  Spoke to patient's daughter over the phone yesterday, all questions answered  Hospital Course:    Please see above list of diagnoses and related plan for additional information  Condition at Discharge: good       Discharge Day Visit / Exam:     Subjective:  Patient denies SOB, reports cough with clear phlegm    Denies chest pain, headache, dizziness, nausea vomiting, diarrhea, constipation, fever, chills  Happy to be going back to rehab today  Vitals: Blood Pressure: 134/77 (01/09/22 0746)  Pulse: 56 (01/09/22 0746)  Temperature: 97 9 °F (36 6 °C) (01/09/22 0746)  Temp Source: Oral (01/09/22 0746)  Respirations: 20 (01/09/22 0746)  Height: 5' 6" (167 6 cm) (01/01/22 0215)  Weight - Scale: 85 1 kg (187 lb 9 8 oz) (01/05/22 0549)  SpO2: 96 % (01/09/22 0746)  Exam:   Physical Exam  Vitals and nursing note reviewed  Constitutional:       Appearance: She is well-developed  She is obese  HENT:      Head: Normocephalic and atraumatic  Neck:      Thyroid: No thyromegaly  Vascular: No JVD  Trachea: No tracheal deviation  Cardiovascular:      Rate and Rhythm: Regular rhythm  Comments: Pulse rate 102,regular  Pulmonary:      Effort: Pulmonary effort is normal  No respiratory distress  Breath sounds: Normal breath sounds  No wheezing or rales  Comments: On room air, satting high 90s  Abdominal:      General: Bowel sounds are normal  There is no distension  Palpations: Abdomen is soft  Tenderness: There is no abdominal tenderness  There is no guarding  Musculoskeletal:         General: Swelling present  Cervical back: Neck supple  Comments: Left foot bigger than right foot which is chronic  Skin:     General: Skin is warm and dry  Neurological:      General: No focal deficit present  Mental Status: She is alert and oriented to person, place, and time  Comments: Moves all extremities but weak  Psychiatric:         Mood and Affect: Mood normal          Judgment: Judgment normal            Discharge instructions/Information to patient and family:   See after visit summary for information provided to patient and family  Provisions for Follow-Up Care:  See after visit summary for information related to follow-up care and any pertinent home health orders        Disposition:     Hermilo Ward Leonid at [facility]    Planned Readmission:  None Discharge Statement:  I spent 40 minutes discharging the patient  This time was spent on the day of discharge  I had direct contact with the patient on the day of discharge  Greater than 50% of the total time was spent examining patient, answering all patient questions, arranging and discussing plan of care with patient as well as directly providing post-discharge instructions  Additional time then spent on discharge activities  Discharge Medications:  See after visit summary for reconciled discharge medications provided to patient and family        ** Please Note: This note has been constructed using a voice recognition system **

## 2022-01-09 NOTE — [place] UNIVERSAL TRANSFER FORM
[place] UNIVERSAL TRANSFER FOR[place]  (ALL ITEMS MUST BE COMPLETED)    1  TRANSFER FROM: [address]      TRANSFER TO: [facility]     2  DATE OF TRANSFER: 1/9/2022                        TIME OF TRANSFER: 1130    3  PATIENT NAME: Reyes Persia, E      YOB: 1935                             GENDER: female    4  LANGUAGE:   English    5  PHYSICIAN NAME:  Vanice GoodellDO                   PHONE: [phone]  CODE STATUS: Level 3 - DNAR and DNI        Out of Hospital DNR Attached: Yes    7  :                                      :  Extended Emergency Contact Information  Primary Emergency Contact: Sharon Jordan  Mobile Phone: [phone]  Relation: Daughter  Secondary Emergency Contact: Andreea Dan  Mobile Phone: [phone]  Relation: [address] Representative/Proxy:  No           Legal Guardian:  No             NAME OF:           HEALTH CARE REPRESENTATIVE/PROXY:                                         OR           LEGAL GUARDIAN, IF NOT :                                               PHONE:  (Day)           (Night)                        (Cell)    8  REASON FOR TRANSFER: (Must include brief medical history and recent changes in physical function or cognition ) STR            V/S: /77 (BP Location: Left arm)   Pulse 56   Temp 97 9 °F (36 6 °C) (Oral)   Resp 20   Ht 5' 6" (1 676 m)   Wt 85 1 kg (187 lb 9 8 oz)   SpO2 96%   BMI 30 28 kg/m²           PAIN: None    9  PRIMARY DIAGNOSIS: Acute respiratory failure with hypoxia (HCC)      Secondary Diagnosis:         Pacemaker: No      Internal Defib: No          Mental Health Diagnosis (if Applicable):    10  RESTRAINTS: No     11  RESPIRATORY NEEDS: None    12  ISOLATION/PRECAUTION: None    13   ALLERGY: Bactrim [sulfamethoxazole-trimethoprim], Clindamycin, Coumadin [warfarin], Dabigatran, Latex, and Pradaxa [dabigatran etexilate mesylate]    14  SENSORY:       Vision Glasses    15  SKIN CONDITION: Yes:  Pressure    16  DIET: Special (describe)Diabetic    17  IV ACCESS: None    18  PERSONAL ITEMS SENT WITH PATIENT: None    19  ATTACHED DOCUMENTS: MUST ATTACH CURRENT MEDICATION INFORMATION Face Sheet, MAR, Medication Reconciliation, Labs, Discharge Summary, PT Note, OT Note and HX/PE    20  AT RISK ALERTS:Pressure Ulcer        HARM TO: N/A    21  WEIGHT BEARING STATUS:         Left Leg: None        Right Leg: None    22  MENTAL STATUS:Alert, Oriented and Forgetful    23  FUNCTION:        Walk: Not Able        Transfer: Not Able        Toilet: Not Able        Feed: Self    24  IMMUNIZATIONS/SCREENING:     Immunization History   Administered Date(s) Administered    Influenza Split High Dose Preservative Free IM 10/11/2012, 11/01/2013, 10/16/2014, 10/08/2015, 11/17/2016, 10/26/2017    Influenza, high dose seasonal 0 7 mL 11/13/2018, 11/04/2019, 11/17/2020, 11/10/2021    Pneumococcal Conjugate 13-Valent 01/12/2016    Pneumococcal Polysaccharide PPV23 01/01/2008       25  BOWEL: Incontinent     26  BLADDER: Incontinent    27   SENDING FACILITY CONTACT: [address] [place] [address]         Unit: 3S        Phone: [phone] (if known):        Title:        Unit:         Phone:         FORM PREFILLED BY (if applicable)       Title:       Unit:        Phone:         Tone Limon RN      Title: RN      Phone: [phone]

## 2022-01-09 NOTE — ASSESSMENT & PLAN NOTE
PT/OT, follow-up on recommendations  Patient is wheelchair-bound baseline, discharge to rehab today

## 2022-01-09 NOTE — ASSESSMENT & PLAN NOTE
After Visit Summary   3/13/2018    Miriam Crocker    MRN: [id number]           Patient Information     Date Of Birth          2012        Visit Information        Provider Department      3/13/2018 2:00 PM Annalisa Anthony MD [facility]        Today's Diagnoses     Diaper rash    -  1       Follow-ups after your visit        Follow-up notes from your care team     Return in about 1 week (around 3/20/2018) for lack of improvement.      Who to contact     If you have questions or need follow up information about today's clinic visit or your schedule please contact [facility] directly at [phone].  Normal or non-critical lab and imaging results will be communicated to you by MyChart, letter or phone within 4 business days after the clinic has received the results. If you do not hear from us within 7 days, please contact the clinic through [email or web address] or phone. If you have a critical or abnormal lab result, we will notify you by phone as soon as possible.  Submit refill requests through [employer] or call your pharmacy and they will forward the refill request to us. Please allow 3 business days for your refill to be completed.          Additional Information About Your Visit        MyChart Information     [employer] gives you secure access to your electronic health record. If you see a primary care provider, you can also send messages to your care team and make appointments. If you have questions, please call your primary care clinic.  If you do not have a primary care provider, please call [phone] and they will assist you.        Care EveryWhere ID     This is your Care EveryWhere ID. This could be used by other organizations to access your [facility] medical records  [id number]        Your Vitals Were     Pulse Temperature Height Pulse Oximetry BMI (Body Mass Index)       101 99  F (37.2  C) (Tympanic) 3' (0.914 m) 99% 15.79 kg/m2        Blood  Noted to be downgoing during hospitalization now stabilized  No further evidence of overt bleeding now  · Monitor hemoglobin  · Monitor signs and symptoms of overt bleeding  · Repeat CBC in 3 days in rehab Pressure from Last 3 Encounters:   03/13/18 (!) 88/66   11/07/17 90/58   10/26/17 103/71    Weight from Last 3 Encounters:   03/13/18 29 lb 1.6 oz (13.2 kg) (20 %)*   11/07/17 25 lb 12.8 oz (11.7 kg) (6 %)*   10/26/17 27 lb 6.4 oz (12.4 kg) (17 %)*     * Growth percentiles are based on [facility] 2-20 Years data.              Today, you had the following     No orders found for display         Today's Medication Changes          These changes are accurate as of 3/13/18  2:38 PM.  If you have any questions, ask your nurse or doctor.               Start taking these medicines.        Dose/Directions    BUTT PASTE (WITH H.C)   Used for:  Diaper rash   Replaces:  hydrocortisone 1 % ointment   Started by:  Annalisa Anthony MD        Equal parts 1% hydrocortisone cream, Nystatin cream and Bactroban ointment   Quantity:  60 g   Refills:  0         Stop taking these medicines if you haven't already. Please contact your care team if you have questions.     hydrocortisone 1 % ointment   Replaced by:  BUTT PASTE (WITH H.C)   Stopped by:  Annalisa Anthony MD                Where to get your medicines      These medications were sent to [facility] [address] [place] [address]     Phone:  [phone]     BUTT PASTE (WITH H.C)                Primary Care Provider Office Phone # Fax #    Annalisa Anthony [phone]       [address] [facility] [address]        Equal Access to Services     JIGNESH NEAL AH: Hadguillermo robbins Soyo, waaxda luqadaha, qaybta kaalmada trista, tonya leonard. So [facility] [phone].    ATENCIÓN: Si habla español, tiene a rachel disposición servicios gratuitos de asistencia lingüística. Llame al [phone].    We comply with applicable federal civil rights laws and [place] laws. We do not discriminate on the basis of race, color, national origin, age, disability, sex, sexual orientation, or gender  identity.            Thank you!     Thank you for choosing [facility]  for your care. Our goal is always to provide you with excellent care. Hearing back from our patients is one way we can continue to improve our services. Please take a few minutes to complete the written survey that you may receive in the mail after your visit with us. Thank you!             Your Updated Medication List - Protect others around you: Learn how to safely use, store and throw away your medicines at www.disposemymeds.org.          This list is accurate as of 3/13/18  2:38 PM.  Always use your most recent med list.                   Brand Name Dispense Instructions for use Diagnosis    BUTT PASTE (WITH H.C)     60 g    Equal parts 1% hydrocortisone cream, Nystatin cream and Bactroban ointment    Diaper rash       BUTT PASTE - REGULAR    DR LOVE POOP GODARBY BUTT PASTE FORMULA     Apply topically every hour as needed for skin protection        cholecalciferol 400 UNIT/ML Liqd liquid    vitamin D/ D-VI-SOL    60 mL    Take 1 mL (400 Units) by mouth daily    Encounter for WCC (well child check) with abnormal findings       nitroFURantoin 25 MG/5ML suspension    FURADANTIN     Take 50 mg by mouth 4 times daily        nystatin cream    MYCOSTATIN    30 g    Apply to affected area tid until rash resolves.    Candidal diaper dermatitis       TYLENOL PO       Throat pain, Fever, unspecified

## 2022-01-09 NOTE — ASSESSMENT & PLAN NOTE
Continue aspirin, increased to 162mg p o  Daily as she was taking in rehab  On sotalol, bradycardia noted at rest   Decreased sotalol to daily  Increased back to b i d due to bigeminy trigeminy on telemetry  Previously declined anticoagulation due to allergy/intolerance to anticoagulants  Optimize electrolytes  EKG on 1/5 showed sinus rhythm with frequent PVCs in a pattern of bigeminy, essentially unchanged from admitting EKG  EKG on 1/7 showed NSR  Check BMP, Mag in 3 days in rehab  Patient has not been seen by her cardiologist for a few years  Refer patient to Cardiology post discharge, to be seen in 2 weeks

## 2022-01-09 NOTE — ASSESSMENT & PLAN NOTE
Pneumonia due to COVID-19 with superimposed pneumococcal pneumonia  Presented with increasing shortness of breath, cough, hypoxia  Moderate disease based on oxygen 10 L requirement on presentation, now on room air  CXR - evidence of left-sided infiltrate  SARS-CoV-2 PCR positive-12/28, subsequently received monoclonal sotrovimab 12/29  CTA chest showed - Consolidation and near complete collapse of the left lower lobe  Patchy groundglass opacities in the left upper lobe  Right lung clear  No pulmonary embolism  Repeat chest x-ray 1/7 showed improvement  Clinically improving, now on room air  Coughing up bloody sputum/small clot during hospitalization while on heparin drip-no further hemoptysis  Medication treatment started per COVID protocol:  · Received 9 days of Dexamethasone 6 mg IV daily  · Completed Remdesivir   · Discontinued heparin drip, changed to Lovenox 30 subQ b i d  As above  · Continue Atorvastatin  · Initially started on IV cefepime,transitioned to ceftriaxone given positive pneumococcal antigen, received Rocephin 7 days total   · Trend inflammatory markers, CRP and D-dimer normalized  · Troponin-negative, proBNP -elevated   · Trended procalcitonin, negative x 3  · Sputum culture showed mixed respiratory johnny  · Increase activity and mobilization as tolerated  · Continue Incentive spirometer and Acapella in rehab  · Speech eval due to cough with meals  -follow-up recommendation, downgraded to dysphagia 3  Tolerating well  · Discharge patient to rehab today  Continue Mucinex for 7 more days  Continue IS and Acapella in rehab  · Check CBC, BMP, Mag in 3 days            Results from last 7 days   Lab Units 01/09/22  0646 01/05/22  0530 01/04/22  0905 01/03/22  0525   CRP mg/L 2 2 7 9* 8 3* 16 4*   D-DIMER QUANTITATIVE ug/ml FEU 0 48 0 43  --  0 79*      Results from last 7 days   Lab Units 01/05/22  0530 01/04/22  1313   PROCALCITONIN ng/ml 0 10 0 09

## 2022-01-10 LAB
ATRIAL RATE: 98 BPM
PR INTERVAL: 142 MS
QRS AXIS: -16 DEGREES
QRSD INTERVAL: 78 MS
QT INTERVAL: 342 MS
QTC INTERVAL: 436 MS
T WAVE AXIS: 70 DEGREES
VENTRICULAR RATE: 98 BPM

## 2022-01-10 PROCEDURE — 93010 ELECTROCARDIOGRAM REPORT: CPT | Performed by: INTERNAL MEDICINE

## 2022-02-10 ENCOUNTER — TELEPHONE (OUTPATIENT)
Dept: NEUROLOGY | Facility: CLINIC | Age: 87
End: 2022-02-10

## 2022-05-06 ENCOUNTER — RA CDI HCC (OUTPATIENT)
Dept: OTHER | Facility: HOSPITAL | Age: 87
End: 2022-05-06

## 2022-05-06 NOTE — PROGRESS NOTES
[address]  coding opportunities       Chart reviewed, no opportunity found: CHART REVIEWED, NO OPPORTUNITY FOUND        Patients Insurance     Medicare Insurance: Medicare

## 2022-05-12 ENCOUNTER — TELEPHONE (OUTPATIENT)
Dept: FAMILY MEDICINE CLINIC | Facility: CLINIC | Age: 87
End: 2022-05-12

## 2022-05-12 NOTE — TELEPHONE ENCOUNTER
Patient is currently in a long term care facility  She will not be continuing care at our practice  Please remove pcp     Alejandro Carr MA

## 2022-06-23 NOTE — TELEPHONE ENCOUNTER
06/23/22 12:30 PM        Thank you for your request  Your request has been received, reviewed, and the patient chart updated  The PCP has successfully been removed with a patient attribution note  This message will now be completed          Thank you  Sun Mccord

## 2022-10-12 PROBLEM — J12.82 PNEUMONIA DUE TO COVID-19 VIRUS: Status: RESOLVED | Noted: 2021-12-29 | Resolved: 2022-10-12

## 2022-10-12 PROBLEM — U07.1 PNEUMONIA DUE TO COVID-19 VIRUS: Status: RESOLVED | Noted: 2021-12-29 | Resolved: 2022-10-12

## 2022-10-21 ENCOUNTER — TELEPHONE (OUTPATIENT)
Dept: NEUROLOGY | Facility: CLINIC | Age: 87
End: 2022-10-21

## 2022-12-01 ENCOUNTER — HOSPITAL ENCOUNTER (EMERGENCY)
Facility: HOSPITAL | Age: 87
Discharge: HOME | End: 2022-12-01
Attending: EMERGENCY MEDICINE

## 2022-12-01 ENCOUNTER — APPOINTMENT (EMERGENCY)
Dept: RADIOLOGY | Facility: HOSPITAL | Age: 87
End: 2022-12-01

## 2022-12-01 VITALS
DIASTOLIC BLOOD PRESSURE: 94 MMHG | HEIGHT: 66 IN | HEART RATE: 49 BPM | RESPIRATION RATE: 16 BRPM | SYSTOLIC BLOOD PRESSURE: 147 MMHG | TEMPERATURE: 97.5 F | BODY MASS INDEX: 29.73 KG/M2 | WEIGHT: 184.97 LBS | OXYGEN SATURATION: 94 %

## 2022-12-01 DIAGNOSIS — R42 VERTIGO: Primary | ICD-10-CM

## 2022-12-01 LAB
2HR DELTA HS TROPONIN: 2 NG/L
ALBUMIN SERPL BCP-MCNC: 2.7 G/DL (ref 3.5–5)
ALP SERPL-CCNC: 110 U/L (ref 46–116)
ALT SERPL W P-5'-P-CCNC: 10 U/L (ref 12–78)
ANION GAP SERPL CALCULATED.3IONS-SCNC: 3 MMOL/L (ref 4–13)
APTT PPP: 27 SECONDS (ref 23–37)
AST SERPL W P-5'-P-CCNC: 10 U/L (ref 5–45)
BACTERIA UR QL AUTO: NORMAL /HPF
BASOPHILS # BLD AUTO: 0.05 THOUSANDS/ÂΜL (ref 0–0.1)
BASOPHILS NFR BLD AUTO: 1 % (ref 0–1)
BILIRUB SERPL-MCNC: 0.2 MG/DL (ref 0.2–1)
BILIRUB UR QL STRIP: NEGATIVE
BUN SERPL-MCNC: 20 MG/DL (ref 5–25)
CALCIUM ALBUM COR SERPL-MCNC: 9.3 MG/DL (ref 8.3–10.1)
CALCIUM SERPL-MCNC: 8.3 MG/DL (ref 8.3–10.1)
CARDIAC TROPONIN I PNL SERPL HS: 13 NG/L
CARDIAC TROPONIN I PNL SERPL HS: 15 NG/L
CHLORIDE SERPL-SCNC: 110 MMOL/L (ref 96–108)
CLARITY UR: CLEAR
CO2 SERPL-SCNC: 30 MMOL/L (ref 21–32)
COLOR UR: YELLOW
CREAT SERPL-MCNC: 0.53 MG/DL (ref 0.6–1.3)
EOSINOPHIL # BLD AUTO: 0.16 THOUSAND/ÂΜL (ref 0–0.61)
EOSINOPHIL NFR BLD AUTO: 3 % (ref 0–6)
ERYTHROCYTE [DISTWIDTH] IN BLOOD BY AUTOMATED COUNT: 14.3 % (ref 11.6–15.1)
FLUAV RNA RESP QL NAA+PROBE: NEGATIVE
FLUBV RNA RESP QL NAA+PROBE: NEGATIVE
GFR SERPL CREATININE-BSD FRML MDRD: 86 ML/MIN/1.73SQ M
GLUCOSE SERPL-MCNC: 114 MG/DL (ref 65–140)
GLUCOSE UR STRIP-MCNC: NEGATIVE MG/DL
HCT VFR BLD AUTO: 43.2 % (ref 34.8–46.1)
HGB BLD-MCNC: 13.6 G/DL (ref 11.5–15.4)
HGB UR QL STRIP.AUTO: NEGATIVE
IMM GRANULOCYTES # BLD AUTO: 0.02 THOUSAND/UL (ref 0–0.2)
IMM GRANULOCYTES NFR BLD AUTO: 0 % (ref 0–2)
INR PPP: 0.95 (ref 0.84–1.19)
KETONES UR STRIP-MCNC: NEGATIVE MG/DL
LEUKOCYTE ESTERASE UR QL STRIP: NEGATIVE
LYMPHOCYTES # BLD AUTO: 1.58 THOUSANDS/ÂΜL (ref 0.6–4.47)
LYMPHOCYTES NFR BLD AUTO: 28 % (ref 14–44)
MCH RBC QN AUTO: 28.9 PG (ref 26.8–34.3)
MCHC RBC AUTO-ENTMCNC: 31.5 G/DL (ref 31.4–37.4)
MCV RBC AUTO: 92 FL (ref 82–98)
MONOCYTES # BLD AUTO: 0.54 THOUSAND/ÂΜL (ref 0.17–1.22)
MONOCYTES NFR BLD AUTO: 10 % (ref 4–12)
NEUTROPHILS # BLD AUTO: 3.29 THOUSANDS/ÂΜL (ref 1.85–7.62)
NEUTS SEG NFR BLD AUTO: 58 % (ref 43–75)
NITRITE UR QL STRIP: NEGATIVE
NON-SQ EPI CELLS URNS QL MICRO: NORMAL /HPF
NRBC BLD AUTO-RTO: 0 /100 WBCS
PH UR STRIP.AUTO: 5.5 [PH]
PLATELET # BLD AUTO: 154 THOUSANDS/UL (ref 149–390)
PMV BLD AUTO: 12.4 FL (ref 8.9–12.7)
POTASSIUM SERPL-SCNC: 4.2 MMOL/L (ref 3.5–5.3)
PROT SERPL-MCNC: 6.7 G/DL (ref 6.4–8.4)
PROT UR STRIP-MCNC: ABNORMAL MG/DL
PROTHROMBIN TIME: 12.8 SECONDS (ref 11.6–14.5)
RBC # BLD AUTO: 4.71 MILLION/UL (ref 3.81–5.12)
RBC #/AREA URNS AUTO: NORMAL /HPF
RSV RNA RESP QL NAA+PROBE: NEGATIVE
SARS-COV-2 RNA RESP QL NAA+PROBE: NEGATIVE
SODIUM SERPL-SCNC: 143 MMOL/L (ref 135–147)
SP GR UR STRIP.AUTO: 1.02 (ref 1–1.03)
UROBILINOGEN UR QL STRIP.AUTO: 0.2 E.U./DL
WBC # BLD AUTO: 5.64 THOUSAND/UL (ref 4.31–10.16)
WBC #/AREA URNS AUTO: NORMAL /HPF

## 2022-12-01 RX ORDER — LABETALOL HYDROCHLORIDE 5 MG/ML
20 INJECTION, SOLUTION INTRAVENOUS ONCE
Status: COMPLETED | OUTPATIENT
Start: 2022-12-01 | End: 2022-12-01

## 2022-12-01 RX ORDER — ONDANSETRON 4 MG/1
4 TABLET, FILM COATED ORAL EVERY 8 HOURS PRN
Qty: 20 TABLET | Refills: 0 | Status: SHIPPED | OUTPATIENT
Start: 2022-12-01

## 2022-12-01 RX ORDER — DIAZEPAM 5 MG/ML
2.5 INJECTION, SOLUTION INTRAMUSCULAR; INTRAVENOUS ONCE
Status: COMPLETED | OUTPATIENT
Start: 2022-12-01 | End: 2022-12-01

## 2022-12-01 RX ORDER — ONDANSETRON 4 MG/1
4 TABLET, FILM COATED ORAL EVERY 8 HOURS PRN
Qty: 20 TABLET | Refills: 0 | Status: SHIPPED | OUTPATIENT
Start: 2022-12-01 | End: 2022-12-01 | Stop reason: SDUPTHER

## 2022-12-01 RX ORDER — MECLIZINE HYDROCHLORIDE 25 MG/1
25 TABLET ORAL 3 TIMES DAILY PRN
Qty: 30 TABLET | Refills: 0 | Status: SHIPPED | OUTPATIENT
Start: 2022-12-01 | End: 2022-12-01 | Stop reason: SDUPTHER

## 2022-12-01 RX ORDER — MECLIZINE HYDROCHLORIDE 25 MG/1
25 TABLET ORAL ONCE
Status: COMPLETED | OUTPATIENT
Start: 2022-12-01 | End: 2022-12-01

## 2022-12-01 RX ORDER — MECLIZINE HYDROCHLORIDE 25 MG/1
25 TABLET ORAL 3 TIMES DAILY PRN
Qty: 30 TABLET | Refills: 0 | Status: SHIPPED | OUTPATIENT
Start: 2022-12-01

## 2022-12-01 RX ADMIN — IOHEXOL 85 ML: 350 INJECTION, SOLUTION INTRAVENOUS at 08:47

## 2022-12-01 RX ADMIN — MECLIZINE HYDROCHLORIDE 25 MG: 25 TABLET ORAL at 09:20

## 2022-12-01 RX ADMIN — LABETALOL 20 MG/4 ML (5 MG/ML) INTRAVENOUS SYRINGE 20 MG: at 09:15

## 2022-12-01 RX ADMIN — DIAZEPAM 2.5 MG: 10 INJECTION, SOLUTION INTRAMUSCULAR; INTRAVENOUS at 09:21

## 2022-12-01 NOTE — ED NOTES
Pt's daughter Chantell Montesinos called and was updated on pt's condition  She would like a call back when CT results are back and it is determined whether or not the pt is being admitted       Maribel Thomason RN  12/01/22 1016

## 2022-12-01 NOTE — ED PROVIDER NOTES
History  Chief Complaint   Patient presents with   • Dizziness     Pt c/o dizziness when being woken up by staff at [facility] to change her diaper  Upon arriving pt states "I don't think I need to be here " Per EMS staff stated pt wasn't acting normally  Pt states "I just don't feel right in my head " Pt has hx of MS  Patient was well last night when she went to bed  She was awakened this morning to have her diaper changed and when she was rolled over on her side she became dizzy and lightheaded  Patient states she does not feel well in the head  The dizziness is worse with position change  No nausea vomiting  No new weakness in extremities  Patient has chronic multiple sclerosis and is bed-bound for 2 or 3 years  No speech difficulty  Patient did not receive her morning medications, transferred to the ED awake and alert          Prior to Admission Medications   Prescriptions Last Dose Informant Patient Reported? Taking?    Easy Comfort Lancets MISC   No No   Sig: USE DAILY ICD 10 CODE E11 9   Multiple Vitamin (MULTI-VITAMIN DAILY PO)   Yes No   Sig: Take by mouth daily   Omega-3 Fatty Acids (OMEGA-3 FISH OIL) 1000 MG CAPS   Yes No   Sig: Take 1 capsule by mouth daily   amLODIPine (NORVASC) 5 mg tablet   No No   Sig: Take 1 tablet (5 mg total) by mouth daily   ascorbic acid (VITAMIN C) 500 MG tablet   Yes No   Sig: Take 500 mg by mouth daily   aspirin 81 MG tablet   Yes No   Sig: Take 162 mg by mouth daily   docusate sodium (COLACE) 100 mg capsule   No No   Sig: Take 1 capsule (100 mg total) by mouth 2 (two) times a day as needed for constipation   escitalopram (Lexapro) 10 mg tablet   No No   Sig: Take 1 tablet (10 mg total) by mouth daily   famotidine (PEPCID) 20 mg tablet   No No   Sig: Take 1 tablet (20 mg total) by mouth daily at bedtime   glucose blood test strip   No No   Sig: Test twice daily   insulin lispro (HumaLOG) 100 units/mL injection   No No   Sig: ACHS per SSI: Blood Glucose 150 - 224: 1 Unit of Insulin Blood Glucose 225 - 299: 2 Units of Insulin Blood Glucose 300 - 374: 3 Units of Insulin Blood Glucose 375 - 449: 4 Units of Insulin Blood Glucose greater than or equal to 450: 5 Units of Insulin   latanoprost (XALATAN) 0 005 % ophthalmic solution   No No   Sig: Administer 1 drop to both eyes daily at bedtime   lisinopril (ZESTRIL) 20 mg tablet   No No   Sig: Take 1 tablet (20 mg total) by mouth 2 (two) times a day   metFORMIN (GLUCOPHAGE) 1000 MG tablet   No No   Sig: TAKE 1/2 TABLET BY MOUTH TWICE A DAY WITH MEALS     simvastatin (ZOCOR) 20 mg tablet   No No   Sig: Take 1 tablet (20 mg total) by mouth daily   sotalol (BETAPACE) 80 mg tablet   No No   Sig: Take 1 tablet (80 mg total) by mouth 2 (two) times a day      Facility-Administered Medications: None       Past Medical History:   Diagnosis Date   • Abscess of buttock, right     last assessed-5/4/2017   • Cancer ([facility] )     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-last assessed-10/8/2015   • Cellulitis of chest wall     last assessed-7/27/2015   • Chronic endometritis 10/12/2006   • Diabetes mellitus ([facility] )    • Dysuria 11/02/2007   • Flushing     resolved-6/30/2015   • Glaucoma    • Hyperlipidemia    • Hypertension    • Ingrown nail 01/05/2012   • Malignant neoplasm of breast ([facility] )     last assessed-11/5/2015   • MS (multiple sclerosis) ([facility] )    • Nonvenomous insect bite     last assessed-12/12/2013   • Palpitations 02/09/2011   • Pressure ulcer of buttock 10/13/2006   • Proctitis 05/12/2006   • Vaginal polyp 03/14/2006   • Viral gastroenteritis     last assessed-9/8/2016       Past Surgical History:   Procedure Laterality Date   • BREAST BIOPSY      open   • BREAST SURGERY     • CERVICAL BIOPSY  W/ LOOP ELECTRODE EXCISION     • DILATION AND CURETTAGE OF UTERUS     • INCISION AND DRAINAGE OF WOUND Left 2/27/2017    Procedure: INCISION AND DRAINAGE (I&D)   Chest wall x 2;  Surgeon: Ricardo Stewart MD;  Location: [address] [place] [address];  Service:    • MASTECTOMY      last assessed-6/30/2015       Family History   Problem Relation Age of Onset   • Heart disease Father         cardiac disorder   • COPD Sister    • Diabetes Sister    • Lung cancer Mother    • Lung cancer Cousin    • Heart disease Cousin         cardiac disorder   • Multiple sclerosis Cousin    • Cancer Cousin    • Cancer Daughter         bladder   • Breast cancer Maternal Aunt      I have reviewed and agree with the history as documented  E-Cigarette/Vaping   • E-Cigarette Use Never User      E-Cigarette/Vaping Substances     Social History     Tobacco Use   • Smoking status: Former   • Smokeless tobacco: Never   Vaping Use   • Vaping Use: Never used   Substance Use Topics   • Alcohol use: Not Currently     Alcohol/week: 0 0 standard drinks   • Drug use: Not Currently       Review of Systems   Constitutional: Negative for chills and fever  HENT: Negative for congestion and sore throat  Eyes: Negative for visual disturbance  Respiratory: Negative for cough and shortness of breath  Cardiovascular: Negative for chest pain and leg swelling  Gastrointestinal: Negative for abdominal pain and vomiting  Genitourinary: Negative for dysuria  Musculoskeletal: Negative for arthralgias, back pain and neck pain  Skin: Negative for rash  Neurological: Positive for dizziness, weakness and light-headedness  Negative for syncope, speech difficulty, numbness and headaches  Hematological: Does not bruise/bleed easily  Psychiatric/Behavioral: Negative for confusion  All other systems reviewed and are negative  Physical Exam  Physical Exam  Vitals and nursing note reviewed  Constitutional:       Appearance: Normal appearance  HENT:      Head: Normocephalic  Right Ear: External ear normal       Left Ear: External ear normal       Nose: Nose normal       Mouth/Throat:      Pharynx: Oropharynx is clear     Eyes:      Conjunctiva/sclera: Conjunctivae normal       Comments: Nonsustained nystagmus on right  Appears to have a lazy eye on left   Cardiovascular:      Rate and Rhythm: Normal rate and regular rhythm  Pulses: Normal pulses  Pulmonary:      Effort: Pulmonary effort is normal       Breath sounds: Normal breath sounds  Abdominal:      General: Bowel sounds are normal       Palpations: Abdomen is soft  Musculoskeletal:         General: No signs of injury  Cervical back: Normal range of motion and neck supple  Comments: Patient had no movement on the left lower extremity which is chronic  She also has decreased range of motion on the right lower extremity which is not changed   Skin:     General: Skin is warm and dry  Capillary Refill: Capillary refill takes less than 2 seconds  Neurological:      Mental Status: She is alert and oriented to person, place, and time  Cranial Nerves: No cranial nerve deficit  Motor: Weakness present     Psychiatric:         Mood and Affect: Mood normal          Behavior: Behavior normal          Vital Signs  ED Triage Vitals [12/01/22 0709]   Temperature Pulse Respirations Blood Pressure SpO2   97 5 °F (36 4 °C) 58 16 (!) 206/109 94 %      Temp Source Heart Rate Source Patient Position - Orthostatic VS BP Location FiO2 (%)   Oral Monitor Lying Left arm --      Pain Score       No Pain           Vitals:    12/01/22 0911 12/01/22 0924 12/01/22 0952 12/01/22 1135   BP: (!) 185/78 (!) 182/76 127/59 143/67   Pulse: (!) 53 60 (!) 50 (!) 47   Patient Position - Orthostatic VS: Lying Lying Lying Lying         Visual Acuity  Visual Acuity    Flowsheet Row Most Recent Value   L Pupil Size (mm) 2   R Pupil Size (mm) 2          ED Medications  Medications   iohexol (OMNIPAQUE) 350 MG/ML injection (SINGLE-DOSE) 85 mL (85 mL Intravenous Given 12/1/22 0847)   labetalol (NORMODYNE) injection 20 mg (20 mg Intravenous Given 12/1/22 0915)   meclizine (ANTIVERT) tablet 25 mg (25 mg Oral Given 12/1/22 0920)   diazepam (VALIUM) injection 2 5 mg (2 5 mg Intravenous Given 12/1/22 0921)       Diagnostic Studies  Results Reviewed     Procedure Component Value Units Date/Time    HS Troponin I 2hr [[id number]]  (Normal) Collected: 12/01/22 0951    Lab Status: Final result Specimen: Blood from Arm, Left Updated: 12/01/22 1028     hs TnI 2hr 15 ng/L      Delta 2hr hsTnI 2 ng/L     Urine Microscopic [[id number]]  (Normal) Collected: 12/01/22 0851    Lab Status: Final result Specimen: Urine, Other Updated: 12/01/22 0924     RBC, UA None Seen /hpf      WBC, UA 2-4 /hpf      Epithelial Cells Occasional /hpf      Bacteria, UA Occasional /hpf     UA (URINE) with reflex to Scope [[id number]]  (Abnormal) Collected: 12/01/22 0851    Lab Status: Final result Specimen: Urine, Other Updated: 12/01/22 0906     Color, UA Yellow     Clarity, UA Clear     Specific [place], UA 1 020     pH, UA 5 5     Leukocytes, UA Negative     Nitrite, UA Negative     Protein, UA Trace mg/dl      Glucose, UA Negative mg/dl      Ketones, UA Negative mg/dl      Urobilinogen, UA 0 2 E U /dl      Bilirubin, UA Negative     Occult Blood, UA Negative    FLU/RSV/COVID - if FLU/RSV clinically relevant [[id number]]  (Normal) Collected: 12/01/22 0749    Lab Status: Final result Specimen: Nares from Nose Updated: 12/01/22 0840     SARS-CoV-2 Negative     INFLUENZA A PCR Negative     INFLUENZA B PCR Negative     RSV PCR Negative    Narrative:      FOR PEDIATRIC PATIENTS - copy/paste COVID Guidelines URL to browser: [email or web address]    SARS-CoV-2 assay is a Nucleic Acid Amplification assay intended for the  qualitative detection of nucleic acid from SARS-CoV-2 in nasopharyngeal  swabs  Results are for the presumptive identification of SARS-CoV-2 RNA  Positive results are indicative of infection with SARS-CoV-2, the virus  causing COVID-19, but do not rule out bacterial infection or co-infection  with other viruses   Laboratories within the [facility] and its  territories are required to report all positive results to the appropriate  public health authorities  Negative results do not preclude SARS-CoV-2  infection and should not be used as the sole basis for treatment or other  patient management decisions  Negative results must be combined with  clinical observations, patient history, and epidemiological information  This test has not been FDA cleared or approved  This test has been authorized by FDA under an Emergency Use Authorization  (EUA)  This test is only authorized for the duration of time the  declaration that circumstances exist justifying the authorization of the  emergency use of an in vitro diagnostic tests for detection of SARS-CoV-2  virus and/or diagnosis of COVID-19 infection under section 564(b)(1) of  the Act, 21 U  S C  155TXQ-9(U)(0), unless the authorization is terminated  or revoked sooner  The test has been validated but independent review by FDA  and CLIA is pending  Test performed using [employer] GeneXpert: This RT-PCR assay targets N2,  a region unique to SARS-CoV-2  A conserved region in the E-gene was chosen  for pan-Sarbecovirus detection which includes SARS-CoV-2  According to CMS-2020-01-R, this platform meets the definition of high-throughput technology      HS Troponin 0hr (reflex protocol) [[id number]]  (Normal) Collected: 12/01/22 0749    Lab Status: Final result Specimen: Blood from Arm, Left Updated: 12/01/22 0826     hs TnI 0hr 13 ng/L     Comprehensive metabolic panel [[id number]]  (Abnormal) Collected: 12/01/22 0749    Lab Status: Final result Specimen: Blood from Arm, Left Updated: 12/01/22 0817     Sodium 143 mmol/L      Potassium 4 2 mmol/L      Chloride 110 mmol/L      CO2 30 mmol/L      ANION GAP 3 mmol/L      BUN 20 mg/dL      Creatinine 0 53 mg/dL      Glucose 114 mg/dL      Calcium 8 3 mg/dL      Corrected Calcium 9 3 mg/dL      AST 10 U/L      ALT 10 U/L      Alkaline Phosphatase 110 U/L Total Protein 6 7 g/dL      Albumin 2 7 g/dL      Total Bilirubin 0 20 mg/dL      eGFR 86 ml/min/1 73sq m     Narrative:      National Kidney Disease Foundation guidelines for Chronic Kidney Disease (CKD):   •  Stage 1 with normal or high GFR (GFR > 90 mL/min/1 73 square meters)  •  Stage 2 Mild CKD (GFR = 60-89 mL/min/1 73 square meters)  •  Stage 3A Moderate CKD (GFR = 45-59 mL/min/1 73 square meters)  •  Stage 3B Moderate CKD (GFR = 30-44 mL/min/1 73 square meters)  •  Stage 4 Severe CKD (GFR = 15-29 mL/min/1 73 square meters)  •  Stage 5 End Stage CKD (GFR <15 mL/min/1 73 square meters)  Note: GFR calculation is accurate only with a steady state creatinine    Protime-INR [[id number]]  (Normal) Collected: 12/01/22 0749    Lab Status: Final result Specimen: Blood from Arm, Left Updated: 12/01/22 0813     Protime 12 8 seconds      INR 0 95    APTT [[id number]]  (Normal) Collected: 12/01/22 0749    Lab Status: Final result Specimen: Blood from Arm, Left Updated: 12/01/22 0813     PTT 27 seconds     CBC and differential [[id number]] Collected: 12/01/22 0749    Lab Status: Final result Specimen: Blood from Arm, Left Updated: 12/01/22 0756     WBC 5 64 Thousand/uL      RBC 4 71 Million/uL      Hemoglobin 13 6 g/dL      Hematocrit 43 2 %      MCV 92 fL      MCH 28 9 pg      MCHC 31 5 g/dL      RDW 14 3 %      MPV 12 4 fL      Platelets 028 Thousands/uL      nRBC 0 /100 WBCs      Neutrophils Relative 58 %      Immat GRANS % 0 %      Lymphocytes Relative 28 %      Monocytes Relative 10 %      Eosinophils Relative 3 %      Basophils Relative 1 %      Neutrophils Absolute 3 29 Thousands/µL      Immature Grans Absolute 0 02 Thousand/uL      Lymphocytes Absolute 1 58 Thousands/µL      Monocytes Absolute 0 54 Thousand/µL      Eosinophils Absolute 0 16 Thousand/µL      Basophils Absolute 0 05 Thousands/µL                  CTA head and neck with and without contrast   Final Result by Moisés Gomez MD (12/01 8295)      1    No acute intracranial CT abnormality  2   Patent major vasculature of the [place] of young without high-grade stenosis  No aneurysm  3   No hemodynamically significant stenosis or dissection of cervical carotid and vertebral arteries  4   Partially imaged plaque-like opacity in the left upper lobe, likely atelectasis  Scattered patchy groundglass opacities, likely mild edema  Correlate with chest imaging  5   Left maxillary sinus disease containing hyperattenuating materials, likely inspissated secretions versus fungal colonization  Workstation performed: [id number]         XR chest 1 view portable   Final Result by Marina Chau MD (12/01 1200)      Chronic left lower lobe opacity with small pleural effusion  Workstation performed: [id number]                    Procedures  ECG 12 Lead Documentation Only    Date/Time: 12/1/2022 7:26 AM  Performed by: Ramin Jaramillo MD  Authorized by: Ramin Jaramillo MD     Indications / Diagnosis:  Dizzy  ECG reviewed by me, the ED Provider: yes    Patient location:  ED  Interpretation:     Interpretation: abnormal    Rate:     ECG rate:  53    ECG rate assessment: bradycardic    Rhythm:     Rhythm: sinus bradycardia    Ectopy:     Ectopy: none    QRS:     QRS axis:  Normal    QRS intervals:  Normal  Conduction:     Conduction: normal    ST segments:     ST segments:  Normal  T waves:     T waves: normal    Other findings:     Other findings: poor R wave progression      Other findings comment:  Low voltage             ED Course                               SBIRT 20yo+    Flowsheet Row Most Recent Value   SBIRT (25 yo +)    In order to provide better care to our patients, we are screening all of our patients for alcohol and drug use  Would it be okay to ask you these screening questions?  No Filed at: 12/01/2022 8425                    [facility]  Number of Diagnoses or Management Options  Diagnosis management comments: Dizziness and lightheadedness in an nonambulatory patient  Will check CT scan      Disposition  Final diagnoses:   Vertigo     Time reflects when diagnosis was documented in both MDM as applicable and the Disposition within this note     Time User Action Codes Description Comment    12/1/2022 12:35 PM Cassandra Delatorre Add [R42] Vertigo       ED Disposition     ED Disposition   Discharge    Condition   Stable    Date/Time   u Dec 1, 2022 12:35 PM    Comment   Morgan Giraldo [facility] discharge to home/self care  Follow-up Information     Follow up With Specialties Details Why Contact Info    Moshe Delatorre MD Neurology Schedule an appointment as soon as possible for a visit   [address] [address]  [phone]            Patient's Medications   Discharge Prescriptions    MECLIZINE (ANTIVERT) 25 MG TABLET    Take 1 tablet (25 mg total) by mouth 3 (three) times a day as needed for dizziness       Start Date: 12/1/2022 End Date: --       Order Dose: 25 mg       Quantity: 30 tablet    Refills: 0    ONDANSETRON (ZOFRAN) 4 MG TABLET    Take 1 tablet (4 mg total) by mouth every 8 (eight) hours as needed for nausea or vomiting       Start Date: 12/1/2022 End Date: --       Order Dose: 4 mg       Quantity: 20 tablet    Refills: 0       No discharge procedures on file      PDMP Review     None          ED Provider  Electronically Signed by           Iva Friend MD  12/01/22 7318

## 2022-12-03 LAB
ATRIAL RATE: 53 BPM
P AXIS: 82 DEGREES
PR INTERVAL: 232 MS
QRS AXIS: 30 DEGREES
QRSD INTERVAL: 82 MS
QT INTERVAL: 488 MS
QTC INTERVAL: 457 MS
T WAVE AXIS: 60 DEGREES
VENTRICULAR RATE: 53 BPM

## 2023-04-27 NOTE — PROGRESS NOTES
Assessment    1  Benign essential hypertension (401 1) (I10)   2  Type 2 diabetes mellitus (250 00) (E11 9)   3  Hyperlipidemia (272 4) (E78 5)   4  Former smoker (V15 82) (L41 418)    Plan  Benign essential hypertension    · AmLODIPine Besylate 10 MG Oral Tablet; 1 every day   · Lisinopril 20 MG Oral Tablet; TAKE 2 TABLETS BY MOUTH EVERY DAY  Hyperlipidemia    · Simvastatin 20 MG Oral Tablet; TAKE 1 TABLET  by mouth every day  Type 2 diabetes mellitus    · MetFORMIN HCl - 1000 MG Oral Tablet; TAKE 1/2 TABLET  BY MOUTH TWICEA DAY   · (1) COMPREHENSIVE METABOLIC PANEL; Status:Active; Requested for:07Ehw6495;    · (1) HEMOGLOBIN A1C; Status:Active; Requested for:23Hdj3626;    · Follow-up visit in 3 months Evaluation and Treatment  Follow-up  Status: Complete -Scheduling  Done: 70VAU1010 02:15PM    Discussion/Summary    Hypertension - stable- improved, A1c is down to 6 9- stable, triglycerides still elevated  Chief Complaint  review new labs      History of Present Illness  The patient states her hyperlipidemia has been under good control since the last visit  Symptoms: Associated symptoms include no memory loss  Medications: the patient is adherent with her medication regimen  -- She denies medication side effects  The patient presents for follow-up of essential hypertension  The patient states she has been doing well with her blood pressure control since the last visit  Symptoms: Associated symptoms include no headache  Medications: the patient is adherent with her medication regimen  -- She denies medication side effects  The patient states she has been doing well with her Type II Diabetes control since the last visit  Symptoms:  Home monitoring: the patient reports no symptomatic hypoglycemic episodes  Medications: the patient is adherent with her medication regimen  -- She denies medication side effects   The patient is doing well with her diabetes goals Goals for diabetes management: Hemoglobin A1C: Please cancel all infusion appts  Thank you! 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)   5  Atrial fibrillation (427 31) (I48 91)   6  Benign essential hypertension (401 1) (I10)   7  BMI 32 0-32 9,adult (V85 32) (Z68 32)   8  Breast cancer (174 9) (C50 919)   9  Cancer of right breast, stage 1 (174 9) (C50 911)   10  Edema (782 3) (R60 9)   11  Encounter for recheck of abscess following incision and drainage (V67 09) (Z09)   12  Encounter for screening mammogram for high-risk patient (V76 11) (Z12 31)   13  Encounter for screening mammogram for malignant neoplasm of breast (V76 12)  (Z12 31)   14  Endometrial thickening on ultra sound (793 5) (R93 8)   15  Former smoker (V15 82) (Q50 598)   16  Hirsutism (704 1) (L68 0)   17  History of mastectomy, right (V45 71) (Z90 11)   18  Hyperlipidemia (272 4) (E78 5)   19  Influenza vaccine needed (V04 81) (Z23)   20  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)   21  Localized, primary osteoarthritis of shoulder region, 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)  (M19 019)   22  Multiple sclerosis (340) (G35)   23  Neoplasm of uncertain behavior of skin (238 2) (D48 5)   24  Obesity (278 00) (E66 9)   25  Peptic ulcer (533 90) (K27 9)   26  Post-menopausal bleeding (627 1) (N95 0)   27  Screening for malignant neoplasm of breast (V76 10) (Z12 31)   28  Screening for osteoporosis (V82 81) (Z13 820)   29  Skin ulcer (707 9) (L98 499)   30  Type 2 diabetes mellitus (250 00) (E11 9)   31  Urinary incontinence (788 30) (R32)   32  Wears glasses (V47 89) (Z97 3)    Past Medical History  1   History of Abscess of back (682 2) (L02 212)   2  History of Abscess of right buttock (682 5) (L02 31)   3  Acute upper respiratory infection (465 9) (J06 9)   4  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, 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)   5  History of Cellulitis (682 9) (L03 90)   6  History of Cellulitis of chest wall (682 2) (L03 313)   7  History of Dysuria (788 1) (R30 0)   8  History of Flushing (782 62) (R23 2)   9  History of abdominal pain (V13 89) (Z87 898)   10  History of acute conjunctivitis (V12 49) (Z86 69)   11  History of chronic endometritis (V13 29) (Z87 42)   12  History of ingrown nail (V13 3) (Z87 2)   13  History of malignant neoplasm of breast (V10 3) (Z85 3)   14  History of viral gastroenteritis (V12 09) (Z86 19)   15  Influenza vaccine needed (V04 81) (Z23)   16  History of Initial Medicare annual wellness visit (V70 0) (Z00 00)   17  Medicare annual wellness visit, subsequent (V70 0) (Z00 00)   18  Need for pneumococcal vaccination (V03 82) (Z23)   19  History of Nonvenomous Insect Bite (E906 4)   20  Otitis media, left (382 9) (H66 92)   21  History of Palpitations (785 1) (R00 2)   22  History of Pelvic cramping (625 9) (R10 2)   23  History of Pressure Ulcer Of The Buttock (707 05)   24  History of Proctitis (569 49) (K62 89)   25  Urinary tract infection (599 0) (N39 0)   26  History of Vaginal polyp (623 7) (N84 2)   27  History of Visit for pre-operative examination (V72 84) (N31 529)    Surgical History  1  History of Biopsy Breast Open   2  History of Breast Surgery Mastectomy   3  History of Cervical Loop Electrosurgical Excision (LEEP)   4  History of Dilation And Curettage    Family History  Mother    1  Family history of lung cancer (V16 1) (Z80 1)  Father    2  Family history of    3  Family history of cardiac disorder (V17 49) (Z82 49)  Child    4  Family history of malignant neoplasm of urinary bladder (V16 52) (Z80 52)  Sister    5  Family history of Diabetes  Cousin    6   Family history of cardiac disorder (V17 49) (Z82 49)   7  Family history of lung cancer (V16 1) (Z80 1)   8  Family history of multiple sclerosis (V17 2) (Z82 0)    Social History     · Denied: History of Drug use   · Former smoker (V15 82) (M64 867)   ·    · No alcohol use   · Three children  The social history was reviewed and updated today  Current Meds   1  AmLODIPine Besylate 10 MG Oral Tablet; 1 every day; Therapy: 85HET0572 to (Evaluate:32Vvx3720)  Requested for: 72SNY5091; Last Rx:30Pjg1697 Ordered   2  Aspir-81 81 MG Oral Tablet Delayed Release; 1 Two Times A Day; Therapy: 48YVT0167 to  Requested for: 22IWX8717 Recorded   3  Bridget Contour Next Test In [employer]; USE TO TEST GLUCOSE DAILY (dx E11 9); Therapy: 90WYG5416 to (Last Rx:17Oct2017)  Requested for: 65Ici1186 Ordered   4  Bridget Microlet Lancets Miscellaneous; TEST GLUCOSE DAILY (DX: 250 00) WITH BRIDGET CONTOUR GLUCOMETER; Therapy: 80DSQ6358 to [phone])  Requested for: 25Oct2017; Last DA:20TKZ4348 Ordered   5  [employer] Vitamin C 500 MG Oral Tablet; TAKE 1 TABLET DAILY; Therapy: (Recorded:08Jun2015) to Recorded   6  DuoNeb 0 5-2 5 (3) MG/3ML Inhalation Solution; use as dir; Therapy: (Recorded:15Mar2017) to Recorded   7  Lancets Miscellaneous; test glucose daily  (dx: 250 00) with Bridget Contour glucometer; Therapy: 17NUG4586 to (Last Rx:98Qvp4020)  Requested for: 98MVC9117 Ordered   8  Latanoprost 0 005 % Ophthalmic Solution; INSTILL 1 DROP IN BOTH EYES AT BEDTIME; Therapy: (Recorded:08Jun2015) to Recorded   9  Lisinopril 20 MG Oral Tablet; TAKE 2 TABLETS BY MOUTH EVERY DAY; Therapy: 86RMA0050 to (AFNRJDYX:00MYN6708)  Requested for: 25Oct2017; Last SI:05MUZ5977 Ordered   10  MetFORMIN HCl - 1000 MG Oral Tablet; TAKE 1/2 TABLET  BY MOUTH TWICE A DAY; Therapy: 62LCB7919 to (Evaluate:01Qvo8113)  Requested for: 25Oct2017; Last  Rx:25Oct2017 Ordered   11  Multi-Vitamin Oral Tablet; 1 Every Day;   Therapy: 23Ual2268 to  Requested for: 60PNN5812 Recorded   12  Omega-3 Fish Oil 1000 MG Oral Capsule; TAKE 1 CAPSULE DAILY; Therapy: (Recorded:08Jun2015) to Recorded   13  Simvastatin 20 MG Oral Tablet; TAKE 1 TABLET  by mouth every day; Therapy: 79GJN0658 to (Evaluate:02Jan2018)  Requested for: 13KKA7443; Last  Rx:91Gwc6897 Ordered   14  Sotalol HCl (AF) 80 MG Oral Tablet; TAKE 1 TABLET TWICE DAILY; Therapy: (Recorded:17Hac4379) to Recorded    Allergies  1  Clindamycin HCl CAPS   2  Coumadin TABS   3  Latex Exam Gloves MISC   4  Pradaxa CAPS    Vitals  Vital Signs    Recorded: 71CAG7253 01:35PM   Temperature 96 8 F   Heart Rate 72   Respiration 16   Systolic 886   Diastolic 76   Height Unobtainable Yes   Weight Unobtainable Yes       Physical Exam   Constitutional  General appearance: No acute distress, well appearing and well nourished  Ears, Nose, Mouth, and Throat  External inspection of ears and nose: Normal    Otoscopic examination: Tympanic membranes translucent with normal light reflex  Canals patent without erythema  Oropharynx: Normal with no erythema, edema, exudate or lesions  Pulmonary  Auscultation of lungs: Clear to auscultation  no rales or crackles were heard bilaterally  no rhonchi  no friction rub  no wheezing  Cardiovascular  Auscultation of heart: Normal rate and rhythm, normal S1 and S2, without murmurs  Examination of extremities for edema and/or varicosities: Abnormal   left ankle pitting edema-- and-- left pretibial pretibial pitting edema  Musculoskeletal  Gait and station: Abnormal  -- slow gait, using cane          Future Appointments    Date/Time Provider Specialty Site   02/19/2018 01:30 PM Joan Bess, [facility]   01/03/2018 01:15 PM Michelle Kaur MD Hematology Oncology [facility]       Signatures   Electronically signed by : Maira Masters DO; Nov 17 2017  4:41PM EST                       (Author)

## 2023-06-06 NOTE — H&P
H&P- Chandni Bender 1935, 82 y o  female MRN: [id number]    Unit/Bed#: MIGUEL Encounter: [id number]    Primary Care Provider: Jailene Livingston DO   Date and time admitted to hospital: 7/14/2019 12:21 PM        * Chest pain  Assessment & Plan  Patient presented to the ED with complaints of acute onset chest pain  Patient was in her usual state of health until this morning after eating breakfast   She developed midsternal chest discomfort which radiated into her back  Described as a pressure  There is no associated shortness of breath, headache, dizziness  Nothing makes it better or worse  She received nitro and morphine in the ER with no improvement  Pain is 4/10  Troponin unremarkable  EKG with no acute ischemic changes  CTA was done which ruled out dissection  Gallbladder unremarkable      · Admit to Medicine  · Serial EKGs and troponin  · Aspirin, statin  · Cardiology consultation  · Lipid panel and hemoglobin A1c in the a m   · Nuclear stress in AM  · Add on LFTs    Thyroid nodule  Assessment & Plan  Incidental findings on CT - outpatient follow up  Add on TSH    Leukocytosis  Assessment & Plan  Mildly, likely reactive  No signs of infection  Repeat in AM    Cancer of right breast, stage 1 (HCC)  Assessment & Plan  Post mastectomy    Type 2 diabetes mellitus without complication, without long-term current use of insulin (HCC)  Assessment & Plan  Hga1c 7 7  Accucheck AC/HS with insulin sliding scale  Hold metformin while hospitalized      Essential hypertension  Assessment & Plan  Hypertensive in the ER, continue home meds - norvasc,lisinopril, sotalol   May need additional agent - monitor for now    Multiple sclerosis [facility])  Assessment & Plan  Generalized weakness at baseline secondary to MS  No acute change  Outpatient follow up    Atrial fibrillation [facility])  Assessment & Plan  Patient reports no longer being on [facility], stopped by her primary cardiologist  Continue sotalol and aspirin     Mixed Performed By: Helene Cordova Urine Pregnancy Test Result: negative hyperlipidemia  Assessment & Plan  Continue statin  Check lipid panel in AM    VTE Prophylaxis: Enoxaparin (Lovenox)  / sequential compression device   Code Status: Prior    Anticipated Length of Stay:  Patient will be admitted on an Observation basis with an anticipated length of stay of less than 2 midnights  Justification for Hospital Stay: chest pain rule out ACS    Total Time for Visit, including Counseling / Coordination of Care: 20 minutes  Greater than 50% of this total time spent on direct patient counseling and coordination of care  Chief Complaint:   Chest Pain (pain in chest with some radiation to back since this am)      History of Present Illness:    Félix Barr is a 82 y o  female with a PMH of multiple sclerosis, hypertension, hyperlipidemia, atrial fibrillation not on anticoagulation, type 2 diabetes not on insulin, breast cancer who presents with chest pain  Patient was in her usual state of health until this morning when she developed midsternal chest pain which is described as a pressure  This occurred after she ate breakfast   It radiated into her back  There were no other associated symptoms and patient denies shortness of breath, headache, dizziness, fevers, chills  She does report feeling diaphoretic at times in the evening which she attributed to her blood sugar being low  In the ER patient received morphine and nitro, neither of which improved her pain  Labs were unremarkable  She had a CTA to rule out dissection which was negative  Lungs also had no acute findings, gallbladder unremarkable  EKG revealed normal sinus rhythm with nonspecific changes  Patient is admitted to rule out ACS  Review of Systems:    Review of Systems   Constitutional: Positive for diaphoresis  HENT: Negative  Eyes: Negative  Respiratory: Negative  Cardiovascular: Positive for chest pain and leg swelling (chronic)  Gastrointestinal: Negative    Negative for constipation, diarrhea, Expiration Date (Optional): 60- nausea and vomiting  Endocrine: Negative  Genitourinary: Negative  Musculoskeletal: Positive for gait problem (baseline)  Skin: Negative  Allergic/Immunologic: Negative  Hematological: Negative  Psychiatric/Behavioral: Negative  Past Medical and Surgical History:     Past Medical History:   Diagnosis Date    Abscess of buttock, right     last assessed-5/4/2017    Cancer [facility])     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-last assessed-10/8/2015    Cellulitis of chest wall     last assessed-7/27/2015    Chronic endometritis 10/12/2006    Diabetes mellitus [facility] )     Dysuria 11/02/2007    Flushing     resolved-6/30/2015    Glaucoma     Hyperlipidemia     Hypertension     Ingrown nail 01/05/2012    Malignant neoplasm of breast ([facility] [address] )     last assessed-11/5/2015    [facility]     Nonvenomous insect bite     last assessed-12/12/2013    Palpitations 02/09/2011    Pressure ulcer of buttock 10/13/2006    Proctitis 05/12/2006    Vaginal polyp 03/14/2006    Viral gastroenteritis     last assessed-9/8/2016       Past Surgical History:   Procedure Laterality Date    BREAST BIOPSY      open    BREAST SURGERY      CERVICAL BIOPSY  W/ LOOP ELECTRODE EXCISION      DILATION AND CURETTAGE OF UTERUS      INCISION AND DRAINAGE OF WOUND Left 2/27/2017    Procedure: INCISION AND DRAINAGE (I&D)   Chest wall x 2;  Surgeon: Carolann Perales MD;  Location: [address] [place] [address];  Service:     MASTECTOMY      last assessed-6/30/2015       Meds/Allergies:    Prior to Admission medications    Medication Sig Start Date End Date Taking?  Authorizing Provider   amLODIPine (NORVASC) 10 mg tablet TAKE 1 TABLET (10 MG TOTAL) BY MOUTH DAILY 4/25/19  Yes Jose Lyon DO   ascorbic acid ([employer] VITAMIN C) 500 MG tablet Take 1 tablet by mouth daily   Yes Historical Provider, MD   aspirin 81 MG tablet Take 162 mg by mouth daily   Yes Historical Provider, MD   latanoprost (XALATAN) 0 005 % ophthalmic Detail Level: Detailed solution Administer 1 drop to both eyes daily at bedtime    Yes Historical Provider, MD   lisinopril (ZESTRIL) 20 mg tablet Take 1 tablet (20 mg total) by mouth 2 (two) times a day 8/21/18  Yes Kate Patrick,    metFORMIN (GLUCOPHAGE) 1000 MG tablet Take 0 5 tablets (500 mg total) by mouth 2 (two) times a day with meals 8/21/18  Yes Kate Patrick, DO   Multiple Vitamin (MULTI-VITAMIN DAILY PO) Take by mouth daily 8/12/09  Yes Historical Provider, MD   simvastatin (ZOCOR) 20 mg tablet Take 1 tablet (20 mg total) by mouth daily 8/21/18  Yes Kate Patrick,    sotalol (BETAPACE) 80 mg tablet Take 80 mg by mouth 2 (two) times a day   Yes Historical Provider, MD   FRANKI MICROLET LANCETS lancets by Other route 2 (two) times a day E11 9 Test blood sugar twice daily 11/23/18   Daylene Rump, DO   glucose blood (FRANKI CONTOUR NEXT TEST) test strip 1 each by Other route 2 (two) times a day E11 9 test blood sugar twice daily 11/23/18   Daylene Rump, DO   Omega-3 Fatty Acids (OMEGA-3 FISH OIL) 1000 MG CAPS Take 1 capsule by mouth daily    Historical Provider, MD       Allergies: Allergies   Allergen Reactions    Clindamycin      Annotation - 84QZZ4373: bad taste in mouth    Coumadin [Warfarin]      Reaction Date: 22May2012;  Annotation - 51JNR9448: lip swelling    Latex      Annotation - 25UMF8553: RASH    Pradaxa [Dabigatran Etexilate Mesylate]        Social History:     Marital Status: /Civil Union   Substance Use History:   Social History     Substance and Sexual Activity   Alcohol Use No     Social History     Tobacco Use   Smoking Status Former Smoker   Smokeless Tobacco Never Used     Social History     Substance and Sexual Activity   Drug Use No       Family History:    Family History   Problem Relation Age of Onset    Heart disease Father         cardiac disorder    COPD Sister     Diabetes Sister     Lung cancer Mother     Lung cancer Cousin     Heart disease Cousin         cardiac disorder    Lot # (Optional): [id number] Multiple sclerosis Cousin     Cancer Cousin     Cancer Child         urinary bladder       Physical Exam:     Vitals:   Blood Pressure: (!) 179/77 (07/14/19 1530)  Pulse: 78 (07/14/19 1530)  Temperature: 98 4 °F (36 9 °C) (07/14/19 1227)  Temp Source: Tympanic (07/14/19 1227)  Respirations: 18 (07/14/19 1530)  SpO2: 92 % (07/14/19 1530)    Physical Exam   Constitutional: She is oriented to person, place, and time  She appears well-developed and well-nourished  HENT:   Head: Normocephalic and atraumatic  Eyes: Pupils are equal, round, and reactive to light  EOM are normal    Neck: Normal range of motion  Cardiovascular: Normal rate and regular rhythm  Murmur heard  Pulmonary/Chest: Effort normal and breath sounds normal  No respiratory distress  She has no wheezes  Abdominal: Soft  Bowel sounds are normal  She exhibits no distension  Musculoskeletal: Normal range of motion  She exhibits edema (chronic nonpitting edema to lower extremities)  Neurological: She is alert and oriented to person, place, and time  Skin: Skin is warm and dry  Psychiatric: She has a normal mood and affect  Her behavior is normal    Nursing note and vitals reviewed  Additional Data:     Lab Results: I have personally reviewed pertinent reports        Results from last 7 days   Lab Units 07/14/19  1242   WBC Thousand/uL 11 78*   HEMOGLOBIN g/dL 14 3   HEMATOCRIT % 44 9   PLATELETS Thousands/uL 203   NEUTROS PCT % 76*     Results from last 7 days   Lab Units 07/14/19  1333 07/11/19  0743   SODIUM mmol/L 142 143  142   POTASSIUM mmol/L 4 6 5 0  4 6   CHLORIDE mmol/L 104 103  103   CO2 mmol/L 31 24  24   BUN mg/dL 14 14  13   CREATININE mg/dL 0 69 0 66  0 61   CALCIUM mg/dL 9 0  --    TOTAL BILIRUBIN mg/dL  --  0 2  0 2   ALT IU/L  --  15  14   AST IU/L  --  16  11         Results from last 7 days   Lab Units 07/14/19  1242   TROPONIN I ng/mL <0 02               Imaging: I have personally reviewed pertinent reports  CTA dissection protocol chest and abdomen   Final Result by Vic Zepeda MD (07/14 1513)      No acute dissection and/or aneurysmal dilatation of the thoracoabdominal aorta  Incidental thyroid nodule(s) for which nonemergent thyroid ultrasound is recommended if this is deemed appropriate for this patient  Enlarged, fatty liver  Workstation performed: [id number]         XR chest 1 view portable    (Results Pending)       CTA dissection protocol chest and abdomen   Final Result      No acute dissection and/or aneurysmal dilatation of the thoracoabdominal aorta  Incidental thyroid nodule(s) for which nonemergent thyroid ultrasound is recommended if this is deemed appropriate for this patient  Enlarged, fatty liver  Workstation performed: [id number]         XR chest 1 view portable    (Results Pending)       EKG, Pathology, and Other Studies Reviewed on Admission:   · EKG: NSR with nonspecific ST segment changes     Allscripts / Epic Records Reviewed: Yes     ** Please Note: This note has been constructed using a voice recognition system   **

## 2023-08-25 ENCOUNTER — LAB REQUISITION (OUTPATIENT)
Dept: LAB | Facility: HOSPITAL | Age: 88
End: 2023-08-25
Payer: MEDICARE

## 2023-08-25 ENCOUNTER — OFFICE VISIT (OUTPATIENT)
Dept: PODIATRY | Facility: CLINIC | Age: 88
End: 2023-08-25
Payer: MEDICARE

## 2023-08-25 VITALS — RESPIRATION RATE: 17 BRPM | BODY MASS INDEX: 29.57 KG/M2 | WEIGHT: 184 LBS | HEIGHT: 66 IN

## 2023-08-25 DIAGNOSIS — E11.42 DIABETIC POLYNEUROPATHY ASSOCIATED WITH TYPE 2 DIABETES MELLITUS (HCC): ICD-10-CM

## 2023-08-25 DIAGNOSIS — L60.0 INGROWN TOENAIL: Primary | ICD-10-CM

## 2023-08-25 DIAGNOSIS — L03.032 PARONYCHIA OF TOENAIL OF LEFT FOOT: ICD-10-CM

## 2023-08-25 DIAGNOSIS — M79.672 LEFT FOOT PAIN: ICD-10-CM

## 2023-08-25 DIAGNOSIS — L03.032 CELLULITIS OF LEFT TOE: ICD-10-CM

## 2023-08-25 PROCEDURE — 88312 SPECIAL STAINS GROUP 1: CPT | Performed by: PATHOLOGY

## 2023-08-25 PROCEDURE — 11730 AVULSION NAIL PLATE SIMPLE 1: CPT | Performed by: PODIATRIST

## 2023-08-25 PROCEDURE — 99203 OFFICE O/P NEW LOW 30 MIN: CPT | Performed by: PODIATRIST

## 2023-08-25 PROCEDURE — 88342 IMHCHEM/IMCYTCHM 1ST ANTB: CPT | Performed by: PATHOLOGY

## 2023-08-25 PROCEDURE — 88305 TISSUE EXAM BY PATHOLOGIST: CPT | Performed by: PATHOLOGY

## 2023-08-25 NOTE — PROGRESS NOTES
Assessment/Plan: Ingrown toenail left hallux with paronychia and secondary to pyogenic fibular nail groove. Pain. Diabetic neuropathy. Plan. Chart reviewed. Patient examined. Patient and family advised on condition. Today partial nail avulsion done. After achieving anesthesia with 6 cc 2% lidocaine plain partial nail avulsion done of fibular aspect left hallux nail plate. Resection pyogenic granuloma. Specimen sent for biopsy. Gentian violet dry sterile dressing applied. Aftercare instructions given to nursing. These are written. We will place patient on clindamycin daily as well as dry sterile dressing daily. Return as needed         Diagnoses and all orders for this visit:    Ingrown toenail    Paronychia of toenail of left foot    Left foot pain    Diabetic polyneuropathy associated with type 2 diabetes mellitus ([address])          Subjective:   Patient is seen on referral from skilled nursing facility. Patient has history of having toenail cut several months prior. Since that time she had pain and swelling of the toe. Allergies   Allergen Reactions   • Bactrim [Sulfamethoxazole-Trimethoprim] Other (See Comments)     General weakness   • Clindamycin      Annotation - [id number]: bad taste in mouth   • Coumadin [Warfarin]      Reaction Date: 22May2012;  Annotation - [id number]: lip swelling   • Dabigatran    • Latex      Annotation - [id number]: RASH   • Pradaxa [Dabigatran Etexilate Mesylate]          Current Outpatient Medications:   •  amLODIPine (NORVASC) 5 mg tablet, Take 1 tablet (5 mg total) by mouth daily, Disp: , Rfl: 0  •  ascorbic acid (VITAMIN C) 500 MG tablet, Take 500 mg by mouth daily, Disp: , Rfl:   •  aspirin 81 MG tablet, Take 162 mg by mouth daily, Disp: , Rfl:   •  docusate sodium (COLACE) 100 mg capsule, Take 1 capsule (100 mg total) by mouth 2 (two) times a day as needed for constipation, Disp: , Rfl: 0  •  Easy Comfort Lancets MISC, USE DAILY ICD 10 CODE E11.9, Disp: 100 each, Rfl: 3  •  escitalopram (Lexapro) 10 mg tablet, Take 1 tablet (10 mg total) by mouth daily, Disp: 90 tablet, Rfl: 1  •  famotidine (PEPCID) 20 mg tablet, Take 1 tablet (20 mg total) by mouth daily at bedtime, Disp: , Rfl: 0  •  glucose blood test strip, Test twice daily, Disp: 100 each, Rfl: 10  •  insulin lispro (HumaLOG) 100 units/mL injection, ACHS per SSI: Blood Glucose 150 - 224: 1 Unit of Insulin Blood Glucose 225 - 299: 2 Units of Insulin Blood Glucose 300 - 374: 3 Units of Insulin Blood Glucose 375 - 449: 4 Units of Insulin Blood Glucose greater than or equal to 450: 5 Units of Insulin, Disp: , Rfl: 0  •  latanoprost (XALATAN) 0.005 % ophthalmic solution, Administer 1 drop to both eyes daily at bedtime, Disp: 2.5 mL, Rfl: 0  •  lisinopril (ZESTRIL) 20 mg tablet, Take 1 tablet (20 mg total) by mouth 2 (two) times a day, Disp: 180 tablet, Rfl: 3  •  meclizine (ANTIVERT) 25 mg tablet, Take 1 tablet (25 mg total) by mouth 3 (three) times a day as needed for dizziness, Disp: 30 tablet, Rfl: 0  •  metFORMIN (GLUCOPHAGE) 1000 MG tablet, TAKE 1/2 TABLET BY MOUTH TWICE A DAY WITH MEALS., Disp: 90 tablet, Rfl: 3  •  Multiple Vitamin (MULTI-VITAMIN DAILY PO), Take by mouth daily, Disp: , Rfl:   •  Omega-3 Fatty Acids (OMEGA-3 FISH OIL) 1000 MG CAPS, Take 1 capsule by mouth daily, Disp: , Rfl:   •  ondansetron (ZOFRAN) 4 mg tablet, Take 1 tablet (4 mg total) by mouth every 8 (eight) hours as needed for nausea or vomiting, Disp: 20 tablet, Rfl: 0  •  simvastatin (ZOCOR) 20 mg tablet, Take 1 tablet (20 mg total) by mouth daily, Disp: 90 tablet, Rfl: 0  •  sotalol (BETAPACE) 80 mg tablet, Take 1 tablet (80 mg total) by mouth 2 (two) times a day, Disp: 180 tablet, Rfl: 3    Patient Active Problem List   Diagnosis   • Mixed hyperlipidemia   • Atrial fibrillation (HCC)   • Multiple sclerosis (HCC)   • Essential hypertension   • Type 2 diabetes mellitus without complication, without long-term current use of insulin (HCC)   • Urinary incontinence   • History of breast cancer   • Arthropathy of multiple sites   • Abnormal gait   • Granuloma of great toe   • Diabetic polyneuropathy associated with type 2 diabetes mellitus ([address])   • Atherosclerosis of native artery of both lower extremities (HCC)   • Chest pain   • Thyroid nodule   • Onychomycosis   • Tinea pedis of both feet   • Pain in both feet   • Current moderate episode of major depressive disorder without prior episode ([address])   • Lower extremity weakness   • Anemia          Patient ID: Andie Rendon is a 82 y.o. female. HPI    The following portions of the patient's history were reviewed and updated as appropriate:     family history includes Breast cancer in her maternal aunt; COPD in her sister; Cancer in her cousin and daughter; Diabetes in her sister; Heart disease in her cousin and father; Lung cancer in her cousin and mother; Multiple sclerosis in her cousin. reports that she has quit smoking. She has never used smokeless tobacco. She reports that she does not currently use alcohol. She reports that she does not currently use drugs. Vitals:    08/25/23 0918   Resp: 17       Review of Systems      Objective:  Patient's shoes and socks removed. Foot ExamPhysical Exam  Vitals and nursing note reviewed. Constitutional:       Appearance: Normal appearance. Cardiovascular:      Rate and Rhythm: Normal rate and regular rhythm. Skin:     Capillary Refill: Capillary refill takes less than 2 seconds. Comments: Left hallux nail plate ingrown fibular aspect. Positive paronychia. Negative cellulitis. Positive pyogenic granuloma. Neurological:      Mental Status: She is alert. Psychiatric:         Mood and Affect: Mood normal.         Behavior: Behavior normal.         Thought Content:  Thought content normal.         Judgment: Judgment normal.

## 2023-08-29 PROCEDURE — 88342 IMHCHEM/IMCYTCHM 1ST ANTB: CPT | Performed by: PATHOLOGY

## 2023-08-29 PROCEDURE — 88312 SPECIAL STAINS GROUP 1: CPT | Performed by: PATHOLOGY

## 2023-08-29 PROCEDURE — 88305 TISSUE EXAM BY PATHOLOGIST: CPT | Performed by: PATHOLOGY

## 2023-10-12 ENCOUNTER — HOSPITAL ENCOUNTER (OUTPATIENT)
Dept: RADIOLOGY | Facility: HOSPITAL | Age: 88
Discharge: HOME | End: 2023-10-12
Payer: MEDICARE

## 2023-10-12 DIAGNOSIS — R10.9 UNSPECIFIED ABDOMINAL PAIN: ICD-10-CM

## 2023-10-12 PROCEDURE — 74177 CT ABD & PELVIS W/CONTRAST: CPT

## 2023-10-12 PROCEDURE — G1004 CDSM NDSC: HCPCS

## 2023-10-12 RX ADMIN — IOHEXOL 100 ML: 350 INJECTION, SOLUTION INTRAVENOUS at 11:54

## 2023-10-23 ENCOUNTER — TELEPHONE (OUTPATIENT)
Dept: OBGYN CLINIC | Facility: CLINIC | Age: 88
End: 2023-10-23

## 2023-10-23 NOTE — TELEPHONE ENCOUNTER
Called number provided, kept ringing. No option to leave a message. Will try again later. Wanted to offer our first available in [facility] which is December 11.

## 2023-10-24 NOTE — TELEPHONE ENCOUNTER
Placed call to Lady Martinez , patient is scheduled on 12/11/23 in the [facility] office.

## 2023-10-25 ENCOUNTER — OFFICE VISIT (OUTPATIENT)
Age: 88
End: 2023-10-25
Payer: MEDICARE

## 2023-10-25 VITALS — BODY MASS INDEX: 29.57 KG/M2 | HEIGHT: 66 IN | RESPIRATION RATE: 17 BRPM | WEIGHT: 184 LBS

## 2023-10-25 DIAGNOSIS — M79.672 LEFT FOOT PAIN: ICD-10-CM

## 2023-10-25 DIAGNOSIS — L03.032 PARONYCHIA OF TOENAIL OF LEFT FOOT: ICD-10-CM

## 2023-10-25 DIAGNOSIS — E11.42 DIABETIC POLYNEUROPATHY ASSOCIATED WITH TYPE 2 DIABETES MELLITUS (HCC): Primary | ICD-10-CM

## 2023-10-25 DIAGNOSIS — B35.1 ONYCHOMYCOSIS: ICD-10-CM

## 2023-10-25 DIAGNOSIS — L60.0 INGROWN TOENAIL: ICD-10-CM

## 2023-10-25 PROCEDURE — 99213 OFFICE O/P EST LOW 20 MIN: CPT | Performed by: PODIATRIST

## 2023-10-25 NOTE — PROGRESS NOTES
Assessment/Plan: Ingrown toenail left hallux with paronychia and secondary to pyogenic fibular nail groove. Pain. Diabetic neuropathy. Mycosis of nail     Plan. Chart reviewed. Patient examined. Diabetic foot exam performed. Patient family educated on care of the diabetic foot. Patient and family advised on condition. All nails debrided without pain or complication. To left hallux, gentian violet dry sterile dressing applied. Aftercare instructions given to nursing. These are written. We will place patient on clindamycin daily as well as dry sterile dressing daily. Return as needed            Diagnoses and all orders for this visit:     Ingrown toenail     Paronychia of toenail of left foot     Left foot pain     Diabetic polyneuropathy associated with type 2 diabetes mellitus ([address])            Subjective:   Patient is seen on referral from skilled nursing facility. Patient has history of having toenail cut several months prior. Since that time she had pain and swelling of the toe. Allergies   Allergen Reactions    Bactrim [Sulfamethoxazole-Trimethoprim] Other (See Comments)       General weakness    Clindamycin         Annotation - [id number]: bad taste in mouth    Coumadin [Warfarin]         Reaction Date: 22May2012;  Annotation - [id number]: lip swelling    Dabigatran      Latex         Annotation - [id number]: RASH    Pradaxa [Dabigatran Etexilate Mesylate]              Current Outpatient Medications:     amLODIPine (NORVASC) 5 mg tablet, Take 1 tablet (5 mg total) by mouth daily, Disp: , Rfl: 0    ascorbic acid (VITAMIN C) 500 MG tablet, Take 500 mg by mouth daily, Disp: , Rfl:     aspirin 81 MG tablet, Take 162 mg by mouth daily, Disp: , Rfl:     docusate sodium (COLACE) 100 mg capsule, Take 1 capsule (100 mg total) by mouth 2 (two) times a day as needed for constipation, Disp: , Rfl: 0    Easy Comfort Lancets MISC, USE DAILY ICD 10 CODE E11.9, Disp: 100 each, Rfl: 3    escitalopram (Lexapro) 10 mg tablet, Take 1 tablet (10 mg total) by mouth daily, Disp: 90 tablet, Rfl: 1    famotidine (PEPCID) 20 mg tablet, Take 1 tablet (20 mg total) by mouth daily at bedtime, Disp: , Rfl: 0    glucose blood test strip, Test twice daily, Disp: 100 each, Rfl: 10    insulin lispro (HumaLOG) 100 units/mL injection, ACHS per SSI: Blood Glucose 150 - 224: 1 Unit of Insulin Blood Glucose 225 - 299: 2 Units of Insulin Blood Glucose 300 - 374: 3 Units of Insulin Blood Glucose 375 - 449: 4 Units of Insulin Blood Glucose greater than or equal to 450: 5 Units of Insulin, Disp: , Rfl: 0    latanoprost (XALATAN) 0.005 % ophthalmic solution, Administer 1 drop to both eyes daily at bedtime, Disp: 2.5 mL, Rfl: 0    lisinopril (ZESTRIL) 20 mg tablet, Take 1 tablet (20 mg total) by mouth 2 (two) times a day, Disp: 180 tablet, Rfl: 3    meclizine (ANTIVERT) 25 mg tablet, Take 1 tablet (25 mg total) by mouth 3 (three) times a day as needed for dizziness, Disp: 30 tablet, Rfl: 0    metFORMIN (GLUCOPHAGE) 1000 MG tablet, TAKE 1/2 TABLET BY MOUTH TWICE A DAY WITH MEALS., Disp: 90 tablet, Rfl: 3    Multiple Vitamin (MULTI-VITAMIN DAILY PO), Take by mouth daily, Disp: , Rfl:     Omega-3 Fatty Acids (OMEGA-3 FISH OIL) 1000 MG CAPS, Take 1 capsule by mouth daily, Disp: , Rfl:     ondansetron (ZOFRAN) 4 mg tablet, Take 1 tablet (4 mg total) by mouth every 8 (eight) hours as needed for nausea or vomiting, Disp: 20 tablet, Rfl: 0    simvastatin (ZOCOR) 20 mg tablet, Take 1 tablet (20 mg total) by mouth daily, Disp: 90 tablet, Rfl: 0    sotalol (BETAPACE) 80 mg tablet, Take 1 tablet (80 mg total) by mouth 2 (two) times a day, Disp: 180 tablet, Rfl: 3         Patient Active Problem List   Diagnosis    Mixed hyperlipidemia    Atrial fibrillation (HCC)    Multiple sclerosis (HCC)    Essential hypertension    Type 2 diabetes mellitus without complication, without long-term current use of insulin (HCC)    Urinary incontinence    History of breast cancer    Arthropathy of multiple sites    Abnormal gait    Granuloma of great toe    Diabetic polyneuropathy associated with type 2 diabetes mellitus (HCC)    Atherosclerosis of native artery of both lower extremities (HCC)    Chest pain    Thyroid nodule    Onychomycosis    Tinea pedis of both feet    Pain in both feet    Current moderate episode of major depressive disorder without prior episode (HCC)    Lower extremity weakness    Anemia             Patient ID: Delano Ram is a 82 y.o. female. HPI     The following portions of the patient's history were reviewed and updated as appropriate:      family history includes Breast cancer in her maternal aunt; COPD in her sister; Cancer in her cousin and daughter; Diabetes in her sister; Heart disease in her cousin and father; Lung cancer in her cousin and mother; Multiple sclerosis in her cousin. reports that she has quit smoking. She has never used smokeless tobacco. She reports that she does not currently use alcohol. She reports that she does not currently use drugs. Objective:  Patient's shoes and socks removed. Foot ExamPhysical Exam  Vitals and nursing note reviewed. Constitutional:       Appearance: Normal appearance. Cardiovascular:      Rate and Rhythm: Normal rate and regular rhythm. Skin:     Capillary Refill: Capillary refill takes less than 2 seconds. Comments: Left hallux nail plate ingrown fibular aspect. Positive paronychia. Negative cellulitis. Positive pyogenic granuloma. Neurological:      Mental Status: She is alert. Psychiatric:         Mood and Affect: Mood normal.         Behavior: Behavior normal.         Thought Content: Thought content normal.         Judgment: Judgment normal.     Patient's shoes and socks removed. Right Foot/Ankle   Right Foot Inspection      Toe Exam: tenderness and right toe deformity.      Sensory   Vibration: absent      Left Foot/Ankle  Left Foot Inspection      Toe Exam: tenderness.      Sensory   Vibration: absent      Assign Risk Category  Deformity present  Loss of protective sensation  Weak pulses  Risk: 2

## 2023-10-26 NOTE — PROGRESS NOTES
Assessment/Plan:     No problem-specific Assessment & Plan notes found for this encounter. Diagnoses and all orders for this visit:    Encounter to discuss test results      Advised to call if she has vaginal bleeding or other clinical condition warrants. Patient and daughter agree with the plan. Subjective:      Patient ID: Wendi Wilson is a 82 y.o. female who presents for results review. She is accompanied by her daughter, Mehreen Julien. She had a CT scan which is below secondary to upper abdominal pain. She denies vaginal bleeding though her daughter believes she may have had an episode of blood in her diaper some months back but cannot confirm. FINDINGS:     ABDOMEN     LOWER CHEST: Cardiomegaly. Coronary artery calcification. Discoid atelectasis left lower lobe. LIVER/BILIARY TREE:  Liver is diffusely decreased in density consistent with fatty change. Tiny hypodensity noted in the dome of the liver, too small to characterize. No CT evidence of suspicious hepatic mass. Normal hepatic contours. No biliary   dilatation. GALLBLADDER:  No calcified gallstones. No pericholecystic inflammatory change. SPLEEN:  Unremarkable. Probable tiny splenule. PANCREAS: Mild fatty replacement of the pancreas without acute findings. ADRENAL GLANDS:  Unremarkable. KIDNEYS/URETERS:  No hydronephrosis or urinary tract calculus. Several bilateral subcentimeter renal hypodensities are present, too small to accurately characterize, and statistically most likely benign findings. According to recent literature (Radiology   2019) no further workup of these findings is recommended. STOMACH AND BOWEL: Evaluation of the stomach is limited by underdistention. No focal pathology seen within limitations. Small bowel is normal caliber. Moderate amount of colonic stool. No focal inflammatory changes or fluid collections are identified.      APPENDIX:  No findings to suggest appendicitis. ABDOMINOPELVIC CAVITY:  No ascites. Trace presacral edema. No pneumoperitoneum. No lymphadenopathy. VESSELS:  Unremarkable for patient's age. PELVIS     REPRODUCTIVE ORGANS: 2.8 x 1.3 cm solid nodule associated with the endometrial canal (series 605/117). Endometrial thickening was described on the previous ultrasound. URINARY BLADDER:  Unremarkable. ABDOMINAL WALL/INGUINAL REGIONS:  Unremarkable. OSSEOUS STRUCTURES:  No acute fracture or destructive osseous lesion. Multilevel degenerative changes of the spine. Right hip osteoarthritis with heterotopic ossification superomedial to the greater trochanter. IMPRESSION:     1. 2.8 x 1.3 cm solid nodule along the endometrial canal. Differential considerations include neoplasm, polyp, hyperplasia as well as submucosal fibroid. Gynecologic consultation and further work-up is advised. 2. No acute intra-abdominal abnormality. 3. Fatty liver. Additional incidental findings as above. Discussed options with her. Pt makes her own medical decisions. Pt unable to do office EMB due to mobility issues. D&C offered as the only definitive way to rule out malignancy. Pt declines at this time and opts for conservative management. HPI    The following portions of the patient's history were reviewed and updated as appropriate: allergies, current medications, past family history, past medical history, past social history, past surgical history and problem list.    Review of Systems      Objective:      /83 (BP Location: Right arm, Patient Position: Sitting, Cuff Size: Large)   Pulse 76   Resp 18   Ht 5' 6" (1.676 m)   BMI 29.70 kg/m²          Physical Exam  Vitals reviewed. Pulmonary:      Effort: Pulmonary effort is normal.   Neurological:      General: No focal deficit present. Mental Status: She is alert.    Psychiatric:         Mood and Affect: Mood normal.         Behavior: Behavior normal.

## 2023-10-31 ENCOUNTER — OFFICE VISIT (OUTPATIENT)
Dept: OBGYN CLINIC | Facility: CLINIC | Age: 88
End: 2023-10-31

## 2023-10-31 VITALS
RESPIRATION RATE: 18 BRPM | SYSTOLIC BLOOD PRESSURE: 160 MMHG | BODY MASS INDEX: 29.7 KG/M2 | HEART RATE: 76 BPM | DIASTOLIC BLOOD PRESSURE: 83 MMHG | HEIGHT: 66 IN

## 2023-10-31 DIAGNOSIS — Z71.2 ENCOUNTER TO DISCUSS TEST RESULTS: Primary | ICD-10-CM

## 2024-03-30 ENCOUNTER — HOSPITAL ENCOUNTER (INPATIENT)
Facility: HOSPITAL | Age: 89
LOS: 5 days | Discharge: NURSING, REHAB OR LONG-TERM CARE | DRG: 871 | End: 2024-04-04
Attending: EMERGENCY MEDICINE | Admitting: INTERNAL MEDICINE
Payer: MEDICARE

## 2024-03-30 ENCOUNTER — APPOINTMENT (EMERGENCY)
Dept: RADIOLOGY | Facility: HOSPITAL | Age: 89
DRG: 871 | End: 2024-03-30
Payer: MEDICARE

## 2024-03-30 DIAGNOSIS — E11.9 TYPE 2 DIABETES MELLITUS WITHOUT COMPLICATION, WITHOUT LONG-TERM CURRENT USE OF INSULIN (HCC): ICD-10-CM

## 2024-03-30 DIAGNOSIS — E78.2 MIXED HYPERLIPIDEMIA: ICD-10-CM

## 2024-03-30 DIAGNOSIS — G35 MULTIPLE SCLEROSIS (HCC): ICD-10-CM

## 2024-03-30 DIAGNOSIS — R06.03 RESPIRATORY DISTRESS: ICD-10-CM

## 2024-03-30 DIAGNOSIS — I48.91 ATRIAL FIBRILLATION, UNSPECIFIED TYPE (HCC): ICD-10-CM

## 2024-03-30 DIAGNOSIS — I10 ESSENTIAL HYPERTENSION: ICD-10-CM

## 2024-03-30 DIAGNOSIS — R32 URINARY INCONTINENCE: ICD-10-CM

## 2024-03-30 DIAGNOSIS — J18.9 PNEUMONIA: Primary | ICD-10-CM

## 2024-03-30 PROBLEM — J96.22 ACUTE ON CHRONIC RESPIRATORY FAILURE WITH HYPOXIA AND HYPERCAPNIA (HCC): Status: ACTIVE | Noted: 2024-03-30

## 2024-03-30 PROBLEM — F32.A DEPRESSION: Status: ACTIVE | Noted: 2024-03-30

## 2024-03-30 PROBLEM — K21.9 GERD (GASTROESOPHAGEAL REFLUX DISEASE): Status: ACTIVE | Noted: 2024-03-30

## 2024-03-30 PROBLEM — R65.10 SIRS (SYSTEMIC INFLAMMATORY RESPONSE SYNDROME) (HCC): Status: ACTIVE | Noted: 2024-03-30

## 2024-03-30 PROBLEM — J96.21 ACUTE ON CHRONIC RESPIRATORY FAILURE WITH HYPOXIA AND HYPERCAPNIA (HCC): Status: ACTIVE | Noted: 2024-03-30

## 2024-03-30 LAB
2HR DELTA HS TROPONIN: 43 NG/L
4HR DELTA HS TROPONIN: 22 NG/L
ALBUMIN SERPL BCP-MCNC: 3.2 G/DL (ref 3.5–5)
ALP SERPL-CCNC: 113 U/L (ref 34–104)
ALT SERPL W P-5'-P-CCNC: 14 U/L (ref 7–52)
ANION GAP SERPL CALCULATED.3IONS-SCNC: 7 MMOL/L (ref 4–13)
ANISOCYTOSIS BLD QL SMEAR: PRESENT
APTT PPP: 31 SECONDS (ref 23–37)
ARTERIAL PATENCY WRIST A: YES
AST SERPL W P-5'-P-CCNC: 8 U/L (ref 13–39)
BASE EXCESS BLDA CALC-SCNC: 2.8 MMOL/L
BASOPHILS # BLD MANUAL: 0.13 THOUSAND/UL (ref 0–0.1)
BASOPHILS NFR MAR MANUAL: 1 % (ref 0–1)
BILIRUB SERPL-MCNC: 0.24 MG/DL (ref 0.2–1)
BNP SERPL-MCNC: 173 PG/ML (ref 0–100)
BODY TEMPERATURE: 98 DEGREES FEHRENHEIT
BUN SERPL-MCNC: 40 MG/DL (ref 5–25)
CALCIUM ALBUM COR SERPL-MCNC: 9.4 MG/DL (ref 8.3–10.1)
CALCIUM SERPL-MCNC: 8.8 MG/DL (ref 8.4–10.2)
CARDIAC TROPONIN I PNL SERPL HS: 39 NG/L
CARDIAC TROPONIN I PNL SERPL HS: 61 NG/L
CARDIAC TROPONIN I PNL SERPL HS: 82 NG/L
CHLORIDE SERPL-SCNC: 98 MMOL/L (ref 96–108)
CO2 SERPL-SCNC: 33 MMOL/L (ref 21–32)
CREAT SERPL-MCNC: 0.73 MG/DL (ref 0.6–1.3)
EOSINOPHIL # BLD MANUAL: 0 THOUSAND/UL (ref 0–0.4)
EOSINOPHIL NFR BLD MANUAL: 0 % (ref 0–6)
ERYTHROCYTE [DISTWIDTH] IN BLOOD BY AUTOMATED COUNT: 14.7 % (ref 11.6–15.1)
FLUAV RNA RESP QL NAA+PROBE: NEGATIVE
FLUBV RNA RESP QL NAA+PROBE: NEGATIVE
GFR SERPL CREATININE-BSD FRML MDRD: 74 ML/MIN/1.73SQ M
GLUCOSE SERPL-MCNC: 248 MG/DL (ref 65–140)
GLUCOSE SERPL-MCNC: 283 MG/DL (ref 65–140)
HCO3 BLDA-SCNC: 30.9 MMOL/L (ref 22–28)
HCT VFR BLD AUTO: 43.2 % (ref 34.8–46.1)
HGB BLD-MCNC: 13.3 G/DL (ref 11.5–15.4)
INR PPP: 1.06 (ref 0.84–1.19)
LACTATE SERPL-SCNC: 0.9 MMOL/L (ref 0.5–2)
LG PLATELETS BLD QL SMEAR: PRESENT
LYMPHOCYTES # BLD AUTO: 18 % (ref 14–44)
LYMPHOCYTES # BLD AUTO: 2.31 THOUSAND/UL (ref 0.6–4.47)
MCH RBC QN AUTO: 28.5 PG (ref 26.8–34.3)
MCHC RBC AUTO-ENTMCNC: 30.8 G/DL (ref 31.4–37.4)
MCV RBC AUTO: 93 FL (ref 82–98)
METAMYELOCYTES NFR BLD MANUAL: 1 % (ref 0–1)
MONOCYTES # BLD AUTO: 0.77 THOUSAND/UL (ref 0–1.22)
MONOCYTES NFR BLD: 6 % (ref 4–12)
MYELOCYTES NFR BLD MANUAL: 2 % (ref 0–1)
NEUTROPHILS # BLD MANUAL: 9.23 THOUSAND/UL (ref 1.85–7.62)
NEUTS BAND NFR BLD MANUAL: 3 % (ref 0–8)
NEUTS SEG NFR BLD AUTO: 69 % (ref 43–75)
NON VENT ROOM AIR: 60 %
NRBC BLD AUTO-RTO: 1 /100 WBC (ref 0–2)
O2 CT BLDA-SCNC: 17.5 ML/DL (ref 16–23)
OXYHGB MFR BLDA: 92 % (ref 94–97)
PCO2 BLDA: 63.9 MM HG (ref 36–44)
PCO2 TEMP ADJ BLDA: 63.1 MM HG (ref 36–44)
PH BLD: 7.31 [PH] (ref 7.35–7.45)
PH BLDA: 7.3 [PH] (ref 7.35–7.45)
PLATELET # BLD AUTO: 328 THOUSANDS/UL (ref 149–390)
PLATELET BLD QL SMEAR: ADEQUATE
PMV BLD AUTO: 10.9 FL (ref 8.9–12.7)
PO2 BLD: 70.3 MM HG (ref 75–129)
PO2 BLDA: 71.7 MM HG (ref 75–129)
POLYCHROMASIA BLD QL SMEAR: PRESENT
POTASSIUM SERPL-SCNC: 5.1 MMOL/L (ref 3.5–5.3)
PROCALCITONIN SERPL-MCNC: 0.62 NG/ML
PROT SERPL-MCNC: 7.8 G/DL (ref 6.4–8.4)
PROTHROMBIN TIME: 14 SECONDS (ref 11.6–14.5)
RBC # BLD AUTO: 4.66 MILLION/UL (ref 3.81–5.12)
RBC MORPH BLD: PRESENT
RSV RNA RESP QL NAA+PROBE: NEGATIVE
SARS-COV-2 RNA RESP QL NAA+PROBE: NEGATIVE
SODIUM SERPL-SCNC: 138 MMOL/L (ref 135–147)
SPECIMEN SOURCE: ABNORMAL
WBC # BLD AUTO: 12.82 THOUSAND/UL (ref 4.31–10.16)

## 2024-03-30 PROCEDURE — 82805 BLOOD GASES W/O2 SATURATION: CPT | Performed by: EMERGENCY MEDICINE

## 2024-03-30 PROCEDURE — 96375 TX/PRO/DX INJ NEW DRUG ADDON: CPT

## 2024-03-30 PROCEDURE — 99223 1ST HOSP IP/OBS HIGH 75: CPT | Performed by: NURSE PRACTITIONER

## 2024-03-30 PROCEDURE — 85007 BL SMEAR W/DIFF WBC COUNT: CPT | Performed by: EMERGENCY MEDICINE

## 2024-03-30 PROCEDURE — 94760 N-INVAS EAR/PLS OXIMETRY 1: CPT

## 2024-03-30 PROCEDURE — 36415 COLL VENOUS BLD VENIPUNCTURE: CPT | Performed by: EMERGENCY MEDICINE

## 2024-03-30 PROCEDURE — 94669 MECHANICAL CHEST WALL OSCILL: CPT

## 2024-03-30 PROCEDURE — 85730 THROMBOPLASTIN TIME PARTIAL: CPT | Performed by: EMERGENCY MEDICINE

## 2024-03-30 PROCEDURE — 96367 TX/PROPH/DG ADDL SEQ IV INF: CPT

## 2024-03-30 PROCEDURE — 93005 ELECTROCARDIOGRAM TRACING: CPT

## 2024-03-30 PROCEDURE — 84145 PROCALCITONIN (PCT): CPT | Performed by: EMERGENCY MEDICINE

## 2024-03-30 PROCEDURE — 82948 REAGENT STRIP/BLOOD GLUCOSE: CPT

## 2024-03-30 PROCEDURE — 99285 EMERGENCY DEPT VISIT HI MDM: CPT

## 2024-03-30 PROCEDURE — 36600 WITHDRAWAL OF ARTERIAL BLOOD: CPT

## 2024-03-30 PROCEDURE — 94002 VENT MGMT INPAT INIT DAY: CPT

## 2024-03-30 PROCEDURE — 84484 ASSAY OF TROPONIN QUANT: CPT | Performed by: EMERGENCY MEDICINE

## 2024-03-30 PROCEDURE — 96365 THER/PROPH/DIAG IV INF INIT: CPT

## 2024-03-30 PROCEDURE — 85027 COMPLETE CBC AUTOMATED: CPT | Performed by: EMERGENCY MEDICINE

## 2024-03-30 PROCEDURE — 85610 PROTHROMBIN TIME: CPT | Performed by: EMERGENCY MEDICINE

## 2024-03-30 PROCEDURE — 83880 ASSAY OF NATRIURETIC PEPTIDE: CPT | Performed by: EMERGENCY MEDICINE

## 2024-03-30 PROCEDURE — 71045 X-RAY EXAM CHEST 1 VIEW: CPT

## 2024-03-30 PROCEDURE — 94640 AIRWAY INHALATION TREATMENT: CPT

## 2024-03-30 PROCEDURE — 84484 ASSAY OF TROPONIN QUANT: CPT | Performed by: NURSE PRACTITIONER

## 2024-03-30 PROCEDURE — 80053 COMPREHEN METABOLIC PANEL: CPT | Performed by: EMERGENCY MEDICINE

## 2024-03-30 PROCEDURE — 99285 EMERGENCY DEPT VISIT HI MDM: CPT | Performed by: EMERGENCY MEDICINE

## 2024-03-30 PROCEDURE — 87040 BLOOD CULTURE FOR BACTERIA: CPT | Performed by: EMERGENCY MEDICINE

## 2024-03-30 PROCEDURE — 0241U HB NFCT DS VIR RESP RNA 4 TRGT: CPT | Performed by: EMERGENCY MEDICINE

## 2024-03-30 PROCEDURE — 94003 VENT MGMT INPAT SUBQ DAY: CPT

## 2024-03-30 PROCEDURE — 87081 CULTURE SCREEN ONLY: CPT | Performed by: INTERNAL MEDICINE

## 2024-03-30 PROCEDURE — 83605 ASSAY OF LACTIC ACID: CPT | Performed by: EMERGENCY MEDICINE

## 2024-03-30 RX ORDER — CHLORHEXIDINE GLUCONATE ORAL RINSE 1.2 MG/ML
15 SOLUTION DENTAL EVERY 12 HOURS SCHEDULED
Status: DISCONTINUED | OUTPATIENT
Start: 2024-03-30 | End: 2024-04-01

## 2024-03-30 RX ORDER — SENNOSIDES 8.6 MG
650 CAPSULE ORAL EVERY 6 HOURS PRN
COMMUNITY

## 2024-03-30 RX ORDER — LATANOPROST 50 UG/ML
1 SOLUTION/ DROPS OPHTHALMIC
Status: DISCONTINUED | OUTPATIENT
Start: 2024-03-30 | End: 2024-04-04 | Stop reason: HOSPADM

## 2024-03-30 RX ORDER — FUROSEMIDE 10 MG/ML
40 INJECTION INTRAMUSCULAR; INTRAVENOUS ONCE
Status: COMPLETED | OUTPATIENT
Start: 2024-03-30 | End: 2024-03-30

## 2024-03-30 RX ORDER — CEFTRIAXONE 1 G/50ML
1000 INJECTION, SOLUTION INTRAVENOUS ONCE
Status: COMPLETED | OUTPATIENT
Start: 2024-03-30 | End: 2024-03-30

## 2024-03-30 RX ORDER — POTASSIUM CHLORIDE 750 MG/1
10 CAPSULE, EXTENDED RELEASE ORAL DAILY
COMMUNITY

## 2024-03-30 RX ORDER — POLYETHYLENE GLYCOL 3350 17 G/17G
17 POWDER, FOR SOLUTION ORAL DAILY
COMMUNITY

## 2024-03-30 RX ORDER — LORATADINE 10 MG/1
10 TABLET ORAL DAILY
COMMUNITY

## 2024-03-30 RX ORDER — ENOXAPARIN SODIUM 100 MG/ML
40 INJECTION SUBCUTANEOUS DAILY
Status: DISCONTINUED | OUTPATIENT
Start: 2024-03-31 | End: 2024-04-04 | Stop reason: HOSPADM

## 2024-03-30 RX ORDER — CEFTRIAXONE 2 G/50ML
2000 INJECTION, SOLUTION INTRAVENOUS EVERY 24 HOURS
Status: DISCONTINUED | OUTPATIENT
Start: 2024-03-31 | End: 2024-04-04 | Stop reason: HOSPADM

## 2024-03-30 RX ORDER — ENOXAPARIN SODIUM 100 MG/ML
30 INJECTION SUBCUTANEOUS DAILY
Status: DISCONTINUED | OUTPATIENT
Start: 2024-03-31 | End: 2024-03-30

## 2024-03-30 RX ORDER — FAMOTIDINE 10 MG/ML
20 INJECTION, SOLUTION INTRAVENOUS
Status: DISCONTINUED | OUTPATIENT
Start: 2024-03-31 | End: 2024-04-01

## 2024-03-30 RX ORDER — HYDRALAZINE HYDROCHLORIDE 20 MG/ML
10 INJECTION INTRAMUSCULAR; INTRAVENOUS EVERY 6 HOURS PRN
Status: DISCONTINUED | OUTPATIENT
Start: 2024-03-30 | End: 2024-04-04 | Stop reason: HOSPADM

## 2024-03-30 RX ORDER — INSULIN LISPRO 100 [IU]/ML
1-6 INJECTION, SOLUTION INTRAVENOUS; SUBCUTANEOUS EVERY 6 HOURS SCHEDULED
Status: DISCONTINUED | OUTPATIENT
Start: 2024-03-31 | End: 2024-04-01

## 2024-03-30 RX ORDER — FUROSEMIDE 40 MG/1
40 TABLET ORAL DAILY
COMMUNITY

## 2024-03-30 RX ORDER — LEVALBUTEROL INHALATION SOLUTION 1.25 MG/3ML
1.25 SOLUTION RESPIRATORY (INHALATION)
Status: DISCONTINUED | OUTPATIENT
Start: 2024-03-30 | End: 2024-04-04 | Stop reason: HOSPADM

## 2024-03-30 RX ADMIN — CEFTRIAXONE 1000 MG: 1 INJECTION, SOLUTION INTRAVENOUS at 18:39

## 2024-03-30 RX ADMIN — INSULIN LISPRO 3 UNITS: 100 INJECTION, SOLUTION INTRAVENOUS; SUBCUTANEOUS at 23:49

## 2024-03-30 RX ADMIN — IPRATROPIUM BROMIDE 0.5 MG: 0.5 SOLUTION RESPIRATORY (INHALATION) at 22:22

## 2024-03-30 RX ADMIN — AZITHROMYCIN MONOHYDRATE 500 MG: 500 INJECTION, POWDER, LYOPHILIZED, FOR SOLUTION INTRAVENOUS at 19:16

## 2024-03-30 RX ADMIN — LATANOPROST 1 DROP: 50 SOLUTION OPHTHALMIC at 23:00

## 2024-03-30 RX ADMIN — FUROSEMIDE 40 MG: 10 INJECTION, SOLUTION INTRAVENOUS at 18:33

## 2024-03-30 RX ADMIN — LEVALBUTEROL HYDROCHLORIDE 1.25 MG: 1.25 SOLUTION RESPIRATORY (INHALATION) at 22:22

## 2024-03-30 NOTE — ED NOTES
Spoke with Brianna at [facility] and requested MAR be faxed to this ED.      Misty Chen RN  03/30/24 8545

## 2024-03-30 NOTE — ED NOTES
Provider at bedside for re-evaluation, changed BiPAP settings. States new plan of care is to see how patient does on new settings and then re-evaluate.      Danelle Brown RN  03/30/24 1920

## 2024-03-31 ENCOUNTER — APPOINTMENT (INPATIENT)
Dept: RADIOLOGY | Facility: HOSPITAL | Age: 89
DRG: 871 | End: 2024-03-31
Payer: MEDICARE

## 2024-03-31 LAB
ALBUMIN SERPL BCP-MCNC: 2.8 G/DL (ref 3.5–5)
ALP SERPL-CCNC: 86 U/L (ref 34–104)
ALT SERPL W P-5'-P-CCNC: 10 U/L (ref 7–52)
ANION GAP SERPL CALCULATED.3IONS-SCNC: 6 MMOL/L (ref 4–13)
AST SERPL W P-5'-P-CCNC: 7 U/L (ref 13–39)
ATRIAL RATE: 122 BPM
BASE EX.OXY STD BLDV CALC-SCNC: 94.8 % (ref 60–80)
BASE EXCESS BLDV CALC-SCNC: 3.8 MMOL/L
BILIRUB SERPL-MCNC: 0.22 MG/DL (ref 0.2–1)
BUN SERPL-MCNC: 40 MG/DL (ref 5–25)
CALCIUM ALBUM COR SERPL-MCNC: 9.2 MG/DL (ref 8.3–10.1)
CALCIUM SERPL-MCNC: 8.2 MG/DL (ref 8.4–10.2)
CHLORIDE SERPL-SCNC: 101 MMOL/L (ref 96–108)
CO2 SERPL-SCNC: 33 MMOL/L (ref 21–32)
CREAT SERPL-MCNC: 0.66 MG/DL (ref 0.6–1.3)
ERYTHROCYTE [DISTWIDTH] IN BLOOD BY AUTOMATED COUNT: 14.6 % (ref 11.6–15.1)
GFR SERPL CREATININE-BSD FRML MDRD: 80 ML/MIN/1.73SQ M
GLUCOSE SERPL-MCNC: 136 MG/DL (ref 65–140)
GLUCOSE SERPL-MCNC: 140 MG/DL (ref 65–140)
GLUCOSE SERPL-MCNC: 171 MG/DL (ref 65–140)
GLUCOSE SERPL-MCNC: 181 MG/DL (ref 65–140)
GLUCOSE SERPL-MCNC: 198 MG/DL (ref 65–140)
HCO3 BLDV-SCNC: 31.4 MMOL/L (ref 24–30)
HCT VFR BLD AUTO: 42.7 % (ref 34.8–46.1)
HGB BLD-MCNC: 12.8 G/DL (ref 11.5–15.4)
MAGNESIUM SERPL-MCNC: 2.6 MG/DL (ref 1.9–2.7)
MCH RBC QN AUTO: 28.4 PG (ref 26.8–34.3)
MCHC RBC AUTO-ENTMCNC: 30 G/DL (ref 31.4–37.4)
MCV RBC AUTO: 95 FL (ref 82–98)
NRBC BLD AUTO-RTO: 1 /100 WBCS
O2 CT BLDV-SCNC: 18 ML/DL
P AXIS: -18 DEGREES
PCO2 BLDV: 61 MM HG (ref 42–50)
PH BLDV: 7.33 [PH] (ref 7.3–7.4)
PHOSPHATE SERPL-MCNC: 5.2 MG/DL (ref 2.3–4.1)
PLATELET # BLD AUTO: 264 THOUSANDS/UL (ref 149–390)
PMV BLD AUTO: 10.9 FL (ref 8.9–12.7)
PO2 BLDV: 93.2 MM HG (ref 35–45)
POTASSIUM SERPL-SCNC: 4.9 MMOL/L (ref 3.5–5.3)
PR INTERVAL: 192 MS
PROCALCITONIN SERPL-MCNC: 0.64 NG/ML
PROT SERPL-MCNC: 6.7 G/DL (ref 6.4–8.4)
QRS AXIS: -37 DEGREES
QRSD INTERVAL: 82 MS
QT INTERVAL: 310 MS
QTC INTERVAL: 441 MS
RBC # BLD AUTO: 4.5 MILLION/UL (ref 3.81–5.12)
SODIUM SERPL-SCNC: 140 MMOL/L (ref 135–147)
T WAVE AXIS: 87 DEGREES
VENTRICULAR RATE: 122 BPM
WBC # BLD AUTO: 13.68 THOUSAND/UL (ref 4.31–10.16)

## 2024-03-31 PROCEDURE — 94760 N-INVAS EAR/PLS OXIMETRY 1: CPT

## 2024-03-31 PROCEDURE — 71045 X-RAY EXAM CHEST 1 VIEW: CPT

## 2024-03-31 PROCEDURE — 93010 ELECTROCARDIOGRAM REPORT: CPT | Performed by: INTERNAL MEDICINE

## 2024-03-31 PROCEDURE — 84100 ASSAY OF PHOSPHORUS: CPT | Performed by: NURSE PRACTITIONER

## 2024-03-31 PROCEDURE — 84145 PROCALCITONIN (PCT): CPT | Performed by: NURSE PRACTITIONER

## 2024-03-31 PROCEDURE — 99232 SBSQ HOSP IP/OBS MODERATE 35: CPT | Performed by: INTERNAL MEDICINE

## 2024-03-31 PROCEDURE — 80053 COMPREHEN METABOLIC PANEL: CPT | Performed by: NURSE PRACTITIONER

## 2024-03-31 PROCEDURE — 94668 MNPJ CHEST WALL SBSQ: CPT

## 2024-03-31 PROCEDURE — 82805 BLOOD GASES W/O2 SATURATION: CPT

## 2024-03-31 PROCEDURE — 82948 REAGENT STRIP/BLOOD GLUCOSE: CPT

## 2024-03-31 PROCEDURE — 94669 MECHANICAL CHEST WALL OSCILL: CPT

## 2024-03-31 PROCEDURE — 83735 ASSAY OF MAGNESIUM: CPT | Performed by: NURSE PRACTITIONER

## 2024-03-31 PROCEDURE — 85027 COMPLETE CBC AUTOMATED: CPT | Performed by: NURSE PRACTITIONER

## 2024-03-31 PROCEDURE — 94640 AIRWAY INHALATION TREATMENT: CPT

## 2024-03-31 RX ORDER — ACETAMINOPHEN 650 MG/1
650 SUPPOSITORY RECTAL EVERY 4 HOURS PRN
Status: DISCONTINUED | OUTPATIENT
Start: 2024-03-31 | End: 2024-04-04 | Stop reason: HOSPADM

## 2024-03-31 RX ORDER — BISACODYL 10 MG
10 SUPPOSITORY, RECTAL RECTAL DAILY PRN
Status: DISCONTINUED | OUTPATIENT
Start: 2024-03-31 | End: 2024-04-04 | Stop reason: HOSPADM

## 2024-03-31 RX ORDER — SODIUM CHLORIDE FOR INHALATION 3 %
4 VIAL, NEBULIZER (ML) INHALATION
Status: DISCONTINUED | OUTPATIENT
Start: 2024-03-31 | End: 2024-04-04 | Stop reason: HOSPADM

## 2024-03-31 RX ADMIN — INSULIN LISPRO 1 UNITS: 100 INJECTION, SOLUTION INTRAVENOUS; SUBCUTANEOUS at 12:30

## 2024-03-31 RX ADMIN — LATANOPROST 1 DROP: 50 SOLUTION OPHTHALMIC at 22:55

## 2024-03-31 RX ADMIN — SODIUM CHLORIDE SOLN NEBU 3% 4 ML: 3 NEBU SOLN at 11:15

## 2024-03-31 RX ADMIN — CHLORHEXIDINE GLUCONATE 15 ML: 1.2 SOLUTION ORAL at 08:15

## 2024-03-31 RX ADMIN — CHLORHEXIDINE GLUCONATE 15 ML: 1.2 SOLUTION ORAL at 20:54

## 2024-03-31 RX ADMIN — SODIUM CHLORIDE SOLN NEBU 3% 4 ML: 3 NEBU SOLN at 14:48

## 2024-03-31 RX ADMIN — CEFTRIAXONE 2000 MG: 2 INJECTION, SOLUTION INTRAVENOUS at 22:00

## 2024-03-31 RX ADMIN — ENOXAPARIN SODIUM 40 MG: 40 INJECTION SUBCUTANEOUS at 08:15

## 2024-03-31 RX ADMIN — SODIUM CHLORIDE SOLN NEBU 3% 4 ML: 3 NEBU SOLN at 19:14

## 2024-03-31 RX ADMIN — AZITHROMYCIN MONOHYDRATE 500 MG: 500 INJECTION, POWDER, LYOPHILIZED, FOR SOLUTION INTRAVENOUS at 20:49

## 2024-03-31 RX ADMIN — LEVALBUTEROL HYDROCHLORIDE 1.25 MG: 1.25 SOLUTION RESPIRATORY (INHALATION) at 07:48

## 2024-03-31 RX ADMIN — FAMOTIDINE 20 MG: 10 INJECTION INTRAVENOUS at 08:16

## 2024-03-31 RX ADMIN — INSULIN LISPRO 1 UNITS: 100 INJECTION, SOLUTION INTRAVENOUS; SUBCUTANEOUS at 06:07

## 2024-03-31 RX ADMIN — LEVALBUTEROL HYDROCHLORIDE 1.25 MG: 1.25 SOLUTION RESPIRATORY (INHALATION) at 19:14

## 2024-03-31 RX ADMIN — LEVALBUTEROL HYDROCHLORIDE 1.25 MG: 1.25 SOLUTION RESPIRATORY (INHALATION) at 14:48

## 2024-03-31 RX ADMIN — IPRATROPIUM BROMIDE 0.5 MG: 0.5 SOLUTION RESPIRATORY (INHALATION) at 07:48

## 2024-03-31 NOTE — ASSESSMENT & PLAN NOTE
Lab Results   Component Value Date    HGBA1C 6.3 (H) 02/17/2023       Recent Labs     03/30/24  2309   POCGLU 248*       Blood Sugar Average: Last 72 hrs:  (P) 248  Currently n.p.o. in the setting of BiPAP  Initiate sliding scale coverage every 6 hours

## 2024-03-31 NOTE — ASSESSMENT & PLAN NOTE
As evidenced by temperature, tachypnea, tachycardia, leukocytosis  Chest x-ray concerning for left-sided pneumonia  Blood cultures pending  Lactic acid negative  proCalcitonin 0.62  Received azithromycin/Rocephin in the ER -continue  Obtain sputum sample  Obtain urinalysis

## 2024-03-31 NOTE — ASSESSMENT & PLAN NOTE
"Lab Results   Component Value Date    HGBA1C 6.3 (H) 02/17/2023       No results for input(s): \"POCGLU\" in the last 72 hours.    Blood Sugar Average: Last 72 hrs:    Currently n.p.o. in the setting of BiPAP  Initiate sliding scale coverage every 6 hours  "

## 2024-03-31 NOTE — ASSESSMENT & PLAN NOTE
Hold home dose lisinopril, Norvasc, Betapace in the setting of patient being obtunded/n.p.o.  Hydralazine as needed for systolic blood pressure greater than 180

## 2024-03-31 NOTE — H&P
"[facility]  H&P  Name: Ernestina Hurley 88 y.o. female I MRN: [id number]  Unit/Bed#: ICU 12 I Date of Admission: 3/30/2024   Date of Service: 3/30/2024 I Hospital Day: 0      Assessment/Plan   * Acute on chronic respiratory failure with hypoxia and hypercapnia (HCC)  Assessment & Plan  In the setting of hypoxia, and increased work of breathing evidenced by hypoxia, and tachypnea  X-ray concerning for left sided pneumonia  Improved oxygenation on BiPAP  Continue BiPAP and keep O2 saturation 92% or greater  Aggressive chest PT  Continue Rocephin/azithromycin  Continue breathing treatments    SIRS (systemic inflammatory response syndrome) (Formerly [facility])  Assessment & Plan  As evidenced by temperature, tachypnea, tachycardia, leukocytosis  Chest x-ray concerning for left-sided pneumonia  Blood cultures pending  Lactic acid negative  proCalcitonin 0.62  Received azithromycin/Rocephin in the ER -continue  Obtain sputum sample  Obtain urinalysis    Essential hypertension  Assessment & Plan  Hold home dose lisinopril, Norvasc, Betapace in the setting of patient being obtunded/n.p.o.  Hydralazine as needed for systolic blood pressure greater than 180    GERD (gastroesophageal reflux disease)  Assessment & Plan  Change home dose Pepcid to IV    Mixed hyperlipidemia  Assessment & Plan  Hold home dose Zocor    Atrial fibrillation (Formerly [facility])  Assessment & Plan  Hold home dose aspirin  Pt does not take any blood thinners as an outpatient    Multiple sclerosis (Formerly [facility])  Assessment & Plan  Bedbound at baseline, nursing home resident.  Not on medication    Depression  Assessment & Plan  Hold home dose cymbalata    Type 2 diabetes mellitus without complication, without long-term current use of insulin (Formerly [facility])  Assessment & Plan  Lab Results   Component Value Date    HGBA1C 6.3 (H) 02/17/2023       No results for input(s): \"POCGLU\" in the last 72 hours.    Blood Sugar Average: Last 72 hrs:    Currently n.p.o. in the setting of " BiPAP  Initiate sliding scale coverage every 6 hours           History of Present Illness     HPI: Ernestina Hurley is a 88 y.o. who presents with past medical history of diabetes, hypertension, bedbound with multiple sclerosis, A-fib not on blood thinners, GERD, depression, and hyperlipidemia.  She presented from the nursing home for evaluation of cough, respiratory distress, and hypoxia.  She has been more lethargic during the day, and at points struggling to breathe.  Her oxygen saturation was in the 80s on room air.  Initially, she was able to provide some history, however in the emergency room, she became more lethargic, and was placed on BiPAP for hypoxia and hypercarbia.  Initial labs included WBC 12.8, glucose 283, procalcitonin 0.62, and lactic negative.  Chest x-ray concerning for left lower lobe pneumonia.      On exam, patient was removed from BiPAP, and was minimally responsive.  He was only oriented to self.  During her exam, she had increased work of breathing, and hypoxia off of BiPAP.  She was placed back on BiPAP will be admitted to the stepdown unit for further monitoring/workup.  She will require greater than 2 midnight stay.    History obtained from chart review.  Review of Systems   Constitutional:  Positive for fatigue.   HENT: Negative.     Eyes: Negative.    Respiratory: Negative.     Cardiovascular: Negative.    Gastrointestinal: Negative.    Endocrine: Negative.    Genitourinary: Negative.    Musculoskeletal: Negative.    Skin: Negative.    Allergic/Immunologic: Negative.    Neurological:  Positive for weakness.   Hematological: Negative.    Psychiatric/Behavioral: Negative.       Disposition: Stepdown Level 1  Historical Information   Past Medical History:  No date: Abscess of buttock, right      Comment:  last assessed-5/4/2017  No date: Cancer (HCC)      Comment:  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-last                assessed-10/8/2015  No date: Cellulitis of chest wall       Comment:  last assessed-7/27/2015  10/12/2006: Chronic endometritis  No date: Diabetes mellitus (HCC)  11/02/2007: Dysuria  No date: Flushing      Comment:  resolved-6/30/2015  No date: Glaucoma  No date: Hyperlipidemia  No date: Hypertension  01/05/2012: Ingrown nail  No date: Malignant neoplasm of breast ([facility])      Comment:  last assessed-11/5/2015  No date: MS (multiple sclerosis) ([facility])  No date: Nonvenomous insect bite      Comment:  last assessed-12/12/2013 02/09/2011: Palpitations  10/13/2006: Pressure ulcer of buttock  05/12/2006: Proctitis  03/14/2006: Vaginal polyp  No date: Viral gastroenteritis      Comment:  last assessed-9/8/2016 Past Surgical History:  No date: BREAST BIOPSY      Comment:  open  No date: BREAST SURGERY  No date: CERVICAL BIOPSY  W/ LOOP ELECTRODE EXCISION  No date: DILATION AND CURETTAGE OF UTERUS  2/27/2017: INCISION AND DRAINAGE OF WOUND; Left      Comment:  Procedure: INCISION AND DRAINAGE (I&D)   Chest wall x 2;               Surgeon: Shant Braga MD;  Location: [facility] OR;                 Service:   No date: MASTECTOMY      Comment:  last assessed-6/30/2015   Current Outpatient Medications   Medication Instructions    amLODIPine (NORVASC) 5 mg, Oral, Daily    ascorbic acid (VITAMIN C) 500 mg, Oral, Daily    aspirin 162 mg, Oral, Daily    docusate sodium (COLACE) 100 mg, Oral, 2 times daily PRN    Easy Comfort Lancets MISC USE DAILY ICD 10 CODE E11.9    escitalopram (LEXAPRO) 10 mg, Oral, Daily    famotidine (PEPCID) 20 mg, Oral, Daily at bedtime    glucose blood test strip Test twice daily    insulin lispro (HumaLOG) 100 units/mL injection ACHS per SSI: Blood Glucose 150 - 224: 1 Unit of Insulin Blood Glucose 225 - 299: 2 Units of Insulin Blood Glucose 300 - 374: 3 Units of Insulin Blood Glucose 375 - 449: 4 Units of Insulin Blood Glucose greater than or equal to 450: 5 Units of Insulin    latanoprost (XALATAN) 0.005 % ophthalmic solution 1 drop, Both Eyes, Daily at bedtime     lisinopril (ZESTRIL) 20 mg, Oral, 2 times daily    meclizine (ANTIVERT) 25 mg, Oral, 3 times daily PRN    metFORMIN (GLUCOPHAGE) 1000 MG tablet TAKE 1/2 TABLET BY MOUTH TWICE A DAY WITH MEALS.    Multiple Vitamin (MULTI-VITAMIN DAILY PO) Oral, Daily    Omega-3 Fatty Acids (OMEGA-3 FISH OIL) 1000 MG CAPS 1 capsule, Oral, Daily    ondansetron (ZOFRAN) 4 mg, Oral, Every 8 hours PRN    simvastatin (ZOCOR) 20 mg, Oral, Daily    sotalol (BETAPACE) 80 mg, Oral, 2 times daily    Allergies   Allergen Reactions    Bactrim [Sulfamethoxazole-Trimethoprim] Other (See Comments)     General weakness    Clindamycin      Annotation - 08Oct2015: bad taste in mouth    Coumadin [Warfarin]      Reaction Date: 22May2012; Annotation - 04Sep2012: lip swelling    Dabigatran     Latex      Annotation - [id number]: RASH    Pradaxa [Dabigatran Etexilate Mesylate]       Social History     Tobacco Use    Smoking status: Former    Smokeless tobacco: Never   Vaping Use    Vaping status: Never Used   Substance Use Topics    Alcohol use: Not Currently     Alcohol/week: 0.0 standard drinks of alcohol    Drug use: Not Currently    Family History   Problem Relation Age of Onset    Heart disease Father         cardiac disorder    COPD Sister     Diabetes Sister     Lung cancer Mother     Lung cancer Cousin     Heart disease Cousin         cardiac disorder    Multiple sclerosis Cousin     Cancer Cousin     Cancer Daughter         bladder    Breast cancer Maternal Aunt           Objective                            Vitals I/O      Most Recent Min/Max in 24hrs   Temp (!) 101.2 °F (38.4 °C) Temp  Min: 101.2 °F (38.4 °C)  Max: 101.2 °F (38.4 °C)   Pulse (!) 113 Pulse  Min: 82  Max: 121   Resp (!) 25 Resp  Min: 23  Max: 30   /67 BP  Min: 146/65  Max: 181/81   O2 Sat 98 % SpO2  Min: 95 %  Max: 98 %      Intake/Output Summary (Last 24 hours) at 3/30/2024 2122  Last data filed at 3/30/2024 1909  Gross per 24 hour   Intake 50 ml   Output --   Net 50 ml        No diet orders on file    Invasive Monitoring           Physical Exam   Physical Exam  Vitals and nursing note reviewed.   Eyes:      General: Vision grossly intact.      Extraocular Movements: Extraocular movements intact.      Conjunctiva/sclera: Conjunctivae normal.      Pupils: Pupils are equal, round, and reactive to light.   Skin:     General: Skin is warm and dry.   HENT:      Head: Normocephalic and atraumatic.      Right Ear: Hearing and tympanic membrane normal.      Left Ear: Hearing and tympanic membrane normal.      Mouth/Throat:      Mouth: Mucous membranes are dry.   Cardiovascular:      Rate and Rhythm: Tachycardia present.   Musculoskeletal:         General: Normal range of motion.      Right lower le+ Edema present.      Left lower le+ Edema present.   Abdominal: General: Bowel sounds are normal.   Constitutional:       Appearance: She is ill-appearing and toxic-appearing.   Pulmonary:      Comments: On BiPAP  Left lower lobe coarse, diminished  Neurological:        Corneal reflex present, cough reflex and gag reflex intact.      Comments: Oriented to person only  Follows commands  Oriented to place time and situation  Withdraws to painful stimuli in all 4 extremities and Bilateral lower extremity foot drop   Genitourinary/Anorectal:     Comments: Pure wick           Diagnostic Studies      EKG: Normal sinus rhythm, alarms on  Imaging: No results found.  I have personally reviewed pertinent films in PACS     Medications:  Scheduled PRN        Continuous    No current facility-administered medications for this encounter.       Labs:    CBC    Recent Labs     24  1816   WBC 12.82*   HGB 13.3   HCT 43.2      BANDSPCT 3     BMP    Recent Labs     24  1816   SODIUM 138   K 5.1   CL 98   CO2 33*   AGAP 7   BUN 40*   CREATININE 0.73   CALCIUM 8.8       Coags    Recent Labs     24  1816   INR 1.06   PTT 31        Additional Electrolytes  No recent results       Blood  Gas    Recent Labs     03/30/24  1822   PHART 7.302*   TIY6APT 63.9*   PO2ART 71.7*   [id number] 30.9*   BEART 2.8   SOURCE Radial, Right     Recent Labs     03/30/24  1822   SOURCE Radial, Right    LFTs  Recent Labs     03/30/24  1816   ALT 14   AST 8*   ALKPHOS 113*   ALB 3.2*   TBILI 0.24       Infectious  Recent Labs     03/30/24  1816   PROCALCITONI 0.62*     Glucose  Recent Labs     03/30/24  1816   GLUC 283*             Anticipated Length of Stay is > 2 midnights  JAMISON Mercedes

## 2024-03-31 NOTE — ASSESSMENT & PLAN NOTE
In the setting of hypoxia, and increased work of breathing evidenced by hypoxia, and tachypnea  X-ray concerning for left sided pneumonia  Improved oxygenation on BiPAP  Continue BiPAP and keep O2 saturation 92% or greater  Aggressive chest PT  Continue Rocephin/azithromycin  Continue breathing treatments

## 2024-03-31 NOTE — ASSESSMENT & PLAN NOTE
In the setting of hypoxia, and increased work of breathing evidenced by tachypnea  X-ray concerning for left sided pneumonia  Improved oxygenation on BiPAP  Continue BiPAP and keep O2 saturation 92% or greater  Aggressive chest PT  Continue Rocephin/azithromycin  Continue breathing treatments

## 2024-03-31 NOTE — PROGRESS NOTES
[facility]  Progress Note  Name: Ernestina Hurley I  MRN: [id number]  Unit/Bed#: ICU 12 I Date of Admission: 3/30/2024   Date of Service: 3/31/2024 I Hospital Day: 1    Assessment/Plan   * Acute on chronic respiratory failure with hypoxia and hypercapnia (HCC)  Assessment & Plan  In the setting of hypoxia, and increased work of breathing evidenced by tachypnea  X-ray concerning for left sided pneumonia  Improved oxygenation on BiPAP  Continue BiPAP and keep O2 saturation 92% or greater  Aggressive chest PT  Continue Rocephin/azithromycin  Continue breathing treatments    SIRS (systemic inflammatory response syndrome) (HCC)  Assessment & Plan  As evidenced by temperature, tachypnea, tachycardia, leukocytosis  Chest x-ray concerning for left-sided pneumonia  Blood cultures pending  Lactic acid negative  proCalcitonin 0.62  Received azithromycin/Rocephin in the ER -continue  Obtain sputum sample  Obtain urinalysis    Essential hypertension  Assessment & Plan  Hold home dose lisinopril, Norvasc, Betapace in the setting of patient being obtunded/n.p.o.  Hydralazine as needed for systolic blood pressure greater than 180    GERD (gastroesophageal reflux disease)  Assessment & Plan  Change home dose Pepcid to IV    Mixed hyperlipidemia  Assessment & Plan  Hold home dose Zocor    Atrial fibrillation ([facility])  Assessment & Plan  Hold home dose aspirin  Pt does not take any blood thinners as an outpatient    Multiple sclerosis ([facility])  Assessment & Plan  Bedbound at baseline, nursing home resident.  Not on medication    Depression  Assessment & Plan  Hold home dose cymbalata    Type 2 diabetes mellitus without complication, without long-term current use of insulin ([facility])  Assessment & Plan  Lab Results   Component Value Date    HGBA1C 6.3 (H) 02/17/2023       Recent Labs     03/30/24  2309   POCGLU 248*       Blood Sugar Average: Last 72 hrs:  (P) 248  Currently n.p.o. in the setting of BiPAP  Initiate  sliding scale coverage every 6 hours             Disposition: Stepdown Level 1    ICU Core Measures     A: Assess, Prevent, and Manage Pain Has pain been assessed? Yes  Need for changes to pain regimen? No   B: Both SAT/SAT  N/A   C: Choice of Sedation RASS Goal: N/A patient not on sedation  Need for changes to sedation or analgesia regimen? No   D: Delirium CAM-ICU: Positive   E: Early Mobility  Plan for early mobility? Yes   F: Family Engagement Plan for family engagement today? Yes       Antibiotic Review: Awaiting culture results.       Prophylaxis:  VTE VTE covered by:  enoxaparin, Subcutaneous       Stress Ulcer  covered byFamotidine (PF) (PEPCID) injection 20 mg [[id number]], famotidine (PEPCID) 20 mg tablet [[id number]] (Long-Term Med)         Significant 24hr Events     24hr events: Patient was admitted last evening from the emergency room on BiPAP for increased work of breathing and hypoxia.  She was admitted to the stepdown unit, remained comfortable on BiPAP throughout the evening.     Subjective   Review of Systems   Constitutional:  Positive for fatigue.   HENT: Negative.     Eyes: Negative.    Respiratory:  Positive for shortness of breath.    Cardiovascular: Negative.    Gastrointestinal: Negative.    Endocrine: Negative.    Genitourinary: Negative.    Musculoskeletal: Negative.    Skin: Negative.    Allergic/Immunologic: Negative.    Neurological:  Positive for weakness.   Hematological: Negative.    Psychiatric/Behavioral: Negative.        Objective                            Vitals I/O      Most Recent Min/Max in 24hrs   Temp 97.8 °F (36.6 °C) Temp  Min: 97.8 °F (36.6 °C)  Max: 101.2 °F (38.4 °C)   Pulse (!) 109 Pulse  Min: 75  Max: 121   Resp (!) 25 Resp  Min: 20  Max: 30   /59 BP  Min: 117/58  Max: 181/81   O2 Sat 98 % SpO2  Min: 95 %  Max: 98 %      Intake/Output Summary (Last 24 hours) at 3/31/2024 0534  Last data filed at 3/31/2024 0400  Gross per 24 hour   Intake 300 ml   Output 200 ml    Net 100 ml       Diet NPO    Invasive Monitoring           Physical Exam   Physical Exam  Vitals and nursing note reviewed.   Eyes:      General: Vision grossly intact.      Extraocular Movements: Extraocular movements intact.      Conjunctiva/sclera: Conjunctivae normal.      Pupils: Pupils are equal, round, and reactive to light.   Skin:     General: Skin is warm and dry.   HENT:      Head: Normocephalic and atraumatic.      Mouth/Throat:      Mouth: Mucous membranes are dry.   Cardiovascular:      Rate and Rhythm: Regular rhythm. Tachycardia present.   Musculoskeletal:         General: Normal range of motion.      Right lower le+ Edema present.      Left lower le+ Edema present.   Abdominal: General: Bowel sounds are normal.   Constitutional:       Appearance: She is ill-appearing and toxic-appearing.   Pulmonary:      Comments: On BiPAP  Bilateral breath sounds diminished, left greater than right  Neurological:      Mental Status: She is somnolent, disoriented to place, disoriented to time and disoriented to situation.      Motor: Strength full and intact in all extremities.        Corneal reflex present, cough reflex and gag reflex intact.      Comments: Withdraws to painful stimuli, follows commands   Genitourinary/Anorectal:  external catheter present.          Diagnostic Studies      EKG: Sinus tachycardia, alarms on  Imaging: No results found.  I have personally reviewed pertinent films in PACS     Medications:  Scheduled PRN   azithromycin, 500 mg, Q24H  cefTRIAXone, 2,000 mg, Q24H  chlorhexidine, 15 mL, Q12H DANITZA  enoxaparin, 40 mg, Daily  famotidine, 20 mg, Q24H DANITZA  insulin lispro, 1-6 Units, Q6H DANITZA  ipratropium, 0.5 mg, 4x Daily  latanoprost, 1 drop, HS  levalbuterol, 1.25 mg, TID      hydrALAZINE, 10 mg, Q6H PRN       Continuous          Labs:    CBC    Recent Labs     24  1816 24  0439   WBC 12.82* 13.68*   HGB 13.3 12.8   HCT 43.2 42.7    264   BANDSPCT 3  --       BMP    Recent Labs     03/30/24  1816 03/31/24  0439   SODIUM 138 140   K 5.1 4.9   CL 98 101   CO2 33* 33*   AGAP 7 6   BUN 40* 40*   CREATININE 0.73 0.66   CALCIUM 8.8 8.2*       Coags    Recent Labs     03/30/24  1816   INR 1.06   PTT 31        Additional Electrolytes  Recent Labs     03/31/24  0439   MG 2.6   PHOS 5.2*          Blood Gas    Recent Labs     03/30/24  1822   PHART 7.302*   VGA8BBA 63.9*   PO2ART 71.7*   [id number] 30.9*   BEART 2.8   SOURCE Radial, Right     Recent Labs     03/30/24  1822   SOURCE Radial, Right    LFTs  Recent Labs     03/30/24 1816 03/31/24  0439   ALT 14 10   AST 8* 7*   ALKPHOS 113* 86   ALB 3.2* 2.8*   TBILI 0.24 0.22       Infectious  Recent Labs     03/30/24  1816 03/31/24  0439   PROCALCITONI 0.62* 0.64*     Glucose  Recent Labs     03/30/24  1816 03/31/24  0439   GLUC 283* 198*               JAMISON Mercedes

## 2024-03-31 NOTE — ED PROVIDER NOTES
History  Chief Complaint   Patient presents with    Respiratory Distress     Pt brought in by EMS 4 for respiratory distress.  Per report pt was found during rounds, altered with a RA sat of 84%, blue lips and tachypneic. Pt given 1 duoneb in route      HPI  Patient is an 88-year-old female history of diabetes, hypertension, multiple sclerosis, paroxysmal atrial fibrillation presenting for evaluation of cough, confusion, respiratory distress.  Per care facility, patient has been drowsier throughout the day today, is having trouble sitting up, less interactive, coughing, struggling to breathe.  Patient with oxygen saturations dropping into the 80s prior to EMS arrival.  Patient is drowsy but able to provide limited history and states that she has been coughing for the last day, states that has been stuck in her chest and the back of her throat, states subjective fever and generalized abdominal pain.  Prior to Admission Medications   Prescriptions Last Dose Informant Patient Reported? Taking?   Easy Comfort Lancets MISC  Self No No   Sig: USE DAILY ICD 10 CODE E11.9   Multiple Vitamin (MULTI-VITAMIN DAILY PO)  Self Yes No   Sig: Take by mouth daily   Omega-3 Fatty Acids (OMEGA-3 FISH OIL) 1000 MG CAPS  Self Yes No   Sig: Take 1 capsule by mouth daily   amLODIPine (NORVASC) 5 mg tablet  Self No No   Sig: Take 1 tablet (5 mg total) by mouth daily   ascorbic acid (VITAMIN C) 500 MG tablet  Self Yes No   Sig: Take 500 mg by mouth daily   aspirin 81 MG tablet  Self Yes No   Sig: Take 162 mg by mouth daily   docusate sodium (COLACE) 100 mg capsule  Self No No   Sig: Take 1 capsule (100 mg total) by mouth 2 (two) times a day as needed for constipation   escitalopram (Lexapro) 10 mg tablet  Self No No   Sig: Take 1 tablet (10 mg total) by mouth daily   famotidine (PEPCID) 20 mg tablet  Self No No   Sig: Take 1 tablet (20 mg total) by mouth daily at bedtime   glucose blood test strip  Self No No   Sig: Test twice daily    insulin lispro (HumaLOG) 100 units/mL injection  Self No No   Sig: ACHS per SSI: Blood Glucose 150 - 224: 1 Unit of Insulin Blood Glucose 225 - 299: 2 Units of Insulin Blood Glucose 300 - 374: 3 Units of Insulin Blood Glucose 375 - 449: 4 Units of Insulin Blood Glucose greater than or equal to 450: 5 Units of Insulin   latanoprost (XALATAN) 0.005 % ophthalmic solution  Self No No   Sig: Administer 1 drop to both eyes daily at bedtime   lisinopril (ZESTRIL) 20 mg tablet  Self No No   Sig: Take 1 tablet (20 mg total) by mouth 2 (two) times a day   meclizine (ANTIVERT) 25 mg tablet  Self No No   Sig: Take 1 tablet (25 mg total) by mouth 3 (three) times a day as needed for dizziness   metFORMIN (GLUCOPHAGE) 1000 MG tablet  Self No No   Sig: TAKE 1/2 TABLET BY MOUTH TWICE A DAY WITH MEALS.   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-last assessed-10/8/2015    Cellulitis of chest wall     last assessed-7/27/2015    Chronic endometritis 10/12/2006    Diabetes mellitus (HCC)     Dysuria 11/02/2007    Flushing     resolved-6/30/2015    Glaucoma     Hyperlipidemia     Hypertension     Ingrown nail 01/05/2012    Malignant neoplasm of breast (HCC)     last assessed-11/5/2015    MS (multiple sclerosis) (HCC)     Nonvenomous insect bite     last assessed-12/12/2013    Palpitations 02/09/2011    Pressure ulcer of buttock 10/13/2006    Proctitis 05/12/2006    Vaginal polyp 03/14/2006    Viral gastroenteritis     last  assessed-9/8/2016       Past Surgical History:   Procedure Laterality Date    BREAST BIOPSY      open    BREAST SURGERY      CERVICAL BIOPSY  W/ LOOP ELECTRODE EXCISION      DILATION AND CURETTAGE OF UTERUS      INCISION AND DRAINAGE OF WOUND Left 2/27/2017    Procedure: INCISION AND DRAINAGE (I&D)   Chest wall x 2;  Surgeon: Shant Braga MD;  Location: [facility] OR;  Service:     MASTECTOMY      last assessed-6/30/2015       Family History   Problem Relation Age of Onset    Heart disease Father         cardiac disorder    COPD Sister     Diabetes Sister     Lung cancer Mother     Lung cancer Cousin     Heart disease Cousin         cardiac disorder    Multiple sclerosis Cousin     Cancer Cousin     Cancer Daughter         bladder    Breast cancer Maternal Aunt      I have reviewed and agree with the history as documented.    E-Cigarette/Vaping    E-Cigarette Use Never User      E-Cigarette/Vaping Substances     Social History     Tobacco Use    Smoking status: Former    Smokeless tobacco: Never   Vaping Use    Vaping status: Never Used   Substance Use Topics    Alcohol use: Not Currently     Alcohol/week: 0.0 standard drinks of alcohol    Drug use: Not Currently       Review of Systems   Constitutional:  Positive for fatigue and fever. Negative for chills.   Respiratory:  Positive for cough and shortness of breath. Negative for wheezing.    Gastrointestinal:  Positive for abdominal pain. Negative for constipation, nausea and vomiting.   Neurological:  Negative for headaches.   All other systems reviewed and are negative.      Physical Exam  Physical Exam  Vitals and nursing note reviewed.   Constitutional:       General: She is in acute distress.      Appearance: Normal appearance. She is toxic-appearing. She is not ill-appearing or diaphoretic.   HENT:      Head: Normocephalic and atraumatic.      Comments: Moist mucous membranes     Right Ear: External ear normal.      Left Ear: External ear normal.   Eyes:       General:         Right eye: No discharge.         Left eye: No discharge.   Cardiovascular:      Comments: Sinus tachycardia rate of 120.  No murmurs rubs or gallops  Pulmonary:      Effort: No respiratory distress.      Breath sounds: Rales present.      Comments: Patient in respiratory distress with a respiratory rate in the 30s.  Globally diminished breath sounds.  Rales in the left lung base.  Saturations in the 80s on room air.  Maintaining saturations in the low 90s on nonrebreather  Abdominal:      General: There is no distension.      Comments: Generalized abdominal tenderness without focality   Musculoskeletal:         General: No deformity.      Cervical back: Normal range of motion.   Skin:     Findings: No lesion or rash.   Neurological:      Mental Status: She is oriented to person, place, and time. Mental status is at baseline. She is lethargic.      Comments: Drowsy but arousable, intermittently answering yes or no questions   Psychiatric:         Mood and Affect: Mood and affect normal.         Vital Signs  ED Triage Vitals   Temperature Pulse Respirations Blood Pressure SpO2   03/30/24 1830 03/30/24 1803 03/30/24 1803 03/30/24 1803 03/30/24 1803   (!) 101.2 °F (38.4 °C) (!) 121 (!) 30 (!) 181/81 95 %      Temp Source Heart Rate Source Patient Position - Orthostatic VS BP Location FiO2 (%)   03/30/24 1830 03/30/24 1803 03/30/24 1803 03/30/24 1803 --   Rectal Monitor Lying Left arm       Pain Score       03/30/24 1830       No Pain           Vitals:    03/30/24 1830 03/30/24 1845 03/30/24 1915 03/30/24 1930   BP: 168/74 152/70 167/74 150/63   Pulse: 94 83 85 89   Patient Position - Orthostatic VS:  Lying Lying Lying         Visual Acuity      ED Medications  Medications   cefTRIAXone (ROCEPHIN) IVPB (premix in dextrose) 1,000 mg 50 mL (0 mg Intravenous Stopped 3/30/24 1909)   azithromycin (ZITHROMAX) 500 mg in sodium chloride 0.9% 250mL IVPB 500 mg (500 mg Intravenous New Bag 3/30/24 1916)    furosemide (LASIX) injection 40 mg (40 mg Intravenous Given 3/30/24 1833)       Diagnostic Studies  Results Reviewed       Procedure Component Value Units Date/Time    COVID/FLU/RSV [[id number]]  (Normal) Collected: 03/30/24 1918    Lab Status: Final result Specimen: Nares from Nose Updated: 03/30/24 2005     SARS-CoV-2 Negative     INFLUENZA A PCR Negative     INFLUENZA B PCR Negative     RSV PCR Negative    Narrative:      FOR PEDIATRIC PATIENTS - copy/paste COVID Guidelines URL to browser: [email or web address]    SARS-CoV-2 assay is a Nucleic Acid Amplification assay intended for the  qualitative detection of nucleic acid from SARS-CoV-2 in nasopharyngeal  swabs. Results are for the presumptive identification of SARS-CoV-2 RNA.    Positive results are indicative of infection with SARS-CoV-2, the virus  causing COVID-19, but do not rule out bacterial infection or co-infection  with other viruses. Laboratories within the [place] and its  territories are required to report all positive results to the appropriate  public health authorities. Negative results do not preclude SARS-CoV-2  infection and should not be used as the sole basis for treatment or other  patient management decisions. Negative results must be combined with  clinical observations, patient history, and epidemiological information.  This test has not been FDA cleared or approved.    This test has been authorized by FDA under an Emergency Use Authorization  (EUA). This test is only authorized for the duration of time the  declaration that circumstances exist justifying the authorization of the  emergency use of an in vitro diagnostic tests for detection of SARS-CoV-2  virus and/or diagnosis of COVID-19 infection under section 564(b)(1) of  the Act, 21 U.S.C. 360bbb-3(b)(1), unless the authorization is terminated  or revoked sooner. The test has been validated but independent review by  FDA  and CLIA is pending.    Test performed using [employer] GeneXpert: This RT-PCR assay targets N2,  a region unique to SARS-CoV-2. A conserved region in the E-gene was chosen  for pan-Sarbecovirus detection which includes SARS-CoV-2.    According to CMS-2020-01-R, this platform meets the definition of high-throughput technology.    HS Troponin I 4hr [[id number]]     Lab Status: No result Specimen: Blood     RBC Morphology Reflex Test [[id number]] Collected: 03/30/24 1816    Lab Status: Final result Specimen: Blood from Arm, Right Updated: 03/30/24 2001    B-Type Natriuretic Peptide(BNP) [[id number]]  (Abnormal) Collected: 03/30/24 1816    Lab Status: Final result Specimen: Blood from Arm, Right Updated: 03/30/24 1927      pg/mL     CBC and differential [[id number]]  (Abnormal) Collected: 03/30/24 1816    Lab Status: Final result Specimen: Blood from Arm, Right Updated: 03/30/24 1902     WBC 12.82 Thousand/uL      RBC 4.66 Million/uL      Hemoglobin 13.3 g/dL      Hematocrit 43.2 %      MCV 93 fL      MCH 28.5 pg      MCHC 30.8 g/dL      RDW 14.7 %      MPV 10.9 fL      Platelets 328 Thousands/uL     Narrative:      This is an appended report.  These results have been appended to a previously verified report.    Manual Differential(PHLEBS Do Not Order) [[id number]]  (Abnormal) Collected: 03/30/24 1816    Lab Status: Final result Specimen: Blood from Arm, Right Updated: 03/30/24 1902     Segmented % 69 %      Bands % 3 %      Lymphocytes % 18 %      Monocytes % 6 %      Eosinophils % 0 %      Basophils % 1 %      Metamyelocytes % 1 %      Myelocytes % 2 %      Absolute Neutrophils 9.23 Thousand/uL      Absolute Lymphocytes 2.31 Thousand/uL      Absolute Monocytes 0.77 Thousand/uL      Absolute Eosinophils 0.00 Thousand/uL      Absolute Basophils 0.13 Thousand/uL      Total Counted --     nRBC 1 /100 WBC      RBC Morphology Present     Platelet Estimate Adequate     Large Platelet Present     Anisocytosis Present      Polychromasia Present    Procalcitonin [[id number]]  (Abnormal) Collected: 03/30/24 1816    Lab Status: Final result Specimen: Blood from Arm, Right Updated: 03/30/24 1857     Procalcitonin 0.62 ng/ml     Lactic acid [[id number]]  (Normal) Collected: 03/30/24 1816    Lab Status: Final result Specimen: Blood from Arm, Right Updated: 03/30/24 1856     LACTIC ACID 0.9 mmol/L     Narrative:      Result may be elevated if tourniquet was used during collection.    HS Troponin 0hr (reflex protocol) [[id number]]  (Normal) Collected: 03/30/24 1816    Lab Status: Final result Specimen: Blood from Arm, Right Updated: 03/30/24 1854     hs TnI 0hr 39 ng/L     HS Troponin I 2hr [[id number]]     Lab Status: No result Specimen: Blood     Comprehensive metabolic panel [[id number]]  (Abnormal) Collected: 03/30/24 1816    Lab Status: Final result Specimen: Blood from Arm, Right Updated: 03/30/24 1849     Sodium 138 mmol/L      Potassium 5.1 mmol/L      Chloride 98 mmol/L      CO2 33 mmol/L      ANION GAP 7 mmol/L      BUN 40 mg/dL      Creatinine 0.73 mg/dL      Glucose 283 mg/dL      Calcium 8.8 mg/dL      Corrected Calcium 9.4 mg/dL      AST 8 U/L      ALT 14 U/L      Alkaline Phosphatase 113 U/L      Total Protein 7.8 g/dL      Albumin 3.2 g/dL      Total Bilirubin 0.24 mg/dL      eGFR 74 ml/min/1.73sq m     Narrative:      National Kidney Disease Foundation guidelines for Chronic Kidney Disease (CKD):     Stage 1 with normal or high GFR (GFR > 90 mL/min/1.73 square meters)    Stage 2 Mild CKD (GFR = 60-89 mL/min/1.73 square meters)    Stage 3A Moderate CKD (GFR = 45-59 mL/min/1.73 square meters)    Stage 3B Moderate CKD (GFR = 30-44 mL/min/1.73 square meters)    Stage 4 Severe CKD (GFR = 15-29 mL/min/1.73 square meters)    Stage 5 End Stage CKD (GFR <15 mL/min/1.73 square meters)  Note: GFR calculation is accurate only with a steady state creatinine    Protime-INR [[id number]]  (Normal) Collected: 03/30/24 0878    Lab Status: Final  result Specimen: Blood from Arm, Right Updated: 03/30/24 1843     Protime 14.0 seconds      INR 1.06    APTT [[id number]]  (Normal) Collected: 03/30/24 1816    Lab Status: Final result Specimen: Blood from Arm, Right Updated: 03/30/24 1843     PTT 31 seconds     Blood gas, arterial [[id number]]  (Abnormal) Collected: 03/30/24 1822    Lab Status: Final result Specimen: Blood, Arterial from Radial, Right Updated: 03/30/24 1830     pH, Arterial 7.302     PH ART TC 7.306     pCO2, Arterial 63.9 mm Hg      PCO2 (TC) Arterial 63.1 mm Hg      pO2, Arterial 71.7 mm Hg      PO2 (TC) Arterial 70.3 mm Hg      HCO3, Arterial 30.9 mmol/L      Base Excess, Arterial 2.8 mmol/L      O2 Content, Arterial 17.5 mL/dL      O2 HGB,Arterial  92.0 %      SOURCE Radial, Right     AMOL TEST Yes     Temperature 98.0 Degrees Fehrenheit      ROOM AIR FIO2 60 %      Non Vent type BIPAP --    Blood culture #2 [[id number]] Collected: 03/30/24 1817    Lab Status: In process Specimen: Blood from Arm, Left Updated: 03/30/24 1828    Blood culture #1 [[id number]] Collected: 03/30/24 1816    Lab Status: In process Specimen: Blood from Arm, Right Updated: 03/30/24 1827    UA w Reflex to Microscopic w Reflex to Culture [[id number]]     Lab Status: No result Specimen: Urine                    XR chest 1 view portable    (Results Pending)              Procedures  Procedures         ED Course                                             Medical Decision Making  I obtained history from the patient, from the patient's daughter, and from the patient's care facility.  Patient presented in respiratory distress, febrile, tachycardic concerning for pneumonia.  Patient placed on BiPAP for respiratory support and maintaining sats on BiPAP with overall improvement of respiratory status.  I ordered and reviewed lab work to evaluate for infection including CBC, CMP, procalcitonin, blood cultures, lactate.  Patient with a mild leukocytosis and a mildly elevated  procalcitonin.  I ordered and independently interpreted an EKG which demonstrates sinus tachycardia rate of 122 without ST or T wave abnormalities.  I ordered and independently interpreted a chest x-ray which is extremely limited secondary to patient's kyphosis and body habitus but appears to demonstrate a porter out left lung concerning for pneumonia.  Patient treated with Rocephin, azithromycin.  I discussed patient with the critical care team who accepted the patient.  I had a goals of care discussion with patient's daughter Herminia who states that she has had multiple discussions with her mother in the past and that she would not want intubation or chest compressions.  Patient admitted to the ICU for further evaluation and management.    Amount and/or Complexity of Data Reviewed  Labs: ordered.  Radiology: ordered.    Risk  Prescription drug management.  Decision regarding hospitalization.             Disposition  Final diagnoses:   Respiratory distress   Pneumonia     Time reflects when diagnosis was documented in both MDM as applicable and the Disposition within this note       Time User Action Codes Description Comment    3/30/2024  8:13 PM Damian Aleman Add [R06.03] Respiratory distress     3/30/2024  8:13 PM Damian Aleman Add [J18.9] Pneumonia     3/30/2024  8:13 PM Damian Aleman Modify [R06.03] Respiratory distress     3/30/2024  8:13 PM Damian Aleman Modify [J18.9] Pneumonia           ED Disposition       ED Disposition   Admit    Condition   Stable    Date/Time   Sat Mar 30, 2024  8:13 PM    Comment   Case was discussed with critical care and the patient's admission status was agreed to be Admission Status: inpatient status to the service of Dr. Butler .               Follow-up Information       Follow up With Specialties Details Why Contact Info Additional Information    [facility] Emergency Department Emergency Medicine  If symptoms worsen Erik Freeman  [facility] [address]  [phone] [facility]ly [facility] Emergency Department, [address], [place], [address]            Patient's Medications   Discharge Prescriptions    No medications on file       No discharge procedures on file.    PDMP Review       None            ED Provider  Electronically Signed by             Damian Aleman MD  03/30/24 2020

## 2024-04-01 ENCOUNTER — APPOINTMENT (INPATIENT)
Dept: RADIOLOGY | Facility: HOSPITAL | Age: 89
DRG: 871 | End: 2024-04-01
Payer: MEDICARE

## 2024-04-01 LAB
ALBUMIN SERPL BCP-MCNC: 2.8 G/DL (ref 3.5–5)
ALP SERPL-CCNC: 79 U/L (ref 34–104)
ALT SERPL W P-5'-P-CCNC: 7 U/L (ref 7–52)
ANION GAP SERPL CALCULATED.3IONS-SCNC: 9 MMOL/L (ref 4–13)
AST SERPL W P-5'-P-CCNC: 7 U/L (ref 13–39)
BILIRUB SERPL-MCNC: 0.2 MG/DL (ref 0.2–1)
BUN SERPL-MCNC: 31 MG/DL (ref 5–25)
CA-I BLD-SCNC: 0.95 MMOL/L (ref 1.12–1.32)
CALCIUM ALBUM COR SERPL-MCNC: 9.3 MG/DL (ref 8.3–10.1)
CALCIUM SERPL-MCNC: 8.3 MG/DL (ref 8.4–10.2)
CHLORIDE SERPL-SCNC: 103 MMOL/L (ref 96–108)
CO2 SERPL-SCNC: 30 MMOL/L (ref 21–32)
CREAT SERPL-MCNC: 0.51 MG/DL (ref 0.6–1.3)
ERYTHROCYTE [DISTWIDTH] IN BLOOD BY AUTOMATED COUNT: 14.5 % (ref 11.6–15.1)
GFR SERPL CREATININE-BSD FRML MDRD: 87 ML/MIN/1.73SQ M
GLUCOSE SERPL-MCNC: 122 MG/DL (ref 65–140)
GLUCOSE SERPL-MCNC: 130 MG/DL (ref 65–140)
GLUCOSE SERPL-MCNC: 138 MG/DL (ref 65–140)
GLUCOSE SERPL-MCNC: 162 MG/DL (ref 65–140)
GLUCOSE SERPL-MCNC: 217 MG/DL (ref 65–140)
HCT VFR BLD AUTO: 38.8 % (ref 34.8–46.1)
HGB BLD-MCNC: 11.6 G/DL (ref 11.5–15.4)
L PNEUMO1 AG UR QL IA.RAPID: NEGATIVE
MAGNESIUM SERPL-MCNC: 2.5 MG/DL (ref 1.9–2.7)
MCH RBC QN AUTO: 28.1 PG (ref 26.8–34.3)
MCHC RBC AUTO-ENTMCNC: 29.9 G/DL (ref 31.4–37.4)
MCV RBC AUTO: 94 FL (ref 82–98)
MRSA NOSE QL CULT: NORMAL
PHOSPHATE SERPL-MCNC: 3.3 MG/DL (ref 2.3–4.1)
PLATELET # BLD AUTO: 211 THOUSANDS/UL (ref 149–390)
PMV BLD AUTO: 10.6 FL (ref 8.9–12.7)
POTASSIUM SERPL-SCNC: 4.5 MMOL/L (ref 3.5–5.3)
PROCALCITONIN SERPL-MCNC: 0.28 NG/ML
PROT SERPL-MCNC: 6.6 G/DL (ref 6.4–8.4)
RBC # BLD AUTO: 4.13 MILLION/UL (ref 3.81–5.12)
S PNEUM AG UR QL: NEGATIVE
SODIUM SERPL-SCNC: 142 MMOL/L (ref 135–147)
WBC # BLD AUTO: 8.39 THOUSAND/UL (ref 4.31–10.16)

## 2024-04-01 PROCEDURE — 94760 N-INVAS EAR/PLS OXIMETRY 1: CPT

## 2024-04-01 PROCEDURE — 99232 SBSQ HOSP IP/OBS MODERATE 35: CPT | Performed by: ANESTHESIOLOGY

## 2024-04-01 PROCEDURE — 94669 MECHANICAL CHEST WALL OSCILL: CPT

## 2024-04-01 PROCEDURE — 85027 COMPLETE CBC AUTOMATED: CPT

## 2024-04-01 PROCEDURE — 94640 AIRWAY INHALATION TREATMENT: CPT

## 2024-04-01 PROCEDURE — 84100 ASSAY OF PHOSPHORUS: CPT

## 2024-04-01 PROCEDURE — 80053 COMPREHEN METABOLIC PANEL: CPT

## 2024-04-01 PROCEDURE — 82948 REAGENT STRIP/BLOOD GLUCOSE: CPT

## 2024-04-01 PROCEDURE — 84145 PROCALCITONIN (PCT): CPT

## 2024-04-01 PROCEDURE — 83735 ASSAY OF MAGNESIUM: CPT

## 2024-04-01 PROCEDURE — 92610 EVALUATE SWALLOWING FUNCTION: CPT

## 2024-04-01 PROCEDURE — 71045 X-RAY EXAM CHEST 1 VIEW: CPT

## 2024-04-01 PROCEDURE — 87449 NOS EACH ORGANISM AG IA: CPT

## 2024-04-01 PROCEDURE — 82330 ASSAY OF CALCIUM: CPT

## 2024-04-01 RX ORDER — FAMOTIDINE 40 MG/5ML
20 POWDER, FOR SUSPENSION ORAL 2 TIMES DAILY
Status: DISCONTINUED | OUTPATIENT
Start: 2024-04-01 | End: 2024-04-04 | Stop reason: HOSPADM

## 2024-04-01 RX ORDER — INSULIN LISPRO 100 [IU]/ML
1-6 INJECTION, SOLUTION INTRAVENOUS; SUBCUTANEOUS
Status: DISCONTINUED | OUTPATIENT
Start: 2024-04-01 | End: 2024-04-01

## 2024-04-01 RX ORDER — ASPIRIN 81 MG/1
81 TABLET, CHEWABLE ORAL DAILY
Status: DISCONTINUED | OUTPATIENT
Start: 2024-04-01 | End: 2024-04-04 | Stop reason: HOSPADM

## 2024-04-01 RX ORDER — INSULIN LISPRO 100 [IU]/ML
1-6 INJECTION, SOLUTION INTRAVENOUS; SUBCUTANEOUS
Status: DISCONTINUED | OUTPATIENT
Start: 2024-04-01 | End: 2024-04-04 | Stop reason: HOSPADM

## 2024-04-01 RX ORDER — SOTALOL HYDROCHLORIDE 80 MG/1
80 TABLET ORAL 2 TIMES DAILY
Status: DISCONTINUED | OUTPATIENT
Start: 2024-04-01 | End: 2024-04-04 | Stop reason: HOSPADM

## 2024-04-01 RX ORDER — LORATADINE 10 MG/1
10 TABLET ORAL DAILY
Status: DISCONTINUED | OUTPATIENT
Start: 2024-04-01 | End: 2024-04-04 | Stop reason: HOSPADM

## 2024-04-01 RX ORDER — AMLODIPINE BESYLATE 5 MG/1
5 TABLET ORAL DAILY
Status: DISCONTINUED | OUTPATIENT
Start: 2024-04-01 | End: 2024-04-04 | Stop reason: HOSPADM

## 2024-04-01 RX ADMIN — AMLODIPINE BESYLATE 5 MG: 5 TABLET ORAL at 09:27

## 2024-04-01 RX ADMIN — CHLORHEXIDINE GLUCONATE 15 ML: 1.2 SOLUTION ORAL at 08:32

## 2024-04-01 RX ADMIN — LATANOPROST 1 DROP: 50 SOLUTION OPHTHALMIC at 22:30

## 2024-04-01 RX ADMIN — SODIUM CHLORIDE SOLN NEBU 3% 4 ML: 3 NEBU SOLN at 13:50

## 2024-04-01 RX ADMIN — SODIUM CHLORIDE SOLN NEBU 3% 4 ML: 3 NEBU SOLN at 02:42

## 2024-04-01 RX ADMIN — LEVALBUTEROL HYDROCHLORIDE 1.25 MG: 1.25 SOLUTION RESPIRATORY (INHALATION) at 20:18

## 2024-04-01 RX ADMIN — SOTALOL HYDROCHLORIDE 80 MG: 80 TABLET ORAL at 17:46

## 2024-04-01 RX ADMIN — SODIUM CHLORIDE SOLN NEBU 3% 4 ML: 3 NEBU SOLN at 20:18

## 2024-04-01 RX ADMIN — LEVALBUTEROL HYDROCHLORIDE 1.25 MG: 1.25 SOLUTION RESPIRATORY (INHALATION) at 07:33

## 2024-04-01 RX ADMIN — SOTALOL HYDROCHLORIDE 80 MG: 80 TABLET ORAL at 09:27

## 2024-04-01 RX ADMIN — ENOXAPARIN SODIUM 40 MG: 40 INJECTION SUBCUTANEOUS at 08:32

## 2024-04-01 RX ADMIN — ASPIRIN 81 MG CHEWABLE TABLET 81 MG: 81 TABLET CHEWABLE at 09:26

## 2024-04-01 RX ADMIN — FAMOTIDINE 20 MG: 10 INJECTION INTRAVENOUS at 08:32

## 2024-04-01 RX ADMIN — SODIUM CHLORIDE SOLN NEBU 3% 4 ML: 3 NEBU SOLN at 07:33

## 2024-04-01 RX ADMIN — INSULIN LISPRO 1 UNITS: 100 INJECTION, SOLUTION INTRAVENOUS; SUBCUTANEOUS at 16:26

## 2024-04-01 RX ADMIN — INSULIN LISPRO 2 UNITS: 100 INJECTION, SOLUTION INTRAVENOUS; SUBCUTANEOUS at 22:30

## 2024-04-01 RX ADMIN — LORATADINE 10 MG: 10 TABLET ORAL at 09:27

## 2024-04-01 RX ADMIN — LEVALBUTEROL HYDROCHLORIDE 1.25 MG: 1.25 SOLUTION RESPIRATORY (INHALATION) at 13:50

## 2024-04-01 RX ADMIN — CEFTRIAXONE 2000 MG: 2 INJECTION, SOLUTION INTRAVENOUS at 22:30

## 2024-04-01 NOTE — PLAN OF CARE
Problem: Potential for Falls  Goal: Patient will remain free of falls  Description: INTERVENTIONS:  - Educate patient/family on patient safety including physical limitations  - Instruct patient to call for assistance with activity   - Consult OT/PT to assist with strengthening/mobility   - Keep Call bell within reach  - Keep bed low and locked with side rails adjusted as appropriate  - Keep care items and personal belongings within reach  - Initiate and maintain comfort rounds  - Make Fall Risk Sign visible to staff  - Offer Toileting every 2 Hours, in advance of need  - Initiate/Maintain bedalarm  - Obtain necessary fall risk management equipment: bed  - Apply yellow socks and bracelet for high fall risk patients  - Consider moving patient to room near nurses station  Outcome: Progressing

## 2024-04-01 NOTE — ASSESSMENT & PLAN NOTE
In the setting of hypoxia, and increased work of breathing evidenced by tachypnea  X-ray concerning for left sided pneumonia  Improved oxygenation on BiPAP  Not on BiPAP at home baseline     Plan:   Continue BiPAP and keep O2 saturation 92% or greater  Refused BiPAP overnight, saturated well on 6L NC   Aggressive chest PT  Continue Rocephin/azithromycin  Continue breathing treatments  Speech swallow eval

## 2024-04-01 NOTE — ASSESSMENT & PLAN NOTE
As evidenced by temperature, tachypnea, tachycardia, leukocytosis  Chest x-ray concerning for left-sided pneumonia  Blood cultures x2 - no growth at 24 hrs  Lactic acid negative  proCalcitonin 0.62 > 0.64 > 0.28    Plan:   Continue azithromycin D3 and Rocephin D3   Consider obtaining sputum sample  Obtain urinalysis

## 2024-04-01 NOTE — NURSING NOTE
Verbal report given to receiving nurse. Passed on that patient will need P-500 bed once transferred to floor

## 2024-04-01 NOTE — ASSESSMENT & PLAN NOTE
Hold home dose lisinopril, Norvasc, sotalol in the setting of mental status change/patient being obtunded/n.p.o.  Hydralazine as needed for systolic blood pressure greater than 180

## 2024-04-01 NOTE — QUICK NOTE
Education and Discussions with Family/Patient Information Sharing:   Patient's daughter Herminia and son Scott were present at bedside. Discussed patient's condition and current plan to transfer patient to Med/surg level of care.   All questions answered.     aMura Olivarez DO  Family Medicine, PGY-2  04/01/24  1:15 PM

## 2024-04-01 NOTE — ASSESSMENT & PLAN NOTE
Lab Results   Component Value Date    HGBA1C 6.3 (H) 02/17/2023       Recent Labs     03/31/24  1121 03/31/24  1759 03/31/24  2337 04/01/24  0556   POCGLU 181* 136 140 122       Blood Sugar Average: Last 72 hrs:  (P) [id number]  Currently n.p.o. in the setting of BiPAP  Accuchecks and sliding scale coverage every 6 hours

## 2024-04-01 NOTE — WOUND OSTOMY CARE
Progress Note - Wound   Ernestina Hurley 88 y.o. female MRN: [id number]  Unit/Bed#: ICU 12 Encounter: [id number]      Assessment:   This is an 88 year old female patient admitted on 3/30/24 with acute on chronic respiratory failure. She has a history of HTN, NIDDM 2, AFIB, MS and depression. She was AAO to person and place and agreeable to have wound visit done. She was turned and repositioned with assist of 2 persons. She had Purewick in place with no record of BM.    Assessment Findings:  1-Healed wound to left sacrobuttock - dry eschar dislodged when barrier cream applied. Orders in place for skin care and for prevention.  2-Unstageable pressure injury to right sacrobuttock with dry brown eschar and no drainage. Orders in place for wound care and for prevention.  3-Bilateral heels intact - orders in place for skin care and for prevention.    Plan:   Skin care plans:  1-Hydraguard to bilateral sacrum, buttock and heels BID and PRN  2-Heel lift boots to be worn to bilateral heels at all times in bed and after transfer to reclining chair if able. If patient unable to tolerate, must float heels on 2 pillows to offload pressure so heels are not in contact with mattress or pillows.  3-Ehob pressure redistribution cushion in chair when out of bed. Limit sitting for 1-2 hours at a time due to sacrobuttock breakdown then offload back in bed turning side to side every 2 hours.  4-Moisturize skin daily with skin nourishing cream.  5-Turn/reposition q2h or when medically stable for pressure re-distribution on skin.   6-Patient must have p-500 bed once transferred from ICU to Med-Surg floor.    Wound 04/01/24 Pressure Injury Buttocks Left (Active)   Wound Image    04/01/24 1336   Wound Description Epithelialization;Pink;Granulation tissue 04/01/24 1336   Roopa-wound Assessment Intact 04/01/24 1336   Wound Length (cm) 0 cm 04/01/24 1336   Wound Width (cm) 0 cm 04/01/24 1336   Wound Depth (cm) 0 cm 04/01/24 1336   Wound Surface  Area (cm^2) 0 cm^2 04/01/24 1336   Wound Volume (cm^3) 0 cm^3 04/01/24 1336   Calculated Wound Volume (cm^3) 0 cm^3 04/01/24 1336   Drainage Amount None 04/01/24 1336   Treatments Cleansed 04/01/24 1336   Dressing Protective barrier 04/01/24 1336   Dressing Changed New 04/01/24 1336   Dressing Status Intact 04/01/24 1336       Wound 04/01/24 Pressure Injury Buttocks Right (Active)   Wound Image   04/01/24 1337   Wound Description Dry;Brown;Eschar 04/01/24 1337   Pressure Injury Stage U 04/01/24 1337   Roopa-wound Assessment Intact 04/01/24 1337   Wound Length (cm) 1.5 cm 04/01/24 1337   Wound Width (cm) 0.6 cm 04/01/24 1337   Wound Depth (cm) 0 cm 04/01/24 1337   Wound Surface Area (cm^2) 0.9 cm^2 04/01/24 1337   Wound Volume (cm^3) 0 cm^3 04/01/24 1337   Calculated Wound Volume (cm^3) 0 cm^3 04/01/24 1337   Drainage Amount None 04/01/24 1337   Treatments Cleansed 04/01/24 1337   Dressing Changed New 04/01/24 1337   Dressing Status Intact 04/01/24 1337       Discussed assessment findings, and plan of care/recommendations with Bibi CARDENAS.    Wound care will follow along with patient throughout admission, please call or tiger text with questions and concerns.    Recommendations written as orders.  Estefani Mahajan MSN, RN, CWON

## 2024-04-01 NOTE — PLAN OF CARE
Plan of care reviewed w/ patient.    Problem: Potential for Falls  Goal: Patient will remain free of falls  Description: INTERVENTIONS:  - Educate patient/family on patient safety including physical limitations  - Instruct patient to call for assistance with activity   - Consult OT/PT to assist with strengthening/mobility   - Keep Call bell within reach  - Keep bed low and locked with side rails adjusted as appropriate  - Keep care items and personal belongings within reach  - Initiate and maintain comfort rounds  - Make Fall Risk Sign visible to staff  - Offer Toileting every 2 Hours, in advance of need  - Initiate/Maintain bed alarm  - Apply yellow socks and bracelet for high fall risk patients  - Consider moving patient to room near nurses station  Outcome: Progressing     Problem: PAIN - ADULT  Goal: Verbalizes/displays adequate comfort level or baseline comfort level  Description: Interventions:  - Encourage patient to monitor pain and request assistance  - Assess pain using appropriate pain scale  - Administer analgesics based on type and severity of pain and evaluate response  - Implement non-pharmacological measures as appropriate and evaluate response  - Consider cultural and social influences on pain and pain management  - Notify physician/advanced practitioner if interventions unsuccessful or patient reports new pain  Outcome: Progressing     Problem: INFECTION - ADULT  Goal: Absence or prevention of progression during hospitalization  Description: INTERVENTIONS:  - Assess and monitor for signs and symptoms of infection  - Monitor lab/diagnostic results  - Monitor all insertion sites, i.e. indwelling lines, tubes, and drains  - Monitor endotracheal if appropriate and nasal secretions for changes in amount and color  - [place] appropriate cooling/warming therapies per order  - Administer medications as ordered  - Instruct and encourage patient and family to use good hand hygiene technique  - Identify and  instruct in appropriate isolation precautions for identified infection/condition  Outcome: Progressing     Problem: DISCHARGE PLANNING  Goal: Discharge to home or other facility with appropriate resources  Description: INTERVENTIONS:  - Identify barriers to discharge w/patient and caregiver  - Arrange for needed discharge resources and transportation as appropriate  - Identify discharge learning needs (meds, wound care, etc.)  - Arrange for interpretive services to assist at discharge as needed  - Refer to Case Management Department for coordinating discharge planning if the patient needs post-hospital services based on physician/advanced practitioner order or complex needs related to functional status, cognitive ability, or social support system  Outcome: Progressing     Problem: Knowledge Deficit  Goal: Patient/family/caregiver demonstrates understanding of disease process, treatment plan, medications, and discharge instructions  Description: Complete learning assessment and assess knowledge base.  Interventions:  - Provide teaching at level of understanding  - Provide teaching via preferred learning methods  Outcome: Progressing     Problem: RESPIRATORY - ADULT  Goal: Achieves optimal ventilation and oxygenation  Description: INTERVENTIONS:  - Assess for changes in respiratory status  - Assess for changes in mentation and behavior  - Position to facilitate oxygenation and minimize respiratory effort  - Oxygen administered by appropriate delivery if ordered  - Initiate smoking cessation education as indicated  - Encourage broncho-pulmonary hygiene including cough, deep breathe, Incentive Spirometry  - Assess the need for suctioning and aspirate as needed  - Assess and instruct to report SOB or any respiratory difficulty  - Respiratory Therapy support as indicated  Outcome: Progressing     Problem: SKIN/TISSUE INTEGRITY - ADULT  Goal: Skin Integrity remains intact(Skin Breakdown Prevention)  Description:  Assess:  -Perform Rudi assessment every shift  -Inspect skin when repositioning, toileting, and assisting with ADLS  -Assess extremities for adequate circulation and sensation     Bed Management:  -Have minimal linens on bed & keep smooth, unwrinkled  -Change linens as needed when moist or perspiring  -Avoid sitting or lying in one position for more than 2 hours while in bed  -Keep HOB at 30 degrees     Toileting:  -Offer bedside commode    Activity:  -Encourage activity and walks on unit  -Encourage or provide ROM exercises   -Turn and reposition patient every 2 Hours  -Use appropriate equipment to lift or move patient in bed    Skin Care:  -Avoid use of baby powder, tape, friction and shearing, hot water or constrictive clothing  -Do not massage red bony areas    Outcome: Progressing  Goal: Incision(s), wounds(s) or drain site(s) healing without S/S of infection  Description: INTERVENTIONS  - Assess and document dressing, incision, wound bed, drain sites and surrounding tissue  - Provide patient and family education  Outcome: Progressing  Goal: Pressure injury heals and does not worsen  Description: Interventions:  - Implement low air loss mattress or specialty surface (Criteria met)  - Apply silicone foam dressing  - Turn and reposition patient & offload bony prominences every 2 hours   - Utilize friction reducing device or surface for transfers   - Consider nutrition services referral as needed  Outcome: Progressing     Problem: Prexisting or High Potential for Compromised Skin Integrity  Goal: Skin integrity is maintained or improved  Description: INTERVENTIONS:  - Identify patients at risk for skin breakdown  - Assess and monitor skin integrity  - Assess and monitor nutrition and hydration status  - Monitor labs   - Assess for incontinence   - Turn and reposition patient  - Assist with mobility/ambulation  - Relieve pressure over bony prominences  - Avoid friction and shearing  - Provide appropriate hygiene  as needed including keeping skin clean and dry  - Evaluate need for skin moisturizer/barrier cream  - Collaborate with interdisciplinary team   - Patient/family teaching  - Consider wound care consult   Outcome: Progressing     Problem: Nutrition/Hydration-ADULT  Goal: Nutrient/Hydration intake appropriate for improving, restoring or maintaining nutritional needs  Description: Monitor and assess patient's nutrition/hydration status for malnutrition. Collaborate with interdisciplinary team and initiate plan and interventions as ordered.  Monitor patient's weight and dietary intake as ordered or per policy. Utilize nutrition screening tool and intervene as necessary. Determine patient's food preferences and provide high-protein, high-caloric foods as appropriate.     INTERVENTIONS:  - Monitor oral intake, urinary output, labs, and treatment plans  - Assess nutrition and hydration status and recommend course of action  - Evaluate amount of meals eaten  - Assist patient with eating if necessary   - Allow adequate time for meals  - Recommend/ encourage appropriate diets, oral nutritional supplements, and vitamin/mineral supplements  - Order, calculate, and assess calorie counts as needed  - Recommend, monitor, and adjust tube feedings and TPN/PPN based on assessed needs  - Assess need for intravenous fluids  - Provide specific nutrition/hydration education as appropriate  - Include patient/family/caregiver in decisions related to nutrition  Outcome: Progressing

## 2024-04-01 NOTE — PROGRESS NOTES
Critical Care Interval Transfer Note:    Please refer to progress note from earlier today for full details.     Barriers to discharge:   New supplemental O2 requirement, continue weaning trial   IV antibiotics, can transition to PO   Left lung pneumonia vs pleural effusion      Consults: IP CONSULT TO CASE MANAGEMENT    Recommended to review admission imaging for incidental findings and document in discharge navigator: Chart reviewed, no known incidental findings noted at this time.      Discharge Plan: Anticipate discharge in 24-48 hrs to prior assisted or independent living facility.    PT Recommendations: Post acute rehabilitation services  OT Recommendations: Return to facility with rehabilitation services      Patient seen and evaluated by Critical Care today and deemed to be appropriate for transfer to Med Surg. Spoke to Dr. Danni Olivo from [facility] to accept transfer. Critical care can be contacted via [facility] with any questions or concerns.        Maura Olivarez DO  Family Medicine, PGY-2  04/01/24  2:18 PM

## 2024-04-01 NOTE — PROGRESS NOTES
[facility]  Progress Note: Critical Care  Name: Ernestina Hurley 88 y.o. female I MRN: [id number]  Unit/Bed#: ICU 12 I Date of Admission: 3/30/2024   Date of Service: 4/1/2024 I Hospital Day: 2    Assessment/Plan     * Acute on chronic respiratory failure with hypoxia and hypercapnia (HCC)  Assessment & Plan  In the setting of hypoxia, and increased work of breathing evidenced by tachypnea  X-ray concerning for left sided pneumonia  Improved oxygenation on BiPAP  Not on BiPAP at home baseline     Plan:   Continue BiPAP and keep O2 saturation 92% or greater  Refused BiPAP overnight, saturated well on 6L NC   Aggressive chest PT  Continue Rocephin/azithromycin  Continue breathing treatments  Speech swallow eval     SIRS (systemic inflammatory response syndrome) (HCC)  Assessment & Plan  As evidenced by temperature, tachypnea, tachycardia, leukocytosis  Chest x-ray concerning for left-sided pneumonia  Blood cultures x2 - no growth at 24 hrs  Lactic acid negative  proCalcitonin 0.62 > 0.64 > 0.28    Plan:   Continue azithromycin D3 and Rocephin D3   Consider obtaining sputum sample  Obtain urinalysis    Essential hypertension  Assessment & Plan  Hold home dose lisinopril, Norvasc, sotalol in the setting of mental status change/patient being obtunded/n.p.o.  Hydralazine as needed for systolic blood pressure greater than 180    GERD (gastroesophageal reflux disease)  Assessment & Plan  Change home dose Pepcid to IV    Mixed hyperlipidemia  Assessment & Plan  Hold home dose Zocor    Atrial fibrillation (HCC)  Assessment & Plan  Hold home dose aspirin and sotalol   Pt does not take any AC blood thinners as an outpatient    Multiple sclerosis (HCC)  Assessment & Plan  Bedbound at baseline, nursing home resident.  Not on medication    Depression  Assessment & Plan  Hold home dose Lexapro while NPO     Type 2 diabetes mellitus without complication, without long-term current use of insulin  ([facility])  Assessment & Plan  Lab Results   Component Value Date    HGBA1C 6.3 (H) 02/17/2023       Recent Labs     03/31/24  1121 03/31/24  1759 03/31/24  2337 04/01/24  0556   POCGLU 181* 136 140 122       Blood Sugar Average: Last 72 hrs:  (P) [id number]  Currently n.p.o. in the setting of BiPAP  Accuchecks and sliding scale coverage every 6 hours          Disposition: Stepdown Level 1    ICU Core Measures     A: Assess, Prevent, and Manage Pain Has pain been assessed? Yes  Need for changes to pain regimen? No   B: Both SAT/SAT  N/A   C: Choice of Sedation RASS Goal: 0 Alert and Calm  Need for changes to sedation or analgesia regimen? NA   D: Delirium CAM-ICU: Negative   E: Early Mobility  Plan for early mobility? Yes   F: Family Engagement Plan for family engagement today? Yes       Antibiotic Review: Continue broad spectrum secondary to severity of illness.       Prophylaxis:  VTE VTE covered by:  enoxaparin, Subcutaneous, 40 mg at 03/31/24 0815       Stress Ulcer  covered byFamotidine (PF) (PEPCID) injection 20 mg [[id number]], famotidine (PEPCID) 20 mg tablet [[id number]] (Long-Term Med)        Significant 24hr Events     24hr events: Patient was afebrile but tachycardia into the 130s overnight. O2 sats improved, so she was transitioned from continuous BiPAP to HS BiPAP. She was more alert and refused to wear BiPAP overnight. O2 sats remained stable on 6L nasal cannula.    On exam this morning, patient is alert and oriented x3. She is saturating 98% on 4L NC. She denies any acute concerns aside from dry mouth/lips from being NPO and feeling a little cold last night.      Subjective     Review of Systems   Constitutional:  Negative for chills and fever.   Respiratory:  Negative for chest tightness and shortness of breath.    Cardiovascular:  Negative for chest pain and palpitations.   Gastrointestinal:  Positive for constipation (patient is not sure when last BM was). Negative for abdominal pain, nausea  and vomiting.   Musculoskeletal:         MS    Skin:  Negative for color change and rash.   Neurological:  Negative for dizziness and headaches.   All other systems reviewed and are negative.       Objective                            Vitals I/O      Most Recent Min/Max in 24hrs   Temp 97.5 °F (36.4 °C) Temp  Min: 96 °F (35.6 °C)  Max: 97.5 °F (36.4 °C)   Pulse (!) 111 Pulse  Min: 96  Max: 115   Resp (!) 25 Resp  Min: 16  Max: 35   /58 BP  Min: 113/58  Max: 165/84   O2 Sat 98 % SpO2  Min: 98 %  Max: 100 %      Intake/Output Summary (Last 24 hours) at 2024 0740  Last data filed at 2024 0430  Gross per 24 hour   Intake 300 ml   Output 800 ml   Net -500 ml       Diet NPO    Invasive Monitoring           Physical Exam   Physical Exam  Vitals reviewed.   Eyes:      General: Vision grossly intact.      Extraocular Movements: Extraocular movements intact.      Conjunctiva/sclera: Conjunctivae normal.   Skin:     General: Skin is warm.   HENT:      Head: Normocephalic and atraumatic.      Right Ear: External ear normal.      Left Ear: External ear normal.      Mouth/Throat:      Mouth: Mucous membranes are dry.   Cardiovascular:      Rate and Rhythm: Regular rhythm. Tachycardia present.      Heart sounds: Normal heart sounds.   Musculoskeletal:      Right lower le+ Edema present.      Left lower le+ Edema present.   Abdominal: General: Abdomen is flat. Bowel sounds are normal.      Palpations: Abdomen is soft.      Tenderness: There is no abdominal tenderness.   Constitutional:       General: She is awake. She is not in acute distress.     Appearance: She is obese. She is ill-appearing (chronically).      Interventions: Nasal cannula in place.   Pulmonary:      Effort: Tachypnea present.      Breath sounds: Decreased breath sounds (left > right) present. No wheezing or rales.      Comments: NC 4L   Psychiatric:         Behavior: Behavior is cooperative.   Neurological:      Mental Status: She is alert  and oriented to person, place, and time.      GCS: GCS eye subscore is 3. GCS verbal subscore is 5. GCS motor subscore is 6.   Genitourinary/Anorectal:  external catheter present.          Diagnostic Studies      EKG: Sinus tachycardia, HR 100s on Tele   Imaging: I have personally reviewed pertinent reports.    3/31 CXR: Improved aeration of left lung with small left effusion and partial left lower lob atelectasis   3/30 CXR: Extensive opacity in left hemithorax, likely atelectasis/aspiration      Medications:  Scheduled PRN   azithromycin, 500 mg, Q24H  cefTRIAXone, 2,000 mg, Q24H  chlorhexidine, 15 mL, Q12H DANITZA  enoxaparin, 40 mg, Daily  famotidine, 20 mg, Q24H DANITZA  insulin lispro, 1-6 Units, Q6H DANITZA  latanoprost, 1 drop, HS  levalbuterol, 1.25 mg, TID  sodium chloride, 4 mL, Q6H      acetaminophen, 650 mg, Q4H PRN  bisacodyl, 10 mg, Daily PRN  hydrALAZINE, 10 mg, Q6H PRN       Continuous          Labs:    CBC    Recent Labs     03/30/24  1816 03/31/24  0439 04/01/24  0504   WBC 12.82* 13.68* 8.39   HGB 13.3 12.8 11.6   HCT 43.2 42.7 38.8    264 211   BANDSPCT 3  --   --      BMP    Recent Labs     03/31/24 0439 04/01/24  0444   SODIUM 140 142   K 4.9 4.5    103   CO2 33* 30   AGAP 6 9   BUN 40* 31*   CREATININE 0.66 0.51*   CALCIUM 8.2* 8.3*       Coags    Recent Labs     03/30/24  1816   INR 1.06   PTT 31        Additional Electrolytes  Recent Labs     03/31/24  0439 04/01/24  0444   MG 2.6 2.5   PHOS 5.2* 3.3   CAIONIZED  --  0.95*          Blood Gas    Recent Labs     03/30/24  1822   PHART 7.302*   YQH9YBX 63.9*   PO2ART 71.7*   HJV9PEI 30.9*   BEART 2.8   SOURCE Radial, Right     Recent Labs     03/30/24  1822 03/31/24  1031   PHVEN  --  7.329   SGD8PIP  --  61.0*   PO2VEN  --  93.2*   FDO3LXO  --  31.4*   BEVEN  --  3.8   E7RKLGN  --  94.8*   SOURCE Radial, Right  --     LFTs  Recent Labs     03/31/24  0439 04/01/24  0444   ALT 10 7   AST 7* 7*   ALKPHOS 86 79   ALB 2.8* 2.8*   TBILI 0.22 0.20        Infectious  Recent Labs     03/31/24  0439 04/01/24  0444   PROCALCITONI 0.64* 0.28*     Glucose  Recent Labs     03/30/24  1816 03/31/24  0439 04/01/24  0444   GLUC 283* 198* 130               Maura Olivarez DO

## 2024-04-01 NOTE — SPEECH THERAPY NOTE
Speech-Language Pathology Bedside Swallow Evaluation      Patient Name: Ernestina Hurley    Today's Date: 4/1/2024     Problem List  Principal Problem:    Acute on chronic respiratory failure with hypoxia and hypercapnia (HCC)  Active Problems:    Mixed hyperlipidemia    Atrial fibrillation (HCC)    Multiple sclerosis (HCC)    Essential hypertension    Type 2 diabetes mellitus without complication, without long-term current use of insulin (HCC)    Depression    GERD (gastroesophageal reflux disease)    SIRS (systemic inflammatory response syndrome) (HCC)      Past Medical History  Past Medical History:   Diagnosis Date    Abscess of buttock, right     last assessed-5/4/2017    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-last assessed-10/8/2015    Cellulitis of chest wall     last assessed-7/27/2015    Chronic endometritis 10/12/2006    Diabetes mellitus (HCC)     Dysuria 11/02/2007    Flushing     resolved-6/30/2015    Glaucoma     Hyperlipidemia     Hypertension     Ingrown nail 01/05/2012    Malignant neoplasm of breast (HCC)     last assessed-11/5/2015    MS (multiple sclerosis) (HCC)     Nonvenomous insect bite     last assessed-12/12/2013    Palpitations 02/09/2011    Pressure ulcer of buttock 10/13/2006    Proctitis 05/12/2006    Vaginal polyp 03/14/2006    Viral gastroenteritis     last assessed-9/8/2016         Summary   Pt presented with h/o MS and weak cough but functional appearing oral and pharyngeal stage swallowing skills with materials administered today.    Recommended Diet: regular diet and thin liquids   Recommended Form of Meds: whole with puree   Reflux precautions: upright posture and slow rate of feeding  Other Recommendations: Continue frequent oral care        Current Medical Status  jesikameka Hurley is a 87 yo F SNF resident with PMH of DM, htn, hld, bedbound with multiple sclerosis, A-fib not on blood thinners, GERD, depression admitted 3/30 for evaluation of cough,  respiratory distress, and hypoxia. Initially, she was able to provide some history, however in the emergency room, she became more lethargic, and was placed on BiPAP for hypoxia and hypercarbia.  Initial labs included WBC 12.8, glucose 283, procalcitonin 0.62, and lactic negative.  Chest x-ray concerning for left lower lobe pneumonia.    DX:  acute on chronic respiratory failure, SIRS, MS, T2DM. Pt on BIPAP to begin, transitioned to NC 3/31. Per MD, Continue aggressive chest PT and for formal speech and swallow evaluation (completed bedside).     Current Precautions:  Fall      Allergies:  No known food allergies (but +medication and latex allergy)    Past medical history:  Please see H&P for details    Special Studies:  3/30 CXR: Left upper lung obscured by the patient's head. Extensive opacity in the left hemithorax, likely atelectasis/aspiration.    3/31 CXR: Improved aeration of the left lung with small left effusion and partial left lower lobe atelectasis.     Social/Education/Vocational Hx:  Pt lives in SNF[facility]F [facility]    Swallow Information   Current Risks for Dysphagia & Aspiration: h/o MD and GERD, current respiratory challenge, see CXRs  Baseline Diet: CCD regualr textured food, thin liquid      Baseline Assessment   Behavior/Cognition: alert, O to person, place and appropriate in all of her actions  Speech/Language Status: able to participate in conversation c no significant dysarthria  Patient Positioning: upright in bed  Pain Status/Interventions/Response to Interventions:   No report of or nonverbal indications of pain.       Swallow Mechanism Exam  Facial: symmetrical  Labial: WFL  Lingual: WFL  Velum: symmetrical  Mandible: adequate ROM  Dentition: adequate  Vocal quality:clear/adequate   Volitional Cough: weak   Respiratory Status: on 2L O2      Consistencies Assessed and Performance   Consistencies Administered: thin liquids, puree, and hard solids    Oral Stage:   Mastication was adequate with  the materials administered today.  Bolus formation and transfer were functional with no significant oral residue noted.  No overt s/s reduced oral control.    Pharyngeal Stage:   Swallow Mechanics:  Swallowing initiation appeared prompt.  Laryngeal rise was palpated and judged to be within functional limits.  No coughing, throat clearing, change in vocal quality or respiratory status noted today.     Esophageal Concerns: h/o GERD    Summary and Recommendations (see above)    Results Reviewed with: patient and RN     Treatment Recommended: f/u x1 to ensure tolerance of regular texture food/thin liquid over min of 24 hrs    Dysphagia Goal  -Patient will demonstrate safe and effective oral intake (without overt s/s significant oral/pharyngeal dysphagia including s/s penetration or aspiration) with regular textured food, thin liquid for minimum of 24 hrs (given recent respiratory challenge)    Salima Squires MS CCC-SLP  [place] License [id number]

## 2024-04-02 LAB
ANION GAP SERPL CALCULATED.3IONS-SCNC: 6 MMOL/L (ref 4–13)
BUN SERPL-MCNC: 17 MG/DL (ref 5–25)
CALCIUM SERPL-MCNC: 8.2 MG/DL (ref 8.4–10.2)
CHLORIDE SERPL-SCNC: 100 MMOL/L (ref 96–108)
CO2 SERPL-SCNC: 35 MMOL/L (ref 21–32)
CREAT SERPL-MCNC: 0.42 MG/DL (ref 0.6–1.3)
ERYTHROCYTE [DISTWIDTH] IN BLOOD BY AUTOMATED COUNT: 14.6 % (ref 11.6–15.1)
GFR SERPL CREATININE-BSD FRML MDRD: 93 ML/MIN/1.73SQ M
GLUCOSE SERPL-MCNC: 158 MG/DL (ref 65–140)
GLUCOSE SERPL-MCNC: 159 MG/DL (ref 65–140)
GLUCOSE SERPL-MCNC: 167 MG/DL (ref 65–140)
GLUCOSE SERPL-MCNC: 212 MG/DL (ref 65–140)
GLUCOSE SERPL-MCNC: 238 MG/DL (ref 65–140)
HCT VFR BLD AUTO: 39.1 % (ref 34.8–46.1)
HGB BLD-MCNC: 12.2 G/DL (ref 11.5–15.4)
MAGNESIUM SERPL-MCNC: 2.2 MG/DL (ref 1.9–2.7)
MCH RBC QN AUTO: 28.4 PG (ref 26.8–34.3)
MCHC RBC AUTO-ENTMCNC: 31.2 G/DL (ref 31.4–37.4)
MCV RBC AUTO: 91 FL (ref 82–98)
PHOSPHATE SERPL-MCNC: 2.5 MG/DL (ref 2.3–4.1)
PLATELET # BLD AUTO: 247 THOUSANDS/UL (ref 149–390)
PMV BLD AUTO: 11.1 FL (ref 8.9–12.7)
POTASSIUM SERPL-SCNC: 3.8 MMOL/L (ref 3.5–5.3)
RBC # BLD AUTO: 4.3 MILLION/UL (ref 3.81–5.12)
SODIUM SERPL-SCNC: 141 MMOL/L (ref 135–147)
WBC # BLD AUTO: 9.55 THOUSAND/UL (ref 4.31–10.16)

## 2024-04-02 PROCEDURE — 82948 REAGENT STRIP/BLOOD GLUCOSE: CPT

## 2024-04-02 PROCEDURE — 92526 ORAL FUNCTION THERAPY: CPT

## 2024-04-02 PROCEDURE — 83735 ASSAY OF MAGNESIUM: CPT

## 2024-04-02 PROCEDURE — 94640 AIRWAY INHALATION TREATMENT: CPT

## 2024-04-02 PROCEDURE — 84100 ASSAY OF PHOSPHORUS: CPT

## 2024-04-02 PROCEDURE — 80048 BASIC METABOLIC PNL TOTAL CA: CPT

## 2024-04-02 PROCEDURE — 85027 COMPLETE CBC AUTOMATED: CPT

## 2024-04-02 PROCEDURE — 84145 PROCALCITONIN (PCT): CPT | Performed by: STUDENT IN AN ORGANIZED HEALTH CARE EDUCATION/TRAINING PROGRAM

## 2024-04-02 PROCEDURE — 97167 OT EVAL HIGH COMPLEX 60 MIN: CPT

## 2024-04-02 PROCEDURE — 94760 N-INVAS EAR/PLS OXIMETRY 1: CPT

## 2024-04-02 RX ORDER — LANOLIN ALCOHOL/MO/W.PET/CERES
6 CREAM (GRAM) TOPICAL
Status: DISCONTINUED | OUTPATIENT
Start: 2024-04-02 | End: 2024-04-04 | Stop reason: HOSPADM

## 2024-04-02 RX ADMIN — SODIUM CHLORIDE SOLN NEBU 3% 4 ML: 3 NEBU SOLN at 07:14

## 2024-04-02 RX ADMIN — INSULIN LISPRO 3 UNITS: 100 INJECTION, SOLUTION INTRAVENOUS; SUBCUTANEOUS at 11:52

## 2024-04-02 RX ADMIN — LATANOPROST 1 DROP: 50 SOLUTION OPHTHALMIC at 21:19

## 2024-04-02 RX ADMIN — AZITHROMYCIN MONOHYDRATE 500 MG: 500 INJECTION, POWDER, LYOPHILIZED, FOR SOLUTION INTRAVENOUS at 00:46

## 2024-04-02 RX ADMIN — SOTALOL HYDROCHLORIDE 80 MG: 80 TABLET ORAL at 17:38

## 2024-04-02 RX ADMIN — SODIUM CHLORIDE SOLN NEBU 3% 4 ML: 3 NEBU SOLN at 19:30

## 2024-04-02 RX ADMIN — SOTALOL HYDROCHLORIDE 80 MG: 80 TABLET ORAL at 09:55

## 2024-04-02 RX ADMIN — Medication 6 MG: at 21:17

## 2024-04-02 RX ADMIN — LEVALBUTEROL HYDROCHLORIDE 1.25 MG: 1.25 SOLUTION RESPIRATORY (INHALATION) at 13:27

## 2024-04-02 RX ADMIN — ASPIRIN 81 MG CHEWABLE TABLET 81 MG: 81 TABLET CHEWABLE at 09:55

## 2024-04-02 RX ADMIN — INSULIN LISPRO 1 UNITS: 100 INJECTION, SOLUTION INTRAVENOUS; SUBCUTANEOUS at 17:42

## 2024-04-02 RX ADMIN — LEVALBUTEROL HYDROCHLORIDE 1.25 MG: 1.25 SOLUTION RESPIRATORY (INHALATION) at 07:14

## 2024-04-02 RX ADMIN — AZITHROMYCIN MONOHYDRATE 500 MG: 500 INJECTION, POWDER, LYOPHILIZED, FOR SOLUTION INTRAVENOUS at 22:59

## 2024-04-02 RX ADMIN — AMLODIPINE BESYLATE 5 MG: 5 TABLET ORAL at 09:55

## 2024-04-02 RX ADMIN — INSULIN LISPRO 1 UNITS: 100 INJECTION, SOLUTION INTRAVENOUS; SUBCUTANEOUS at 09:56

## 2024-04-02 RX ADMIN — INSULIN LISPRO 2 UNITS: 100 INJECTION, SOLUTION INTRAVENOUS; SUBCUTANEOUS at 21:19

## 2024-04-02 RX ADMIN — ENOXAPARIN SODIUM 40 MG: 40 INJECTION SUBCUTANEOUS at 09:56

## 2024-04-02 RX ADMIN — FAMOTIDINE 20 MG: 40 POWDER, FOR SUSPENSION ORAL at 21:18

## 2024-04-02 RX ADMIN — LORATADINE 10 MG: 10 TABLET ORAL at 09:56

## 2024-04-02 RX ADMIN — LEVALBUTEROL HYDROCHLORIDE 1.25 MG: 1.25 SOLUTION RESPIRATORY (INHALATION) at 19:30

## 2024-04-02 RX ADMIN — FAMOTIDINE 20 MG: 40 POWDER, FOR SUSPENSION ORAL at 09:56

## 2024-04-02 RX ADMIN — SODIUM CHLORIDE SOLN NEBU 3% 4 ML: 3 NEBU SOLN at 13:27

## 2024-04-02 RX ADMIN — CEFTRIAXONE 2000 MG: 2 INJECTION, SOLUTION INTRAVENOUS at 21:17

## 2024-04-02 NOTE — PLAN OF CARE
Problem: Potential for Falls  Goal: Patient will remain free of falls  Description: INTERVENTIONS:  - Educate patient/family on patient safety including physical limitations  - Instruct patient to call for assistance with activity   - Consult OT/PT to assist with strengthening/mobility   - Keep Call bell within reach  - Keep bed low and locked with side rails adjusted as appropriate  - Keep care items and personal belongings within reach  - Initiate and maintain comfort rounds  - Make Fall Risk Sign visible to staff  - Offer Toileting every 2 Hours, in advance of need  - Initiate/Maintain bed alarm  - Obtain necessary fall risk management equipment: socks  - Apply yellow socks and bracelet for high fall risk patients  - Consider moving patient to room near nurses station  Outcome: Progressing     Problem: PAIN - ADULT  Goal: Verbalizes/displays adequate comfort level or baseline comfort level  Description: Interventions:  - Encourage patient to monitor pain and request assistance  - Assess pain using appropriate pain scale  - Administer analgesics based on type and severity of pain and evaluate response  - Implement non-pharmacological measures as appropriate and evaluate response  - Consider cultural and social influences on pain and pain management  - Notify physician/advanced practitioner if interventions unsuccessful or patient reports new pain  Outcome: Progressing     Problem: INFECTION - ADULT  Goal: Absence or prevention of progression during hospitalization  Description: INTERVENTIONS:  - Assess and monitor for signs and symptoms of infection  - Monitor lab/diagnostic results  - Monitor all insertion sites, i.e. indwelling lines, tubes, and drains  - Monitor endotracheal if appropriate and nasal secretions for changes in amount and color  - [place] appropriate cooling/warming therapies per order  - Administer medications as ordered  - Instruct and encourage patient and family to use good hand hygiene  technique  - Identify and instruct in appropriate isolation precautions for identified infection/condition  Outcome: Progressing     Problem: DISCHARGE PLANNING  Goal: Discharge to home or other facility with appropriate resources  Description: INTERVENTIONS:  - Identify barriers to discharge w/patient and caregiver  - Arrange for needed discharge resources and transportation as appropriate  - Identify discharge learning needs (meds, wound care, etc.)  - Arrange for interpretive services to assist at discharge as needed  - Refer to Case Management Department for coordinating discharge planning if the patient needs post-hospital services based on physician/advanced practitioner order or complex needs related to functional status, cognitive ability, or social support system  Outcome: Progressing     Problem: Knowledge Deficit  Goal: Patient/family/caregiver demonstrates understanding of disease process, treatment plan, medications, and discharge instructions  Description: Complete learning assessment and assess knowledge base.  Interventions:  - Provide teaching at level of understanding  - Provide teaching via preferred learning methods  Outcome: Progressing     Problem: RESPIRATORY - ADULT  Goal: Achieves optimal ventilation and oxygenation  Description: INTERVENTIONS:  - Assess for changes in respiratory status  - Assess for changes in mentation and behavior  - Position to facilitate oxygenation and minimize respiratory effort  - Oxygen administered by appropriate delivery if ordered  - Initiate smoking cessation education as indicated  - Encourage broncho-pulmonary hygiene including cough, deep breathe, Incentive Spirometry  - Assess the need for suctioning and aspirate as needed  - Assess and instruct to report SOB or any respiratory difficulty  - Respiratory Therapy support as indicated  Outcome: Progressing     Problem: SKIN/TISSUE INTEGRITY - ADULT  Goal: Skin Integrity remains intact(Skin Breakdown  Prevention)  Description: Assess:  -Perform Rudi assessment every shift  -Clean and moisturize skin every shift  -Inspect skin when repositioning, toileting, and assisting with ADLS  -Assess under medical devices such as oxygen tubing every shift  -Assess extremities for adequate circulation and sensation     Bed Management:  -Have minimal linens on bed & keep smooth, unwrinkled  -Change linens as needed when moist or perspiring  -Avoid sitting or lying in one position for more than 2 hours while in bed  -Keep HOB at 30-45 degrees     Toileting:  -Offer bedside commode  -Assess for incontinence every shift  -Use incontinent care products after each incontinent episode such as purewick    Activity:  -Mobilize patient 3 times a day  -Encourage activity and walks on unit  -Encourage or provide ROM exercises   -Turn and reposition patient every 2 Hours  -Use appropriate equipment to lift or move patient in bed  -Instruct/ Assist with weight shifting every shift when out of bed in chair  -Consider limitation of chair time 2 hour intervals    Skin Care:  -Avoid use of baby powder, tape, friction and shearing, hot water or constrictive clothing  -Relieve pressure over bony prominences using cushion and wedges  -Do not massage red bony areas    Next Steps:  -Teach patient strategies to minimize risks such as falls   -Consider consults to  interdisciplinary teams such as PT  Outcome: Progressing  Goal: Incision(s), wounds(s) or drain site(s) healing without S/S of infection  Description: INTERVENTIONS  - Assess and document dressing, incision, wound bed, drain sites and surrounding tissue  - Provide patient and family education  - Perform skin care/dressing changes every shift  Outcome: Progressing  Goal: Pressure injury heals and does not worsen  Description: Interventions:  - Implement low air loss mattress or specialty surface (Criteria met)  - Apply silicone foam dressing  - Instruct/assist with weight shifting every  60 minutes when in chair   - Limit chair time to 2 hour intervals  - Use special pressure reducing interventions such as cushion when in chair   - Apply fecal or urinary incontinence containment device   - Perform passive or active ROM every shift  - Turn and reposition patient & offload bony prominences every 2 hours   - Utilize friction reducing device or surface for transfers   - Consider consults to  interdisciplinary teams such as wound care  - Use incontinent care products after each incontinent episode such as purewick  - Consider nutrition services referral as needed  Outcome: Progressing     Problem: Prexisting or High Potential for Compromised Skin Integrity  Goal: Skin integrity is maintained or improved  Description: INTERVENTIONS:  - Identify patients at risk for skin breakdown  - Assess and monitor skin integrity  - Assess and monitor nutrition and hydration status  - Monitor labs   - Assess for incontinence   - Turn and reposition patient  - Assist with mobility/ambulation  - Relieve pressure over bony prominences  - Avoid friction and shearing  - Provide appropriate hygiene as needed including keeping skin clean and dry  - Evaluate need for skin moisturizer/barrier cream  - Collaborate with interdisciplinary team   - Patient/family teaching  - Consider wound care consult   Outcome: Progressing     Problem: Nutrition/Hydration-ADULT  Goal: Nutrient/Hydration intake appropriate for improving, restoring or maintaining nutritional needs  Description: Monitor and assess patient's nutrition/hydration status for malnutrition. Collaborate with interdisciplinary team and initiate plan and interventions as ordered.  Monitor patient's weight and dietary intake as ordered or per policy. Utilize nutrition screening tool and intervene as necessary. Determine patient's food preferences and provide high-protein, high-caloric foods as appropriate.     INTERVENTIONS:  - Monitor oral intake, urinary output, labs, and  treatment plans  - Assess nutrition and hydration status and recommend course of action  - Evaluate amount of meals eaten  - Assist patient with eating if necessary   - Allow adequate time for meals  - Recommend/ encourage appropriate diets, oral nutritional supplements, and vitamin/mineral supplements  - Order, calculate, and assess calorie counts as needed  - Recommend, monitor, and adjust tube feedings and TPN/PPN based on assessed needs  - Assess need for intravenous fluids  - Provide specific nutrition/hydration education as appropriate  - Include patient/family/caregiver in decisions related to nutrition  Outcome: Progressing

## 2024-04-02 NOTE — SPEECH THERAPY NOTE
SLP Progress Note    Patient Name: Ernestina Hurley  Today's Date: 4/2/2024     Subjective:  Pt fully alert and easily engaged in conversation.     Objective:  Pt was seen for f/u to swallowing evaluation to ensure tolerance of regular textured food/thin liquid over time. Pt was alert and positioned upright.    Pt was assessed with fruit in am, grilled cheese/soup/thin liquid in pm. Mastication was timely and effective.  Bolus formation and transfer were effective.  Swallow initiation appeared prompt. Laryngeal rise was good by palpation. No coughing, throat clearing, c/o stasis, multiple swallows, change in vocal quality noted c po intake today.     Assessment:  Pt is tolerating diet with no overt s/s dysphagia or aspiration.    Plan/Recommendations:  Continue regular textured food, thin liquid.   No additional SLP f/u appears indicated at this time.     Salima Squires MS [facility]-SLP  [place] License [id number]

## 2024-04-02 NOTE — OCCUPATIONAL THERAPY NOTE
"OT EVALUATION       04/02/24 4210   OT Last Visit   OT Visit Date 04/02/24   Note Type   Note type Evaluation   Pain Assessment   Pain Assessment Tool 0-10   Pain Score No Pain   Restrictions/Precautions   Other Precautions Bed Alarm;Chair Alarm;Fall Risk   Home Living   Type of Home SNF  ([facility] long term care)   Home Equipment Mechanical lift  (uses sit to stand or nazario lift for transfers)   Prior Function   Level of [place] Needs assistance with IADLS;Needs assistance with functional mobility;Needs assistance with ADLs   Lives With Facility staff   Receives Help From Personal care attendant   IADLs Family/Friend/Other provides transportation;Family/Friend/Other provides meals;Family/Friend/Other provides medication management   Lifestyle   Reciprocal Relationships daughter present for session   General   Family/Caregiver Present Yes  (daughter)   Subjective   Subjective \"I dont know why I am so tired today\"   ADL   Eating Assistance 5  Supervision/Setup   Grooming Assistance 4  Minimal Assistance   UB Bathing Assistance 3  Moderate Assistance   LB Bathing Assistance 1  Total Assistance   UB Dressing Assistance 2  Maximal Assistance   LB Dressing Assistance 1  Total Assistance   Toileting Assistance  1  Total Assistance   Bed Mobility   Rolling R 2  Maximal assistance   Rolling L 2  Maximal assistance   Transfers   Sit to Stand Unable to assess   Additional items   (pt uses a mechanical lift at baseline for transfers)   Activity Tolerance   Activity Tolerance Patient limited by fatigue   Nurse Made Aware yes, Kayy   RUKENYON Assessment   RUE Assessment WFL  (grossly 3+/5 MMT)   LUE Assessment   LUE Assessment WFL  (grossly 3+/5 MMT)   Cognition   Overall Cognitive Status Impaired   Arousal/Participation Cooperative   Attention Attends with cues to redirect   Orientation Level Oriented to person;Disoriented to place;Oriented to time;Disoriented to situation   Following Commands Follows one step commands with " increased time or repetition   Comments lethargic today, but able to participate in OT session   Assessment   Limitation Decreased ADL status;Decreased UE ROM;Decreased UE strength;Decreased Safe judgement during ADL;Decreased endurance   Prognosis Fair   Assessment Patient evaluated by Occupational Therapy.  Patient admitted with Acute on chronic respiratory failure with hypoxia and hypercapnia (HCC).  The patients occupational profile, medical and therapy history includes a extensive additional review of physical, cognitive, or psychosocial history related to current functional performance.  Comorbidities affecting functional mobility and ADLS include: diabetes, glaucoma, hypertension, and Multiple Sclerosis.  Prior to admission, patient was dependent for mobility, requiring assist for ADLS, requiring assist for IADLS, and a resident of long term care.  The evaluation identifies the following performance deficits: weakness, decreased ROM, impaired balance, decreased endurance, increased fall risk, decreased ADLS, decreased activity tolerance, decreased safety awareness, impaired judgement, decreased cognition, and decreased strength, that result in activity limitations and/or participation restrictions. This evaluation requires clinical decision making of high complexity, because the patient presents with comorbidites that affect occupational performance and required significant modification of tasks or assistance with consideration of multiple treatment options.  The Barthel Index was used as a functional outcome tool presenting with a score of Barthel Index Score: 5, indicating marked limitations of functional mobility and ADLS.  The patient's raw score on the -PAC Daily Activity Inpatient Short Form is 11. A raw score of less than 19 suggests the patient may benefit from discharge to post-acute rehabilitation services. Please refer to the recommendation of the Occupational Therapist for safe discharge  planning.  Patient will benefit from skilled Occupational Therapy services to address above deficits and facilitate a safe return to prior level of function. PT evaluation deferred as pt is dependent for mobility and transfers as she uses a mechanical lift for transfers at long term care.  OT to follow for ADLS and bed mobility.   Goals   Patient Goals to feel better   STG Time Frame   (1-7 days)   Short Term Goal  Goals established to promote Patient Goals: to feel better:  Eating: supervision; Grooming: supervision seated; Bathing: min assist; Upper Body Dressing mod assist; Patient will increase bed mobility to mod assist in preparation for ADLS and transfers; Patient will tolerate 5 minutes of UE ROM/strengthening to increase general activity tolerance and performance in ADLS/IADLS; Patient will improve functional activity tolerance to 10 minutes of sustained functional tasks to increase participation in basic self-care and decrease assistance level; Patient will increase static sitting balance to poor+ to improve the ability to sit at edge of bed or on a chair for ADLS.   LTG Time Frame   (8-14 days)   Long Term Goal UB Bathing: supervision; Patient will increase bed mobility to min assist in preparation for ADLS and transfers; Patient will tolerate 10 minutes of UE ROM/strengthening to increase general activity tolerance and performance in ADLS/IADLS; Patient will improve functional activity tolerance to 20 minutes of sustained functional tasks to increase participation in basic self-care and decrease assistance level; Patient will increase static sitting balance to fair- to improve the ability to sit at edge of bed or on a chair for ADLS.   Pt will score >/= 15/24 on AM-PAC Daily Activity Inpatient scale to promote safe independence with ADLs and functional mobility; Pt will score >/= 35/100 on Barthel Index in order to decrease caregiver assistance needed and increase ability to perform ADLs and functional  mobility.   Plan   Treatment Interventions ADL retraining;UE strengthening/ROM;Endurance training;Patient/family training;Equipment evaluation/education;Activityengagement   Goal Expiration Date 04/16/24   OT Frequency 2-3x/wk   Discharge Recommendation   Rehab Resource Intensity Level, OT III (Minimum Resource Intensity)   AM-PAC Daily Activity Inpatient   Lower Body Dressing 1   Bathing 1   Toileting 1   Upper Body Dressing 2   Grooming 3   Eating 3   Daily Activity Raw Score 11   Daily Activity Standardized Score (Calc for Raw Score >=11) 29.04   AM-PAC Applied Cognition Inpatient   Following a Speech/Presentation 2   Understanding Ordinary Conversation 4   Taking Medications 2   Remembering Where Things Are Placed or Put Away 2   Remembering List of 4-5 Errands 1   Taking Care of Complicated Tasks 1   Applied Cognition Raw Score 12   Applied Cognition Standardized Score 28.82   Barthel Index   Feeding 5   Bathing 0   Grooming Score 0   Dressing Score 0   Bladder Score 0   Bowels Score 0   Toilet Use Score 0   Transfers (Bed/Chair) Score 0   Mobility (Level Surface) Score 0   Stairs Score 0   Barthel Index Score 5   End of Consult   Nurse Communication Nurse aware of consult   Licensure   [place] License Number  Lanette Rajput MS OTR/L [id number]

## 2024-04-02 NOTE — PHYSICAL THERAPY NOTE
PHYSICAL THERAPY     04/02/24 3295   Note Type   Note type Screen  (no skilled PT needs as per OT eval as patient is dependent from long term care. DC PT, OT to follow patient as needed)   Discharge Recommendation   Rehab Resource Intensity Level, PT No post-acute rehabilitation needs   Licensure   [place] License Number  Jordana Torres PT [id number]

## 2024-04-03 ENCOUNTER — APPOINTMENT (INPATIENT)
Dept: NON INVASIVE DIAGNOSTICS | Facility: HOSPITAL | Age: 89
DRG: 871 | End: 2024-04-03
Payer: MEDICARE

## 2024-04-03 ENCOUNTER — APPOINTMENT (OUTPATIENT)
Dept: NON INVASIVE DIAGNOSTICS | Facility: HOSPITAL | Age: 89
DRG: 871 | End: 2024-04-03
Attending: STUDENT IN AN ORGANIZED HEALTH CARE EDUCATION/TRAINING PROGRAM
Payer: MEDICARE

## 2024-04-03 LAB
APPEARANCE FLD: ABNORMAL
COLOR FLD: ABNORMAL
GLUCOSE FLD-MCNC: 181 MG/DL
GLUCOSE SERPL-MCNC: 126 MG/DL (ref 65–140)
GLUCOSE SERPL-MCNC: 145 MG/DL (ref 65–140)
GLUCOSE SERPL-MCNC: 218 MG/DL (ref 65–140)
GLUCOSE SERPL-MCNC: 241 MG/DL (ref 65–140)
HISTIOCYTES NFR FLD: 8 %
LDH FLD L TO P-CCNC: 117 U/L
LYMPHOCYTES NFR BLD AUTO: 64 %
MONO+MESO NFR FLD MANUAL: 8 %
NEUTS SEG NFR BLD AUTO: 20 %
PATHOLOGY REVIEW: NO
PH BODY FLUID: 7.6
PROCALCITONIN SERPL-MCNC: 0.2 NG/ML
PROT FLD-MCNC: 3.1 G/DL
SITE: ABNORMAL
TOTAL CELLS COUNTED SPEC: 100
TOTAL NUCLEATED CELL COUNT: 2977 /UL

## 2024-04-03 PROCEDURE — 94760 N-INVAS EAR/PLS OXIMETRY 1: CPT

## 2024-04-03 PROCEDURE — 32555 ASPIRATE PLEURA W/ IMAGING: CPT | Performed by: RADIOLOGY

## 2024-04-03 PROCEDURE — 88341 IMHCHEM/IMCYTCHM EA ADD ANTB: CPT | Performed by: PATHOLOGY

## 2024-04-03 PROCEDURE — 88342 IMHCHEM/IMCYTCHM 1ST ANTB: CPT | Performed by: PATHOLOGY

## 2024-04-03 PROCEDURE — 84157 ASSAY OF PROTEIN OTHER: CPT | Performed by: STUDENT IN AN ORGANIZED HEALTH CARE EDUCATION/TRAINING PROGRAM

## 2024-04-03 PROCEDURE — NC001 PR NO CHARGE: Performed by: RADIOLOGY

## 2024-04-03 PROCEDURE — 87070 CULTURE OTHR SPECIMN AEROBIC: CPT | Performed by: STUDENT IN AN ORGANIZED HEALTH CARE EDUCATION/TRAINING PROGRAM

## 2024-04-03 PROCEDURE — 87205 SMEAR GRAM STAIN: CPT | Performed by: STUDENT IN AN ORGANIZED HEALTH CARE EDUCATION/TRAINING PROGRAM

## 2024-04-03 PROCEDURE — 83986 ASSAY PH BODY FLUID NOS: CPT | Performed by: STUDENT IN AN ORGANIZED HEALTH CARE EDUCATION/TRAINING PROGRAM

## 2024-04-03 PROCEDURE — 99233 SBSQ HOSP IP/OBS HIGH 50: CPT | Performed by: STUDENT IN AN ORGANIZED HEALTH CARE EDUCATION/TRAINING PROGRAM

## 2024-04-03 PROCEDURE — 88305 TISSUE EXAM BY PATHOLOGIST: CPT | Performed by: PATHOLOGY

## 2024-04-03 PROCEDURE — 94640 AIRWAY INHALATION TREATMENT: CPT

## 2024-04-03 PROCEDURE — 89051 BODY FLUID CELL COUNT: CPT | Performed by: STUDENT IN AN ORGANIZED HEALTH CARE EDUCATION/TRAINING PROGRAM

## 2024-04-03 PROCEDURE — 88112 CYTOPATH CELL ENHANCE TECH: CPT | Performed by: PATHOLOGY

## 2024-04-03 PROCEDURE — 82948 REAGENT STRIP/BLOOD GLUCOSE: CPT

## 2024-04-03 PROCEDURE — 82945 GLUCOSE OTHER FLUID: CPT | Performed by: STUDENT IN AN ORGANIZED HEALTH CARE EDUCATION/TRAINING PROGRAM

## 2024-04-03 PROCEDURE — 0W9B3ZZ DRAINAGE OF LEFT PLEURAL CAVITY, PERCUTANEOUS APPROACH: ICD-10-PCS | Performed by: STUDENT IN AN ORGANIZED HEALTH CARE EDUCATION/TRAINING PROGRAM

## 2024-04-03 PROCEDURE — 83615 LACTATE (LD) (LDH) ENZYME: CPT | Performed by: STUDENT IN AN ORGANIZED HEALTH CARE EDUCATION/TRAINING PROGRAM

## 2024-04-03 RX ORDER — AZITHROMYCIN 250 MG/1
500 TABLET, FILM COATED ORAL EVERY 24 HOURS
Status: DISCONTINUED | OUTPATIENT
Start: 2024-04-03 | End: 2024-04-04 | Stop reason: HOSPADM

## 2024-04-03 RX ORDER — LIDOCAINE WITH 8.4% SOD BICARB 0.9%(10ML)
SYRINGE (ML) INJECTION AS NEEDED
Status: COMPLETED | OUTPATIENT
Start: 2024-04-03 | End: 2024-04-03

## 2024-04-03 RX ORDER — FUROSEMIDE 10 MG/ML
40 INJECTION INTRAMUSCULAR; INTRAVENOUS ONCE
Status: COMPLETED | OUTPATIENT
Start: 2024-04-03 | End: 2024-04-03

## 2024-04-03 RX ORDER — FUROSEMIDE 40 MG/1
40 TABLET ORAL DAILY
Status: DISCONTINUED | OUTPATIENT
Start: 2024-04-03 | End: 2024-04-03

## 2024-04-03 RX ORDER — FUROSEMIDE 40 MG/1
40 TABLET ORAL DAILY
Status: DISCONTINUED | OUTPATIENT
Start: 2024-04-04 | End: 2024-04-04 | Stop reason: HOSPADM

## 2024-04-03 RX ADMIN — LATANOPROST 1 DROP: 50 SOLUTION OPHTHALMIC at 21:52

## 2024-04-03 RX ADMIN — AZITHROMYCIN DIHYDRATE 500 MG: 250 TABLET ORAL at 21:52

## 2024-04-03 RX ADMIN — Medication 6 MG: at 21:51

## 2024-04-03 RX ADMIN — SODIUM CHLORIDE SOLN NEBU 3% 4 ML: 3 NEBU SOLN at 19:15

## 2024-04-03 RX ADMIN — CEFTRIAXONE 2000 MG: 2 INJECTION, SOLUTION INTRAVENOUS at 21:51

## 2024-04-03 RX ADMIN — SOTALOL HYDROCHLORIDE 80 MG: 80 TABLET ORAL at 09:11

## 2024-04-03 RX ADMIN — SODIUM CHLORIDE SOLN NEBU 3% 4 ML: 3 NEBU SOLN at 07:07

## 2024-04-03 RX ADMIN — ENOXAPARIN SODIUM 40 MG: 40 INJECTION SUBCUTANEOUS at 09:11

## 2024-04-03 RX ADMIN — LEVALBUTEROL HYDROCHLORIDE 1.25 MG: 1.25 SOLUTION RESPIRATORY (INHALATION) at 19:15

## 2024-04-03 RX ADMIN — Medication 5 ML: at 15:01

## 2024-04-03 RX ADMIN — ASPIRIN 81 MG CHEWABLE TABLET 81 MG: 81 TABLET CHEWABLE at 09:11

## 2024-04-03 RX ADMIN — INSULIN LISPRO 3 UNITS: 100 INJECTION, SOLUTION INTRAVENOUS; SUBCUTANEOUS at 21:53

## 2024-04-03 RX ADMIN — LEVALBUTEROL HYDROCHLORIDE 1.25 MG: 1.25 SOLUTION RESPIRATORY (INHALATION) at 07:07

## 2024-04-03 RX ADMIN — FUROSEMIDE 40 MG: 10 INJECTION, SOLUTION INTRAVENOUS at 15:25

## 2024-04-03 RX ADMIN — SODIUM CHLORIDE SOLN NEBU 3% 4 ML: 3 NEBU SOLN at 02:21

## 2024-04-03 RX ADMIN — INSULIN LISPRO 2 UNITS: 100 INJECTION, SOLUTION INTRAVENOUS; SUBCUTANEOUS at 12:10

## 2024-04-03 RX ADMIN — AMLODIPINE BESYLATE 5 MG: 5 TABLET ORAL at 09:11

## 2024-04-03 RX ADMIN — SODIUM CHLORIDE SOLN NEBU 3% 4 ML: 3 NEBU SOLN at 13:15

## 2024-04-03 RX ADMIN — LEVALBUTEROL HYDROCHLORIDE 1.25 MG: 1.25 SOLUTION RESPIRATORY (INHALATION) at 13:05

## 2024-04-03 RX ADMIN — SOTALOL HYDROCHLORIDE 80 MG: 80 TABLET ORAL at 18:38

## 2024-04-03 RX ADMIN — LORATADINE 10 MG: 10 TABLET ORAL at 09:11

## 2024-04-03 NOTE — ASSESSMENT & PLAN NOTE
Lab Results   Component Value Date    HGBA1C 6.3 (H) 02/17/2023       Recent Labs     04/02/24  1633 04/02/24  2118 04/03/24  0807 04/03/24  1128   POCGLU 167* 212* 126 218*       Blood Sugar Average: Last 72 hrs:  (P) [id number]

## 2024-04-03 NOTE — CASE MANAGEMENT
Case Management Assessment & Discharge Planning Note    Patient name Ernestina Hurley  Location [address] [address] [address] MRN [id number]  : 1935 Date 4/3/2024       Current Admission Date: 3/30/2024  Current Admission Diagnosis:Acute on chronic respiratory failure with hypoxia and hypercapnia ([facility])   Patient Active Problem List    Diagnosis Date Noted    Depression 2024    GERD (gastroesophageal reflux disease) 2024    SIRS (systemic inflammatory response syndrome) ([facility]) 2024    Acute on chronic respiratory failure with hypoxia and hypercapnia ([facility]) 2024    Anemia 2022    Lower extremity weakness 2021    Current moderate episode of major depressive disorder without prior episode ([facility]) 2020    Onychomycosis 2019    Tinea pedis of both feet 2019    Pain in both feet 2019    Chest pain 2019    Thyroid nodule 2019    Diabetic polyneuropathy associated with type 2 diabetes mellitus ([facility]) 03/15/2019    Atherosclerosis of native artery of both lower extremities ([facility]) 03/15/2019    Granuloma of great toe 02/15/2018    History of breast cancer 2017    Essential hypertension 2017    Type 2 diabetes mellitus without complication, without long-term current use of insulin ([facility]) 2017    Mixed hyperlipidemia 2017    Atrial fibrillation ([facility]) 2017    Multiple sclerosis ([facility]) 2017    Abnormal gait 10/08/2015    Urinary incontinence 08/15/2013    Arthropathy of multiple sites 2013      LOS (days): 4  Geometric Mean LOS (GMLOS) (days): 5.1  Days to GMLOS:1.4     OBJECTIVE:    Risk of Unplanned Readmission Score: 15.95     Current admission status: Inpatient    Preferred Pharmacy:   [id number], [address] [address]  Phone: [phone] Fax: [phone]    [facility] - [place], [place] - [address] [place] [address]  Phone:  [phone] Fax: [phone]    Primary Care Provider: No primary care provider on file.    Primary Insurance: MEDICARE  Secondary Insurance: BLUE CROSS    ASSESSMENT:  Active Health Care Proxies    There are no active Health Care Proxies on file.         Readmission Root Cause  30 Day Readmission: No    Patient Information  Admitted from:: Facility (LT resident at [facility])  Mental Status: Alert  During Assessment patient was accompanied by: Not accompanied during assessment  Assessment information provided by:: Daughter (Daughter Herminia)  Primary Caregiver: Other (Comment)  Caregiver's Name:: [facility]  Caregiver's Relationship to Patient:: Other (Specify) ([facility])  Caregiver's Telephone Number:: [phone]  Support Systems: Son, Daughter  County of Residence: [place]  What city do you live in?: [place]  Home entry access options. Select all that apply.: No steps to enter home  Type of Current Residence: Facility ([facility])  Upon entering residence, is there a bedroom on the main floor (no further steps)?: Yes  Upon entering residence, is there a bathroom on the main floor (no further steps)?: Yes  Living Arrangements: Other (Comment) ([facility])    Activities of Daily Living Prior to Admission  Functional Status: Assistance  Completes ADLs independently?: No  Level of ADL dependence: Assistance  Ambulates independently?: No  Level of ambulatory dependence: Total Dependent  Does patient use assisted devices?: Yes  Assisted Devices (DME) used: Wheelchair, Mel lift  Does patient currently own DME?: Yes  What DME does the patient currently own?: Wheelchair, Mel lift  Does the patient have a history of Short-Term Rehab?: Yes         Patient Information Continued  Income Source: Pension/long-term  Does patient have prescription coverage?: Yes  Does patient receive dialysis treatments?: No         Means of Transportation  Means of Transport to [facility]::  Other (Comment)      Social Determinants of Health (SDOH)      Flowsheet Row Most Recent Value   Housing Stability    In the last 12 months, was there a time when you were not able to pay the mortgage or rent on time? N   In the last 12 months, how many places have you lived? 2   In the last 12 months, was there a time when you did not have a steady place to sleep or slept in a shelter (including now)? N   Transportation Needs    In the past 12 months, has lack of transportation kept you from medical appointments or from getting medications? no   In the past 12 months, has lack of transportation kept you from meetings, work, or from getting things needed for daily living? No   Food Insecurity    Within the past 12 months, you worried that your food would run out before you got the money to buy more. Never true   Within the past 12 months, the food you bought just didn't last and you didn't have money to get more. Never true   Utilities    In the past 12 months has the electric, gas, oil, or water company threatened to shut off services in your home? No            DISCHARGE DETAILS:    Discharge planning discussed with:: Patient's Daughter Herminia  Freedom of Choice: Yes  Comments - Freedom of Choice: LEATHA spoke with patient's Daughter Herminia to confirm discharge plans. Herminia confirmed plan is for patient to return as a LTC resident at [facility].  CM contacted family/caregiver?: Yes    Contacts  Patient Contacts: Herminia (dtr)  Relationship to Patient:: Family  Contact Method: Phone  Phone Number: [phone]  Reason/Outcome: Emergency Contact, Discharge Planning, Continuity of Care    Requested Home Health Care         Is the patient interested in HHC at discharge?: No    DME Referral Provided  Referral made for DME?: No    Other Referral/Resources/Interventions Provided:  Referral Comments: CM sent referral in [place] to [facility].    Treatment Team Recommendation: Facility Return  Discharge  Destination Plan:: Facility Return      CM met with patient bedside to introduce role. CM called patient's Daughter Herminia to complete assessment and discuss discharge planning. No questions or needs at this time. CM will continue to follow patient for discharge needs.

## 2024-04-03 NOTE — ASSESSMENT & PLAN NOTE
Initially admitted to ICU for  hypoxia, and increased work of breathing evidenced by tachypnea  X-ray concerning for left sided pneumonia  Improved oxygenation on BiPAP  Pt transferred from ICU to Medicine  Repeat CXR 4/2/24  At least moderate size left pleural effusion with adjacent atelectasis. Opacities throughout the aerated portions of the left lung may represent atelectasis, pneumonia or layering effusion      Plan:   Continue BiPAP qhs  Down to 2L NC  Aggressive chest PT  Continue Rocephin/azithromycin  IR thoracentesis consult  Give lasix 40 iv x 1 today resume home lasix 40 mg daily tomorrow  Continue breathing treatments  Home o2 eval prior to d/c tomorrow  F/U echo

## 2024-04-03 NOTE — PLAN OF CARE
Problem: Potential for Falls  Goal: Patient will remain free of falls  Description: INTERVENTIONS:  - Educate patient/family on patient safety including physical limitations  - Instruct patient to call for assistance with activity   - Consult OT/PT to assist with strengthening/mobility   - Keep Call bell within reach  - Keep bed low and locked with side rails adjusted as appropriate  - Keep care items and personal belongings within reach  - Initiate and maintain comfort rounds  - Make Fall Risk Sign visible to staff  - Offer Toileting every 2 Hours, in advance of need  - Initiate/Maintain BED alarm  - Obtain necessary fall risk management equipment: YELLOW SOCKS  - Apply yellow socks and bracelet for high fall risk patients  - Consider moving patient to room near nurses station  Outcome: Progressing     Problem: PAIN - ADULT  Goal: Verbalizes/displays adequate comfort level or baseline comfort level  Description: Interventions:  - Encourage patient to monitor pain and request assistance  - Assess pain using appropriate pain scale  - Administer analgesics based on type and severity of pain and evaluate response  - Implement non-pharmacological measures as appropriate and evaluate response  - Consider cultural and social influences on pain and pain management  - Notify physician/advanced practitioner if interventions unsuccessful or patient reports new pain  Outcome: Progressing     Problem: INFECTION - ADULT  Goal: Absence or prevention of progression during hospitalization  Description: INTERVENTIONS:  - Assess and monitor for signs and symptoms of infection  - Monitor lab/diagnostic results  - Monitor all insertion sites, i.e. indwelling lines, tubes, and drains  - Monitor endotracheal if appropriate and nasal secretions for changes in amount and color  - [place] appropriate cooling/warming therapies per order  - Administer medications as ordered  - Instruct and encourage patient and family to use good hand  hygiene technique  - Identify and instruct in appropriate isolation precautions for identified infection/condition  Outcome: Progressing

## 2024-04-03 NOTE — DISCHARGE INSTRUCTIONS
Thoracentesis   WHAT YOU NEED TO KNOW:   A thoracentesis is a procedure to remove extra fluid or air from between your lungs and your inner chest wall. Air or fluid buildup may make it hard for you to breathe. A thoracentesis allows your lungs to expand fully so you can breathe more easily.    DISCHARGE INSTRUCTIONS:     Small amount of shoulder pain and bloody sputum is normal after a Thoracentesis.     Rest:  Rest when you feel it is needed. Slowly start to do more each day. Return to your daily activities as directed.     Resume your normal diet. Small sips of flat soda will help mild nausea.    Do not smoke:  If you smoke, it is never too late to quit. Ask for information about how to stop smoking if you need help.    Contact Interventional Radiology at [phone] (YANI PATIENTS: Contact Interventional Radiology at [phone]) (JUANITA PATIENTS: Contact Interventional Radiology at [phone]) if:   You have a fever.    Your puncture site is red, warm, swollen, or draining pus.    You have questions or concerns about your procedure, medicine, or care.    Seek care immediately or call 911 if:   Severe chest pain with inspiration and shortness of breath    Large amounts of blood in your sputum    Follow up with your healthcare provider as directed.

## 2024-04-03 NOTE — PROGRESS NOTES
[facility]  Progress Note  Name: Ernestina Hurley I  MRN: [id number]  Unit/Bed#: [address] [address] I Date of Admission: 3/30/2024   Date of Service: 4/3/2024 I Hospital Day: 4    Assessment/Plan   * Acute on chronic respiratory failure with hypoxia and hypercapnia (HCC)  Assessment & Plan  Initially admitted to ICU for  hypoxia, and increased work of breathing evidenced by tachypnea  X-ray concerning for left sided pneumonia  Improved oxygenation on BiPAP  Pt transferred from ICU to Medicine  Repeat CXR 4/2/24  At least moderate size left pleural effusion with adjacent atelectasis. Opacities throughout the aerated portions of the left lung may represent atelectasis, pneumonia or layering effusion      Plan:   Continue BiPAP qhs  Down to 2L NC  Aggressive chest PT  Continue Rocephin/azithromycin  IR thoracentesis consult  Give lasix 40 iv x 1 today resume home lasix 40 mg daily tomorrow  Continue breathing treatments  Home o2 eval prior to d/c tomorrow  F/U echo    Type 2 diabetes mellitus without complication, without long-term current use of insulin (HCC)  Assessment & Plan  Lab Results   Component Value Date    HGBA1C 6.3 (H) 02/17/2023       Recent Labs     04/02/24  1633 04/02/24  2118 04/03/24  0807 04/03/24  1128   POCGLU 167* 212* 126 218*       Blood Sugar Average: Last 72 hrs:  (P) [id number]      Essential hypertension  Assessment & Plan  Amlodipine 5 mg has been resumed  Will give Lasix 40 IV times once today for volume overload  Monitor blood pressure may need to adjust amlodipine to 10 mg    Multiple sclerosis (HCC)  Assessment & Plan  Bedbound at baseline, nursing home resident.  Not on medication    Atrial fibrillation (HCC)  Assessment & Plan  Resume home dose aspirin and sotalol   Pt does not take any AC blood thinners as an outpatient               VTE Pharmacologic Prophylaxis: VTE Score: 3 Moderate Risk (Score 3-4) - Pharmacological DVT Prophylaxis Ordered:  enoxaparin (Lovenox).    Mobility:   Basic Mobility Inpatient Raw Score: 8  JH-HLM Goal: 3: Sit at edge of bed  JH-HLM Achieved: 2: Bed activities/Dependent transfer  JH-HLM Goal NOT achieved. Continue with multidisciplinary rounding and encourage appropriate mobility to improve upon JH-HLM goals.    Patient Centered Rounds: I performed bedside rounds with nursing staff today.   Discussions with Specialists or Other Care Team Provider: case management    Education and Discussions with Family / Patient: Updated  (daughter) via phone.    Total Time Spent on Date of Encounter in care of patient: 35 mins. This time was spent on one or more of the following: performing physical exam; counseling and coordination of care; obtaining or reviewing history; documenting in the medical record; reviewing/ordering tests, medications or procedures; communicating with other healthcare professionals and discussing with patient's family/caregivers.    Current Length of Stay: 4 day(s)  Current Patient Status: Inpatient   Certification Statement: The patient will continue to require additional inpatient hospital stay due to pending thoracentesis  Discharge Plan: Anticipate discharge in 24-48 hrs to prior assisted or independent living facility.    Code Status: Level 3 - DNAR and DNI    Subjective:   Patient seen examined bedside.  States that she is feeling well currently has no acute complaints.  Denies any cough or URI symptoms    Objective:     Vitals:   Temp (24hrs), Av.8 °F (36.6 °C), Min:97.8 °F (36.6 °C), Max:97.8 °F (36.6 °C)    Temp:  [97.8 °F (36.6 °C)] 97.8 °F (36.6 °C)  HR:  [103-104] 104  Resp:  [18-20] 20  BP: (148-172)/(73) 172/73  SpO2:  [93 %-97 %] 97 %  Body mass index is 30.74 kg/m².     Input and Output Summary (last 24 hours):   No intake or output data in the 24 hours ending 24 1345    Physical Exam:   Physical Exam  Vitals and nursing note reviewed.   Constitutional:       General: She is  not in acute distress.     Appearance: She is well-developed.   HENT:      Head: Normocephalic and atraumatic.   Eyes:      Conjunctiva/sclera: Conjunctivae normal.   Cardiovascular:      Rate and Rhythm: Normal rate and regular rhythm.      Heart sounds: No murmur heard.  Pulmonary:      Effort: Pulmonary effort is normal.      Comments: Decreased breath sounds left lower lobe  Abdominal:      Palpations: Abdomen is soft.      Tenderness: There is no abdominal tenderness.   Musculoskeletal:         General: No swelling.      Cervical back: Neck supple.   Skin:     General: Skin is warm and dry.      Capillary Refill: Capillary refill takes less than 2 seconds.   Neurological:      Mental Status: She is alert. Mental status is at baseline.   Psychiatric:         Mood and Affect: Mood normal.          Additional Data:     Labs:  Results from last 7 days   Lab Units 04/02/24  0615 03/31/24  0439 03/30/24  1816   WBC Thousand/uL 9.55   < > 12.82*   HEMOGLOBIN g/dL 12.2   < > 13.3   HEMATOCRIT % 39.1   < > 43.2   PLATELETS Thousands/uL 247   < > 328   BANDS PCT %  --   --  3   LYMPHO PCT %  --   --  18   MONO PCT %  --   --  6   EOS PCT %  --   --  0    < > = values in this interval not displayed.     Results from last 7 days   Lab Units 04/02/24  0615 04/01/24  0444   SODIUM mmol/L 141 142   POTASSIUM mmol/L 3.8 4.5   CHLORIDE mmol/L 100 103   CO2 mmol/L 35* 30   BUN mg/dL 17 31*   CREATININE mg/dL 0.42* 0.51*   ANION GAP mmol/L 6 9   CALCIUM mg/dL 8.2* 8.3*   ALBUMIN g/dL  --  2.8*   TOTAL BILIRUBIN mg/dL  --  0.20   ALK PHOS U/L  --  79   ALT U/L  --  7   AST U/L  --  7*   GLUCOSE RANDOM mg/dL 159* 130     Results from last 7 days   Lab Units 03/30/24  1816   INR  1.06     Results from last 7 days   Lab Units 04/03/24  1128 04/03/24  0807 04/02/24  2118 04/02/24  1633 04/02/24  1058 04/02/24  0718 04/01/24  2052 04/01/24  1552 04/01/24  1148 04/01/24  0556 03/31/24  2337 03/31/24  1759   POC GLUCOSE mg/dl 218* 126  212* 167* 238* 158* 217* 162* 138 122 140 136         Results from last 7 days   Lab Units 04/02/24  0615 04/01/24  0444 03/31/24  0439 03/30/24  1816   LACTIC ACID mmol/L  --   --   --  0.9   PROCALCITONIN ng/ml 0.20 0.28* 0.64* 0.62*       Lines/Drains:  Invasive Devices       Peripheral Intravenous Line  Duration             Peripheral IV 04/02/24 Dorsal (posterior);Left;Lower Forearm 1 day    Peripheral IV 04/02/24 Dorsal (posterior);Left Hand <1 day                          Imaging: Reviewed radiology reports from this admission including: chest xray    Recent Cultures (last 7 days):   Results from last 7 days   Lab Units 04/01/24  1001 03/30/24  1817 03/30/24  1816   BLOOD CULTURE   --  No Growth at 72 hrs. No Growth at 72 hrs.   LEGIONELLA URINARY ANTIGEN  Negative  --   --        Last 24 Hours Medication List:   Current Facility-Administered Medications   Medication Dose Route Frequency Provider Last Rate    acetaminophen  650 mg Rectal Q4H PRN Maura Olivarez, DO      amLODIPine  5 mg Oral Daily Maura Olivarez, DO      aspirin  81 mg Oral Daily Maura Olivarez, DO      azithromycin  500 mg Oral Q24H Pandi Todhe, DO      bisacodyl  10 mg Rectal Daily PRN Maura Olivarez, DO      cefTRIAXone  2,000 mg Intravenous Q24H Maura Olivarez, DO 2,000 mg (04/02/24 2117)    enoxaparin  40 mg Subcutaneous Daily Maura Olivarez, DO      famotidine  20 mg Oral BID Maura Olivarez, DO      furosemide  40 mg Intravenous Once Pandi Todhe, DO      [START ON 4/4/2024] furosemide  40 mg Oral Daily Pandi Todhe, DO      hydrALAZINE  10 mg Intravenous Q6H PRN Maura Olivarez, DO      insulin lispro  1-6 Units Subcutaneous 4x Daily (AC & HS) Maura Olivarez, DO      latanoprost  1 drop Both Eyes HS Maura Olivarez, DO      levalbuterol  1.25 mg Nebulization TID Maura Olivarez, DO      loratadine  10 mg Oral Daily Maura Olivarez, DO      melatonin  6 mg Oral HS Maggie JAMISON Savage      sodium chloride  4 mL Nebulization Q6H Maura Olivarez, DO      sotalol  80 mg Oral BID  Maura Olivarez DO          Today, Patient Was Seen By: Cassandra Estrella DO    **Please Note: This note may have been constructed using a voice recognition system.**

## 2024-04-03 NOTE — ASSESSMENT & PLAN NOTE
Amlodipine 5 mg has been resumed  Will give Lasix 40 IV times once today for volume overload  Monitor blood pressure may need to adjust amlodipine to 10 mg

## 2024-04-03 NOTE — BRIEF OP NOTE (RAD/CATH)
INTERVENTIONAL RADIOLOGY PROCEDURE NOTE    Date: 4/3/2024    Procedure: IR THORACENTESIS     Preoperative diagnosis:   1. Pneumonia    2. Respiratory distress    3. Mixed hyperlipidemia    4. Atrial fibrillation, unspecified type (HCC)    5. Multiple sclerosis (HCC)    6. Essential hypertension    7. Type 2 diabetes mellitus without complication, without long-term current use of insulin (HCC)    8. Urinary incontinence         Postoperative diagnosis: Same.    Surgeon: Huyen Rodriguez MD     Assistant: None. No qualified resident was available.    Blood loss: minimal    Specimens: serosang fluid sent for studies.    Findings: successful left thora.    I called her daughter and gave her the update.    Complications: None immediate.    Anesthesia: local

## 2024-04-03 NOTE — SEDATION DOCUMENTATION
Left thoracentesis completed by Dr. Rodriguez. 500 ml serosanguineous fluid removed and sent to lab as ordered. Band-aid to site. Patient tolerated well.

## 2024-04-04 ENCOUNTER — APPOINTMENT (INPATIENT)
Dept: NON INVASIVE DIAGNOSTICS | Facility: HOSPITAL | Age: 89
DRG: 871 | End: 2024-04-04
Payer: MEDICARE

## 2024-04-04 VITALS
HEIGHT: 66 IN | WEIGHT: 190.48 LBS | RESPIRATION RATE: 18 BRPM | DIASTOLIC BLOOD PRESSURE: 60 MMHG | BODY MASS INDEX: 30.61 KG/M2 | HEART RATE: 97 BPM | OXYGEN SATURATION: 95 % | TEMPERATURE: 97.8 F | SYSTOLIC BLOOD PRESSURE: 127 MMHG

## 2024-04-04 LAB
ALBUMIN SERPL BCP-MCNC: 2.6 G/DL (ref 3.5–5)
ANION GAP SERPL CALCULATED.3IONS-SCNC: 4 MMOL/L (ref 4–13)
AORTIC ROOT: 3 CM
APICAL FOUR CHAMBER EJECTION FRACTION: 64 %
BACTERIA BLD CULT: NORMAL
BACTERIA BLD CULT: NORMAL
BSA FOR ECHO PROCEDURE: 1.96 M2
BUN SERPL-MCNC: 11 MG/DL (ref 5–25)
CALCIUM ALBUM COR SERPL-MCNC: 8.9 MG/DL (ref 8.3–10.1)
CALCIUM SERPL-MCNC: 7.8 MG/DL (ref 8.4–10.2)
CHLORIDE SERPL-SCNC: 101 MMOL/L (ref 96–108)
CO2 SERPL-SCNC: 36 MMOL/L (ref 21–32)
CREAT SERPL-MCNC: 0.36 MG/DL (ref 0.6–1.3)
E WAVE DECELERATION TIME: 140 MS
E/A RATIO: 1.19
ERYTHROCYTE [DISTWIDTH] IN BLOOD BY AUTOMATED COUNT: 14.6 % (ref 11.6–15.1)
EST. AVERAGE GLUCOSE BLD GHB EST-MCNC: 183 MG/DL
FRACTIONAL SHORTENING: 39 (ref 28–44)
GFR SERPL CREATININE-BSD FRML MDRD: 97 ML/MIN/1.73SQ M
GLUCOSE SERPL-MCNC: 122 MG/DL (ref 65–140)
GLUCOSE SERPL-MCNC: 123 MG/DL (ref 65–140)
GLUCOSE SERPL-MCNC: 259 MG/DL (ref 65–140)
HBA1C MFR BLD: 8 %
HCT VFR BLD AUTO: 38.5 % (ref 34.8–46.1)
HGB BLD-MCNC: 11.5 G/DL (ref 11.5–15.4)
INTERVENTRICULAR SEPTUM IN DIASTOLE (PARASTERNAL SHORT AXIS VIEW): 1.4 CM
INTERVENTRICULAR SEPTUM: 1.4 CM (ref 0.6–1.1)
LAAS-AP2: 10.6 CM2
LAAS-AP4: 11.2 CM2
LEFT ATRIUM SIZE: 2.5 CM
LEFT ATRIUM VOLUME (MOD BIPLANE): 20 ML
LEFT ATRIUM VOLUME INDEX (MOD BIPLANE): 10.2 ML/M2
LEFT INTERNAL DIMENSION IN SYSTOLE: 1.9 CM (ref 2.1–4)
LEFT VENTRICULAR INTERNAL DIMENSION IN DIASTOLE: 3.1 CM (ref 3.5–6)
LEFT VENTRICULAR POSTERIOR WALL IN END DIASTOLE: 1.5 CM
LEFT VENTRICULAR STROKE VOLUME: 28 ML
LVSV (TEICH): 28 ML
MAGNESIUM SERPL-MCNC: 2.3 MG/DL (ref 1.9–2.7)
MCH RBC QN AUTO: 27.9 PG (ref 26.8–34.3)
MCHC RBC AUTO-ENTMCNC: 29.9 G/DL (ref 31.4–37.4)
MCV RBC AUTO: 93 FL (ref 82–98)
MV E'TISSUE VEL-LAT: 7 CM/S
MV E'TISSUE VEL-SEP: 4 CM/S
MV PEAK A VEL: 0.7 M/S
MV PEAK E VEL: 83 CM/S
MV STENOSIS PRESSURE HALF TIME: 41 MS
MV VALVE AREA P 1/2 METHOD: 5.37
PHOSPHATE SERPL-MCNC: 3.4 MG/DL (ref 2.3–4.1)
PLATELET # BLD AUTO: 217 THOUSANDS/UL (ref 149–390)
PMV BLD AUTO: 10.8 FL (ref 8.9–12.7)
POTASSIUM SERPL-SCNC: 4.4 MMOL/L (ref 3.5–5.3)
PROCALCITONIN SERPL-MCNC: 0.17 NG/ML
RBC # BLD AUTO: 4.12 MILLION/UL (ref 3.81–5.12)
RIGHT ATRIUM AREA SYSTOLE A4C: 10.5 CM2
RIGHT VENTRICLE ID DIMENSION: 2.8 CM
SL CV LEFT ATRIUM LENGTH A2C: 4.3 CM
SL CV LV EF: 65
SL CV PED ECHO LEFT VENTRICLE DIASTOLIC VOLUME (MOD BIPLANE) 2D: 39 ML
SL CV PED ECHO LEFT VENTRICLE SYSTOLIC VOLUME (MOD BIPLANE) 2D: 11 ML
SODIUM SERPL-SCNC: 141 MMOL/L (ref 135–147)
TR MAX PG: 59 MMHG
TR PEAK VELOCITY: 3.9 M/S
TRICUSPID ANNULAR PLANE SYSTOLIC EXCURSION: 1.7 CM
TRICUSPID VALVE PEAK REGURGITATION VELOCITY: 3.85 M/S
WBC # BLD AUTO: 8.63 THOUSAND/UL (ref 4.31–10.16)

## 2024-04-04 PROCEDURE — 82948 REAGENT STRIP/BLOOD GLUCOSE: CPT

## 2024-04-04 PROCEDURE — 93306 TTE W/DOPPLER COMPLETE: CPT | Performed by: INTERNAL MEDICINE

## 2024-04-04 PROCEDURE — 93306 TTE W/DOPPLER COMPLETE: CPT

## 2024-04-04 PROCEDURE — 83036 HEMOGLOBIN GLYCOSYLATED A1C: CPT | Performed by: STUDENT IN AN ORGANIZED HEALTH CARE EDUCATION/TRAINING PROGRAM

## 2024-04-04 PROCEDURE — 85027 COMPLETE CBC AUTOMATED: CPT | Performed by: STUDENT IN AN ORGANIZED HEALTH CARE EDUCATION/TRAINING PROGRAM

## 2024-04-04 PROCEDURE — 99239 HOSP IP/OBS DSCHRG MGMT >30: CPT | Performed by: STUDENT IN AN ORGANIZED HEALTH CARE EDUCATION/TRAINING PROGRAM

## 2024-04-04 PROCEDURE — 94760 N-INVAS EAR/PLS OXIMETRY 1: CPT

## 2024-04-04 PROCEDURE — 94640 AIRWAY INHALATION TREATMENT: CPT

## 2024-04-04 PROCEDURE — 83735 ASSAY OF MAGNESIUM: CPT | Performed by: STUDENT IN AN ORGANIZED HEALTH CARE EDUCATION/TRAINING PROGRAM

## 2024-04-04 PROCEDURE — 84145 PROCALCITONIN (PCT): CPT | Performed by: STUDENT IN AN ORGANIZED HEALTH CARE EDUCATION/TRAINING PROGRAM

## 2024-04-04 PROCEDURE — 80069 RENAL FUNCTION PANEL: CPT | Performed by: STUDENT IN AN ORGANIZED HEALTH CARE EDUCATION/TRAINING PROGRAM

## 2024-04-04 RX ADMIN — LORATADINE 10 MG: 10 TABLET ORAL at 08:52

## 2024-04-04 RX ADMIN — SOTALOL HYDROCHLORIDE 80 MG: 80 TABLET ORAL at 08:53

## 2024-04-04 RX ADMIN — AMLODIPINE BESYLATE 5 MG: 5 TABLET ORAL at 08:52

## 2024-04-04 RX ADMIN — LEVALBUTEROL HYDROCHLORIDE 1.25 MG: 1.25 SOLUTION RESPIRATORY (INHALATION) at 13:14

## 2024-04-04 RX ADMIN — FAMOTIDINE 20 MG: 40 POWDER, FOR SUSPENSION ORAL at 08:53

## 2024-04-04 RX ADMIN — SODIUM CHLORIDE SOLN NEBU 3% 4 ML: 3 NEBU SOLN at 07:16

## 2024-04-04 RX ADMIN — FUROSEMIDE 40 MG: 40 TABLET ORAL at 08:52

## 2024-04-04 RX ADMIN — SODIUM CHLORIDE SOLN NEBU 3% 4 ML: 3 NEBU SOLN at 02:05

## 2024-04-04 RX ADMIN — INSULIN LISPRO 3 UNITS: 100 INJECTION, SOLUTION INTRAVENOUS; SUBCUTANEOUS at 12:00

## 2024-04-04 RX ADMIN — SODIUM CHLORIDE SOLN NEBU 3% 4 ML: 3 NEBU SOLN at 13:14

## 2024-04-04 RX ADMIN — ENOXAPARIN SODIUM 40 MG: 40 INJECTION SUBCUTANEOUS at 08:53

## 2024-04-04 RX ADMIN — ASPIRIN 81 MG CHEWABLE TABLET 81 MG: 81 TABLET CHEWABLE at 08:52

## 2024-04-04 RX ADMIN — LEVALBUTEROL HYDROCHLORIDE 1.25 MG: 1.25 SOLUTION RESPIRATORY (INHALATION) at 07:16

## 2024-04-04 NOTE — DISCHARGE INSTR - AVS FIRST PAGE
Please re-start taking your metformin for diabetes.  Your A1C is pending      Your echo showed elevated lung pressures and I have made a referral for you to see cardiology on discharge    You have completed 5-day course of IV antibiotics for pneumonia while hospitalized.

## 2024-04-04 NOTE — [place] UNIVERSAL TRANSFER FORM
"[place] UNIVERSAL TRANSFER FORM  (ALL ITEMS MUST BE COMPLETED)    1. TRANSFER FROM: [facility] [address] [facility]      TRANSFER TO: [facility]    2. DATE OF TRANSFER: 4/4/2024                        TIME OF TRANSFER: 1530    3. PATIENT NAME: Ernestina Hurley E      YOB: 1935                             GENDER: female    4. LANGUAGE:   English    5. PHYSICIAN NAME:  Cassandra Estrella DO                   PHONE: [phone]    6. CODE STATUS: Level 3 - DNAR and DNI        Out of Hospital DNR Attached: No    7. :                                      :  Extended Emergency Contact Information  Primary Emergency Contact: Herminia Horn  Mobile Phone: [phone]  Relation: Daughter  Secondary Emergency Contact: Scott Hurley  Mobile Phone: [phone]  Relation: Son           Health Care Representative/Proxy:  Yes           Legal Guardian:  No             NAME OF:           HEALTH CARE REPRESENTATIVE/PROXY:                                         OR           LEGAL GUARDIAN, IF NOT :                                               PHONE:  (Day)           (Night)                        (Cell)    8. REASON FOR TRANSFER: (Must include brief medical history and recent changes in physical function or cognition.) SNF            V/S: /64   Pulse 98   Temp (!) 97.2 °F (36.2 °C) (Tympanic)   Resp 18   Ht 5' 6\" (1.676 m)   Wt 86.4 kg (190 lb 7.6 oz)   SpO2 94%   BMI 30.74 kg/m²           PAIN: None    9. PRIMARY DIAGNOSIS: Acute on chronic respiratory failure with hypoxia and hypercapnia (HCC)      Secondary Diagnosis:         Pacemaker: No      Internal Defib: No          Mental Health Diagnosis (if Applicable):    10. RESTRAINTS: No     11. RESPIRATORY NEEDS: Oxygen Device 2L02,    12. ISOLATION/PRECAUTION: None    13. ALLERGY: Bactrim [sulfamethoxazole-trimethoprim], Clindamycin, Coumadin [warfarin], Dabigatran, Latex, " and Pradaxa [dabigatran etexilate mesylate]    14. SENSORY:       Vision Glasses    15. SKIN CONDITION: Yes:  Pressure    16. DIET: Regular    17. IV ACCESS: None    18. PERSONAL ITEMS SENT WITH PATIENT: Glasses    19. ATTACHED DOCUMENTS: MUST ATTACH CURRENT MEDICATION INFORMATION Face Sheet, Medication Reconciliation, and Discharge Summary    20. AT RISK ALERTS:Falls and Pressure Ulcer        HARM TO: N/A    21. WEIGHT BEARING STATUS:         Left Leg: None        Right Leg: None    22. MENTAL STATUS:Alert, Oriented, and Forgetful    23. FUNCTION:        Walk: Not Able        Transfer: Not Able        Toilet: Not Able        Feed: Self    24. IMMUNIZATIONS/SCREENING:     Immunization History   Administered Date(s) Administered    COVID-19 PFIZER VACCINE 0.3 ML IM 10/11/2023    Influenza Split High Dose Preservative Free IM 10/11/2012, 11/01/2013, 10/16/2014, 10/08/2015, 11/17/2016, 10/26/2017    Influenza, high dose seasonal 0.7 mL 11/13/2018, 11/04/2019, 11/17/2020, 11/10/2021    Pneumococcal Conjugate 13-Valent 01/12/2016    Pneumococcal Polysaccharide PPV23 01/01/2008       25. BOWEL: Incontinent     26. BLADDER: Incontinent    27. SENDING FACILITY CONTACT: Maribeth SANDERS                  Title: RN        Unit: [address]        Phone: [phone]          REC'G FACILITY CONTACT (if known):        Title:        Unit:         Phone:         FORM PREFILLED BY (if applicable)       Title:       Unit:        Phone:         FORM COMPLETED BY Maribeth Luna      Title: Juan      Phone: [phone]

## 2024-04-04 NOTE — CASE MANAGEMENT
Case Management Discharge Planning Note    Patient name Ernestina Hurley  Location [address] [address]/[address] [address]-* MRN [id number]  : 1935 Date 2024       Current Admission Date: 3/30/2024  Current Admission Diagnosis:Acute on chronic respiratory failure with hypoxia and hypercapnia (Formerly [facility])   Patient Active Problem List    Diagnosis Date Noted    Depression 2024    GERD (gastroesophageal reflux disease) 2024    SIRS (systemic inflammatory response syndrome) ([facility]) 2024    Acute on chronic respiratory failure with hypoxia and hypercapnia ([facility]) 2024    Anemia 2022    Lower extremity weakness 2021    Current moderate episode of major depressive disorder without prior episode ([facility]) 2020    Onychomycosis 2019    Tinea pedis of both feet 2019    Pain in both feet 2019    Chest pain 2019    Thyroid nodule 2019    Diabetic polyneuropathy associated with type 2 diabetes mellitus ([facility]) 03/15/2019    Atherosclerosis of native artery of both lower extremities ([facility]) 03/15/2019    Granuloma of great toe 02/15/2018    History of breast cancer 2017    Essential hypertension 2017    Type 2 diabetes mellitus without complication, without long-term current use of insulin ([facility]) 2017    Mixed hyperlipidemia 2017    Atrial fibrillation ([facility]) 2017    Multiple sclerosis ([facility]) 2017    Abnormal gait 10/08/2015    Urinary incontinence 08/15/2013    Arthropathy of multiple sites 2013      LOS (days): 5  Geometric Mean LOS (GMLOS) (days): 5.1  Days to GMLOS:0.4     OBJECTIVE:  Risk of Unplanned Readmission Score: 14.75         Current admission status: Inpatient   Preferred Pharmacy:   [id number], [address] [address]  Phone: [phone] Fax: [phone]    [facility] - [place], [place] - [address] [place] [id number]  Phone: [phone]  Fax: [phone]    Primary Care Provider: No primary care provider on file.    Primary Insurance: MEDICARE  Secondary Insurance: BLUE CROSS    DISCHARGE DETAILS:    Discharge planning discussed with:: Patient's Vivi Hope  Freedom of Choice: Yes    CM contacted family/caregiver?: Yes    Contacts  Patient Contacts: Herminia (dtr)  Relationship to Patient:: Family  Contact Method: Phone  Phone Number: [phone]  Reason/Outcome: Continuity of Care, Discharge Planning    Requested Home Health Care         Is the patient interested in HHC at discharge?: No    DME Referral Provided  Referral made for DME?: No    Other Referral/Resources/Interventions Provided:  Interventions: Facility Return  Referral Comments: CM requested BLS tranportation in [facility].    Transport at Discharge : [facility]LS Ambulance  Dispatcher Contacted: Yes  Number/Name of Dispatcher: [facility]  Transported by (Company and Unit #): [facility]  ETA of Transport (Date): 04/04/24  ETA of Transport (Time): 1530     Transfer Mode: Stretcher    IMM Given (Date):: 04/04/24 (IMM reviewed with patient's Daughter Herminia. Original form placed in patient's room. Copy placed in scan bin for patient's chart.)  IMM Given to:: Patient  Family notified:: Vivi Hope

## 2024-04-04 NOTE — DISCHARGE SUMMARY
Pending sale to [facility]  Discharge- Ernestina Hurley 1935, 88 y.o. female MRN: [id number]  Unit/Bed#: [address] [place] [address] Encounter: [id number]  Primary Care Provider: No primary care provider on file.   Date and time admitted to hospital: 3/30/2024  6:00 PM    * Acute on chronic respiratory failure with hypoxia and hypercapnia (HCC)  Assessment & Plan  Initially admitted to ICU for  hypoxia, and increased work of breathing evidenced by tachypnea  X-ray concerning for left sided pneumonia  Improved oxygenation on BiPAP  Pt transferred from ICU to Medicine  Repeat CXR 4/2/24  At least moderate size left pleural effusion with adjacent atelectasis. Opacities throughout the aerated portions of the left lung may represent atelectasis, pneumonia or layering effusion      Plan:   Continue BiPAP qhs  Down to 2L NC  Aggressive chest PT  Finished 5 days of azithromycin and ceftriaxone  IR thoracentesis consult s/p 500 cc of fluid drainage  Given lasix 40 iv x 1 on 4/3 then placed on home lasix 40 mg daily  Continue breathing treatments  TTE 4/3/24:   Left Ventricle: Left ventricular cavity size is normal. Wall thickness is moderately increased. There is moderate concentric hypertrophy. The left ventricular ejection fraction is 65%. Systolic function is normal. Wall motion is normal.  Right Ventricle: Wall thickness is increased.  Mitral Valve: There is mild annular calcification.  Tricuspid Valve: There is mild regurgitation. The right ventricular systolic pressure is moderately to severely elevated at 62-65mmHg peak.  Pericardium: There is a trivial pericardial effusion. There is a small left pleural effusion.  Compared to prior echo, LV function remains normal. Increased pulmonary pressures are now noted  Cardiology referral made on discharge for pulmonary hypertension    Type 2 diabetes mellitus without complication, without long-term current use of insulin (HCC)  Assessment & Plan  Lab Results   Component  Value Date    HGBA1C 6.3 (H) 02/17/2023     Follow-up pending A1c  Resume metformin on discharge    Recent Labs     04/03/24  1728 04/03/24  2112 04/04/24  0703 04/04/24  1047   POCGLU 145* 241* 123 259*         Blood Sugar Average: Last 72 hrs:  (P) [id number]      Essential hypertension  Assessment & Plan  Amlodipine 5 mg has been resumed  Bp controlled    Multiple sclerosis (HCC)  Assessment & Plan  Bedbound at baseline, nursing home resident.  Not on medication    Atrial fibrillation (HCC)  Assessment & Plan  Resume home dose aspirin and sotalol   Pt does not take any AC blood thinners as an outpatient        Medical Problems       Resolved Problems  Date Reviewed: 4/4/2024   None       Discharging Physician / Practitioner: Cassandra Estrella DO  PCP: No primary care provider on file.  Admission Date:   Admission Orders (From admission, onward)       Ordered        03/30/24 2014  INPATIENT ADMISSION  Once                          Discharge Date: 04/04/24    Consultations During Hospital Stay:  IR    Procedures Performed:   Thoracentesis with 500 ml of fluid drained from Left lung    Significant Findings / Test Results:   CXR At least moderate size left pleural effusion with adjacent atelectasis. Opacities throughout the aerated portions of the left lung may represent atelectasis, pneumonia or layering effusion.     TTE 4/3/24    Left Ventricle: Left ventricular cavity size is normal. Wall thickness is moderately increased. There is moderate concentric hypertrophy. The left ventricular ejection fraction is 65%. Systolic function is normal. Wall motion is normal.    Right Ventricle: Wall thickness is increased.    Mitral Valve: There is mild annular calcification.    Tricuspid Valve: There is mild regurgitation. The right ventricular systolic pressure is moderately to severely elevated at 62-65mmHg peak.    Pericardium: There is a trivial pericardial effusion. There is a small left pleural effusion.      Compared to prior echo, LV function remains normal. Increased pulmonary pressures are now noted.    Incidental Findings:   See above   I reviewed the above mentioned incidental findings with the patient and/or family and they expressed understanding.    Test Results Pending at Discharge (will require follow up):   A1C     Outpatient Tests Requested:  Monitor A1C  Cardiology follow up regarding pulmonary hypertension    Complications:  none    Reason for Admission: Acute hypoxic respiratory failure    Hospital Course:   Ernestina Hurley is a 88 y.o. female patient who originally presented to the hospital on 3/30/2024 due to acute hypoxic respiratory failure in setting of pneumonia.  Patient was initially admitted to the ICU requiring BiPAP.  Chest x-ray was concerning for a pneumonia and patient was started on IV ceftriaxone and azithromycin.  Patient's O2 requirement and patient was eventually transitioned to nasal cannula and moved to medical floor.  Patient has finished 5-day course of IV ceftriaxone and azithromycin for concern of pneumonia.  Chest x-ray was also noted for moderate left-sided pleural effusion for which interventional radiology was consulted and performed thoracentesis with 500 cc of fluid drained.  Ultrasound pending.  Patient was also given one-time Lasix 40 IV and home Lasix 40 mg daily regiment was resumed.  An updated echo was obtained which was noted for elevated and pulmonary arterial systolic pressure of 60 to 65 mmHg.  Cardiology referral has been made on discharge.  Patient is currently euvolemic.         Please see above list of diagnoses and related plan for additional information.     Condition at Discharge: stable    Discharge Day Visit / Exam:   Subjective: She states that she feels well currently has no shortness of breath fevers chills chest pain  Vitals: Blood Pressure: 144/64 (04/04/24 0800)  Pulse: 98 (04/04/24 0800)  Temperature: (!) 97.2 °F (36.2 °C) (04/04/24 0717)  Temp  "Source: Tympanic (04/04/24 0717)  Respirations: 18 (04/04/24 0717)  Height: 5' 6\" (167.6 cm) (04/04/24 0800)  Weight - Scale: 86.4 kg (190 lb 7.6 oz) (04/04/24 0800)  SpO2: 94 % (04/04/24 0717)  Exam:   Physical Exam  Vitals and nursing note reviewed.   Constitutional:       General: She is not in acute distress.     Appearance: She is well-developed.   HENT:      Head: Normocephalic and atraumatic.   Eyes:      Conjunctiva/sclera: Conjunctivae normal.   Cardiovascular:      Rate and Rhythm: Normal rate and regular rhythm.      Heart sounds: No murmur heard.  Pulmonary:      Effort: Pulmonary effort is normal. No respiratory distress.      Breath sounds: Normal breath sounds.   Abdominal:      Palpations: Abdomen is soft.      Tenderness: There is no abdominal tenderness.   Musculoskeletal:         General: No swelling.      Cervical back: Neck supple.   Skin:     General: Skin is warm and dry.      Capillary Refill: Capillary refill takes less than 2 seconds.   Neurological:      Mental Status: She is alert. Mental status is at baseline. She is disoriented.   Psychiatric:         Mood and Affect: Mood normal.          Discussion with Family: Updated  (daughter) via phone.    Discharge instructions/Information to patient and family:   See after visit summary for information provided to patient and family.      Provisions for Follow-Up Care:  See after visit summary for information related to follow-up care and any pertinent home health orders.      Mobility at time of Discharge:   Basic Mobility Inpatient Raw Score: 8  -HLM Goal: 3: Sit at edge of bed  JH-HLM Achieved: 2: Bed activities/Dependent transfer  HLM Goal NOT achieved. Continue to encourage mobility in post discharge setting.     Disposition:   Other Skilled Nursing Facility at [facility]    Planned Readmission: no     Discharge Statement:  I spent 45 minutes discharging the patient. This time was spent on the day of discharge. I had direct " contact with the patient on the day of discharge. Greater than 50% of the total time was spent examining patient, answering all patient questions, arranging and discussing plan of care with patient as well as directly providing post-discharge instructions.  Additional time then spent on discharge activities.    Discharge Medications:  See after visit summary for reconciled discharge medications provided to patient and/or family.      **Please Note: This note may have been constructed using a voice recognition system**

## 2024-04-04 NOTE — NURSING NOTE
Patient discharged to [facility]. Spoke with receiving nurse, Danitza, updated her on patient's status and plan of care. Removed IV from arm, pressure dressing in place. No c/o pain or discomfort. VSS/afebrile. Transport team picked patient up in stretcher. Pt stable at time of discharge.

## 2024-04-04 NOTE — PLAN OF CARE
Problem: Potential for Falls  Goal: Patient will remain free of falls  Description: INTERVENTIONS:  - Educate patient/family on patient safety including physical limitations  - Instruct patient to call for assistance with activity   - Consult OT/PT to assist with strengthening/mobility   - Keep Call bell within reach  - Keep bed low and locked with side rails adjusted as appropriate  - Keep care items and personal belongings within reach  - Initiate and maintain comfort rounds  - Make Fall Risk Sign visible to staff  - Offer Toileting every 2 Hours, in advance of need  - Initiate/Maintain bed alarm  - Obtain necessary fall risk management equipment: yellow socks  - Apply yellow socks and bracelet for high fall risk patients  - Consider moving patient to room near nurses station  Outcome: Progressing     Problem: PAIN - ADULT  Goal: Verbalizes/displays adequate comfort level or baseline comfort level  Description: Interventions:  - Encourage patient to monitor pain and request assistance  - Assess pain using appropriate pain scale  - Administer analgesics based on type and severity of pain and evaluate response  - Implement non-pharmacological measures as appropriate and evaluate response  - Consider cultural and social influences on pain and pain management  - Notify physician/advanced practitioner if interventions unsuccessful or patient reports new pain  Outcome: Progressing     Problem: INFECTION - ADULT  Goal: Absence or prevention of progression during hospitalization  Description: INTERVENTIONS:  - Assess and monitor for signs and symptoms of infection  - Monitor lab/diagnostic results  - Monitor all insertion sites, i.e. indwelling lines, tubes, and drains  - Monitor endotracheal if appropriate and nasal secretions for changes in amount and color  - [place] appropriate cooling/warming therapies per order  - Administer medications as ordered  - Instruct and encourage patient and family to use good hand  hygiene technique  - Identify and instruct in appropriate isolation precautions for identified infection/condition  Outcome: Progressing     Problem: DISCHARGE PLANNING  Goal: Discharge to home or other facility with appropriate resources  Description: INTERVENTIONS:  - Identify barriers to discharge w/patient and caregiver  - Arrange for needed discharge resources and transportation as appropriate  - Identify discharge learning needs (meds, wound care, etc.)  - Arrange for interpretive services to assist at discharge as needed  - Refer to Case Management Department for coordinating discharge planning if the patient needs post-hospital services based on physician/advanced practitioner order or complex needs related to functional status, cognitive ability, or social support system  Outcome: Progressing     Problem: Knowledge Deficit  Goal: Patient/family/caregiver demonstrates understanding of disease process, treatment plan, medications, and discharge instructions  Description: Complete learning assessment and assess knowledge base.  Interventions:  - Provide teaching at level of understanding  - Provide teaching via preferred learning methods  Outcome: Progressing     Problem: RESPIRATORY - ADULT  Goal: Achieves optimal ventilation and oxygenation  Description: INTERVENTIONS:  - Assess for changes in respiratory status  - Assess for changes in mentation and behavior  - Position to facilitate oxygenation and minimize respiratory effort  - Oxygen administered by appropriate delivery if ordered  - Initiate smoking cessation education as indicated  - Encourage broncho-pulmonary hygiene including cough, deep breathe, Incentive Spirometry  - Assess the need for suctioning and aspirate as needed  - Assess and instruct to report SOB or any respiratory difficulty  - Respiratory Therapy support as indicated  Outcome: Progressing     Problem: SKIN/TISSUE INTEGRITY - ADULT  Goal: Skin Integrity remains intact(Skin Breakdown  Prevention)  Description: Assess:  -Perform Rudi assessment every shift  -Clean and moisturize skin every shift  -Inspect skin when repositioning, toileting, and assisting with ADLS  -Assess under medical devices such as IV sites every shift  -Assess extremities for adequate circulation and sensation     Bed Management:  -Have minimal linens on bed & keep smooth, unwrinkled  -Change linens as needed when moist or perspiring  -Avoid sitting or lying in one position for more than 2 hours while in bed  -Keep HOB at 45degrees     Toileting:  -Offer bedside commode  -Assess for incontinence every shift  -Use incontinent care products after each incontinent episode such as barrier creams    Activity:  -Mobilize patient 3 times a day  -Encourage activity and walks on unit  -Encourage or provide ROM exercises   -Turn and reposition patient every 2 Hours  -Use appropriate equipment to lift or move patient in bed  -Instruct/ Assist with weight shifting every shift when out of bed in chair  -Consider limitation of chair time 2 hour intervals    Skin Care:  -Avoid use of baby powder, tape, friction and shearing, hot water or constrictive clothing  -Relieve pressure over bony prominences using waffle cushion  -Do not massage red bony areas    Next Steps:  -Teach patient strategies to minimize risks such as repositioning   -Consider consults to  interdisciplinary teams such as PT  Outcome: Progressing  Goal: Incision(s), wounds(s) or drain site(s) healing without S/S of infection  Description: INTERVENTIONS  - Assess and document dressing, incision, wound bed, drain sites and surrounding tissue  - Provide patient and family education  - Perform skin care/dressing changes every shift  Outcome: Progressing  Goal: Pressure injury heals and does not worsen  Description: Interventions:  - Implement low air loss mattress or specialty surface (Criteria met)  - Apply silicone foam dressing  - Instruct/assist with weight shifting every  90 minutes when in chair   - Limit chair time to 2 hour intervals  - Use special pressure reducing interventions such as repositioning when in chair   - Apply fecal or urinary incontinence containment device   - Perform passive or active ROM every shift  - Turn and reposition patient & offload bony prominences every 2 hours   - Utilize friction reducing device or surface for transfers   - Consider consults to  interdisciplinary teams such as PT  - Use incontinent care products after each incontinent episode such as barrier creams  - Consider nutrition services referral as needed  Outcome: Progressing     Problem: Prexisting or High Potential for Compromised Skin Integrity  Goal: Skin integrity is maintained or improved  Description: INTERVENTIONS:  - Identify patients at risk for skin breakdown  - Assess and monitor skin integrity  - Assess and monitor nutrition and hydration status  - Monitor labs   - Assess for incontinence   - Turn and reposition patient  - Assist with mobility/ambulation  - Relieve pressure over bony prominences  - Avoid friction and shearing  - Provide appropriate hygiene as needed including keeping skin clean and dry  - Evaluate need for skin moisturizer/barrier cream  - Collaborate with interdisciplinary team   - Patient/family teaching  - Consider wound care consult   Outcome: Progressing     Problem: Nutrition/Hydration-ADULT  Goal: Nutrient/Hydration intake appropriate for improving, restoring or maintaining nutritional needs  Description: Monitor and assess patient's nutrition/hydration status for malnutrition. Collaborate with interdisciplinary team and initiate plan and interventions as ordered.  Monitor patient's weight and dietary intake as ordered or per policy. Utilize nutrition screening tool and intervene as necessary. Determine patient's food preferences and provide high-protein, high-caloric foods as appropriate.     INTERVENTIONS:  - Monitor oral intake, urinary output, labs,  and treatment plans  - Assess nutrition and hydration status and recommend course of action  - Evaluate amount of meals eaten  - Assist patient with eating if necessary   - Allow adequate time for meals  - Recommend/ encourage appropriate diets, oral nutritional supplements, and vitamin/mineral supplements  - Order, calculate, and assess calorie counts as needed  - Recommend, monitor, and adjust tube feedings and TPN/PPN based on assessed needs  - Assess need for intravenous fluids  - Provide specific nutrition/hydration education as appropriate  - Include patient/family/caregiver in decisions related to nutrition  Outcome: Progressing

## 2024-04-04 NOTE — CASE MANAGEMENT
Case Management Discharge Planning Note    Patient name Ernestina Hurley  Location [address] [address]/[address] [address]-* MRN [id number]  : 1935 Date 2024       Current Admission Date: 3/30/2024  Current Admission Diagnosis:Acute on chronic respiratory failure with hypoxia and hypercapnia ([facility])   Patient Active Problem List    Diagnosis Date Noted    Depression 2024    GERD (gastroesophageal reflux disease) 2024    SIRS (systemic inflammatory response syndrome) ([facility]) 2024    Acute on chronic respiratory failure with hypoxia and hypercapnia ([facility]) 2024    Anemia 2022    Lower extremity weakness 2021    Current moderate episode of major depressive disorder without prior episode ([facility]) 2020    Onychomycosis 2019    Tinea pedis of both feet 2019    Pain in both feet 2019    Chest pain 2019    Thyroid nodule 2019    Diabetic polyneuropathy associated with type 2 diabetes mellitus ([facility]) 03/15/2019    Atherosclerosis of native artery of both lower extremities ([facility]) 03/15/2019    Granuloma of great toe 02/15/2018    History of breast cancer 2017    Essential hypertension 2017    Type 2 diabetes mellitus without complication, without long-term current use of insulin ([facility]) 2017    Mixed hyperlipidemia 2017    Atrial fibrillation ([facility]) 2017    Multiple sclerosis ([facility]) 2017    Abnormal gait 10/08/2015    Urinary incontinence 08/15/2013    Arthropathy of multiple sites 2013      LOS (days): 5  Geometric Mean LOS (GMLOS) (days): 5.1  Days to GMLOS:0.4     OBJECTIVE:  Risk of Unplanned Readmission Score: 14.71     Current admission status: Inpatient   Preferred Pharmacy:   [facility] #[id number], [address] [address]  Phone: [phone] Fax: [phone]    [facility] - [place], [place] - [address] [place] [id number]  Phone: [phone] Fax:  [phone]    Primary Care Provider: No primary care provider on file.    Primary Insurance: MEDICARE  Secondary Insurance: BLUE CROSS    DISCHARGE DETAILS:    Discharge planning discussed with:: Patient's Vivi Hope  Freedom of Choice: Yes     CM contacted family/caregiver?: Yes    Contacts  Patient Contacts: Herminia (dtr)  Relationship to Patient:: Family  Contact Method: Phone  Phone Number: [phone]  Reason/Outcome: Continuity of Care, Discharge Planning    Requested Home Health Care         Is the patient interested in HHC at discharge?: No    DME Referral Provided  Referral made for DME?: No    Other Referral/Resources/Interventions Provided:  Interventions: Facility Return  Referral Comments: CM requested BLS tranportation in [place].    IMM Given (Date):: 04/04/24 (IMM reviewed with patient's Daughter Herminia. Original form placed in patient's room. Copy placed in scan bin for patient's chart.)  IMM Given to:: Patient  Family notified:: Daughter Herminia    IMM reviewed with patient's caregiver, patient's caregiver agrees with discharge determination.     LEATHA was made aware by attending that patient is cleared for discharge. CM made LTC facility [facility] aware. CM called and left message for patient's Daughter Herminia to make her aware of patient's discharge today. CM left detailed message with Medicare rights and contact number for appeal. CM also noted a copy of the IMM with phone number will be placed in patient's room bedside.     CM requested BLS transportation in [place], 1530  time requested.     CM placed medical necessities in label binder for nursing and transportation.

## 2024-04-04 NOTE — ASSESSMENT & PLAN NOTE
Lab Results   Component Value Date    HGBA1C 6.3 (H) 02/17/2023     Follow-up pending A1c  Resume metformin on discharge    Recent Labs     04/03/24  1728 04/03/24  2112 04/04/24  0703 04/04/24  1047   POCGLU 145* 241* 123 259*         Blood Sugar Average: Last 72 hrs:  (P) [id number]

## 2024-04-04 NOTE — ASSESSMENT & PLAN NOTE
Initially admitted to ICU for  hypoxia, and increased work of breathing evidenced by tachypnea  X-ray concerning for left sided pneumonia  Improved oxygenation on BiPAP  Pt transferred from ICU to Medicine  Repeat CXR 4/2/24  At least moderate size left pleural effusion with adjacent atelectasis. Opacities throughout the aerated portions of the left lung may represent atelectasis, pneumonia or layering effusion      Plan:   Continue BiPAP qhs  Down to 2L NC  Aggressive chest PT  Finished 5 days of azithromycin and ceftriaxone  IR thoracentesis consult s/p 500 cc of fluid drainage  Given lasix 40 iv x 1 on 4/3 then placed on home lasix 40 mg daily  Continue breathing treatments  TTE 4/3/24:   Left Ventricle: Left ventricular cavity size is normal. Wall thickness is moderately increased. There is moderate concentric hypertrophy. The left ventricular ejection fraction is 65%. Systolic function is normal. Wall motion is normal.  Right Ventricle: Wall thickness is increased.  Mitral Valve: There is mild annular calcification.  Tricuspid Valve: There is mild regurgitation. The right ventricular systolic pressure is moderately to severely elevated at 62-65mmHg peak.  Pericardium: There is a trivial pericardial effusion. There is a small left pleural effusion.  Compared to prior echo, LV function remains normal. Increased pulmonary pressures are now noted  Cardiology referral made on discharge for pulmonary hypertension

## 2024-04-06 LAB
BACTERIA SPEC BFLD CULT: NO GROWTH
GRAM STN SPEC: NORMAL
GRAM STN SPEC: NORMAL

## 2024-05-23 ENCOUNTER — HOSPITAL ENCOUNTER (INPATIENT)
Facility: HOSPITAL | Age: 89
LOS: 3 days | Discharge: NURSING, REHAB OR LONG-TERM CARE | DRG: 378 | End: 2024-05-26
Attending: EMERGENCY MEDICINE | Admitting: INTERNAL MEDICINE
Payer: MEDICARE

## 2024-05-23 ENCOUNTER — APPOINTMENT (EMERGENCY)
Dept: RADIOLOGY | Facility: HOSPITAL | Age: 89
DRG: 378 | End: 2024-05-23
Payer: MEDICARE

## 2024-05-23 DIAGNOSIS — D64.9 ANEMIA: ICD-10-CM

## 2024-05-23 DIAGNOSIS — K28.4 BLEEDING ULCER: Primary | ICD-10-CM

## 2024-05-23 DIAGNOSIS — D50.0 IRON DEFICIENCY ANEMIA DUE TO CHRONIC BLOOD LOSS: ICD-10-CM

## 2024-05-23 DIAGNOSIS — K27.4 PEPTIC ULCER DISEASE WITH HEMORRHAGE: ICD-10-CM

## 2024-05-23 LAB
2HR DELTA HS TROPONIN: -1 NG/L
ABO GROUP BLD: NORMAL
ABO GROUP BLD: NORMAL
ALBUMIN SERPL BCP-MCNC: 3 G/DL (ref 3.5–5)
ALP SERPL-CCNC: 74 U/L (ref 34–104)
ALT SERPL W P-5'-P-CCNC: 5 U/L (ref 7–52)
ANION GAP SERPL CALCULATED.3IONS-SCNC: 5 MMOL/L (ref 4–13)
APTT PPP: 27 SECONDS (ref 23–37)
AST SERPL W P-5'-P-CCNC: 7 U/L (ref 13–39)
BACTERIA UR QL AUTO: ABNORMAL /HPF
BASOPHILS # BLD AUTO: 0.06 THOUSANDS/ÂΜL (ref 0–0.1)
BASOPHILS NFR BLD AUTO: 1 % (ref 0–1)
BILIRUB SERPL-MCNC: 0.19 MG/DL (ref 0.2–1)
BILIRUB UR QL STRIP: NEGATIVE
BLD GP AB SCN SERPL QL: NEGATIVE
BUN SERPL-MCNC: 26 MG/DL (ref 5–25)
CALCIUM ALBUM COR SERPL-MCNC: 9.3 MG/DL (ref 8.3–10.1)
CALCIUM SERPL-MCNC: 8.5 MG/DL (ref 8.4–10.2)
CARDIAC TROPONIN I PNL SERPL HS: 4 NG/L
CARDIAC TROPONIN I PNL SERPL HS: 5 NG/L
CHLORIDE SERPL-SCNC: 101 MMOL/L (ref 96–108)
CLARITY UR: CLEAR
CO2 SERPL-SCNC: 32 MMOL/L (ref 21–32)
COLOR UR: YELLOW
CREAT SERPL-MCNC: 0.5 MG/DL (ref 0.6–1.3)
EOSINOPHIL # BLD AUTO: 0.17 THOUSAND/ÂΜL (ref 0–0.61)
EOSINOPHIL NFR BLD AUTO: 2 % (ref 0–6)
ERYTHROCYTE [DISTWIDTH] IN BLOOD BY AUTOMATED COUNT: 14.9 % (ref 11.6–15.1)
GFR SERPL CREATININE-BSD FRML MDRD: 87 ML/MIN/1.73SQ M
GLUCOSE SERPL-MCNC: 148 MG/DL (ref 65–140)
GLUCOSE SERPL-MCNC: 165 MG/DL (ref 65–140)
GLUCOSE SERPL-MCNC: 243 MG/DL (ref 65–140)
GLUCOSE UR STRIP-MCNC: NEGATIVE MG/DL
HCT VFR BLD AUTO: 28.3 % (ref 34.8–46.1)
HGB BLD-MCNC: 8.8 G/DL (ref 11.5–15.4)
HGB UR QL STRIP.AUTO: NEGATIVE
IMM GRANULOCYTES # BLD AUTO: 0.05 THOUSAND/UL (ref 0–0.2)
IMM GRANULOCYTES NFR BLD AUTO: 1 % (ref 0–2)
INR PPP: 1.01 (ref 0.84–1.19)
KETONES UR STRIP-MCNC: NEGATIVE MG/DL
LEUKOCYTE ESTERASE UR QL STRIP: ABNORMAL
LIPASE SERPL-CCNC: 39 U/L (ref 11–82)
LYMPHOCYTES # BLD AUTO: 2.04 THOUSANDS/ÂΜL (ref 0.6–4.47)
LYMPHOCYTES NFR BLD AUTO: 24 % (ref 14–44)
MCH RBC QN AUTO: 28.2 PG (ref 26.8–34.3)
MCHC RBC AUTO-ENTMCNC: 31.1 G/DL (ref 31.4–37.4)
MCV RBC AUTO: 91 FL (ref 82–98)
MONOCYTES # BLD AUTO: 0.72 THOUSAND/ÂΜL (ref 0.17–1.22)
MONOCYTES NFR BLD AUTO: 9 % (ref 4–12)
NEUTROPHILS # BLD AUTO: 5.43 THOUSANDS/ÂΜL (ref 1.85–7.62)
NEUTS SEG NFR BLD AUTO: 63 % (ref 43–75)
NITRITE UR QL STRIP: NEGATIVE
NON-SQ EPI CELLS URNS QL MICRO: ABNORMAL /HPF
NRBC BLD AUTO-RTO: 0 /100 WBCS
PH UR STRIP.AUTO: 5 [PH]
PLATELET # BLD AUTO: 225 THOUSANDS/UL (ref 149–390)
PMV BLD AUTO: 11.4 FL (ref 8.9–12.7)
POTASSIUM SERPL-SCNC: 4.4 MMOL/L (ref 3.5–5.3)
PROT SERPL-MCNC: 6.4 G/DL (ref 6.4–8.4)
PROT UR STRIP-MCNC: NEGATIVE MG/DL
PROTHROMBIN TIME: 13.5 SECONDS (ref 11.6–14.5)
RBC # BLD AUTO: 3.12 MILLION/UL (ref 3.81–5.12)
RBC #/AREA URNS AUTO: ABNORMAL /HPF
RH BLD: NEGATIVE
RH BLD: NEGATIVE
SODIUM SERPL-SCNC: 138 MMOL/L (ref 135–147)
SP GR UR STRIP.AUTO: 1.01 (ref 1–1.03)
SPECIMEN EXPIRATION DATE: NORMAL
UROBILINOGEN UR STRIP-ACNC: <2 MG/DL
WBC # BLD AUTO: 8.47 THOUSAND/UL (ref 4.31–10.16)
WBC #/AREA URNS AUTO: ABNORMAL /HPF

## 2024-05-23 PROCEDURE — 96374 THER/PROPH/DIAG INJ IV PUSH: CPT

## 2024-05-23 PROCEDURE — 36415 COLL VENOUS BLD VENIPUNCTURE: CPT | Performed by: EMERGENCY MEDICINE

## 2024-05-23 PROCEDURE — 85730 THROMBOPLASTIN TIME PARTIAL: CPT | Performed by: EMERGENCY MEDICINE

## 2024-05-23 PROCEDURE — 83690 ASSAY OF LIPASE: CPT | Performed by: EMERGENCY MEDICINE

## 2024-05-23 PROCEDURE — 85610 PROTHROMBIN TIME: CPT | Performed by: EMERGENCY MEDICINE

## 2024-05-23 PROCEDURE — 71045 X-RAY EXAM CHEST 1 VIEW: CPT

## 2024-05-23 PROCEDURE — 87081 CULTURE SCREEN ONLY: CPT | Performed by: INTERNAL MEDICINE

## 2024-05-23 PROCEDURE — C9113 INJ PANTOPRAZOLE SODIUM, VIA: HCPCS | Performed by: EMERGENCY MEDICINE

## 2024-05-23 PROCEDURE — 86850 RBC ANTIBODY SCREEN: CPT | Performed by: EMERGENCY MEDICINE

## 2024-05-23 PROCEDURE — 99223 1ST HOSP IP/OBS HIGH 75: CPT | Performed by: INTERNAL MEDICINE

## 2024-05-23 PROCEDURE — 74178 CT ABD&PLV WO CNTR FLWD CNTR: CPT

## 2024-05-23 PROCEDURE — 82948 REAGENT STRIP/BLOOD GLUCOSE: CPT

## 2024-05-23 PROCEDURE — 93005 ELECTROCARDIOGRAM TRACING: CPT

## 2024-05-23 PROCEDURE — 84484 ASSAY OF TROPONIN QUANT: CPT | Performed by: EMERGENCY MEDICINE

## 2024-05-23 PROCEDURE — 99222 1ST HOSP IP/OBS MODERATE 55: CPT | Performed by: INTERNAL MEDICINE

## 2024-05-23 PROCEDURE — 99285 EMERGENCY DEPT VISIT HI MDM: CPT

## 2024-05-23 PROCEDURE — 86900 BLOOD TYPING SEROLOGIC ABO: CPT | Performed by: EMERGENCY MEDICINE

## 2024-05-23 PROCEDURE — 86901 BLOOD TYPING SEROLOGIC RH(D): CPT | Performed by: EMERGENCY MEDICINE

## 2024-05-23 PROCEDURE — 81001 URINALYSIS AUTO W/SCOPE: CPT | Performed by: EMERGENCY MEDICINE

## 2024-05-23 PROCEDURE — 80053 COMPREHEN METABOLIC PANEL: CPT | Performed by: EMERGENCY MEDICINE

## 2024-05-23 PROCEDURE — 99285 EMERGENCY DEPT VISIT HI MDM: CPT | Performed by: EMERGENCY MEDICINE

## 2024-05-23 PROCEDURE — 85025 COMPLETE CBC W/AUTO DIFF WBC: CPT | Performed by: EMERGENCY MEDICINE

## 2024-05-23 RX ORDER — AMLODIPINE BESYLATE 5 MG/1
5 TABLET ORAL DAILY
Status: DISCONTINUED | OUTPATIENT
Start: 2024-05-24 | End: 2024-05-26 | Stop reason: HOSPADM

## 2024-05-23 RX ORDER — LORATADINE 10 MG/1
10 TABLET ORAL DAILY
Status: DISCONTINUED | OUTPATIENT
Start: 2024-05-23 | End: 2024-05-26 | Stop reason: HOSPADM

## 2024-05-23 RX ORDER — LATANOPROST 50 UG/ML
1 SOLUTION/ DROPS OPHTHALMIC
Status: DISCONTINUED | OUTPATIENT
Start: 2024-05-23 | End: 2024-05-26 | Stop reason: HOSPADM

## 2024-05-23 RX ORDER — INSULIN LISPRO 100 [IU]/ML
1-6 INJECTION, SOLUTION INTRAVENOUS; SUBCUTANEOUS
Status: DISCONTINUED | OUTPATIENT
Start: 2024-05-23 | End: 2024-05-26 | Stop reason: HOSPADM

## 2024-05-23 RX ORDER — METOCLOPRAMIDE HYDROCHLORIDE 5 MG/ML
10 INJECTION INTRAMUSCULAR; INTRAVENOUS ONCE
Status: COMPLETED | OUTPATIENT
Start: 2024-05-23 | End: 2024-05-23

## 2024-05-23 RX ORDER — ACETAMINOPHEN 325 MG/1
650 TABLET ORAL EVERY 8 HOURS PRN
Status: DISCONTINUED | OUTPATIENT
Start: 2024-05-23 | End: 2024-05-26 | Stop reason: HOSPADM

## 2024-05-23 RX ORDER — PANTOPRAZOLE SODIUM 40 MG/10ML
40 INJECTION, POWDER, LYOPHILIZED, FOR SOLUTION INTRAVENOUS ONCE
Status: COMPLETED | OUTPATIENT
Start: 2024-05-23 | End: 2024-05-23

## 2024-05-23 RX ORDER — ONDANSETRON 2 MG/ML
4 INJECTION INTRAMUSCULAR; INTRAVENOUS EVERY 6 HOURS PRN
Status: DISCONTINUED | OUTPATIENT
Start: 2024-05-23 | End: 2024-05-26 | Stop reason: HOSPADM

## 2024-05-23 RX ORDER — SOTALOL HYDROCHLORIDE 80 MG/1
80 TABLET ORAL 2 TIMES DAILY
Status: DISCONTINUED | OUTPATIENT
Start: 2024-05-23 | End: 2024-05-26 | Stop reason: HOSPADM

## 2024-05-23 RX ADMIN — PANTOPRAZOLE SODIUM 8 MG/HR: 40 INJECTION, POWDER, FOR SOLUTION INTRAVENOUS at 15:54

## 2024-05-23 RX ADMIN — IOHEXOL 100 ML: 350 INJECTION, SOLUTION INTRAVENOUS at 12:28

## 2024-05-23 RX ADMIN — METOCLOPRAMIDE 10 MG: 5 INJECTION, SOLUTION INTRAMUSCULAR; INTRAVENOUS at 14:31

## 2024-05-23 RX ADMIN — SOTALOL HYDROCHLORIDE 80 MG: 80 TABLET ORAL at 17:27

## 2024-05-23 RX ADMIN — LATANOPROST 1 DROP: 50 SOLUTION OPHTHALMIC at 23:43

## 2024-05-23 RX ADMIN — PANTOPRAZOLE SODIUM 8 MG/HR: 40 INJECTION, POWDER, FOR SOLUTION INTRAVENOUS at 23:43

## 2024-05-23 RX ADMIN — PANTOPRAZOLE SODIUM 40 MG: 40 INJECTION, POWDER, FOR SOLUTION INTRAVENOUS at 14:01

## 2024-05-23 RX ADMIN — LORATADINE 10 MG: 10 TABLET ORAL at 17:27

## 2024-05-23 NOTE — ASSESSMENT & PLAN NOTE
Recent hemoglobin appears to be in the range of 11-13  Likely in the setting of GI bleeding  Monitor hemoglobin and transfuse if less than 8  Results from last 7 days   Lab Units 05/23/24  1118   HEMOGLOBIN g/dL 8.8*   HEMATOCRIT % 28.3*   MCV fL 91

## 2024-05-23 NOTE — ED NOTES
[facility] notified that pt is admitted to hospital, spoke to Rena johnson.      Pretty Medrano, RN  05/23/24 3550

## 2024-05-23 NOTE — ASSESSMENT & PLAN NOTE
Patient is on sotalol and not on anticoagulation  Patient is on aspirin which will be held at the current time

## 2024-05-23 NOTE — H&P
"[facility]  H&P  Name: Ernestina Hurley 88 y.o. female I MRN: [id number]  Unit/Bed#: [address] [place] [address] Date of Admission: 5/23/2024   Date of Service: 5/23/2024 I Hospital Day: 0      Assessment & Plan   * Peptic ulcer disease with hemorrhage  Assessment & Plan  Patient presented with abdominal pain which is especially worse 2 days ago  In the ED patient noted to be anemic with a hemoglobin of 8.8  CT abdomen pelvis high-volume bleeding scan-gastroduodenitis with gastric pylorus ulcer with small focus of active bleed without any free air  Patient started on Protonix drip which will be continued  GI was consulted as patient will need EGD  Continue to keep n.p.o.  Hold aspirin    Anemia  Assessment & Plan  Recent hemoglobin appears to be in the range of 11-13  Likely in the setting of GI bleeding  Monitor hemoglobin and transfuse if less than 8  Results from last 7 days   Lab Units 05/23/24  1118   HEMOGLOBIN g/dL 8.8*   HEMATOCRIT % 28.3*   MCV fL 91        Diabetic polyneuropathy associated with type 2 diabetes mellitus (HCC)  Assessment & Plan  Lab Results   Component Value Date    HGBA1C 8.0 (H) 04/04/2024       No results for input(s): \"POCGLU\" in the last 72 hours.    Blood Sugar Average: Last 72 hrs:    Patient is on metformin which will be held at the current time  Patient with Humalog sliding scale with Accu-Cheks every 6 hours    Essential hypertension  Assessment & Plan  Continue amlodipine 5 mg p.o. daily  Hold Lasix for now    Multiple sclerosis (HCC)  Assessment & Plan  Patient is bedbound at baseline    Atrial fibrillation (HCC)  Assessment & Plan  Patient is on sotalol and not on anticoagulation  Patient is on aspirin which will be held at the current time       VTE Pharmacologic Prophylaxis: VTE Score: 1 Moderate Risk (Score 3-4) - Pharmacological DVT Prophylaxis Contraindicated. Sequential Compression Devices Ordered.  Code Status: Level 3 - DNAR and DNI   Discussion with " family: Updated  (daughter) via phone.    Anticipated Length of Stay: Patient will be admitted on an inpatient basis with an anticipated length of stay of greater than 2 midnights secondary to peptic ulcer disease with bleeding, anemia.    Total Time Spent on Date of Encounter in care of patient: 55 mins. This time was spent on one or more of the following: performing physical exam; counseling and coordination of care; obtaining or reviewing history; documenting in the medical record; reviewing/ordering tests, medications or procedures; communicating with other healthcare professionals and discussing with patient's family/caregivers.    Chief Complaint: Abdominal pain    History of Present Illness:  Ernestina Hurley is a 88 y.o. female with a PMH of multiple sclerosis, diabetes, hypertension, PAF, chronic respiratory failure, pulmonary hypertension who presents with abdominal pain intermittently for the past 3 weeks.  Patient is feeling nauseous after she eats and feeling full.  Patient reports the pain was really bad 2 nights ago.  Abdominal pain localized in the left upper abdomen and radiating across the upper abdomen.  Patient denies any vomiting.  Unsure if she had any black stool as patient cannot say.  In the ED patient's vital signs were stable.  Labs showed a BUN/creatinine of 26 and 0.50 with a hemoglobin of 8.8  CT abdomen pelvis high-volume scan showed gastroduodenitis with gastric pyloric ulcer with small focus of active bleed    Review of Systems:  Review of Systems   Constitutional:  Negative for activity change, appetite change, chills, diaphoresis, fatigue, fever and unexpected weight change.   HENT:  Negative for congestion, ear discharge, ear pain, facial swelling, hearing loss, mouth sores, nosebleeds, postnasal drip, rhinorrhea, sinus pressure, sneezing, sore throat, tinnitus, trouble swallowing and voice change.    Eyes:  Negative for photophobia, discharge, redness and visual  disturbance.   Respiratory:  Negative for cough, chest tightness, shortness of breath, wheezing and stridor.    Cardiovascular:  Negative for chest pain, palpitations and leg swelling.   Gastrointestinal:  Positive for abdominal pain. Negative for abdominal distention, anal bleeding, blood in stool, constipation, diarrhea, nausea and vomiting.   Endocrine: Negative for polydipsia, polyphagia and polyuria.   Genitourinary:  Negative for decreased urine volume, difficulty urinating, dysuria, flank pain, hematuria, menstrual problem, pelvic pain, urgency, vaginal bleeding and vaginal discharge.   Musculoskeletal:  Negative for arthralgias, back pain and neck stiffness.   Skin:  Negative for pallor and rash.   Neurological:  Negative for dizziness, seizures, facial asymmetry, speech difficulty, light-headedness, numbness and headaches.   Hematological:  Negative for adenopathy.   Psychiatric/Behavioral:  Negative for agitation and confusion.        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-last assessed-10/8/2015    Cellulitis of chest wall     last assessed-7/27/2015    Chronic endometritis 10/12/2006    Diabetes mellitus ([facility])     Dysuria 11/02/2007    Flushing     resolved-6/30/2015    Glaucoma     Hyperlipidemia     Hypertension     Ingrown nail 01/05/2012    Malignant neoplasm of breast (HCC)     last assessed-11/5/2015    MS (multiple sclerosis) (Former[facility])     Nonvenomous insect bite     last assessed-12/12/2013    Palpitations 02/09/2011    Pressure ulcer of buttock 10/13/2006    Proctitis 05/12/2006    Vaginal polyp 03/14/2006    Viral gastroenteritis     last assessed-9/8/2016       Past Surgical History:   Procedure Laterality Date    BREAST BIOPSY      open    BREAST SURGERY      CERVICAL BIOPSY  W/ LOOP ELECTRODE EXCISION      DILATION AND CURETTAGE OF UTERUS       INCISION AND DRAINAGE OF WOUND Left 2/27/2017    Procedure: INCISION AND DRAINAGE (I&D)   Chest wall x 2;  Surgeon: Shant Braga MD;  Location: [facility];  Service:     IR THORACENTESIS  4/3/2024    MASTECTOMY      last assessed-6/30/2015       Meds/Allergies:  Prior to Admission medications    Medication Sig Start Date End Date Taking? Authorizing Provider   amLODIPine (NORVASC) 5 mg tablet Take 1 tablet (5 mg total) by mouth daily 1/10/22  Yes JAMISON Nair   latanoprost (XALATAN) 0.005 % ophthalmic solution Administer 1 drop to both eyes daily at bedtime 10/8/20  Yes Jolie Berrios DO   loratadine (CLARITIN) 10 mg tablet Take 10 mg by mouth daily   Yes Historical Provider, MD   sotalol (BETAPACE) 80 mg tablet Take 1 tablet (80 mg total) by mouth 2 (two) times a day 2/15/21  Yes Jolie Berrios DO   aspirin 81 MG tablet Take 162 mg by mouth daily  5/23/24 Yes Historical Provider, MD   furosemide (LASIX) 40 mg tablet Take 40 mg by mouth daily  5/23/24 Yes Historical Provider, MD   polyethylene glycol (MIRALAX) 17 g packet Take 17 g by mouth daily  5/23/24 Yes Historical Provider, MD   potassium chloride (MICRO-K) 10 MEQ CR capsule Take 10 mEq by mouth daily  5/23/24 Yes Historical Provider, MD   acetaminophen (TYLENOL) 650 mg CR tablet Take 650 mg by mouth every 6 (six) hours as needed for mild pain    Historical Provider, MD   Easy Comfort Lancets MISC USE DAILY ICD 10 CODE E11.9 12/26/20   Jolie Berrios DO   metFORMIN (GLUCOPHAGE) 1000 MG tablet Take 0.5 tablets (500 mg total) by mouth 2 (two) times a day with meals 4/4/24 5/4/24  Cassandra Estrella DO   glucose blood test strip Test twice daily 3/2/21 5/23/24  Frantz Ny MD   LACTOBACILLUS PO Take 1 capsule by mouth 2 (two) times a day  5/23/24  Historical Provider, MD     I have reviewed home medications with patient personally.    Allergies:   Allergies   Allergen Reactions    Bactrim [Sulfamethoxazole-Trimethoprim] Other (See Comments)     General  weakness    Clindamycin      Annotation - [id number]: bad taste in mouth    Coumadin [Warfarin]      Reaction Date: 22May2012; Annotation - [id number]: lip swelling    Dabigatran     Latex      Annotation - [id number]: RASH    Pradaxa [Dabigatran Etexilate Mesylate]        Social History:  Marital Status: /Civil Union   Patient Pre-hospital Living Situation: Skilled Nursing Facility: [facility]  Patient Pre-hospital Level of Mobility: non-ambulatory/bed bound  Patient Pre-hospital Diet Restrictions: Diabetic diet  Substance Use History:   Social History     Substance and Sexual Activity   Alcohol Use Not Currently    Alcohol/week: 0.0 standard drinks of alcohol     Social History     Tobacco Use   Smoking Status Former   Smokeless Tobacco Never     Social History     Substance and Sexual Activity   Drug Use Not Currently       Family History:  Family History   Problem Relation Age of Onset    Heart disease Father         cardiac disorder    COPD Sister     Diabetes Sister     Lung cancer Mother     Lung cancer Cousin     Heart disease Cousin         cardiac disorder    Multiple sclerosis Cousin     Cancer Cousin     Cancer Daughter         bladder    Breast cancer Maternal Aunt        Physical Exam:     Vitals:   Blood Pressure: 131/62 (05/23/24 1546)  Pulse: 98 (05/23/24 1546)  Temperature: 98 °F (36.7 °C) (05/23/24 1546)  Temp Source: Oral (05/23/24 1546)  Respirations: 20 (05/23/24 1546)  Weight - Scale: 83.5 kg (184 lb) (05/23/24 1546)  SpO2: 100 % (05/23/24 1546)    Physical Exam  Constitutional:       Appearance: Normal appearance.   HENT:      Head: Normocephalic and atraumatic.      Nose: Nose normal.      Mouth/Throat:      Mouth: Mucous membranes are moist.      Pharynx: Oropharynx is clear.   Eyes:      Extraocular Movements: Extraocular movements intact.      Pupils: Pupils are equal, round, and reactive to light.   Cardiovascular:      Rate and Rhythm: Normal rate and regular rhythm.   Pulmonary:       Effort: Pulmonary effort is normal.      Breath sounds: Normal breath sounds.   Abdominal:      General: Bowel sounds are normal. There is no distension.      Palpations: Abdomen is soft.      Tenderness: There is no abdominal tenderness.   Musculoskeletal:         General: No swelling.      Cervical back: Normal range of motion and neck supple.   Skin:     General: Skin is warm and dry.   Neurological:      General: No focal deficit present.      Mental Status: She is alert.          Additional Data:     Lab Results:  Results from last 7 days   Lab Units 05/23/24  1118   WBC Thousand/uL 8.47   HEMOGLOBIN g/dL 8.8*   HEMATOCRIT % 28.3*   PLATELETS Thousands/uL 225   SEGS PCT % 63   LYMPHO PCT % 24   MONO PCT % 9   EOS PCT % 2     Results from last 7 days   Lab Units 05/23/24  1118   SODIUM mmol/L 138   POTASSIUM mmol/L 4.4   CHLORIDE mmol/L 101   CO2 mmol/L 32   BUN mg/dL 26*   CREATININE mg/dL 0.50*   ANION GAP mmol/L 5   CALCIUM mg/dL 8.5   ALBUMIN g/dL 3.0*   TOTAL BILIRUBIN mg/dL 0.19*   ALK PHOS U/L 74   ALT U/L 5*   AST U/L 7*   GLUCOSE RANDOM mg/dL 243*     Results from last 7 days   Lab Units 05/23/24  1118   INR  1.01                   Lines/Drains:  Invasive Devices       Peripheral Intravenous Line  Duration             Peripheral IV 05/23/24 Left;Ventral (anterior) Forearm <1 day                        Imaging: Reviewed radiology reports from this admission including: abdominal/pelvic CT  CT high volume bleeding scan abdomen pelvis   Final Result by Jolie Fuentes MD (05/23 4318)      Findings of gastroduodenitis and gastric pylorus ulcer. Small focus of active bleed, may be a D[place]oy[place] lesion. No free air to suggest perforation.      Evidence of mild similar edema and decrease small left pleural effusion compared to recent chest x-ray.      Other nonemergent findings above.            Workstation performed: [id number]         XR chest 1 view portable   Final Result by Jolie Fuentes MD (05/23 2687)    Compared to 4/1/2024, similar vascular congestion. Improved, small left pleural effusion.                  Workstation performed: [id number]             EKG and Other Studies Reviewed on Admission:   EKG: EKG showed normal sinus rhythm at 90 bpm with PVCs with Q waves in 3 and aVF    ** Please Note: This note has been constructed using a voice recognition system. **

## 2024-05-23 NOTE — CONSULTS
Consultation -  [facility]  Ernestina KENYON Bakeramor 88 y.o. female MRN: [id number]  Unit/Bed#: [address] [place] [address] Encounter: [id number]        Consults    Reason for Consult / Principal Problem:     Principal Problem:    Peptic ulcer disease with hemorrhage  Active Problems:    Multiple sclerosis (HCC)    Essential hypertension    Diabetic polyneuropathy associated with type 2 diabetes mellitus (HCC)    Anemia          ASSESSMENT AND PLAN:      Acute GI blood loss anemia  LUQ abdominal pain  Atypical chest pain  Abnormal CT imaging  - patient admitted with acute GI blood loss anemia, Hgb 8.8 down from baseline of 11.5  - there is evidence of gastric pyloric ulcer with small focus of active bleeding  - she has no evidence of overt GI bleeding, but does have symptomatic anemia and abdominal pain consistent with peptic ulcer disease  - no blood transfusion requirements  - risk factors for peptic ulcer disease include recent ICU hospitalization, age, aspirin use, care facility resident  - she is otherwise hemodynamically stable and has a full stomach of food as she ate last approximately 4 hours ago  - recommend IV PPI drip  - Monitor stool output, hgb, transfuse as needed  - NPO after midnight for EGD tomorrow      ______________________________________________________________________    HPI:      Patient is an 88-year-old female who is admitted through the ER with left upper quadrant abdominal pain.  She is a long-term resident of [facility].  She has atrial fibrillation, on home aspirin, multiple sclerosis, HTN, T2DM with last HbA1c 6.3%.  She was discharged approximately 6 weeks ago after inpatient admission for acute on chronic respiratory failure with hypoxia and hypercapnia due to left-sided pneumonia.  She now complains of left-sided upper quadrant abdominal pain.  She states symptoms have been ongoing for the last 1 week, progressive in nature, keeping her up at night. Symptoms have been  intermittent and severe, lasting for hours. She was given tylenol for treatment. She reports decreased appetite and interest in food over the same time.    On admission, her hemoglobin is 8.8, down from a baseline of 11.5.  CT imaging with evidence of gastroduodenitis and gastric pylorus ulcer with small focus of bleed concerning for dieulafoy or other.  No overt GI bleeding has been documented during her ED admission, and she is unaware of her stool appearance at her facility. She ate breakfast this morning at 8 AM.    There are no previous history of EGD or colonoscopy in our EMR upon record review.    REVIEW OF SYSTEMS:    CONSTITUTIONAL: Denies any fever, chills, rigors, and weight loss.  HEENT: No earache or tinnitus. Denies hearing loss or visual disturbances.  CARDIOVASCULAR: No chest pain or palpitations.   RESPIRATORY: +recent hospitalization for pneumonia  GASTROINTESTINAL: As noted in the History of Present Illness.   GENITOURINARY: No problems with urination. Denies any hematuria or dysuria.  NEUROLOGIC: No dizziness or vertigo, denies headaches.   MUSCULOSKELETAL: Denies any muscle or joint pain.   SKIN: Denies skin rashes or itching.   ENDOCRINE: Denies excessive thirst. Denies intolerance to heat or cold.  PSYCHOSOCIAL: Denies depression or anxiety. Denies any recent memory loss.       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-last assessed-10/8/2015    Cellulitis of chest wall     last assessed-7/27/2015    Chronic endometritis 10/12/2006    Diabetes mellitus (HCC)     Dysuria 11/02/2007    Flushing     resolved-6/30/2015    Glaucoma     Hyperlipidemia     Hypertension     Ingrown nail 01/05/2012    Malignant neoplasm of breast (HCC)     last assessed-11/5/2015    MS (multiple sclerosis) (HCC)     Nonvenomous insect bite     last assessed-12/12/2013     Palpitations 02/09/2011    Pressure ulcer of buttock 10/13/2006    Proctitis 05/12/2006    Vaginal polyp 03/14/2006    Viral gastroenteritis     last assessed-9/8/2016     Past Surgical History:   Procedure Laterality Date    BREAST BIOPSY      open    BREAST SURGERY      CERVICAL BIOPSY  W/ LOOP ELECTRODE EXCISION      DILATION AND CURETTAGE OF UTERUS      INCISION AND DRAINAGE OF WOUND Left 2/27/2017    Procedure: INCISION AND DRAINAGE (I&D)   Chest wall x 2;  Surgeon: Shant Braga MD;  Location: [facility];  Service:     IR THORACENTESIS  4/3/2024    MASTECTOMY      last assessed-6/30/2015     Social History   Social History     Substance and Sexual Activity   Alcohol Use Not Currently    Alcohol/week: 0.0 standard drinks of alcohol     Social History     Substance and Sexual Activity   Drug Use Not Currently     Social History     Tobacco Use   Smoking Status Former   Smokeless Tobacco Never     Family History   Problem Relation Age of Onset    Heart disease Father         cardiac disorder    COPD Sister     Diabetes Sister     Lung cancer Mother     Lung cancer Cousin     Heart disease Cousin         cardiac disorder    Multiple sclerosis Cousin     Cancer Cousin     Cancer Daughter         bladder    Breast cancer Maternal Aunt        Meds/Allergies       Medications Prior to Admission:     amLODIPine (NORVASC) 5 mg tablet    latanoprost (XALATAN) 0.005 % ophthalmic solution    loratadine (CLARITIN) 10 mg tablet    sotalol (BETAPACE) 80 mg tablet    acetaminophen (TYLENOL) 650 mg CR tablet    Easy Comfort Lancets MISC    metFORMIN (GLUCOPHAGE) 1000 MG tablet  Current Facility-Administered Medications   Medication Dose Route Frequency    acetaminophen (TYLENOL) tablet 650 mg  650 mg Oral Q8H PRN    [START ON 5/24/2024] amLODIPine (NORVASC) tablet 5 mg  5 mg Oral Daily    latanoprost (XALATAN) 0.005 % ophthalmic solution 1 drop  1 drop Both Eyes HS    loratadine (CLARITIN) tablet 10 mg  10 mg Oral Daily     ondansetron (ZOFRAN) injection 4 mg  4 mg Intravenous Q6H PRN    pantoprazole (PROTONIX) 80 mg in sodium chloride 0.9 % 100 mL infusion  8 mg/hr Intravenous Continuous    sotalol (BETAPACE) tablet 80 mg  80 mg Oral BID       Allergies   Allergen Reactions    Bactrim [Sulfamethoxazole-Trimethoprim] Other (See Comments)     General weakness    Clindamycin      Annotation - 08Oct2015: bad taste in mouth    Coumadin [Warfarin]      Reaction Date: 22May2012; Annotation - 04Sep2012: lip swelling    Dabigatran     Latex      Annotation - [id number]: RASH    Pradaxa [Dabigatran Etexilate Mesylate]            Objective     Blood pressure 120/63, pulse 95, temperature 97.8 °F (36.6 °C), temperature source Oral, resp. rate 22, weight 85 kg (187 lb 6.3 oz), SpO2 98%. Body mass index is 30.25 kg/m².    No intake or output data in the 24 hours ending 05/23/24 1550      PHYSICAL EXAM:      General Appearance:   Alert, cooperative, no distress   HEENT:   Normocephalic, atraumatic, anicteric.     Neck:  Supple, symmetrical, trachea midline   Lungs:   Clear to auscultation bilaterally; no rales, rhonchi or wheezing; respirations unlabored    Heart::   Regular rate and rhythm; no murmur, rub, or gallop.   Abdomen:   Soft, non-tender, non-distended; normal bowel sounds; no masses, no organomegaly    Genitalia:   Deferred    Rectal:   Deferred    Extremities:  No cyanosis, clubbing or edema    Pulses:  2+ and symmetric all extremities    Skin:  No jaundice, rashes, or lesions    Lymph nodes:  No palpable cervical lymphadenopathy        Lab Results:     Results from last 7 days   Lab Units 05/23/24  1118   WBC Thousand/uL 8.47   HEMOGLOBIN g/dL 8.8*   HEMATOCRIT % 28.3*   PLATELETS Thousands/uL 225     Results from last 7 days   Lab Units 05/23/24  1118   SODIUM mmol/L 138   POTASSIUM mmol/L 4.4   CHLORIDE mmol/L 101   CO2 mmol/L 32   BUN mg/dL 26*   CREATININE mg/dL 0.50*   CALCIUM mg/dL 8.5   ALK PHOS U/L 74   ALT U/L 5*   AST U/L 7*          Imaging Studies:    Procedure: CT high volume bleeding scan abdomen pelvis    Result Date: 5/23/2024  Narrative: CT ABDOMEN AND PELVIS - WITHOUT AND WITH IV CONTRAST INDICATION:   abd pain. Patient complains of left-sided abdominal pain for 1 week. Chest pain radiating to the right shoulder. Pain is intermittent. Per ED notes, patient reports brought to ED for decreased counts from 9-6. Is going to have endoscopy.   Impression: Findings of gastroduodenitis and gastric pylorus ulcer. Small focus of active bleed, may be a dieulafoy. No free air to suggest perforation.     CT images reviewed on PACS by me.

## 2024-05-23 NOTE — ASSESSMENT & PLAN NOTE
Patient presented with abdominal pain which is especially worse 2 days ago  In the ED patient noted to be anemic with a hemoglobin of 8.8  CT abdomen pelvis high-volume bleeding scan-gastroduodenitis with gastric pylorus ulcer with small focus of active bleed without any free air  Patient started on Protonix drip which will be continued  GI was consulted as patient will need EGD  Continue to keep n.p.o.  Hold aspirin

## 2024-05-23 NOTE — ED PROVIDER NOTES
History  Chief Complaint   Patient presents with    Abdominal Pain    Chest Pain     Pt c/o l sided abd pain for a week, pt sent in from Nursing [facility], chest pain radiating to the right shoulder     Patient presents for evaluation of left-sided abdominal pain going into the left chest intermittently for over 1 week.  Patient states it comes and goes.  Last night it kept her up most the night.  However at this time she does not have any pain.  Patient states that she was told she was coming in because her count was 6 instead of 9 and that she was going to get an endoscopy.      History provided by:  Patient   used: No    Abdominal Pain  Associated symptoms: chest pain    Chest Pain  Associated symptoms: abdominal pain        Prior to Admission Medications   Prescriptions Last Dose Informant Patient Reported? Taking?   Easy Comfort Lancets MISC  Self No No   Sig: USE DAILY ICD 10 CODE E11.9   acetaminophen (TYLENOL) 650 mg CR tablet   Yes No   Sig: Take 650 mg by mouth every 6 (six) hours as needed for mild pain   amLODIPine (NORVASC) 5 mg tablet 5/23/2024 Self No Yes   Sig: Take 1 tablet (5 mg total) by mouth daily   latanoprost (XALATAN) 0.005 % ophthalmic solution 5/22/2024 Self No Yes   Sig: Administer 1 drop to both eyes daily at bedtime   loratadine (CLARITIN) 10 mg tablet 5/22/2024  Yes Yes   Sig: Take 10 mg by mouth daily   metFORMIN (GLUCOPHAGE) 1000 MG tablet   No No   Sig: Take 0.5 tablets (500 mg total) by mouth 2 (two) times a day with meals   sotalol (BETAPACE) 80 mg tablet 5/22/2024 Self No Yes   Sig: Take 1 tablet (80 mg total) by mouth 2 (two) times a day      Facility-Administered Medications: None       Past Medical History:   Diagnosis Date    Abscess of buttock, right     last assessed-5/4/2017    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-last assessed-10/8/2015    Cellulitis of chest wall     last assessed-7/27/2015    Chronic  endometritis 10/12/2006    Diabetes mellitus (HCC)     Dysuria 11/02/2007    Flushing     resolved-6/30/2015    Glaucoma     Hyperlipidemia     Hypertension     Ingrown nail 01/05/2012    Malignant neoplasm of breast (HCC)     last assessed-11/5/2015    MS (multiple sclerosis) (HCC)     Nonvenomous insect bite     last assessed-12/12/2013    Palpitations 02/09/2011    Pressure ulcer of buttock 10/13/2006    Proctitis 05/12/2006    Vaginal polyp 03/14/2006    Viral gastroenteritis     last assessed-9/8/2016       Past Surgical History:   Procedure Laterality Date    BREAST BIOPSY      open    BREAST SURGERY      CERVICAL BIOPSY  W/ LOOP ELECTRODE EXCISION      DILATION AND CURETTAGE OF UTERUS      INCISION AND DRAINAGE OF WOUND Left 2/27/2017    Procedure: INCISION AND DRAINAGE (I&D)   Chest wall x 2;  Surgeon: Shant Braga MD;  Location: [facility];  Service:     IR THORACENTESIS  4/3/2024    MASTECTOMY      last assessed-6/30/2015       Family History   Problem Relation Age of Onset    Heart disease Father         cardiac disorder    COPD Sister     Diabetes Sister     Lung cancer Mother     Lung cancer Cousin     Heart disease Cousin         cardiac disorder    Multiple sclerosis Cousin     Cancer Cousin     Cancer Daughter         bladder    Breast cancer Maternal Aunt      I have reviewed and agree with the history as documented.    E-Cigarette/Vaping    E-Cigarette Use Never User      E-Cigarette/Vaping Substances     Social History     Tobacco Use    Smoking status: Former    Smokeless tobacco: Never   Vaping Use    Vaping status: Never Used   Substance Use Topics    Alcohol use: Not Currently     Alcohol/week: 0.0 standard drinks of alcohol    Drug use: Not Currently       Review of Systems   Cardiovascular:  Positive for chest pain.   Gastrointestinal:  Positive for abdominal pain.   All other systems reviewed and are negative.      Physical Exam  Physical Exam  Vitals and nursing note reviewed.    Constitutional:       General: She is not in acute distress.     Appearance: She is ill-appearing.   Cardiovascular:      Rate and Rhythm: Normal rate and regular rhythm.   Pulmonary:      Effort: Pulmonary effort is normal. No respiratory distress.      Breath sounds: Normal breath sounds.   Abdominal:      General: Bowel sounds are normal. There is no distension.      Palpations: Abdomen is soft.      Tenderness: There is no abdominal tenderness. There is no guarding or rebound.   Skin:     Capillary Refill: Capillary refill takes less than 2 seconds.      Coloration: Skin is pale.   Neurological:      Mental Status: She is alert and oriented to person, place, and time. Mental status is at baseline.         Vital Signs  ED Triage Vitals   Temperature Pulse Respirations Blood Pressure SpO2   05/23/24 1047 05/23/24 1047 05/23/24 1047 05/23/24 1051 05/23/24 1047   98 °F (36.7 °C) 95 20 152/65 92 %      Temp Source Heart Rate Source Patient Position - Orthostatic VS BP Location FiO2 (%)   05/23/24 1047 05/23/24 1239 -- -- --   Tympanic Monitor         Pain Score       05/23/24 1112       No Pain           Vitals:    05/23/24 1145 05/23/24 1239 05/23/24 1330 05/23/24 1400   BP: 115/71 135/62 118/74 120/63   Pulse: 94 92 95 95         Visual Acuity      ED Medications  Medications   pantoprazole (PROTONIX) 80 mg in sodium chloride 0.9 % 100 mL infusion (has no administration in time range)   amLODIPine (NORVASC) tablet 5 mg (has no administration in time range)   latanoprost (XALATAN) 0.005 % ophthalmic solution 1 drop (has no administration in time range)   loratadine (CLARITIN) tablet 10 mg (has no administration in time range)   sotalol (BETAPACE) tablet 80 mg (has no administration in time range)   acetaminophen (TYLENOL) tablet 650 mg (has no administration in time range)   ondansetron (ZOFRAN) injection 4 mg (has no administration in time range)   iohexol (OMNIPAQUE) 350 MG/ML injection (MULTI-DOSE) 100 mL (100  mL Intravenous Given 5/23/24 1228)   pantoprazole (PROTONIX) injection 40 mg (40 mg Intravenous Given 5/23/24 1401)   metoclopramide (REGLAN) injection 10 mg (10 mg Intravenous Given 5/23/24 1431)       Diagnostic Studies  Results Reviewed       Procedure Component Value Units Date/Time    HS Troponin I 2hr [[id number]]  (Normal) Collected: 05/23/24 1323    Lab Status: Final result Specimen: Blood from Arm, Left Updated: 05/23/24 1358     hs TnI 2hr 4 ng/L      Delta 2hr hsTnI -1 ng/L     Urine Microscopic [[id number]]  (Abnormal) Collected: 05/23/24 1323    Lab Status: Final result Specimen: Urine, Clean Catch Updated: 05/23/24 1350     RBC, UA 0-1 /hpf      WBC, UA 4-10 /hpf      Epithelial Cells Occasional /hpf      Bacteria, UA Occasional /hpf     UA (URINE) with reflex to Scope [[id number]]  (Abnormal) Collected: 05/23/24 1323    Lab Status: Final result Specimen: Urine, Clean Catch Updated: 05/23/24 1349     Color, UA Yellow     Clarity, UA Clear     Specific Gravity, UA 1.015     pH, UA 5.0     Leukocytes, UA Moderate     Nitrite, UA Negative     Protein, UA Negative mg/dl      Glucose, UA Negative mg/dl      Ketones, UA Negative mg/dl      Urobilinogen, UA <2.0 mg/dl      Bilirubin, UA Negative     Occult Blood, UA Negative    HS Troponin 0hr (reflex protocol) [[id number]]  (Normal) Collected: 05/23/24 1118    Lab Status: Final result Specimen: Blood from Arm, Left Updated: 05/23/24 1158     hs TnI 0hr 5 ng/L     Comprehensive metabolic panel [[id number]]  (Abnormal) Collected: 05/23/24 1118    Lab Status: Final result Specimen: Blood from Arm, Left Updated: 05/23/24 1156     Sodium 138 mmol/L      Potassium 4.4 mmol/L      Chloride 101 mmol/L      CO2 32 mmol/L      ANION GAP 5 mmol/L      BUN 26 mg/dL      Creatinine 0.50 mg/dL      Glucose 243 mg/dL      Calcium 8.5 mg/dL      Corrected Calcium 9.3 mg/dL      AST 7 U/L      ALT 5 U/L      Alkaline Phosphatase 74 U/L      Total Protein 6.4 g/dL      Albumin 3.0  g/dL      Total Bilirubin 0.19 mg/dL      eGFR 87 ml/min/1.73sq m     Narrative:      National Kid[employer] Disease Foundation guidelines for Chronic Kidney Disease (CKD):     Stage 1 with normal or high GFR (GFR > 90 mL/min/1.73 square meters)    Stage 2 Mild CKD (GFR = 60-89 mL/min/1.73 square meters)    Stage 3A Moderate CKD (GFR = 45-59 mL/min/1.73 square meters)    Stage 3B Moderate CKD (GFR = 30-44 mL/min/1.73 square meters)    Stage 4 Severe CKD (GFR = 15-29 mL/min/1.73 square meters)    Stage 5 End Stage CKD (GFR <15 mL/min/1.73 square meters)  Note: GFR calculation is accurate only with a steady state creatinine    Lipase [[id number]]  (Normal) Collected: 05/23/24 1118    Lab Status: Final result Specimen: Blood from Arm, Left Updated: 05/23/24 1156     Lipase 39 u/L     Protime-INR [[id number]]  (Normal) Collected: 05/23/24 1118    Lab Status: Final result Specimen: Blood from Arm, Left Updated: 05/23/24 1146     Protime 13.5 seconds      INR 1.01    APTT [[id number]]  (Normal) Collected: 05/23/24 1118    Lab Status: Final result Specimen: Blood from Arm, Left Updated: 05/23/24 1146     PTT 27 seconds     CBC and differential [[id number]]  (Abnormal) Collected: 05/23/24 1118    Lab Status: Final result Specimen: Blood from Arm, Left Updated: 05/23/24 1135     WBC 8.47 Thousand/uL      RBC 3.12 Million/uL      Hemoglobin 8.8 g/dL      Hematocrit 28.3 %      MCV 91 fL      MCH 28.2 pg      MCHC 31.1 g/dL      RDW 14.9 %      MPV 11.4 fL      Platelets 225 Thousands/uL      nRBC 0 /100 WBCs      Segmented % 63 %      Immature Grans % 1 %      Lymphocytes % 24 %      Monocytes % 9 %      Eosinophils Relative 2 %      Basophils Relative 1 %      Absolute Neutrophils 5.43 Thousands/µL      Absolute Immature Grans 0.05 Thousand/uL      Absolute Lymphocytes 2.04 Thousands/µL      Absolute Monocytes 0.72 Thousand/µL      Eosinophils Absolute 0.17 Thousand/µL      Basophils Absolute 0.06 Thousands/µL                    CT  high volume bleeding scan abdomen pelvis   Final Result by Jolie Fuentes MD (05/23 1408)      Findings of gastroduodenitis and gastric pylorus ulcer. Small focus of active bleed, may be a Dula Foye lesion. No free air to suggest perforation.      Evidence of mild similar edema and decrease small left pleural effusion compared to recent chest x-ray.      Other nonemergent findings above.            Workstation performed: [id number]         XR chest 1 view portable   Final Result by Jolie Fuentes MD (05/23 1407)   Compared to 4/1/2024, similar vascular congestion. Improved, small left pleural effusion.                  Workstation performed: [id number]                    Procedures  ECG 12 Lead Documentation Only    Date/Time: 5/23/2024 11:01 AM    Performed by: Jose Antonio Wen DO  Authorized by: Jose Antonio Wen DO    ECG reviewed by me, the ED Provider: yes    Patient location:  ED  Interpretation:     Interpretation: abnormal    Rate:     ECG rate:  90    ECG rate assessment: normal    Rhythm:     Rhythm: sinus rhythm    Ectopy:     Ectopy: PVCs    ST segments:     ST segments:  Normal  T waves:     T waves: normal    Q waves:     Q waves:  III and aVF           ED Course  ED Course as of 05/23/24 1549   Thu May 23, 2024   1147 Hemoglobin(!): 8.8  Decreased from 11 1 month ago but increased from 7.9 from outpatient lab work.              HEART Risk Score      Flowsheet Row Most Recent Value   Heart Score Risk Calculator    History 0 Filed at: 05/23/2024 1331   ECG 0 Filed at: 05/23/2024 1331   Age 2 Filed at: 05/23/2024 1331   Risk Factors 2 Filed at: 05/23/2024 1331   Troponin 0 Filed at: 05/23/2024 1331   HEART Score 4 Filed at: 05/23/2024 1331                          SBIRT 22yo+      Flowsheet Row Most Recent Value   Initial Alcohol Screen: US AUDIT-C     1. How often do you have a drink containing alcohol? 0 Filed at: 05/23/2024 1053   3b. FEMALE Any Age, or MALE 65+: How often do you have 4 or more drinks on one  occassion? 0 Filed at: 05/23/2024 1053   Audit-C Score 0 Filed at: 05/23/2024 1053                      Medical Decision Making  Pulse ox 92% on room air indicating adequate oxygenation.  CXR: NAD as read by me      Differential diagnose include but not limited to diverticulitis, GI bleed, bleeding ulcer, arrhythmia, ACS    CT scan and lab work discussed with patient at bedside.  Workup demonstrating a bleeding pyloric ulcer with anemia.  Patient was started on Protonix gastroenterology on-call ARCENIO Iqbal contacted and case discussed.  Recommended n.p.o. and Reglan 10 mg IV.  Case then discussed with hospitalist on-call Dr. Olivo for admission to the medical service.    Amount and/or Complexity of Data Reviewed  Labs: ordered. Decision-making details documented in ED Course.  Radiology: ordered and independent interpretation performed.  ECG/medicine tests: ordered and independent interpretation performed.    Risk  Prescription drug management.  Decision regarding hospitalization.             Disposition  Final diagnoses:   Bleeding ulcer     Time reflects when diagnosis was documented in both MDM as applicable and the Disposition within this note       Time User Action Codes Description Comment    5/23/2024  2:14 PM Jose Antonio Wen Add [K28.4] Bleeding ulcer     5/23/2024  2:50 PM Denisse Alexis Add [K27.4] Peptic ulcer disease with hemorrhage     5/23/2024  2:50 PM Denisse Alexis Add [D50.0] Iron deficiency anemia due to chronic blood loss           ED Disposition       ED Disposition   Admit    Condition   Stable    Date/Time   u May 23, 2024 1414    Comment   Case was discussed with Dr. Dubois and the patient's admission status was agreed to be Admission Status: inpatient status to the service of Dr. Ruvalcaba.               Follow-up Information    None         Current Discharge Medication List        CONTINUE these medications which have NOT CHANGED    Details   amLODIPine (NORVASC) 5 mg tablet  Take 1 tablet (5 mg total) by mouth daily  Refills: 0    Associated Diagnoses: Essential hypertension      latanoprost (XALATAN) 0.005 % ophthalmic solution Administer 1 drop to both eyes daily at bedtime  Qty: 2.5 mL, Refills: 0    Associated Diagnoses: Glaucoma, unspecified glaucoma type, unspecified laterality      loratadine (CLARITIN) 10 mg tablet Take 10 mg by mouth daily      sotalol (BETAPACE) 80 mg tablet Take 1 tablet (80 mg total) by mouth 2 (two) times a day  Qty: 180 tablet, Refills: 3    Associated Diagnoses: Atrial fibrillation, unspecified type (HCC)      acetaminophen (TYLENOL) 650 mg CR tablet Take 650 mg by mouth every 6 (six) hours as needed for mild pain      Easy Comfort Lancets MISC USE DAILY ICD 10 CODE E11.9  Qty: 100 each, Refills: 3    Associated Diagnoses: Type 2 diabetes mellitus without complication, without long-term current use of insulin (HCC)      metFORMIN (GLUCOPHAGE) 1000 MG tablet Take 0.5 tablets (500 mg total) by mouth 2 (two) times a day with meals  Qty: 30 tablet, Refills: 0    Associated Diagnoses: Type 2 diabetes mellitus without complication, without long-term current use of insulin (HCC)             No discharge procedures on file.    PDMP Review       None            ED Provider  Electronically Signed by             Jose Antonio Wen DO  05/23/24 1467

## 2024-05-23 NOTE — PLAN OF CARE
Problem: PAIN - ADULT  Goal: Verbalizes/displays adequate comfort level or baseline comfort level  Description: Interventions:  - Encourage patient to monitor pain and request assistance  - Assess pain using appropriate pain scale  - Administer analgesics based on type and severity of pain and evaluate response  - Implement non-pharmacological measures as appropriate and evaluate response  - Consider cultural and social influences on pain and pain management  - Notify physician/advanced practitioner if interventions unsuccessful or patient reports new pain  Outcome: Progressing     Problem: INFECTION - ADULT  Goal: Absence or prevention of progression during hospitalization  Description: INTERVENTIONS:  - Assess and monitor for signs and symptoms of infection  - Monitor lab/diagnostic results  - Monitor all insertion sites, i.e. indwelling lines, tubes, and drains  - Monitor endotracheal if appropriate and nasal secretions for changes in amount and color  - [place] appropriate cooling/warming therapies per order  - Administer medications as ordered  - Instruct and encourage patient and family to use good hand hygiene technique  - Identify and instruct in appropriate isolation precautions for identified infection/condition  Outcome: Progressing     Problem: SAFETY ADULT  Goal: Patient will remain free of falls  Description: INTERVENTIONS:  - Educate patient/family on patient safety including physical limitations  - Instruct patient to call for assistance with activity   - Consult OT/PT to assist with strengthening/mobility   - Keep Call bell within reach  - Keep bed low and locked with side rails adjusted as appropriate  - Keep care items and personal belongings within reach  - Initiate and maintain comfort rounds  - Make Fall Risk Sign visible to staff  - Offer Toileting every 2 Hours, in advance of need  - Initiate/Maintain bed/chair alarmbed/  - Obtain necessary fall risk management equipment:bed/chair alarm    - Apply yellow socks and bracelet for high fall risk patients  - Consider moving patient to room near nurses station  Outcome: Progressing  Goal: Maintain or return to baseline ADL function  Description: INTERVENTIONS:  -  Assess patient's ability to carry out ADLs; assess patient's baseline for ADL function and identify physical deficits which impact ability to perform ADLs (bathing, care of mouth/teeth, toileting, grooming, dressing, etc.)  - Assess/evaluate cause of self-care deficits   - Assess range of motion  - Assess patient's mobility; develop plan if impaired  - Assess patient's need for assistive devices and provide as appropriate  - Encourage maximum independence but intervene and supervise when necessary  - Involve family in performance of ADLs  - Assess for home care needs following discharge   - Consider OT consult to assist with ADL evaluation and planning for discharge  - Provide patient education as appropriate  Outcome: Progressing  Goal: Maintains/Returns to pre admission functional level  Description: INTERVENTIONS:  - Perform AM-PAC 6 Click Basic Mobility/ Daily Activity assessment daily.  - Set and communicate daily mobility goal to care team and patient/family/caregiver.   - Collaborate with rehabilitation services on mobility goals if consulted  - Perform Range of Motion 3 times a day.  - Reposition patient every 3 hours.  - Dangle patient 3 times a day  - Stand patient 3 times a day  - Ambulate patient 3 times a day  - Out of bed to chair 3 times a day   - Out of bed for meals 3 times a day  - Out of bed for toileting  - Record patient progress and toleration of activity level   Outcome: Progressing     Problem: DISCHARGE PLANNING  Goal: Discharge to home or other facility with appropriate resources  Description: INTERVENTIONS:  - Identify barriers to discharge w/patient and caregiver  - Arrange for needed discharge resources and transportation as appropriate  - Identify discharge learning  needs (meds, wound care, etc.)  - Arrange for interpretive services to assist at discharge as needed  - Refer to Case Management Department for coordinating discharge planning if the patient needs post-hospital services based on physician/advanced practitioner order or complex needs related to functional status, cognitive ability, or social support system  Outcome: Progressing     Problem: Knowledge Deficit  Goal: Patient/family/caregiver demonstrates understanding of disease process, treatment plan, medications, and discharge instructions  Description: Complete learning assessment and assess knowledge base.  Interventions:  - Provide teaching at level of understanding  - Provide teaching via preferred learning methods  Outcome: Progressing

## 2024-05-23 NOTE — ASSESSMENT & PLAN NOTE
"Lab Results   Component Value Date    HGBA1C 8.0 (H) 04/04/2024       No results for input(s): \"POCGLU\" in the last 72 hours.    Blood Sugar Average: Last 72 hrs:    Patient is on metformin which will be held at the current time  Patient with Humalog sliding scale with Accu-Cheks every 6 hours  "

## 2024-05-24 ENCOUNTER — APPOINTMENT (OUTPATIENT)
Dept: PERIOP | Facility: HOSPITAL | Age: 89
DRG: 378 | End: 2024-05-24
Attending: INTERNAL MEDICINE
Payer: MEDICARE

## 2024-05-24 ENCOUNTER — ANESTHESIA EVENT (INPATIENT)
Dept: PERIOP | Facility: HOSPITAL | Age: 89
DRG: 378 | End: 2024-05-24
Payer: MEDICARE

## 2024-05-24 ENCOUNTER — ANESTHESIA (INPATIENT)
Dept: PERIOP | Facility: HOSPITAL | Age: 89
DRG: 378 | End: 2024-05-24
Payer: MEDICARE

## 2024-05-24 LAB
ERYTHROCYTE [DISTWIDTH] IN BLOOD BY AUTOMATED COUNT: 14.9 % (ref 11.6–15.1)
GLUCOSE SERPL-MCNC: 134 MG/DL (ref 65–140)
GLUCOSE SERPL-MCNC: 162 MG/DL (ref 65–140)
GLUCOSE SERPL-MCNC: 172 MG/DL (ref 65–140)
GLUCOSE SERPL-MCNC: 273 MG/DL (ref 65–140)
HCT VFR BLD AUTO: 25.8 % (ref 34.8–46.1)
HGB BLD-MCNC: 7.9 G/DL (ref 11.5–15.4)
MCH RBC QN AUTO: 28.1 PG (ref 26.8–34.3)
MCHC RBC AUTO-ENTMCNC: 30.6 G/DL (ref 31.4–37.4)
MCV RBC AUTO: 92 FL (ref 82–98)
PLATELET # BLD AUTO: 203 THOUSANDS/UL (ref 149–390)
PMV BLD AUTO: 11.7 FL (ref 8.9–12.7)
RBC # BLD AUTO: 2.81 MILLION/UL (ref 3.81–5.12)
WBC # BLD AUTO: 6.2 THOUSAND/UL (ref 4.31–10.16)

## 2024-05-24 PROCEDURE — 88313 SPECIAL STAINS GROUP 2: CPT | Performed by: PATHOLOGY

## 2024-05-24 PROCEDURE — 3E0G8GC INTRODUCTION OF OTHER THERAPEUTIC SUBSTANCE INTO UPPER GI, VIA NATURAL OR ARTIFICIAL OPENING ENDOSCOPIC: ICD-10-PCS | Performed by: INTERNAL MEDICINE

## 2024-05-24 PROCEDURE — C9113 INJ PANTOPRAZOLE SODIUM, VIA: HCPCS | Performed by: EMERGENCY MEDICINE

## 2024-05-24 PROCEDURE — 99232 SBSQ HOSP IP/OBS MODERATE 35: CPT | Performed by: INTERNAL MEDICINE

## 2024-05-24 PROCEDURE — 85027 COMPLETE CBC AUTOMATED: CPT | Performed by: INTERNAL MEDICINE

## 2024-05-24 PROCEDURE — 0DB68ZX EXCISION OF STOMACH, VIA NATURAL OR ARTIFICIAL OPENING ENDOSCOPIC, DIAGNOSTIC: ICD-10-PCS | Performed by: INTERNAL MEDICINE

## 2024-05-24 PROCEDURE — 82948 REAGENT STRIP/BLOOD GLUCOSE: CPT

## 2024-05-24 PROCEDURE — 0D598ZZ DESTRUCTION OF DUODENUM, VIA NATURAL OR ARTIFICIAL OPENING ENDOSCOPIC: ICD-10-PCS | Performed by: INTERNAL MEDICINE

## 2024-05-24 PROCEDURE — 43255 EGD CONTROL BLEEDING ANY: CPT | Performed by: INTERNAL MEDICINE

## 2024-05-24 PROCEDURE — 88305 TISSUE EXAM BY PATHOLOGIST: CPT | Performed by: PATHOLOGY

## 2024-05-24 PROCEDURE — 88342 IMHCHEM/IMCYTCHM 1ST ANTB: CPT | Performed by: PATHOLOGY

## 2024-05-24 PROCEDURE — 43239 EGD BIOPSY SINGLE/MULTIPLE: CPT | Performed by: INTERNAL MEDICINE

## 2024-05-24 PROCEDURE — 88341 IMHCHEM/IMCYTCHM EA ADD ANTB: CPT | Performed by: PATHOLOGY

## 2024-05-24 RX ORDER — SODIUM CHLORIDE, SODIUM LACTATE, POTASSIUM CHLORIDE, CALCIUM CHLORIDE 600; 310; 30; 20 MG/100ML; MG/100ML; MG/100ML; MG/100ML
INJECTION, SOLUTION INTRAVENOUS CONTINUOUS PRN
Status: DISCONTINUED | OUTPATIENT
Start: 2024-05-24 | End: 2024-05-24

## 2024-05-24 RX ORDER — EPINEPHRINE 1 MG/ML
INJECTION, SOLUTION, CONCENTRATE INTRAVENOUS AS NEEDED
Status: COMPLETED | OUTPATIENT
Start: 2024-05-24 | End: 2024-05-24

## 2024-05-24 RX ORDER — PHENYLEPHRINE HCL IN 0.9% NACL 1 MG/10 ML
SYRINGE (ML) INTRAVENOUS AS NEEDED
Status: DISCONTINUED | OUTPATIENT
Start: 2024-05-24 | End: 2024-05-24

## 2024-05-24 RX ORDER — PROPOFOL 10 MG/ML
INJECTION, EMULSION INTRAVENOUS AS NEEDED
Status: DISCONTINUED | OUTPATIENT
Start: 2024-05-24 | End: 2024-05-24

## 2024-05-24 RX ORDER — NYSTATIN 100000 [USP'U]/G
POWDER TOPICAL 2 TIMES DAILY
Status: DISCONTINUED | OUTPATIENT
Start: 2024-05-24 | End: 2024-05-26 | Stop reason: HOSPADM

## 2024-05-24 RX ORDER — LIDOCAINE HYDROCHLORIDE 10 MG/ML
INJECTION, SOLUTION EPIDURAL; INFILTRATION; INTRACAUDAL; PERINEURAL AS NEEDED
Status: DISCONTINUED | OUTPATIENT
Start: 2024-05-24 | End: 2024-05-24

## 2024-05-24 RX ADMIN — PROPOFOL 120 MG: 10 INJECTION, EMULSION INTRAVENOUS at 14:31

## 2024-05-24 RX ADMIN — AMLODIPINE BESYLATE 5 MG: 5 TABLET ORAL at 08:43

## 2024-05-24 RX ADMIN — NYSTATIN: 100000 POWDER TOPICAL at 16:31

## 2024-05-24 RX ADMIN — PANTOPRAZOLE SODIUM 8 MG/HR: 40 INJECTION, POWDER, FOR SOLUTION INTRAVENOUS at 12:11

## 2024-05-24 RX ADMIN — LIDOCAINE HYDROCHLORIDE 50 MG: 10 INJECTION, SOLUTION EPIDURAL; INFILTRATION; INTRACAUDAL at 14:31

## 2024-05-24 RX ADMIN — SOTALOL HYDROCHLORIDE 80 MG: 80 TABLET ORAL at 18:41

## 2024-05-24 RX ADMIN — Medication 100 MCG: at 14:31

## 2024-05-24 RX ADMIN — SOTALOL HYDROCHLORIDE 80 MG: 80 TABLET ORAL at 08:43

## 2024-05-24 RX ADMIN — LATANOPROST 1 DROP: 50 SOLUTION OPHTHALMIC at 21:50

## 2024-05-24 RX ADMIN — PROPOFOL 30 MG: 10 INJECTION, EMULSION INTRAVENOUS at 14:40

## 2024-05-24 RX ADMIN — EPINEPHRINE 0.4 MG: 1 INJECTION, SOLUTION, CONCENTRATE INTRAVENOUS at 14:39

## 2024-05-24 RX ADMIN — PROPOFOL 30 MG: 10 INJECTION, EMULSION INTRAVENOUS at 14:38

## 2024-05-24 RX ADMIN — INSULIN LISPRO 1 UNITS: 100 INJECTION, SOLUTION INTRAVENOUS; SUBCUTANEOUS at 16:30

## 2024-05-24 RX ADMIN — SODIUM CHLORIDE, SODIUM LACTATE, POTASSIUM CHLORIDE, AND CALCIUM CHLORIDE: .6; .31; .03; .02 INJECTION, SOLUTION INTRAVENOUS at 14:20

## 2024-05-24 RX ADMIN — PROPOFOL 30 MG: 10 INJECTION, EMULSION INTRAVENOUS at 14:34

## 2024-05-24 NOTE — DISCHARGE INSTR - OTHER ORDERS
Skin Care Plan:    1-Cleanse sacrobuttocks with foaming cleanser & pat dry. Apply thin layer of Triad paste 3x/day and as needed for soilage/dislodgement. Must cleanse in-between applications to prevent caking.  2-Cleanse bilateral groin folds with foaming cleanser & pat dry. Apply light dusting of Nystatin powder 2x/day and gently remove excess to prevent caking.  3-Apply Prevent barrier cream to bilateral heels 2x/day and as needed.  4-Float heels on 2 pillows to offload pressure so heels are not in contact with mattress or pillows.   5-Ehob pressure redistribution cushion in chair when out of bed if able. Limit sitting for 1-2 hours at a time due to sacrobuttock breakdown then offload back in bed turning side to side every 2 hours.  6-Moisturize skin daily with skin nourishing cream.  7-Turn/reposition every 2 hrs in bed for pressure re-distribution on skin.   8-Patient must have low air-loss, pressure redistribution mattress.

## 2024-05-24 NOTE — ANESTHESIA POSTPROCEDURE EVALUATION
Post-Op Assessment Note    CV Status:  Stable  Pain Score: 0    Pain management: adequate       Mental Status:  Alert and awake   Hydration Status:  Euvolemic   PONV Controlled:  Controlled   Airway Patency:  Patent     Post Op Vitals Reviewed: Yes    No anethesia notable event occurred.    Staff: Anesthesiologist, CRNA   Comments: Report given to rcovering RN, VSs, pt states she is comfortable            BP   184/76   Temp      Pulse  65   Resp   16   SpO2   100

## 2024-05-24 NOTE — PLAN OF CARE
Problem: PAIN - ADULT  Goal: Verbalizes/displays adequate comfort level or baseline comfort level  Description: Interventions:  - Encourage patient to monitor pain and request assistance  - Assess pain using appropriate pain scale  - Administer analgesics based on type and severity of pain and evaluate response  - Implement non-pharmacological measures as appropriate and evaluate response  - Consider cultural and social influences on pain and pain management  - Notify physician/advanced practitioner if interventions unsuccessful or patient reports new pain  Outcome: Progressing     Problem: INFECTION - ADULT  Goal: Absence or prevention of progression during hospitalization  Description: INTERVENTIONS:  - Assess and monitor for signs and symptoms of infection  - Monitor lab/diagnostic results  - Monitor all insertion sites, i.e. indwelling lines, tubes, and drains  - Monitor endotracheal if appropriate and nasal secretions for changes in amount and color  - [place] appropriate cooling/warming therapies per order  - Administer medications as ordered  - Instruct and encourage patient and family to use good hand hygiene technique  - Identify and instruct in appropriate isolation precautions for identified infection/condition  Outcome: Progressing     Problem: SAFETY ADULT  Goal: Patient will remain free of falls  Description: INTERVENTIONS:  - Educate patient/family on patient safety including physical limitations  - Instruct patient to call for assistance with activity   - Consult OT/PT to assist with strengthening/mobility   - Keep Call bell within reach  - Keep bed low and locked with side rails adjusted as appropriate  - Keep care items and personal belongings within reach  - Initiate and maintain comfort rounds  - Make Fall Risk Sign visible to staff  - Offer Toileting every 2 Hours, in advance of need  - Initiate/Maintain bed/chair alarmbed/  - Obtain necessary fall risk management equipment:bed/chair alarm    - Apply yellow socks and bracelet for high fall risk patients  - Consider moving patient to room near nurses station  Outcome: Progressing  Goal: Maintain or return to baseline ADL function  Description: INTERVENTIONS:  -  Assess patient's ability to carry out ADLs; assess patient's baseline for ADL function and identify physical deficits which impact ability to perform ADLs (bathing, care of mouth/teeth, toileting, grooming, dressing, etc.)  - Assess/evaluate cause of self-care deficits   - Assess range of motion  - Assess patient's mobility; develop plan if impaired  - Assess patient's need for assistive devices and provide as appropriate  - Encourage maximum independence but intervene and supervise when necessary  - Involve family in performance of ADLs  - Assess for home care needs following discharge   - Consider OT consult to assist with ADL evaluation and planning for discharge  - Provide patient education as appropriate  Outcome: Progressing  Goal: Maintains/Returns to pre admission functional level  Description: INTERVENTIONS:  - Perform AM-PAC 6 Click Basic Mobility/ Daily Activity assessment daily.  - Set and communicate daily mobility goal to care team and patient/family/caregiver.   - Collaborate with rehabilitation services on mobility goals if consulted  - Perform Range of Motion 3 times a day.  - Reposition patient every 3 hours.  - Dangle patient 3 times a day  - Stand patient 3 times a day  - Ambulate patient 3 times a day  - Out of bed to chair 3 times a day   - Out of bed for meals 3 times a day  - Out of bed for toileting  - Record patient progress and toleration of activity level   Outcome: Progressing     Problem: DISCHARGE PLANNING  Goal: Discharge to home or other facility with appropriate resources  Description: INTERVENTIONS:  - Identify barriers to discharge w/patient and caregiver  - Arrange for needed discharge resources and transportation as appropriate  - Identify discharge learning  needs (meds, wound care, etc.)  - Arrange for interpretive services to assist at discharge as needed  - Refer to Case Management Department for coordinating discharge planning if the patient needs post-hospital services based on physician/advanced practitioner order or complex needs related to functional status, cognitive ability, or social support system  Outcome: Progressing     Problem: Knowledge Deficit  Goal: Patient/family/caregiver demonstrates understanding of disease process, treatment plan, medications, and discharge instructions  Description: Complete learning assessment and assess knowledge base.  Interventions:  - Provide teaching at level of understanding  - Provide teaching via preferred learning methods  Outcome: Progressing     Problem: Prexisting or High Potential for Compromised Skin Integrity  Goal: Skin integrity is maintained or improved  Description: INTERVENTIONS:  - Identify patients at risk for skin breakdown  - Assess and monitor skin integrity  - Assess and monitor nutrition and hydration status  - Monitor labs   - Assess for incontinence   - Turn and reposition patient  - Assist with mobility/ambulation  - Relieve pressure over bony prominences  - Avoid friction and shearing  - Provide appropriate hygiene as needed including keeping skin clean and dry  - Evaluate need for skin moisturizer/barrier cream  - Collaborate with interdisciplinary team   - Patient/family teaching  - Consider wound care consult   Outcome: Progressing

## 2024-05-24 NOTE — CASE MANAGEMENT
Case Management Assessment & Discharge Planning Note    Patient name Ernestina Hurley  Location [address] [place] [address]/[address] [place] [address]-* MRN [id number]  : 1935 Date 2024       Current Admission Date: 2024  Current Admission Diagnosis:Peptic ulcer disease with hemorrhage   Patient Active Problem List    Diagnosis Date Noted Date Diagnosed    Peptic ulcer disease with hemorrhage 2024     Depression 2024     GERD (gastroesophageal reflux disease) 2024     SIRS (systemic inflammatory response syndrome) ([facility]) 2024     Acute on chronic respiratory failure with hypoxia and hypercapnia ([facility]) 2024     Anemia 2022     Lower extremity weakness 2021     Current moderate episode of major depressive disorder without prior episode ([facility]) 2020     Onychomycosis 2019     Tinea pedis of both feet 2019     Pain in both feet 2019     Chest pain 2019     Thyroid nodule 2019     Diabetic polyneuropathy associated with type 2 diabetes mellitus ([facility]) 03/15/2019     Atherosclerosis of native artery of both lower extremities ([facility]) 03/15/2019     Granuloma of great toe 02/15/2018     History of breast cancer 2017     Essential hypertension 2017     Type 2 diabetes mellitus without complication, without long-term current use of insulin ([facility]) 2017     Mixed hyperlipidemia 2017     Atrial fibrillation ([facility]) 2017     Multiple sclerosis ([facility]) 2017     Abnormal gait 10/08/2015     Urinary incontinence 08/15/2013     Arthropathy of multiple sites 2013       LOS (days): 1  Geometric Mean LOS (GMLOS) (days): 3  Days to GMLOS:2.1     OBJECTIVE:    Risk of Unplanned Readmission Score: 12.89         Current admission status: Inpatient       Preferred Pharmacy:   [id number], [address] [place] [address]  Phone: [phone] Fax: [phone]    [facility] - [place], [place] -  [address] [address]  Phone: [phone] Fax: [phone]    Primary Care Provider: No primary care provider on file.    Primary Insurance: MEDICARE  Secondary Insurance: BLUE CROSS    ASSESSMENT:  Active Health Care Proxies    There are no active Health Care Proxies on file.                 Readmission Root Cause  30 Day Readmission: No    Patient Information  Admitted from:: Facility (LTC at [facility])  Mental Status: Alert  During Assessment patient was accompanied by: Not accompanied during assessment  Assessment information provided by:: Daughter  Primary Caregiver: Other (Comment)  Caregiver's Name:: [facility] staff  Caregiver's Relationship to Patient:: Other (Specify) (LT facility staff)  Caregiver's Telephone Number:: [phone]  Support Systems: Daughter, Son, Home care staff  County of Residence: [place]  What [facility] do you live in?: [place]  Home entry access options. Select all that apply.: No steps to enter home  Type of Current Residence: Facility ([facility])  Upon entering residence, is there a bedroom on the main floor (no further steps)?: Yes  Upon entering residence, is there a bathroom on the main floor (no further steps)?: Yes  Living Arrangements: Other (Comment) (LT facility)    Activities of Daily Living Prior to Admission  Functional Status: Assistance  Completes ADLs independently?: No  Level of ADL dependence: Total Dependent  Ambulates independently?: No  Level of ambulatory dependence: Total Dependent  Does patient use assisted devices?: Yes  Assisted Devices (DME) used: Wheelchair, Mel lift  Does patient currently own DME?: Yes  What DME does the patient currently own?: Wheelchair, Mel lift  Does patient have a history of Outpatient Therapy (PT/OT)?: No  Does the patient have a history of Short-Term Rehab?: Yes  Does patient have a history of HHC?: Yes  Does patient currently  have [facility]?: No         Patient Information Continued  Income Source: SSI/SSD  Does patient have prescription coverage?: Yes  Does patient receive dialysis treatments?: No  Does patient have a history of substance abuse?: No  Does patient have a history of Mental Health Diagnosis?: Yes  Is patient receiving treatment for mental health?: Yes  Has patient received inpatient treatment related to mental health in the last 2 years?: No         Means of Transportation  Means of Transport to [facility]:: Other (Comment) (Facility transports when needed.)      Social Determinants of Health (SDOH)      Flowsheet Row Most Recent Value   Housing Stability    In the last 12 months, was there a time when you were not able to pay the mortgage or rent on time? N   In the past 12 months, how many times have you moved where you were living? 1   At any time in the past 12 months, were you homeless or living in a shelter (including now)? N   Transportation Needs    In the past 12 months, has lack of transportation kept you from medical appointments or from getting medications? no   In the past 12 months, has lack of transportation kept you from meetings, work, or from getting things needed for daily living? No   Food Insecurity    Within the past 12 months, you worried that your food would run out before you got the money to buy more. Never true   Within the past 12 months, the food you bought just didn't last and you didn't have money to get more. Never true   Utilities    In the past 12 months has the electric, gas, oil, or water company threatened to shut off services in your home? No            DISCHARGE DETAILS:    Discharge planning discussed with:: Patient's daughter, Herminia, via phone call.  Freedom of Choice: Yes  Comments - Freedom of Choice: Pt to return to LTC facility [facility] on discharge.  CM contacted family/caregiver?: Yes  Were Treatment Team discharge recommendations reviewed with patient/caregiver?:  Yes  Did patient/caregiver verbalize understanding of patient care needs?: N/A- going to facility  Were patient/caregiver advised of the risks associated with not following Treatment Team discharge recommendations?: Yes    Contacts  Patient Contacts: Herminia-daughter  Relationship to Patient:: Family  Contact Method: Phone  Phone Number: [phone]  Reason/Outcome: Continuity of Care, Emergency Contact, Discharge Planning    Requested Home Health Care         Is the patient interested in HHC at discharge?: No    DME Referral Provided  Referral made for DME?: No    Other Referral/Resources/Interventions Provided:  Interventions: SNF, Facility Return  Referral Comments: [place] referral sent to [facility].      Treatment Team Recommendation: SNF, Facility Return  Discharge Destination Plan:: SNF, Facility Return    Additional Comments: CM spoke with pt's daughter via phone call and introduced self and role. Pt is LTC resident of [facility] and plan is for pt to return on discharge. Pt is total dependent with ADL's and uses nazario lift at baseline. CM department to continue to follow for discharge planning needs throughout this admission.

## 2024-05-24 NOTE — ASSESSMENT & PLAN NOTE
Recent hemoglobin appears to be in the range of 11-13  Likely in the setting of GI bleeding  Monitor hemoglobin and transfuse if less than 7.5  Results from last 7 days   Lab Units 05/24/24  0545 05/23/24  1118   HEMOGLOBIN g/dL 7.9* 8.8*   HEMATOCRIT % 25.8* 28.3*   MCV fL 92 91

## 2024-05-24 NOTE — WOUND OSTOMY CARE
Progress Note - Wound   Ernestina Hurley 88 y.o. female MRN: [id number]  Unit/Bed#: [address] [place] [address] Encounter: [id number]      Assessment:   This is an 88 year old female patient admitted on 5/23/24 with peptic ulcer disease with hemorrhage. Patient has a history of DM with polyneuropathy, HTN, multiple sclerosis and AFIB. Patient was AAO x 3 and agreeable to have wound visit done.    Assessment Findings:  1-Left sacrobuttock intact with no open areas - orders in place for skin care and for prevention. Please see below for detailed assessments.  2-Right sacrobuttock with stage 3 pressure injury present on admission. Orders in place for wound care and for prevention.  3-Bilateral heels intact - orders in place for skin care and for prevention.    Skin Care Plan:   1-Cleanse sacrobuttocks with foaming cleanser & pat dry. Apply thin layer of Triad paste 3x/day and prn soilage/dislodgement. Must cleanse in-between applications to prevent caking.  2-Cleanse bilateral groin folds with foaming cleanser & pat dry. Apply light dusting of Nystatin powder 2x/day and gently remove excess to prevent caking.  3-Hydraguard to bilateral heels BID and PRN  4-Float heels on 2 pillows to offload pressure so heels are not in contact with mattress or pillows. Patient refusing Prevalon boots.  5-Ehob pressure redistribution cushion in chair when out of bed if able. Limit sitting for 1-2 hours at a time due to sacrobuttock breakdown then offload back in bed turning side to side every 2 hours.  6-Moisturize skin daily with skin nourishing cream.  7-Turn/reposition q2h or when medically stable for pressure re-distribution on skin.     Wound 05/23/24 Pressure Injury Buttocks Left (Active)   Wound Image   05/24/24 0944   Wound Description Intact;Epithelialization 05/24/24 0944   Wound Length (cm) 0 cm 05/24/24 0944   Wound Width (cm) 0 cm 05/24/24 0944   Wound Depth (cm) 0 cm 05/24/24 0944   Wound Surface Area (cm^2) 0 cm^2 05/24/24 0944    Wound Volume (cm^3) 0 cm^3 05/24/24 0944   Calculated Wound Volume (cm^3) 0 cm^3 05/24/24 0944   Drainage Amount None 05/24/24 0944   Treatments Cleansed/applied Triad paste 05/24/24 0944   Dressing Changed New 05/24/24 0944   Dressing Status Intact 05/24/24 0944       Wound 04/01/24 Pressure Injury Buttocks Right (Active)   Wound Image   05/24/24 0940   Wound Description Granulation tissue;Pink;Slough;White 05/24/24 0940   Pressure Injury Stage Stage3 05/24/24 0940   [place]-wound Assessment Dry;Scaly 05/24/24 0940   Wound Length (cm) 0.9 cm 05/24/24 0940   Wound Width (cm) 0.3 cm 05/24/24 0940   Wound Depth (cm) 0.1 cm 05/24/24 0940   Wound Surface Area (cm^2) 0.27 cm^2 05/24/24 0940   Wound Volume (cm^3) 0.027 cm^3 05/24/24 0940   Calculated Wound Volume (cm^3) 0.03 cm^3 05/24/24 0940   Drainage Amount Small 05/24/24 0940   Drainage Description Serous;Yellow 05/24/24 0940   Treatments Cleansed 05/24/24 0940   Dressing Triad paste 05/24/24 0940   Dressing Changed New 05/24/24 0940   Dressing Status Intact 05/24/24 0940     Discussed assessment findings, and plan of care/recommendations with Maribeth CARDENAS.    Wound care will follow along with patient throughout admission, please call or tiger text with questions and concerns.    Recommendations written as orders.  Estefani Mahajan MSN, RN, CWON

## 2024-05-24 NOTE — ASSESSMENT & PLAN NOTE
Lab Results   Component Value Date    HGBA1C 8.0 (H) 04/04/2024       Recent Labs     05/23/24  1616 05/23/24  2356 05/24/24  0600 05/24/24  1038   POCGLU 148* 165* 162* 134       Blood Sugar Average: Last 72 hrs:  (P) 152.25  Patient is on metformin which will be held at the current time  Patient with Humalog sliding scale with Accu-Cheks every 6 hours  Patient has been n.p.o. and will be started on clear liquid diet

## 2024-05-24 NOTE — ASSESSMENT & PLAN NOTE
Patient presented with abdominal pain which is especially worse 2 days ago  In the ED patient noted to be anemic with a hemoglobin of 8.8  CT abdomen pelvis high-volume bleeding scan-gastroduodenitis with gastric pylorus ulcer with small focus of active bleed without any free air  Patient currently on Protonix drip which will be continued for the next 24 to 48 hours  Patient underwent EGD which showed single ulcer in the second part of the duodenum with nonbleeding visible vessel which was treated with induced coagulation with bipolar cautery, injected epinephrine  Patient to be started on clear liquid diet which can be done/tolerated.

## 2024-05-24 NOTE — PLAN OF CARE
Problem: PAIN - ADULT  Goal: Verbalizes/displays adequate comfort level or baseline comfort level  Description: Interventions:  - Encourage patient to monitor pain and request assistance  - Assess pain using appropriate pain scale  - Administer analgesics based on type and severity of pain and evaluate response  - Implement non-pharmacological measures as appropriate and evaluate response  - Consider cultural and social influences on pain and pain management  - Notify physician/advanced practitioner if interventions unsuccessful or patient reports new pain  Outcome: Progressing     Problem: INFECTION - ADULT  Goal: Absence or prevention of progression during hospitalization  Description: INTERVENTIONS:  - Assess and monitor for signs and symptoms of infection  - Monitor lab/diagnostic results  - Monitor all insertion sites, i.e. indwelling lines, tubes, and drains  - Monitor endotracheal if appropriate and nasal secretions for changes in amount and color  - [place] appropriate cooling/warming therapies per order  - Administer medications as ordered  - Instruct and encourage patient and family to use good hand hygiene technique  - Identify and instruct in appropriate isolation precautions for identified infection/condition  Outcome: Progressing     Problem: SAFETY ADULT  Goal: Patient will remain free of falls  Description: INTERVENTIONS:  - Educate patient/family on patient safety including physical limitations  - Instruct patient to call for assistance with activity   - Consult OT/PT to assist with strengthening/mobility   - Keep Call bell within reach  - Keep bed low and locked with side rails adjusted as appropriate  - Keep care items and personal belongings within reach  - Initiate and maintain comfort rounds  - Make Fall Risk Sign visible to staff  - Offer Toileting every 2 Hours, in advance of need  - Initiate/Maintain bed/chair alarmbed/  - Obtain necessary fall risk management equipment:bed/chair alarm    - Apply yellow socks and bracelet for high fall risk patients  - Consider moving patient to room near nurses station  Outcome: Progressing     Problem: DISCHARGE PLANNING  Goal: Discharge to home or other facility with appropriate resources  Description: INTERVENTIONS:  - Identify barriers to discharge w/patient and caregiver  - Arrange for needed discharge resources and transportation as appropriate  - Identify discharge learning needs (meds, wound care, etc.)  - Arrange for interpretive services to assist at discharge as needed  - Refer to Case Management Department for coordinating discharge planning if the patient needs post-hospital services based on physician/advanced practitioner order or complex needs related to functional status, cognitive ability, or social support system  Outcome: Progressing     Problem: Knowledge Deficit  Goal: Patient/family/caregiver demonstrates understanding of disease process, treatment plan, medications, and discharge instructions  Description: Complete learning assessment and assess knowledge base.  Interventions:  - Provide teaching at level of understanding  - Provide teaching via preferred learning methods  Outcome: Progressing     Problem: Prexisting or High Potential for Compromised Skin Integrity  Goal: Skin integrity is maintained or improved  Description: INTERVENTIONS:  - Identify patients at risk for skin breakdown  - Assess and monitor skin integrity  - Assess and monitor nutrition and hydration status  - Monitor labs   - Assess for incontinence   - Turn and reposition patient  - Assist with mobility/ambulation  - Relieve pressure over bony prominences  - Avoid friction and shearing  - Provide appropriate hygiene as needed including keeping skin clean and dry  - Evaluate need for skin moisturizer/barrier cream  - Collaborate with interdisciplinary team   - Patient/family teaching  - Consider wound care consult   Outcome: Progressing

## 2024-05-24 NOTE — PROGRESS NOTES
[facility]  Progress Note  Name: Ernestina Hurley I  MRN: [id number]  Unit/Bed#: [address] [place] [address] Date of Admission: 5/23/2024   Date of Service: 5/24/2024 I Hospital Day: 1    Assessment & Plan   * Peptic ulcer disease with hemorrhage  Assessment & Plan  Patient presented with abdominal pain which is especially worse 2 days ago  In the ED patient noted to be anemic with a hemoglobin of 8.8  CT abdomen pelvis high-volume bleeding scan-gastroduodenitis with gastric pylorus ulcer with small focus of active bleed without any free air  Patient currently on Protonix drip which will be continued for the next 24 to 48 hours  Patient underwent EGD which showed single ulcer in the second part of the duodenum with nonbleeding visible vessel which was treated with induced coagulation with bipolar cautery, injected epinephrine  Patient to be started on clear liquid diet which can be done/tolerated.    Anemia  Assessment & Plan  Recent hemoglobin appears to be in the range of 11-13  Likely in the setting of GI bleeding  Monitor hemoglobin and transfuse if less than 7.5  Results from last 7 days   Lab Units 05/24/24  0545 05/23/24  1118   HEMOGLOBIN g/dL 7.9* 8.8*   HEMATOCRIT % 25.8* 28.3*   MCV fL 92 91          Diabetic polyneuropathy associated with type 2 diabetes mellitus (HCC)  Assessment & Plan  Lab Results   Component Value Date    HGBA1C 8.0 (H) 04/04/2024       Recent Labs     05/23/24  1616 05/23/24  2356 05/24/24  0600 05/24/24  1038   POCGLU 148* 165* 162* 134       Blood Sugar Average: Last 72 hrs:  (P) 152.25  Patient is on metformin which will be held at the current time  Patient with Humalog sliding scale with Accu-Cheks every 6 hours  Patient has been n.p.o. and will be started on clear liquid diet    Essential hypertension  Assessment & Plan  Continue amlodipine 5 mg p.o. daily  Lasix have been on hold    Multiple sclerosis (HCC)  Assessment & Plan  Patient is bedbound at  baseline    Atrial fibrillation (HCC)  Assessment & Plan  Patient is on sotalol and not on anticoagulation  Patient is on aspirin which is on hold               VTE Pharmacologic Prophylaxis: VTE Score: 1 High Risk (Score >/= 5) - Pharmacological DVT Prophylaxis Contraindicated. Sequential Compression Devices Ordered.    Mobility:   Basic Mobility Inpatient Raw Score: 12  JH-HLM Goal: 4: Move to chair/commode  JH-HLM Achieved: 2: Bed activities/Dependent transfer  JH-HLM Goal achieved. Continue to encourage appropriate mobility.    Patient Centered Rounds: I performed bedside rounds with nursing staff today.   Discussions with Specialists or Other Care Team Provider: yes    Education and Discussions with Family / Patient: Updated  (daughter) via phone.    Total Time Spent on Date of Encounter in care of patient: 35 mins. This time was spent on one or more of the following: performing physical exam; counseling and coordination of care; obtaining or reviewing history; documenting in the medical record; reviewing/ordering tests, medications or procedures; communicating with other healthcare professionals and discussing with patient's family/caregivers.    Current Length of Stay: 1 day(s)  Current Patient Status: Inpatient   Certification Statement: The patient will continue to require additional inpatient hospital stay due to GI bleeding, anemia  Discharge Plan: Anticipate discharge in 48-72 hrs to rehab facility.    Code Status: Level 3 - DNAR and DNI    Subjective:   Patient had no black stools, nausea or vomiting.  Patient also denies any abdominal pain or chest pain.    Objective:     Vitals:   Temp (24hrs), Av.8 °F (36.6 °C), Min:97.1 °F (36.2 °C), Max:98.2 °F (36.8 °C)    Temp:  [97.1 °F (36.2 °C)-98.2 °F (36.8 °C)] 97.1 °F (36.2 °C)  HR:  [64-98] 64  Resp:  [16-20] 16  BP: (131-184)/(62-85) 184/76  SpO2:  [95 %-100 %] 100 %  Body mass index is 29.7 kg/m².     Input and Output Summary (last 24  hours):     Intake/Output Summary (Last 24 hours) at 5/24/2024 1505  Last data filed at 5/24/2024 1444  Gross per 24 hour   Intake 300 ml   Output --   Net 300 ml       Physical Exam:   Physical Exam  Constitutional:       Appearance: Normal appearance.   HENT:      Head: Normocephalic and atraumatic.      Nose: Nose normal.      Mouth/Throat:      Mouth: Mucous membranes are moist.      Pharynx: Oropharynx is clear.   Eyes:      Extraocular Movements: Extraocular movements intact.      Pupils: Pupils are equal, round, and reactive to light.   Cardiovascular:      Rate and Rhythm: Normal rate and regular rhythm.   Pulmonary:      Effort: Pulmonary effort is normal.      Breath sounds: Normal breath sounds.   Abdominal:      General: Bowel sounds are normal. There is no distension.      Palpations: Abdomen is soft.      Tenderness: There is no abdominal tenderness.   Musculoskeletal:         General: No swelling.      Cervical back: Normal range of motion and neck supple.      Right lower leg: Edema present.      Left lower leg: Edema present.      Comments: Trace edema   Skin:     General: Skin is warm and dry.   Neurological:      General: No focal deficit present.      Mental Status: She is alert.          Additional Data:     Labs:  Results from last 7 days   Lab Units 05/24/24  0545 05/23/24  1118   WBC Thousand/uL 6.20 8.47   HEMOGLOBIN g/dL 7.9* 8.8*   HEMATOCRIT % 25.8* 28.3*   PLATELETS Thousands/uL 203 225   SEGS PCT %  --  63   LYMPHO PCT %  --  24   MONO PCT %  --  9   EOS PCT %  --  2     Results from last 7 days   Lab Units 05/23/24  1118   SODIUM mmol/L 138   POTASSIUM mmol/L 4.4   CHLORIDE mmol/L 101   CO2 mmol/L 32   BUN mg/dL 26*   CREATININE mg/dL 0.50*   ANION GAP mmol/L 5   CALCIUM mg/dL 8.5   ALBUMIN g/dL 3.0*   TOTAL BILIRUBIN mg/dL 0.19*   ALK PHOS U/L 74   ALT U/L 5*   AST U/L 7*   GLUCOSE RANDOM mg/dL 243*     Results from last 7 days   Lab Units 05/23/24  1118   INR  1.01     Results from  last 7 days   Lab Units 05/24/24  1038 05/24/24  0600 05/23/24  2356 05/23/24  1616   POC GLUCOSE mg/dl 134 162* 165* 148*               Lines/Drains:  Invasive Devices       Peripheral Intravenous Line  Duration             Peripheral IV 05/23/24 Left;Ventral (anterior) Forearm 1 day                          Imaging: Reviewed radiology reports from this admission including: abdominal/pelvic CT    Recent Cultures (last 7 days):         Last 24 Hours Medication List:   Current Facility-Administered Medications   Medication Dose Route Frequency Provider Last Rate    acetaminophen  650 mg Oral Q8H PRN Danni Olivo MD      amLODIPine  5 mg Oral Daily Danni Olivo MD      insulin lispro  1-6 Units Subcutaneous TID AC Danni Olivo MD      latanoprost  1 drop Both Eyes HS Danni Olivo MD      loratadine  10 mg Oral Daily Danni Olivo MD      ondansetron  4 mg Intravenous Q6H PRN Danni Olivo MD      pantoprazole (PROTONIX) 80 mg in sodium chloride 0.9 % 100 mL infusion  8 mg/hr Intravenous Continuous Jose Antonio Wen DO 8 mg/hr (05/24/24 1211)    sotalol  80 mg Oral BID Danni Olivo MD          Today, Patient Was Seen By: Danni Olivo MD    **Please Note: This note may have been constructed using a voice recognition system.**

## 2024-05-25 LAB
ANION GAP SERPL CALCULATED.3IONS-SCNC: 3 MMOL/L (ref 4–13)
BUN SERPL-MCNC: 17 MG/DL (ref 5–25)
CALCIUM SERPL-MCNC: 8.1 MG/DL (ref 8.4–10.2)
CHLORIDE SERPL-SCNC: 105 MMOL/L (ref 96–108)
CO2 SERPL-SCNC: 33 MMOL/L (ref 21–32)
CREAT SERPL-MCNC: 0.4 MG/DL (ref 0.6–1.3)
ERYTHROCYTE [DISTWIDTH] IN BLOOD BY AUTOMATED COUNT: 15.2 % (ref 11.6–15.1)
GFR SERPL CREATININE-BSD FRML MDRD: 94 ML/MIN/1.73SQ M
GLUCOSE SERPL-MCNC: 111 MG/DL (ref 65–140)
GLUCOSE SERPL-MCNC: 138 MG/DL (ref 65–140)
GLUCOSE SERPL-MCNC: 156 MG/DL (ref 65–140)
GLUCOSE SERPL-MCNC: 163 MG/DL (ref 65–140)
HCT VFR BLD AUTO: 25.1 % (ref 34.8–46.1)
HGB BLD-MCNC: 7.7 G/DL (ref 11.5–15.4)
MCH RBC QN AUTO: 27.9 PG (ref 26.8–34.3)
MCHC RBC AUTO-ENTMCNC: 30.7 G/DL (ref 31.4–37.4)
MCV RBC AUTO: 91 FL (ref 82–98)
MRSA NOSE QL CULT: NORMAL
PLATELET # BLD AUTO: 205 THOUSANDS/UL (ref 149–390)
PMV BLD AUTO: 11.3 FL (ref 8.9–12.7)
POTASSIUM SERPL-SCNC: 3.6 MMOL/L (ref 3.5–5.3)
RBC # BLD AUTO: 2.76 MILLION/UL (ref 3.81–5.12)
SODIUM SERPL-SCNC: 141 MMOL/L (ref 135–147)
WBC # BLD AUTO: 6.83 THOUSAND/UL (ref 4.31–10.16)

## 2024-05-25 PROCEDURE — 80048 BASIC METABOLIC PNL TOTAL CA: CPT | Performed by: INTERNAL MEDICINE

## 2024-05-25 PROCEDURE — 99232 SBSQ HOSP IP/OBS MODERATE 35: CPT | Performed by: INTERNAL MEDICINE

## 2024-05-25 PROCEDURE — C9113 INJ PANTOPRAZOLE SODIUM, VIA: HCPCS | Performed by: INTERNAL MEDICINE

## 2024-05-25 PROCEDURE — 85027 COMPLETE CBC AUTOMATED: CPT | Performed by: INTERNAL MEDICINE

## 2024-05-25 PROCEDURE — 82948 REAGENT STRIP/BLOOD GLUCOSE: CPT

## 2024-05-25 RX ADMIN — AMLODIPINE BESYLATE 5 MG: 5 TABLET ORAL at 08:55

## 2024-05-25 RX ADMIN — INSULIN LISPRO 1 UNITS: 100 INJECTION, SOLUTION INTRAVENOUS; SUBCUTANEOUS at 12:24

## 2024-05-25 RX ADMIN — SOTALOL HYDROCHLORIDE 80 MG: 80 TABLET ORAL at 17:27

## 2024-05-25 RX ADMIN — SOTALOL HYDROCHLORIDE 80 MG: 80 TABLET ORAL at 08:55

## 2024-05-25 RX ADMIN — LORATADINE 10 MG: 10 TABLET ORAL at 08:55

## 2024-05-25 RX ADMIN — PANTOPRAZOLE SODIUM 8 MG/HR: 40 INJECTION, POWDER, FOR SOLUTION INTRAVENOUS at 18:06

## 2024-05-25 RX ADMIN — NYSTATIN: 100000 POWDER TOPICAL at 09:01

## 2024-05-25 RX ADMIN — PANTOPRAZOLE SODIUM 8 MG/HR: 40 INJECTION, POWDER, FOR SOLUTION INTRAVENOUS at 21:14

## 2024-05-25 RX ADMIN — LATANOPROST 1 DROP: 50 SOLUTION OPHTHALMIC at 21:30

## 2024-05-25 RX ADMIN — NYSTATIN: 100000 POWDER TOPICAL at 17:30

## 2024-05-25 NOTE — ASSESSMENT & PLAN NOTE
Recent hemoglobin appears to be in the range of 11-13  Likely in the setting of GI bleeding  Monitor hemoglobin and transfuse if less than 7.5  Results from last 7 days   Lab Units 05/25/24  0710 05/24/24  0545 05/23/24  1118   HEMOGLOBIN g/dL 7.7* 7.9* 8.8*   HEMATOCRIT % 25.1* 25.8* 28.3*   MCV fL 91 92 91

## 2024-05-25 NOTE — ASSESSMENT & PLAN NOTE
Continue amlodipine 5 mg p.o. daily  Lasix have been on hold which can be restarted upon discharge

## 2024-05-25 NOTE — PLAN OF CARE
Problem: PAIN - ADULT  Goal: Verbalizes/displays adequate comfort level or baseline comfort level  Description: Interventions:  - Encourage patient to monitor pain and request assistance  - Assess pain using appropriate pain scale  - Administer analgesics based on type and severity of pain and evaluate response  - Implement non-pharmacological measures as appropriate and evaluate response  - Consider cultural and social influences on pain and pain management  - Notify physician/advanced practitioner if interventions unsuccessful or patient reports new pain  Outcome: Progressing     Problem: INFECTION - ADULT  Goal: Absence or prevention of progression during hospitalization  Description: INTERVENTIONS:  - Assess and monitor for signs and symptoms of infection  - Monitor lab/diagnostic results  - Monitor all insertion sites, i.e. indwelling lines, tubes, and drains  - Monitor endotracheal if appropriate and nasal secretions for changes in amount and color  - [place] appropriate cooling/warming therapies per order  - Administer medications as ordered  - Instruct and encourage patient and family to use good hand hygiene technique  - Identify and instruct in appropriate isolation precautions for identified infection/condition  Outcome: Progressing     Problem: SAFETY ADULT  Goal: Patient will remain free of falls  Description: INTERVENTIONS:  - Educate patient/family on patient safety including physical limitations  - Instruct patient to call for assistance with activity   - Consult OT/PT to assist with strengthening/mobility   - Keep Call bell within reach  - Keep bed low and locked with side rails adjusted as appropriate  - Keep care items and personal belongings within reach  - Initiate and maintain comfort rounds  - Make Fall Risk Sign visible to staff  - Offer Toileting every 2 Hours, in advance of need  - Initiate/Maintain bed/chair alarmbed/  - Obtain necessary fall risk management equipment:bed/chair alarm    - Apply yellow socks and bracelet for high fall risk patients  - Consider moving patient to room near nurses station  Outcome: Progressing  Goal: Maintain or return to baseline ADL function  Description: INTERVENTIONS:  -  Assess patient's ability to carry out ADLs; assess patient's baseline for ADL function and identify physical deficits which impact ability to perform ADLs (bathing, care of mouth/teeth, toileting, grooming, dressing, etc.)  - Assess/evaluate cause of self-care deficits   - Assess range of motion  - Assess patient's mobility; develop plan if impaired  - Assess patient's need for assistive devices and provide as appropriate  - Encourage maximum independence but intervene and supervise when necessary  - Involve family in performance of ADLs  - Assess for home care needs following discharge   - Consider OT consult to assist with ADL evaluation and planning for discharge  - Provide patient education as appropriate  Outcome: Progressing  Goal: Maintains/Returns to pre admission functional level  Description: INTERVENTIONS:  - Perform AM-PAC 6 Click Basic Mobility/ Daily Activity assessment daily.  - Set and communicate daily mobility goal to care team and patient/family/caregiver.   - Collaborate with rehabilitation services on mobility goals if consulted  - Perform Range of Motion 3 times a day.  - Reposition patient every 3 hours.  - Dangle patient 3 times a day  - Stand patient 3 times a day  - Ambulate patient 3 times a day  - Out of bed to chair 3 times a day   - Out of bed for meals 3 times a day  - Out of bed for toileting  - Record patient progress and toleration of activity level   Outcome: Progressing     Problem: DISCHARGE PLANNING  Goal: Discharge to home or other facility with appropriate resources  Description: INTERVENTIONS:  - Identify barriers to discharge w/patient and caregiver  - Arrange for needed discharge resources and transportation as appropriate  - Identify discharge learning  needs (meds, wound care, etc.)  - Arrange for interpretive services to assist at discharge as needed  - Refer to Case Management Department for coordinating discharge planning if the patient needs post-hospital services based on physician/advanced practitioner order or complex needs related to functional status, cognitive ability, or social support system  Outcome: Progressing     Problem: Knowledge Deficit  Goal: Patient/family/caregiver demonstrates understanding of disease process, treatment plan, medications, and discharge instructions  Description: Complete learning assessment and assess knowledge base.  Interventions:  - Provide teaching at level of understanding  - Provide teaching via preferred learning methods  Outcome: Progressing     Problem: Prexisting or High Potential for Compromised Skin Integrity  Goal: Skin integrity is maintained or improved  Description: INTERVENTIONS:  - Identify patients at risk for skin breakdown  - Assess and monitor skin integrity  - Assess and monitor nutrition and hydration status  - Monitor labs   - Assess for incontinence   - Turn and reposition patient  - Assist with mobility/ambulation  - Relieve pressure over bony prominences  - Avoid friction and shearing  - Provide appropriate hygiene as needed including keeping skin clean and dry  - Evaluate need for skin moisturizer/barrier cream  - Collaborate with interdisciplinary team   - Patient/family teaching  - Consider wound care consult   Outcome: Progressing

## 2024-05-25 NOTE — PROGRESS NOTES
[facility]  Progress Note  Name: Ernestina Hurley I  MRN: [id number]  Unit/Bed#: 3 [address] Date of Admission: 5/23/2024   Date of Service: 5/25/2024 I Hospital Day: 2    Assessment & Plan   * Peptic ulcer disease with hemorrhage  Assessment & Plan  Patient presented with abdominal pain which is especially worse 2 days ago  In the ED patient noted to be anemic with a hemoglobin of 8.8  CT abdomen pelvis high-volume bleeding scan-gastroduodenitis with gastric pylorus ulcer with small focus of active bleed without any free air  Patient currently on Protonix drip which will be continued for the next 24 to 48 hours  Patient underwent EGD which showed single ulcer in the second part of the duodenum with nonbleeding visible vessel which was treated with induced coagulation with bipolar cautery, injected epinephrine  Patient has been on clear liquid diet which was advanced to nonulcerogenic diet    Anemia  Assessment & Plan  Recent hemoglobin appears to be in the range of 11-13  Likely in the setting of GI bleeding  Monitor hemoglobin and transfuse if less than 7.5  Results from last 7 days   Lab Units 05/25/24  0710 05/24/24  0545 05/23/24  1118   HEMOGLOBIN g/dL 7.7* 7.9* 8.8*   HEMATOCRIT % 25.1* 25.8* 28.3*   MCV fL 91 92 91          Diabetic polyneuropathy associated with type 2 diabetes mellitus (HCC)  Assessment & Plan  Lab Results   Component Value Date    HGBA1C 8.0 (H) 04/04/2024       Recent Labs     05/24/24  2110 05/25/24  0559 05/25/24  0704 05/25/24  1050   POCGLU 273* 138 138 163*         Blood Sugar Average: Last 72 hrs:  (P) 165.9119289129086239  Patient is on metformin which will be held at the current time  Patient with Humalog sliding scale with Accu-Cheks every 6 hours  Patient initially was n.p.o. and currently on clear liquid diet which was advanced to diabetic not ulcerogenic diet    Essential hypertension  Assessment & Plan  Continue amlodipine 5 mg p.o. daily  Lasix  have been on hold which can be restarted upon discharge    Multiple sclerosis (HCC)  Assessment & Plan  Patient is bedbound at baseline    Atrial fibrillation (HCC)  Assessment & Plan  Patient is on sotalol and not on anticoagulation  Patient is on aspirin which is on hold               VTE Pharmacologic Prophylaxis: VTE Score: 1 High Risk (Score >/= 5) - Pharmacological DVT Prophylaxis Contraindicated. Sequential Compression Devices Ordered.    Mobility:   Basic Mobility Inpatient Raw Score: 11  JH-HLM Goal: 4: Move to chair/commode  JH-HLM Achieved: 2: Bed activities/Dependent transfer  JH-HLM Goal NOT achieved. Continue with multidisciplinary rounding and encourage appropriate mobility to improve upon JH-HLM goals.    Patient Centered Rounds: I performed bedside rounds with nursing staff today.   Discussions with Specialists or Other Care Team Provider: GI    Education and Discussions with Family / Patient: yes    Total Time Spent on Date of Encounter in care of patient: 35 mins. This time was spent on one or more of the following: performing physical exam; counseling and coordination of care; obtaining or reviewing history; documenting in the medical record; reviewing/ordering tests, medications or procedures; communicating with other healthcare professionals and discussing with patient's family/caregivers.    Current Length of Stay: 2 day(s)  Current Patient Status: Inpatient   Certification Statement: The patient will continue to require additional inpatient hospital stay due to GI bleeding with anemia  Discharge Plan: Anticipate discharge tomorrow to rehab facility.    Code Status: Level 3 - DNAR and DNI    Subjective:   Patient had some abdominal pain after eating liquid diet this morning.  Patient had a brown bowel movement last night.  Denies any nausea vomiting or chest pain    Objective:     Vitals:   Temp (24hrs), Av.7 °F (36.5 °C), Min:97.1 °F (36.2 °C), Max:98.4 °F (36.9 °C)    Temp:  [97.1 °F  (36.2 °C)-98.4 °F (36.9 °C)] 97.5 °F (36.4 °C)  HR:  [62-97] 62  Resp:  [16-19] 18  BP: (139-184)/(62-95) 166/62  SpO2:  [98 %-100 %] 100 %  Body mass index is 29.7 kg/m².     Input and Output Summary (last 24 hours):     Intake/Output Summary (Last 24 hours) at 5/25/2024 1256  Last data filed at 5/24/2024 1444  Gross per 24 hour   Intake 300 ml   Output --   Net 300 ml       Physical Exam:   Physical Exam  Constitutional:       Appearance: Normal appearance.   HENT:      Head: Normocephalic and atraumatic.      Nose: Nose normal.      Mouth/Throat:      Mouth: Mucous membranes are moist.      Pharynx: Oropharynx is clear.   Eyes:      Extraocular Movements: Extraocular movements intact.      Pupils: Pupils are equal, round, and reactive to light.   Cardiovascular:      Rate and Rhythm: Normal rate and regular rhythm.   Pulmonary:      Effort: Pulmonary effort is normal.      Breath sounds: Normal breath sounds.   Abdominal:      General: Bowel sounds are normal. There is no distension.      Palpations: Abdomen is soft.      Tenderness: There is no abdominal tenderness.   Musculoskeletal:         General: No swelling.      Cervical back: Normal range of motion and neck supple.   Skin:     General: Skin is warm and dry.   Neurological:      General: No focal deficit present.      Mental Status: She is alert.          Additional Data:     Labs:  Results from last 7 days   Lab Units 05/25/24  0710 05/24/24  0545 05/23/24  1118   WBC Thousand/uL 6.83   < > 8.47   HEMOGLOBIN g/dL 7.7*   < > 8.8*   HEMATOCRIT % 25.1*   < > 28.3*   PLATELETS Thousands/uL 205   < > 225   SEGS PCT %  --   --  63   LYMPHO PCT %  --   --  24   MONO PCT %  --   --  9   EOS PCT %  --   --  2    < > = values in this interval not displayed.     Results from last 7 days   Lab Units 05/25/24  0710 05/23/24  1118   SODIUM mmol/L 141 138   POTASSIUM mmol/L 3.6 4.4   CHLORIDE mmol/L 105 101   CO2 mmol/L 33* 32   BUN mg/dL 17 26*   CREATININE mg/dL  0.40* 0.50*   ANION GAP mmol/L 3* 5   CALCIUM mg/dL 8.1* 8.5   ALBUMIN g/dL  --  3.0*   TOTAL BILIRUBIN mg/dL  --  0.19*   ALK PHOS U/L  --  74   ALT U/L  --  5*   AST U/L  --  7*   GLUCOSE RANDOM mg/dL 138 243*     Results from last 7 days   Lab Units 05/23/24  1118   INR  1.01     Results from last 7 days   Lab Units 05/25/24  1050 05/25/24  0704 05/25/24  0559 05/24/24  2110 05/24/24  1625 05/24/24  1038 05/24/24  0600 05/23/24  2356 05/23/24  1616   POC GLUCOSE mg/dl 163* 138 138 273* 172* 134 162* 165* 148*               Lines/Drains:  Invasive Devices       Peripheral Intravenous Line  Duration             Long-Dwell Peripheral IV (Midline) 05/25/24 Left Brachial <1 day                          Imaging: Reviewed radiology reports from this admission including: abdominal/pelvic CT    Recent Cultures (last 7 days):         Last 24 Hours Medication List:   Current Facility-Administered Medications   Medication Dose Route Frequency Provider Last Rate    acetaminophen  650 mg Oral Q8H PRN John Alexander MD      amLODIPine  5 mg Oral Daily John Alexander MD      insulin lispro  1-6 Units Subcutaneous TID AC John Alexander MD      latanoprost  1 drop Both Eyes HS John Alexander MD      loratadine  10 mg Oral Daily John Alexander MD      nystatin   Topical BID Danni Olivo MD      ondansetron  4 mg Intravenous Q6H PRN John Alexander MD      pantoprazole (PROTONIX) 80 mg in sodium chloride 0.9 % 100 mL infusion  8 mg/hr Intravenous Continuous John Alexander MD 8 mg/hr (05/24/24 1211)    sotalol  80 mg Oral BID John Alexander MD          Today, Patient Was Seen By: Danni Olivo MD    **Please Note: This note may have been constructed using a voice recognition system.**

## 2024-05-25 NOTE — PROGRESS NOTES
"Progress Note - Ernestina Hurley 88 y.o. female MRN: [id number]    Unit/Bed#: [address] [place] [address] Encounter: [id number]    Assessment and Plan:     88-year-old female with history of A-fib not on anticoagulation, MS, type 2 diabetes who presented to the emergency room for abdominal pain found to have significant anemia with hemoglobin decreasing from 11-12 range to 7.7.    Acute blood loss anemia  EGD performed yesterday, 5/24 with single ulcer noted in the second portion of the duodenum with nonbleeding visible vessel status post cautery and epinephrine.  Per SLIM, reported brown bowel movement overnight.  Per nursing no bowel movement so far this morning.  Hemoglobin stable at 7.7, BUN resolved.    -Recommend continuing PPI drip for another 24 hours, then transition to p.o. twice daily.  -Okay to advance diet  - Monitor stool output.    - Monitor hemoglobin and since she is as needed per primary team.  - Avoid all NSAIDs.  - Patient was on daily aspirin.  Discussed with primary team.  If no clear indication could consider discontinuing this however if she needs to be on this can hold for around 5 days and restart.    ----------------------------------------------------------------------------------------------------------------    Subjective:     Patient reports she is doing okay.  She reports some intermittent right-sided abdominal pain however not severe.  No nausea or vomiting.  Tolerated some clear liquid diet.  Her biggest concern is she would like to brush her hair, wash her face and brush her teeth.  Discussed with nursing who reports no bowel movements this morning.    Objective:     Vitals: Blood pressure 166/62, pulse 62, temperature 97.5 °F (36.4 °C), temperature source Oral, resp. rate 18, height 5' 6\" (1.676 m), weight 83.5 kg (184 lb), SpO2 100%.,Body mass index is 29.7 kg/m².      Intake/Output Summary (Last 24 hours) at 5/25/2024 1302  Last data filed at 5/24/2024 1444  Gross per 24 hour   Intake 300 ml " "  Output --   Net 300 ml       Physical Exam:     General Appearance: Lying comfortably in bed.  Does not appear in distress.  However she does get emotional as she would like to go back home.  Lungs: Clear to auscultation bilaterally, no rales or rhonchi, no labored breathing/accessory muscle use  Heart: Regular rate and rhythm, S1, S2 normal, no murmur, click, rub or gallop  Abdomen:  abdomen soft, nondistended, nontender.  Bowel sounds present.  Extremities: Lower extremity swelling of lower feet bilaterally    Invasive Devices       Peripheral Intravenous Line  Duration             Long-Dwell Peripheral IV (Midline) 05/25/24 Left Brachial <1 day                    Lab Results:  Results from last 7 days   Lab Units 05/25/24  0710 05/24/24  0545 05/23/24  1118   WBC Thousand/uL 6.83   < > 8.47   HEMOGLOBIN g/dL 7.7*   < > 8.8*   HEMATOCRIT % 25.1*   < > 28.3*   PLATELETS Thousands/uL 205   < > 225   SEGS PCT %  --   --  63   LYMPHO PCT %  --   --  24   MONO PCT %  --   --  9   EOS PCT %  --   --  2    < > = values in this interval not displayed.     Results from last 7 days   Lab Units 05/25/24  0710 05/23/24  1118   POTASSIUM mmol/L 3.6 4.4   CHLORIDE mmol/L 105 101   CO2 mmol/L 33* 32   BUN mg/dL 17 26*   CREATININE mg/dL 0.40* 0.50*   CALCIUM mg/dL 8.1* 8.5   ALK PHOS U/L  --  74   ALT U/L  --  5*   AST U/L  --  7*     Invalid input(s): \"BILI\"  Results from last 7 days   Lab Units 05/23/24  1118   INR  1.01     Results from last 7 days   Lab Units 05/23/24  1118   LIPASE u/L 39       Imaging Studies: I have personally reviewed pertinent imaging studies.    EGD    Result Date: 5/24/2024  Impression: Single ulcer in the 2nd part of the duodenum with nonbleeding visible vessel (Stephon IIA); induced coagulation with bipolar cautery; injected epinephrine to address bleeding Mild erythematous mucosa with erosion in the antrum; performed cold forceps biopsy The esophagus appeared normal. RECOMMENDATION: Await " pathology results Protonix drip for 24-48 hours Monitor CBC Clear liquid diet and advance as tolerated   John Alexander MD     CT high volume bleeding scan abdomen pelvis    Result Date: 5/23/2024  Impression: Findings of gastroduodenitis and gastric pylorus ulcer. Small focus of active bleed, may be a [place] lesion. No free air to suggest perforation. Evidence of mild similar edema and decrease small left pleural effusion compared to recent chest x-ray. Other nonemergent findings above. Workstation performed: [id number]     XR chest 1 view portable    Result Date: 5/23/2024  Impression: Compared to 4/1/2024, similar vascular congestion. Improved, small left pleural effusion. Workstation performed: [id number]

## 2024-05-25 NOTE — ASSESSMENT & PLAN NOTE
Patient presented with abdominal pain which is especially worse 2 days ago  In the ED patient noted to be anemic with a hemoglobin of 8.8  CT abdomen pelvis high-volume bleeding scan-gastroduodenitis with gastric pylorus ulcer with small focus of active bleed without any free air  Patient currently on Protonix drip which will be continued for the next 24 to 48 hours  Patient underwent EGD which showed single ulcer in the second part of the duodenum with nonbleeding visible vessel which was treated with induced coagulation with bipolar cautery, injected epinephrine  Patient has been on clear liquid diet which was advanced to nonulcerogenic diet

## 2024-05-26 VITALS
DIASTOLIC BLOOD PRESSURE: 61 MMHG | RESPIRATION RATE: 18 BRPM | WEIGHT: 184 LBS | HEART RATE: 64 BPM | TEMPERATURE: 98.1 F | OXYGEN SATURATION: 99 % | BODY MASS INDEX: 29.57 KG/M2 | HEIGHT: 66 IN | SYSTOLIC BLOOD PRESSURE: 133 MMHG

## 2024-05-26 LAB
ERYTHROCYTE [DISTWIDTH] IN BLOOD BY AUTOMATED COUNT: 15.4 % (ref 11.6–15.1)
GLUCOSE SERPL-MCNC: 127 MG/DL (ref 65–140)
GLUCOSE SERPL-MCNC: 183 MG/DL (ref 65–140)
HCT VFR BLD AUTO: 25.2 % (ref 34.8–46.1)
HGB BLD-MCNC: 7.6 G/DL (ref 11.5–15.4)
MCH RBC QN AUTO: 27.7 PG (ref 26.8–34.3)
MCHC RBC AUTO-ENTMCNC: 30.2 G/DL (ref 31.4–37.4)
MCV RBC AUTO: 92 FL (ref 82–98)
PLATELET # BLD AUTO: 207 THOUSANDS/UL (ref 149–390)
PMV BLD AUTO: 11.7 FL (ref 8.9–12.7)
RBC # BLD AUTO: 2.74 MILLION/UL (ref 3.81–5.12)
WBC # BLD AUTO: 6.04 THOUSAND/UL (ref 4.31–10.16)

## 2024-05-26 PROCEDURE — 99239 HOSP IP/OBS DSCHRG MGMT >30: CPT | Performed by: INTERNAL MEDICINE

## 2024-05-26 PROCEDURE — 85027 COMPLETE CBC AUTOMATED: CPT | Performed by: INTERNAL MEDICINE

## 2024-05-26 PROCEDURE — C9113 INJ PANTOPRAZOLE SODIUM, VIA: HCPCS | Performed by: INTERNAL MEDICINE

## 2024-05-26 PROCEDURE — 82948 REAGENT STRIP/BLOOD GLUCOSE: CPT

## 2024-05-26 RX ORDER — FERROUS SULFATE 324(65)MG
324 TABLET, DELAYED RELEASE (ENTERIC COATED) ORAL
Start: 2024-05-26 | End: 2024-06-25

## 2024-05-26 RX ORDER — PANTOPRAZOLE SODIUM 40 MG/1
40 TABLET, DELAYED RELEASE ORAL 2 TIMES DAILY
Start: 2024-05-26

## 2024-05-26 RX ADMIN — INSULIN LISPRO 1 UNITS: 100 INJECTION, SOLUTION INTRAVENOUS; SUBCUTANEOUS at 12:06

## 2024-05-26 RX ADMIN — LORATADINE 10 MG: 10 TABLET ORAL at 09:55

## 2024-05-26 RX ADMIN — AMLODIPINE BESYLATE 5 MG: 5 TABLET ORAL at 09:55

## 2024-05-26 RX ADMIN — NYSTATIN 1 APPLICATION: 100000 POWDER TOPICAL at 09:56

## 2024-05-26 RX ADMIN — PANTOPRAZOLE SODIUM 8 MG/HR: 40 INJECTION, POWDER, FOR SOLUTION INTRAVENOUS at 08:15

## 2024-05-26 RX ADMIN — SOTALOL HYDROCHLORIDE 80 MG: 80 TABLET ORAL at 09:55

## 2024-05-26 NOTE — ASSESSMENT & PLAN NOTE
Recent hemoglobin appears to be in the range of 11-13  Likely in the setting of GI bleeding  Monitor hemoglobin and transfuse if less than 7.5  Hemoglobin has been stable over the past 2 days and patient is having brown bowel movement.  Patient started on iron supplementation  Results from last 7 days   Lab Units 05/26/24  0605 05/25/24  0710 05/24/24  0545 05/23/24  1118   HEMOGLOBIN g/dL 7.6* 7.7* 7.9* 8.8*   HEMATOCRIT % 25.2* 25.1* 25.8* 28.3*   MCV fL 92 91 92 91

## 2024-05-26 NOTE — [place]ERS[place] TRANSF[place] FOR[place]
"[place] UN[place]  (ALL ITEMS MUST BE COMPLETED)    1. TRANSFER FROM: [facility]      TRANSFER TO: ***    2. DATE OF TRANSFER: 5/26/2024                        TIME OF TRANSFER: ***    3. PATIENT NAME: Ernestina Hurley E      YOB: 1935                             GENDER: female    4. LANGUAGE:   English    5. PHYSICIAN NAME:  Danni Olivo MD                   PHONE: [phone]    6. CODE STATUS: Level 3 - DNAR and DNI        Out of Hospital DNR Attached: {Yes/No:20651}    7. :                                      :  Extended Emergency Contact Information  Primary Emergency Contact: Herminia Horn  Mobile Phone: [phone]  Relation: Daughter  Secondary Emergency Contact: Scott Hurley  Mobile Phone: [phone]  Relation: Son           Health Care Representative/Proxy:  {Yes/No:20651}           Legal Guardian:  {Yes/No:20651}             NAME OF:           HEALTH CARE REPRESENTATIVE/PROXY:                                         OR           LEGAL GUARDIAN, IF NOT :                                               PHONE:  (Day)           (Night)                        (Cell)    8. REASON FOR TRANSFER: (Must include brief medical history and recent changes in physical function or cognition.) ***            V/S: /61 (BP Location: Left arm)   Pulse 64   Temp 98.1 °F (36.7 °C) (Oral)   Resp 18   Ht 5' 6\" (1.676 m)   Wt 83.5 kg (184 lb)   SpO2 99%   BMI 29.70 kg/m²           PAIN: {Pain:20652}    9. PRIMARY DIAGNOSIS: Peptic ulcer disease with hemorrhage      Secondary Diagnosis:         Pacemaker: {Yes/No:20651}      Internal Defib: {Yes/No:20651}          Mental Health Diagnosis (if Applicable):    10. RESTRAINTS: {Restraints:20654}     11. RESPIRATORY NEEDS: {Respiratory Needs:20655}    12. ISOLATION/PRECAUTION: {Isolation Precautions:20657}    13. ALLERGY: Bactrim " [sulfamethoxazole-trimethoprim], Clindamycin, Coumadin [warfarin], Dabigatran, Latex, and Pradaxa [dabigatran etexilate mesylate]    14. SENSORY:       {Sensory:10489}    15. SKIN CONDITION: {Skin Cond:63694}    16. DIET: {Diet:20783}    17. IV ACCESS: {IV Access:20887}    18. PERSONAL ITEMS SENT WITH PATIENT: {Personal Items:20888}    19. ATTACHED DOCUMENTS: MUST ATTACH CURRENT MEDICATION INFORMATION {Attached Documents:20891}    20. AT RISK ALERTS:{Risks:20892}        HARM TO: {Harm To:20893}    21. WEIGHT BEARING STATUS:         Left Leg: {None/Limited/Full:72704}        Right Leg: {None/Limited/Full:20896}    22. MENTAL STATUS:{Mental Status:79434}    23. FUNCTION:        Walk: {Self/With Help/Not Able:20899}        Transfer: {Self/With Help/Not Able:20899}        Toilet: {Self/With Help/Not Able:20899}        Feed: {Self/With Help/Not Able:20899}    24. IMMUNIZATIONS/SCREENING:     Immunization History   Administered Date(s) Administered    COVID-19 PFIZER VACCINE 0.3 ML IM 10/11/2023    Influenza Split High Dose Preservative Free IM 10/11/2012, 11/01/2013, 10/16/2014, 10/08/2015, 11/17/2016, 10/26/2017    Influenza, high dose seasonal 0.7 mL 11/13/2018, 11/04/2019, 11/17/2020, 11/10/2021    Pneumococcal Conjugate 13-Valent 01/12/2016    Pneumococcal Polysaccharide PPV23 01/01/2008       25. BOWEL: {Bowel:20900}    26. BLADDER: {Bladder:20901}    27. SENDING FACILITY CONTACT: ***                  Title: ***        Unit: ***        Phone: ***          REC'G FACILITY CONTACT (if known):        Title:        Unit:         Phone:         FORM PREFILLED BY (if applicable)       Title:       Unit:        Phone:         FORM COMPLETED BY Ani Frazier RN      Title: ***      Phone: ***

## 2024-05-26 NOTE — ASSESSMENT & PLAN NOTE
Lab Results   Component Value Date    HGBA1C 8.0 (H) 04/04/2024       Recent Labs     05/25/24  1545 05/25/24  2104 05/26/24  0836 05/26/24  1106   POCGLU 111 156* 127 183*         Blood Sugar Average: Last 72 hrs:  (P) 159.2886690239807196  Metformin held during hospitalization  Patient with Humalog sliding scale with Accu-Cheks every 6 hours  Patient is starting diabetic not ulcerogenic diet.  Can resume metformin upon discharge

## 2024-05-26 NOTE — PLAN OF CARE
Problem: PAIN - ADULT  Goal: Verbalizes/displays adequate comfort level or baseline comfort level  Description: Interventions:  - Encourage patient to monitor pain and request assistance  - Assess pain using appropriate pain scale  - Administer analgesics based on type and severity of pain and evaluate response  - Implement non-pharmacological measures as appropriate and evaluate response  - Consider cultural and social influences on pain and pain management  - Notify physician/advanced practitioner if interventions unsuccessful or patient reports new pain  Outcome: Progressing     Problem: INFECTION - ADULT  Goal: Absence or prevention of progression during hospitalization  Description: INTERVENTIONS:  - Assess and monitor for signs and symptoms of infection  - Monitor lab/diagnostic results  - Monitor all insertion sites, i.e. indwelling lines, tubes, and drains  - Monitor endotracheal if appropriate and nasal secretions for changes in amount and color  - [place] appropriate cooling/warming therapies per order  - Administer medications as ordered  - Instruct and encourage patient and family to use good hand hygiene technique  - Identify and instruct in appropriate isolation precautions for identified infection/condition  Outcome: Progressing     Problem: SAFETY ADULT  Goal: Patient will remain free of falls  Description: INTERVENTIONS:  - Educate patient/family on patient safety including physical limitations  - Instruct patient to call for assistance with activity   - Consult OT/PT to assist with strengthening/mobility   - Keep Call bell within reach  - Keep bed low and locked with side rails adjusted as appropriate  - Keep care items and personal belongings within reach  - Initiate and maintain comfort rounds  - Make Fall Risk Sign visible to staff  - Offer Toileting every 2 Hours, in advance of need  - Initiate/Maintain bed/chair alarmbed/  - Obtain necessary fall risk management equipment:bed/chair alarm    - Apply yellow socks and bracelet for high fall risk patients  - Consider moving patient to room near nurses station  Outcome: Progressing  Goal: Maintain or return to baseline ADL function  Description: INTERVENTIONS:  -  Assess patient's ability to carry out ADLs; assess patient's baseline for ADL function and identify physical deficits which impact ability to perform ADLs (bathing, care of mouth/teeth, toileting, grooming, dressing, etc.)  - Assess/evaluate cause of self-care deficits   - Assess range of motion  - Assess patient's mobility; develop plan if impaired  - Assess patient's need for assistive devices and provide as appropriate  - Encourage maximum independence but intervene and supervise when necessary  - Involve family in performance of ADLs  - Assess for home care needs following discharge   - Consider OT consult to assist with ADL evaluation and planning for discharge  - Provide patient education as appropriate  Outcome: Progressing  Goal: Maintains/Returns to pre admission functional level  Description: INTERVENTIONS:  - Perform AM-PAC 6 Click Basic Mobility/ Daily Activity assessment daily.  - Set and communicate daily mobility goal to care team and patient/family/caregiver.   - Collaborate with rehabilitation services on mobility goals if consulted  - Perform Range of Motion 3 times a day.  - Reposition patient every 3 hours.  - Dangle patient 3 times a day  - Stand patient 3 times a day  - Ambulate patient 3 times a day  - Out of bed to chair 3 times a day   - Out of bed for meals 3 times a day  - Out of bed for toileting  - Record patient progress and toleration of activity level   Outcome: Progressing     Problem: DISCHARGE PLANNING  Goal: Discharge to home or other facility with appropriate resources  Description: INTERVENTIONS:  - Identify barriers to discharge w/patient and caregiver  - Arrange for needed discharge resources and transportation as appropriate  - Identify discharge learning  needs (meds, wound care, etc.)  - Arrange for interpretive services to assist at discharge as needed  - Refer to Case Management Department for coordinating discharge planning if the patient needs post-hospital services based on physician/advanced practitioner order or complex needs related to functional status, cognitive ability, or social support system  Outcome: Progressing     Problem: Knowledge Deficit  Goal: Patient/family/caregiver demonstrates understanding of disease process, treatment plan, medications, and discharge instructions  Description: Complete learning assessment and assess knowledge base.  Interventions:  - Provide teaching at level of understanding  - Provide teaching via preferred learning methods  Outcome: Progressing     Problem: Prexisting or High Potential for Compromised Skin Integrity  Goal: Skin integrity is maintained or improved  Description: INTERVENTIONS:  - Identify patients at risk for skin breakdown  - Assess and monitor skin integrity  - Assess and monitor nutrition and hydration status  - Monitor labs   - Assess for incontinence   - Turn and reposition patient  - Assist with mobility/ambulation  - Relieve pressure over bony prominences  - Avoid friction and shearing  - Provide appropriate hygiene as needed including keeping skin clean and dry  - Evaluate need for skin moisturizer/barrier cream  - Collaborate with interdisciplinary team   - Patient/family teaching  - Consider wound care consult   Outcome: Progressing

## 2024-05-26 NOTE — CASE MANAGEMENT
Case Management Discharge Planning Note    Patient name Ernestina Hurley  Location [address] [address]/[address] [address] MRN [id number]  : 1935 Date 2024       Current Admission Date: 2024  Current Admission Diagnosis:Peptic ulcer disease with hemorrhage   Patient Active Problem List    Diagnosis Date Noted Date Diagnosed    Peptic ulcer disease with hemorrhage 2024     Depression 2024     GERD (gastroesophageal reflux disease) 2024     SIRS (systemic inflammatory response syndrome) ([facility]) 2024     Acute on chronic respiratory failure with hypoxia and hypercapnia ([facility]) 2024     Anemia 2022     Lower extremity weakness 2021     Current moderate episode of major depressive disorder without prior episode ([facility]) 2020     Onychomycosis 2019     Tinea pedis of both feet 2019     Pain in both feet 2019     Chest pain 2019     Thyroid nodule 2019     Diabetic polyneuropathy associated with type 2 diabetes mellitus ([facility]) 03/15/2019     Atherosclerosis of native artery of both lower extremities ([facility]) 03/15/2019     Granuloma of great toe 02/15/2018     History of breast cancer 2017     Essential hypertension 2017     Type 2 diabetes mellitus without complication, without long-term current use of insulin ([facility]) 2017     Mixed hyperlipidemia 2017     Atrial fibrillation ([facility]) 2017     Multiple sclerosis ([facility]) 2017     Abnormal gait 10/08/2015     Urinary incontinence 08/15/2013     Arthropathy of multiple sites 2013       LOS (days): 3  Geometric Mean LOS (GMLOS) (days): 3  Days to GMLOS:0.1     OBJECTIVE:  Risk of Unplanned Readmission Score: 13.26         Current admission status: Inpatient   Preferred Pharmacy:   [id number], [address] [id number]  Phone: [phone] Fax: [phone]    [facility] [place] [address]  [facility] [address]  Phone: [phone] Fax: [phone]    Primary Care Provider: No primary care provider on file.    Primary Insurance: MEDICARE  Secondary Insurance: BLUE CROSS    DISCHARGE DETAILS:    Discharge planning discussed with:: Herminia Horn (daughter)        CM contacted family/caregiver?: Yes  Were Treatment Team discharge recommendations reviewed with patient/caregiver?: Yes  Did patient/caregiver verbalize understanding of patient care needs?: N/A- going to facility  Were patient/caregiver advised of the risks associated with not following Treatment Team discharge recommendations?: Yes    Contacts  Patient Contacts: Herminia Horn (daughter)  Relationship to Patient:: Family  Contact Method: Phone  Phone Number: [phone]  Reason/Outcome: Emergency Contact, Discharge Planning       Other Referral/Resources/Interventions Provided:  Interventions: Facility Return, SNF, Transportation    Would you like to participate in our [facility] service program?  : No - Declined    Treatment Team Recommendation: SNF, Facility Return  Discharge Destination Plan:: Facility Return, SNF  Transport at Discharge : [facility] Ambulance  Dispatcher Contacted: Yes  Number/Name of Dispatcher: [facility] [employer]  Transported by (Company and Unit #): [facility]  ETA of Transport (Date): 05/26/24  ETA of Transport (Time): 1415        Accepting Facility Name, City & State : [facility]  Receiving Facility/Agency Phone Number: [phone]        Patient is medically cleared for discharge per attending.  ABRAHAM requested [facility] transport in [employer] and pickup is scheduled for 1415.  Nursing was notified of pickup time via TT and medical necessity form e-mailed to nurse.  ABRAHAM spoke by phone with daughter Herminia to notify her of pickup time.

## 2024-05-26 NOTE — [place] TRANSF[place] FORM
"[place] FOR[place]  (ALL ITEMS MUST BE COMPLETED)    1. TRANSFER FROM: [facility]      TRANSFER TO: [facility]     2. DATE OF TRANSFER: 5/26/2024                        TIME OF TRANSFER: 1415    3. PATIENT NAME: Ernestina Hurley E      YOB: 1935                             GENDER: female    4. LANGUAGE:   English    5. PHYSICIAN NAME:  Danni Olivo MD                   PHONE: [phone]    6. CODE STATUS: Level 3 - DNAR and DNI        Out of Hospital DNR Attached: Yes    7. :                                      :  Extended Emergency Contact Information  Primary Emergency Contact: Herminia Horn  Mobile Phone: [phone]  Relation: Daughter  Secondary Emergency Contact: Scott Hurley  Mobile Phone: [phone]  Relation: Son           Health Care Representative/Proxy:             Legal Guardian:               NAME OF:           HEALTH CARE REPRESENTATIVE/PROXY:                                         OR           LEGAL GUARDIAN, IF NOT :                                               PHONE:  (Day)           (Night)                        (Cell)    8. REASON FOR TRANSFER: (Must include brief medical history and recent changes in physical function or cognition.) rehab            V/S: /61 (BP Location: Left arm)   Pulse 64   Temp 98.1 °F (36.7 °C) (Oral)   Resp 18   Ht 5' 6\" (1.676 m)   Wt 83.5 kg (184 lb)   SpO2 99%   BMI 29.70 kg/m²           PAIN: None    9. PRIMARY DIAGNOSIS: Peptic ulcer disease with hemorrhage      Secondary Diagnosis:         Pacemaker:  No    Internal Defib: No          Mental Health Diagnosis (if Applicable):    10. RESTRAINTS: No     11. RESPIRATORY NEEDS: None    12. ISOLATION/PRECAUTION: None    13. ALLERGY: Bactrim [sulfamethoxazole-trimethoprim], Clindamycin, Coumadin [warfarin], Dabigatran, Latex, and Pradaxa [dabigatran etexilate mesylate]    14. " SENSORY:           15. SKIN CONDITION: Yes:  Pressure    16. DIET: DM no ulergenic    17. IV ACCESS: None    18. PERSONAL ITEMS SENT WITH PATIENT: None    19. ATTACHED DOCUMENTS: MUST ATTACH CURRENT MEDICATION INFORMATION Face Sheet    20. AT RISK ALERTS:Falls        HARM TO: N/A    21. WEIGHT BEARING STATUS:         Left Leg: Limited        Right Leg: Limited    22. MENTAL STATUS:Alert    23. FUNCTION:        Walk: Not Able        Transfer: Not Able        Toilet: Not Able        Feed: Self    24. IMMUNIZATIONS/SCREENING:     Immunization History   Administered Date(s) Administered    COVID-19 PFIZER VACCINE 0.3 ML IM 10/11/2023    Influenza Split High Dose Preservative Free IM 10/11/2012, 11/01/2013, 10/16/2014, 10/08/2015, 11/17/2016, 10/26/2017    Influenza, high dose seasonal 0.7 mL 11/13/2018, 11/04/2019, 11/17/2020, 11/10/2021    Pneumococcal Conjugate 13-Valent 01/12/2016    Pneumococcal Polysaccharide PPV23 01/01/2008       25. BOWEL: Incontinent     26. BLADDER: Incontinent    27. SENDING FACILITY CONTACT: [id number]                  Title: Rn        Unit: 3n        Phone:[phone]          REC'G FACILITY CONTACT (if known):        Title:        Unit:         Phone:         FORM PREFILLED BY (if applicable)       Title:       Unit:        Phone:         FORM COMPLETED BY Ani Frazier RN      Title: RN      Phone:  [phone]

## 2024-05-26 NOTE — ASSESSMENT & PLAN NOTE
Patient presented with abdominal pain which is especially worse 2 days ago  In the ED patient noted to be anemic with a hemoglobin of 8.8  CT abdomen pelvis high-volume bleeding scan-gastroduodenitis with gastric pylorus ulcer with small focus of active bleed without any free air  Patient has been on Protonix drip during hospitalization was discharged on Protonix 40 mg p.o. twice daily  Patient underwent EGD which showed single ulcer in the second part of the duodenum with nonbleeding visible vessel which was treated with induced coagulation with bipolar cautery, injected epinephrine  Patient is tolerating not ulcerogenic diet

## 2024-05-26 NOTE — ASSESSMENT & PLAN NOTE
Patient is on sotalol and not on anticoagulation  Aspirin has been on hold which can be restarted in 3 days

## 2024-05-26 NOTE — DISCHARGE SUMMARY
[facility]  Discharge- Ernestina Hurley 1935, 88 y.o. female MRN: [id number]  Unit/Bed#: [address] [place] [address] Encounter: [id number]  Primary Care Provider: No primary care provider on file.   Date and time admitted to hospital: 5/23/2024 10:45 AM    * Peptic ulcer disease with hemorrhage  Assessment & Plan  Patient presented with abdominal pain which is especially worse 2 days ago  In the ED patient noted to be anemic with a hemoglobin of 8.8  CT abdomen pelvis high-volume bleeding scan-gastroduodenitis with gastric pylorus ulcer with small focus of active bleed without any free air  Patient has been on Protonix drip during hospitalization was discharged on Protonix 40 mg p.o. twice daily  Patient underwent EGD which showed single ulcer in the second part of the duodenum with nonbleeding visible vessel which was treated with induced coagulation with bipolar cautery, injected epinephrine  Patient is tolerating not ulcerogenic diet    Anemia  Assessment & Plan  Recent hemoglobin appears to be in the range of 11-13  Likely in the setting of GI bleeding  Monitor hemoglobin and transfuse if less than 7.5  Hemoglobin has been stable over the past 2 days and patient is having brown bowel movement.  Patient started on iron supplementation  Results from last 7 days   Lab Units 05/26/24  0605 05/25/24  0710 05/24/24  0545 05/23/24  1118   HEMOGLOBIN g/dL 7.6* 7.7* 7.9* 8.8*   HEMATOCRIT % 25.2* 25.1* 25.8* 28.3*   MCV fL 92 91 92 91          Diabetic polyneuropathy associated with type 2 diabetes mellitus (HCC)  Assessment & Plan  Lab Results   Component Value Date    HGBA1C 8.0 (H) 04/04/2024       Recent Labs     05/25/24  1545 05/25/24  2104 05/26/24  0836 05/26/24  1106   POCGLU 111 156* 127 183*         Blood Sugar Average: Last 72 hrs:  (P) [id number]  Metformin held during hospitalization  Patient with Humalog sliding scale with Accu-Cheks every 6 hours  Patient is starting diabetic  not ulcerogenic diet.  Can resume metformin upon discharge    Essential hypertension  Assessment & Plan  Continue amlodipine 5 mg p.o. daily  Can resume Lasix upon discharge    Multiple sclerosis (HCC)  Assessment & Plan  Patient is bedbound at baseline    Atrial fibrillation (HCC)  Assessment & Plan  Patient is on sotalol and not on anticoagulation  Aspirin has been on hold which can be restarted in 3 days        Medical Problems       Resolved Problems  Date Reviewed: 5/26/2024   None       Discharging Physician / Practitioner: Danni Olivo MD  PCP: No primary care provider on file.  Admission Date:   Admission Orders (From admission, onward)       Ordered        05/23/24 1415  INPATIENT ADMISSION  Once                          Discharge Date: 05/26/24    Consultations During Hospital Stay:  Gastroenterology    Procedures:    EGD-single ulcer in second part of the duodenum with nonbleeding visible vessel, induced coagulation with bipolar cautery, injected epinephrine.  Mild erythematous mucosa with erosion in the antrum      Significant Findings / Test Results:   Chest x-ray-vascular congestion with improved left pleural effusion  CT abdomen pelvis high-volume scan-gastroduodenitis and gastric pylorus ulcer with active bleeding       Outpatient Tests Requested:  Follow-up with GI    Complications: None    Reason for Admission:     Hospital Course:   Ernestina Hurley is a 88 y.o. female patient with past med history of MS, diabetes, hypertension, PAF, chronic respiratory failure, pulmonary  hypertension who originally presented to the hospital on 5/23/2024 due to intermittent abdominal pain with nausea.  In the ED patient's CAT scan high-volume bleed showed gastric pyloric ulcer with small focus of active bleed.  Patient's hemoglobin dropped to around 8 from her baseline of 12.  Patient was started on Protonix drip.  Patient underwent EGD which showed single ulcer in the duodenum which was treated with  "coagulation bipolar cautery and epinephrine injection.  Hemoglobin continues remain stable.  Patient was advanced on ulcerogenic diet.  Patient to be discharged to rehab on iron supplementation with an outpatient follow-up    Please see above list of diagnoses and related plan for additional information.     Condition at Discharge: fair    Discharge Day Visit / Exam:   Subjective: Patient reports some lip swelling and dry mouth  Vitals: Blood Pressure: 133/61 (05/26/24 0818)  Pulse: 64 (05/26/24 0818)  Temperature: 98.1 °F (36.7 °C) (05/26/24 0818)  Temp Source: Oral (05/26/24 0818)  Respirations: 18 (05/26/24 0818)  Height: 5' 6\" (167.6 cm) (05/24/24 0240)  Weight - Scale: 83.5 kg (184 lb) (05/23/24 1546)  SpO2: 99 % (05/26/24 0818)  Exam:   Physical Exam  Constitutional:       Appearance: Normal appearance.   HENT:      Head: Normocephalic and atraumatic.      Nose: Nose normal.      Mouth/Throat:      Mouth: Mucous membranes are moist.      Pharynx: Oropharynx is clear.   Eyes:      Extraocular Movements: Extraocular movements intact.      Pupils: Pupils are equal, round, and reactive to light.   Cardiovascular:      Rate and Rhythm: Normal rate and regular rhythm.   Pulmonary:      Effort: Pulmonary effort is normal.      Breath sounds: Normal breath sounds.   Abdominal:      General: Bowel sounds are normal. There is no distension.      Palpations: Abdomen is soft.      Tenderness: There is no abdominal tenderness.   Musculoskeletal:         General: No swelling.      Cervical back: Normal range of motion and neck supple.   Skin:     General: Skin is warm and dry.   Neurological:      General: No focal deficit present.      Mental Status: She is alert.          Discussion with Family: Updated  (daughter) via phone.    Discharge instructions/Information to patient and family:   See after visit summary for information provided to patient and family.      Provisions for Follow-Up Care:  See after " visit summary for information related to follow-up care and any pertinent home health orders.      Mobility at time of Discharge:   Basic Mobility Inpatient Raw Score: 9  JH-HLM Goal: 3: Sit at edge of bed  JH-HLM Achieved: 2: Bed activities/Dependent transfer  HLM Goal NOT achieved. Continue to encourage mobility in post discharge setting.     Disposition:   Other Skilled Nursing Facility at [facility]    Planned Readmission: No     Discharge Statement:  I spent 35 minutes discharging the patient. This time was spent on the day of discharge. I had direct contact with the patient on the day of discharge. Greater than 50% of the total time was spent examining patient, answering all patient questions, arranging and discussing plan of care with patient as well as directly providing post-discharge instructions.  Additional time then spent on discharge activities.    Discharge Medications:  See after visit summary for reconciled discharge medications provided to patient and/or family.      **Please Note: This note may have been constructed using a voice recognition system**

## 2024-05-28 LAB
ATRIAL RATE: 90 BPM
P AXIS: 52 DEGREES
PR INTERVAL: 196 MS
QRS AXIS: 0 DEGREES
QRSD INTERVAL: 76 MS
QT INTERVAL: 376 MS
QTC INTERVAL: 459 MS
T WAVE AXIS: 88 DEGREES
VENTRICULAR RATE: 90 BPM

## 2024-05-28 PROCEDURE — 93010 ELECTROCARDIOGRAM REPORT: CPT | Performed by: INTERNAL MEDICINE

## 2024-05-28 RX ORDER — POTASSIUM CHLORIDE 750 MG/1
10 TABLET, FILM COATED, EXTENDED RELEASE ORAL 2 TIMES DAILY
COMMUNITY
Start: 2024-02-20 | End: 2024-05-30

## 2024-05-28 RX ORDER — ACETAMINOPHEN 500 MG
TABLET ORAL EVERY 12 HOURS
COMMUNITY
Start: 2024-03-29 | End: 2024-05-30

## 2024-05-28 RX ORDER — IBUPROFEN 600 MG/1
TABLET ORAL
COMMUNITY
Start: 2024-05-01 | End: 2024-05-30

## 2024-05-28 RX ORDER — METOLAZONE 2.5 MG/1
1 TABLET ORAL EVERY 24 HOURS
COMMUNITY
Start: 2024-03-29 | End: 2024-05-30

## 2024-05-30 ENCOUNTER — OFFICE VISIT (OUTPATIENT)
Dept: CARDIOLOGY CLINIC | Facility: CLINIC | Age: 89
End: 2024-05-30
Payer: MEDICARE

## 2024-05-30 VITALS
HEIGHT: 66 IN | SYSTOLIC BLOOD PRESSURE: 116 MMHG | BODY MASS INDEX: 29.41 KG/M2 | HEART RATE: 69 BPM | DIASTOLIC BLOOD PRESSURE: 82 MMHG | OXYGEN SATURATION: 99 % | WEIGHT: 183 LBS

## 2024-05-30 DIAGNOSIS — I48.0 PAROXYSMAL ATRIAL FIBRILLATION (HCC): Primary | ICD-10-CM

## 2024-05-30 DIAGNOSIS — E78.2 MIXED HYPERLIPIDEMIA: ICD-10-CM

## 2024-05-30 DIAGNOSIS — I10 ESSENTIAL HYPERTENSION: ICD-10-CM

## 2024-05-30 DIAGNOSIS — J18.9 PNEUMONIA: ICD-10-CM

## 2024-05-30 DIAGNOSIS — E11.9 TYPE 2 DIABETES MELLITUS WITHOUT COMPLICATION, WITHOUT LONG-TERM CURRENT USE OF INSULIN (HCC): ICD-10-CM

## 2024-05-30 DIAGNOSIS — I27.20 PULMONARY HYPERTENSION (HCC): ICD-10-CM

## 2024-05-30 PROCEDURE — 88341 IMHCHEM/IMCYTCHM EA ADD ANTB: CPT | Performed by: PATHOLOGY

## 2024-05-30 PROCEDURE — 88305 TISSUE EXAM BY PATHOLOGIST: CPT | Performed by: PATHOLOGY

## 2024-05-30 PROCEDURE — 88342 IMHCHEM/IMCYTCHM 1ST ANTB: CPT | Performed by: PATHOLOGY

## 2024-05-30 PROCEDURE — 93000 ELECTROCARDIOGRAM COMPLETE: CPT | Performed by: INTERNAL MEDICINE

## 2024-05-30 PROCEDURE — 88313 SPECIAL STAINS GROUP 2: CPT | Performed by: PATHOLOGY

## 2024-05-30 PROCEDURE — 99214 OFFICE O/P EST MOD 30 MIN: CPT | Performed by: INTERNAL MEDICINE

## 2024-05-30 RX ORDER — POLYETHYLENE GLYCOL 3350 17 G/17G
17 POWDER, FOR SOLUTION ORAL DAILY
COMMUNITY

## 2024-05-30 NOTE — PROGRESS NOTES
Cardiology   Gaviota Mauro DO, [facility]  Jona Lagunas MD, [facility]  Rufus Webber MD, [facility]  Tatiana Trevino MD, [facility]  -------------------------------------------------------------------  [facility]  [address], [address], [address]  [place], [place], [address]  [phone]    Cardiology Follow Up  Ernestina Hurley  1935  [id number]          Assessment/Plan:    1. Paroxysmal atrial fibrillation (HCC)    2. Essential hypertension    3. Mixed hyperlipidemia    4. Pulmonary hypertension (HCC)    5. Pneumonia    6. Type 2 diabetes mellitus without complication, without long-term current use of insulin (HCC)      -Patient with history of A-fib but did not have any atrial fibrillation during recent hospitalization for pneumonia and ulcer.  She has no evidence of acute congestion at this time and is not on diuretic.  -Continue sotalol 80 mg twice daily -QT interval is normal.  Her last creatinine was normal along with electrolytes.  -Will require routine follow-up every 6 months.  -Pulmonary hypertension may be secondary to pneumonia and congestion at the time.  Currently asymptomatic.  No changes at this time.        Interval History:     Ernestina Hurley is 88 y.o. female here for followup of recent hospitalization.  The patient was recently admitted to [facility] with acute on chronic respiratory failure with hypoxia and hypercapnia.  She was also noted to have moderate size left pleural effusion.  She was treated with IV antibiotics and underwent thoracentesis.  A 2D echocardiogram which done which showed EF of 65% and moderate pulmonary hypertension.  She was discharged home but returned after developing GI bleed.  She was treated with Protonix infusion.  EGD showed ulcer in the duodenum which was treated with cautery.  Her hemoglobin was 7.6.    Patient has a history of atrial fibrillation and is on treatment with sotalol.  She is not on  anticoagulation.  She did not have any atrial fibrillation during her recent hospitalization.  She has been on the medication for over 3 years.  She has not seen a cardiologist during this time.    She denies any shortness of breath or lower extremity edema.  She complains of lip swelling and pain.      The following portions of the patient's history were reviewed and updated as appropriate: allergies, current medications, past family history, past medical history, past social history, past surgical history, and problem list.       Current Outpatient Medications:     acetaminophen (TYLENOL) 650 mg CR tablet, Take 650 mg by mouth every 6 (six) hours as needed for mild pain, Disp: , Rfl:     ferrous sulfate 324 MG TBEC, Take 1 tablet (324 mg total) by mouth daily with breakfast, Disp: , Rfl:     latanoprost (XALATAN) 0.005 % ophthalmic solution, Administer 1 drop to both eyes daily at bedtime, Disp: 2.5 mL, Rfl: 0    loratadine (CLARITIN) 10 mg tablet, Take 10 mg by mouth daily, Disp: , Rfl:     pantoprazole (PROTONIX) 40 mg tablet, Take 1 tablet (40 mg total) by mouth 2 (two) times a day, Disp: , Rfl:     polyethylene glycol (MIRALAX) 17 g packet, Take 17 g by mouth daily, Disp: , Rfl:     sotalol (BETAPACE) 80 mg tablet, Take 1 tablet (80 mg total) by mouth 2 (two) times a day, Disp: 180 tablet, Rfl: 3    amLODIPine (NORVASC) 5 mg tablet, Take 1 tablet (5 mg total) by mouth daily (Patient not taking: Reported on 5/30/2024), Disp: , Rfl: 0    Easy Comfort Lancets MISC, USE DAILY ICD 10 CODE E11.9 (Patient not taking: Reported on 5/30/2024), Disp: 100 each, Rfl: 3    ibuprofen (MOTRIN) 600 mg tablet, , Disp: , Rfl:     metFORMIN (GLUCOPHAGE) 1000 MG tablet, Take 0.5 tablets (500 mg total) by mouth 2 (two) times a day with meals, Disp: 30 tablet, Rfl: 0    metolazone (ZAROXOLYN) 2.5 mg tablet, Take 1 tablet by mouth every 24 hours (Patient not taking: Reported on 5/30/2024), Disp: , Rfl:     potassium chloride  "(Klor-Con) 10 mEq tablet, Take 10 mEq by mouth 2 (two) times a day (Patient not taking: Reported on 5/30/2024), Disp: , Rfl:     Probiotic, Lactobacillus, CAPS, Take by mouth Every 12 hours (Patient not taking: Reported on 5/30/2024), Disp: , Rfl:         Review of Systems:  Review of Systems   HENT:          Lip pain   Respiratory:  Negative for shortness of breath.    Cardiovascular:  Negative for chest pain, palpitations and leg swelling.   Musculoskeletal:  Positive for arthralgias and gait problem.   All other systems reviewed and are negative.        Physical Exam:  Vitals:  Vitals:    05/30/24 1125   BP: 116/82   BP Location: Left arm   Patient Position: Sitting   Cuff Size: Standard   Pulse: 69   SpO2: 99%   Weight: 83 kg (183 lb)   Height: 5' 6\" (1.676 m)     Physical Exam   Constitutional: She appears healthy. No distress.   Eyes: Pupils are equal, round, and reactive to light. Conjunctivae are normal.   Neck: No JVD present.   Cardiovascular: Normal rate and regular rhythm. Exam reveals no gallop and no friction rub.   Murmur heard.  Pulmonary/Chest: Effort normal and breath sounds normal. She has no wheezes. She has no rales.   Musculoskeletal:         General: No tenderness, deformity or edema.      Cervical back: Normal range of motion and neck supple.   Neurological: She is alert and oriented to person, place, and time.   Skin: Skin is warm and dry.        Cardiographics:  EKG: Personally reviewed NSR with rate 70 Normal QT interval  Last known EF: 60%    This note was completed in part utilizing M-Modal Fluency Direct Software.  Grammatical errors, random word insertions, spelling mistakes, and incomplete sentences can be an occasional consequence of this system secondary to software limitations, ambient noise, and hardware issues.  If you have any questions or concerns about the content, text, or information contained within the body of this dictation, please contact the provider for " clarification.

## 2024-06-05 ENCOUNTER — TELEPHONE (OUTPATIENT)
Dept: GASTROENTEROLOGY | Facility: CLINIC | Age: 89
End: 2024-06-05

## 2024-06-05 NOTE — TELEPHONE ENCOUNTER
Pt 's daughter Herminia returning a call. Per Herminia pt is at [facility]. See notes in pt's demographic's for all the phone #'s at [facility].

## 2024-06-05 NOTE — TELEPHONE ENCOUNTER
I left a voice message to get an updated phone number I found a number for patient facility and updated it in her chart, Thank you

## 2024-06-06 ENCOUNTER — NURSE TRIAGE (OUTPATIENT)
Age: 89
End: 2024-06-06

## 2024-06-06 NOTE — TELEPHONE ENCOUNTER
Karen from [facility] called in regard to scheduling hospital follow up appointment. Call was transferred to Leeanna santos nurse to assist.

## 2024-06-06 NOTE — TELEPHONE ENCOUNTER
"    Pt. Resident at [facility] and was recently d/c from hospital due to bleeding ulcer, staff calling to schedule f/u OV, scheduled for 6/13/24      Reason for Disposition   Information only question and nurse able to answer    Answer Assessment - Initial Assessment Questions  1. REASON FOR CALL or QUESTION: \"What is your reason for calling today?\" or \"How can I best help you?\" or \"What question do you have that I can help answer?\"          Staff calling to schedule f/u OV for pt. After d/c from hospital    Protocols used: Information Only Call - No Triage-ADULT-[place]    "

## 2024-06-11 ENCOUNTER — HOSPITAL ENCOUNTER (EMERGENCY)
Facility: HOSPITAL | Age: 89
Discharge: HOME | End: 2024-06-11
Attending: EMERGENCY MEDICINE
Payer: MEDICARE

## 2024-06-11 ENCOUNTER — APPOINTMENT (EMERGENCY)
Dept: RADIOLOGY | Facility: HOSPITAL | Age: 89
End: 2024-06-11
Payer: MEDICARE

## 2024-06-11 VITALS
DIASTOLIC BLOOD PRESSURE: 67 MMHG | RESPIRATION RATE: 20 BRPM | TEMPERATURE: 97.7 F | WEIGHT: 185.19 LBS | HEART RATE: 101 BPM | SYSTOLIC BLOOD PRESSURE: 152 MMHG | HEIGHT: 66 IN | OXYGEN SATURATION: 98 % | BODY MASS INDEX: 29.76 KG/M2

## 2024-06-11 DIAGNOSIS — R07.9 CHEST PAIN: Primary | ICD-10-CM

## 2024-06-11 DIAGNOSIS — E11.9 DIABETES (HCC): ICD-10-CM

## 2024-06-11 LAB
2HR DELTA HS TROPONIN: 0 NG/L
ALBUMIN SERPL BCP-MCNC: 3.1 G/DL (ref 3.5–5)
ALP SERPL-CCNC: 77 U/L (ref 34–104)
ALT SERPL W P-5'-P-CCNC: 6 U/L (ref 7–52)
ANION GAP SERPL CALCULATED.3IONS-SCNC: 5 MMOL/L (ref 4–13)
AST SERPL W P-5'-P-CCNC: 6 U/L (ref 13–39)
ATRIAL RATE: 102 BPM
BASOPHILS # BLD AUTO: 0.05 THOUSANDS/ÂΜL (ref 0–0.1)
BASOPHILS NFR BLD AUTO: 1 % (ref 0–1)
BILIRUB SERPL-MCNC: 0.23 MG/DL (ref 0.2–1)
BUN SERPL-MCNC: 13 MG/DL (ref 5–25)
CALCIUM ALBUM COR SERPL-MCNC: 9.3 MG/DL (ref 8.3–10.1)
CALCIUM SERPL-MCNC: 8.6 MG/DL (ref 8.4–10.2)
CARDIAC TROPONIN I PNL SERPL HS: 4 NG/L
CARDIAC TROPONIN I PNL SERPL HS: 4 NG/L
CHLORIDE SERPL-SCNC: 99 MMOL/L (ref 96–108)
CO2 SERPL-SCNC: 35 MMOL/L (ref 21–32)
CREAT SERPL-MCNC: 0.51 MG/DL (ref 0.6–1.3)
EOSINOPHIL # BLD AUTO: 0.25 THOUSAND/ÂΜL (ref 0–0.61)
EOSINOPHIL NFR BLD AUTO: 4 % (ref 0–6)
ERYTHROCYTE [DISTWIDTH] IN BLOOD BY AUTOMATED COUNT: 15.5 % (ref 11.6–15.1)
GFR SERPL CREATININE-BSD FRML MDRD: 87 ML/MIN/1.73SQ M
GLUCOSE SERPL-MCNC: 182 MG/DL (ref 65–140)
HCT VFR BLD AUTO: 34.5 % (ref 34.8–46.1)
HGB BLD-MCNC: 10.1 G/DL (ref 11.5–15.4)
IMM GRANULOCYTES # BLD AUTO: 0.03 THOUSAND/UL (ref 0–0.2)
IMM GRANULOCYTES NFR BLD AUTO: 0 % (ref 0–2)
LIPASE SERPL-CCNC: 24 U/L (ref 11–82)
LYMPHOCYTES # BLD AUTO: 1.62 THOUSANDS/ÂΜL (ref 0.6–4.47)
LYMPHOCYTES NFR BLD AUTO: 23 % (ref 14–44)
MCH RBC QN AUTO: 26.6 PG (ref 26.8–34.3)
MCHC RBC AUTO-ENTMCNC: 29.3 G/DL (ref 31.4–37.4)
MCV RBC AUTO: 91 FL (ref 82–98)
MONOCYTES # BLD AUTO: 0.53 THOUSAND/ÂΜL (ref 0.17–1.22)
MONOCYTES NFR BLD AUTO: 8 % (ref 4–12)
NEUTROPHILS # BLD AUTO: 4.49 THOUSANDS/ÂΜL (ref 1.85–7.62)
NEUTS SEG NFR BLD AUTO: 64 % (ref 43–75)
NRBC BLD AUTO-RTO: 0 /100 WBCS
PLATELET # BLD AUTO: 223 THOUSANDS/UL (ref 149–390)
PMV BLD AUTO: 11.3 FL (ref 8.9–12.7)
POTASSIUM SERPL-SCNC: 4.7 MMOL/L (ref 3.5–5.3)
PR INTERVAL: 140 MS
PROT SERPL-MCNC: 7.2 G/DL (ref 6.4–8.4)
QRS AXIS: 9 DEGREES
QRSD INTERVAL: 70 MS
QT INTERVAL: 372 MS
QTC INTERVAL: 484 MS
RBC # BLD AUTO: 3.79 MILLION/UL (ref 3.81–5.12)
SODIUM SERPL-SCNC: 139 MMOL/L (ref 135–147)
T WAVE AXIS: 77 DEGREES
VENTRICULAR RATE: 102 BPM
WBC # BLD AUTO: 6.97 THOUSAND/UL (ref 4.31–10.16)

## 2024-06-11 PROCEDURE — 80053 COMPREHEN METABOLIC PANEL: CPT | Performed by: EMERGENCY MEDICINE

## 2024-06-11 PROCEDURE — 83690 ASSAY OF LIPASE: CPT | Performed by: EMERGENCY MEDICINE

## 2024-06-11 PROCEDURE — 84484 ASSAY OF TROPONIN QUANT: CPT | Performed by: EMERGENCY MEDICINE

## 2024-06-11 PROCEDURE — 71275 CT ANGIOGRAPHY CHEST: CPT

## 2024-06-11 PROCEDURE — 74174 CTA ABD&PLVS W/CONTRAST: CPT

## 2024-06-11 PROCEDURE — 93010 ELECTROCARDIOGRAM REPORT: CPT | Performed by: INTERNAL MEDICINE

## 2024-06-11 PROCEDURE — 99285 EMERGENCY DEPT VISIT HI MDM: CPT | Performed by: EMERGENCY MEDICINE

## 2024-06-11 PROCEDURE — 36415 COLL VENOUS BLD VENIPUNCTURE: CPT | Performed by: EMERGENCY MEDICINE

## 2024-06-11 PROCEDURE — 93005 ELECTROCARDIOGRAM TRACING: CPT

## 2024-06-11 PROCEDURE — 99285 EMERGENCY DEPT VISIT HI MDM: CPT

## 2024-06-11 PROCEDURE — 85025 COMPLETE CBC W/AUTO DIFF WBC: CPT | Performed by: EMERGENCY MEDICINE

## 2024-06-11 RX ADMIN — IOHEXOL 100 ML: 350 INJECTION, SOLUTION INTRAVENOUS at 04:32

## 2024-06-11 NOTE — ED NOTES
This RN called report to [facility]. [facility] told this RN they would set aside the patient's breakfast for her as she is looking forward to her usual breakfast.      Yi Martins RN  06/11/24 0736

## 2024-06-11 NOTE — ED NOTES
Patient keeps telling nurse she is having the mid gastric pain again. Sinus rhythm on monitor, vital signs within normal limits, all tests negative for any cardiac event and patient informed of that, no signs of distress. Patient is on protonix at facility.Patient with HL's removed and ready for transport back to nursing home.     Natalie Roach RN  06/11/24 5002

## 2024-06-11 NOTE — ED PROVIDER NOTES
History  Chief Complaint   Patient presents with    Chest Pain     Patient states she woke from sleep with mid-sternal chest pain radiating through to her back. No N/V. 324 ASA given PTA.      88-year-old female past medical significant for history of recent admission for bleeding ulcer, history of MS presenting to the ED today from care facility for chest pain.  Patient states she woke up and then shortly after waking up she started to have sharp pain in the center of her chest that radiates towards her back.  She says that now that she is here she no longer has this pain.  She was given aspirin prior to arrival by EMS.  Denying any nausea or diaphoresis associated with this pain.        Prior to Admission Medications   Prescriptions Last Dose Informant Patient Reported? Taking?   acetaminophen (TYLENOL) 650 mg CR tablet 6/10/2024  Yes Yes   Sig: Take 650 mg by mouth every 6 (six) hours as needed for mild pain   ferrous sulfate 324 MG TBEC 6/10/2024  No Yes   Sig: Take 1 tablet (324 mg total) by mouth daily with breakfast   latanoprost (XALATAN) 0.005 % ophthalmic solution 6/10/2024 Self No Yes   Sig: Administer 1 drop to both eyes daily at bedtime   loratadine (CLARITIN) 10 mg tablet 6/10/2024  Yes Yes   Sig: Take 10 mg by mouth daily   metFORMIN (GLUCOPHAGE) 1000 MG tablet   No No   Sig: Take 0.5 tablets (500 mg total) by mouth 2 (two) times a day with meals   pantoprazole (PROTONIX) 40 mg tablet 6/10/2024  No Yes   Sig: Take 1 tablet (40 mg total) by mouth 2 (two) times a day   polyethylene glycol (MIRALAX) 17 g packet 6/10/2024 Outside Facility (Specify) Yes Yes   Sig: Take 17 g by mouth daily   sotalol (BETAPACE) 80 mg tablet 6/10/2024 Self No Yes   Sig: Take 1 tablet (80 mg total) by mouth 2 (two) times a day      Facility-Administered Medications: None       Past Medical History:   Diagnosis Date    Abscess of buttock, right     last assessed-5/4/2017    Cancer (HCC)     of upper-outer quadrant of female  breast right-last assessed-10/8/2015    Cellulitis of chest wall     last assessed-7/27/2015    Chronic endometritis 10/12/2006    Diabetes mellitus (HCC)     Dysuria 11/02/2007    Flushing     resolved-6/30/2015    Glaucoma     Hyperlipidemia     Hypertension     Ingrown nail 01/05/2012    Malignant neoplasm of breast (HCC)     last assessed-11/5/2015    MS (multiple sclerosis) (HCC)     Nonvenomous insect bite     last assessed-12/12/2013    Palpitations 02/09/2011    Pressure ulcer of buttock 10/13/2006    Proctitis 05/12/2006    Vaginal polyp 03/14/2006    Viral gastroenteritis     last assessed-9/8/2016       Past Surgical History:   Procedure Laterality Date    BREAST BIOPSY      open    BREAST SURGERY      CERVICAL BIOPSY  W/ LOOP ELECTRODE EXCISION      DILATION AND CURETTAGE OF UTERUS      INCISION AND DRAINAGE OF WOUND Left 2/27/2017    Procedure: INCISION AND DRAINAGE (I&D)   Chest wall x 2;  Surgeon: Shant Braga MD;  Location: [facility];  Service:     IR THORACENTESIS  4/3/2024    MASTECTOMY      last assessed-6/30/2015       Family History   Problem Relation Age of Onset    Heart disease Father         cardiac disorder    COPD Sister     Diabetes Sister     Lung cancer Mother     Lung cancer Cousin     Heart disease Cousin         cardiac disorder    Multiple sclerosis Cousin     Cancer Cousin     Cancer Daughter         bladder    Breast cancer Maternal Aunt      I have reviewed and agree with the history as documented.    E-Cigarette/Vaping    E-Cigarette Use Never User      E-Cigarette/Vaping Substances     Social History     Tobacco Use    Smoking status: Former    Smokeless tobacco: Never   Vaping Use    Vaping status: Never Used   Substance Use Topics    Alcohol use: Not Currently     Alcohol/week: 0.0 standard drinks of alcohol    Drug use: Not Currently       Review of Systems   Constitutional:  Negative for chills and fever.   HENT:  Negative for hearing loss.    Eyes:  Negative for visual  disturbance.   Respiratory:  Negative for shortness of breath.    Cardiovascular:  Positive for chest pain.   Gastrointestinal:  Negative for abdominal pain, constipation, diarrhea, nausea and vomiting.   Genitourinary:  Negative for difficulty urinating.   Musculoskeletal:  Negative for myalgias.   Skin:  Negative for color change.   Neurological:  Negative for dizziness and headaches.   Psychiatric/Behavioral:  Negative for agitation.    All other systems reviewed and are negative.      Physical Exam  Physical Exam  Vitals and nursing note reviewed.   Constitutional:       General: She is not in acute distress.     Appearance: Normal appearance. She is well-developed. She is not ill-appearing.   HENT:      Head: Normocephalic and atraumatic.      Right Ear: External ear normal.      Left Ear: External ear normal.      Nose: Nose normal.      Mouth/Throat:      Mouth: Mucous membranes are moist.      Pharynx: Oropharynx is clear. No oropharyngeal exudate.   Eyes:      General:         Right eye: No discharge.         Left eye: No discharge.      Extraocular Movements: Extraocular movements intact.      Conjunctiva/sclera: Conjunctivae normal.      Pupils: Pupils are equal, round, and reactive to light.   Cardiovascular:      Rate and Rhythm: Normal rate and regular rhythm.      Heart sounds: Normal heart sounds. No murmur heard.     No friction rub. No gallop.   Pulmonary:      Effort: Pulmonary effort is normal. No respiratory distress.      Breath sounds: Normal breath sounds. No stridor. No wheezing.   Abdominal:      General: Bowel sounds are normal. There is no distension.      Palpations: Abdomen is soft.      Tenderness: There is no abdominal tenderness.   Musculoskeletal:         General: No swelling. Normal range of motion.      Cervical back: Normal range of motion and neck supple. No rigidity.   Skin:     General: Skin is warm and dry.      Capillary Refill: Capillary refill takes less than 2 seconds.    Neurological:      General: No focal deficit present.      Mental Status: She is alert and oriented to person, place, and time. Mental status is at baseline.   Psychiatric:         Mood and Affect: Mood normal.         Behavior: Behavior normal.         Vital Signs  ED Triage Vitals [06/11/24 0329]   Temperature Pulse Respirations Blood Pressure SpO2   97.7 °F (36.5 °C) 100 16 156/71 98 %      Temp Source Heart Rate Source Patient Position - Orthostatic VS BP Location FiO2 (%)   Oral Monitor Sitting Left arm --      Pain Score       5           Vitals:    06/11/24 0500 06/11/24 0530 06/11/24 0600 06/11/24 0630   BP: 132/63 136/63 131/62 147/67   Pulse: 96 95 76 98   Patient Position - Orthostatic VS: Lying Lying Lying Lying         Visual Acuity      ED Medications  Medications   iohexol (OMNIPAQUE) 350 MG/ML injection (MULTI-DOSE) 100 mL (100 mL Intravenous Given 6/11/24 0432)       Diagnostic Studies  Results Reviewed       Procedure Component Value Units Date/Time    HS Troponin I 2hr [[id number]]  (Normal) Collected: 06/11/24 0604    Lab Status: Final result Specimen: Blood from Arm, Left Updated: 06/11/24 0637     hs TnI 2hr 4 ng/L      Delta 2hr hsTnI 0 ng/L     HS Troponin I 4hr [[id number]]     Lab Status: No result Specimen: Blood     HS Troponin 0hr (reflex protocol) [[id number]]  (Normal) Collected: 06/11/24 0403    Lab Status: Final result Specimen: Blood from Arm, Left Updated: 06/11/24 0430     hs TnI 0hr 4 ng/L     Comprehensive metabolic panel [[id number]]  (Abnormal) Collected: 06/11/24 0403    Lab Status: Final result Specimen: Blood from Arm, Left Updated: 06/11/24 0426     Sodium 139 mmol/L      Potassium 4.7 mmol/L      Chloride 99 mmol/L      CO2 35 mmol/L      ANION GAP 5 mmol/L      BUN 13 mg/dL      Creatinine 0.51 mg/dL      Glucose 182 mg/dL      Calcium 8.6 mg/dL      Corrected Calcium 9.3 mg/dL      AST 6 U/L      ALT 6 U/L      Alkaline Phosphatase 77 U/L      Total Protein 7.2 g/dL       Albumin 3.1 g/dL      Total Bilirubin 0.23 mg/dL      eGFR 87 ml/min/1.73sq m     Narrative:      [employer] guidelines for Chronic Kidney Disease (CKD):     Stage 1 with normal or high GFR (GFR > 90 mL/min/1.73 square meters)    Stage 2 Mild CKD (GFR = 60-89 mL/min/1.73 square meters)    Stage 3A Moderate CKD (GFR = 45-59 mL/min/1.73 square meters)    Stage 3B Moderate CKD (GFR = 30-44 mL/min/1.73 square meters)    Stage 4 Severe CKD (GFR = 15-29 mL/min/1.73 square meters)    Stage 5 End Stage CKD (GFR <15 mL/min/1.73 square meters)  Note: GFR calculation is accurate only with a steady state creatinine    Lipase [[id number]]  (Normal) Collected: 06/11/24 0403    Lab Status: Final result Specimen: Blood from Arm, Left Updated: 06/11/24 0426     Lipase 24 u/L     CBC and differential [[id number]]  (Abnormal) Collected: 06/11/24 0403    Lab Status: Final result Specimen: Blood from Arm, Left Updated: 06/11/24 0408     WBC 6.97 Thousand/uL      RBC 3.79 Million/uL      Hemoglobin 10.1 g/dL      Hematocrit 34.5 %      MCV 91 fL      MCH 26.6 pg      MCHC 29.3 g/dL      RDW 15.5 %      MPV 11.3 fL      Platelets 223 Thousands/uL      nRBC 0 /100 WBCs      Segmented % 64 %      Immature Grans % 0 %      Lymphocytes % 23 %      Monocytes % 8 %      Eosinophils Relative 4 %      Basophils Relative 1 %      Absolute Neutrophils 4.49 Thousands/µL      Absolute Immature Grans 0.03 Thousand/uL      Absolute Lymphocytes 1.62 Thousands/µL      Absolute Monocytes 0.53 Thousand/µL      Eosinophils Absolute 0.25 Thousand/µL      Basophils Absolute 0.05 Thousands/µL                    CTA dissection protocol chest/abdomen/pelvis    (Results Pending)              Procedures  ECG 12 Lead Documentation Only    Date/Time: 6/11/2024 3:42 AM    Performed by: Silvio Cisneros MD  Authorized by: Silvio Cisneros MD    Indications / Diagnosis:  Cp  ECG reviewed by me, the ED Provider: yes    Patient location:  ED  Previous  ECG:     Previous ECG:  Unavailable    Comparison to cardiac monitor: No    Interpretation:     Interpretation: abnormal    Rate:     ECG rate:  102    ECG rate assessment: normal    Rhythm:     Rhythm: sinus tachycardia    Ectopy:     Ectopy: none    QRS:     QRS axis:  Normal    QRS intervals:  Normal  Conduction:     Conduction: normal    ST segments:     ST segments:  Normal  T waves:     T waves: normal             ED Course  ED Course as of 06/11/24 0651   Tue Jun 11, 2024   0410 Hemoglobin(!): 10.1  This is improved as of two weeks ago when she was here for GI bleed. Good.   0428 LIPASE: 24  Normal   0428 Comprehensive metabolic panel(!)  No actionable derangements   0430 hs TnI 0hr: 4  Will delta given sudden onset symptoms   0646 It seems that the radiologist was reading the scan had been reading it for about 2 hours.  I called over to the reading room and it appears that the patient's scan was read by the radiologist however they forgot to sign the report.  I had another radiologist take a look and there is no sign of dissection.  Patient has a pleural effusion however this is known and patient has had admission for it and she has no symptoms from it at this time.  No signs of aneurysm either.  Will discharge patient home.             HEART Risk Score      Flowsheet Row Most Recent Value   Heart Score Risk Calculator    History 0 Filed at: 06/11/2024 0642   ECG 0 Filed at: 06/11/2024 0642   Age 2 Filed at: 06/11/2024 0642   Risk Factors 2 Filed at: 06/11/2024 0642   Troponin 0 Filed at: 06/11/2024 0642   HEART Score 4 Filed at: 06/11/2024 0642                          SBIRT 22yo+      Flowsheet Row Most Recent Value   Initial Alcohol Screen: US AUDIT-C     1. How often do you have a drink containing alcohol? 0 Filed at: 06/11/2024 0332   2. How many drinks containing alcohol do you have on a typical day you are drinking?  0 Filed at: 06/11/2024 0332   3a. Male UNDER 65: How often do you have five or more  drinks on one occasion? 0 Filed at: 06/11/2024 0332   3b. FEMALE Any Age, or MALE 65+: How often do you have 4 or more drinks on one occassion? 0 Filed at: 06/11/2024 0332   Audit-C Score 0 Filed at: 06/11/2024 0332   CODY: How many times in the past year have you...    Used an illegal drug or used a prescription medication for non-medical reasons? Never Filed at: 06/11/2024 0332                      Medical Decision Making  88-year-old female presenting to ED today for chest pain.  Currently reassuring physical exam.  However given her chest pain her differential is broad and includes ACS versus pneumonia versus pneumothorax versus aortic dissection versus pancreatitis versus small bowel obstruction.  Will evaluate at this time with a CBC, CMP, troponin, twelve-lead EKG, CTA dissection study.  Patient will need a delta troponin given that her pain started shortly prior to arrival here.  She does not need aspirin as she was given that prehospital.  Dispo pending imaging and labs however if otherwise unremarkable we will plan to discharge patient back to care facility.    Amount and/or Complexity of Data Reviewed  Labs: ordered. Decision-making details documented in ED Course.  Radiology: ordered.    Risk  Prescription drug management.             Disposition  Final diagnoses:   Chest pain   Diabetes (HCC)     Time reflects when diagnosis was documented in both MDM as applicable and the Disposition within this note       Time User Action Codes Description Comment    6/11/2024  6:47 AM Silvio Cisneros Add [R07.9] Chest pain     6/11/2024  6:51 AM Silvio Cisneros Add [E11.9] Diabetes (HCC)           ED Disposition       ED Disposition   Discharge    Condition   Stable    Date/Time   Tue Jun 11, 2024 0647    Comment   Ernestina Hurley discharge to home/self care.                   Follow-up Information       Follow up With Specialties Details Why Contact Info    Follow-up with primary care doctor.                Patient's  Medications   Discharge Prescriptions    No medications on file       No discharge procedures on file.    PDMP Review       None            ED Provider  Electronically Signed by             Silvio Cisneros MD  06/11/24 7816

## 2024-06-11 NOTE — ED NOTES
Patient incontinent of urine. 2 RN's at bedside to clean the patient. New linens and osbaldo pad placed. Call bell within reach and side rails up x 2.      Yi Martins RN  06/11/24 [id number]

## 2024-06-11 NOTE — DISCHARGE INSTRUCTIONS
Your labs and imaging were otherwise unremarkable.  Please follow-up with your primary care doctor.  Return to ER if develop any new or worrisome symptoms.

## 2024-06-13 ENCOUNTER — OFFICE VISIT (OUTPATIENT)
Dept: GASTROENTEROLOGY | Facility: CLINIC | Age: 89
End: 2024-06-13
Payer: MEDICARE

## 2024-06-13 VITALS
WEIGHT: 185 LBS | HEART RATE: 68 BPM | HEIGHT: 66 IN | BODY MASS INDEX: 29.73 KG/M2 | DIASTOLIC BLOOD PRESSURE: 66 MMHG | SYSTOLIC BLOOD PRESSURE: 148 MMHG

## 2024-06-13 DIAGNOSIS — R19.4 CHANGE IN BOWEL HABIT: ICD-10-CM

## 2024-06-13 DIAGNOSIS — K26.9 DUODENAL ULCER: ICD-10-CM

## 2024-06-13 DIAGNOSIS — D64.9 ANEMIA, UNSPECIFIED TYPE: Primary | ICD-10-CM

## 2024-06-13 PROCEDURE — 99214 OFFICE O/P EST MOD 30 MIN: CPT | Performed by: PHYSICIAN ASSISTANT

## 2024-06-13 NOTE — PROGRESS NOTES
[facility] - Outpatient Follow-up Note  Ernestina Hurley 88 y.o. female MRN: [id number]  Encounter: [id number]          ASSESSMENT AND PLAN:      1. Anemia, unspecified type  Patient with acute anemia from blood loss from duodenal ulcer with visible vessel status post cauterization as well as epinephrine injection on 5/24/2024 and hemoglobin is stable, most recent hemoglobin was 10.1 up from 7.7 in the hospital.  Remains on p.o. ferrous sulfate, this likely can be discontinued in future once hemoglobin reaches baseline    2. Duodenal ulcer  Patient with duodenal ulcer status posttreatment endoscopic, with cauterization and epinephrine injection with control of bleeding, continue PPI twice daily for 3 months and then resume once daily, no evidence of H. pylori on biopsies    3. Change in bowel habit  Patient with change in bowel habits since nursing home placement, does take MiraLAX daily which she can continue on,   -Stool burden noted in right colon on recent CAT scan and did tell her to try laxative today but she is hesitant and asking to try prune juice  -Would add 1 tablespoon of Metamucil daily to regimen to help with fluctuating bowel habits      Patient can follow-up with us as needed, daughter was present for office visit.  Please call if any GI questions arise.    ______________________________________________________________________    SUBJECTIVE:    88-year-old female with history of A-fib not on anticoagulation, MS, type 2 diabetes who presented to the emergency room on 5/23/24 for abdominal pain found to have significant anemia with hemoglobin decreasing from 11-12 range to 7.7.  EGD performed 5/24 with single ulcer noted in the second portion of the duodenum with nonbleeding visible vessel status post cautery and epinephrine.  Pt was placed on PPI drip. Presents today for follow up.  Hemoglobin in hospital was 7.7.  Patient did have blood work 2 days ago and hemoglobin was  stable at 10.1.  Patient on pantoprazole 40 mg twice daily.  She was previously taking aspirin, but does not appear to be taking this currently.  Patient does take ferrous sulfate.  Patient reports that she has fluctuating bowel habits.  Sometimes has diarrhea but has not been able to go for 2 days.  Recently hospitalized for chest pain and had CTA of abdomen and pelvis and it does look like there is stool burden in the right colon.  Patient reports that she does not want to take a laxative and fear of having diarrhea.  She otherwise states that she had no issues going at home but recently has had more constipation since hospitalized/in the nursing home.  Denies any abdominal pain.        EGD 5/24  IMPRESSION:  Single ulcer in the 2nd part of the duodenum with nonbleeding visible vessel (Stephon IIA); induced coagulation with bipolar cautery; injected epinephrine to address bleeding  Mild erythematous mucosa with erosion in the antrum; performed cold forceps biopsy  The esophagus appeared normal.     Path-    Stomach, gastric bxs-r/o H-pylori:  - Gastric antral and oxyntic mucosa with chronic, inactive gastritis and surface erosion.  - Negative for intestinal metaplasia, dysplasia or carcinoma.  - Immunohistochemistry for Helicobacter pylori is negative.      REVIEW OF SYSTEMS IS OTHERWISE NEGATIVE.      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-last assessed-10/8/2015    Cellulitis of chest wall     last assessed-7/27/2015    Chronic endometritis 10/12/2006    Diabetes mellitus (HCC)     Dysuria 11/02/2007    Flushing     resolved-6/30/2015    Glaucoma     Hyperlipidemia     Hypertension     Ingrown nail 01/05/2012    Malignant neoplasm of breast (HCC)     last assessed-11/5/2015    MS (multiple sclerosis) (HCC)     Nonvenomous insect bite     last assessed-12/12/2013     Palpitations 02/09/2011    Pressure ulcer of buttock 10/13/2006    Proctitis 05/12/2006    Vaginal polyp 03/14/2006    Viral gastroenteritis     last assessed-9/8/2016     Past Surgical History:   Procedure Laterality Date    BREAST BIOPSY      open    BREAST SURGERY      CERVICAL BIOPSY  W/ LOOP ELECTRODE EXCISION      DILATION AND CURETTAGE OF UTERUS      INCISION AND DRAINAGE OF WOUND Left 2/27/2017    Procedure: INCISION AND DRAINAGE (I&D)   Chest wall x 2;  Surgeon: Shant Braga MD;  Location: [facility];  Service:     IR THORACENTESIS  4/3/2024    MASTECTOMY      last assessed-6/30/2015     Social History   Social History     Substance and Sexual Activity   Alcohol Use Not Currently    Alcohol/week: 0.0 standard drinks of alcohol     Social History     Substance and Sexual Activity   Drug Use Not Currently     Social History     Tobacco Use   Smoking Status Former   Smokeless Tobacco Never     Family History   Problem Relation Age of Onset    Heart disease Father         cardiac disorder    COPD Sister     Diabetes Sister     Lung cancer Mother     Lung cancer Cousin     Heart disease Cousin         cardiac disorder    Multiple sclerosis Cousin     Cancer Cousin     Cancer Daughter         bladder    Breast cancer Maternal Aunt        Meds/Allergies       Current Outpatient Medications:     acetaminophen (TYLENOL) 650 mg CR tablet    ferrous sulfate 324 MG TBEC    latanoprost (XALATAN) 0.005 % ophthalmic solution    loratadine (CLARITIN) 10 mg tablet    metFORMIN (GLUCOPHAGE) 1000 MG tablet    pantoprazole (PROTONIX) 40 mg tablet    polyethylene glycol (MIRALAX) 17 g packet    sotalol (BETAPACE) 80 mg tablet    Allergies   Allergen Reactions    Clindamycin      Annotation - 08Oct2015: bad taste in mouth    Dabigatran     Pradaxa [Dabigatran Etexilate Mesylate]     Sulfamethoxazole-Trimethoprim Other (See Comments)     General weakness    Warfarin Lip Swelling     Reaction Date: 22May2012; Annotation -  [id number]: lip swelling    Latex Rash     Annotation - [id number]: RASH           Objective     There were no vitals taken for this visit. There is no height or weight on file to calculate BMI.      PHYSICAL EXAM:      General Appearance:   Alert, cooperative, no distress   HEENT:   Normocephalic, atraumatic, anicteric.     Neck:  Supple, symmetrical, trachea midline   Lungs:   Clear to auscultation bilaterally; no rales, rhonchi or wheezing; respirations unlabored    Heart::   Regular rate and rhythm; no murmur, rub, or gallop.   Abdomen:   Soft, non-tender, non-distended; normal bowel sounds; no masses, no organomegaly    Genitalia:   Deferred    Rectal:   Deferred    Extremities:  No cyanosis, clubbing or edema    Pulses:  2+ and symmetric    Skin:  No jaundice, rashes, or lesions    Lymph nodes:  No palpable cervical lymphadenopathy        Lab Results:   No visits with results within 1 Day(s) from this visit.   Latest known visit with results is:   Admission on 06/11/2024, Discharged on 06/11/2024   Component Date Value    WBC 06/11/2024 6.97     RBC 06/11/2024 3.79 (L)     Hemoglobin 06/11/2024 10.1 (L)     Hematocrit 06/11/2024 34.5 (L)     MCV 06/11/2024 91     MCH 06/11/2024 26.6 (L)     MCHC 06/11/2024 29.3 (L)     RDW 06/11/2024 15.5 (H)     MPV 06/11/2024 11.3     Platelets 06/11/2024 223     nRBC 06/11/2024 0     Segmented % 06/11/2024 64     Immature Grans % 06/11/2024 0     Lymphocytes % 06/11/2024 23     Monocytes % 06/11/2024 8     Eosinophils Relative 06/11/2024 4     Basophils Relative 06/11/2024 1     Absolute Neutrophils 06/11/2024 4.49     Absolute Immature Grans 06/11/2024 0.03     Absolute Lymphocytes 06/11/2024 1.62     Absolute Monocytes 06/11/2024 0.53     Eosinophils Absolute 06/11/2024 0.25     Basophils Absolute 06/11/2024 0.05     hs TnI 0hr 06/11/2024 4     Sodium 06/11/2024 139     Potassium 06/11/2024 4.7     Chloride 06/11/2024 99     CO2 06/11/2024 35 (H)     ANION GAP 06/11/2024 5      BUN 06/11/2024 13     Creatinine 06/11/2024 0.51 (L)     Glucose 06/11/2024 182 (H)     Calcium 06/11/2024 8.6     Corrected Calcium 06/11/2024 9.3     AST 06/11/2024 6 (L)     ALT 06/11/2024 6 (L)     Alkaline Phosphatase 06/11/2024 77     Total Protein 06/11/2024 7.2     Albumin 06/11/2024 3.1 (L)     Total Bilirubin 06/11/2024 0.23     eGFR 06/11/2024 87     Lipase 06/11/2024 24     hs TnI 2hr 06/11/2024 4     Delta 2hr hsTnI 06/11/2024 0     Ventricular Rate 06/11/2024 102     Atrial Rate 06/11/2024 102     AL Interval 06/11/2024 140     QRSD Interval 06/11/2024 70     QT Interval 06/11/2024 372     QTC Interval 06/11/2024 484     QRS Axis 06/11/2024 9     T Wave [place] 06/11/2024 77          Radiology Results:   CTA dissection protocol chest/abdomen/pelvis    Result Date: 6/11/2024  Narrative: CTA - CHEST, ABDOMEN AND PELVIS - WITHOUT AND WITH IV CONTRAST INDICATION: Chest pain radiating towards her back; r/o dissection vs pancreatitis. COMPARISON: None. TECHNIQUE: CT examination of the chest, abdomen and pelvis was performed both prior to and after the administration of intravenous contrast. The noncontrast portion of this examination was performed utilizing low radiation dose technique.  Thin section angiographic arterial phase post contrast technique was used in order to evaluate for aortic dissection. 3D reformatted images and volume rendering were performed on an independent workstation. Multiplanar 2D reformatted images were created from the source data. Radiation dose length product (DLP) for this visit: 1918.75 mGy-cm . This examination, like all CT scans performed in the [facility], was performed utilizing techniques to minimize radiation dose exposure, including the use of iterative reconstruction and automated exposure control. IV Contrast: 100 mL of iohexol (OMNIPAQUE) Enteric Contrast: Not administered. FINDINGS: AORTA: No aortic dissection or intramural hematoma. No aortic  aneurysm. No significant atherosclerotic disease. Specifically, no flow limiting atherosclerotic stenosis of aorta or major aortic branch vessel in the chest, abdomen or pelvis. CHEST LUNGS: Partial collapse of the left lower lobe. Mild atelectasis along the left upper lobe and lingula.. No tracheal or endobronchial lesion. PLEURA: Small left pleural effusion HEART/PULMONARY ARTERIAL TREE: Heart is unremarkable for patient's age. MEDIASTINUM AND LETICIA: Unremarkable. CHEST WALL AND LOWER NECK: Unremarkable. ABDOMEN LIVER/BILIARY TREE: Unremarkable. GALLBLADDER: No calcified gallstones. No pericholecystic inflammatory change. SPLEEN: Regional hypoenhancement throughout the anterior spleen. PANCREAS: Unremarkable. ADRENAL GLANDS: Unremarkable. KIDNEYS/URETERS: No hydronephrosis or urinary tract calculi. Subcentimeter hypoattenuating renal lesion(s), too small to characterize but statistically likely benign, which do not warrant follow-up (Radiology June 2019). STOMACH AND BOWEL: Unremarkable. APPENDIX: No findings to suggest appendicitis. ABDOMINOPELVIC CAVITY: No ascites. No pneumoperitoneum. No lymphadenopathy. PELVIS REPRODUCTIVE ORGANS: Unremarkable for patient's age. URINARY BLADDER: Unremarkable. ABDOMINAL WALL/INGUINAL REGIONS: Unremarkable. BONES: No acute fracture or suspicious osseous lesion.     Impression: No evidence of aortic aneurysm or dissection. Splenic infarct. Small left pleural effusion. Atelectatic change throughout the left lung. Workstation performed: [id number]     EGD    Result Date: 5/24/2024  Narrative: Table formatting from the original result was not included. [facility] Operating Room [address] [place] [id number] [phone] DATE OF SERVICE: 5/24/24 PHYSICIAN(S): Attending: John Alexander MD Fellow: No Staff Documented INDICATION: Iron deficiency anemia due to chronic blood loss, Peptic ulcer disease with hemorrhage POST-OP DIAGNOSIS: See the impression below.  PREPROCEDURE: Informed consent was obtained for the procedure, including sedation.  Risks of perforation, hemorrhage, adverse drug reaction and aspiration were discussed. The patient was placed in the left lateral decubitus position. Patient was explained about the risks and benefits of the procedure. Risks including but not limited to bleeding, infection, and perforation were explained in detail. Also explained about less than 100% sensitivity with the exam and other alternatives. PROCEDURE: EGD DETAILS OF PROCEDURE: Patient was taken to the procedure room where a time out was performed to confirm correct patient and correct procedure. The patient underwent monitored anesthesia care, which was administered by an anesthesia professional. The patient's blood pressure, heart rate, level of consciousness, respirations, oxygen, ECG and ETCO2 were monitored throughout the procedure. The scope was introduced through the mouth and advanced to the second part of the duodenum. Retroflexion was performed in the fundus. The patient experienced no blood loss. The procedure was not difficult. The patient tolerated the procedure well. There were no apparent adverse events. ANESTHESIA INFORMATION: ASA: III Anesthesia Type: IV Sedation with Anesthesia MEDICATIONS: EPINEPHrine PF (ADRENALIN) 1 mg/mL injection 0.4 mg (Totals for administrations occurring from 1331 to 1444 on 05/24/24) FINDINGS: Single large deep ulcer in the 2nd part of the duodenum with nonbleeding visible vessel (Stephon IIA); induced coagulation with with bipolar cautery; injected 4 mL of epinephrine to address bleeding. The ulcer was located in the medial wall of the duodenal sweep. It measured 2 x 2 cm. Mild erythematous mucosa with erosion in the antrum; performed cold forceps biopsy to rule out H. pylori The esophagus appeared normal. Z-line is 38 cm from the incisors. SPECIMENS: ID Type Source Tests Collected by Time Destination 1 : gastric bxs-r/o H-pylori  Tissue Stomach TISSUE EXAM John Alexander MD 5/24/2024  2:42 PM      Impression: Single ulcer in the 2nd part of the duodenum with nonbleeding visible vessel (Stephon IIA); induced coagulation with bipolar cautery; injected epinephrine to address bleeding Mild erythematous mucosa with erosion in the antrum; performed cold forceps biopsy The esophagus appeared normal. RECOMMENDATION: Await pathology results Protonix drip for 24-48 hours Monitor CBC Clear liquid diet and advance as tolerated   John Alexander MD     CT high volume bleeding scan abdomen pelvis    Result Date: 5/23/2024  Narrative: CT ABDOMEN AND PELVIS - WITHOUT AND WITH IV CONTRAST INDICATION:   abd pain. Patient complains of left-sided abdominal pain for 1 week. Chest pain radiating to the right shoulder. Pain is intermittent. Per ED notes, patient reports brought to ED for decreased counts from 9-6. Is going to have endoscopy. COMPARISON: 10/12/2023 abdominopelvic CT. 4/1/2024 chest x-ray. TECHNIQUE: CT examination of the abdomen and pelvis was performed both prior to and after the administration of intravenous contrast. Multiplanar 2D reformatted images were created from the source data. Radiation dose length product (DLP) for this visit: 3200.24 mGy-cm . This examination, like all CT scans performed in the [facility], was performed utilizing techniques to minimize radiation dose exposure, including the use of iterative reconstruction and automated exposure control. IV Contrast: 100 mL of iohexol (OMNIPAQUE) Enteric Contrast: Not administered. FINDINGS: ABDOMEN BOWEL: Mild gastric pyloric and proximal duodenal wall thickening with regional fatty stranding. Outpouching from the medial border of the pylorus (4/77 and 603/58) most compatible with ulcer. Effacement of pancreaticoduodenal groove fat. No free air or pneumatosis. Tiny focus of enhancement in the arterial phase which mildly progresses in the portal venous phase, most  compatible with active bleed (4/81 and 5/79). Normal gastric and bowel caliber. No bowel wall thickening or active bleed. LOWER CHEST: Small, probably decreased left pleural effusion. Bilateral, mild central groundglass airspace opacities most compatible with mild and probably similar edema. Cardiomegaly. LIVER/BILIARY TREE: Unchanged, subcentimeter circumscribed hypodensity at the medial dome, too small to characterize but statistically likely benign. Does not require follow-up (ACR White Paper 2017). Normal hepatic morphology and bile duct caliber. GALLBLADDER: Within normal limits. SPLEEN: Within normal limits. PANCREAS: Abnormal pancreaticoduodenal groove as described above. Pancreas is otherwise within normal limits, no findings of pancreatitis. ADRENAL GLANDS: Within normal limits. KIDNEYS/URETERS: Unchanged, subcentimeter, circumscribed hypoattenuating lesion(s), too small to characterize but statistically likely benign. Do not warrant follow-up (Radiology June 2019). APPENDIX: Within normal limits. ABDOMINOPELVIC CAVITY: Minimal free fluid around the pylorus. No fluid collections, free air or enlarged lymph nodes. VESSELS: Atherosclerosis. Normal aortic caliber. Patent mesenteric vessels. PELVIS REPRODUCTIVE ORGANS: Within normal limits for age. Right ovarian calcification is considered benign. URINARY BLADDER: Within normal limits. ABDOMINAL WALL/INGUINAL REGIONS: Fatty muscle atrophy. Tiny, fat-containing umbilical hernia. BONES: Degenerative change. Marked right femoral trochanteric enthesopathy most compatible with old gluteus insertional injury. I personally discussed this study with MICHAEL DELGADO on 5/23/2024 2:08 PM.     Impression: Findings of gastroduodenitis and gastric pylorus ulcer. Small focus of active bleed, may be a [place] lesion. No free air to suggest perforation. Evidence of mild similar edema and decrease small left pleural effusion compared to recent chest x-ray. Other nonemergent  findings above. Workstation performed: [id number]     XR chest 1 view portable    Result Date: 5/23/2024  Narrative: CHEST INDICATION:   cp. COMPARISON: 4/1/2024 EXAM PERFORMED/VIEWS:  XR CHEST PORTABLE FINDINGS: Rotated to the left. Patient's chin obscures the left lung apex. Small left pleural effusion appears decreased. Presumed compressive atelectasis. Similar vascular congestion. No pneumothorax. Stable appearance of the cardiomediastinal silhouette. No acute osseous or soft tissue pathology.     Impression: Compared to 4/1/2024, similar vascular congestion. Improved, small left pleural effusion. Workstation performed: [id number]

## 2024-07-24 NOTE — PLAN OF CARE
Problem: INFECTION - ADULT  Goal: Absence or prevention of progression during hospitalization  Description: INTERVENTIONS:  - Assess and monitor for signs and symptoms of infection  - Monitor lab/diagnostic results  - Monitor all insertion sites, i.e. indwelling lines, tubes, and drains  - Monitor endotracheal if appropriate and nasal secretions for changes in amount and color  - [place] appropriate cooling/warming therapies per order  - Administer medications as ordered  - Instruct and encourage patient and family to use good hand hygiene technique  - Identify and instruct in appropriate isolation precautions for identified infection/condition  Outcome: Progressing     Problem: GASTROINTESTINAL - ADULT  Goal: Minimal or absence of nausea and/or vomiting  Description: INTERVENTIONS:  - Administer IV fluids if ordered to ensure adequate hydration  - Maintain NPO status until nausea and vomiting are resolved  - Nasogastric tube if ordered  - Administer ordered antiemetic medications as needed  - Provide nonpharmacologic comfort measures as appropriate  - Advance diet as tolerated, if ordered  - Consider nutrition services referral to assist patient with adequate nutrition and appropriate food choices  Outcome: Progressing     Problem: HEMATOLOGIC - ADULT  Goal: Maintains hematologic stability  Description: INTERVENTIONS  - Assess for signs and symptoms of bleeding or hemorrhage  - Monitor labs  - Administer supportive blood products/factors as ordered and appropriate  Outcome: Progressing      [Motivational Interviewing] : Motivational Interviewing  [Psychodynamic Therapy] : Psychodynamic Therapy  [Supportive Therapy] : Supportive Therapy [Recommended Frequency of Visits: ____] : Recommended frequency of visits: [unfilled] [Return in ____ week(s)] : Return in [unfilled] week(s) [FreeTextEntry2] : Problem/Need I:   Domain for Problem/Need I: Mental Health.   Problem: Self Image/ PTSD.   Objective A: Patient will utilize positive self talk daily at least 50% of the time, and discuss events in life that lead to the negative self image.   Objective B: Patient will decrease SAMRA-7 by 1 point and report feeling more positive about self and less worried.    Problem/Need II: Domain for Problem/Need II: Physical Health. Problem: Patient will maintain overall physical health and well being, and will address medical needs as necessary. Objective A: Patient will attend all medical appointments as scheduled. Objective B: Patient will exercise at least once a week to be more physically fit. [de-identified] : Session began at 3:02pm and ended at 3:36pm, and they were seen via Solo audio/video session. She stated she is doing well at her job and expressed she has been thinking about her past experiences and how they relate to her thoughts today. She explored her feelings about her biological father not wanting to be on her birth certificate and how this led to her 'hating men' for some time, she has since 'made peace' with this. She noted this can be a correlation to her overeating to not deal with her emotions. Therapist and patient explored this as she expressed wanting to work towards happiness and an improved self-image. She continues to rely on her carlos for support and protection, and has begun sorting out her finances.  [FreeTextEntry1] : Patient will continue to attend therapy to discuss symptoms and management of these symptoms. Patient will contact therapist if they are experiencing an increase in symptoms or difficulty in managing. Next virtual session is scheduled for 8/8 at .

## 2024-09-15 ENCOUNTER — HOSPITAL ENCOUNTER (INPATIENT)
Facility: HOSPITAL | Age: 89
LOS: 1 days | Discharge: NURSING, REHAB OR LONG-TERM CARE | DRG: 305 | End: 2024-09-17
Attending: EMERGENCY MEDICINE | Admitting: INTERNAL MEDICINE
Payer: MEDICARE

## 2024-09-15 ENCOUNTER — APPOINTMENT (EMERGENCY)
Dept: RADIOLOGY | Facility: HOSPITAL | Age: 89
DRG: 305 | End: 2024-09-15
Payer: MEDICARE

## 2024-09-15 DIAGNOSIS — I10 ESSENTIAL HYPERTENSION: ICD-10-CM

## 2024-09-15 DIAGNOSIS — E83.42 HYPOMAGNESEMIA: ICD-10-CM

## 2024-09-15 DIAGNOSIS — R07.9 CHEST PAIN, UNSPECIFIED TYPE: Primary | ICD-10-CM

## 2024-09-15 DIAGNOSIS — E78.2 MIXED HYPERLIPIDEMIA: ICD-10-CM

## 2024-09-15 LAB
2HR DELTA HS TROPONIN: 9 NG/L
4HR DELTA HS TROPONIN: 73 NG/L
ALBUMIN SERPL BCG-MCNC: 3 G/DL (ref 3.5–5)
ALP SERPL-CCNC: 87 U/L (ref 34–104)
ALT SERPL W P-5'-P-CCNC: 7 U/L (ref 7–52)
ANION GAP SERPL CALCULATED.3IONS-SCNC: 4 MMOL/L (ref 4–13)
APTT PPP: 29 SECONDS (ref 23–34)
AST SERPL W P-5'-P-CCNC: 7 U/L (ref 13–39)
BASOPHILS # BLD AUTO: 0.03 THOUSANDS/ΜL (ref 0–0.1)
BASOPHILS NFR BLD AUTO: 0 % (ref 0–1)
BILIRUB SERPL-MCNC: 0.22 MG/DL (ref 0.2–1)
BNP SERPL-MCNC: 66 PG/ML (ref 0–100)
BUN SERPL-MCNC: 16 MG/DL (ref 5–25)
CALCIUM ALBUM COR SERPL-MCNC: 8.8 MG/DL (ref 8.3–10.1)
CALCIUM SERPL-MCNC: 8 MG/DL (ref 8.4–10.2)
CARDIAC TROPONIN I PNL SERPL HS: 102 NG/L
CARDIAC TROPONIN I PNL SERPL HS: 29 NG/L
CARDIAC TROPONIN I PNL SERPL HS: 38 NG/L
CARDIAC TROPONIN I PNL SERPL HS: 91 NG/L (ref 8–18)
CHLORIDE SERPL-SCNC: 103 MMOL/L (ref 96–108)
CO2 SERPL-SCNC: 34 MMOL/L (ref 21–32)
CREAT SERPL-MCNC: 0.46 MG/DL (ref 0.6–1.3)
EOSINOPHIL # BLD AUTO: 0.18 THOUSAND/ΜL (ref 0–0.61)
EOSINOPHIL NFR BLD AUTO: 2 % (ref 0–6)
ERYTHROCYTE [DISTWIDTH] IN BLOOD BY AUTOMATED COUNT: 17.1 % (ref 11.6–15.1)
EST. AVERAGE GLUCOSE BLD GHB EST-MCNC: 166 MG/DL
GFR SERPL CREATININE-BSD FRML MDRD: 90 ML/MIN/1.73SQ M
GLUCOSE SERPL-MCNC: 127 MG/DL (ref 65–140)
GLUCOSE SERPL-MCNC: 189 MG/DL (ref 65–140)
GLUCOSE SERPL-MCNC: 203 MG/DL (ref 65–140)
HBA1C MFR BLD: 7.4 %
HCT VFR BLD AUTO: 37.1 % (ref 34.8–46.1)
HGB BLD-MCNC: 11 G/DL (ref 11.5–15.4)
IMM GRANULOCYTES # BLD AUTO: 0.02 THOUSAND/UL (ref 0–0.2)
IMM GRANULOCYTES NFR BLD AUTO: 0 % (ref 0–2)
INR PPP: 1 (ref 0.85–1.19)
LIPASE SERPL-CCNC: 28 U/L (ref 11–82)
LYMPHOCYTES # BLD AUTO: 1.99 THOUSANDS/ΜL (ref 0.6–4.47)
LYMPHOCYTES NFR BLD AUTO: 24 % (ref 14–44)
MCH RBC QN AUTO: 25.6 PG (ref 26.8–34.3)
MCHC RBC AUTO-ENTMCNC: 29.6 G/DL (ref 31.4–37.4)
MCV RBC AUTO: 86 FL (ref 82–98)
MONOCYTES # BLD AUTO: 0.72 THOUSAND/ΜL (ref 0.17–1.22)
MONOCYTES NFR BLD AUTO: 9 % (ref 4–12)
NEUTROPHILS # BLD AUTO: 5.29 THOUSANDS/ΜL (ref 1.85–7.62)
NEUTS SEG NFR BLD AUTO: 65 % (ref 43–75)
NRBC BLD AUTO-RTO: 0 /100 WBCS
PLATELET # BLD AUTO: 205 THOUSANDS/UL (ref 149–390)
PMV BLD AUTO: 12.1 FL (ref 8.9–12.7)
POTASSIUM SERPL-SCNC: 4.2 MMOL/L (ref 3.5–5.3)
PROT SERPL-MCNC: 6.4 G/DL (ref 6.4–8.4)
PROTHROMBIN TIME: 13.7 SECONDS (ref 12.3–15)
RBC # BLD AUTO: 4.3 MILLION/UL (ref 3.81–5.12)
SODIUM SERPL-SCNC: 141 MMOL/L (ref 135–147)
WBC # BLD AUTO: 8.23 THOUSAND/UL (ref 4.31–10.16)

## 2024-09-15 PROCEDURE — 83036 HEMOGLOBIN GLYCOSYLATED A1C: CPT | Performed by: INTERNAL MEDICINE

## 2024-09-15 PROCEDURE — 85730 THROMBOPLASTIN TIME PARTIAL: CPT | Performed by: EMERGENCY MEDICINE

## 2024-09-15 PROCEDURE — 80053 COMPREHEN METABOLIC PANEL: CPT | Performed by: EMERGENCY MEDICINE

## 2024-09-15 PROCEDURE — 84484 ASSAY OF TROPONIN QUANT: CPT | Performed by: INTERNAL MEDICINE

## 2024-09-15 PROCEDURE — 82948 REAGENT STRIP/BLOOD GLUCOSE: CPT

## 2024-09-15 PROCEDURE — 99223 1ST HOSP IP/OBS HIGH 75: CPT | Performed by: INTERNAL MEDICINE

## 2024-09-15 PROCEDURE — 93005 ELECTROCARDIOGRAM TRACING: CPT

## 2024-09-15 PROCEDURE — 83880 ASSAY OF NATRIURETIC PEPTIDE: CPT | Performed by: EMERGENCY MEDICINE

## 2024-09-15 PROCEDURE — 99285 EMERGENCY DEPT VISIT HI MDM: CPT | Performed by: EMERGENCY MEDICINE

## 2024-09-15 PROCEDURE — 87081 CULTURE SCREEN ONLY: CPT | Performed by: INTERNAL MEDICINE

## 2024-09-15 PROCEDURE — 99285 EMERGENCY DEPT VISIT HI MDM: CPT

## 2024-09-15 PROCEDURE — 71045 X-RAY EXAM CHEST 1 VIEW: CPT

## 2024-09-15 PROCEDURE — 85610 PROTHROMBIN TIME: CPT | Performed by: EMERGENCY MEDICINE

## 2024-09-15 PROCEDURE — 83690 ASSAY OF LIPASE: CPT | Performed by: EMERGENCY MEDICINE

## 2024-09-15 PROCEDURE — 84484 ASSAY OF TROPONIN QUANT: CPT | Performed by: EMERGENCY MEDICINE

## 2024-09-15 PROCEDURE — 85025 COMPLETE CBC W/AUTO DIFF WBC: CPT | Performed by: EMERGENCY MEDICINE

## 2024-09-15 PROCEDURE — 36415 COLL VENOUS BLD VENIPUNCTURE: CPT | Performed by: EMERGENCY MEDICINE

## 2024-09-15 RX ORDER — FERROUS SULFATE 325(65) MG
325 TABLET, DELAYED RELEASE (ENTERIC COATED) ORAL
COMMUNITY

## 2024-09-15 RX ORDER — ENOXAPARIN SODIUM 100 MG/ML
1 INJECTION SUBCUTANEOUS ONCE
Status: DISCONTINUED | OUTPATIENT
Start: 2024-09-15 | End: 2024-09-15

## 2024-09-15 RX ORDER — SOTALOL HYDROCHLORIDE 80 MG/1
80 TABLET ORAL 2 TIMES DAILY
COMMUNITY

## 2024-09-15 RX ORDER — ENOXAPARIN SODIUM 100 MG/ML
40 INJECTION SUBCUTANEOUS DAILY
Status: DISCONTINUED | OUTPATIENT
Start: 2024-09-15 | End: 2024-09-17 | Stop reason: HOSPADM

## 2024-09-15 RX ORDER — PANTOPRAZOLE SODIUM 40 MG/1
40 TABLET, DELAYED RELEASE ORAL 2 TIMES DAILY
Status: DISCONTINUED | OUTPATIENT
Start: 2024-09-15 | End: 2024-09-17 | Stop reason: HOSPADM

## 2024-09-15 RX ORDER — INSULIN LISPRO 100 [IU]/ML
1-5 INJECTION, SOLUTION INTRAVENOUS; SUBCUTANEOUS
Status: DISCONTINUED | OUTPATIENT
Start: 2024-09-15 | End: 2024-09-17 | Stop reason: HOSPADM

## 2024-09-15 RX ORDER — ONDANSETRON 2 MG/ML
4 INJECTION INTRAMUSCULAR; INTRAVENOUS EVERY 6 HOURS PRN
Status: DISCONTINUED | OUTPATIENT
Start: 2024-09-15 | End: 2024-09-17 | Stop reason: HOSPADM

## 2024-09-15 RX ORDER — SOTALOL HYDROCHLORIDE 80 MG/1
80 TABLET ORAL 2 TIMES DAILY
Status: DISCONTINUED | OUTPATIENT
Start: 2024-09-15 | End: 2024-09-17

## 2024-09-15 RX ORDER — LORATADINE 10 MG/1
10 TABLET ORAL DAILY
Status: DISCONTINUED | OUTPATIENT
Start: 2024-09-15 | End: 2024-09-17 | Stop reason: HOSPADM

## 2024-09-15 RX ORDER — INSULIN LISPRO 100 [IU]/ML
1-6 INJECTION, SOLUTION INTRAVENOUS; SUBCUTANEOUS
Status: DISCONTINUED | OUTPATIENT
Start: 2024-09-15 | End: 2024-09-17 | Stop reason: HOSPADM

## 2024-09-15 RX ORDER — ENOXAPARIN SODIUM 100 MG/ML
40 INJECTION SUBCUTANEOUS ONCE
Status: COMPLETED | OUTPATIENT
Start: 2024-09-15 | End: 2024-09-15

## 2024-09-15 RX ORDER — FERROUS SULFATE 325(65) MG
325 TABLET ORAL
Status: DISCONTINUED | OUTPATIENT
Start: 2024-09-16 | End: 2024-09-17 | Stop reason: HOSPADM

## 2024-09-15 RX ORDER — LATANOPROST 50 UG/ML
1 SOLUTION/ DROPS OPHTHALMIC
Status: DISCONTINUED | OUTPATIENT
Start: 2024-09-15 | End: 2024-09-17 | Stop reason: HOSPADM

## 2024-09-15 RX ORDER — SERTRALINE HYDROCHLORIDE 25 MG/1
25 TABLET, FILM COATED ORAL DAILY
Status: DISCONTINUED | OUTPATIENT
Start: 2024-09-15 | End: 2024-09-17

## 2024-09-15 RX ORDER — SERTRALINE HYDROCHLORIDE 25 MG/1
25 TABLET, FILM COATED ORAL DAILY
COMMUNITY

## 2024-09-15 RX ORDER — ASPIRIN 81 MG/1
81 TABLET, CHEWABLE ORAL DAILY
Status: DISCONTINUED | OUTPATIENT
Start: 2024-09-16 | End: 2024-09-17 | Stop reason: HOSPADM

## 2024-09-15 RX ORDER — POLYETHYLENE GLYCOL 3350 17 G/17G
17 POWDER, FOR SOLUTION ORAL EVERY OTHER DAY
Status: DISCONTINUED | OUTPATIENT
Start: 2024-09-16 | End: 2024-09-17 | Stop reason: HOSPADM

## 2024-09-15 RX ORDER — LORATADINE 10 MG/1
10 TABLET ORAL DAILY
Status: DISCONTINUED | OUTPATIENT
Start: 2024-09-16 | End: 2024-09-15

## 2024-09-15 RX ADMIN — ENOXAPARIN SODIUM 40 MG: 40 INJECTION SUBCUTANEOUS at 15:39

## 2024-09-15 RX ADMIN — INSULIN LISPRO 1 UNITS: 100 INJECTION, SOLUTION INTRAVENOUS; SUBCUTANEOUS at 21:09

## 2024-09-15 RX ADMIN — PANTOPRAZOLE SODIUM 40 MG: 40 TABLET, DELAYED RELEASE ORAL at 21:09

## 2024-09-15 RX ADMIN — SERTRALINE HYDROCHLORIDE 25 MG: 25 TABLET ORAL at 15:40

## 2024-09-15 RX ADMIN — LATANOPROST 1 DROP: 50 SOLUTION OPHTHALMIC at 21:09

## 2024-09-15 RX ADMIN — LORATADINE 10 MG: 10 TABLET ORAL at 15:40

## 2024-09-15 RX ADMIN — ENOXAPARIN SODIUM 40 MG: 40 INJECTION SUBCUTANEOUS at 17:58

## 2024-09-15 RX ADMIN — SOTALOL HYDROCHLORIDE 80 MG: 80 TABLET ORAL at 17:59

## 2024-09-15 RX ADMIN — PSYLLIUM HUSK 1 PACKET: 3.4 POWDER ORAL at 15:40

## 2024-09-15 NOTE — ASSESSMENT & PLAN NOTE
"Lab Results   Component Value Date    HGBA1C 8.0 (H) 04/04/2024       No results for input(s): \"POCGLU\" in the last 72 hours.    Blood Sugar Average: Last 72 hrs:  Patient reports that she is not been getting metformin at the nursing home  Will repeat hemoglobin A1c  Monitor Accu-Cheks with before every meal and at bedtime  Patient will be on diabetic diet    "

## 2024-09-15 NOTE — ASSESSMENT & PLAN NOTE
Currently in sinus rhythm   continue sotalol 80 mg p.o. twice daily  Patient not on anticoagulation  Chads vascular score is 5

## 2024-09-15 NOTE — ED NOTES
Per floor they need 20 mins before patient can be transferred.     Casey Schoener, RN  09/15/24 [id number]

## 2024-09-15 NOTE — ED PROVIDER NOTES
1. Chest pain, unspecified type      ED Disposition       ED Disposition   Admit    Condition   Stable    Date/Time   Sun Sep 15, 2024  1:27 PM    Comment   Case was discussed with sara and the patient's admission status was agreed to be Admission Status: observation status to the service of Dr. Olivo .               Assessment & Plan       Medical Decision Making  88-year-old female intermittent chest pain for the last 24 hours.  She describes it as a chest pressure.  Concern at this point is cardiac disease with her history of atrial fibrillation.  She does not have any history of shortness of breath or issues over the last 24 hours with that she states that she has had this before comes and goes but has been pretty constant over the last few hours    Amount and/or Complexity of Data Reviewed  Labs: ordered.  Radiology: ordered.    Risk  Decision regarding hospitalization.      See above    HEART Risk Score      Flowsheet Row Most Recent Value   Heart Score Risk Calculator    History 1 Filed at: 09/15/2024 1318   ECG 1 Filed at: 09/15/2024 1318   Age 2 Filed at: 09/15/2024 1318   Risk Factors 2 Filed at: 09/15/2024 1318   Troponin 1 Filed at: 09/15/2024 1318   HEART Score 7 Filed at: 09/15/2024 1318                    Medications - No data to display    History of Present Illness       88-year-old female intermittent chest pressure over the last 24 hours patient is awake and alert states is very very minimal at this point almost nonexistent.  No shortness of breath no nausea vomiting diarrhea states it radiates into her arms and sometimes into her back never up into her jaw or face.  Patient does have a history of atrial fibrillation.  No injuries or falls to her chest.        See above    Review of Systems   Constitutional:  Negative for activity change, chills, diaphoresis and fever.   HENT:  Negative for congestion, ear pain, nosebleeds, sore throat, trouble swallowing and voice change.    Eyes:  Negative  for pain, discharge and redness.   Respiratory:  Negative for apnea, cough, choking, shortness of breath, wheezing and stridor.    Cardiovascular:  Positive for chest pain. Negative for palpitations.   Gastrointestinal:  Negative for abdominal distention, abdominal pain, constipation, diarrhea, nausea and vomiting.   Endocrine: Negative for polydipsia.   Genitourinary:  Negative for difficulty urinating, dysuria, flank pain, frequency, hematuria and urgency.   Musculoskeletal:  Negative for back pain, gait problem, joint swelling, myalgias, neck pain and neck stiffness.   Skin:  Negative for pallor and rash.   Neurological:  Negative for dizziness, tremors, syncope, speech difficulty, weakness, numbness and headaches.   Hematological:  Negative for adenopathy.   Psychiatric/Behavioral:  Negative for confusion, hallucinations, self-injury and suicidal ideas. The patient is not nervous/anxious.            Objective     ED Triage Vitals [09/15/24 1029]   Temperature Pulse Blood Pressure Respirations SpO2 Patient Position - Orthostatic VS   98 °F (36.7 °C) 67 (!) 206/86 18 98 % Lying      Temp Source Heart Rate Source BP Location FiO2 (%) Pain Score    Tympanic Monitor Left arm -- 10 - Worst Possible Pain        Physical Exam  Vitals and nursing note reviewed.   Constitutional:       General: She is not in acute distress.     Appearance: Normal appearance. She is well-developed. She is not diaphoretic.   HENT:      Head: Normocephalic and atraumatic.      Right Ear: External ear normal.      Left Ear: External ear normal.      Nose: Nose normal.   Eyes:      Conjunctiva/sclera: Conjunctivae normal.      Pupils: Pupils are equal, round, and reactive to light.   Cardiovascular:      Rate and Rhythm: Normal rate and regular rhythm.      Heart sounds: Normal heart sounds.   Pulmonary:      Effort: Pulmonary effort is normal.      Breath sounds: Normal breath sounds.   Abdominal:      General: Bowel sounds are normal.       Palpations: Abdomen is soft.      Tenderness: There is no abdominal tenderness.   Musculoskeletal:         General: Normal range of motion.      Cervical back: Normal range of motion and neck supple.   Skin:     General: Skin is warm and dry.   Neurological:      Mental Status: She is alert and oriented to person, place, and time.      Deep Tendon Reflexes: Reflexes are normal and symmetric.         Labs Reviewed   CBC AND DIFFERENTIAL - Abnormal       Result Value    WBC 8.23      RBC 4.30      Hemoglobin 11.0 (*)     Hematocrit 37.1      MCV 86      MCH 25.6 (*)     MCHC 29.6 (*)     RDW 17.1 (*)     MPV 12.1      Platelets 205      nRBC 0      Segmented % 65      Immature Grans % 0      Lymphocytes % 24      Monocytes % 9      Eosinophils Relative 2      Basophils Relative 0      Absolute Neutrophils 5.29      Absolute Immature Grans 0.02      Absolute Lymphocytes 1.99      Absolute Monocytes 0.72      Eosinophils Absolute 0.18      Basophils Absolute 0.03     COMPREHENSIVE METABOLIC PANEL - Abnormal    Sodium 141      Potassium 4.2      Chloride 103      CO2 34 (*)     ANION GAP 4      BUN 16      Creatinine 0.46 (*)     Glucose 189 (*)     Calcium 8.0 (*)     Corrected Calcium 8.8      AST 7 (*)     ALT 7      Alkaline Phosphatase 87      Total Protein 6.4      Albumin 3.0 (*)     Total Bilirubin 0.22      eGFR 90      Narrative:     National Kidney Disease Foundation guidelines for Chronic Kidney Disease (CKD):     Stage 1 with normal or high GFR (GFR > 90 mL/min/1.73 square meters)    Stage 2 Mild CKD (GFR = 60-89 mL/min/1.73 square meters)    Stage 3A Moderate CKD (GFR = 45-59 mL/min/1.73 square meters)    Stage 3B Moderate CKD (GFR = 30-44 mL/min/1.73 square meters)    Stage 4 Severe CKD (GFR = 15-29 mL/min/1.73 square meters)    Stage 5 End Stage CKD (GFR <15 mL/min/1.73 square meters)  Note: GFR calculation is accurate only with a steady state creatinine   PROTIME-INR - Normal    Protime 13.7      INR  1.00      Narrative:     INR Therapeutic Range    Indication                                             INR Range      Atrial Fibrillation                                               2.0-3.0  Hypercoagulable State                                    2.0.2.3  Left Ventricular Asist Device                            2.0-3.0  Mechanical Heart Valve                                  -    Aortic(with afib, MI, embolism, HF, LA enlargement,    and/or coagulopathy)                                     2.0-3.0 (2.5-3.5)     Mitral                                                             2.5-3.5  Prosthetic/Bioprosthetic Heart Valve               2.0-3.0  Venous thromboembolism (VTE: VT, PE        2.0-3.0   APTT - Normal    PTT 29     LIPASE - Normal    Lipase 28     HS TROPONIN I 0HR - Normal    hs TnI 0hr 29     B-TYPE NATRIURETIC PEPTIDE (BNP) - Normal    BNP 66     HS TROPONIN I 2HR - Normal    hs TnI 2hr 38      Delta 2hr hsTnI 9     URINALYSIS WITH REFLEX TO SCOPE   HS TROPONIN I 4HR     XR chest 1 view portable    (Results Pending)       Procedures       Stefan Gr, DO  09/15/24 2898

## 2024-09-15 NOTE — ASSESSMENT & PLAN NOTE
Also known history of duodenal ulcer with bleeding during hospitalization in May  Continue PPI twice daily

## 2024-09-15 NOTE — PLAN OF CARE

## 2024-09-15 NOTE — H&P
"H&P - Hospitalist   Name: Ernestina Hurley 88 y.o. female I MRN: [id number]  Unit/Bed#: [address] [place] [address] Date of Admission: 9/15/2024   Date of Service: 9/15/2024 I Hospital Day: 0     Assessment & Plan  Chest pain  Patient presented with intermittent chest tightness radiating to the arms for the past 2 days with some shortness of breath  EKG was unremarkable  Troponin x 2 were negative   SUSAN score 3  Will add aspirin 81 mg p.o. daily  Check fasting lipid panel  Due to multiple risk factors patient will be scheduled for nuclear stress test in a.m.  Cardiology consultation    Mixed hyperlipidemia  Patient currently not on any medications check fasting lipid panel    Atrial fibrillation (HCC)  Currently in sinus rhythm   continue sotalol 80 mg p.o. twice daily  Patient not on anticoagulation  Chads vascular score is 5    Type 2 diabetes mellitus without complication, without long-term current use of insulin (HCC)  Lab Results   Component Value Date    HGBA1C 8.0 (H) 04/04/2024       No results for input(s): \"POCGLU\" in the last 72 hours.    Blood Sugar Average: Last 72 hrs:  Patient reports that she is not been getting metformin at the nursing home  Will repeat hemoglobin A1c  Monitor Accu-Cheks with before every meal and at bedtime  Patient will be on diabetic diet    Depression  Patient is on Zoloft we can  GERD (gastroesophageal reflux disease)  Also known history of duodenal ulcer with bleeding during hospitalization in May  Continue PPI twice daily  Multiple sclerosis (HCC)  Patient resides at the nursing home and bedbound    VTE Pharmacologic Prophylaxis: VTE Score: 4 Moderate Risk (Score 3-4) - Pharmacological DVT Prophylaxis Ordered: enoxaparin (Lovenox).  Code Status: Level 3 - DNAR and DNI   Discussion with family: Updated  (daughter) via phone.    Anticipated Length of Stay: Patient will be admitted on an observation basis with an anticipated length of stay of less than 2 midnights secondary " to chest pain.    History of Present Illness   Chief Complaint: Chest pressure    Ernestina Hurley is a 88 y.o. female with a PMH of multiple sclerosis, diabetes, hypertension, hyperlipidemia, breast cancer who presents with chest pressure for the past 2 days.  Patient reports that she has been having mid chest pressure for the past 2 days intermittently.  This morning patient had pain radiating to both upper arms associated with some shortness of breath and patient went to the ED.  Patient denies any nausea or vomiting.  Patient has chronic left lower abdominal pain.  In the ED initially patient's blood pressure was elevated.  Labs showed negative troponin x 2.    Review of Systems   Constitutional:  Negative for activity change, appetite change, chills, diaphoresis, fatigue, fever and unexpected weight change.   HENT:  Negative for congestion, ear discharge, ear pain, facial swelling, hearing loss, mouth sores, nosebleeds, postnasal drip, rhinorrhea, sinus pressure, sneezing, sore throat, tinnitus, trouble swallowing and voice change.    Eyes:  Negative for photophobia, discharge, redness and visual disturbance.   Respiratory:  Positive for chest tightness and shortness of breath. Negative for cough, wheezing and stridor.    Cardiovascular:  Negative for chest pain, palpitations and leg swelling.   Gastrointestinal:  Negative for abdominal distention, abdominal pain, anal bleeding, blood in stool, constipation, diarrhea, nausea and vomiting.   Endocrine: Negative for polydipsia, polyphagia and polyuria.   Genitourinary:  Negative for decreased urine volume, difficulty urinating, dysuria, flank pain, hematuria, menstrual problem, pelvic pain, urgency, vaginal bleeding and vaginal discharge.   Musculoskeletal:  Negative for arthralgias, back pain and neck stiffness.   Skin:  Negative for pallor and rash.   Neurological:  Negative for dizziness, seizures, facial asymmetry, speech difficulty, light-headedness,  "numbness and headaches.   Hematological:  Negative for adenopathy.   Psychiatric/Behavioral:  Negative for agitation and confusion.          Social History:  Marital Status: /Civil Union   Patient Pre-hospital Living Situation: Long Term Care Facility  Patient Pre-hospital Level of Mobility: non-ambulatory/bed bound  Patient Pre-hospital Diet Restrictions: Diabetic diet    Objective     Vitals:   Blood Pressure: 150/69 (09/15/24 1345)  Pulse: 63 (09/15/24 1345)  Temperature: 98 °F (36.7 °C) (09/15/24 1029)  Temp Source: Tympanic (09/15/24 1029)  Respirations: 19 (09/15/24 1300)  Height: 5' 8\" (172.7 cm) (09/15/24 1500)  SpO2: 97 % (09/15/24 1345)    Physical Exam  Constitutional:       General: She is not in acute distress.  HENT:      Head: Normocephalic and atraumatic.      Nose: Nose normal.   Eyes:      Conjunctiva/sclera: Conjunctivae normal.      Pupils: Pupils are equal, round, and reactive to light.   Cardiovascular:      Rate and Rhythm: Normal rate and regular rhythm.      Heart sounds: Normal heart sounds.   Pulmonary:      Effort: Pulmonary effort is normal. No respiratory distress.      Breath sounds: Normal breath sounds. No wheezing or rales.   Abdominal:      General: Bowel sounds are normal. There is no distension.      Palpations: Abdomen is soft.      Tenderness: There is no abdominal tenderness. There is no guarding or rebound.   Musculoskeletal:      Cervical back: Normal range of motion and neck supple.   Skin:     General: Skin is warm and dry.      Findings: No rash.   Neurological:      Mental Status: She is alert.      Cranial Nerves: No cranial nerve deficit.         Lines/Drains:  Lines/Drains/Airways       Active Status       None                        Additional Data:   Lab Results: I have reviewed the following results:   Results from last 7 days   Lab Units 09/15/24  1045   WBC Thousand/uL 8.23   HEMOGLOBIN g/dL 11.0*   HEMATOCRIT % 37.1   PLATELETS Thousands/uL 205   SEGS PCT " % 65   LYMPHO PCT % 24   MONO PCT % 9   EOS PCT % 2     Results from last 7 days   Lab Units 09/15/24  1045   SODIUM mmol/L 141   POTASSIUM mmol/L 4.2   CHLORIDE mmol/L 103   CO2 mmol/L 34*   BUN mg/dL 16   CREATININE mg/dL 0.46*   ANION GAP mmol/L 4   CALCIUM mg/dL 8.0*   ALBUMIN g/dL 3.0*   TOTAL BILIRUBIN mg/dL 0.22   ALK PHOS U/L 87   ALT U/L 7   AST U/L 7*   GLUCOSE RANDOM mg/dL 189*     Results from last 7 days   Lab Units 09/15/24  1045   INR  1.00         Lab Results   Component Value Date    HGBA1C 8.0 (H) 04/04/2024    HGBA1C 6.3 (H) 02/17/2023    HGBA1C 6.3 (H) 10/17/2022           Imaging Review: Personally reviewed the following image studies in PACS and associated radiology reports: chest xray. My interpretation of the radiology images/reports is: No infiltrates or abnormality.  Other Studies: EKG was reviewed.     Administrative Statements   I have spent a total time of 55 minutes in caring for this patient on the day of the visit/encounter including Diagnostic results, Prognosis, Risks and benefits of tx options, Instructions for management, Patient and family education, Risk factor reductions, Impressions, Counseling / Coordination of care, Documenting in the medical record, Reviewing / ordering tests, medicine, procedures  , Obtaining or reviewing history  , and Communicating with other healthcare professionals .    ** Please Note: This note has been constructed using a voice recognition system. **

## 2024-09-15 NOTE — ASSESSMENT & PLAN NOTE
Patient presented with intermittent chest tightness radiating to the arms for the past 2 days with some shortness of breath  EKG was unremarkable  Troponin x 2 were negative   SUSAN score 3  Will add aspirin 81 mg p.o. daily  Check fasting lipid panel  Due to multiple risk factors patient will be scheduled for nuclear stress test in a.m.  Cardiology consultation

## 2024-09-16 ENCOUNTER — APPOINTMENT (OUTPATIENT)
Dept: NON INVASIVE DIAGNOSTICS | Facility: HOSPITAL | Age: 89
DRG: 305 | End: 2024-09-16
Payer: MEDICARE

## 2024-09-16 ENCOUNTER — APPOINTMENT (OUTPATIENT)
Dept: RADIOLOGY | Facility: HOSPITAL | Age: 89
DRG: 305 | End: 2024-09-16
Payer: MEDICARE

## 2024-09-16 PROBLEM — I10 ESSENTIAL (PRIMARY) HYPERTENSION: Chronic | Status: ACTIVE | Noted: 2024-09-16

## 2024-09-16 PROBLEM — I27.20 PULMONARY HYPERTENSION (HCC): Chronic | Status: ACTIVE | Noted: 2024-09-16

## 2024-09-16 LAB
ATRIAL RATE: 68 BPM
CARDIAC TROPONIN I PNL SERPL HS: 56 NG/L (ref 8–18)
CHEST PAIN STATEMENT: NORMAL
CHOLEST SERPL-MCNC: 236 MG/DL
GLUCOSE SERPL-MCNC: 127 MG/DL (ref 65–140)
GLUCOSE SERPL-MCNC: 128 MG/DL (ref 65–140)
GLUCOSE SERPL-MCNC: 155 MG/DL (ref 65–140)
GLUCOSE SERPL-MCNC: 197 MG/DL (ref 65–140)
HDLC SERPL-MCNC: 36 MG/DL
LDLC SERPL CALC-MCNC: 144 MG/DL (ref 0–100)
MAX DIASTOLIC BP: 86 MMHG
MAX HR PERCENT: 73 %
MAX HR: 98 BPM
MAX PREDICTED HEART RATE: 134 BPM
NONHDLC SERPL-MCNC: 200 MG/DL
NUC STRESS EJECTION FRACTION: 79 %
P AXIS: 61 DEGREES
PR INTERVAL: 202 MS
PROTOCOL NAME: NORMAL
QRS AXIS: -3 DEGREES
QRSD INTERVAL: 78 MS
QT INTERVAL: 458 MS
QTC INTERVAL: 487 MS
RATE PRESSURE PRODUCT: 158
REASON FOR TERMINATION: NORMAL
SL CV REST NUCLEAR ISOTOPE DOSE: 10.72 MCI
SL CV STRESS NUCLEAR ISOTOPE DOSE: 32.3 MCI
SL CV STRESS RECOVERY BP: NORMAL MMHG
SL CV STRESS RECOVERY HR: 80 BPM
SL CV STRESS RECOVERY O2 SAT: 97 %
STRESS ANGINA INDEX: 0
STRESS BASELINE BP: NORMAL MMHG
STRESS BASELINE HR: 65 BPM
STRESS O2 SAT REST: 95 %
STRESS PEAK HR: 79 BPM
STRESS POST ESTIMATED WORKLOAD: 1 METS
STRESS POST EXERCISE DUR MIN: 1 MIN
STRESS POST EXERCISE DUR MIN: 2 MIN
STRESS POST EXERCISE DUR SEC: 23 SEC
STRESS POST O2 SAT PEAK: 97 %
STRESS POST PEAK BP: 2 MMHG
STRESS POST PEAK HR: 98 BPM
STRESS POST PEAK SYSTOLIC BP: 208 MMHG
STRESS/REST PERFUSION RATIO: 1.2
T WAVE AXIS: 67 DEGREES
TARGET HR FORMULA: NORMAL
TEST INDICATION: NORMAL
TRIGL SERPL-MCNC: 281 MG/DL
VENTRICULAR RATE: 68 BPM

## 2024-09-16 PROCEDURE — 93010 ELECTROCARDIOGRAM REPORT: CPT | Performed by: INTERNAL MEDICINE

## 2024-09-16 PROCEDURE — 93016 CV STRESS TEST SUPVJ ONLY: CPT | Performed by: INTERNAL MEDICINE

## 2024-09-16 PROCEDURE — 78452 HT MUSCLE IMAGE SPECT MULT: CPT | Performed by: INTERNAL MEDICINE

## 2024-09-16 PROCEDURE — 93005 ELECTROCARDIOGRAM TRACING: CPT

## 2024-09-16 PROCEDURE — 93018 CV STRESS TEST I&R ONLY: CPT | Performed by: INTERNAL MEDICINE

## 2024-09-16 PROCEDURE — A9502 TC99M TETROFOSMIN: HCPCS

## 2024-09-16 PROCEDURE — 99223 1ST HOSP IP/OBS HIGH 75: CPT | Performed by: INTERNAL MEDICINE

## 2024-09-16 PROCEDURE — 99232 SBSQ HOSP IP/OBS MODERATE 35: CPT

## 2024-09-16 PROCEDURE — 84484 ASSAY OF TROPONIN QUANT: CPT | Performed by: INTERNAL MEDICINE

## 2024-09-16 PROCEDURE — 78452 HT MUSCLE IMAGE SPECT MULT: CPT

## 2024-09-16 PROCEDURE — 82948 REAGENT STRIP/BLOOD GLUCOSE: CPT

## 2024-09-16 PROCEDURE — 93017 CV STRESS TEST TRACING ONLY: CPT

## 2024-09-16 PROCEDURE — 80061 LIPID PANEL: CPT | Performed by: PHYSICIAN ASSISTANT

## 2024-09-16 RX ORDER — REGADENOSON 0.08 MG/ML
0.4 INJECTION, SOLUTION INTRAVENOUS ONCE
Status: COMPLETED | OUTPATIENT
Start: 2024-09-16 | End: 2024-09-16

## 2024-09-16 RX ORDER — LOSARTAN POTASSIUM 25 MG/1
25 TABLET ORAL DAILY
Status: DISCONTINUED | OUTPATIENT
Start: 2024-09-16 | End: 2024-09-17

## 2024-09-16 RX ORDER — ATORVASTATIN CALCIUM 40 MG/1
40 TABLET, FILM COATED ORAL
Status: DISCONTINUED | OUTPATIENT
Start: 2024-09-16 | End: 2024-09-17 | Stop reason: HOSPADM

## 2024-09-16 RX ADMIN — ASPIRIN 81 MG CHEWABLE TABLET 81 MG: 81 TABLET CHEWABLE at 11:38

## 2024-09-16 RX ADMIN — PANTOPRAZOLE SODIUM 40 MG: 40 TABLET, DELAYED RELEASE ORAL at 11:37

## 2024-09-16 RX ADMIN — SOTALOL HYDROCHLORIDE 80 MG: 80 TABLET ORAL at 11:38

## 2024-09-16 RX ADMIN — FERROUS SULFATE TAB 325 MG (65 MG ELEMENTAL FE) 325 MG: 325 (65 FE) TAB at 11:38

## 2024-09-16 RX ADMIN — SOTALOL HYDROCHLORIDE 80 MG: 80 TABLET ORAL at 17:14

## 2024-09-16 RX ADMIN — INSULIN LISPRO 1 UNITS: 100 INJECTION, SOLUTION INTRAVENOUS; SUBCUTANEOUS at 21:52

## 2024-09-16 RX ADMIN — SERTRALINE HYDROCHLORIDE 25 MG: 25 TABLET ORAL at 11:38

## 2024-09-16 RX ADMIN — LORATADINE 10 MG: 10 TABLET ORAL at 11:38

## 2024-09-16 RX ADMIN — REGADENOSON 0.4 MG: 0.08 INJECTION, SOLUTION INTRAVENOUS at 09:30

## 2024-09-16 RX ADMIN — PSYLLIUM HUSK 1 PACKET: 3.4 POWDER ORAL at 11:42

## 2024-09-16 RX ADMIN — LATANOPROST 1 DROP: 50 SOLUTION OPHTHALMIC at 21:52

## 2024-09-16 RX ADMIN — ENOXAPARIN SODIUM 40 MG: 40 INJECTION SUBCUTANEOUS at 11:37

## 2024-09-16 RX ADMIN — ATORVASTATIN CALCIUM 40 MG: 40 TABLET, FILM COATED ORAL at 17:14

## 2024-09-16 RX ADMIN — LOSARTAN POTASSIUM 25 MG: 25 TABLET, FILM COATED ORAL at 11:38

## 2024-09-16 RX ADMIN — PANTOPRAZOLE SODIUM 40 MG: 40 TABLET, DELAYED RELEASE ORAL at 21:52

## 2024-09-16 NOTE — ASSESSMENT & PLAN NOTE
Lab Results   Component Value Date    HGBA1C 7.4 (H) 09/15/2024     Recent Labs     09/15/24  1652 09/15/24  2040 09/16/24  0729   POCGLU 127 203* 128     4/04/24 HgbA1c: 8.0.   Care per primary.

## 2024-09-16 NOTE — ASSESSMENT & PLAN NOTE
Patient with documented history of essential hypertension.  Review of home meds notes that patient is not on any antihypertensives.  Patient with hypertensive urgency on presentation.  Will start losartan 25 mg daily.

## 2024-09-16 NOTE — PROGRESS NOTES
Patient:  DEVENDRA KENT    MRN:  [id number]    [facility] Request ID:  [id number]    Level of care reserved:  Skilled Nursing Facility    Partner Reserved:  [facility] [place] [address] [phone]    Clinical needs requested:    Geography searched:  10 miles around [id number]    Start of Service:    Request sent:  10:09am EDT on 9/16/2024 by Blanche Mckeon    Partner reserved:  11:54am EDT on 9/16/2024 by Blanche Mckeon    Choice list shared:  11:53am EDT on 9/16/2024 by Blanche Mckeon

## 2024-09-16 NOTE — CASE MANAGEMENT
Case Management Assessment & Discharge Planning Note    Patient name Ernestina Hurley  Location [address] [place] [address]/[address] [address]-* MRN [id number]  : 1935 Date 2024       Current Admission Date: 9/15/2024  Current Admission Diagnosis:Chest pain   Patient Active Problem List    Diagnosis Date Noted Date Diagnosed    Essential (primary) hypertension 2024     Pulmonary hypertension (HCC) 2024     Peptic ulcer disease with hemorrhage 2024     Depression 2024     GERD (gastroesophageal reflux disease) 2024     SIRS (systemic inflammatory response syndrome) ([facility]) 2024     Acute on chronic respiratory failure with hypoxia and hypercapnia ([facility]) 2024     Anemia 2022     Lower extremity weakness 2021     Current moderate episode of major depressive disorder without prior episode ([facility]) 2020     Onychomycosis 2019     Tinea pedis of both feet 2019     Pain in both feet 2019     Chest pain 2019     Thyroid nodule 2019     Diabetic polyneuropathy associated with type 2 diabetes mellitus ([facility]) 03/15/2019     Atherosclerosis of native artery of both lower extremities (HCC) 03/15/2019     Granuloma of great toe 02/15/2018     History of breast cancer 2017     Essential hypertension 2017     Type 2 diabetes mellitus without complication, without long-term current use of insulin (HCC) 2017     Mixed hyperlipidemia 2017     Atrial fibrillation ([facility]) 2017     Multiple sclerosis ([facility]) 2017     Abnormal gait 10/08/2015     Urinary incontinence 08/15/2013     Arthropathy of multiple sites 2013       LOS (days): 0  Geometric Mean LOS (GMLOS) (days):   Days to GMLOS:     OBJECTIVE:         Current admission status: Observation  Referral Reason: Other (Discharge planning)    Preferred Pharmacy:   [id number], [address] [place] [address]  Phone:  [phone] Fax: [phone]    [facility] - [address]  [address] [facility] [address]  Phone: [phone] Fax: [phone]    Primary Care Provider: No primary care provider on file.    Primary Insurance: MEDICARE  Secondary Insurance: BLUE CROSS    ASSESSMENT:  Active Health Care Proxies    There are no active Health Care Proxies on file.          Obs Notice Signed: 09/16/24 (Notice reviewed with daughter Herminia by phone.  Copy mailed to Herminia ([address] [place] [address] [place] [address]) and copy placed in scan bin for chart.)    Readmission Root Cause  30 Day Readmission: No    Patient Information  Admitted from:: Facility ([facility])  Mental Status: Alert  During Assessment patient was accompanied by: Not accompanied during assessment  Assessment information provided by:: Daughter  Primary Caregiver: Other (Comment)  Caregiver's Name:: [facility]  Caregiver's Relationship to Patient:: Other (Specify) (SNF)  Caregiver's Telephone Number:: [phone]  Support Systems: Daughter, Family members  County of Residence: [facility] do you live in?: [place]  Type of Current Residence: Facility    Activities of Daily Living Prior to Admission  Functional Status: Total dependent  Completes ADLs independently?: No  Level of ADL dependence: Total Dependent  Ambulates independently?: No  Level of ambulatory dependence: Total Dependent  Does patient use assisted devices?: Yes  Does the patient have a history of Short-Term Rehab?: Yes  Does patient have a history of HHC?: Yes  Does patient currently have HHC?: No    Patient Information Continued  Income Source: Pension/prison  Does patient have prescription coverage?: Yes  Does patient receive dialysis treatments?: No    Means of Transportation  Means of Transport to Appts:: Other (Comment) (arranged by facility)      Social Determinants of Health (SDOH)      Flowsheet Row Most Recent Value    Housing Stability    In the last 12 months, was there a time when you were not able to pay the mortgage or rent on time? N   In the past 12 months, how many times have you moved where you were living? 1   At any time in the past 12 months, were you homeless or living in a shelter (including now)? N   Transportation Needs    In the past 12 months, has lack of transportation kept you from medical appointments or from getting medications? no   In the past 12 months, has lack of transportation kept you from meetings, work, or from getting things needed for daily living? No   Food Insecurity    Within the past 12 months, you worried that your food would run out before you got the money to buy more. Never true   Within the past 12 months, the food you bought just didn't last and you didn't have money to get more. Never true   Utilities    In the past 12 months has the electric, gas, oil, or water company threatened to shut off services in your home? No            DISCHARGE DETAILS:    Discharge planning discussed with:: Daughter Herminia Horn  Freedom of Choice: Yes    Comments - Freedom of Choice: SW spoke with daughter Herminia by phone to confirm that patient is a long-term resident at [facility] and preference is for patient to return to facility when medically cleared.  SW placed referral in [facility] and patient was confirmed as a bed hold.  SW will continue to follow.      CM contacted family/caregiver?: Yes  Were Treatment Team discharge recommendations reviewed with patient/caregiver?: Yes  Did patient/caregiver verbalize understanding of patient care needs?: N/A- going to facility  Were patient/caregiver advised of the risks associated with not following Treatment Team discharge recommendations?: Yes    Contacts  Patient Contacts: Herminia Horn (daughter)  Relationship to Patient:: Family  Contact Method: Phone  Phone Number: [phone]  Reason/Outcome: Emergency Contact, Discharge  Planning    Requested Home Health Care         Is the patient interested in HHC at discharge?: No    DME Referral Provided  Referral made for DME?: No    Other Referral/Resources/Interventions Provided:  Interventions: Facility Return, SNF    Would you like to participate in our [facility] service program?  : No - Declined    Treatment Team Recommendation: Facility Return, SNF  Discharge Destination Plan:: Facility Return, SNF  Transport at Discharge : [facility]

## 2024-09-16 NOTE — PLAN OF CARE
Problem: Potential for Falls  Goal: Patient will remain free of falls  Description: INTERVENTIONS:  - Educate patient/family on patient safety including physical limitations  - Instruct patient to call for assistance with activity   - Consult OT/PT to assist with strengthening/mobility   - Keep Call bell within reach  - Keep bed low and locked with side rails adjusted as appropriate  - Keep care items and personal belongings within reach  - Initiate and maintain comfort rounds  - Make Fall Risk Sign visible to staff  - Offer Toileting every 2 Hours, in advance of need  - Initiate/Maintain bed/chair alarm  - Obtain necessary fall risk management equipment:  bed/chair alarm  - Apply yellow socks and bracelet for high fall risk patients  - Consider moving patient to room near nurses station  Outcome: Progressing     Problem: PAIN - ADULT  Goal: Verbalizes/displays adequate comfort level or baseline comfort level  Description: Interventions:  - Encourage patient to monitor pain and request assistance  - Assess pain using appropriate pain scale  - Administer analgesics based on type and severity of pain and evaluate response  - Implement non-pharmacological measures as appropriate and evaluate response  - Consider cultural and social influences on pain and pain management  - Notify physician/advanced practitioner if interventions unsuccessful or patient reports new pain  Outcome: Progressing     Problem: INFECTION - ADULT  Goal: Absence or prevention of progression during hospitalization  Description: INTERVENTIONS:  - Assess and monitor for signs and symptoms of infection  - Monitor lab/diagnostic results  - Monitor all insertion sites, i.e. indwelling lines, tubes, and drains  - Monitor endotracheal if appropriate and nasal secretions for changes in amount and color  - [place] appropriate cooling/warming therapies per order  - Administer medications as ordered  - Instruct and encourage patient and family to use  good hand hygiene technique  - Identify and instruct in appropriate isolation precautions for identified infection/condition  Outcome: Progressing     Problem: SAFETY ADULT  Goal: Patient will remain free of falls  Description: INTERVENTIONS:  - Educate patient/family on patient safety including physical limitations  - Instruct patient to call for assistance with activity   - Consult OT/PT to assist with strengthening/mobility   - Keep Call bell within reach  - Keep bed low and locked with side rails adjusted as appropriate  - Keep care items and personal belongings within reach  - Initiate and maintain comfort rounds  - Make Fall Risk Sign visible to staff  - Offer Toileting every 2 Hours, in advance of need  - Initiate/Maintain bed/chair alarm  - Obtain necessary fall risk management equipment:  bed/chair alarm  - Apply yellow socks and bracelet for high fall risk patients  - Consider moving patient to room near nurses station  Outcome: Progressing  Goal: Maintain or return to baseline ADL function  Description: INTERVENTIONS:  -  Assess patient's ability to carry out ADLs; assess patient's baseline for ADL function and identify physical deficits which impact ability to perform ADLs (bathing, care of mouth/teeth, toileting, grooming, dressing, etc.)  - Assess/evaluate cause of self-care deficits   - Assess range of motion  - Assess patient's mobility; develop plan if impaired  - Assess patient's need for assistive devices and provide as appropriate  - Encourage maximum independence but intervene and supervise when necessary  - Involve family in performance of ADLs  - Assess for home care needs following discharge   - Consider OT consult to assist with ADL evaluation and planning for discharge  - Provide patient education as appropriate  Outcome: Progressing  Goal: Maintains/Returns to pre admission functional level  Description: INTERVENTIONS:  - Perform AM-PAC 6 Click Basic Mobility/ Daily Activity assessment  daily.  - Set and communicate daily mobility goal to care team and patient/family/caregiver.   - Collaborate with rehabilitation services on mobility goals if consulted  - Perform Range of Motion 3 times a day.  - Reposition patient every 3 hours.  - Dangle patient 3 times a day  - Stand patient 3 times a day  - Ambulate patient 3 times a day  - Out of bed to chair 3 times a day   - Out of bed for meals 3 times a day  - Out of bed for toileting  - Record patient progress and toleration of activity level   Outcome: Progressing     Problem: DISCHARGE PLANNING  Goal: Discharge to home or other facility with appropriate resources  Description: INTERVENTIONS:  - Identify barriers to discharge w/patient and caregiver  - Arrange for needed discharge resources and transportation as appropriate  - Identify discharge learning needs (meds, wound care, etc.)  - Arrange for interpretive services to assist at discharge as needed  - Refer to Case Management Department for coordinating discharge planning if the patient needs post-hospital services based on physician/advanced practitioner order or complex needs related to functional status, cognitive ability, or social support system  Outcome: Progressing     Problem: Knowledge Deficit  Goal: Patient/family/caregiver demonstrates understanding of disease process, treatment plan, medications, and discharge instructions  Description: Complete learning assessment and assess knowledge base.  Interventions:  - Provide teaching at level of understanding  - Provide teaching via preferred learning methods  Outcome: Progressing     Problem: Prexisting or High Potential for Compromised Skin Integrity  Goal: Skin integrity is maintained or improved  Description: INTERVENTIONS:  - Identify patients at risk for skin breakdown  - Assess and monitor skin integrity  - Assess and monitor nutrition and hydration status  - Monitor labs   - Assess for incontinence   - Turn and reposition patient  -  Assist with mobility/ambulation  - Relieve pressure over bony prominences  - Avoid friction and shearing  - Provide appropriate hygiene as needed including keeping skin clean and dry  - Evaluate need for skin moisturizer/barrier cream  - Collaborate with interdisciplinary team   - Patient/family teaching  - Consider wound care consult   Outcome: Progressing

## 2024-09-16 NOTE — ASSESSMENT & PLAN NOTE
Patient with documented history of paroxysmal atrial fibrillation.  Currently on sotalol 80 mg twice daily.  [id number] stroke risk score: 5 points, moderate to high risk.  Discussed with patient given history of paroxysmal atrial fibrillation and elevated [id number] stroke risk score is recommended patient to be on anticoagulation.  Patient declines anticoagulation.  Discussed risks of not being on anticoagulation given history of paroxysmal atrial fibrillation clued but are not limited to stroke, heart attack, PE.  Patient reported she understand risks of not being on anticoagulation, continues to decline anticoagulation.

## 2024-09-16 NOTE — ASSESSMENT & PLAN NOTE
Currently in sinus rhythm   continue sotalol 80 mg p.o. twice daily  Patient not on anticoagulation  Chads vascular score is 5     done

## 2024-09-16 NOTE — CONSULTS
Consultation - Cardiology   [facility]     Ernestina Hurley 88 y.o. female MRN: [id number]  : 1935  Unit/Bed#: [address] [place] [address] Encounter: [id number]      Assessment & Plan     Assessment & Plan  Chest pain  Patient presented on 9/15/24 for evaluation for chest pressure. Patient states that she started to experience chest pressure on morning of 9/15/24.  She states that the pressure started in the middle of her chest with radiation to both of her arms.  She endorses some mild shortness of breath with chest discomfort.  She denies any additional symptoms such as palpitations, lightheadedness, dizziness, headache, nausea, vomiting or diaphoresis.  Patient denies currently experiencing chest pain.  9/15/24 EKG: Sinus rhythm, 60 bpm.  Inferior infarct, cited on or before 19.  Cannot rule out anterior infarct, cited on or before 3/11/2017.  9/15/24 hs troponin: 29 (0hrs), 38 (2hrs), 102 (4hrs),  9/15/24 hs troponin random: 91.   Mildly elevated troponin, likely non-MI troponin elevation secondary to hypertensive urgency on presentation.  Scheduled for nuclear stress test today.  Atrial fibrillation (HCC)  Patient with documented history of paroxysmal atrial fibrillation.  Currently on sotalol 80 mg twice daily.  [id number] stroke risk score: 5 points, moderate to high risk.  Discussed with patient given history of paroxysmal atrial fibrillation and elevated [id number] stroke risk score is recommended patient to be on anticoagulation.  Patient declines anticoagulation.  Discussed risks of not being on anticoagulation given history of paroxysmal atrial fibrillation clued but are not limited to stroke, heart attack, PE.  Patient reported she understand risks of not being on anticoagulation, continues to decline anticoagulation.  Essential (primary) hypertension  Patient with documented history of essential hypertension.  Review of home meds notes that patient is not on any  antihypertensives.  Patient with hypertensive urgency on presentation.  Will start losartan 25 mg daily.  Mixed hyperlipidemia  6/15/22 lipid panel: Cholesterol 131, triglycerides 160, HDL 32, LDL 67.  Repeat lipid panel ordered.  Not currently on statin therapy.  Will follow-up repeat lipid panel.  Pulmonary hypertension (HCC)  4/04/24 TTE: The right ventricular systolic pressure is moderately to severely elevated at 62-65mmHg peak.   Type 2 diabetes mellitus without complication, without long-term current use of insulin (HCC)  Lab Results   Component Value Date    HGBA1C 7.4 (H) 09/15/2024     Recent Labs     09/15/24  1652 09/15/24  2040 09/16/24  0729   POCGLU 127 203* 128     4/04/24 HgbA1c: 8.0.   Care per primary.   Multiple sclerosis (HCC)  Care per primary team.   Depression  Care per primary team.     Summary of Recommendations:        Thank you for your consultation.    Physician Requesting Consult: Danni Olivo MD    Reason for Consult / Principal Problem: Chest pain.    Inpatient consult to Cardiology  Consult performed by: Radha Tamez PA-C  Consult ordered by: Danni Olivo MD          HPI: Ernestina Hruley is a 88 y.o. female with PMHx of paroxysmal atrial fibrillation (not on anticoagulation), essential hypertension, hyperlipidemia, pulmonary hypertension, DMII, multiple sclerosis, who presented on 9/15/24 for evaluation for chest pressure.     Patient states that she started to experience chest pressure on morning of 9/15/24.  She states that the pressure started in the middle of her chest with radiation to both of her arms.  She endorses some mild shortness of breath with chest discomfort.  She denies any additional symptoms such as palpitations, lightheadedness, dizziness, headache, nausea, vomiting or diaphoresis.      Patient currently denies experiencing chest pain.      Discussed with patient given history of paroxysmal atrial fibrillation and elevated [id number] stroke risk  score is recommended patient to be on anticoagulation.  Patient declines anticoagulation.  Discussed risks of not being on anticoagulation given history of paroxysmal atrial fibrillation clued but are not limited to stroke, heart attack, PE.  Patient reported she understand risks of not being on anticoagulation, continues to decline anticoagulation.    Review of Systems   Constitutional:  Negative for activity change, appetite change, chills, diaphoresis, fatigue, fever and unexpected weight change.   Respiratory:  Positive for chest tightness and shortness of breath. Negative for wheezing.    Cardiovascular:  Negative for chest pain, palpitations and leg swelling.   Gastrointestinal:  Positive for vomiting. Negative for abdominal distention, abdominal pain, anal bleeding, constipation, diarrhea and nausea.   Musculoskeletal: Negative.    Skin: Negative.    Neurological:  Negative for dizziness, syncope, weakness, light-headedness, numbness and headaches.       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-last assessed-10/8/2015    Cellulitis of chest wall     last assessed-7/27/2015    Chronic endometritis 10/12/2006    Diabetes mellitus (HCC)     Dysuria 11/02/2007    Flushing     resolved-6/30/2015    Glaucoma     Hyperlipidemia     Hypertension     Ingrown nail 01/05/2012    Malignant neoplasm of breast (HCC)     last assessed-11/5/2015    MS (multiple sclerosis) ([facility])     Nonvenomous insect bite     last assessed-12/12/2013    Palpitations 02/09/2011    Pressure ulcer of buttock 10/13/2006    Proctitis 05/12/2006    Vaginal polyp 03/14/2006    Viral gastroenteritis     last assessed-9/8/2016     Past Surgical History:   Procedure Laterality Date    BREAST BIOPSY      open    BREAST SURGERY      CERVICAL BIOPSY  W/ LOOP ELECTRODE EXCISION      DILATION AND CURETTAGE OF  UTERUS      INCISION AND DRAINAGE OF WOUND Left 2/27/2017    Procedure: INCISION AND DRAINAGE (I&D)   Chest wall x 2;  Surgeon: Shant Braga MD;  Location: [facility];  Service:     IR THORACENTESIS  4/3/2024    MASTECTOMY      last assessed-6/30/2015     Social History     Substance and Sexual Activity   Alcohol Use Not Currently    Alcohol/week: 0.0 standard drinks of alcohol     Social History     Substance and Sexual Activity   Drug Use Not Currently     Social History     Tobacco Use   Smoking Status Former   Smokeless Tobacco Never     Family History:   Family History   Problem Relation Age of Onset    Heart disease Father         cardiac disorder    COPD Sister     Diabetes Sister     Lung cancer Mother     Lung cancer Cousin     Heart disease Cousin         cardiac disorder    Multiple sclerosis Cousin     Cancer Cousin     Cancer Daughter         bladder    Breast cancer Maternal Aunt        Meds/Allergies    PTA meds:    Medications Prior to Admission:     acetaminophen (TYLENOL) 650 mg CR tablet    ferrous sulfate 325 (65 FE) MG EC tablet    latanoprost (XALATAN) 0.005 % ophthalmic solution    loratadine (CLARITIN) 10 mg tablet    pantoprazole (PROTONIX) 40 mg tablet    polyethylene glycol (MIRALAX) 17 g packet    psyllium (METAMUCIL) 58.6 % packet    sertraline (ZOLOFT) 25 mg tablet    sotalol (BETAPACE) 80 mg tablet    metFORMIN (GLUCOPHAGE) 1000 MG tablet   Allergies   Allergen Reactions    Clindamycin      Annotation - 08Oct2015: bad taste in mouth    Dabigatran     Dabigatran Etexilate Mesylate Other (See Comments)    Methixene Other (See Comments)    Pradaxa [Dabigatran Etexilate Mesylate]     Sulfamethoxazole-Trimethoprim Other (See Comments)     General weakness    Warfarin Lip Swelling     Reaction Date: 22May2012; Annotation - 04Sep2012: lip swelling    Latex Rash     Annotation - [id number]: RASH       Current Facility-Administered Medications:     aspirin chewable tablet 81 mg, 81 mg, Oral,  "Daily, Danni Olivo MD    enoxaparin (LOVENOX) subcutaneous injection 40 mg, 40 mg, Subcutaneous, Daily, Danni Oilvo MD, 40 mg at 09/15/24 1539    ferrous sulfate tablet 325 mg, 325 mg, Oral, Daily With Breakfast, Danni Olivo MD    insulin lispro (HumALOG/ADMELOG) 100 units/mL subcutaneous injection 1-5 Units, 1-5 Units, Subcutaneous, HS, Danni Olivo MD, 1 Units at 09/15/24 2109    insulin lispro (HumALOG/ADMELOG) 100 units/mL subcutaneous injection 1-6 Units, 1-6 Units, Subcutaneous, TID AC **AND** Fingerstick Glucose (POCT), , , TID AC, Danni Olivo MD    latanoprost (XALATAN) 0.005 % ophthalmic solution 1 drop, 1 drop, Both Eyes, HS, Danni Olivo MD, 1 drop at 09/15/24 2109    loratadine (CLARITIN) tablet 10 mg, 10 mg, Oral, Daily, Danni Olivo MD, 10 mg at 09/15/24 1540    ondansetron (ZOFRAN) injection 4 mg, 4 mg, Intravenous, Q6H PRN, Danni Olivo MD    pantoprazole (PROTONIX) EC tablet 40 mg, 40 mg, Oral, BID, Danni Olivo MD, 40 mg at 09/15/24 2109    polyethylene glycol (MIRALAX) packet 17 g, 17 g, Oral, Every Other Day, Danni Olivo MD    psyllium (METAMUCIL) 1 packet, 1 packet, Oral, Daily, Danni Olivo MD, 1 packet at 09/15/24 1540    sertraline (ZOLOFT) tablet 25 mg, 25 mg, Oral, Daily, Danni Olivo MD, 25 mg at 09/15/24 1540    sotalol (BETAPACE) tablet 80 mg, 80 mg, Oral, BID, Danni Olivo MD, 80 mg at 09/15/24 1759    VTE Pharmacologic Prophylaxis:   Enoxaparin (Lovenox)    Objective:   Vitals: Blood pressure (!) 173/66, pulse 62, temperature 97.7 °F (36.5 °C), resp. rate 16, height 5' 8\" (1.727 m), SpO2 96%.  Body mass index is 28.13 kg/m².  Wt Readings from Last 3 Encounters:   06/13/24 83.9 kg (185 lb)   06/11/24 84 kg (185 lb 3 oz)   05/30/24 83 kg (183 lb)     BP Readings from Last 3 Encounters:   09/16/24 (!) 173/66   06/13/24 148/66   06/11/24 152/67     Orthostatic Blood Pressures      Flowsheet Row Most Recent " Value   Blood Pressure 173/66 filed at 09/16/2024 0736   Patient Position - Orthostatic VS Lying filed at 09/15/2024 1029            Intake/Output Summary (Last 24 hours) at 9/16/2024 1005  Last data filed at 9/16/2024 0300  Gross per 24 hour   Intake --   Output 400 ml   Net -400 ml       Invasive Devices       Peripheral Intravenous Line  Duration             Peripheral IV 09/15/24 Left Hand <1 day              Drain  Duration             External Urinary Catheter <1 day                      Physical Exam:   Physical Exam  Vitals reviewed.   Constitutional:       General: She is not in acute distress.  Cardiovascular:      Rate and Rhythm: Normal rate and regular rhythm.      Pulses: Normal pulses.      Heart sounds: No murmur heard.  Pulmonary:      Effort: Pulmonary effort is normal. No respiratory distress.      Breath sounds: Normal breath sounds.   Abdominal:      General: Abdomen is flat. There is no distension.      Palpations: Abdomen is soft. There is no mass.      Tenderness: There is no abdominal tenderness.   Musculoskeletal:      Right lower leg: No edema.      Left lower leg: No edema.   Skin:     General: Skin is warm and dry.   Neurological:      Mental Status: She is alert and oriented to person, place, and time.           Labs:   Troponins:  Results from last 7 days   Lab Units 09/16/24  0531 09/15/24  2020 09/15/24  1620 09/15/24  1240   HS TNI RAND ng/L 56* 91*  --   --    HSTNI D2 ng/L  --   --   --  9   HSTNI D4 ng/L  --   --  73*  --        CBC with diff:   Results from last 7 days   Lab Units 09/15/24  1045   WBC Thousand/uL 8.23   HEMOGLOBIN g/dL 11.0*   HEMATOCRIT % 37.1   MCV fL 86   PLATELETS Thousands/uL 205   RBC Million/uL 4.30   MCH pg 25.6*   MCHC g/dL 29.6*   RDW % 17.1*   MPV fL 12.1   NRBC AUTO /100 WBCs 0       CMP:   Results from last 7 days   Lab Units 09/15/24  1045   SODIUM mmol/L 141   POTASSIUM mmol/L 4.2   CHLORIDE mmol/L 103   CO2 mmol/L 34*   ANION GAP mmol/L 4   BUN  mg/dL 16   CREATININE mg/dL 0.46*   CALCIUM mg/dL 8.0*   AST U/L 7*   ALT U/L 7   ALK PHOS U/L 87   TOTAL PROTEIN g/dL 6.4   ALBUMIN g/dL 3.0*   TOTAL BILIRUBIN mg/dL 0.22   EGFR ml/min/1.73sq m 90   GLUCOSE RANDOM mg/dL 189*     Coags:   Results from last 7 days   Lab Units 09/15/24  1045   PTT seconds 29   INR  1.00     Lipid Profile:   Lab Results   Component Value Date    CHOLESTEROL 146 11/24/2021    HDL 52 11/24/2021    LDLCALC 67 11/24/2021    TRIG 134 11/24/2021     Hgb A1c:   Results from last 7 days   Lab Units 09/15/24  1620   HEMOGLOBIN A1C % 7.4*     Imaging & Testing     Cardiac testing:     Echo complete with contrast if indicated  Result date: 4/04/24    Left Ventricle Left ventricular cavity size is normal. Wall thickness is moderately increased. There is moderate concentric hypertrophy. The left ventricular ejection fraction is 65%. Systolic function is normal. Wall motion is normal. Unable to assess diastolic function.   Right Ventricle Systolic function is normal. Normal tricuspid annular plane systolic excursion (TAPSE) > 1.7 cm. Wall thickness is increased.   Left Atrium The atrium is normal in size.   Right Atrium The atrium is normal in size.   Aortic Valve The aortic valve is trileaflet. The leaflets are mildly thickened. The leaflets are mildly calcified. The leaflets exhibit normal mobility. There is trace regurgitation. The aortic valve has no significant stenosis.   Mitral Valve There is mild thickening. There is mild annular calcification.  There is trace regurgitation. There is no evidence of stenosis.   Tricuspid Valve There is mild thickening. There is mild regurgitation. There is no evidence of stenosis. The right ventricular systolic pressure is moderately to severely elevated at 62-65mmHg peak.   Pulmonic Valve There is mild thickening. There is trace regurgitation. There is no evidence of stenosis.   IVC/SVC The inferior vena cava is normal in size.   Pericardium There is a  trivial pericardial effusion. There is a small left pleural effusion.     Imaging: No pertinent imaging studies reviewed.  No results found.    EKG/ Monitor: Personally reviewed.     Telemetry reviewed: Currently sinus rhythm, 63 bpm.    9/15/24 EKG: Sinus rhythm, 60 bpm.  Inferior infarct, cited on or before 7/14/19.  Cannot rule out anterior infarct, cited on or before 3/11/2017.         Code Status: Level 3 - DNAR and DNI  Advance Directive and Living Will:      POLST:        Radha Tamez PA-C

## 2024-09-16 NOTE — ASSESSMENT & PLAN NOTE
Lab Results   Component Value Date    HGBA1C 7.4 (H) 09/15/2024       Recent Labs     09/15/24  1652 09/15/24  2040 09/16/24  0729 09/16/24  1136   POCGLU 127 203* 128 155*       Blood Sugar Average: Last 72 hrs:  (P) 153.25Patient reports that she is not been getting metformin at the nursing home  Will repeat hemoglobin A1c  Monitor Accu-Cheks with before every meal and at bedtime  Patient will be on diabetic diet

## 2024-09-16 NOTE — PROGRESS NOTES
Progress Note - Hospitalist   Name: Ernestina Hurley 88 y.o. female I MRN: [id number]  Unit/Bed#: [address] [place] [address] Date of Admission: 9/15/2024   Date of Service: 9/16/2024 I Hospital Day: 0    Assessment & Plan  Chest pain  Patient presented with intermittent chest tightness radiating to the arms for the past 2 days with some shortness of breath  EKG was unremarkable  Troponin x 2 were negative   SUSAN score 3  Will add aspirin 81 mg p.o. daily  Fasting lipid panel noted above  Due to multiple risk factors patient will be scheduled for nuclear stress test in a.m.  Cardiology consultation    Mixed hyperlipidemia  Patient currently not on any medications   Fasting lipid panel resulted as follow:  Cholesterol 236, triglyceride 281, , HDL 36  Will start atorvastatin 40 mg    Atrial fibrillation (HCC)  Currently in sinus rhythm   continue sotalol 80 mg p.o. twice daily  Patient not on anticoagulation  Chads vascular score is 5    Type 2 diabetes mellitus without complication, without long-term current use of insulin ([facility])  Lab Results   Component Value Date    HGBA1C 7.4 (H) 09/15/2024       Recent Labs     09/15/24  1652 09/15/24  2040 09/16/24  0729 09/16/24  1136   POCGLU 127 203* 128 155*       Blood Sugar Average: Last 72 hrs:  (P) 153.25Patient reports that she is not been getting metformin at the nursing home  Will repeat hemoglobin A1c  Monitor Accu-Cheks with before every meal and at bedtime  Patient will be on diabetic diet    Depression  Patient is on Zoloft we can  GERD (gastroesophageal reflux disease)  Also known history of duodenal ulcer with bleeding during hospitalization in May  Continue PPI twice daily  Multiple sclerosis (HCC)  Patient resides at the nursing home and bedbound  Essential (primary) hypertension  Presented with hypertensive urgency  Started on losartan 25 mg daily  Pulmonary hypertension (HCC)      VTE Pharmacologic Prophylaxis: VTE Score: 4 High Risk (Score >/= 5) -  Pharmacological DVT Prophylaxis Ordered: enoxaparin (Lovenox). Sequential Compression Devices Ordered.    Mobility:   Basic Mobility Inpatient Raw Score: 6  -HLM Goal: 2: Bed activities/Dependent transfer  -HLM Achieved: 2: Bed activities/Dependent transfer       Patient Centered Rounds: I performed bedside rounds with nursing staff today.   Discussions with Specialists or Other Care Team Provider:  Cardiology, RN,CM    Education and Discussions with Family / Patient: Attempted to update  (son) via phone. Left voicemail.     Current Length of Stay: 0 day(s)  Current Patient Status: Observation   Certification Statement: The patient will continue to require additional inpatient hospital stay due to clinical course  Discharge Plan: Anticipate discharge in 24-48 hrs to prior assisted or independent living facility.    Code Status: Level 3 - DNAR and DNI    Subjective   Seen and examined at bedside.  Patient denies chest pain/tightness, shortness of breath, lightheadedness, dizziness or headache    Objective     Vitals:   Temp (24hrs), Av.9 °F (36.6 °C), Min:97.7 °F (36.5 °C), Max:98.1 °F (36.7 °C)    Temp:  [97.7 °F (36.5 °C)-98.1 °F (36.7 °C)] 97.7 °F (36.5 °C)  HR:  [61-79] 62  Resp:  [16-18] 18  BP: (165-179)/(59-86) 173/66  SpO2:  [93 %-96 %] 96 %  Body mass index is 28.13 kg/m².     Input and Output Summary (last 24 hours):     Intake/Output Summary (Last 24 hours) at 2024 1436  Last data filed at 2024 0300  Gross per 24 hour   Intake --   Output 400 ml   Net -400 ml       Physical Exam  HENT:      Head: Normocephalic.      Mouth/Throat:      Mouth: Mucous membranes are moist.   Cardiovascular:      Rate and Rhythm: Normal rate.      Pulses: Normal pulses.   Pulmonary:      Effort: Pulmonary effort is normal.   Abdominal:      General: Bowel sounds are normal.   Musculoskeletal:         General: Normal range of motion.      Cervical back: Normal range of motion.   Skin:     General:  Skin is warm and dry.      Capillary Refill: Capillary refill takes less than 2 seconds.   Neurological:      Mental Status: She is alert. Mental status is at baseline.   Psychiatric:         Behavior: Behavior normal.          Lines/Drains:  Lines/Drains/Airways       Active Status       Name Placement date Placement time Site Days    External Urinary Catheter 09/16/24  1200  -- less than 1                      Telemetry:  Telemetry Orders (From admission, onward)               24 Hour Telemetry Monitoring  Continuous x 24 Hours (Telem)        Question:  Reason for 24 Hour Telemetry  Answer:  PCI/EP study (including pacer and ICD implementation), Cardiac surgery, MI, abnormal cardiac cath, and chest pain- rule out MI                     Telemetry Reviewed: Normal Sinus Rhythm  Indication for Continued Telemetry Use: Acute MI/Unstable Angina/Rule out ACS               Lab Results: I have reviewed the following results:    Results from last 7 days   Lab Units 09/15/24  1045   WBC Thousand/uL 8.23   HEMOGLOBIN g/dL 11.0*   HEMATOCRIT % 37.1   PLATELETS Thousands/uL 205   SEGS PCT % 65   LYMPHO PCT % 24   MONO PCT % 9   EOS PCT % 2     Results from last 7 days   Lab Units 09/15/24  1045   SODIUM mmol/L 141   POTASSIUM mmol/L 4.2   CHLORIDE mmol/L 103   CO2 mmol/L 34*   BUN mg/dL 16   CREATININE mg/dL 0.46*   ANION GAP mmol/L 4   CALCIUM mg/dL 8.0*   ALBUMIN g/dL 3.0*   TOTAL BILIRUBIN mg/dL 0.22   ALK PHOS U/L 87   ALT U/L 7   AST U/L 7*   GLUCOSE RANDOM mg/dL 189*     Results from last 7 days   Lab Units 09/15/24  1045   INR  1.00     Results from last 7 days   Lab Units 09/16/24  1136 09/16/24  0729 09/15/24  2040 09/15/24  1652   POC GLUCOSE mg/dl 155* 128 203* 127     Results from last 7 days   Lab Units 09/15/24  1620   HEMOGLOBIN A1C % 7.4*           Recent Cultures (last 7 days):         Imaging Review: No pertinent imaging studies reviewed.  Other Studies: No additional pertinent studies reviewed.    Last 24  Hours Medication List:     Current Facility-Administered Medications:     aspirin chewable tablet 81 mg, Daily    atorvastatin (LIPITOR) tablet 40 mg, Daily With Dinner    enoxaparin (LOVENOX) subcutaneous injection 40 mg, Daily    ferrous sulfate tablet 325 mg, Daily With Breakfast    insulin lispro (HumALOG/ADMELOG) 100 units/mL subcutaneous injection 1-5 Units, HS    insulin lispro (HumALOG/ADMELOG) 100 units/mL subcutaneous injection 1-6 Units, TID AC **AND** Fingerstick Glucose (POCT), TID AC    latanoprost (XALATAN) 0.005 % ophthalmic solution 1 drop, HS    loratadine (CLARITIN) tablet 10 mg, Daily    losartan (COZAAR) tablet 25 mg, Daily    ondansetron (ZOFRAN) injection 4 mg, Q6H PRN    pantoprazole (PROTONIX) EC tablet 40 mg, BID    polyethylene glycol (MIRALAX) packet 17 g, Every Other Day    psyllium (METAMUCIL) 1 packet, Daily    sertraline (ZOLOFT) tablet 25 mg, Daily    sotalol (BETAPACE) tablet 80 mg, BID    Administrative Statements   Today, Patient Was Seen By: JAMISON Sal      **Please Note: This note may have been constructed using a voice recognition system.**

## 2024-09-16 NOTE — ASSESSMENT & PLAN NOTE
6/15/22 lipid panel: Cholesterol 131, triglycerides 160, HDL 32, LDL 67.  Repeat lipid panel ordered.  Not currently on statin therapy.  Will follow-up repeat lipid panel.

## 2024-09-16 NOTE — ASSESSMENT & PLAN NOTE
Patient presented on 9/15/24 for evaluation for chest pressure. Patient states that she started to experience chest pressure on morning of 9/15/24.  She states that the pressure started in the middle of her chest with radiation to both of her arms.  She endorses some mild shortness of breath with chest discomfort.  She denies any additional symptoms such as palpitations, lightheadedness, dizziness, headache, nausea, vomiting or diaphoresis.  Patient denies currently experiencing chest pain.  9/15/24 EKG: Sinus rhythm, 60 bpm.  Inferior infarct, cited on or before 7/14/19.  Cannot rule out anterior infarct, cited on or before 3/11/2017.  9/15/24 hs troponin: 29 (0hrs), 38 (2hrs), 102 (4hrs),  9/15/24 hs troponin random: 91.   Mildly elevated troponin, likely non-MI troponin elevation secondary to hypertensive urgency on presentation.  Scheduled for nuclear stress test today.

## 2024-09-16 NOTE — ASSESSMENT & PLAN NOTE
Patient currently not on any medications   Fasting lipid panel resulted as follow:  Cholesterol 236, triglyceride 281, , HDL 36  Will start atorvastatin 40 mg

## 2024-09-16 NOTE — ASSESSMENT & PLAN NOTE
4/04/24 TTE: The right ventricular systolic pressure is moderately to severely elevated at 62-65mmHg peak.

## 2024-09-16 NOTE — ASSESSMENT & PLAN NOTE
Patient presented with intermittent chest tightness radiating to the arms for the past 2 days with some shortness of breath  EKG was unremarkable  Troponin x 2 were negative   SUSAN score 3  Will add aspirin 81 mg p.o. daily  Fasting lipid panel noted above  Due to multiple risk factors patient will be scheduled for nuclear stress test in a.m.  Cardiology consultation

## 2024-09-17 VITALS
HEIGHT: 68 IN | OXYGEN SATURATION: 94 % | HEART RATE: 68 BPM | SYSTOLIC BLOOD PRESSURE: 131 MMHG | DIASTOLIC BLOOD PRESSURE: 63 MMHG | RESPIRATION RATE: 18 BRPM | BODY MASS INDEX: 28.04 KG/M2 | TEMPERATURE: 98 F | WEIGHT: 185 LBS

## 2024-09-17 PROBLEM — E83.42 HYPOMAGNESEMIA: Status: ACTIVE | Noted: 2024-09-17

## 2024-09-17 LAB
ANION GAP SERPL CALCULATED.3IONS-SCNC: 5 MMOL/L (ref 4–13)
BUN SERPL-MCNC: 14 MG/DL (ref 5–25)
CALCIUM SERPL-MCNC: 8 MG/DL (ref 8.4–10.2)
CHLORIDE SERPL-SCNC: 106 MMOL/L (ref 96–108)
CO2 SERPL-SCNC: 32 MMOL/L (ref 21–32)
CREAT SERPL-MCNC: 0.42 MG/DL (ref 0.6–1.3)
ERYTHROCYTE [DISTWIDTH] IN BLOOD BY AUTOMATED COUNT: 17.2 % (ref 11.6–15.1)
GFR SERPL CREATININE-BSD FRML MDRD: 93 ML/MIN/1.73SQ M
GLUCOSE SERPL-MCNC: 124 MG/DL (ref 65–140)
GLUCOSE SERPL-MCNC: 124 MG/DL (ref 65–140)
GLUCOSE SERPL-MCNC: 141 MG/DL (ref 65–140)
GLUCOSE SERPL-MCNC: 187 MG/DL (ref 65–140)
HCT VFR BLD AUTO: 36.6 % (ref 34.8–46.1)
HGB BLD-MCNC: 10.7 G/DL (ref 11.5–15.4)
MAGNESIUM SERPL-MCNC: 1.8 MG/DL (ref 1.9–2.7)
MCH RBC QN AUTO: 25.2 PG (ref 26.8–34.3)
MCHC RBC AUTO-ENTMCNC: 29.2 G/DL (ref 31.4–37.4)
MCV RBC AUTO: 86 FL (ref 82–98)
MRSA NOSE QL CULT: NORMAL
PLATELET # BLD AUTO: 174 THOUSANDS/UL (ref 149–390)
PMV BLD AUTO: 10.9 FL (ref 8.9–12.7)
POTASSIUM SERPL-SCNC: 3.9 MMOL/L (ref 3.5–5.3)
RBC # BLD AUTO: 4.25 MILLION/UL (ref 3.81–5.12)
SODIUM SERPL-SCNC: 143 MMOL/L (ref 135–147)
WBC # BLD AUTO: 7.27 THOUSAND/UL (ref 4.31–10.16)

## 2024-09-17 PROCEDURE — 99239 HOSP IP/OBS DSCHRG MGMT >30: CPT

## 2024-09-17 PROCEDURE — 83735 ASSAY OF MAGNESIUM: CPT

## 2024-09-17 PROCEDURE — 99232 SBSQ HOSP IP/OBS MODERATE 35: CPT | Performed by: INTERNAL MEDICINE

## 2024-09-17 PROCEDURE — 82948 REAGENT STRIP/BLOOD GLUCOSE: CPT

## 2024-09-17 PROCEDURE — 80048 BASIC METABOLIC PNL TOTAL CA: CPT

## 2024-09-17 PROCEDURE — 85027 COMPLETE CBC AUTOMATED: CPT

## 2024-09-17 RX ORDER — LANOLIN ALCOHOL/MO/W.PET/CERES
400 CREAM (GRAM) TOPICAL 2 TIMES DAILY
Start: 2024-09-17 | End: 2024-09-19

## 2024-09-17 RX ORDER — ATORVASTATIN CALCIUM 40 MG/1
40 TABLET, FILM COATED ORAL
Start: 2024-09-17

## 2024-09-17 RX ORDER — LOSARTAN POTASSIUM 50 MG/1
50 TABLET ORAL DAILY
Status: DISCONTINUED | OUTPATIENT
Start: 2024-09-17 | End: 2024-09-17 | Stop reason: HOSPADM

## 2024-09-17 RX ORDER — ACETAMINOPHEN 325 MG/1
650 TABLET ORAL EVERY 6 HOURS PRN
Status: DISCONTINUED | OUTPATIENT
Start: 2024-09-17 | End: 2024-09-17 | Stop reason: HOSPADM

## 2024-09-17 RX ORDER — LOSARTAN POTASSIUM 50 MG/1
50 TABLET ORAL DAILY
Start: 2024-09-18

## 2024-09-17 RX ORDER — ASPIRIN 81 MG/1
81 TABLET, CHEWABLE ORAL DAILY
Start: 2024-09-18

## 2024-09-17 RX ORDER — HYDRALAZINE HYDROCHLORIDE 20 MG/ML
5 INJECTION INTRAMUSCULAR; INTRAVENOUS ONCE
Status: COMPLETED | OUTPATIENT
Start: 2024-09-17 | End: 2024-09-17

## 2024-09-17 RX ORDER — SOTALOL HYDROCHLORIDE 80 MG/1
80 TABLET ORAL 2 TIMES DAILY
Status: DISCONTINUED | OUTPATIENT
Start: 2024-09-17 | End: 2024-09-17 | Stop reason: HOSPADM

## 2024-09-17 RX ORDER — LANOLIN ALCOHOL/MO/W.PET/CERES
400 CREAM (GRAM) TOPICAL 2 TIMES DAILY
Status: DISCONTINUED | OUTPATIENT
Start: 2024-09-17 | End: 2024-09-17 | Stop reason: HOSPADM

## 2024-09-17 RX ORDER — SERTRALINE HYDROCHLORIDE 25 MG/1
25 TABLET, FILM COATED ORAL DAILY
Status: DISCONTINUED | OUTPATIENT
Start: 2024-09-17 | End: 2024-09-17 | Stop reason: HOSPADM

## 2024-09-17 RX ADMIN — LORATADINE 10 MG: 10 TABLET ORAL at 08:16

## 2024-09-17 RX ADMIN — ENOXAPARIN SODIUM 40 MG: 40 INJECTION SUBCUTANEOUS at 08:10

## 2024-09-17 RX ADMIN — ASPIRIN 81 MG CHEWABLE TABLET 81 MG: 81 TABLET CHEWABLE at 08:10

## 2024-09-17 RX ADMIN — SOTALOL HYDROCHLORIDE 80 MG: 80 TABLET ORAL at 07:09

## 2024-09-17 RX ADMIN — PANTOPRAZOLE SODIUM 40 MG: 40 TABLET, DELAYED RELEASE ORAL at 08:10

## 2024-09-17 RX ADMIN — Medication 400 MG: at 09:02

## 2024-09-17 RX ADMIN — PSYLLIUM HUSK 1 PACKET: 3.4 POWDER ORAL at 09:48

## 2024-09-17 RX ADMIN — LOSARTAN POTASSIUM 50 MG: 50 TABLET, FILM COATED ORAL at 12:23

## 2024-09-17 RX ADMIN — ACETAMINOPHEN 650 MG: 325 TABLET ORAL at 05:47

## 2024-09-17 RX ADMIN — FERROUS SULFATE TAB 325 MG (65 MG ELEMENTAL FE) 325 MG: 325 (65 FE) TAB at 08:10

## 2024-09-17 RX ADMIN — SERTRALINE HYDROCHLORIDE 25 MG: 25 TABLET ORAL at 08:12

## 2024-09-17 RX ADMIN — INSULIN LISPRO 1 UNITS: 100 INJECTION, SOLUTION INTRAVENOUS; SUBCUTANEOUS at 12:20

## 2024-09-17 RX ADMIN — HYDRALAZINE HYDROCHLORIDE 5 MG: 20 INJECTION INTRAMUSCULAR; INTRAVENOUS at 06:06

## 2024-09-17 NOTE — ASSESSMENT & PLAN NOTE
Presented with hypertensive urgency  Started on losartan 25 mg daily  9/17: Episode of hypertensive urgency with - 203  Increase Losartan to 50 mg daily

## 2024-09-17 NOTE — WOUND OSTOMY CARE
Assessment:  This is an 88 year old female patient admitted on 9/15/24 with chest pain. Patient was AAO x 3 and agreeable to have wound visit done. She was turned and repositioned with assist of 2 persons and given care for fecal incontinence - she had Purewick in place with episodes of urinary incontinence.     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 Orders in place for wound/skin care and for prevention.  2-Full thickness wound to left 1st toe with dry black/brown eschar and no drainage. Patient stated that she has an ingrown toe nail and that wound was cauterized (with silver nitrate). Orders in place for wound care.  3-Bilateral heels intact with blanchable erythema - orders in place for skin care and for prevention.    Plan:  Skin care plans:  1-Cleanse bilateral sacrobuttock wounds with foaming cleanser & pat dry. Apply thin to moderate layer of Triad paste 2x/day and prn soilage/dislodgement.  2-Cleanse wound to left 1st toe with NSS & pat dry. Paint wound with betadine swab, cover with gauze, rolled gauze and tape. Change every other day & prn soilage/dislodgement.  3-Apply Prevent barrier cream to bilateral heels BID and PRN  4-Float heels on 2 pillows to offload pressure so heels are not in contact with mattress or pillows.  5-Ehob pressure redistribution cushion in chair when out of bed. Avoid prolonged sitting due to sacrobuttock pressure injuries.  6-Moisturize skin daily with skin nourishing cream.  7-Turn/reposition q2h or when medically stable for pressure re-distribution on skin.     Wound 05/23/24 Pressure Injury Buttocks Left (Active)   Wound Image   09/16/24 1330   Wound Description Granulation tissue;Pink 09/16/24 1330   Pressure Injury Stage Stage 3 09/16/24 1330   Roopa-wound Assessment Denuded 09/16/24 1330   Wound Length (cm) 0.5 cm 09/16/24 1330   Wound Width (cm) 1 cm 09/16/24 1330   Wound Depth (cm) 0.1 cm 09/16/24  1330   Wound Surface Area (cm^2) 0.5 cm^2 09/16/24 1330   Wound Volume (cm^3) 0.05 cm^3 09/16/24 1330   Calculated Wound Volume (cm^3) 0.05 cm^3 09/16/24 1330   Drainage Amount None 09/16/24 1330   Treatments Cleansed 09/16/24 1330   Dressing Triad paste 09/16/24 1330   Dressing Changed New 09/16/24 1330   Dressing Status Intact 09/16/24 1330       Wound 04/01/24 Pressure Injury Buttocks Right (Active)   Wound Image   09/16/24 1331   Wound Description Granulation tissue;Pink;Slough;Yellow 09/16/24 1331   Pressure Injury Stage Stage 3 09/16/24 1331   Roopa-wound Assessment Denuded 09/16/24 1331   Wound Length (cm) 4 cm 09/16/24 1331   Wound Width (cm) 1 cm 09/16/24 1331   Wound Depth (cm) 0.1 cm 09/16/24 1331   Wound Surface Area (cm^2) 4 cm^2 09/16/24 1331   Wound Volume (cm^3) 0.4 cm^3 09/16/24 1331   Calculated Wound Volume (cm^3) 0.4 cm^3 09/16/24 1331   Drainage Amount None 09/16/24 1331   Treatments Cleansed 09/16/24 1331   Dressing Triad paste 09/16/24 1331   Dressing Changed New 09/16/24 1331   Dressing Status Intact 09/16/24 1331       Wound 09/15/24 Toe D1, great Anterior;Left (Active)   Wound Image   09/16/24 1315   Wound Description Dry;Brown;Black;Eschar 09/16/24 1315   Roopa-wound Assessment Intact;Erythema 09/16/24 1315   Wound Length (cm) 1.2 cm 09/16/24 1315   Wound Width (cm) 0.5 cm 09/16/24 1315   Wound Depth (cm) 0 cm 09/16/24 1315   Wound Surface Area (cm^2) 0.6 cm^2 09/16/24 1315   Wound Volume (cm^3) 0 cm^3 09/16/24 1315   Calculated Wound Volume (cm^3) 0 cm^3 09/16/24 1315   Drainage Amount None 09/16/24 1315   Non-staged Wound Description Full thickness 09/16/24 1315   Treatments Cleansed 09/16/24 1315   Dressing Betadine;Gauze;Dry dressing 09/16/24 1315   Dressing Changed New 09/16/24 1315   Dressing Status Clean;Dry;Intact 09/16/24 1315     Discussed assessment findings, and plan of care/recommendations with Marilyn CARDENAS.    Patient is for possible discharge today, please call or text with  questions and concerns.    Recommendations written as orders.  Estefani Mahajan MSN, RN, CWON

## 2024-09-17 NOTE — [place] UNIVERSAL TRANSFER FORM
"[place] UNIVERSAL TRANSFER FORM  (ALL ITEMS MUST BE COMPLETED)    1. TRANSFER FROM: [facility]      TRANSFER TO: [facility]     2. DATE OF TRANSFER: 9/17/2024                        TIME OF TRANSFER: 1400    3. PATIENT NAME: Ernestina Hurley E      YOB: 1935                             GENDER: female    4. LANGUAGE:   English    5. PHYSICIAN NAME:  Rebecca Ma DO                   PHONE: [phone]    6. CODE STATUS: Level 3 - DNAR and DNI        Out of Hospital DNR Attached: Yes    7. :                                      :  Extended Emergency Contact Information  Primary Emergency Contact: Herminia Horn  Mobile Phone: [phone]  Relation: Daughter  Secondary Emergency Contact: Scott Hurley  Mobile Phone: [phone]  Relation: Son           Health Care Representative/Proxy:  Yes           Legal Guardian:  No             NAME OF:           HEALTH CARE REPRESENTATIVE/PROXY:                                         OR           LEGAL GUARDIAN, IF NOT :                                               PHONE:  (Day)           (Night)                        (Cell)    8. REASON FOR TRANSFER: Cardiac medications adjusted.            V/S: /70 (BP Location: Left arm)   Pulse 62   Temp 98 °F (36.7 °C)   Resp 18   Ht 5' 8\" (1.727 m)   Wt 83.9 kg (185 lb)   SpO2 95%   BMI 28.13 kg/m²           PAIN: None    9. PRIMARY DIAGNOSIS: Chest pain      Secondary Diagnosis:         Pacemaker: No      Internal Defib: No          Mental Health Diagnosis (if Applicable):    10. RESTRAINTS: No     11. RESPIRATORY NEEDS: None    12. ISOLATION/PRECAUTION: None    13. ALLERGY: Clindamycin, Dabigatran, Dabigatran etexilate mesylate, Methixene, Pradaxa [dabigatran etexilate mesylate], Sulfamethoxazole-trimethoprim, Warfarin, and Latex    14. SENSORY:       Vision Good and Glasses, Hearing Good , and Speech Clear    15. " SKIN CONDITION: Yes:  Pressure and 2nd Wound:  Vascular    16. DIET: Regular Consistent Carbohydrate     17. IV ACCESS: None    18. PERSONAL ITEMS SENT WITH PATIENT: Glasses    19. ATTACHED DOCUMENTS: MUST ATTACH CURRENT MEDICATION INFORMATION Face Sheet and Medication Reconciliation    20. AT RISK ALERTS:Falls and Pressure Ulcer        HARM TO: N/A    21. WEIGHT BEARING STATUS:         Left Leg: Limited        Right Leg: Limited    22. MENTAL STATUS:Alert, Oriented, and Forgetful    23. FUNCTION:        Walk: With Help        Transfer: With Help        Toilet: With Help        Feed: With Help    24. IMMUNIZATIONS/SCREENING:     Immunization History   Administered Date(s) Administered    COVID-19 PFIZER VACCINE 0.3 ML IM 10/11/2023    COVID-19 Pfizer mRNA vacc PF angel-sucrose 12 yr and older (Comirnaty) 07/12/2024    Influenza Split High Dose Preservative Free IM 10/11/2012, 11/01/2013, 10/16/2014, 10/08/2015, 11/17/2016, 10/26/2017    Influenza, high dose seasonal 0.7 mL 11/13/2018, 11/04/2019, 11/17/2020, 11/10/2021    Pneumococcal Conjugate 13-Valent 01/12/2016    Pneumococcal Polysaccharide PPV23 01/01/2008       25. BOWEL: Incontinent     26. BLADDER: Incontinent    27. SENDING FACILITY CONTACT: [facility]                  Title:         Unit:         Phone:           [facility] CONTACT (if known):        Title:        Unit:         Phone:         FORM PREFILLED BY (if applicable)       Title:       Unit:        Phone:         FORM COMPLETED BY Kae Ivory RN      Title:       Phone: [phone]

## 2024-09-17 NOTE — PROGRESS NOTES
Progress Note - Cardiology   [facility]     Ernestina Hurley 88 y.o. female MRN: [id number]  : 1935  Unit/Bed#: [address] [place] [address] Encounter: [id number]    Assessment and Plan:     Assessment & Plan  Chest pain  Patient presented on 9/15/24 for evaluation for chest pressure. Patient states that she started to experience chest pressure on morning of 9/15/24.  She states that the pressure started in the middle of her chest with radiation to both of her arms.  She endorses some mild shortness of breath with chest discomfort.  She denies any additional symptoms such as palpitations, lightheadedness, dizziness, headache, nausea, vomiting or diaphoresis.  Patient denies currently experiencing chest pain.  9/15/24 EKG: Sinus rhythm, 60 bpm.  Inferior infarct, cited on or before 19.  Cannot rule out anterior infarct, cited on or before 3/11/2017.  9/15/24 hs troponin: 29 (0hrs), 38 (2hrs), 102 (4hrs),  9/15/24 hs troponin random: 91.   Mildly elevated troponin, likely non-MI troponin elevation secondary to hypertensive urgency on presentation.  24 nuclear stress test: Abnormal study. SSS 9 SRS 2 SDS 6 Apical defect noted with pharmacological vasodilaiton. Compared to prior study 2019- TID is new. Htn blood pressure noted at baseline.  Discussed with patient abnormal 24.  Patient would like to pursue conservative management.  States that she does not want any further cardiac workup.  Continue aspirin 81 mg daily.  Atrial fibrillation (HCC)  Patient with documented history of paroxysmal atrial fibrillation.  Currently on sotalol 80 mg twice daily.  [id number] stroke risk score: 5 points, moderate to high risk.  Discussed with patient given history of paroxysmal atrial fibrillation and elevated [id number] stroke risk score is recommended patient to be on anticoagulation.  Patient declines anticoagulation.  Discussed risks of not being on anticoagulation given history of paroxysmal  "atrial fibrillation clued but are not limited to stroke, heart attack, PE.  Patient reported she understand risks of not being on anticoagulation, continues to decline anticoagulation.  Essential (primary) hypertension  Patient with documented history of essential hypertension.  Review of home meds notes that patient is not on any antihypertensives.  Patient with episodes of hypertensive urgency.  Currently on losartan 25 mg daily.  Will increase to losartan 50 mg daily.  Patient states that she is not eager to be on any antihypertensives, states that she has had reactions to medications in the past.  Currently tolerating losartan.  Mixed hyperlipidemia    9/16/24 lipid panel: Cholesterol 236, triglycerides 281, HDL 36, .  Continue Lipitor 40 mg daily, started on 9/16/24.   Pulmonary hypertension (HCC)  4/04/24 TTE: The right ventricular systolic pressure is moderately to severely elevated at 62-65mmHg peak.   Type 2 diabetes mellitus without complication, without long-term current use of insulin (HCC)  Lab Results   Component Value Date    HGBA1C 7.4 (H) 09/15/2024     Recent Labs     09/16/24  1616 09/16/24  2049 09/17/24  0642 09/17/24  0715   POCGLU 127 197* 124 141*     4/04/24 HgbA1c: 8.0.   Care per primary.   Multiple sclerosis (HCC)  Care per primary team.   Depression  Care per primary team.     Subjective / Objective:          Patient with episodes of hypertensive urgency on morning of 9/17/24.  Patient states that she is overall doing well and states she would like to be discharged.  She denies experiencing chest pain, palpitations, shortness of breath, lightheadedness, dizziness, headache, nausea, vomiting.    Vitals: Blood pressure 102/62, pulse 64, temperature 98 °F (36.7 °C), resp. rate 18, height 5' 8\" (1.727 m), weight 83.9 kg (185 lb), SpO2 95%.  Vitals:    09/16/24 0736   Weight: 83.9 kg (185 lb)     Body mass index is 28.13 kg/m².  BP Readings from Last 3 Encounters:   09/17/24 102/62 "   06/13/24 148/66   06/11/24 152/67     Orthostatic Blood Pressures      Flowsheet Row Most Recent Value   Blood Pressure 102/62 filed at 09/17/2024 0813   Patient Position - Orthostatic VS Lying filed at 09/15/2024 1029          I/O         09/15 0701  09/16 0700 09/16 0701  09/17 0700 09/17 0701  09/18 0700    Urine (mL/kg/hr) 400  600 (1.9)    Total Output 400  600    Net -400  -600                 Invasive Devices       Peripheral Intravenous Line  Duration             Peripheral IV 09/15/24 Left Hand 2 days                      Intake/Output Summary (Last 24 hours) at 9/17/2024 1048  Last data filed at 9/17/2024 0705  Gross per 24 hour   Intake --   Output 600 ml   Net -600 ml         Physical Exam:   Physical Exam  Vitals reviewed.   Constitutional:       General: She is not in acute distress.  Cardiovascular:      Rate and Rhythm: Normal rate and regular rhythm.      Pulses: Normal pulses.      Heart sounds: No murmur heard.  Pulmonary:      Effort: Pulmonary effort is normal. No respiratory distress.      Breath sounds: Normal breath sounds.   Abdominal:      General: Abdomen is flat. There is no distension.      Palpations: Abdomen is soft.      Tenderness: There is no abdominal tenderness.   Musculoskeletal:      Right lower leg: No edema.      Left lower leg: No edema.   Skin:     General: Skin is warm and dry.   Neurological:      Mental Status: She is alert and oriented to person, place, and time.       Medications/ Allergies:     Current Facility-Administered Medications   Medication Dose Route Frequency Provider Last Rate    acetaminophen  650 mg Oral Q6H PRN JAMISON Richard      aspirin  81 mg Oral Daily Danni Olivo MD      atorvastatin  40 mg Oral Daily With Dinner AmbrosioayJAMISON Lopez      enoxaparin  40 mg Subcutaneous Daily Danni Olivo MD      ferrous sulfate  325 mg Oral Daily With Breakfast Danni Olivo MD      insulin lispro  1-5 Units Subcutaneous HS Danni  MD Pallavi      insulin lispro  1-6 Units Subcutaneous TID AC Danni Olivo MD      latanoprost  1 drop Both Eyes HS Danni Olivo MD      loratadine  10 mg Oral Daily Danni Olivo MD      losartan  50 mg Oral Daily Radha Tamez PA-C      magnesium Oxide  400 mg Oral BID Ambrosioayide JAMISON Valdes      ondansetron  4 mg Intravenous Q6H PRN Danni Olivo MD      pantoprazole  40 mg Oral BID Danni Olivo MD      polyethylene glycol  17 g Oral Every Other Day Danni Olivo MD      psyllium  1 packet Oral Daily Danni Olivo MD      sertraline  25 mg Oral Daily Radah Tamez PA-C      sotalol  80 mg Oral BID JAMISON Richard       acetaminophen, 650 mg, Q6H PRN  ondansetron, 4 mg, Q6H PRN      Allergies   Allergen Reactions    Clindamycin      Annotation - 08Oct2015: bad taste in mouth    Dabigatran     Dabigatran Etexilate Mesylate Other (See Comments)    Methixene Other (See Comments)    Pradaxa [Dabigatran Etexilate Mesylate]     Sulfamethoxazole-Trimethoprim Other (See Comments)     General weakness    Warfarin Lip Swelling     Reaction Date: 22May2012; Annotation - 04Sep2012: lip swelling    Latex Rash     Annotation - 08Jun2015: RASH       VTE Pharmacologic Prophylaxis:   Enoxaparin (Lovenox)    Labs:   Troponins:  Results from last 7 days   Lab Units 09/16/24  0531 09/15/24  2020 09/15/24  1620 09/15/24  1240   HS TNI RAND ng/L 56* 91*  --   --    HSTNI D2 ng/L  --   --   --  9   HSTNI D4 ng/L  --   --  73*  --          CBC with diff:  Results from last 7 days   Lab Units 09/17/24  0451 09/15/24  1045   WBC Thousand/uL 7.27 8.23   HEMOGLOBIN g/dL 10.7* 11.0*   HEMATOCRIT % 36.6 37.1   MCV fL 86 86   PLATELETS Thousands/uL 174 205   RBC Million/uL 4.25 4.30   MCH pg 25.2* 25.6*   MCHC g/dL 29.2* 29.6*   RDW % 17.2* 17.1*   MPV fL 10.9 12.1   NRBC AUTO /100 WBCs  --  0       CMP:  Results from last 7 days   Lab Units 09/17/24  0451 09/15/24  1045   SODIUM mmol/L 143 141    POTASSIUM mmol/L 3.9 4.2   CHLORIDE mmol/L 106 103   CO2 mmol/L 32 34*   ANION GAP mmol/L 5 4   BUN mg/dL 14 16   CREATININE mg/dL 0.42* 0.46*   CALCIUM mg/dL 8.0* 8.0*   AST U/L  --  7*   ALT U/L  --  7   ALK PHOS U/L  --  87   TOTAL PROTEIN g/dL  --  6.4   ALBUMIN g/dL  --  3.0*   TOTAL BILIRUBIN mg/dL  --  0.22   EGFR ml/min/1.73sq m 93 90       Magnesium:  Results from last 7 days   Lab Units 09/17/24  0451   MAGNESIUM mg/dL 1.8*     Coags:  Results from last 7 days   Lab Units 09/15/24  1045   PTT seconds 29   INR  1.00     Lipid Profile:  Results from last 7 days   Lab Units 09/16/24  1218   CHOLESTEROL mg/dL 236*   TRIGLYCERIDES mg/dL 281*   HDL mg/dL 36*   LDL CALC mg/dL 144*     Hgb A1c:  Results from last 7 days   Lab Units 09/15/24  1620   HEMOGLOBIN A1C % 7.4*       Imaging & Testing   I have personally reviewed pertinent reports.    XR chest 1 view portable    Result Date: 9/16/2024    Impression: Left lower lobe consolidation and pleural effusion.        EKG / Monitor: Personally reviewed.      Telemetry reviewed: Currently sinus rhythm, 69 bpm.    Cardiac testing:   NM Myocardial Perfusion Spect (Pharmacological Induced Stress and/or Rest)    Result Date: 9/16/2024  Narrative:   Stress ECG: The stress ECG is equivocal for ischemia after pharmacologic vasodilation.   Perfusion Defect Conclusion: The stress/rest perfusion ratio is 1.20. There is evidence of transient ischemic dilation (TID). TID was appreciated visually and quantitatively.   Perfusion: There is a left ventricular perfusion defect that is medium in size present in the apical location(s).   Stress Combined Conclusion: Left ventricular perfusion is abnormal. Abnormal study. SSS 9 SRS 2 SDS 6  Apical defect noted with pharmacological vasodilaiton. Compared to prior study 2019- TID is new.  Htn blood pressure noted at baseline     Echo complete with contrast if indicated  Result date: 4/04/24           Left Ventricle Left ventricular  cavity size is normal. Wall thickness is moderately increased. There is moderate concentric hypertrophy. The left ventricular ejection fraction is 65%. Systolic function is normal. Wall motion is normal. Unable to assess diastolic function.   Right Ventricle Systolic function is normal. Normal tricuspid annular plane systolic excursion (TAPSE) > 1.7 cm. Wall thickness is increased.   Left Atrium The atrium is normal in size.   Right Atrium The atrium is normal in size.   Aortic Valve The aortic valve is trileaflet. The leaflets are mildly thickened. The leaflets are mildly calcified. The leaflets exhibit normal mobility. There is trace regurgitation. The aortic valve has no significant stenosis.   Mitral Valve There is mild thickening. There is mild annular calcification.  There is trace regurgitation. There is no evidence of stenosis.   Tricuspid Valve There is mild thickening. There is mild regurgitation. There is no evidence of stenosis. The right ventricular systolic pressure is moderately to severely elevated at 62-65mmHg peak.   Pulmonic Valve There is mild thickening. There is trace regurgitation. There is no evidence of stenosis.   IVC/SVC The inferior vena cava is normal in size.   Pericardium There is a trivial pericardial effusion. There is a small left pleural effusion.            Radha Tamez PA-C

## 2024-09-17 NOTE — ASSESSMENT & PLAN NOTE
Patient currently not on any medications   Fasting lipid panel resulted as follow:  Cholesterol 236, triglyceride 281, , HDL 36  Continue atorvastatin 40 mg

## 2024-09-17 NOTE — PLAN OF CARE
Problem: Potential for Falls  Goal: Patient will remain free of falls  Description: INTERVENTIONS:  - Educate patient/family on patient safety including physical limitations  - Instruct patient to call for assistance with activity   - Consult OT/PT to assist with strengthening/mobility   - Keep Call bell within reach  - Keep bed low and locked with side rails adjusted as appropriate  - Keep care items and personal belongings within reach  - Initiate and maintain comfort rounds  - Make Fall Risk Sign visible to staff  - Offer Toileting every 2 Hours, in advance of need  - Initiate/Maintain bed/chair alarm  - Obtain necessary fall risk management equipment:  bed/chair alarm  - Apply yellow socks and bracelet for high fall risk patients  - Consider moving patient to room near nurses station  Outcome: Progressing     Problem: PAIN - ADULT  Goal: Verbalizes/displays adequate comfort level or baseline comfort level  Description: Interventions:  - Encourage patient to monitor pain and request assistance  - Assess pain using appropriate pain scale  - Administer analgesics based on type and severity of pain and evaluate response  - Implement non-pharmacological measures as appropriate and evaluate response  - Consider cultural and social influences on pain and pain management  - Notify physician/advanced practitioner if interventions unsuccessful or patient reports new pain  Outcome: Progressing     Problem: INFECTION - ADULT  Goal: Absence or prevention of progression during hospitalization  Description: INTERVENTIONS:  - Assess and monitor for signs and symptoms of infection  - Monitor lab/diagnostic results  - Monitor all insertion sites, i.e. indwelling lines, tubes, and drains  - Monitor endotracheal if appropriate and nasal secretions for changes in amount and color  - [place] appropriate cooling/warming therapies per order  - Administer medications as ordered  - Instruct and encourage patient and family to use  good hand hygiene technique  - Identify and instruct in appropriate isolation precautions for identified infection/condition  Outcome: Progressing     Problem: SAFETY ADULT  Goal: Patient will remain free of falls  Description: INTERVENTIONS:  - Educate patient/family on patient safety including physical limitations  - Instruct patient to call for assistance with activity   - Consult OT/PT to assist with strengthening/mobility   - Keep Call bell within reach  - Keep bed low and locked with side rails adjusted as appropriate  - Keep care items and personal belongings within reach  - Initiate and maintain comfort rounds  - Make Fall Risk Sign visible to staff  - Offer Toileting every 2 Hours, in advance of need  - Initiate/Maintain bed/chair alarm  - Obtain necessary fall risk management equipment:  bed/chair alarm  - Apply yellow socks and bracelet for high fall risk patients  - Consider moving patient to room near nurses station  Outcome: Progressing  Goal: Maintain or return to baseline ADL function  Description: INTERVENTIONS:  -  Assess patient's ability to carry out ADLs; assess patient's baseline for ADL function and identify physical deficits which impact ability to perform ADLs (bathing, care of mouth/teeth, toileting, grooming, dressing, etc.)  - Assess/evaluate cause of self-care deficits   - Assess range of motion  - Assess patient's mobility; develop plan if impaired  - Assess patient's need for assistive devices and provide as appropriate  - Encourage maximum independence but intervene and supervise when necessary  - Involve family in performance of ADLs  - Assess for home care needs following discharge   - Consider OT consult to assist with ADL evaluation and planning for discharge  - Provide patient education as appropriate  Outcome: Progressing  Goal: Maintains/Returns to pre admission functional level  Description: INTERVENTIONS:  - Perform AM-PAC 6 Click Basic Mobility/ Daily Activity assessment  daily.  - Set and communicate daily mobility goal to care team and patient/family/caregiver.   - Collaborate with rehabilitation services on mobility goals if consulted  - Perform Range of Motion 3 times a day.  - Reposition patient every 3 hours.  - Dangle patient 3 times a day  - Stand patient 3 times a day  - Ambulate patient 3 times a day  - Out of bed to chair 3 times a day   - Out of bed for meals 3 times a day  - Out of bed for toileting  - Record patient progress and toleration of activity level   Outcome: Progressing     Problem: DISCHARGE PLANNING  Goal: Discharge to home or other facility with appropriate resources  Description: INTERVENTIONS:  - Identify barriers to discharge w/patient and caregiver  - Arrange for needed discharge resources and transportation as appropriate  - Identify discharge learning needs (meds, wound care, etc.)  - Arrange for interpretive services to assist at discharge as needed  - Refer to Case Management Department for coordinating discharge planning if the patient needs post-hospital services based on physician/advanced practitioner order or complex needs related to functional status, cognitive ability, or social support system  Outcome: Progressing     Problem: Knowledge Deficit  Goal: Patient/family/caregiver demonstrates understanding of disease process, treatment plan, medications, and discharge instructions  Description: Complete learning assessment and assess knowledge base.  Interventions:  - Provide teaching at level of understanding  - Provide teaching via preferred learning methods  Outcome: Progressing     Problem: Prexisting or High Potential for Compromised Skin Integrity  Goal: Skin integrity is maintained or improved  Description: INTERVENTIONS:  - Identify patients at risk for skin breakdown  - Assess and monitor skin integrity  - Assess and monitor nutrition and hydration status  - Monitor labs   - Assess for incontinence   - Turn and reposition patient  -  Assist with mobility/ambulation  - Relieve pressure over bony prominences  - Avoid friction and shearing  - Provide appropriate hygiene as needed including keeping skin clean and dry  - Evaluate need for skin moisturizer/barrier cream  - Collaborate with interdisciplinary team   - Patient/family teaching  - Consider wound care consult   Outcome: Progressing     Problem: Nutrition/Hydration-ADULT  Goal: Nutrient/Hydration intake appropriate for improving, restoring or maintaining nutritional needs  Description: Monitor and assess patient's nutrition/hydration status for malnutrition. Collaborate with interdisciplinary team and initiate plan and interventions as ordered.  Monitor patient's weight and dietary intake as ordered or per policy. Utilize nutrition screening tool and intervene as necessary. Determine patient's food preferences and provide high-protein, high-caloric foods as appropriate.     INTERVENTIONS:  - Monitor oral intake, urinary output, labs, and treatment plans  - Assess nutrition and hydration status and recommend course of action  - Evaluate amount of meals eaten  - Assist patient with eating if necessary   - Allow adequate time for meals  - Recommend/ encourage appropriate diets, oral nutritional supplements, and vitamin/mineral supplements  - Order, calculate, and assess calorie counts as needed  - Recommend, monitor, and adjust tube feedings and TPN/PPN based on assessed needs  - Assess need for intravenous fluids  - Provide specific nutrition/hydration education as appropriate  - Include patient/family/caregiver in decisions related to nutrition  Outcome: Progressing

## 2024-09-17 NOTE — DISCHARGE INSTR - OTHER ORDERS
Plan:  Skin care plans:    1-Cleanse bilateral sacrobuttock wounds with foaming cleanser & pat dry. Apply thin to moderate layer of Triad paste 2x/day and as needed for soilage/dislodgement.  2-Cleanse wound to left 1st toe with normal saline or mild soap and water & pat dry. Paint wound with betadine swab, cover with gauze, rolled gauze and tape. Change every other day & as needed for soilage/dislodgement. Would recommend Podiatry consult.  3-Apply Prevent barrier cream or equivalent to bilateral heels 2x/day and as needed.  4-Heel lift boots to be worn to bilateral heels at all times in bed and after transfer to reclining chair if able. If patient unable to tolerate, must float heels on 2 pillows to offload pressure so heels are not in contact with mattress or pillows.  5-Use pressure redistribution cushion in chair when out of bed if able. Avoid prolonged sitting due to sacrobuttock pressure injuries.  6-Moisturize skin daily with skin nourishing cream.  7-Turn/reposition every 2 hrs for pressure re-distribution on skin.

## 2024-09-17 NOTE — ASSESSMENT & PLAN NOTE
Patient with documented history of paroxysmal atrial fibrillation.  Currently on sotalol 80 mg twice daily.  [id number] stroke risk score: 5 points, moderate to high risk.  Discussed with patient given history of paroxysmal atrial fibrillation and elevated [id number] stroke risk score is recommended patient to be on anticoagulation.  Patient declines anticoagulation.  Discussed risks of not being on anticoagulation given history of paroxysmal atrial fibrillation clued but are not limited to stroke, heart attack, PE.  Patient reported she understand risks of not being on anticoagulation, continues to decline anticoagulation.

## 2024-09-17 NOTE — NURSING NOTE
Patient discharged to [facility] Report given to Pee, all belongings given to transporter team.

## 2024-09-17 NOTE — ASSESSMENT & PLAN NOTE
9/16/24 lipid panel: Cholesterol 236, triglycerides 281, HDL 36, .  Continue Lipitor 40 mg daily, started on 9/16/24.

## 2024-09-17 NOTE — DISCHARGE SUMMARY
Discharge Summary - Hospitalist   Name: Ernestina Hurley 88 y.o. female I MRN: [id number]  Unit/Bed#: [address] [place] [address] Date of Admission: 9/15/2024   Date of Service: 9/17/2024 I Hospital Day: 1     Assessment & Plan  Chest pain  Patient presented with intermittent chest tightness radiating to the arms for the past 2 days with some shortness of breath  EKG was unremarkable  Troponin x 2 were negative   SUSAN score 3  Will add aspirin 81 mg p.o. daily  Fasting lipid panel noted above  Due to multiple risk factors patient will be scheduled for nuclear stress test in a.m.  Cardiology consultation appreciated    Mixed hyperlipidemia  Patient currently not on any medications   Fasting lipid panel resulted as follow:  Cholesterol 236, triglyceride 281, , HDL 36  Continue atorvastatin 40 mg    Atrial fibrillation (HCC)  Currently in sinus rhythm   Continue sotalol 80 mg p.o. twice daily  Patient not on anticoagulation  Chads vascular score is 5    Type 2 diabetes mellitus without complication, without long-term current use of insulin (HCC)  Lab Results   Component Value Date    HGBA1C 7.4 (H) 09/15/2024       Recent Labs     09/16/24  2049 09/17/24  0642 09/17/24  0715 09/17/24  1117   POCGLU 197* 124 141* 187*     Continue home regimen      Blood Sugar Average: Last 72 hrs:  (P) [id number] reports that she is not been getting metformin at the nursing home  Depression  Patient is on Zoloft   GERD (gastroesophageal reflux disease)  Also known history of duodenal ulcer with bleeding during hospitalization in May  Continue PPI twice daily  Multiple sclerosis (HCC)  Patient resides at the nursing home and bedbound  Essential (primary) hypertension  Presented with hypertensive urgency  Started on losartan 25 mg daily  9/17: Episode of hypertensive urgency with - 203  Increase Losartan to 50 mg daily  Pulmonary hypertension (HCC)       Medical Problems       Resolved Problems  Date Reviewed:  9/17/2024   None       Discharging Physician / Practitioner: JAMISON Sal  PCP: No primary care provider on file.  Admission Date:   Admission Orders (From admission, onward)       Ordered        09/16/24 1443  INPATIENT ADMISSION  Once            09/15/24 1328  Place in Observation  Once                          Discharge Date: 09/17/24    Consultations During Hospital Stay:  Cardiology    Procedures Performed:   Chest x-ray: Left lower lobe consolidation and pleural effusion    Significant Findings / Test Results:   Hypertensive urgency      Test Results Pending at Discharge (will require follow up):   None     Outpatient Tests Requested:  None    Complications:  None    Reason for Admission: Chest pressure    Hospital Course:   Ernestina Hurley is a 88 y.o. female patient who originally presented to the hospital on 9/15/2024 due to above.  Unremarkable EKG.  Troponins negative.  Cardiology consulted and ordered nuclear stress test which showed abnormality with apical defect noted with pharmacology vasodilation.  Cardiology discussed the abnormality with patient but will like to pursue conservative management.  Presented to the ED with hypertensive urgency and intermittently while hospitalized noted with SBP 190s to 200s requiring initiating of losartan 50 mg daily.  Wound care was consulted for bilateral sacral was asked wound.  Lab work revealed elevated cholesterol, triglyceride and LDL requiring starting atorvastatin 40 mg daily.  Patient will be discharged to prior SNF.  Denies any pain, shortness of breath, lightheadedness, nausea or vomiting at this time.        Please see above list of diagnoses and related plan for additional information.     Condition at Discharge: stable    Discharge Day Visit / Exam:     Subjective: Seen and examined lying in bed.  Patient reports overall improvement.  No reported problem with new medications (losartan and Lipitor)    Vitals: Blood Pressure: 156/70  "(09/17/24 1231)  Pulse: 62 (09/17/24 1113)  Temperature: 98 °F (36.7 °C) (09/17/24 0709)  Temp Source: Tympanic (09/15/24 1029)  Respirations: 18 (09/17/24 1138)  Height: 5' 8\" (172.7 cm) (09/16/24 0736)  Weight - Scale: 83.9 kg (185 lb) (09/16/24 0736)  SpO2: 95 % (09/17/24 1113)    Exam:   Physical Exam  HENT:      Head: Normocephalic.      Mouth/Throat:      Mouth: Mucous membranes are moist.   Cardiovascular:      Rate and Rhythm: Normal rate.   Pulmonary:      Effort: Pulmonary effort is normal.   Abdominal:      General: Bowel sounds are normal.   Musculoskeletal:      Cervical back: Normal range of motion.   Skin:     General: Skin is warm and dry.      Capillary Refill: Capillary refill takes less than 2 seconds.   Neurological:      Mental Status: She is alert. Mental status is at baseline.   Psychiatric:         Behavior: Behavior normal.          Discussion with Family: Attempted to update  (daughter) via phone. Left voicemail.     Discharge instructions/Information to patient and family:   See after visit summary for information provided to patient and family.      Provisions for Follow-Up Care:  See after visit summary for information related to follow-up care and any pertinent home health orders.      Mobility at time of Discharge:   Basic Mobility Inpatient Raw Score: 6  JH-HLM Goal: 2: Bed activities/Dependent transfer  JH-HLM Achieved: 2: Bed activities/Dependent transfer       Disposition:   Other Skilled Nursing Facility at [facility]    Planned Readmission: No    Discharge Medications:  See after visit summary for reconciled discharge medications provided to patient and/or family.      Administrative Statements   Discharge Statement:  I have spent a total time of  minutes in caring for this patient on the day of the visit/encounter. .    **Please Note: This note may have been constructed using a voice recognition system**  "

## 2024-09-17 NOTE — PLAN OF CARE
Problem: Potential for Falls  Goal: Patient will remain free of falls  Description: INTERVENTIONS:  - Educate patient/family on patient safety including physical limitations  - Instruct patient to call for assistance with activity   - Consult OT/PT to assist with strengthening/mobility   - Keep Call bell within reach  - Keep bed low and locked with side rails adjusted as appropriate  - Keep care items and personal belongings within reach  - Initiate and maintain comfort rounds  - Make Fall Risk Sign visible to staff  - Offer Toileting every 2 Hours, in advance of need  - Initiate/Maintain bed/chair alarm  - Obtain necessary fall risk management equipment:  bed/chair alarm  - Apply yellow socks and bracelet for high fall risk patients  - Consider moving patient to room near nurses station  Outcome: Progressing     Problem: PAIN - ADULT  Goal: Verbalizes/displays adequate comfort level or baseline comfort level  Description: Interventions:  - Encourage patient to monitor pain and request assistance  - Assess pain using appropriate pain scale  - Administer analgesics based on type and severity of pain and evaluate response  - Implement non-pharmacological measures as appropriate and evaluate response  - Consider cultural and social influences on pain and pain management  - Notify physician/advanced practitioner if interventions unsuccessful or patient reports new pain  Outcome: Progressing     Problem: INFECTION - ADULT  Goal: Absence or prevention of progression during hospitalization  Description: INTERVENTIONS:  - Assess and monitor for signs and symptoms of infection  - Monitor lab/diagnostic results  - Monitor all insertion sites, i.e. indwelling lines, tubes, and drains  - Monitor endotracheal if appropriate and nasal secretions for changes in amount and color  - [place] appropriate cooling/warming therapies per order  - Administer medications as ordered  - Instruct and encourage patient and family to use  good hand hygiene technique  - Identify and instruct in appropriate isolation precautions for identified infection/condition  Outcome: Progressing     Problem: SAFETY ADULT  Goal: Patient will remain free of falls  Description: INTERVENTIONS:  - Educate patient/family on patient safety including physical limitations  - Instruct patient to call for assistance with activity   - Consult OT/PT to assist with strengthening/mobility   - Keep Call bell within reach  - Keep bed low and locked with side rails adjusted as appropriate  - Keep care items and personal belongings within reach  - Initiate and maintain comfort rounds  - Make Fall Risk Sign visible to staff  - Offer Toileting every 2 Hours, in advance of need  - Initiate/Maintain bed/chair alarm  - Obtain necessary fall risk management equipment:  bed/chair alarm  - Apply yellow socks and bracelet for high fall risk patients  - Consider moving patient to room near nurses station  Outcome: Progressing  Goal: Maintain or return to baseline ADL function  Description: INTERVENTIONS:  -  Assess patient's ability to carry out ADLs; assess patient's baseline for ADL function and identify physical deficits which impact ability to perform ADLs (bathing, care of mouth/teeth, toileting, grooming, dressing, etc.)  - Assess/evaluate cause of self-care deficits   - Assess range of motion  - Assess patient's mobility; develop plan if impaired  - Assess patient's need for assistive devices and provide as appropriate  - Encourage maximum independence but intervene and supervise when necessary  - Involve family in performance of ADLs  - Assess for home care needs following discharge   - Consider OT consult to assist with ADL evaluation and planning for discharge  - Provide patient education as appropriate  Outcome: Progressing  Goal: Maintains/Returns to pre admission functional level  Description: INTERVENTIONS:  - Perform AM-PAC 6 Click Basic Mobility/ Daily Activity assessment  daily.  - Set and communicate daily mobility goal to care team and patient/family/caregiver.   - Collaborate with rehabilitation services on mobility goals if consulted  - Perform Range of Motion 3 times a day.  - Reposition patient every 3 hours.  - Dangle patient 3 times a day  - Stand patient 3 times a day  - Ambulate patient 3 times a day  - Out of bed to chair 3 times a day   - Out of bed for meals 3 times a day  - Out of bed for toileting  - Record patient progress and toleration of activity level   Outcome: Progressing     Problem: DISCHARGE PLANNING  Goal: Discharge to home or other facility with appropriate resources  Description: INTERVENTIONS:  - Identify barriers to discharge w/patient and caregiver  - Arrange for needed discharge resources and transportation as appropriate  - Identify discharge learning needs (meds, wound care, etc.)  - Arrange for interpretive services to assist at discharge as needed  - Refer to Case Management Department for coordinating discharge planning if the patient needs post-hospital services based on physician/advanced practitioner order or complex needs related to functional status, cognitive ability, or social support system  Outcome: Progressing     Problem: Knowledge Deficit  Goal: Patient/family/caregiver demonstrates understanding of disease process, treatment plan, medications, and discharge instructions  Description: Complete learning assessment and assess knowledge base.  Interventions:  - Provide teaching at level of understanding  - Provide teaching via preferred learning methods  Outcome: Progressing     Problem: Prexisting or High Potential for Compromised Skin Integrity  Goal: Skin integrity is maintained or improved  Description: INTERVENTIONS:  - Identify patients at risk for skin breakdown  - Assess and monitor skin integrity  - Assess and monitor nutrition and hydration status  - Monitor labs   - Assess for incontinence   - Turn and reposition patient  -  Assist with mobility/ambulation  - Relieve pressure over bony prominences  - Avoid friction and shearing  - Provide appropriate hygiene as needed including keeping skin clean and dry  - Evaluate need for skin moisturizer/barrier cream  - Collaborate with interdisciplinary team   - Patient/family teaching  - Consider wound care consult   Outcome: Progressing     Problem: Nutrition/Hydration-ADULT  Goal: Nutrient/Hydration intake appropriate for improving, restoring or maintaining nutritional needs  Description: Monitor and assess patient's nutrition/hydration status for malnutrition. Collaborate with interdisciplinary team and initiate plan and interventions as ordered.  Monitor patient's weight and dietary intake as ordered or per policy. Utilize nutrition screening tool and intervene as necessary. Determine patient's food preferences and provide high-protein, high-caloric foods as appropriate.     INTERVENTIONS:  - Monitor oral intake, urinary output, labs, and treatment plans  - Assess nutrition and hydration status and recommend course of action  - Evaluate amount of meals eaten  - Assist patient with eating if necessary   - Allow adequate time for meals  - Recommend/ encourage appropriate diets, oral nutritional supplements, and vitamin/mineral supplements  - Order, calculate, and assess calorie counts as needed  - Recommend, monitor, and adjust tube feedings and TPN/PPN based on assessed needs  - Assess need for intravenous fluids  - Provide specific nutrition/hydration education as appropriate  - Include patient/family/caregiver in decisions related to nutrition  Outcome: Progressing

## 2024-09-17 NOTE — ASSESSMENT & PLAN NOTE
Patient presented on 9/15/24 for evaluation for chest pressure. Patient states that she started to experience chest pressure on morning of 9/15/24.  She states that the pressure started in the middle of her chest with radiation to both of her arms.  She endorses some mild shortness of breath with chest discomfort.  She denies any additional symptoms such as palpitations, lightheadedness, dizziness, headache, nausea, vomiting or diaphoresis.  Patient denies currently experiencing chest pain.  9/15/24 EKG: Sinus rhythm, 60 bpm.  Inferior infarct, cited on or before 7/14/19.  Cannot rule out anterior infarct, cited on or before 3/11/2017.  9/15/24 hs troponin: 29 (0hrs), 38 (2hrs), 102 (4hrs),  9/15/24 hs troponin random: 91.   Mildly elevated troponin, likely non-MI troponin elevation secondary to hypertensive urgency on presentation.  9/16/24 nuclear stress test: Abnormal study. SSS 9 SRS 2 SDS 6 Apical defect noted with pharmacological vasodilaiton. Compared to prior study 2019- TID is new. Htn blood pressure noted at baseline.  Discussed with patient abnormal 9/16/24.  Patient would like to pursue conservative management.  States that she does not want any further cardiac workup.  Continue aspirin 81 mg daily.

## 2024-09-17 NOTE — ASSESSMENT & PLAN NOTE
Lab Results   Component Value Date    HGBA1C 7.4 (H) 09/15/2024     Recent Labs     09/16/24  1616 09/16/24  2049 09/17/24  0642 09/17/24  0715   POCGLU 127 197* 124 141*     4/04/24 HgbA1c: 8.0.   Care per primary.

## 2024-09-17 NOTE — ASSESSMENT & PLAN NOTE
Patient presented with intermittent chest tightness radiating to the arms for the past 2 days with some shortness of breath  EKG was unremarkable  Troponin x 2 were negative   SUSAN score 3  Will add aspirin 81 mg p.o. daily  Fasting lipid panel noted above  Due to multiple risk factors patient will be scheduled for nuclear stress test in a.m.  Cardiology consultation appreciated

## 2024-09-17 NOTE — ASSESSMENT & PLAN NOTE
Lab Results   Component Value Date    HGBA1C 7.4 (H) 09/15/2024       Recent Labs     09/16/24  2049 09/17/24  0642 09/17/24  0715 09/17/24  1117   POCGLU 197* 124 141* 187*     Continue home regimen      Blood Sugar Average: Last 72 hrs:  (P) [id number] reports that she is not been getting metformin at the nursing home

## 2024-09-17 NOTE — CASE MANAGEMENT
Case Management Discharge Planning Note    Patient name Ernestina Hurley  Location [address] [address] [address] MRN [id number]  : 1935 Date 2024       Current Admission Date: 9/15/2024  Current Admission Diagnosis:Chest pain   Patient Active Problem List    Diagnosis Date Noted Date Diagnosed    Essential (primary) hypertension 2024     Pulmonary hypertension (HCC) 2024     Peptic ulcer disease with hemorrhage 2024     Depression 2024     GERD (gastroesophageal reflux disease) 2024     SIRS (systemic inflammatory response syndrome) (HCC) 2024     Acute on chronic respiratory failure with hypoxia and hypercapnia ([facility]) 2024     Anemia 2022     Lower extremity weakness 2021     Current moderate episode of major depressive disorder without prior episode ([facility]) 2020     Onychomycosis 2019     Tinea pedis of both feet 2019     Pain in both feet 2019     Chest pain 2019     Thyroid nodule 2019     Diabetic polyneuropathy associated with type 2 diabetes mellitus ([facility]) 03/15/2019     Atherosclerosis of native artery of both lower extremities (HCC) 03/15/2019     Granuloma of great toe 02/15/2018     History of breast cancer 2017     Essential hypertension 2017     Type 2 diabetes mellitus without complication, without long-term current use of insulin ([facility]) 2017     Mixed hyperlipidemia 2017     Atrial fibrillation ([facility]) 2017     Multiple sclerosis ([facility]) 2017     Abnormal gait 10/08/2015     Urinary incontinence 08/15/2013     Arthropathy of multiple sites 2013       LOS (days): 1  Geometric Mean LOS (GMLOS) (days): 2.1  Days to GMLOS:1.2     OBJECTIVE:  Risk of Unplanned Readmission Score: 20.39       Current admission status: Inpatient   Preferred Pharmacy:   [facility] #[id number], [address] [place] [address]  Phone: [phone] Fax:  [phone]    [facility] - [address] [address]  Phone: [phone] Fax: [phone]    Primary Care Provider: No primary care provider on file.    Primary Insurance: MEDICARE  Secondary Insurance: BLUE CROSS    DISCHARGE DETAILS:    Discharge planning discussed with:: Daughter Herminia Horn      CM contacted family/caregiver?: Yes (SW spoke with daughter Herminia by phone to update her on discharge plan and pickup time)  Were Treatment Team discharge recommendations reviewed with patient/caregiver?: Yes  Did patient/caregiver verbalize understanding of patient care needs?: N/A- going to facility  Were patient/caregiver advised of the risks associated with not following Treatment Team discharge recommendations?: Yes    Contacts  Patient Contacts: Herminia Horn (daughter)  Relationship to Patient:: Family  Contact Method: Phone  Phone Number: [phone]  Reason/Outcome: Emergency Contact, Discharge Planning    Requested Home Health Care         Is the patient interested in HHC at discharge?: No    DME Referral Provided  Referral made for DME?: No    Other Referral/Resources/Interventions Provided:  Interventions: Facility Return, SNF, Transportation    [facility] to [facility]?  : No - Declined    Treatment Team Recommendation: Facility Return, SNF  Discharge Destination Plan:: Facility Return, SNF  Transport at Discharge : [facility]  Dispatcher Contacted: Yes  Number/Name of Dispatcher: [facility] [employer]  Transported by (Company and Unit #): [facility]  ETA of Transport (Date): 09/17/24  ETA of Transport (Time): 1400         IMM Given (Date):: 09/16/24        Accepting Facility Name, City & State : [facility]  Receiving Facility/Agency Phone Number: [phone]

## 2024-09-17 NOTE — ASSESSMENT & PLAN NOTE
Patient with documented history of essential hypertension.  Review of home meds notes that patient is not on any antihypertensives.  Patient with episodes of hypertensive urgency.  Currently on losartan 25 mg daily.  Will increase to losartan 50 mg daily.  Patient states that she is not eager to be on any antihypertensives, states that she has had reactions to medications in the past.  Currently tolerating losartan.

## 2024-09-18 LAB
ATRIAL RATE: 66 BPM
ATRIAL RATE: 66 BPM
P AXIS: 89 DEGREES
P AXIS: 90 DEGREES
PR INTERVAL: 198 MS
PR INTERVAL: 200 MS
QRS AXIS: -8 DEGREES
QRS AXIS: -9 DEGREES
QRSD INTERVAL: 80 MS
QRSD INTERVAL: 80 MS
QT INTERVAL: 320 MS
QT INTERVAL: 494 MS
QTC INTERVAL: 335 MS
QTC INTERVAL: 517 MS
T WAVE AXIS: 80 DEGREES
T WAVE AXIS: 88 DEGREES
VENTRICULAR RATE: 66 BPM
VENTRICULAR RATE: 66 BPM

## 2024-09-18 PROCEDURE — 93010 ELECTROCARDIOGRAM REPORT: CPT | Performed by: INTERNAL MEDICINE

## 2024-10-21 ENCOUNTER — OFFICE VISIT (OUTPATIENT)
Dept: OTOLARYNGOLOGY | Facility: CLINIC | Age: 89
End: 2024-10-21
Payer: MEDICARE

## 2024-10-21 DIAGNOSIS — H61.23 BILATERAL IMPACTED CERUMEN: Primary | ICD-10-CM

## 2024-10-21 DIAGNOSIS — H90.3 SENSORINEURAL HEARING LOSS (SNHL), BILATERAL: ICD-10-CM

## 2024-10-21 PROCEDURE — 99203 OFFICE O/P NEW LOW 30 MIN: CPT | Performed by: NURSE PRACTITIONER

## 2024-10-21 PROCEDURE — 69210 REMOVE IMPACTED EAR WAX UNI: CPT | Performed by: NURSE PRACTITIONER

## 2024-10-21 RX ORDER — LOSARTAN POTASSIUM AND HYDROCHLOROTHIAZIDE 12.5; 5 MG/1; MG/1
TABLET ORAL
COMMUNITY
Start: 2024-10-13

## 2024-10-21 RX ORDER — CEPHALEXIN 500 MG/1
CAPSULE ORAL EVERY 12 HOURS
COMMUNITY
Start: 2024-06-08

## 2024-10-21 RX ORDER — MUPIROCIN 20 MG/G
OINTMENT TOPICAL
COMMUNITY
Start: 2024-09-05

## 2024-10-21 NOTE — PROGRESS NOTES
Assessment/Plan:      Diagnoses and all orders for this visit:    Bilateral impacted cerumen    Sensorineural hearing loss (SNHL), bilateral    Other orders  -     mupirocin (BACTROBAN) 2 % ointment  -     cephalexin (Keflex) 500 mg capsule; Take by mouth Every 12 hours  -     METAMUCIL FIBER PO; Take 1 Tbsp by mouth every 24 hours  -     losartan-hydrochlorothiazide (HYZAAR) 50-12.5 mg per tablet  -     metFORMIN (GLUCOPHAGE) 500 mg tablet  -     Ear cerumen removal          Resides at [facility].   Here today with daughter    On exam noted bilateral cerumen impaction and unable to fully view tympanic membrane.  Cerumen impaction removed bilateral eac with alligator forceps and suction, pt tolerated procedure well.  Upon removal, improved hearing and decreased clogged sensation of bilateral ears.  Discussed routine cerumen care including avoidance of q-tips, may use cerumen softeners every one to two months.  Hydrocortisone cream pea sized amount on finger as needed for itching in ears.  Encourage ongoing follow up prn to monitor for cerumen and hearing.    Recommend Audiogram if symptoms worsen or if noises in ears fail to improve.      Subjective:     Patient ID: Ernestina Hurley is a 89 y.o. female.    Presents today as a new patient due to ear concerns. Ear cleaning at nursing home about month ago.  Bilateral ears feel blocked.  Crackling bilateral tinnitus.  No otalgia or otorrhea.  No history of ear surgery.  No current hearing aids.            Review of Systems   Constitutional: Negative.    HENT:  Negative for congestion, ear discharge, ear pain, hearing loss, nosebleeds, postnasal drip, rhinorrhea, sinus pressure, sinus pain, sore throat, tinnitus and voice change.    Respiratory:  Negative for chest tightness and shortness of breath.    Skin:  Negative for color change.   Neurological:  Negative for dizziness, numbness and headaches.   Psychiatric/Behavioral: Negative.           Objective:      Physical Exam  Constitutional:       Appearance: She is well-developed.   HENT:      Head: Normocephalic.      Right Ear: Hearing, tympanic membrane, ear canal and external ear normal. No decreased hearing noted. No drainage or tenderness. There is impacted cerumen. Tympanic membrane is not perforated or erythematous.      Left Ear: Hearing, tympanic membrane, ear canal and external ear normal. No decreased hearing noted. No drainage or tenderness. There is impacted cerumen. Tympanic membrane is not perforated or erythematous.      Nose: Nose normal. No nasal deformity or septal deviation.      Mouth/Throat:      Mouth: Mucous membranes are not pale and not dry. No oral lesions.      Dentition: Normal dentition.      Pharynx: Uvula midline. No oropharyngeal exudate.   Neck:      Trachea: No tracheal deviation.   Pulmonary:      Effort: Pulmonary effort is normal. No accessory muscle usage or respiratory distress.   Musculoskeletal:      Cervical back: Full passive range of motion without pain and neck supple.   Lymphadenopathy:      Cervical: No cervical adenopathy.   Skin:     General: Skin is warm and dry.   Neurological:      Mental Status: She is alert and oriented to person, place, and time.      Cranial Nerves: No cranial nerve deficit.      Sensory: No sensory deficit.      Comments: Wheelchair in use     Psychiatric:         Behavior: Behavior is cooperative.         Ear cerumen removal    Date/Time: 10/21/2024 11:00 AM    Performed by: JAMISON Echols  Authorized by: JAMISON Echols  Universal Protocol:  procedure performed by consultantConsent: Verbal consent obtained.  Risks and benefits: risks, benefits and alternatives were discussed  Consent given by: patient  Patient understanding: patient states understanding of the procedure being performed    Patient location:  Clinic  Procedure details:     Local anesthetic:  None    Location:  L ear and R ear    Approach:  External  Post-procedure details:      Complication:  None    Hearing quality:  Normal    Patient tolerance of procedure:  Tolerated well, no immediate complications

## 2024-11-13 ENCOUNTER — OFFICE VISIT (OUTPATIENT)
Age: 89
End: 2024-11-13
Payer: MEDICARE

## 2024-11-13 DIAGNOSIS — E11.42 DIABETIC POLYNEUROPATHY ASSOCIATED WITH TYPE 2 DIABETES MELLITUS (HCC): ICD-10-CM

## 2024-11-13 DIAGNOSIS — L97.521 DIABETIC ULCER OF TOE OF LEFT FOOT ASSOCIATED WITH TYPE 2 DIABETES MELLITUS, LIMITED TO BREAKDOWN OF SKIN (HCC): Primary | ICD-10-CM

## 2024-11-13 DIAGNOSIS — E11.621 DIABETIC ULCER OF TOE OF LEFT FOOT ASSOCIATED WITH TYPE 2 DIABETES MELLITUS, LIMITED TO BREAKDOWN OF SKIN (HCC): Primary | ICD-10-CM

## 2024-11-13 DIAGNOSIS — M79.672 LEFT FOOT PAIN: ICD-10-CM

## 2024-11-13 DIAGNOSIS — M86.272 SUBACUTE OSTEOMYELITIS OF LEFT FOOT (HCC): ICD-10-CM

## 2024-11-13 DIAGNOSIS — I70.209 PERIPHERAL ARTERIOSCLEROSIS (HCC): ICD-10-CM

## 2024-11-13 PROCEDURE — 99214 OFFICE O/P EST MOD 30 MIN: CPT | Performed by: PODIATRIST

## 2024-11-13 NOTE — PROGRESS NOTES
Assessment/Plan: Ulcerated nailbed left hallux.  Granulation tissue noted.  Rule out osteomyelitis.  Diabetic neuropathy.  Peripheral artery disease.  Pain.    Plan.  Chart reviewed.  Nursing home notes reviewed.  Patient examined.  Diabetic foot exam performed.  At this time there appears to be granulation tissue of the nailbed of the left hallux.  This could be indicative of osteomyelitis.  We are recommending x-ray.  If equivocal or negative we still recommend MRI.  Continue wound care.  May need to treat for osteomyelitis.  These instructions have been written for skilled nursing facility.  Continue wound care.  Watch for signs of infection.  Return for follow-up.         Diagnoses and all orders for this visit:    Diabetic ulcer of toe of left foot associated with type 2 diabetes mellitus, limited to breakdown of skin (HCC)    Diabetic polyneuropathy associated with type 2 diabetes mellitus (HCC)    Peripheral arteriosclerosis (HCC)    Subacute osteomyelitis of left foot (HCC)          Subjective: Patient presents for second opinion.  Patient recently had left toenail avulsed.  Since that time she has had pain.  Patient is diabetic.  This has been ongoing for months.            Allergies   Allergen Reactions    Clindamycin      Annotation - 08Oct2015: bad taste in mouth    Dabigatran     Dabigatran Etexilate Mesylate Other (See Comments)    Methixene Other (See Comments)    Pradaxa [Dabigatran Etexilate Mesylate]     Sulfamethoxazole-Trimethoprim Other (See Comments)     General weakness    Warfarin Lip Swelling     Reaction Date: 22May2012; Annotation - 04Sep2012: lip swelling    Latex Rash     Annotation - 08Jun2015: RASH         Current Outpatient Medications:     acetaminophen (TYLENOL) 650 mg CR tablet, Take 650 mg by mouth every 6 (six) hours as needed for mild pain, Disp: , Rfl:     aspirin 81 mg chewable tablet, Chew 1 tablet (81 mg total) daily, Disp: , Rfl:     atorvastatin (LIPITOR) 40 mg tablet,  Take 1 tablet (40 mg total) by mouth daily with dinner, Disp: , Rfl:     cephalexin (Keflex) 500 mg capsule, Take by mouth Every 12 hours, Disp: , Rfl:     ferrous sulfate 325 (65 FE) MG EC tablet, Take 325 mg by mouth daily with breakfast, Disp: , Rfl:     latanoprost (XALATAN) 0.005 % ophthalmic solution, Administer 1 drop to both eyes daily at bedtime, Disp: 2.5 mL, Rfl: 0    loratadine (CLARITIN) 10 mg tablet, Take 10 mg by mouth daily, Disp: , Rfl:     losartan (COZAAR) 50 mg tablet, Take 1 tablet (50 mg total) by mouth daily (Patient not taking: Reported on 10/21/2024), Disp: , Rfl:     losartan-hydrochlorothiazide (HYZAAR) 50-12.5 mg per tablet, , Disp: , Rfl:     magnesium Oxide (MAG-OX) 400 mg TABS, Take 1 tablet (400 mg total) by mouth 2 (two) times a day for 4 doses, Disp: , Rfl:     METAMUCIL FIBER PO, Take 1 Tbsp by mouth every 24 hours, Disp: , Rfl:     metFORMIN (GLUCOPHAGE) 500 mg tablet, , Disp: , Rfl:     mupirocin (BACTROBAN) 2 % ointment, , Disp: , Rfl:     pantoprazole (PROTONIX) 40 mg tablet, Take 1 tablet (40 mg total) by mouth 2 (two) times a day, Disp: , Rfl:     polyethylene glycol (MIRALAX) 17 g packet, Take 17 g by mouth every other day, Disp: , Rfl:     sertraline (ZOLOFT) 25 mg tablet, Take 25 mg by mouth daily, Disp: , Rfl:     sotalol (BETAPACE) 80 mg tablet, Take 80 mg by mouth 2 (two) times a day, Disp: , Rfl:     Patient Active Problem List   Diagnosis    Mixed hyperlipidemia    Atrial fibrillation (HCC)    Multiple sclerosis (HCC)    Essential hypertension    Type 2 diabetes mellitus without complication, without long-term current use of insulin (HCC)    Urinary incontinence    History of breast cancer    Arthropathy of multiple sites    Abnormal gait    Granuloma of great toe    Diabetic polyneuropathy associated with type 2 diabetes mellitus (HCC)    Atherosclerosis of native artery of both lower extremities (HCC)    Chest pain    Thyroid nodule    Onychomycosis    Tinea pedis  of both feet    Pain in both feet    Current moderate episode of major depressive disorder without prior episode (HCC)    Lower extremity weakness    Anemia    Depression    GERD (gastroesophageal reflux disease)    SIRS (systemic inflammatory response syndrome) (HCC)    Acute on chronic respiratory failure with hypoxia and hypercapnia (HCC)    Peptic ulcer disease with hemorrhage    Essential (primary) hypertension    Pulmonary hypertension (HCC)    Hypomagnesemia          Patient ID: Ernestina Hurley is a 89 y.o. female.    HPI    The following portions of the patient's history were reviewed and updated as appropriate: She  has a past medical history of Abscess of buttock, right, Cancer ([facility]), Cellulitis of chest wall, Chronic endometritis (10/12/2006), Diabetes mellitus ([facility]), Dysuria (11/02/2007), Flushing, Glaucoma, Hyperlipidemia, Hypertension, Ingrown nail (01/05/2012), Malignant neoplasm of breast ([facility]), MS (multiple sclerosis) ([facility]), Nonvenomous insect bite, Palpitations (02/09/2011), Pressure ulcer of buttock (10/13/2006), Proctitis (05/12/2006), Vaginal polyp (03/14/2006), and Viral gastroenteritis.  She   Patient Active Problem List    Diagnosis Date Noted    Hypomagnesemia 09/17/2024    Essential (primary) hypertension 09/16/2024    Pulmonary hypertension (HCC) 09/16/2024    Peptic ulcer disease with hemorrhage 05/23/2024    Depression 03/30/2024    GERD (gastroesophageal reflux disease) 03/30/2024    SIRS (systemic inflammatory response syndrome) ([facility]) 03/30/2024    Acute on chronic respiratory failure with hypoxia and hypercapnia ([facility]) 03/30/2024    Anemia 01/06/2022    Lower extremity weakness 11/23/2021    Current moderate episode of major depressive disorder without prior episode ([facility]) 08/11/2020    Onychomycosis 09/24/2019    Tinea pedis of both feet 09/24/2019    Pain in both feet 09/24/2019    Chest pain 07/14/2019    Thyroid nodule 07/14/2019    Diabetic polyneuropathy associated with type 2  diabetes mellitus ([facility]) 03/15/2019    Atherosclerosis of native artery of both lower extremities (HCC) 03/15/2019    Granuloma of great toe 02/15/2018    History of breast cancer 06/28/2017    Essential hypertension 03/11/2017    Type 2 diabetes mellitus without complication, without long-term current use of insulin ([facility]) 03/11/2017    Mixed hyperlipidemia 02/27/2017    Atrial fibrillation ([facility]) 02/27/2017    Multiple sclerosis ([facility]) 02/27/2017    Abnormal gait 10/08/2015    Urinary incontinence 08/15/2013    Arthropathy of multiple sites 04/23/2013     She  has a past surgical history that includes Breast surgery; Incision and drainage of wound (Left, 2/27/2017); Cervical biopsy w/ loop electrode excision; Dilation and curettage of uterus; Mastectomy; Breast biopsy; and IR thoracentesis (4/3/2024).  Her family history includes Breast cancer in her maternal aunt; COPD in her sister; Cancer in her cousin and daughter; Diabetes in her sister; Heart disease in her cousin and father; Lung cancer in her cousin and mother; Multiple sclerosis in her cousin.  She  reports that she has quit smoking. She has never used smokeless tobacco. She reports that she does not currently use alcohol. She reports that she does not currently use drugs.  Current Outpatient Medications   Medication Sig Dispense Refill    acetaminophen (TYLENOL) 650 mg CR tablet Take 650 mg by mouth every 6 (six) hours as needed for mild pain      aspirin 81 mg chewable tablet Chew 1 tablet (81 mg total) daily      atorvastatin (LIPITOR) 40 mg tablet Take 1 tablet (40 mg total) by mouth daily with dinner      cephalexin (Keflex) 500 mg capsule Take by mouth Every 12 hours      ferrous sulfate 325 (65 FE) MG EC tablet Take 325 mg by mouth daily with breakfast      latanoprost (XALATAN) 0.005 % ophthalmic solution Administer 1 drop to both eyes daily at bedtime 2.5 mL 0    loratadine (CLARITIN) 10 mg tablet Take 10 mg by mouth daily      losartan (COZAAR) 50  mg tablet Take 1 tablet (50 mg total) by mouth daily (Patient not taking: Reported on 10/21/2024)      losartan-hydrochlorothiazide (HYZAAR) 50-12.5 mg per tablet       magnesium Oxide (MAG-OX) 400 mg TABS Take 1 tablet (400 mg total) by mouth 2 (two) times a day for 4 doses      METAMUCIL FIBER PO Take 1 Tbsp by mouth every 24 hours      metFORMIN (GLUCOPHAGE) 500 mg tablet       mupirocin (BACTROBAN) 2 % ointment       pantoprazole (PROTONIX) 40 mg tablet Take 1 tablet (40 mg total) by mouth 2 (two) times a day      polyethylene glycol (MIRALAX) 17 g packet Take 17 g by mouth every other day      sertraline (ZOLOFT) 25 mg tablet Take 25 mg by mouth daily      sotalol (BETAPACE) 80 mg tablet Take 80 mg by mouth 2 (two) times a day       No current facility-administered medications for this visit.     Current Outpatient Medications on File Prior to Visit   Medication Sig    acetaminophen (TYLENOL) 650 mg CR tablet Take 650 mg by mouth every 6 (six) hours as needed for mild pain    aspirin 81 mg chewable tablet Chew 1 tablet (81 mg total) daily    atorvastatin (LIPITOR) 40 mg tablet Take 1 tablet (40 mg total) by mouth daily with dinner    cephalexin (Keflex) 500 mg capsule Take by mouth Every 12 hours    ferrous sulfate 325 (65 FE) MG EC tablet Take 325 mg by mouth daily with breakfast    latanoprost (XALATAN) 0.005 % ophthalmic solution Administer 1 drop to both eyes daily at bedtime    loratadine (CLARITIN) 10 mg tablet Take 10 mg by mouth daily    losartan (COZAAR) 50 mg tablet Take 1 tablet (50 mg total) by mouth daily (Patient not taking: Reported on 10/21/2024)    losartan-hydrochlorothiazide (HYZAAR) 50-12.5 mg per tablet     magnesium Oxide (MAG-OX) 400 mg TABS Take 1 tablet (400 mg total) by mouth 2 (two) times a day for 4 doses    METAMUCIL FIBER PO Take 1 Tbsp by mouth every 24 hours    metFORMIN (GLUCOPHAGE) 500 mg tablet     mupirocin (BACTROBAN) 2 % ointment     pantoprazole (PROTONIX) 40 mg tablet  Take 1 tablet (40 mg total) by mouth 2 (two) times a day    polyethylene glycol (MIRALAX) 17 g packet Take 17 g by mouth every other day    sertraline (ZOLOFT) 25 mg tablet Take 25 mg by mouth daily    sotalol (BETAPACE) 80 mg tablet Take 80 mg by mouth 2 (two) times a day     No current facility-administered medications on file prior to visit.     She is allergic to clindamycin, dabigatran, dabigatran etexilate mesylate, methixene, pradaxa [dabigatran etexilate mesylate], sulfamethoxazole-trimethoprim, warfarin, and latex..    There were no vitals filed for this visit.    Review of Systems      Objective:  Patient's shoes and socks removed.   Foot Exam    General  General Appearance: appears stated age and healthy   Orientation: alert and oriented to person, place, and time   Affect: appropriate   Gait: antalgic   Assistance: wheelchair use       Right Foot/Ankle     Inspection and Palpation  Swelling: dorsum   Arch: pes planus  Skin Exam: dry skin;     Neurovascular  Dorsalis pedis: 1+  Posterior tibial: 1+  Saphenous nerve sensation: diminished  Tibial nerve sensation: diminished  Superficial peroneal nerve sensation: absent  Deep peroneal nerve sensation: absent  Sural nerve sensation: absent      Left Foot/Ankle      Inspection and Palpation  Tenderness: bony tenderness   Swelling: dorsum   Arch: pes planus  Skin Exam: dry skin, skin changes and ulcer;     Neurovascular  Dorsalis pedis: 1+  Posterior tibial: 1+  Saphenous nerve sensation: diminished  Tibial nerve sensation: diminished  Superficial peroneal nerve sensation: absent  Deep peroneal nerve sensation: absent  Sural nerve sensation: absent      Physical Exam  Vitals and nursing note reviewed.   Constitutional:       Appearance: Normal appearance.   Cardiovascular:      Rate and Rhythm: Normal rate and regular rhythm.      Pulses: Pulses are weak.           Dorsalis pedis pulses are 1+ on the right side and 1+ on the left side.        Posterior tibial  pulses are 1+ on the right side and 1+ on the left side.   Musculoskeletal:      Left foot: Bony tenderness present.   Feet:      Right foot:      Skin integrity: Dry skin present.      Left foot:      Skin integrity: Ulcer and dry skin present.   Skin:     Capillary Refill: Capillary refill takes less than 2 seconds.      Comments: Patient is xerosis of skin.  Left hallux demonstrates absence of nail plate.  There is erythema noted within the area.  The dorsal lateral aspect of the nail bed demonstrates granulation tissue.  This may be consistent with underlying osteomyelitis.  Negative occult abscess or cellulitis.   Neurological:      Mental Status: She is alert.     Patient's shoes and socks removed.    Right Foot/Ankle   Right Foot Inspection  Skin Exam: dry skin.     Toe Exam: swelling.     Sensory   Vibration: absent  Proprioception: diminished  Monofilament testing: diminished    Vascular  Capillary refills: < 3 seconds  The right DP pulse is 1+. The right PT pulse is 1+.     Right Toe  - Comprehensive Exam  Arch: pes planus  Swelling: dorsum       Left Foot/Ankle  Left Foot Inspection  Skin Exam: dry skin and ulcer.     Toe Exam: swelling.     Sensory   Vibration: absent  Proprioception: diminished  Monofilament testing: diminished    Vascular  Capillary refills: < 3 seconds  The left DP pulse is 1+. The left PT pulse is 1+.     Left Toe  - Comprehensive Exam  Arch: pes planus  Swelling: dorsum   Tenderness: bony tenderness       Assign Risk Category  Deformity present  Loss of protective sensation  Weak pulses  Risk: 2

## 2024-12-10 ENCOUNTER — OFFICE VISIT (OUTPATIENT)
Dept: OTOLARYNGOLOGY | Facility: CLINIC | Age: 89
End: 2024-12-10
Payer: MEDICARE

## 2024-12-10 ENCOUNTER — OFFICE VISIT (OUTPATIENT)
Dept: AUDIOLOGY | Facility: CLINIC | Age: 89
End: 2024-12-10
Payer: MEDICARE

## 2024-12-10 DIAGNOSIS — H69.91 ETD (EUSTACHIAN TUBE DYSFUNCTION), RIGHT: ICD-10-CM

## 2024-12-10 DIAGNOSIS — H90.3 SENSORINEURAL HEARING LOSS (SNHL), BILATERAL: Primary | ICD-10-CM

## 2024-12-10 DIAGNOSIS — H69.91 ETD (EUSTACHIAN TUBE DYSFUNCTION), RIGHT: Primary | ICD-10-CM

## 2024-12-10 DIAGNOSIS — H61.23 BILATERAL IMPACTED CERUMEN: ICD-10-CM

## 2024-12-10 PROCEDURE — 92567 TYMPANOMETRY: CPT | Performed by: AUDIOLOGIST

## 2024-12-10 PROCEDURE — 92557 COMPREHENSIVE HEARING TEST: CPT | Performed by: AUDIOLOGIST

## 2024-12-10 PROCEDURE — 99214 OFFICE O/P EST MOD 30 MIN: CPT | Performed by: NURSE PRACTITIONER

## 2024-12-10 NOTE — PROGRESS NOTES
"ENT HEARING EVALUATION    Name:  Ernestina Hurley  :  1935  Age:  89 y.o.   MRN:  [id number]  Date of Evaluation: 12/10/24     HISTORY:    Reason for visit:  \"Crackling\" intermittently, right greater than left ear     Ernestina Hurley is being seen today for an evaluation of hearing as part of their ENT visit.     EVALUATION:    Otoscopy revealed scant, wet wax, left ear and slight wet canal, right ear     Tympanometry:   Right Ear: Type C; significant negative middle ear pressure in the presence of normal static compliance, consistent with Eustachian tube dysfunction or middle ear pathology.    Left Ear: Type As, normal middle ear pressure with decreased static compliance, consistent with a hypomobile tympanic membrane.     Speech Audiometry:  Speech Reception (SRT)   Right Ear: 50 dB HL   Left Ear: 40 dB HL    Word Recognition Scores (WRS):  Right Ear: excellent (100 % correct)     Left Ear: excellent (100 % correct)   Stimuli: [id number]    Pure Tone Audiometry:  Conventional pure tone audiometry from 250 - 8000 Hz  was obtained with good reliability and revealed the following:     Right Ear: Moderate sloping to severe mixed hearing loss (MHL)  suspected    Left Ear: Mild sloping to severe sensorineural hearing loss (SNHL)  suspected     Unmasked bone only due to patient fatigue     *see attached audiogram    RECOMMENDATIONS:  Per daughter's report, patient is currently residing in [facility].  We agreed to wait until next visit to discuss if hearing aids are possible for her.   She has limited movement due to MS and utilizes a nasal cannula for oxygen.    Follow up per JAMISON Olsen Au.D., CCC-A  Clinical Audiologist  [id number]  [phone]  "

## 2024-12-10 NOTE — PROGRESS NOTES
Assessment/Plan:      Diagnoses and all orders for this visit:    Sensorineural hearing loss (SNHL), bilateral  -     Ambulatory referral to Audiology    Bilateral impacted cerumen    ETD (Eustachian tube dysfunction), right        Resides at [facility].   Here today with daughter    Symptoms include bilateral clogged ears. Ear cleaning about two weeks ago.    On exam, trace cerumen along rim of ear canal. Pt insistent she has wax in her ears. Reassured pt there is no occluding cerumen and the debris along rims of her ear canal entrance is normal.   Recommend audiogram today.  Audiogram completed today indicated bilateral moderate sloping to severe near profound hearing loss. Overall hearing is SNHL with conductive change on right in lower frequencies.   Word discrimination 100% bilaterally  Tymps Type A on left and type C on right.     Typical causes of SNHL include maturity changes, noise exposure, and hereditary risk factors.   Offered options for bilateral SNHL including acceptance, lifestyle changes such as moving closer to those who are speaking, or hearing amplification.  SHe would qualify for hearing amplification based on audiogram.  Discussed hearing aid options including facilities to obtain a hearing aid from as well as costs and benefits.  Discussed that quality of life may improve with hearing amplification     Reviewed the slight conductive change right ear with type C tymp most likely ETD. May consider pe tube if becomes more bothersome. But recommend repeat audiogram in 3 to 6 months. May consider nasal steroids but with oxygen via n/c may cause more dryness.     After discussion agreed to observation. Consider hearing aids. Follow up in 3 to 6 months with repeat audiogram      Subjective:     Patient ID: Ernestina Hurley is a 89 y.o. female.    Presents today for follow up due to ear concerns. Ear cleaning about two weeks ago.  Bilateral ears feel blocked.  Crackling bilateral tinnitus.   No otalgia or otorrhea.  No history of ear surgery.  No current hearing aids. Pt concerned about cerumen blocking ears.         Review of Systems   Constitutional: Negative.    HENT:  Positive for hearing loss. Negative for congestion, ear discharge, ear pain, nosebleeds, postnasal drip, rhinorrhea, sinus pressure, sinus pain, sore throat, tinnitus and voice change.    Respiratory:  Negative for chest tightness and shortness of breath.    Skin:  Negative for color change.   Neurological:  Negative for dizziness, numbness and headaches.   Psychiatric/Behavioral: Negative.           Objective:     Physical Exam  Constitutional:       Appearance: She is well-developed.   HENT:      Head: Normocephalic.      Right Ear: Hearing, tympanic membrane, ear canal and external ear normal. No decreased hearing noted. No drainage or tenderness. Tympanic membrane is not perforated or erythematous.      Left Ear: Hearing, tympanic membrane, ear canal and external ear normal. No decreased hearing noted. No drainage or tenderness. Tympanic membrane is not perforated or erythematous.      Nose: Nose normal. No nasal deformity or septal deviation.      Mouth/Throat:      Mouth: Mucous membranes are not pale and not dry. No oral lesions.      Dentition: Normal dentition.      Pharynx: Uvula midline. No oropharyngeal exudate.   Neck:      Trachea: No tracheal deviation.   Pulmonary:      Effort: Pulmonary effort is normal. No accessory muscle usage or respiratory distress.      Comments: Oxygen via n/c    Musculoskeletal:      Cervical back: Full passive range of motion without pain and neck supple.   Lymphadenopathy:      Cervical: No cervical adenopathy.   Skin:     General: Skin is warm and dry.   Neurological:      Mental Status: She is alert and oriented to person, place, and time.      Cranial Nerves: No cranial nerve deficit.      Sensory: No sensory deficit.      Comments: Wheelchair in use   Psychiatric:         Behavior:  Behavior is cooperative.

## 2024-12-11 ENCOUNTER — OFFICE VISIT (OUTPATIENT)
Dept: CARDIOLOGY CLINIC | Facility: CLINIC | Age: 89
End: 2024-12-11
Payer: MEDICARE

## 2024-12-11 VITALS — HEART RATE: 58 BPM | OXYGEN SATURATION: 95 % | SYSTOLIC BLOOD PRESSURE: 134 MMHG | DIASTOLIC BLOOD PRESSURE: 70 MMHG

## 2024-12-11 DIAGNOSIS — I10 ESSENTIAL HYPERTENSION: ICD-10-CM

## 2024-12-11 DIAGNOSIS — E78.2 MIXED HYPERLIPIDEMIA: ICD-10-CM

## 2024-12-11 DIAGNOSIS — I27.20 PULMONARY HYPERTENSION (HCC): ICD-10-CM

## 2024-12-11 DIAGNOSIS — I48.0 PAROXYSMAL ATRIAL FIBRILLATION (HCC): Primary | ICD-10-CM

## 2024-12-11 PROCEDURE — 93000 ELECTROCARDIOGRAM COMPLETE: CPT | Performed by: INTERNAL MEDICINE

## 2024-12-11 PROCEDURE — 99214 OFFICE O/P EST MOD 30 MIN: CPT | Performed by: INTERNAL MEDICINE

## 2024-12-11 NOTE — PROGRESS NOTES
Cardiology   Gaviota Mauro DO, [facility]  Jona Lagunas MD, [facility]  Rufus Webber MD, [facility]  Tatiana Trevino MD, [facility]  -------------------------------------------------------------------  [facility]  [address], [address], [address]  [place], [place], [address]  [phone]    Cardiology Follow Up  Ernestina Hurley  1935  [id number]          Assessment/Plan:    Assessment & Plan  Paroxysmal atrial fibrillation (HCC)  -No recent episodes.  Has maintained on sotalol 80 mg twice daily.  Cannot use anticoagulation due to large GI bleed in the past.  She was recently started on aspirin after recent hospitalization for chest pain and abnormal stress test.  Monitor for any bleeding.  -Continue sotalol.  Essential hypertension  -Blood pressure is stable on current medication regimen.  Likely cause of elevated blood pressure during last hospitalization was news of death of her sister.  Medication regimen.  Mixed hyperlipidemia  -Continue atorvastatin  Pulmonary hypertension (HCC)  - Continue current Rx      Interval History:     Ernestina Hurley is 89 y.o. female here for followup of recent hospitalization.   She  was recently admitted to [facility] with chest pain and hypertension.  Recently, her sister .  Afterwards she started to complain of chest pain and went to ER.   BP was elevated.  Stress test was done during admission which showed apical ischemia with TID noted.  Family requested conservative Rx.  She was started on ASA and statin dose increased.  She was also started on increased losartan.  Since hospital discharge, she denies any chest pain.  No bleeding.  She has a history of GI bleed due to duodenal ulcer and is not on anticoagulation.  No recent atrial fibrillation episodes.  Denies any palpitations.       Patient has a history of atrial fibrillation and is on treatment with sotalol.  She is not on anticoagulation.  She did not have any  atrial fibrillation during her recent hospitalization.  She has been on the medication for over 3 years.          The following portions of the patient's history were reviewed and updated as appropriate: allergies, current medications, past family history, past medical history, past social history, past surgical history, and problem list.       Current Outpatient Medications:     acetaminophen (TYLENOL) 650 mg CR tablet, Take 650 mg by mouth every 6 (six) hours as needed for mild pain, Disp: , Rfl:     aspirin 81 mg chewable tablet, Chew 1 tablet (81 mg total) daily, Disp: , Rfl:     atorvastatin (LIPITOR) 40 mg tablet, Take 1 tablet (40 mg total) by mouth daily with dinner, Disp: , Rfl:     ferrous sulfate 325 (65 FE) MG EC tablet, Take 325 mg by mouth daily with breakfast, Disp: , Rfl:     latanoprost (XALATAN) 0.005 % ophthalmic solution, Administer 1 drop to both eyes daily at bedtime, Disp: 2.5 mL, Rfl: 0    loratadine (CLARITIN) 10 mg tablet, Take 10 mg by mouth daily, Disp: , Rfl:     losartan-hydrochlorothiazide (HYZAAR) 50-12.5 mg per tablet, , Disp: , Rfl:     pantoprazole (PROTONIX) 40 mg tablet, Take 1 tablet (40 mg total) by mouth 2 (two) times a day, Disp: , Rfl:     polyethylene glycol (MIRALAX) 17 g packet, Take 17 g by mouth every other day, Disp: , Rfl:     sertraline (ZOLOFT) 25 mg tablet, Take 25 mg by mouth daily, Disp: , Rfl:     sotalol (BETAPACE) 80 mg tablet, Take 80 mg by mouth 2 (two) times a day, Disp: , Rfl:     cephalexin (Keflex) 500 mg capsule, Take by mouth Every 12 hours (Patient not taking: Reported on 12/11/2024), Disp: , Rfl:     losartan (COZAAR) 50 mg tablet, Take 1 tablet (50 mg total) by mouth daily (Patient not taking: Reported on 10/21/2024), Disp: , Rfl:     magnesium Oxide (MAG-OX) 400 mg TABS, Take 1 tablet (400 mg total) by mouth 2 (two) times a day for 4 doses, Disp: , Rfl:     METAMUCIL FIBER PO, Take 1 Tbsp by mouth every 24 hours (Patient not taking: Reported on  12/11/2024), Disp: , Rfl:     metFORMIN (GLUCOPHAGE) 500 mg tablet, , Disp: , Rfl:     mupirocin (BACTROBAN) 2 % ointment, , Disp: , Rfl:         Review of Systems:  Review of Systems   Constitutional:  Positive for fatigue.   Respiratory:  Positive for shortness of breath.    Cardiovascular:  Negative for chest pain, palpitations and leg swelling.   Musculoskeletal:  Positive for arthralgias, gait problem and myalgias.   Neurological:  Positive for weakness.   All other systems reviewed and are negative.        Physical Exam:  Vitals:  Vitals:    12/11/24 1116   BP: 134/70   BP Location: Left arm   Patient Position: Sitting   Cuff Size: Large   Pulse: 58   SpO2: 95%     Physical Exam   Constitutional: She appears healthy. No distress.   Eyes: Pupils are equal, round, and reactive to light. Conjunctivae are normal.   Neck: No JVD present.   Cardiovascular: Normal rate and regular rhythm. Exam reveals no gallop and no friction rub.   Murmur heard.  Pulmonary/Chest: Effort normal and breath sounds normal. She has no wheezes. She has no rales.   Musculoskeletal:         General: No tenderness, deformity or edema.      Cervical back: Normal range of motion and neck supple.   Neurological: She is alert and oriented to person, place, and time.   Skin: Skin is warm and dry.        Cardiographics:  EKG: Personally reviewed NSR with rate 60 Normal QT interval  Last known EF: 60%    This note was completed in part utilizing M-Modal Fluency Direct Software.  Grammatical errors, random word insertions, spelling mistakes, and incomplete sentences can be an occasional consequence of this system secondary to software limitations, ambient noise, and hardware issues.  If you have any questions or concerns about the content, text, or information contained within the body of this dictation, please contact the provider for clarification.

## 2024-12-11 NOTE — ASSESSMENT & PLAN NOTE
-Blood pressure is stable on current medication regimen.  Likely cause of elevated blood pressure during last hospitalization was news of death of her sister.  Medication regimen.

## 2025-02-25 NOTE — PROGRESS NOTES
Prasanna Rodriguez is having increased right leg pain and weakness today  "I cant' put any weight on it at all " She is afraid of falling  She did call Dr Asia Bowen, he is on vacation  I have suggested she call Dr Justin Govea who has cared for her prior to this last appointment  She agrees to do this  Prasanna Rodriguez wishes to have home physical therapy as she is essentially home bound 
Never smoker

## 2025-05-03 NOTE — ANESTHESIA PREPROCEDURE EVALUATION
Discharge instructions provided   Procedure:  EGD    Relevant Problems   CARDIO   (+) Atrial fibrillation (HCC)   (+) Chest pain   (+) Essential hypertension   (+) Mixed hyperlipidemia      ENDO   (+) Type 2 diabetes mellitus without complication, without long-term current use of insulin (HCC)      GI/HEPATIC   (+) GERD (gastroesophageal reflux disease)   (+) Peptic ulcer disease with hemorrhage      HEMATOLOGY   (+) Anemia      NEURO/PSYCH   (+) Current moderate episode of major depressive disorder without prior episode (HCC)   (+) Depression   (+) Diabetic polyneuropathy associated with type 2 diabetes mellitus (HCC)   (+) Lower extremity weakness      PULMONARY   (+) Acute on chronic respiratory failure with hypoxia and hypercapnia (HCC)        Physical Exam    Airway    Mallampati score: II  TM Distance: >3 FB  Neck ROM: full     Dental   No notable dental hx     Cardiovascular  Cardiovascular exam normal    Pulmonary  Pulmonary exam normal     Other Findings  post-pubertal.      Anesthesia Plan  ASA Score- 3     Anesthesia Type- IV sedation with anesthesia with ASA Monitors.         Additional Monitors:     Airway Plan:            Plan Factors-Exercise tolerance (METS): >4 METS.    Chart reviewed.   Existing labs reviewed. Patient summary reviewed.    Patient is not a current smoker.      Obstructive sleep apnea risk education given perioperatively.        Induction-     Postoperative Plan-     Perioperative Resuscitation Plan - Level 1 - Full Code.  The DNR status was discussed with the patient and/or POA, and Level 3 code status (DNR/DNI) will be maintained throughout the perioperative period.     Informed Consent- Anesthetic plan and risks discussed with patient.  I personally reviewed this patient with the CRNA. Discussed and agreed on the Anesthesia Plan with the CRNA..

## 2025-06-11 ENCOUNTER — OFFICE VISIT (OUTPATIENT)
Dept: OTOLARYNGOLOGY | Facility: CLINIC | Age: 89
End: 2025-06-11
Payer: MEDICARE

## 2025-06-11 VITALS — TEMPERATURE: 98.6 F

## 2025-06-11 DIAGNOSIS — H61.23 BILATERAL IMPACTED CERUMEN: Primary | ICD-10-CM

## 2025-06-11 DIAGNOSIS — H69.91 ETD (EUSTACHIAN TUBE DYSFUNCTION), RIGHT: ICD-10-CM

## 2025-06-11 DIAGNOSIS — H90.3 SENSORINEURAL HEARING LOSS (SNHL), BILATERAL: ICD-10-CM

## 2025-06-11 PROCEDURE — 99213 OFFICE O/P EST LOW 20 MIN: CPT | Performed by: NURSE PRACTITIONER

## 2025-06-11 PROCEDURE — 69210 REMOVE IMPACTED EAR WAX UNI: CPT | Performed by: NURSE PRACTITIONER

## 2025-06-11 NOTE — PROGRESS NOTES
:  Assessment & Plan  Bilateral impacted cerumen    Orders:    Ear cerumen removal    Sensorineural hearing loss (SNHL), bilateral         ETD (Eustachian tube dysfunction), right           Resides at [facility].   Here today with daughter     Symptoms include bilateral clogged ears.      On exam, bilateral cerumen impaction. Removed with suction and alligator forceps.     Audiogram completed 12/2024 indicated bilateral moderate sloping to severe near profound hearing loss. Overall hearing is SNHL with conductive change on right in lower frequencies.   Word discrimination 100% bilaterally  Tymps Type A on left and type C on right.   Offered repeat audiogram today but agree to defer until December as pt is declining hearing aid evaluation.     After discussion agreed to observation. Consider hearing aids. Follow up in 6 months with repeat audiogram 12/2025    History of Present Illness     Ernestina Hurley is a 89 y.o. female   Presents today for follow up due to ear concerns. Bilateral ears feel blocked.  Crackling bilateral tinnitus.  No otalgia or otorrhea.  No history of ear surgery.  No current hearing aids. Pt concerned about cerumen blocking ears.       Review of Systems   Constitutional: Negative.    HENT:  Positive for hearing loss. Negative for congestion, ear discharge, ear pain, nosebleeds, postnasal drip, rhinorrhea, sinus pressure, sinus pain, sore throat, tinnitus and voice change.    Respiratory:  Negative for chest tightness and shortness of breath.    Skin:  Negative for color change.   Neurological:  Negative for dizziness, numbness and headaches.   Psychiatric/Behavioral: Negative.       Objective   Temp 98.6 °F (37 °C) (Temporal)      Physical Exam  Vitals and nursing note reviewed.   Constitutional:       General: She is not in acute distress.     Appearance: She is well-developed.   HENT:      Head: Normocephalic and atraumatic.      Right Ear: Tympanic membrane, ear canal and external  ear normal. There is impacted cerumen.      Left Ear: Tympanic membrane, ear canal and external ear normal. There is impacted cerumen.      Nose: Nose normal.      Mouth/Throat:      Mouth: Mucous membranes are moist.   Pulmonary:      Effort: Pulmonary effort is normal. No respiratory distress.      Comments: Oxygen via n/c      Musculoskeletal:      Cervical back: Neck supple. Torticollis present. Decreased range of motion.     Skin:     General: Skin is warm and dry.     Neurological:      Mental Status: She is alert.      Comments: Wheelchair in use     Psychiatric:         Mood and Affect: Mood normal.       Ear cerumen removal    Date/Time: 6/11/2025 2:00 PM    Performed by: JAMISON Echols  Authorized by: JAMISON Echols    Universal Protocol:  procedure performed by consultantConsent: Verbal consent obtained  Risks and benefits: risks, benefits and alternatives were discussed  Consent given by: patient  Patient understanding: patient states understanding of the procedure being performed    Patient location:  Clinic  Procedure details:     Local anesthetic:  None    Location:  L ear and R ear    Approach:  External  Post-procedure details:     Complication:  None    Hearing quality:  Normal    Patient tolerance of procedure:  Tolerated well, no immediate complications

## 2025-06-16 NOTE — ED NOTES
Cleaned perineal area and place a purewick to collect urine specimen.  Patient is incontinent.     Kayleigh James RN  05/23/24 9948     no rashes, no jaundice present, good turgor

## (undated) DEVICE — ABDOMINAL PAD: Brand: DERMACEA

## (undated) DEVICE — GLOVE SRG BIOGEL 7

## (undated) DEVICE — INTENDED FOR TISSUE SEPARATION, AND OTHER PROCEDURES THAT REQUIRE A SHARP SURGICAL BLADE TO PUNCTURE OR CUT.: Brand: BARD-PARKER ® CARBON RIB-BACK BLADES

## (undated) DEVICE — LABEL STERILE (RSC) (2-SENSOR CAINE 2-LIDOCAINE 2-HEPARIN)

## (undated) DEVICE — IODOFORM PACKING STRIP: Brand: CURITY

## (undated) DEVICE — 3M™ TEGADERM™ TRANSPARENT FILM DRESSING FRAME STYLE, 1627, 4 IN X 10 IN (10 CM X 25 CM), 20/CT 4CT/CASE: Brand: 3M™ TEGADERM™

## (undated) DEVICE — SPONGE GAUZE 4 X 8 12 PLY STRL LF

## (undated) DEVICE — PACK GENERAL LF

## (undated) DEVICE — 3M™ TEGADERM™ TRANSPARENT FILM DRESSING FRAME STYLE, 1626W, 4 IN X 4-3/4 IN (10 CM X 12 CM), 50/CT 4CT/CASE: Brand: 3M™ TEGADERM™

## (undated) DEVICE — GROUNDING PAD UNIVERSAL SLW

## (undated) DEVICE — SYRINGE 10ML LL CONTROL TOP

## (undated) DEVICE — GLOVE INDICATOR PI UNDERGLOVE SZ 7.5 BLUE

## (undated) DEVICE — SCD SEQUENTIAL COMPRESSION COMFORT SLEEVE MEDIUM KNEE LENGTH: Brand: KENDALL SCD

## (undated) DEVICE — NEEDLE 25G X 1 1/2

## (undated) DEVICE — TIBURON LAPAROTOMY DRAPE: Brand: CONVERTORS

## (undated) DEVICE — BASIC DOUBLE BASIN 2-LF: Brand: MEDLINE INDUSTRIES, INC.

## (undated) DEVICE — ASTOUND IMPERVIOUS SURGICAL GOWN: Brand: CONVERTORS